# Patient Record
Sex: MALE | Race: WHITE | NOT HISPANIC OR LATINO
[De-identification: names, ages, dates, MRNs, and addresses within clinical notes are randomized per-mention and may not be internally consistent; named-entity substitution may affect disease eponyms.]

---

## 2018-08-20 ENCOUNTER — APPOINTMENT (OUTPATIENT)
Dept: DERMATOLOGY | Facility: CLINIC | Age: 78
End: 2018-08-20
Payer: MEDICARE

## 2018-08-20 PROCEDURE — 99203 OFFICE O/P NEW LOW 30 MIN: CPT

## 2018-08-28 ENCOUNTER — APPOINTMENT (OUTPATIENT)
Dept: DERMATOLOGY | Facility: CLINIC | Age: 78
End: 2018-08-28

## 2018-11-15 ENCOUNTER — APPOINTMENT (OUTPATIENT)
Dept: DERMATOLOGY | Facility: CLINIC | Age: 78
End: 2018-11-15

## 2022-10-20 ENCOUNTER — APPOINTMENT (OUTPATIENT)
Dept: DERMATOLOGY | Facility: CLINIC | Age: 82
End: 2022-10-20

## 2022-10-20 PROCEDURE — 99203 OFFICE O/P NEW LOW 30 MIN: CPT | Mod: 25

## 2022-10-20 PROCEDURE — 17000 DESTRUCT PREMALG LESION: CPT

## 2022-11-18 ENCOUNTER — APPOINTMENT (OUTPATIENT)
Dept: ORTHOPEDIC SURGERY | Facility: CLINIC | Age: 82
End: 2022-11-18

## 2022-11-18 VITALS — WEIGHT: 201 LBS | BODY MASS INDEX: 28.14 KG/M2 | HEIGHT: 71 IN

## 2022-11-18 DIAGNOSIS — Z96.641 PRESENCE OF RIGHT ARTIFICIAL HIP JOINT: ICD-10-CM

## 2022-11-18 DIAGNOSIS — Z96.642 PRESENCE OF LEFT ARTIFICIAL HIP JOINT: ICD-10-CM

## 2022-11-18 PROCEDURE — 73503 X-RAY EXAM HIP UNI 4/> VIEWS: CPT | Mod: LT

## 2022-11-18 PROCEDURE — 99203 OFFICE O/P NEW LOW 30 MIN: CPT

## 2022-11-18 NOTE — HISTORY OF PRESENT ILLNESS
[Gradual] : gradual [4] : 4 [3] : 3 [Localized] : localized [Intermittent] : intermittent [Household chores] : household chores [Rest] : rest [de-identified] : 11/18/22 PT PRESENTS HERE TODAY WITH LEFT HIP PAIN\par H/O BL THAS DONE 16+ YEARS AGO\par NO INJURY  [] : no [FreeTextEntry1] : LEFT HIP  [FreeTextEntry5] : NO INJURY   [de-identified] : GETTING OUT OF BED  [de-identified] : NOTHING

## 2022-11-18 NOTE — ASSESSMENT
[FreeTextEntry1] : S/P B/L NATY ~16 YEARS AGO. NO F/C/S. XRAYS REVIEWED WITH COMPONENTS WELL FIXED. NO SIGNS OF INFECTION. QUESTIONS ANSWERED. \par \par 81 year M WITH MODERATE LT HIP PAIN. PAIN IS TO THE LATERAL ASPECT OF THE HIP AND SOMETIMES RADIATES DOWN THE LE. PAIN WORSENS WITH PROLONGED STANDING AND SITTING TO STAND. PAIN IS AFFECTING FUNCTIONAL ACTIVITIES; GETTING IN AND OUT OF CARS. TREATMENT OPTIONS REVIEWED.

## 2022-11-29 ENCOUNTER — APPOINTMENT (OUTPATIENT)
Dept: PULMONOLOGY | Facility: CLINIC | Age: 82
End: 2022-11-29

## 2022-11-29 VITALS — BODY MASS INDEX: 30.41 KG/M2 | WEIGHT: 203 LBS | HEIGHT: 68.5 IN

## 2022-11-29 VITALS
OXYGEN SATURATION: 97 % | SYSTOLIC BLOOD PRESSURE: 144 MMHG | RESPIRATION RATE: 16 BRPM | HEART RATE: 78 BPM | DIASTOLIC BLOOD PRESSURE: 62 MMHG

## 2022-11-29 DIAGNOSIS — I10 ESSENTIAL (PRIMARY) HYPERTENSION: ICD-10-CM

## 2022-11-29 DIAGNOSIS — Z00.00 ENCOUNTER FOR GENERAL ADULT MEDICAL EXAMINATION W/OUT ABNORMAL FINDINGS: ICD-10-CM

## 2022-11-29 PROCEDURE — 94727 GAS DIL/WSHOT DETER LNG VOL: CPT

## 2022-11-29 PROCEDURE — 85018 HEMOGLOBIN: CPT | Mod: QW

## 2022-11-29 PROCEDURE — 94729 DIFFUSING CAPACITY: CPT

## 2022-11-29 PROCEDURE — 94010 BREATHING CAPACITY TEST: CPT

## 2022-11-29 PROCEDURE — 99205 OFFICE O/P NEW HI 60 MIN: CPT | Mod: 25

## 2022-11-29 NOTE — HISTORY OF PRESENT ILLNESS
[TextBox_4] : 82-year-old male with a greater than 40-pack-year history of cigarette smoking DC'd 20 years ago.  Patient felt symptoms of a cough productive of white sputum in mid November.  He was treated with a course of antibiotics by you but also underwent a chest x-ray on 11/11/2022 at [facility].  Compared to a previous film from June of this past year a new right upper lobe lesion was found.  Patient's symptoms of cough and sputum cleared without hemoptysis, shortness of breath, wheeze.  He has had no complaints of new exposures or changes in weight.  No rashes masses or adenopathy noted

## 2022-11-29 NOTE — END OF VISIT
[FreeTextEntry3] : Chest CT, PACS, PFTs reviewed with family [Time Spent: ___ minutes] : I have spent [unfilled] minutes of time on the encounter.

## 2022-11-29 NOTE — DISCUSSION/SUMMARY
[FreeTextEntry1] : 82-year-old male seen today for the above.  Findings on CAT scan are highly suspicious for a bronchogenic carcinoma stage IIb.  In addition the patient also appears to have stage II chronic obstructive lung disease by PFT criteria.  Differential diagnosis does include resolving community-acquired pneumonia with reactive adenopathy.  Will refer patient to thoracic surgery but first we will place the patient on empiric long-acting bronchodilators with follow-up spirometry to maximize lung function prior to surgery.  In addition a follow-up chest x-ray will be performed within 2 to 3 weeks to evaluate for any reduction in size of the lesion in light of his normal x-ray from June of this year.

## 2022-11-29 NOTE — PROCEDURE
[FreeTextEntry1] : 11/29/2022: Pulmonary function test-normal lung volumes.  Diffusion capacity normal.  Spirometry shows a moderate reduction in forced vital capacity with a severe reduction in FEV1.

## 2022-12-05 ENCOUNTER — APPOINTMENT (OUTPATIENT)
Dept: THORACIC SURGERY | Facility: CLINIC | Age: 82
End: 2022-12-05

## 2022-12-05 VITALS
BODY MASS INDEX: 29.35 KG/M2 | TEMPERATURE: 98 F | DIASTOLIC BLOOD PRESSURE: 70 MMHG | OXYGEN SATURATION: 96 % | HEART RATE: 67 BPM | SYSTOLIC BLOOD PRESSURE: 146 MMHG | WEIGHT: 205 LBS | HEIGHT: 70 IN | RESPIRATION RATE: 18 BRPM

## 2022-12-05 DIAGNOSIS — Z82.49 FAMILY HISTORY OF ISCHEMIC HEART DISEASE AND OTHER DISEASES OF THE CIRCULATORY SYSTEM: ICD-10-CM

## 2022-12-05 PROCEDURE — 99204 OFFICE O/P NEW MOD 45 MIN: CPT

## 2022-12-05 RX ORDER — CLOPIDOGREL BISULFATE 75 MG/1
75 TABLET, FILM COATED ORAL
Refills: 0 | Status: ACTIVE | COMMUNITY

## 2022-12-05 RX ORDER — METOPROLOL SUCCINATE 50 MG/1
50 TABLET, EXTENDED RELEASE ORAL
Refills: 0 | Status: ACTIVE | COMMUNITY

## 2022-12-05 RX ORDER — ATORVASTATIN CALCIUM 40 MG/1
40 TABLET, FILM COATED ORAL
Refills: 0 | Status: ACTIVE | COMMUNITY

## 2022-12-05 NOTE — DATA REVIEWED
[FreeTextEntry1] : NM PET/CT WHOLE BODY at [facility] 11/26/22:\par IMPRESSION:\par There is an intensely FDG avid posterior right upper lobe pulmonary mass which appears malignant in nature. Focal FDG activity in the right hilar region is suspicious for metastatic dejon involvement.\par \par Signed by Kayli Barragan MD.\par \par \par \par \par \par \par \par \par \par \par CT Chest at [facility] on 11/17/22:\par IMPRESSION:\par Right upper lobe mass about 2.8cm, concerning for neoplasm. Associated right hilar nodes.\par Additional right lower lobe nodular lesion, ?related satellite lesion.\par Suggest additional PET/CT assessment and correlation with tissue sampling.

## 2022-12-05 NOTE — HISTORY OF PRESENT ILLNESS
[FreeTextEntry1] : Duane Macias is a 82 year old male who presents for consultation, referred by Dr. Reed, for lung mass.\par \par Past medical history includes former smoker (quit in 2002), HTN, HLD.\par \par Patient reports that he was experiencing a productive cough in November. He was treated with a course of antibiotics but also underwent a chest x-ray on 11/11/2022 at [facility]. Compared to a previous film from June of this past year a new right upper lobe lesion was found. He has since had a CT chest and PET scan performed.\par \par Today the patient reports shortness of breath on exertion. He denies chest pain, palpitations, cough, fevers, chills, fatigue, unintentional weight loss or gain, and night sweats. \par \par He lives at home with his life and is able to independently perform all activities of daily living.\par \par \par \par \par Pulmonologist: Dr. Reed\par Primary care provider: Dr. Carballo

## 2022-12-05 NOTE — REVIEW OF SYSTEMS
[SOB on Exertion] : shortness of breath during exertion [Constipation] : constipation [Easy Bruising] : a tendency for easy bruising [Negative] : Endocrine [Fever] : no fever [Chills] : no chills [Feeling Poorly] : not feeling poorly [Feeling Tired] : not feeling tired [Chest Pain] : no chest pain [Palpitations] : no palpitations [Lower Ext Edema] : no extremity edema [Cough] : no cough [Dizziness] : no dizziness [Fainting] : no fainting [Sleep Disturbances] : no sleep disturbances [Anxiety] : no anxiety [Depression] : no depression

## 2022-12-05 NOTE — ASSESSMENT
[FreeTextEntry1] : Duane is an 82-year-old male with recent finding of a lung nodule and adenopathy in the mediastinal region.  He is scheduled to undergo a follow-up imaging study after course of antibiotics as this has apparently appeared over the past few months.  I would like to see him after that is complete.\par \par Thank you for allowing me to participate in the care of your patient.\par \par 45 minutes was spent during this encounter.\par \par Neri Mratin MD\par Department of Cardiovascular and Thoracic Surgery\par \par [employer]\[facility] [employer] at [facility]\par

## 2022-12-21 ENCOUNTER — APPOINTMENT (OUTPATIENT)
Dept: PULMONOLOGY | Facility: CLINIC | Age: 82
End: 2022-12-21

## 2022-12-21 VITALS — BODY MASS INDEX: 30.62 KG/M2 | WEIGHT: 202 LBS | HEIGHT: 68 IN

## 2022-12-21 VITALS — OXYGEN SATURATION: 97 % | HEART RATE: 70 BPM | SYSTOLIC BLOOD PRESSURE: 128 MMHG | DIASTOLIC BLOOD PRESSURE: 70 MMHG

## 2022-12-21 DIAGNOSIS — Z01.818 ENCOUNTER FOR OTHER PREPROCEDURAL EXAMINATION: ICD-10-CM

## 2022-12-21 PROCEDURE — 94010 BREATHING CAPACITY TEST: CPT

## 2022-12-21 PROCEDURE — 99215 OFFICE O/P EST HI 40 MIN: CPT | Mod: 25

## 2022-12-21 RX ORDER — PREDNISONE 10 MG/1
10 TABLET ORAL
Qty: 42 | Refills: 0 | Status: DISCONTINUED | COMMUNITY
Start: 2022-11-29 | End: 2022-12-21

## 2022-12-21 NOTE — HISTORY OF PRESENT ILLNESS
[Former] : former [Class II - Mild Symptoms and Slight Limitations] : II [TextBox_4] : 82-year-old male with a 40-pack-year history of cigarette smoking DC'd 20 years ago.  Patient presented on 11/29/2020 with a right upper lobe mass.  CAT scan and PET scans were performed and are enclosed below.  Patient was found to have significant degree of airways obstruction and begun on long-acting bronchodilator for treatment of stage II chronic obstructive lung disease.  He is seen today in follow-up.  He presently denies any complaints of cough wheeze or sputum.  His exercise tolerance has improved.  No history of weight loss [YearQuit] : 2002

## 2022-12-21 NOTE — DISCUSSION/SUMMARY
[FreeTextEntry1] : 82-year-old male seen today for follow-up.  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  He has had a significant response to long-acting bronchodilators and has been maximized and should tolerate lobectomy easily.  Considerations for staging biopsy per surgery.  Dr. Martin contacted.  Case discussed with family.  Patient advised to seek cardiac and medical clearance

## 2022-12-21 NOTE — CONSULT LETTER
[Dear  ___] : Dear  [unfilled], [Consult Letter:] : I had the pleasure of evaluating your patient, [unfilled]. [Please see my note below.] : Please see my note below. [Consult Closing:] : Thank you very much for allowing me to participate in the care of this patient.  If you have any questions, please do not hesitate to contact me. [Sincerely,] : Sincerely, [FreeTextEntry3] : Ross Reed MD FCCP\par Pulmonary/Critical Care/Sleep Medicine\par Department of Internal Medicine\par \par [facility] [employer]\par

## 2022-12-29 ENCOUNTER — APPOINTMENT (OUTPATIENT)
Dept: THORACIC SURGERY | Facility: CLINIC | Age: 82
End: 2022-12-29
Payer: MEDICARE

## 2022-12-29 VITALS
WEIGHT: 205 LBS | BODY MASS INDEX: 31.17 KG/M2 | TEMPERATURE: 98.4 F | SYSTOLIC BLOOD PRESSURE: 163 MMHG | DIASTOLIC BLOOD PRESSURE: 79 MMHG | HEART RATE: 54 BPM | OXYGEN SATURATION: 98 % | RESPIRATION RATE: 16 BRPM

## 2022-12-29 PROCEDURE — 99214 OFFICE O/P EST MOD 30 MIN: CPT

## 2022-12-29 NOTE — REVIEW OF SYSTEMS
[SOB on Exertion] : shortness of breath during exertion [Easy Bleeding] : a tendency for easy bleeding [Easy Bruising] : a tendency for easy bruising [Negative] : Endocrine [Fever] : no fever [Chills] : no chills [Feeling Poorly] : not feeling poorly [Feeling Tired] : not feeling tired [Chest Pain] : no chest pain [Palpitations] : no palpitations [Lower Ext Edema] : no extremity edema [Wheezing] : no wheezing [Cough] : no cough [Dizziness] : no dizziness [Fainting] : no fainting [Sleep Disturbances] : no sleep disturbances [Anxiety] : no anxiety [Depression] : no depression

## 2022-12-29 NOTE — PHYSICAL EXAM
[] : no respiratory distress [Auscultation Breath Sounds / Voice Sounds] : lungs were clear to auscultation bilaterally [Heart Rate And Rhythm] : heart rate was normal and rhythm regular [Heart Sounds] : normal S1 and S2 [Heart Sounds Gallop] : no gallops [Murmurs] : no murmurs [Heart Sounds Pericardial Friction Rub] : no pericardial rub [Examination Of The Chest] : the chest was normal in appearance [Chest Visual Inspection Thoracic Asymmetry] : no chest asymmetry [Diminished Respiratory Excursion] : normal chest expansion [No Focal Deficits] : no focal deficits [Oriented To Time, Place, And Person] : oriented to person, place, and time [Impaired Insight] : insight and judgment were intact [Affect] : the affect was normal Arava Counseling:  Patient counseled regarding adverse effects of Arava including but not limited to nausea, vomiting, abnormalities in liver function tests. Patients may develop mouth sores, rash, diarrhea, and abnormalities in blood counts. The patient understands that monitoring is required including LFTs and blood counts.  There is a rare possibility of scarring of the liver and lung problems that can occur when taking methotrexate. Persistent nausea, loss of appetite, pale stools, dark urine, cough, and shortness of breath should be reported immediately. Patient advised to discontinue Arava treatment and consult with a physician prior to attempting conception. The patient will have to undergo a treatment to eliminate Arava from the body prior to conception.

## 2022-12-29 NOTE — ASSESSMENT
[FreeTextEntry1] : Duane is an 82-year-old male with a right upper lobe nodule and hilar adenopathy that are positive by PET scan and concerning for malignancy.  I will be arranging robotic bronchoscopy and endobronchial ultrasound in the near future to obtain tissue and stage the mediastinum.\par \par Thank you for allowing me to participate in the care of your patient.\par \par 30 minutes was spent during this encounter.\par \par Neri Martin MD\par Department of Cardiovascular and Thoracic Surgery\par \par [employer]\[facility] [employer] [facility] [place]\par

## 2022-12-29 NOTE — DATA REVIEWED
[FreeTextEntry1] : CHEST XRAY at [facility] 12/16/22:\par IMPRESSION:\par Persistent 3cm nodule in the right upper lobe.\par No significant change compared to chest x-ray November 11, 2022.\par \par \par \par NM PET/CT WHOLE BODY at [facility] 11/26/22:\par IMPRESSION:\par There is an intensely FDG avid posterior right upper lobe pulmonary mass which appears malignant in nature. Focal FDG activity in the right hilar region is suspicious for metastatic dejon involvement.\par \par Signed by Kayli Barragan MD.\par \par \par \par \par \par CT Chest at [facility] on 11/17/22:\par IMPRESSION:\par Right upper lobe mass about 2.8cm, concerning for neoplasm. Associated right hilar nodes.\par Additional right lower lobe nodular lesion, ?related satellite lesion.\par Suggest additional PET/CT assessment and correlation with tissue sampling.

## 2022-12-29 NOTE — HISTORY OF PRESENT ILLNESS
[FreeTextEntry1] : Duane Macias is a 82 year old male who presents for a follow up appointment, referred by Dr. Reed, to review imaging.\par \par Past medical history includes former smoker (quit in 2002), HTN, HLD.\par \par Patient reports that he was experiencing a productive cough in November. He was treated with a course of antibiotics but also underwent a chest x-ray on 11/11/2022 at [facility]. Compared to a previous film from June of this past year a new right upper lobe lesion was found. He has since had a CT chest and PET scan performed.\par \par Today the patient reports continued shortness of breath on exertion. He denies chest pain, palpitations, cough, fevers, chills, fatigue, unintentional weight loss or gain, and night sweats. \par \par He lives at home with his wife and is able to independently perform all activities of daily living.\par \par \par \par \par Pulmonologist: Dr. Reed\par Primary care provider: Dr. Carballo \par

## 2023-01-12 ENCOUNTER — OUTPATIENT (OUTPATIENT)
Dept: OUTPATIENT SERVICES | Facility: HOSPITAL | Age: 83
LOS: 1 days | End: 2023-01-12
Payer: MEDICARE

## 2023-01-12 VITALS
SYSTOLIC BLOOD PRESSURE: 158 MMHG | TEMPERATURE: 97 F | DIASTOLIC BLOOD PRESSURE: 60 MMHG | WEIGHT: 203.71 LBS | RESPIRATION RATE: 16 BRPM | HEIGHT: 69 IN | OXYGEN SATURATION: 95 % | HEART RATE: 56 BPM

## 2023-01-12 DIAGNOSIS — I44.4 LEFT ANTERIOR FASCICULAR BLOCK: ICD-10-CM

## 2023-01-12 DIAGNOSIS — Z01.818 ENCOUNTER FOR OTHER PREPROCEDURAL EXAMINATION: ICD-10-CM

## 2023-01-12 DIAGNOSIS — Z96.642 PRESENCE OF LEFT ARTIFICIAL HIP JOINT: Chronic | ICD-10-CM

## 2023-01-12 DIAGNOSIS — I25.10 ATHEROSCLEROTIC HEART DISEASE OF NATIVE CORONARY ARTERY WITHOUT ANGINA PECTORIS: ICD-10-CM

## 2023-01-12 DIAGNOSIS — Z29.9 ENCOUNTER FOR PROPHYLACTIC MEASURES, UNSPECIFIED: ICD-10-CM

## 2023-01-12 DIAGNOSIS — Z91.89 OTHER SPECIFIED PERSONAL RISK FACTORS, NOT ELSEWHERE CLASSIFIED: ICD-10-CM

## 2023-01-12 DIAGNOSIS — R91.8 OTHER NONSPECIFIC ABNORMAL FINDING OF LUNG FIELD: ICD-10-CM

## 2023-01-12 DIAGNOSIS — Z96.641 PRESENCE OF RIGHT ARTIFICIAL HIP JOINT: Chronic | ICD-10-CM

## 2023-01-12 DIAGNOSIS — I10 ESSENTIAL (PRIMARY) HYPERTENSION: ICD-10-CM

## 2023-01-12 LAB
ANION GAP SERPL CALC-SCNC: 9 MMOL/L — SIGNIFICANT CHANGE UP (ref 5–17)
APTT BLD: 30.9 SEC — SIGNIFICANT CHANGE UP (ref 27.5–35.5)
BASOPHILS # BLD AUTO: 0.09 K/UL — SIGNIFICANT CHANGE UP (ref 0–0.2)
BASOPHILS NFR BLD AUTO: 0.8 % — SIGNIFICANT CHANGE UP (ref 0–2)
BUN SERPL-MCNC: 22.1 MG/DL — HIGH (ref 8–20)
CALCIUM SERPL-MCNC: 9.3 MG/DL — SIGNIFICANT CHANGE UP (ref 8.4–10.5)
CHLORIDE SERPL-SCNC: 105 MMOL/L — SIGNIFICANT CHANGE UP (ref 96–108)
CO2 SERPL-SCNC: 29 MMOL/L — SIGNIFICANT CHANGE UP (ref 22–29)
CREAT SERPL-MCNC: 0.99 MG/DL — SIGNIFICANT CHANGE UP (ref 0.5–1.3)
EGFR: 77 ML/MIN/1.73M2 — SIGNIFICANT CHANGE UP
EOSINOPHIL # BLD AUTO: 0.26 K/UL — SIGNIFICANT CHANGE UP (ref 0–0.5)
EOSINOPHIL NFR BLD AUTO: 2.4 % — SIGNIFICANT CHANGE UP (ref 0–6)
GLUCOSE SERPL-MCNC: 126 MG/DL — HIGH (ref 70–99)
HCT VFR BLD CALC: 40.4 % — SIGNIFICANT CHANGE UP (ref 39–50)
HGB BLD-MCNC: 13.1 G/DL — SIGNIFICANT CHANGE UP (ref 13–17)
IMM GRANULOCYTES NFR BLD AUTO: 0.7 % — SIGNIFICANT CHANGE UP (ref 0–0.9)
INR BLD: 0.96 RATIO — SIGNIFICANT CHANGE UP (ref 0.88–1.16)
LYMPHOCYTES # BLD AUTO: 1.02 K/UL — SIGNIFICANT CHANGE UP (ref 1–3.3)
LYMPHOCYTES # BLD AUTO: 9.5 % — LOW (ref 13–44)
MCHC RBC-ENTMCNC: 31.3 PG — SIGNIFICANT CHANGE UP (ref 27–34)
MCHC RBC-ENTMCNC: 32.4 GM/DL — SIGNIFICANT CHANGE UP (ref 32–36)
MCV RBC AUTO: 96.4 FL — SIGNIFICANT CHANGE UP (ref 80–100)
MONOCYTES # BLD AUTO: 1.01 K/UL — HIGH (ref 0–0.9)
MONOCYTES NFR BLD AUTO: 9.4 % — SIGNIFICANT CHANGE UP (ref 2–14)
NEUTROPHILS # BLD AUTO: 8.32 K/UL — HIGH (ref 1.8–7.4)
NEUTROPHILS NFR BLD AUTO: 77.2 % — HIGH (ref 43–77)
PLATELET # BLD AUTO: 271 K/UL — SIGNIFICANT CHANGE UP (ref 150–400)
POTASSIUM SERPL-MCNC: 4.8 MMOL/L — SIGNIFICANT CHANGE UP (ref 3.5–5.3)
POTASSIUM SERPL-SCNC: 4.8 MMOL/L — SIGNIFICANT CHANGE UP (ref 3.5–5.3)
PROTHROM AB SERPL-ACNC: 11.1 SEC — SIGNIFICANT CHANGE UP (ref 10.5–13.4)
RBC # BLD: 4.19 M/UL — LOW (ref 4.2–5.8)
RBC # FLD: 14.9 % — HIGH (ref 10.3–14.5)
SODIUM SERPL-SCNC: 143 MMOL/L — SIGNIFICANT CHANGE UP (ref 135–145)
WBC # BLD: 10.78 K/UL — HIGH (ref 3.8–10.5)
WBC # FLD AUTO: 10.78 K/UL — HIGH (ref 3.8–10.5)

## 2023-01-12 PROCEDURE — 93005 ELECTROCARDIOGRAM TRACING: CPT

## 2023-01-12 PROCEDURE — 93010 ELECTROCARDIOGRAM REPORT: CPT

## 2023-01-12 PROCEDURE — 85610 PROTHROMBIN TIME: CPT

## 2023-01-12 PROCEDURE — G0463: CPT

## 2023-01-12 PROCEDURE — 85025 COMPLETE CBC W/AUTO DIFF WBC: CPT

## 2023-01-12 PROCEDURE — 80048 BASIC METABOLIC PNL TOTAL CA: CPT

## 2023-01-12 PROCEDURE — 36415 COLL VENOUS BLD VENIPUNCTURE: CPT

## 2023-01-12 PROCEDURE — 85730 THROMBOPLASTIN TIME PARTIAL: CPT

## 2023-01-12 NOTE — H&P PST ADULT - PROBLEM SELECTOR PLAN 4
/60, pt states she did not take BP medication this AM, educated to take as prescribed, pt verbalized understanding. Medical and cardiac pending

## 2023-01-12 NOTE — H&P PST ADULT - HISTORY OF PRESENT ILLNESS
82 year old male     Past medical history includes former smoker (quit in 2002), HTN, HLD, RBBB, LAFB,     Patient reports that he was experiencing a productive cough in November. He was treated with a course of antibiotics but also underwent a chest x-ray on 11/11/2022 at [facility]. Compared to a previous film from June of this past year a new right upper lobe lesion was found. He has since had a CT chest and PET scan performed.    Today the patient reports continued shortness of breath on exertion. He denies chest pain, palpitations, cough, fevers, chills, fatigue, unintentional weight loss or gain, and night sweats.  82 year old male with pmhx    Past medical history includes former smoker (quit in 2002), HTN, HLD, RBBB, LAFB,     Patient reports that he was experiencing a productive cough in November. He was treated with a course of antibiotics but also underwent a chest x-ray on 11/11/2022 at [facility]. Compared to a previous film from June of this past year a new right upper lobe lesion was found. He has since had a CT chest and PET scan performed.    Patient reports since starting trelegy his dyspnea on exertion has improved. He denies chest pain, palpitations, cough, fevers, chills, fatigue, unintentional weight loss or gain, and night sweats.   Medical and cardiac evaluation pending  82 year old male with pmhx HTN, HLD, CAD on plavix/aspirin, RBBB, LAFB, former smoker quit in 2002 40 pack year history. Presents today following productive cough treated with antibiotics and an abnormal CXR on 11/11/22 that revealed right upper lobe lesion. Patient was started on trelegy. 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   He denies chest pain, palpitations, cough, fevers, chills, fatigue, unintentional weight loss or gain, and night sweats. Patient is scheduled for robotic assisted bronch (ion) EBUS with Dr Martin on 1/18/23.  Medical and cardiac evaluation pending

## 2023-01-12 NOTE — H&P PST ADULT - NSICDXPASTMEDICALHX_GEN_ALL_CORE_FT
PAST MEDICAL HISTORY:  Abnormal findings on diagnostic imaging of lung      PAST MEDICAL HISTORY:  Abnormal findings on diagnostic imaging of lung     CAD (coronary artery disease)     Hyperlipidemia     Hypertension     Left anterior fascicular block (LAFB)     RBBB

## 2023-01-12 NOTE — H&P PST ADULT - MUSCULOSKELETAL
negative no joint swelling/no joint erythema/no calf tenderness/strength 5/5 bilateral upper extremities/strength 5/5 bilateral lower extremities/abnormal gait

## 2023-01-12 NOTE — H&P PST ADULT - ASSESSMENT
CAPRINI SCORE    AGE RELATED RISK FACTORS                                                             [ ] Age 41-60 years                                            (1 Point)  [ ] Age: 61-74 years                                           (2 Points)                 [ ] Age= 75 years                                                (3 Points)             DISEASE RELATED RISK FACTORS                                                       [ ] Edema in the lower extremities                 (1 Point)                     [ ] Varicose veins                                               (1 Point)                                 [ ] BMI > 25 Kg/m2                                            (1 Point)                                  [ ] Serious infection (ie PNA)                            (1 Point)                     [ ] Lung disease ( COPD, Emphysema)            (1 Point)                                                                          [ ] Acute myocardial infarction                         (1 Point)                  [ ] Congestive heart failure (in the previous month)  (1 Point)         [ ] Inflammatory bowel disease                            (1 Point)                  [ ] Central venous access, PICC or Port               (2 points)       (within the last month)                                                                [ ] Stroke (in the previous month)                        (5 Points)    [ ] Previous or present malignancy                       (2 points)                                                                                                                                                         HEMATOLOGY RELATED FACTORS                                                         [ ] Prior episodes of VTE                                     (3 Points)                     [ ] Positive family history for VTE                      (3 Points)                  [ ] Prothrombin 30063 A                                     (3 Points)                     [ ] Factor V Leiden                                                (3 Points)                        [ ] Lupus anticoagulants                                      (3 Points)                                                           [ ] Anticardiolipin antibodies                              (3 Points)                                                       [ ] High homocysteine in the blood                   (3 Points)                                             [ ] Other congenital or acquired thrombophilia      (3 Points)                                                [ ] Heparin induced thrombocytopenia                  (3 Points)                                        MOBILITY RELATED FACTORS  [ ] Bed rest                                                         (1 Point)  [ ] Plaster cast                                                    (2 points)  [ ] Bed bound for more than 72 hours           (2 Points)    GENDER SPECIFIC FACTORS  [ ] Pregnancy or had a baby within the last month   (1 Point)  [ ] Post-partum < 6 weeks                                   (1 Point)  [ ] Hormonal therapy  or oral contraception   (1 Point)  [ ] History of pregnancy complications              (1 point)  [ ] Unexplained or recurrent              (1 Point)    OTHER RISK FACTORS                                           (1 Point)  [ ] BMI >40, smoking, diabetes requiring insulin, chemotherapy  blood transfusions and length of surgery over 2 hours    SURGERY RELATED RISK FACTORS  [ ]  Section within the last month     (1 Point)  [ ] Minor surgery                                                  (1 Point)  [ ] Arthroscopic surgery                                       (2 Points)  [ ] Planned major surgery lasting more            (2 Points)      than 45 minutes     [ ] Elective hip or knee joint replacement       (5 points)       surgery                                                TRAUMA RELATED RISK FACTORS  [ ] Fracture of the hip, pelvis, or leg                       (5 Points)  [ ] Spinal cord injury resulting in paralysis             (5 points)       (in the previous month)    [ ] Paralysis  (less than 1 month)                             (5 Points)  [ ] Multiple Trauma within 1 month                        (5 Points)    Total Score [        ]    Caprini Score 0-2: Low Risk, NO VTE prophylaxis required for most patients, encourage ambulation  Caprini Score 3-6: Moderate Risk , pharmacologic VTE prophylaxis is indicated for most patients (in the absence of contraindications)  Caprini Score Greater than or =7: High risk, pharmocologic VTE prophylaxis indicated for most patients (in the absence of contraindications)                OPIOID RISK TOOL    LAKSHMI EACH BOX THAT APPLIES AND ADD TOTALS AT THE END    FAMILY HISTORY OF SUBSTANCE ABUSE                 FEMALE         MALE                                                Alcohol                             [  ]1 pt          [  ]3pts                                               Illegal Durgs                     [  ]2 pts        [  ]3pts                                               Rx Drugs                           [  ]4 pts        [  ]4 pts    PERSONAL HISTORY OF SUBSTANCE ABUSE                                                                                          Alcohol                             [  ]3 pts       [  ]3 pts                                               Illegal Durgs                     [  ]4 pts        [  ]4 pts                                               Rx Drugs                           [  ]5 pts        [  ]5 pts    AGE BETWEEN 16-45 YEARS                                      [  ]1 pt         [  ]1 pt    HISTORY OF PREADOLESCENT   SEXUAL ABUSE                                                             [  ]3 pts        [  ]0pts    PSYCHOLOGICAL DISEASE                     ADD, OCD, Bipolar, Schizophrenia        [  ]2 pts         [  ]2 pts                      Depression                                               [  ]1 pt           [  ]1 pt           SCORING TOTAL   (add numbers and type here)              (***)                                     A score of 3 or lower indicated LOW risk for future opiod abuse  A score of 4 to 7 indicated moderate risk for future opiod abuse  A score of 8 or higher indicates a high risk for opiod abuse               CAPRINI SCORE    AGE RELATED RISK FACTORS                                                             [ ] Age 41-60 years                                            (1 Point)  [ ] Age: 61-74 years                                           (2 Points)                 [x ] Age= 75 years                                                (3 Points)             DISEASE RELATED RISK FACTORS                                                       [x ] Edema in the lower extremities                 (1 Point)                     [ ] Varicose veins                                               (1 Point)                                 [x ] BMI > 25 Kg/m2                                            (1 Point)                                  [ ] Serious infection (ie PNA)                            (1 Point)                     [ x] Lung disease ( COPD, Emphysema)            (1 Point)                                                                          [ ] Acute myocardial infarction                         (1 Point)                  [ ] Congestive heart failure (in the previous month)  (1 Point)         [ ] Inflammatory bowel disease                            (1 Point)                  [ ] Central venous access, PICC or Port               (2 points)       (within the last month)                                                                [ ] Stroke (in the previous month)                        (5 Points)    [ ] Previous or present malignancy                       (2 points)                                                                                                                                                         HEMATOLOGY RELATED FACTORS                                                         [ ] Prior episodes of VTE                                     (3 Points)                     [ ] Positive family history for VTE                      (3 Points)                  [ ] Prothrombin 83475 A                                     (3 Points)                     [ ] Factor V Leiden                                                (3 Points)                        [ ] Lupus anticoagulants                                      (3 Points)                                                           [ ] Anticardiolipin antibodies                              (3 Points)                                                       [ ] High homocysteine in the blood                   (3 Points)                                             [ ] Other congenital or acquired thrombophilia      (3 Points)                                                [ ] Heparin induced thrombocytopenia                  (3 Points)                                        MOBILITY RELATED FACTORS  [ ] Bed rest                                                         (1 Point)  [ ] Plaster cast                                                    (2 points)  [ ] Bed bound for more than 72 hours           (2 Points)    GENDER SPECIFIC FACTORS  [ ] Pregnancy or had a baby within the last month   (1 Point)  [ ] Post-partum < 6 weeks                                   (1 Point)  [ ] Hormonal therapy  or oral contraception   (1 Point)  [ ] History of pregnancy complications              (1 point)  [ ] Unexplained or recurrent              (1 Point)    OTHER RISK FACTORS                                           (1 Point)  [ ] BMI >40, smoking, diabetes requiring insulin, chemotherapy  blood transfusions and length of surgery over 2 hours    SURGERY RELATED RISK FACTORS  [ ]  Section within the last month     (1 Point)  [ ] Minor surgery                                                  (1 Point)  [ ] Arthroscopic surgery                                       (2 Points)  [x ] Planned major surgery lasting more            (2 Points)      than 45 minutes     [ ] Elective hip or knee joint replacement       (5 points)       surgery                                                TRAUMA RELATED RISK FACTORS  [ ] Fracture of the hip, pelvis, or leg                       (5 Points)  [ ] Spinal cord injury resulting in paralysis             (5 points)       (in the previous month)    [ ] Paralysis  (less than 1 month)                             (5 Points)  [ ] Multiple Trauma within 1 month                        (5 Points)    Total Score [    8    ]    Caprini Score 0-2: Low Risk, NO VTE prophylaxis required for most patients, encourage ambulation  Caprini Score 3-6: Moderate Risk , pharmacologic VTE prophylaxis is indicated for most patients (in the absence of contraindications)  Caprini Score Greater than or =7: High risk, pharmocologic VTE prophylaxis indicated for most patients (in the absence of contraindications)      OPIOID RISK TOOL    LAKSHMI EACH BOX THAT APPLIES AND ADD TOTALS AT THE END    FAMILY HISTORY OF SUBSTANCE ABUSE                 FEMALE         MALE                                                Alcohol                             [  ]1 pt          [  ]3pts                                               Illegal Durgs                     [  ]2 pts        [  ]3pts                                               Rx Drugs                           [  ]4 pts        [  ]4 pts    PERSONAL HISTORY OF SUBSTANCE ABUSE                                                                                          Alcohol                             [  ]3 pts       [  ]3 pts                                               Illegal Durgs                     [  ]4 pts        [  ]4 pts                                               Rx Drugs                           [  ]5 pts        [  ]5 pts    AGE BETWEEN 16-45 YEARS                                      [  ]1 pt         [  ]1 pt    HISTORY OF PREADOLESCENT   SEXUAL ABUSE                                                             [  ]3 pts        [  ]0pts    PSYCHOLOGICAL DISEASE                     ADD, OCD, Bipolar, Schizophrenia        [  ]2 pts         [  ]2 pts                      Depression                                               [  ]1 pt           [  ]1 pt           SCORING TOTAL   0                                    A score of 3 or lower indicated LOW risk for future opiod abuse  A score of 4 to 7 indicated moderate risk for future opiod abuse  A score of 8 or higher indicates a high risk for opiod abuse      82 year old male with pmhx HTN, HLD, CAD on plavix/aspirin, RBBB, LAFB, former smoker quit in  40 pack year history. Presents today following productive cough treated with antibiotics and an abnormal CXR on 22 that revealed right upper lobe lesion. Patient was started on trelegy. 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   He denies chest pain, palpitations, cough, fevers, chills, fatigue, unintentional weight loss or gain, and night sweats. Patient is scheduled for robotic assisted bronch (ion) EBUS with Dr Martin on 23. Patient educated on COVID testing, preadmission instructions, medical, cardiac clearance and day of procedure medications, verbalizes understanding. Pt instructed to stop vitamins/supplements/herbal medications/NSAIDS for one week prior to surgery and discuss with PMD. Asked the patient to consult with PCP/cardiologist about holding ASA/Plavix and the pt  agreed.

## 2023-01-12 NOTE — H&P PST ADULT - PROBLEM SELECTOR PLAN 1
Patient is scheduled for robotic assisted bronch (ion) EBUS with Dr Martin on 1/18/23. Medical and cardiac evaluation

## 2023-01-12 NOTE — H&P PST ADULT - BP NONINVASIVE DIASTOLIC (MM HG)
Patient assisted to use a urinal. Patient weak, but does not want RN's assistance. RN reassures patient that he will have privacy but must be present for his safety. Patient urinates on pants and floor while attempting to use urinal. RN attempts to help patient take off wet clothes, patient becomes agitated with RN and does it himself. Unsteady gait, but able to complete these tasks without assistance. Patient assisted back into recliner and mask placed back on patient.   60

## 2023-01-12 NOTE — H&P PST ADULT - GASTROINTESTINAL
details… normal/soft/nontender/nondistended/normal active bowel sounds/no organomegaly/no palpable hakan

## 2023-01-17 ENCOUNTER — TRANSCRIPTION ENCOUNTER (OUTPATIENT)
Age: 83
End: 2023-01-17

## 2023-01-17 LAB — SARS-COV-2 N GENE NPH QL NAA+PROBE: NOT DETECTED

## 2023-01-18 ENCOUNTER — RESULT REVIEW (OUTPATIENT)
Age: 83
End: 2023-01-18

## 2023-01-18 ENCOUNTER — APPOINTMENT (OUTPATIENT)
Dept: THORACIC SURGERY | Facility: HOSPITAL | Age: 83
End: 2023-01-18

## 2023-01-18 ENCOUNTER — OUTPATIENT (OUTPATIENT)
Dept: OUTPATIENT SERVICES | Facility: HOSPITAL | Age: 83
LOS: 1 days | End: 2023-01-18
Payer: MEDICARE

## 2023-01-18 DIAGNOSIS — Z96.641 PRESENCE OF RIGHT ARTIFICIAL HIP JOINT: Chronic | ICD-10-CM

## 2023-01-18 DIAGNOSIS — R91.8 OTHER NONSPECIFIC ABNORMAL FINDING OF LUNG FIELD: ICD-10-CM

## 2023-01-18 DIAGNOSIS — Z96.642 PRESENCE OF LEFT ARTIFICIAL HIP JOINT: Chronic | ICD-10-CM

## 2023-01-18 LAB
B PERT IGG+IGM PNL SER: ABNORMAL
COLOR FLD: ABNORMAL
GRAM STN FLD: SIGNIFICANT CHANGE UP
LYMPHOCYTES # FLD: 8 % — SIGNIFICANT CHANGE UP
MONOS+MACROS # FLD: 12 % — SIGNIFICANT CHANGE UP
NEUTROPHILS-BODY FLUID: 80 % — SIGNIFICANT CHANGE UP
RCV VOL RI: HIGH /UL (ref 0–0)
SPECIMEN SOURCE FLD: SIGNIFICANT CHANGE UP
SPECIMEN SOURCE: SIGNIFICANT CHANGE UP
TOTAL NUCLEATED CELL COUNT, BODY FLUID: 1100 /UL — SIGNIFICANT CHANGE UP

## 2023-01-18 PROCEDURE — 87102 FUNGUS ISOLATION CULTURE: CPT

## 2023-01-18 PROCEDURE — 31628 BRONCHOSCOPY/LUNG BX EACH: CPT

## 2023-01-18 PROCEDURE — 88342 IMHCHEM/IMCYTCHM 1ST ANTB: CPT | Mod: 26

## 2023-01-18 PROCEDURE — 31627 NAVIGATIONAL BRONCHOSCOPY: CPT

## 2023-01-18 PROCEDURE — S2900 ROBOTIC SURGICAL SYSTEM: CPT | Mod: NC

## 2023-01-18 PROCEDURE — 88341 IMHCHEM/IMCYTCHM EA ADD ANTB: CPT | Mod: 26

## 2023-01-18 PROCEDURE — 89051 BODY FLUID CELL COUNT: CPT

## 2023-01-18 PROCEDURE — 31653 BRONCH EBUS SAMPLNG 3/> NODE: CPT

## 2023-01-18 PROCEDURE — 88172 CYTP DX EVAL FNA 1ST EA SITE: CPT | Mod: 26,59

## 2023-01-18 PROCEDURE — 88112 CYTOPATH CELL ENHANCE TECH: CPT

## 2023-01-18 PROCEDURE — 87594 PNEUMCYSTS JIROVECII AMP PRB: CPT

## 2023-01-18 PROCEDURE — 31625 BRONCHOSCOPY W/BIOPSY(S): CPT | Mod: 59

## 2023-01-18 PROCEDURE — S2900: CPT

## 2023-01-18 PROCEDURE — 88305 TISSUE EXAM BY PATHOLOGIST: CPT | Mod: 26

## 2023-01-18 PROCEDURE — 31623 DX BRONCHOSCOPE/BRUSH: CPT

## 2023-01-18 PROCEDURE — 88104 CYTOPATH FL NONGYN SMEARS: CPT

## 2023-01-18 PROCEDURE — 88305 TISSUE EXAM BY PATHOLOGIST: CPT

## 2023-01-18 PROCEDURE — 88341 IMHCHEM/IMCYTCHM EA ADD ANTB: CPT

## 2023-01-18 PROCEDURE — 31624 DX BRONCHOSCOPE/LAVAGE: CPT

## 2023-01-18 PROCEDURE — 87070 CULTURE OTHR SPECIMN AEROBIC: CPT

## 2023-01-18 PROCEDURE — 31629 BRONCHOSCOPY/NEEDLE BX EACH: CPT

## 2023-01-18 PROCEDURE — 87116 MYCOBACTERIA CULTURE: CPT

## 2023-01-18 PROCEDURE — 88104 CYTOPATH FL NONGYN SMEARS: CPT | Mod: 26,59

## 2023-01-18 PROCEDURE — 87015 SPECIMEN INFECT AGNT CONCNTJ: CPT

## 2023-01-18 PROCEDURE — 88173 CYTOPATH EVAL FNA REPORT: CPT | Mod: 26,59

## 2023-01-18 PROCEDURE — 76000 FLUOROSCOPY <1 HR PHYS/QHP: CPT

## 2023-01-18 PROCEDURE — C9399: CPT

## 2023-01-18 PROCEDURE — 87206 SMEAR FLUORESCENT/ACID STAI: CPT

## 2023-01-18 PROCEDURE — 88342 IMHCHEM/IMCYTCHM 1ST ANTB: CPT

## 2023-01-18 PROCEDURE — 88173 CYTOPATH EVAL FNA REPORT: CPT

## 2023-01-18 PROCEDURE — 88112 CYTOPATH CELL ENHANCE TECH: CPT | Mod: 26,59

## 2023-01-18 PROCEDURE — 88172 CYTP DX EVAL FNA 1ST EA SITE: CPT

## 2023-01-18 NOTE — BRIEF OPERATIVE NOTE - NSICDXBRIEFPROCEDURE_GEN_ALL_CORE_FT
PROCEDURES:  Bronchoscopy with fine needle aspiration biopsy 18-Jan-2023 12:16:08 Bronchoscopy fine needle biopsy with ION, Ebus, Brushing, BAL, Tamara Pang

## 2023-01-19 LAB
NIGHT BLUE STAIN TISS: SIGNIFICANT CHANGE UP
SPECIMEN SOURCE: SIGNIFICANT CHANGE UP

## 2023-01-20 LAB
CULTURE RESULTS: SIGNIFICANT CHANGE UP
SPECIMEN SOURCE: SIGNIFICANT CHANGE UP

## 2023-01-24 LAB
NON-GYNECOLOGICAL CYTOLOGY STUDY: SIGNIFICANT CHANGE UP
NON-GYNECOLOGICAL CYTOLOGY STUDY: SIGNIFICANT CHANGE UP
P JIROVECII DNA L RESP QL NAA+NON-PROBE: NEGATIVE — SIGNIFICANT CHANGE UP
SPECIMEN SOURCE: SIGNIFICANT CHANGE UP

## 2023-01-30 ENCOUNTER — APPOINTMENT (OUTPATIENT)
Dept: THORACIC SURGERY | Facility: CLINIC | Age: 83
End: 2023-01-30
Payer: MEDICARE

## 2023-01-30 ENCOUNTER — OUTPATIENT (OUTPATIENT)
Dept: OUTPATIENT SERVICES | Facility: HOSPITAL | Age: 83
LOS: 1 days | Discharge: HOME | End: 2023-01-30

## 2023-01-30 VITALS
HEIGHT: 68 IN | BODY MASS INDEX: 31.07 KG/M2 | SYSTOLIC BLOOD PRESSURE: 166 MMHG | TEMPERATURE: 98.2 F | RESPIRATION RATE: 16 BRPM | WEIGHT: 205 LBS | DIASTOLIC BLOOD PRESSURE: 73 MMHG | HEART RATE: 61 BPM | OXYGEN SATURATION: 98 %

## 2023-01-30 DIAGNOSIS — Z96.642 PRESENCE OF LEFT ARTIFICIAL HIP JOINT: Chronic | ICD-10-CM

## 2023-01-30 DIAGNOSIS — Z96.641 PRESENCE OF RIGHT ARTIFICIAL HIP JOINT: Chronic | ICD-10-CM

## 2023-01-30 DIAGNOSIS — C34.90 MALIGNANT NEOPLASM OF UNSPECIFIED PART OF UNSPECIFIED BRONCHUS OR LUNG: ICD-10-CM

## 2023-01-30 PROBLEM — I45.10 UNSPECIFIED RIGHT BUNDLE-BRANCH BLOCK: Chronic | Status: ACTIVE | Noted: 2023-01-12

## 2023-01-30 PROBLEM — E78.5 HYPERLIPIDEMIA, UNSPECIFIED: Chronic | Status: ACTIVE | Noted: 2023-01-12

## 2023-01-30 PROBLEM — I44.4 LEFT ANTERIOR FASCICULAR BLOCK: Chronic | Status: ACTIVE | Noted: 2023-01-12

## 2023-01-30 PROBLEM — I25.10 ATHEROSCLEROTIC HEART DISEASE OF NATIVE CORONARY ARTERY WITHOUT ANGINA PECTORIS: Chronic | Status: ACTIVE | Noted: 2023-01-12

## 2023-01-30 PROBLEM — I10 ESSENTIAL (PRIMARY) HYPERTENSION: Chronic | Status: ACTIVE | Noted: 2023-01-12

## 2023-01-30 PROBLEM — R91.8 OTHER NONSPECIFIC ABNORMAL FINDING OF LUNG FIELD: Chronic | Status: ACTIVE | Noted: 2023-01-12

## 2023-01-30 PROCEDURE — 99214 OFFICE O/P EST MOD 30 MIN: CPT

## 2023-01-30 NOTE — REVIEW OF SYSTEMS
[SOB on Exertion] : shortness of breath during exertion [Negative] : Heme/Lymph [Fever] : no fever [Chills] : no chills [Feeling Poorly] : not feeling poorly [Feeling Tired] : not feeling tired [Chest Pain] : no chest pain [Palpitations] : no palpitations [Shortness Of Breath] : no shortness of breath [Wheezing] : no wheezing [Cough] : no cough [Dizziness] : no dizziness [Fainting] : no fainting

## 2023-01-30 NOTE — PHYSICAL EXAM
[] : no respiratory distress [Auscultation Breath Sounds / Voice Sounds] : lungs were clear to auscultation bilaterally [Oriented To Time, Place, And Person] : oriented to person, place, and time [Impaired Insight] : insight and judgment were intact [Affect] : the affect was normal

## 2023-01-30 NOTE — HISTORY OF PRESENT ILLNESS
[FreeTextEntry1] : Duane Macias is a 82 year old male who presents for a follow up appointment, referred by Dr. Reed, to review pathology. He is status post Flexible bronchoscopy, robotic-assisted bronchoscopy with the ion system, endobronchial ultrasound with  transbronchial needle aspiration, endobronchial brushing, endobronchial biopsy on 1/18/23.\par \par Past medical history includes former smoker (quit in 2002), HTN, HLD.\par \par Patient reports that he was experiencing a productive cough in November 2022. He was treated with a course of antibiotics but also underwent a chest x-ray on 11/11/2022 at [facility]. Compared to a previous film from June of this past year a new right upper lobe lesion was found. He has since had a CT chest and PET scan performed. \par \par Today he reports continued shortness of breath on exertion, improved since starting Trelegy. Patient denies chest pain, palpitations, cough, fevers, chills, fatigue, unintentional weight loss or gain, and night sweats.

## 2023-01-30 NOTE — ASSESSMENT
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  I will be sending him to oncology for further recommendations regarding treatment but he is unlikely to be a surgical candidate.\par \par Thank you for allowing me to participate in the care of your patient.\par \par 30 minutes was spent during this encounter.\par \par Neri Martin MD\par Department of Cardiovascular and Thoracic Surgery\par \par [employer]\[facility] [employer] [facility]\par

## 2023-01-31 ENCOUNTER — RESULT REVIEW (OUTPATIENT)
Age: 83
End: 2023-01-31

## 2023-01-31 ENCOUNTER — APPOINTMENT (OUTPATIENT)
Dept: HEMATOLOGY ONCOLOGY | Facility: CLINIC | Age: 83
End: 2023-01-31
Payer: MEDICARE

## 2023-01-31 ENCOUNTER — NON-APPOINTMENT (OUTPATIENT)
Age: 83
End: 2023-01-31

## 2023-01-31 VITALS
HEIGHT: 69.45 IN | OXYGEN SATURATION: 97 % | DIASTOLIC BLOOD PRESSURE: 79 MMHG | BODY MASS INDEX: 29.87 KG/M2 | WEIGHT: 204.02 LBS | HEART RATE: 63 BPM | SYSTOLIC BLOOD PRESSURE: 159 MMHG

## 2023-01-31 LAB
BASOPHILS # BLD AUTO: 0.1 K/UL — SIGNIFICANT CHANGE UP (ref 0–0.2)
BASOPHILS NFR BLD AUTO: 0.6 % — SIGNIFICANT CHANGE UP (ref 0–2)
EOSINOPHIL # BLD AUTO: 0.2 K/UL — SIGNIFICANT CHANGE UP (ref 0–0.5)
EOSINOPHIL NFR BLD AUTO: 2.6 % — SIGNIFICANT CHANGE UP (ref 0–6)
HCT VFR BLD CALC: 39.5 % — SIGNIFICANT CHANGE UP (ref 39–50)
HGB BLD-MCNC: 13.8 G/DL — SIGNIFICANT CHANGE UP (ref 13–17)
LYMPHOCYTES # BLD AUTO: 1.2 K/UL — SIGNIFICANT CHANGE UP (ref 1–3.3)
LYMPHOCYTES # BLD AUTO: 14.1 % — SIGNIFICANT CHANGE UP (ref 13–44)
MCHC RBC-ENTMCNC: 32.7 PG — SIGNIFICANT CHANGE UP (ref 27–34)
MCHC RBC-ENTMCNC: 34.9 G/DL — SIGNIFICANT CHANGE UP (ref 32–36)
MCV RBC AUTO: 93.7 FL — SIGNIFICANT CHANGE UP (ref 80–100)
MONOCYTES # BLD AUTO: 0.9 K/UL — SIGNIFICANT CHANGE UP (ref 0–0.9)
MONOCYTES NFR BLD AUTO: 9.9 % — SIGNIFICANT CHANGE UP (ref 2–14)
NEUTROPHILS # BLD AUTO: 6.4 K/UL — SIGNIFICANT CHANGE UP (ref 1.8–7.4)
NEUTROPHILS NFR BLD AUTO: 72.8 % — SIGNIFICANT CHANGE UP (ref 43–77)
PLATELET # BLD AUTO: 270 K/UL — SIGNIFICANT CHANGE UP (ref 150–400)
RBC # BLD: 4.22 M/UL — SIGNIFICANT CHANGE UP (ref 4.2–5.8)
RBC # FLD: 12.4 % — SIGNIFICANT CHANGE UP (ref 10.3–14.5)
WBC # BLD: 8.7 K/UL — SIGNIFICANT CHANGE UP (ref 3.8–10.5)
WBC # FLD AUTO: 8.7 K/UL — SIGNIFICANT CHANGE UP (ref 3.8–10.5)

## 2023-01-31 PROCEDURE — G2212 PROLONG OUTPT/OFFICE VIS: CPT

## 2023-01-31 PROCEDURE — 99205 OFFICE O/P NEW HI 60 MIN: CPT

## 2023-01-31 RX ORDER — ONDANSETRON 8 MG/1
8 TABLET ORAL EVERY 8 HOURS
Qty: 90 | Refills: 0 | Status: ACTIVE | COMMUNITY
Start: 2023-01-31 | End: 1900-01-01

## 2023-02-01 LAB
ALBUMIN SERPL ELPH-MCNC: 4.4 G/DL
ALP BLD-CCNC: 118 U/L
ALT SERPL-CCNC: 22 U/L
ANION GAP SERPL CALC-SCNC: 12 MMOL/L
AST SERPL-CCNC: 28 U/L
BILIRUB SERPL-MCNC: 0.5 MG/DL
BUN SERPL-MCNC: 20 MG/DL
CALCIUM SERPL-MCNC: 9.9 MG/DL
CEA SERPL-MCNC: 1.2 NG/ML
CHLORIDE SERPL-SCNC: 104 MMOL/L
CO2 SERPL-SCNC: 25 MMOL/L
CREAT SERPL-MCNC: 1.07 MG/DL
EGFR: 70 ML/MIN/1.73M2
GLUCOSE SERPL-MCNC: 100 MG/DL
HBV CORE IGG+IGM SER QL: NONREACTIVE
HBV SURFACE AB SER QL: NONREACTIVE
HBV SURFACE AG SER QL: NONREACTIVE
HCV AB SER QL: NONREACTIVE
HCV S/CO RATIO: 0.06 S/CO
POTASSIUM SERPL-SCNC: 4.6 MMOL/L
PROT SERPL-MCNC: 7 G/DL
SODIUM SERPL-SCNC: 140 MMOL/L

## 2023-02-02 NOTE — ADDENDUM
[FreeTextEntry1] : Documented by Aria Foster acting as scribe for Dr. Modi on 01/31/2023.\par \par All Medical record entries made by the Scribe were at my, Dr. Modi, direction and personally dictated by me on 01/31/2023. I have reviewed the chart and agree that the record accurately reflects my personal performance of the history, physical exam, assessment and plan. I have also personally directed, reviewed, and agreed with the discharge instructions.

## 2023-02-02 NOTE — ASSESSMENT
[FreeTextEntry1] : JONE SOUTH, who was diagnosed with a squamous cell Carcinoma Lung at the age of  83 yo in Jan 2023  Patient with a PMHX of HTN, HLD, COPD, ASHD,  40 ppk year smoking history ( quit 2002) \par \par Patient initially presented with a  productive cough in November 2022, chest x-ray on 11/11/2022 at [facility] showed a new 3 cm nodular right upper lobe lesion \par \par Subsequent CT Chest and PEt scan was done \par \par PET/CT on 11/26/2022 reported  right upper lobe mass with an SUV of 13.2, 2.3 x 2.6 cm. Adjacent activity in the right hilar region with questionable metastatic adenopathy( SUV 6.4, 1.2 cm ).  Previous 6 mm nodule in the anterior right lung base is no longer seen.   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 \par - Paclitaxel  has side effects including N/v/ myelosuppression/flushing / alopecia/ Neuropathy / diarrhea/ stomatitis/ \par -Durvalumab has immune mediated side effects including but not limited to thyroiditis/ hypopit /myocarditis/ arrythmias/ joint pains/ rash. \par - No CI to Immuno therapy\par Will monitor counts/ and for immune mediated side effects\par Also gave patient instructions for febrile neutropenia and to call the office if they were to develop a fever. \par Chemo Consent signed, patient chemotherapy education provided \par - Post visit labs ordered:CMP/ CEA/ Hepatitis panel \par - Restaging PET/CT scan ordered \par - Order MRI Brain to complete staging \par - foundation one to be sent \par - Tentative RTO in 4 weeks with gracy

## 2023-02-02 NOTE — RESULTS/DATA
[FreeTextEntry1] :  1/18/2023 Pathology\par \par Final Diagnosis\par 1. LYMPH NODE, R4, EBUS-GUIDED FNA\par \par POSITIVE FOR MALIGNANT CELLS.\par Squamous Cell Carcinoma.\par \par The cytology slides are composed of syncytial sheets and single pleomorphic cells with irregular, hyperchromatic nuclei and dense cytoplasm in a background of necrosis and keratinized debris. No lymphoid cells present in the background.\par \par 2. LYMPH NODE, LEVEL 7, EBUS-GUIDED FNA\par Squamous Cell Carcinoma.\par \par The cytology slides are composed of syncytial sheets and single pleomorphic cells with irregular, hyperchromatic nuclei and dense\par cytoplasm in a background of necrosis and keratinized debris. No lymphoid cells present in the background.\par \par \par 3. LYMPH NODE, R10, EBUS-GUIDED FNA\par POSITIVE FOR MALIGNANT CELLS.\par Metastatic squamous Cell Carcinoma.\par \par The cytology slides are composed of syncytial sheets and single pleomorphic cells with irregular, hyperchromatic nuclei and dense cytoplasm in a background of necrosis and keratinized debris admixed with lymphoid cells.\par \par \par \par 4. LUNG, BRONCHOALVEOLAR LAVAGE\par NEGATIVE FOR MALIGNANT CELLS.\par \par The cytology slide and cell block are composed of groups of reactive ciliated bronchial epithelial cells, scattered alveolar macrophages and mixed inflammatory cells.\par \par \par \par 11/26/2022: PET/CT [facility] \par -hyperactive right upper lobe mass with an SUV of 13.2, 2.3 x 2.6 cm\par -  Adjacent activity in the right hilar region with questionable metastatic adenopathy( SUV 6.4, 1.2 cm ).  \par -Previous 6 mm nodule in the anterior right lung base is no longer seen.   \par \par 11/17/2022 CT Chest noncontrast : [facility]  \par -2.8 x 2.8 x 2.3 right upper lobe mass in the posterior segment \par - Indeterminate nodule in anterior RLL measuring 6-7 mm\par - Small nodule left upper lobe, measuring 3-4 mm\par - Prominent nodes along right hilar structures, one of which shows similar density as right upper lobe mass , measuring 13 x 14x 11 mm, concerning for satellite lesion\par  \par 11/11/2022 CXR  at [facility]. Compared to a previous film from June 2022 \par -a new 3 cm nodular right upper lobe lesion

## 2023-02-02 NOTE — REVIEW OF SYSTEMS
[Patient Intake Form Reviewed] : Patient intake form was reviewed [FreeTextEntry2] : negative except as reviewed in interval history

## 2023-02-02 NOTE — HISTORY OF PRESENT ILLNESS
[de-identified] : JONE SOUTH is a 82 year M with PMHX of HTN, HLD, COPD, ASHD,  40 ppk year smoking history ( quit 2002) presents for initial consultation for  Squamous Cell lung cancer \par \par Patient presented to Dr. Reed on 11/29/22 c/o  a productive cough in November 2022. He was treated with a course of antibiotics but also underwent a chest x-ray on 11/11/2022 at [facility]. Compared to a previous film from June of this past year a new 3 cm nodular right upper lobe lesion was found. \par \par Subsequent CT Chest performed on 11/17/2022 revealing  2.8 x 2.8 x 2.3 right upper lobe mass in the posterior segment. Indeterminate nodule in anterior RLL measuring 6-7 mm. Small nodule left upper lobe, measuring 3-4 mm. Prominent nodes along right hilar structures, one of which shows similar density as right upper lobe mass , measuring 13 x 14x 11 mm, concerning for satellite lesion\par \par PET/CT on 11/26/2022 reported  right upper lobe mass with an SUV of 13.2, 2.3 x 2.6 cm. Adjacent activity in the right hilar region with questionable metastatic adenopathy( SUV 6.4, 1.2 cm ).  Previous 6 mm nodule in the anterior right lung base is no longer seen.   \par \par Patient referred to Dr. Martin. He underwent  flexible bronchoscopy, robotic-assisted bronchoscopy with the ion system, endobronchial ultrasound with transbronchial needle aspiration, endobronchial brushing, endobronchial biopsy on 1/18/23.\par \par Pathology \par Lymph Node ( R4, R10, Level 7) positive for malignant cells for squamous cell carcinoma. \par Bronchoalveolar lavage was negative\par \par PMH: HTN, hypercholesterolemia\par FMH:\par Sx: hip replacement sx \par Socx: Former smoker, quit 2002 (smoked 40 ppk year)\par \par Care Team: \par Pulmonologist: Dr. Reed\par Primary care provider: Dr. Carballo \par \par   [de-identified] : Patient presents for initial visit with spouse and daughter\par Pt initially went to Dr. 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

## 2023-02-03 ENCOUNTER — OUTPATIENT (OUTPATIENT)
Dept: OUTPATIENT SERVICES | Facility: HOSPITAL | Age: 83
LOS: 1 days | End: 2023-02-03

## 2023-02-03 ENCOUNTER — APPOINTMENT (OUTPATIENT)
Dept: MRI IMAGING | Facility: CLINIC | Age: 83
End: 2023-02-03
Payer: MEDICARE

## 2023-02-03 DIAGNOSIS — Z96.642 PRESENCE OF LEFT ARTIFICIAL HIP JOINT: Chronic | ICD-10-CM

## 2023-02-03 DIAGNOSIS — C34.90 MALIGNANT NEOPLASM OF UNSPECIFIED PART OF UNSPECIFIED BRONCHUS OR LUNG: ICD-10-CM

## 2023-02-03 DIAGNOSIS — Z96.641 PRESENCE OF RIGHT ARTIFICIAL HIP JOINT: Chronic | ICD-10-CM

## 2023-02-03 PROCEDURE — 70553 MRI BRAIN STEM W/O & W/DYE: CPT | Mod: 26

## 2023-02-06 ENCOUNTER — APPOINTMENT (OUTPATIENT)
Dept: NUCLEAR MEDICINE | Facility: CLINIC | Age: 83
End: 2023-02-06
Payer: MEDICARE

## 2023-02-06 ENCOUNTER — OUTPATIENT (OUTPATIENT)
Dept: OUTPATIENT SERVICES | Facility: HOSPITAL | Age: 83
LOS: 1 days | End: 2023-02-06

## 2023-02-06 DIAGNOSIS — Z96.642 PRESENCE OF LEFT ARTIFICIAL HIP JOINT: Chronic | ICD-10-CM

## 2023-02-06 DIAGNOSIS — C34.90 MALIGNANT NEOPLASM OF UNSPECIFIED PART OF UNSPECIFIED BRONCHUS OR LUNG: ICD-10-CM

## 2023-02-06 DIAGNOSIS — Z96.641 PRESENCE OF RIGHT ARTIFICIAL HIP JOINT: Chronic | ICD-10-CM

## 2023-02-06 PROCEDURE — 78815 PET IMAGE W/CT SKULL-THIGH: CPT | Mod: 26,PI

## 2023-02-07 ENCOUNTER — APPOINTMENT (OUTPATIENT)
Dept: RADIATION ONCOLOGY | Facility: CLINIC | Age: 83
End: 2023-02-07
Payer: MEDICARE

## 2023-02-07 ENCOUNTER — OUTPATIENT (OUTPATIENT)
Dept: OUTPATIENT SERVICES | Facility: HOSPITAL | Age: 83
LOS: 1 days | Discharge: HOME | End: 2023-02-07
Payer: MEDICARE

## 2023-02-07 VITALS
BODY MASS INDEX: 28.86 KG/M2 | HEART RATE: 75 BPM | WEIGHT: 198 LBS | DIASTOLIC BLOOD PRESSURE: 54 MMHG | RESPIRATION RATE: 16 BRPM | SYSTOLIC BLOOD PRESSURE: 155 MMHG | OXYGEN SATURATION: 94 %

## 2023-02-07 DIAGNOSIS — Z78.9 OTHER SPECIFIED HEALTH STATUS: ICD-10-CM

## 2023-02-07 DIAGNOSIS — Z86.39 PERSONAL HISTORY OF OTHER ENDOCRINE, NUTRITIONAL AND METABOLIC DISEASE: ICD-10-CM

## 2023-02-07 DIAGNOSIS — C34.11 MALIGNANT NEOPLASM OF UPPER LOBE, RIGHT BRONCHUS OR LUNG: ICD-10-CM

## 2023-02-07 DIAGNOSIS — I10 ESSENTIAL (PRIMARY) HYPERTENSION: ICD-10-CM

## 2023-02-07 DIAGNOSIS — Z96.642 PRESENCE OF LEFT ARTIFICIAL HIP JOINT: Chronic | ICD-10-CM

## 2023-02-07 DIAGNOSIS — Z87.891 PERSONAL HISTORY OF NICOTINE DEPENDENCE: ICD-10-CM

## 2023-02-07 DIAGNOSIS — Z96.641 PRESENCE OF RIGHT ARTIFICIAL HIP JOINT: Chronic | ICD-10-CM

## 2023-02-07 PROCEDURE — 77263 THER RADIOLOGY TX PLNG CPLX: CPT

## 2023-02-07 PROCEDURE — 99204 OFFICE O/P NEW MOD 45 MIN: CPT | Mod: 25

## 2023-02-08 PROBLEM — Z87.891 FORMER CIGARETTE SMOKER: Status: ACTIVE | Noted: 2022-11-29

## 2023-02-08 PROBLEM — Z86.39 HISTORY OF HYPERCHOLESTEROLEMIA: Status: RESOLVED | Noted: 2022-11-29 | Resolved: 2023-02-08

## 2023-02-08 PROBLEM — I10 HYPERTENSION: Status: RESOLVED | Noted: 2022-11-18 | Resolved: 2023-02-08

## 2023-02-08 PROBLEM — Z78.9 CONSUMES ALCOHOL WEEKLY: Status: ACTIVE | Noted: 2022-12-05

## 2023-02-08 NOTE — REASON FOR VISIT
[Consideration of Curative Therapy] : consideration of curative therapy for [Lung Cancer] : lung cancer [Spouse] : spouse [Family Member] : family member

## 2023-02-08 NOTE — VITALS
[Maximal Pain Intensity: 0/10] : 0/10 [Least Pain Intensity: 0/10] : 0/10 [NoTreatment Scheduled] : no treatment scheduled [ECOG Performance Status: 1 - Restricted in physically strenuous activity but ambulatory and able to carry out work of a light or sedentary nature] : Performance Status: 1 - Restricted in physically strenuous activity but ambulatory and able to carry out work of a light or sedentary nature, e.g., light house work, office work [80: Normal activity with effort; some signs or symptoms of disease.] : 80: Normal activity with effort; some signs or symptoms of disease.

## 2023-02-13 NOTE — DATA REVIEWED
[FreeTextEntry1] : 2/6/23 Restaging PET/CT (Health system)\par IMPRESSION:\par 1. Hypermetabolic nodule in the superior segment of right lower lobe compatible with a malignancy; hypermetabolic right hilar node compatible with dejon involvement.\par \par 2. Soft tissue abutting the left ischium with specks of calcification and patchy activity appears to be related to a chronic inflammatory process; this can be further evaluated with MRI when clinically feasible.\par \par 2/3/2022 MRI brain\par IMPRESSION:\par Indeterminate subcentimeter high FLAIR signal with associated tiny enhancement in the left temporal lobe as described above. Tiny metastasis not excluded. Follow-up exam recommended.\par \par 1/18/2023 \par Fine Needle Aspiration Report - Auth (Verified)\par \par Specimen(s) Submitted\par 1. LYMPH NODE, R4, EBUS-GUIDED FNA\par 2. LYMPH NODE, LEVEL 7, EBUS-GUIDED FNA\par 3. LYMPH NODE, R10, EBUS-GUIDED FNA\par 4. LUNG, BRONCHOALVEOLAR LAVAGE\par \par Final Diagnosis\par 1. LYMPH NODE, R4, EBUS-GUIDED FNA\par \par POSITIVE FOR MALIGNANT CELLS.\par Squamous Cell Carcinoma.\par \par The cytology slides are composed of syncytial sheets and single\par pleomorphic cells with irregular, hyperchromatic nuclei and dense\par cytoplasm in a background of necrosis and keratinized debris. No lymphoid\par cells present in the background.\par \par Note:  This may represent direct extension of the tumor or complete\par replacement of the lymph node by tumor.\par \par 2. LYMPH NODE, LEVEL 7, EBUS-GUIDED FNA\par Squamous Cell Carcinoma.\par \par The cytology slides are composed of syncytial sheets and single\par pleomorphic cells with irregular, hyperchromatic nuclei and dense\par cytoplasm in a background of necrosis and keratinized debris. No lymphoid\par cells present in the background.\par \par Note:  This may represent direct extension of the tumor or complete\par replacement of the lymph node by tumor.\par \par 3. LYMPH NODE, R10, EBUS-GUIDED FNA\par POSITIVE FOR MALIGNANT CELLS.\par Metastatic squamous Cell Carcinoma.\par \par The cytology slides are composed of syncytial sheets and single\par pleomorphic cells with irregular, hyperchromatic nuclei and dense\par cytoplasm in a background of necrosis and keratinized debris admixed with\par lymphoid cells.\par \par Note:  This may represent direct extension of the tumor or complete\par replacement of the lymph node by tumor.\par \par 4. LUNG, BRONCHOALVEOLAR LAVAGE\par NEGATIVE FOR MALIGNANT CELLS.\par \par The cytology slide and cell block are composed of groups of reactive\par ciliated bronchial epithelial cells, scattered alveolar macrophages and\par mixed inflammatory cells.\par \par 11/26/2022: PET/CT [facility] \par -hyperactive right upper lobe mass with an SUV of 13.2, 2.3 x 2.6 cm\par - Adjacent activity in the right hilar region with questionable metastatic adenopathy( SUV 6.4, 1.2 cm ). \par -Previous 6 mm nodule in the anterior right lung base is no longer seen. \par \par 11/17/2022 CT Chest noncontrast : [facility] \par -2.8 x 2.8 x 2.3 right upper lobe mass in the posterior segment \par - Indeterminate nodule in anterior RLL measuring 6-7 mm\par - Small nodule left upper lobe, measuring 3-4 mm\par - Prominent nodes along right hilar structures, one of which shows similar density as right upper lobe mass , measuring 13 x 14x 11 mm, concerning for satellite lesion\par  \par 11/11/2022 CXR at [facility]. Compared to a previous film from June 2022 \par -a new 3 cm nodular right upper lobe lesion. \par

## 2023-02-13 NOTE — HISTORY OF PRESENT ILLNESS
[FreeTextEntry1] : This 82 year-old male presents for radiation medicine consultation.  Mr. Macias is an 82 year-old male who on 1/18/23  underwent bronchoscopy and endobronchial ultrasound that demonstrated squamous cell carcinoma of the right upper lobe with N2 dejon disease.  Per Dr. Martin, he is unlikely to be a surgical candidate.\par \par

## 2023-02-13 NOTE — DISEASE MANAGEMENT
[Clinical] : TNM Stage: c [IIB] : IIB [FreeTextEntry4] : squamous cell carcinoma [TTNM] : 1c [NTNM] : 1 [MTNM] : x [de-identified] : 6000cGy [de-identified] : right lung/hilum/mediastinum

## 2023-02-13 NOTE — REVIEW OF SYSTEMS
[SOB on Exertion] : shortness of breath during exertion [Negative] : Constitutional [FreeTextEntry5] : hypertension, hyperlipidemia [FreeTextEntry6] : COPD stage 2 [FreeTextEntry9] : bilateral hip replacements

## 2023-02-13 NOTE — LETTER CLOSING
[Consult Closing:] : Thank you for allowing me to participate in the care of this patient.  If you have any questions, please do not hesitate to contact me. [Sincerely yours,] : Sincerely yours, [FreeTextEntry3] : Shakir James MD\par Physician in Chief\par Department of Radiation Medicine\par [facility] [place]\par [facility] [place]\par \par  of Radiation Medicine\par [facility]\par at  [facility]\par \par Radiation \par [facility]/\par [facility] [place]\par [address]\par [place], [place] [address]\par \par Tel: [phone]\par Fax: [phone]\par

## 2023-02-13 NOTE — PHYSICAL EXAM
[Normal] : normal skin color and pigmentation and no rash [de-identified] : Breath sound CTA, diminished left lung fields

## 2023-02-13 NOTE — LETTER GREETING
[Dear Doctor] : Dear Doctor, [Consult Letter:] : Your patient, [unfilled] was seen in my office today for consultation. [Please see my note below.] : Please see my note below. [FreeTextEntry2] : Reyna Rosa MD\torrie Martin MD

## 2023-02-15 PROCEDURE — 77338 DESIGN MLC DEVICE FOR IMRT: CPT | Mod: 26

## 2023-02-15 PROCEDURE — 77301 RADIOTHERAPY DOSE PLAN IMRT: CPT | Mod: 26

## 2023-02-15 PROCEDURE — 77300 RADIATION THERAPY DOSE PLAN: CPT | Mod: 26

## 2023-02-18 LAB
CULTURE RESULTS: SIGNIFICANT CHANGE UP
SPECIMEN SOURCE: SIGNIFICANT CHANGE UP

## 2023-02-21 PROCEDURE — 77470 SPECIAL RADIATION TREATMENT: CPT | Mod: 26

## 2023-02-27 ENCOUNTER — NON-APPOINTMENT (OUTPATIENT)
Age: 83
End: 2023-02-27

## 2023-02-27 ENCOUNTER — APPOINTMENT (OUTPATIENT)
Age: 83
End: 2023-02-27

## 2023-02-27 ENCOUNTER — RESULT REVIEW (OUTPATIENT)
Age: 83
End: 2023-02-27

## 2023-02-27 VITALS
RESPIRATION RATE: 16 BRPM | BODY MASS INDEX: 29.3 KG/M2 | SYSTOLIC BLOOD PRESSURE: 156 MMHG | HEART RATE: 96 BPM | OXYGEN SATURATION: 93 % | WEIGHT: 201 LBS | DIASTOLIC BLOOD PRESSURE: 62 MMHG

## 2023-02-27 LAB
BASOPHILS # BLD AUTO: 0.1 K/UL — SIGNIFICANT CHANGE UP (ref 0–0.2)
BASOPHILS NFR BLD AUTO: 0.4 % — SIGNIFICANT CHANGE UP (ref 0–2)
CEA SERPL-MCNC: 1.3 NG/ML — SIGNIFICANT CHANGE UP (ref 0–3.8)
EOSINOPHIL # BLD AUTO: 0 K/UL — SIGNIFICANT CHANGE UP (ref 0–0.5)
EOSINOPHIL NFR BLD AUTO: 0.1 % — SIGNIFICANT CHANGE UP (ref 0–6)
HCT VFR BLD CALC: 38.5 % — LOW (ref 39–50)
HGB BLD-MCNC: 13.3 G/DL — SIGNIFICANT CHANGE UP (ref 13–17)
LYMPHOCYTES # BLD AUTO: 1 K/UL — SIGNIFICANT CHANGE UP (ref 1–3.3)
LYMPHOCYTES # BLD AUTO: 6 % — LOW (ref 13–44)
MCHC RBC-ENTMCNC: 32.2 PG — SIGNIFICANT CHANGE UP (ref 27–34)
MCHC RBC-ENTMCNC: 34.6 G/DL — SIGNIFICANT CHANGE UP (ref 32–36)
MCV RBC AUTO: 93 FL — SIGNIFICANT CHANGE UP (ref 80–100)
MONOCYTES # BLD AUTO: 0.9 K/UL — SIGNIFICANT CHANGE UP (ref 0–0.9)
MONOCYTES NFR BLD AUTO: 5.8 % — SIGNIFICANT CHANGE UP (ref 2–14)
NEUTROPHILS # BLD AUTO: 13.9 K/UL — HIGH (ref 1.8–7.4)
NEUTROPHILS NFR BLD AUTO: 87.6 % — HIGH (ref 43–77)
PLATELET # BLD AUTO: 259 K/UL — SIGNIFICANT CHANGE UP (ref 150–400)
RBC # BLD: 4.14 M/UL — LOW (ref 4.2–5.8)
RBC # FLD: 12.8 % — SIGNIFICANT CHANGE UP (ref 10.3–14.5)
WBC # BLD: 15.9 K/UL — HIGH (ref 3.8–10.5)
WBC # FLD AUTO: 15.9 K/UL — HIGH (ref 3.8–10.5)

## 2023-02-27 PROCEDURE — 77387B: CUSTOM | Mod: 26

## 2023-02-27 NOTE — DISEASE MANAGEMENT
[Clinical] : TNM Stage: c [FreeTextEntry4] : squamous cell carcinoma [TTNM] : 1c [NTNM] : 1 [MTNM] : x [IIB] : IIB [de-identified] : 200 cGy [de-identified] : 6000 cGy [de-identified] : right lung/hilum/mediastinum

## 2023-02-28 DIAGNOSIS — Z51.11 ENCOUNTER FOR ANTINEOPLASTIC CHEMOTHERAPY: ICD-10-CM

## 2023-02-28 DIAGNOSIS — R11.2 NAUSEA WITH VOMITING, UNSPECIFIED: ICD-10-CM

## 2023-02-28 LAB
ALBUMIN SERPL ELPH-MCNC: 4.3 G/DL — SIGNIFICANT CHANGE UP (ref 3.3–5)
ALP SERPL-CCNC: 97 U/L — SIGNIFICANT CHANGE UP (ref 40–120)
ALT FLD-CCNC: 20 U/L — SIGNIFICANT CHANGE UP (ref 10–45)
ANION GAP SERPL CALC-SCNC: 13 MMOL/L — SIGNIFICANT CHANGE UP (ref 5–17)
AST SERPL-CCNC: 24 U/L — SIGNIFICANT CHANGE UP (ref 10–40)
BILIRUB SERPL-MCNC: 0.3 MG/DL — SIGNIFICANT CHANGE UP (ref 0.2–1.2)
BUN SERPL-MCNC: 22 MG/DL — SIGNIFICANT CHANGE UP (ref 7–23)
CALCIUM SERPL-MCNC: 9.8 MG/DL — SIGNIFICANT CHANGE UP (ref 8.4–10.5)
CHLORIDE SERPL-SCNC: 104 MMOL/L — SIGNIFICANT CHANGE UP (ref 96–108)
CO2 SERPL-SCNC: 24 MMOL/L — SIGNIFICANT CHANGE UP (ref 22–31)
CREAT SERPL-MCNC: 1.02 MG/DL — SIGNIFICANT CHANGE UP (ref 0.5–1.3)
EGFR: 74 ML/MIN/1.73M2 — SIGNIFICANT CHANGE UP
GLUCOSE SERPL-MCNC: 118 MG/DL — HIGH (ref 70–99)
POTASSIUM SERPL-MCNC: 4.4 MMOL/L — SIGNIFICANT CHANGE UP (ref 3.5–5.3)
POTASSIUM SERPL-SCNC: 4.4 MMOL/L — SIGNIFICANT CHANGE UP (ref 3.5–5.3)
PROT SERPL-MCNC: 7 G/DL — SIGNIFICANT CHANGE UP (ref 6–8.3)
SODIUM SERPL-SCNC: 141 MMOL/L — SIGNIFICANT CHANGE UP (ref 135–145)

## 2023-02-28 PROCEDURE — 77387B: CUSTOM | Mod: 26

## 2023-03-01 PROCEDURE — 77387B: CUSTOM | Mod: 26

## 2023-03-01 PROCEDURE — 77427 RADIATION TX MANAGEMENT X5: CPT

## 2023-03-02 PROCEDURE — 77387B: CUSTOM | Mod: 26

## 2023-03-03 ENCOUNTER — RESULT REVIEW (OUTPATIENT)
Age: 83
End: 2023-03-03

## 2023-03-03 ENCOUNTER — APPOINTMENT (OUTPATIENT)
Dept: HEMATOLOGY ONCOLOGY | Facility: CLINIC | Age: 83
End: 2023-03-03
Payer: MEDICARE

## 2023-03-03 VITALS
SYSTOLIC BLOOD PRESSURE: 107 MMHG | BODY MASS INDEX: 29.47 KG/M2 | HEART RATE: 82 BPM | WEIGHT: 199 LBS | OXYGEN SATURATION: 94 % | HEIGHT: 69 IN | TEMPERATURE: 97.4 F | DIASTOLIC BLOOD PRESSURE: 67 MMHG

## 2023-03-03 LAB
BASOPHILS # BLD AUTO: 0 K/UL — SIGNIFICANT CHANGE UP (ref 0–0.2)
BASOPHILS NFR BLD AUTO: 0.4 % — SIGNIFICANT CHANGE UP (ref 0–2)
EOSINOPHIL # BLD AUTO: 0.2 K/UL — SIGNIFICANT CHANGE UP (ref 0–0.5)
EOSINOPHIL NFR BLD AUTO: 2.5 % — SIGNIFICANT CHANGE UP (ref 0–6)
HCT VFR BLD CALC: 39.6 % — SIGNIFICANT CHANGE UP (ref 39–50)
HGB BLD-MCNC: 13.4 G/DL — SIGNIFICANT CHANGE UP (ref 13–17)
LYMPHOCYTES # BLD AUTO: 0.7 K/UL — LOW (ref 1–3.3)
LYMPHOCYTES # BLD AUTO: 8.6 % — LOW (ref 13–44)
MCHC RBC-ENTMCNC: 32.1 PG — SIGNIFICANT CHANGE UP (ref 27–34)
MCHC RBC-ENTMCNC: 33.9 G/DL — SIGNIFICANT CHANGE UP (ref 32–36)
MCV RBC AUTO: 94.8 FL — SIGNIFICANT CHANGE UP (ref 80–100)
MONOCYTES # BLD AUTO: 0.3 K/UL — SIGNIFICANT CHANGE UP (ref 0–0.9)
MONOCYTES NFR BLD AUTO: 3.1 % — SIGNIFICANT CHANGE UP (ref 2–14)
NEUTROPHILS # BLD AUTO: 7.2 K/UL — SIGNIFICANT CHANGE UP (ref 1.8–7.4)
NEUTROPHILS NFR BLD AUTO: 85.4 % — HIGH (ref 43–77)
PLATELET # BLD AUTO: 204 K/UL — SIGNIFICANT CHANGE UP (ref 150–400)
RBC # BLD: 4.17 M/UL — LOW (ref 4.2–5.8)
RBC # FLD: 13.3 % — SIGNIFICANT CHANGE UP (ref 10.3–14.5)
WBC # BLD: 8.4 K/UL — SIGNIFICANT CHANGE UP (ref 3.8–10.5)
WBC # FLD AUTO: 8.4 K/UL — SIGNIFICANT CHANGE UP (ref 3.8–10.5)

## 2023-03-03 PROCEDURE — 99214 OFFICE O/P EST MOD 30 MIN: CPT

## 2023-03-03 PROCEDURE — 77014: CPT | Mod: 26

## 2023-03-03 NOTE — ASSESSMENT
[FreeTextEntry1] : JONE SOUTH, who was diagnosed with a squamous cell Carcinoma Lung at the age of  83 yo in Jan 2023  Patient with a PMHX of HTN, HLD, COPD, ASHD,  40 ppk year smoking history ( quit 2002) \par \par Patient initially presented with a  productive cough in November 2022, chest x-ray on 11/11/2022 at [facility] showed a new 3 cm nodular right upper lobe lesion \par \par Subsequent CT Chest and PEt scan was done \par \par PET/CT on 11/26/2022 reported  right upper lobe mass with an SUV of 13.2, 2.3 x 2.6 cm. Adjacent activity in the right hilar region with questionable metastatic adenopathy( SUV 6.4, 1.2 cm ).  Previous 6 mm nodule in the anterior right lung base is no longer seen.   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 \par - Paclitaxel  has side effects including N/v/ myelosuppression/flushing / alopecia/ Neuropathy / diarrhea/ stomatitis/ \par -Durvalumab has immune mediated side effects including but not limited to thyroiditis/ hypopit /myocarditis/ arrythmias/ joint pains/ rash. \par - No CI to Immuno therapy\par Will monitor counts/ and for immune mediated side effects\par Also gave patient instructions for febrile neutropenia and to call the office if they were to develop a fever. \par Chemo Consent signed, patient chemotherapy education provided \par  \par 2/6/23 PET/CT \par -Hypermetabolic lung nodule in the superior segment of the right lower lobe measures 3.1 x 2.8 cm with SUV 16.0; this is compatible with a malignancy. \par -There is a peripheral nodular opacity more peripherally measuring 1.3 x 1.0 cm with minimal activity of SUV 1.4 likely reflecting a nonobstructive pattern of atelectasis.\par -Hypermetabolic node superior and posterior to the right main bronchus measures 1.2 x 1.8 cm with SUV 11.3 compatible with dejon involvement. \par -A subcentimeter right upper paratracheal node  measuring 0.4 x 1.0 cm is nonavid.\par \par IMPRESSION:\par 1. Hypermetabolic nodule in the superior segment of right lower lobe compatible with a malignancy; hypermetabolic right hilar node compatible with dejon involvement.\par 2. Soft tissue abutting the left ischium with specks of calcification and patchy activity appears to be related to a chronic inflammatory process; this can be further evaluated with MRI when clinically feasible.\par \par \par 2/3/23 MR Head \par IMPRESSION:\par -Indeterminate subcentimeter high FLAIR signal with associated tiny enhancement in the left temporal lobe as described above. Tiny metastasis not excluded. Follow-up exam recommended.\par -No acute intracranial hemorrhage or acute infarct\par \par - Reviewed MRI and PET/CT with patient. \par - foundation one to be sent \par - Started weekly chemoradiation with Carbo/Taxol on 2/27/23\par - C2 scheduled 3/6/23\par - F/u with PA in 2-3 weeks \par - F/U with MD in 6-8 weeks \par

## 2023-03-03 NOTE — RESULTS/DATA
[FreeTextEntry1] :  1/18/2023 Pathology\par \par Final Diagnosis\par 1. LYMPH NODE, R4, EBUS-GUIDED FNA\par \par POSITIVE FOR MALIGNANT CELLS.\par Squamous Cell Carcinoma.\par \par The cytology slides are composed of syncytial sheets and single pleomorphic cells with irregular, hyperchromatic nuclei and dense cytoplasm in a background of necrosis and keratinized debris. No lymphoid cells present in the background.\par \par 2. LYMPH NODE, LEVEL 7, EBUS-GUIDED FNA\par Squamous Cell Carcinoma.\par \par The cytology slides are composed of syncytial sheets and single pleomorphic cells with irregular, hyperchromatic nuclei and dense\par cytoplasm in a background of necrosis and keratinized debris. No lymphoid cells present in the background.\par \par \par 3. LYMPH NODE, R10, EBUS-GUIDED FNA\par POSITIVE FOR MALIGNANT CELLS.\par Metastatic squamous Cell Carcinoma.\par \par The cytology slides are composed of syncytial sheets and single pleomorphic cells with irregular, hyperchromatic nuclei and dense cytoplasm in a background of necrosis and keratinized debris admixed with lymphoid cells.\par \par \par \par 4. LUNG, BRONCHOALVEOLAR LAVAGE\par NEGATIVE FOR MALIGNANT CELLS.\par \par The cytology slide and cell block are composed of groups of reactive ciliated bronchial epithelial cells, scattered alveolar macrophages and mixed inflammatory cells.\par \par \par \par 11/26/2022: PET/CT [facility] \par -hyperactive right upper lobe mass with an SUV of 13.2, 2.3 x 2.6 cm\par -  Adjacent activity in the right hilar region with questionable metastatic adenopathy( SUV 6.4, 1.2 cm ).  \par -Previous 6 mm nodule in the anterior right lung base is no longer seen.   \par \par 11/17/2022 CT Chest noncontrast : [facility]  \par -2.8 x 2.8 x 2.3 right upper lobe mass in the posterior segment \par - Indeterminate nodule in anterior RLL measuring 6-7 mm\par - Small nodule left upper lobe, measuring 3-4 mm\par - Prominent nodes along right hilar structures, one of which shows similar density as right upper lobe mass , measuring 13 x 14x 11 mm, concerning for satellite lesion\par  \par 11/11/2022 CXR  at [facility]. Compared to a previous film from June 2022 \par -a new 3 cm nodular right upper lobe lesion

## 2023-03-03 NOTE — HISTORY OF PRESENT ILLNESS
[de-identified] : JONE SOUTH is a 82 year M with PMHX of HTN, HLD, COPD, ASHD,  40 ppk year smoking history ( quit 2002) presents for initial consultation for  Squamous Cell lung cancer \par \par Patient presented to Dr. Reed on 11/29/22 c/o  a productive cough in November 2022. He was treated with a course of antibiotics but also underwent a chest x-ray on 11/11/2022 at [facility]. Compared to a previous film from June of this past year a new 3 cm nodular right upper lobe lesion was found. \par \par Subsequent CT Chest performed on 11/17/2022 revealing  2.8 x 2.8 x 2.3 right upper lobe mass in the posterior segment. Indeterminate nodule in anterior RLL measuring 6-7 mm. Small nodule left upper lobe, measuring 3-4 mm. Prominent nodes along right hilar structures, one of which shows similar density as right upper lobe mass , measuring 13 x 14x 11 mm, concerning for satellite lesion\par \par PET/CT on 11/26/2022 reported  right upper lobe mass with an SUV of 13.2, 2.3 x 2.6 cm. Adjacent activity in the right hilar region with questionable metastatic adenopathy( SUV 6.4, 1.2 cm ).  Previous 6 mm nodule in the anterior right lung base is no longer seen.   \par \par Patient referred to Dr. aMrtin. He underwent  flexible bronchoscopy, robotic-assisted bronchoscopy with the ion system, endobronchial ultrasound with transbronchial needle aspiration, endobronchial brushing, endobronchial biopsy on 1/18/23.\par \par Pathology \par Lymph Node ( R4, R10, Level 7) positive for malignant cells for squamous cell carcinoma. \par Bronchoalveolar lavage was negative\par \par PMH: HTN, hypercholesterolemia\par FMH:\par Sx: hip replacement sx \par Socx: Former smoker, quit 2002 (smoked 40 ppk year)\par \par Care Team: \par Pulmonologist: Dr. Reed\par Primary care provider: Dr. 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 \par -There is a peripheral nodular opacity more peripherally measuring 1.3 x 1.0 cm with minimal activity of SUV 1.4 likely reflecting a nonobstructive pattern of atelectasis.\par -Hypermetabolic node superior and posterior to the right main bronchus measures 1.2 x 1.8 cm with SUV 11.3 compatible with dejon involvement. \par -A subcentimeter right upper paratracheal node  measuring 0.4 x 1.0 cm is nonavid.\par \par IMPRESSION:\par 1. Hypermetabolic nodule in the superior segment of right lower lobe compatible with a malignancy; hypermetabolic right hilar node compatible with dejon involvement.\par \par 2. Soft tissue abutting the left ischium with specks of calcification and patchy activity appears to be related to a chronic inflammatory process; this can be further evaluated with MRI when clinically feasible.\par \par \par 2/3/23 MR Head \par IMPRESSION:\par -Indeterminate subcentimeter high FLAIR signal with associated tiny enhancement in the left temporal lobe as described above. Tiny metastasis not excluded. Follow-up exam recommended.\par -No acute intracranial hemorrhage or acute infarct\par  [Date: ____________] : Patient's last distress assessment performed on [unfilled]. [0 - No Distress] : Distress Level: 0

## 2023-03-06 ENCOUNTER — RESULT REVIEW (OUTPATIENT)
Age: 83
End: 2023-03-06

## 2023-03-06 ENCOUNTER — APPOINTMENT (OUTPATIENT)
Age: 83
End: 2023-03-06

## 2023-03-06 ENCOUNTER — NON-APPOINTMENT (OUTPATIENT)
Age: 83
End: 2023-03-06

## 2023-03-06 VITALS
BODY MASS INDEX: 29.39 KG/M2 | HEART RATE: 84 BPM | OXYGEN SATURATION: 96 % | SYSTOLIC BLOOD PRESSURE: 120 MMHG | DIASTOLIC BLOOD PRESSURE: 69 MMHG | RESPIRATION RATE: 16 BRPM | WEIGHT: 199 LBS

## 2023-03-06 LAB
ALBUMIN SERPL ELPH-MCNC: 4.2 G/DL
ALP BLD-CCNC: 108 U/L
ALT SERPL-CCNC: 23 U/L
ANION GAP SERPL CALC-SCNC: 12 MMOL/L
AST SERPL-CCNC: 21 U/L
BASOPHILS # BLD AUTO: 0 K/UL — SIGNIFICANT CHANGE UP (ref 0–0.2)
BASOPHILS NFR BLD AUTO: 0.1 % — SIGNIFICANT CHANGE UP (ref 0–2)
BILIRUB SERPL-MCNC: 1.1 MG/DL
BUN SERPL-MCNC: 24 MG/DL
CALCIUM SERPL-MCNC: 9.5 MG/DL
CHLORIDE SERPL-SCNC: 103 MMOL/L
CO2 SERPL-SCNC: 27 MMOL/L
CREAT SERPL-MCNC: 1.07 MG/DL
EGFR: 70 ML/MIN/1.73M2
EOSINOPHIL # BLD AUTO: 0 K/UL — SIGNIFICANT CHANGE UP (ref 0–0.5)
EOSINOPHIL NFR BLD AUTO: 0 % — SIGNIFICANT CHANGE UP (ref 0–6)
GLUCOSE SERPL-MCNC: 117 MG/DL
HCT VFR BLD CALC: 36.4 % — LOW (ref 39–50)
HGB BLD-MCNC: 12.4 G/DL — LOW (ref 13–17)
LYMPHOCYTES # BLD AUTO: 0.4 K/UL — LOW (ref 1–3.3)
LYMPHOCYTES # BLD AUTO: 3.7 % — LOW (ref 13–44)
MCHC RBC-ENTMCNC: 32 PG — SIGNIFICANT CHANGE UP (ref 27–34)
MCHC RBC-ENTMCNC: 34 G/DL — SIGNIFICANT CHANGE UP (ref 32–36)
MCV RBC AUTO: 94.3 FL — SIGNIFICANT CHANGE UP (ref 80–100)
MONOCYTES # BLD AUTO: 0.2 K/UL — SIGNIFICANT CHANGE UP (ref 0–0.9)
MONOCYTES NFR BLD AUTO: 1.7 % — LOW (ref 2–14)
NEUTROPHILS # BLD AUTO: 9.3 K/UL — HIGH (ref 1.8–7.4)
NEUTROPHILS NFR BLD AUTO: 94.5 % — HIGH (ref 43–77)
PLATELET # BLD AUTO: 219 K/UL — SIGNIFICANT CHANGE UP (ref 150–400)
POTASSIUM SERPL-SCNC: 4.7 MMOL/L
PROT SERPL-MCNC: 6.7 G/DL
RBC # BLD: 3.86 M/UL — LOW (ref 4.2–5.8)
RBC # FLD: 13.4 % — SIGNIFICANT CHANGE UP (ref 10.3–14.5)
SODIUM SERPL-SCNC: 141 MMOL/L
WBC # BLD: 9.9 K/UL — SIGNIFICANT CHANGE UP (ref 3.8–10.5)
WBC # FLD AUTO: 9.9 K/UL — SIGNIFICANT CHANGE UP (ref 3.8–10.5)

## 2023-03-06 PROCEDURE — 77427 RADIATION TX MANAGEMENT X5: CPT

## 2023-03-06 PROCEDURE — 77387B: CUSTOM | Mod: 26

## 2023-03-06 NOTE — DISEASE MANAGEMENT
[Clinical] : TNM Stage: c [FreeTextEntry4] : squamous cell carcinoma [TTNM] : 1c [NTNM] : 1 [MTNM] : x [IIB] : IIB [de-identified] : 1200 cGy [de-identified] : 6000 cGy [de-identified] : right lung/hilum/mediastinum

## 2023-03-07 LAB
ALBUMIN SERPL ELPH-MCNC: 4.4 G/DL — SIGNIFICANT CHANGE UP (ref 3.3–5)
ALP SERPL-CCNC: 97 U/L — SIGNIFICANT CHANGE UP (ref 40–120)
ALT FLD-CCNC: 23 U/L — SIGNIFICANT CHANGE UP (ref 10–45)
ANION GAP SERPL CALC-SCNC: 13 MMOL/L — SIGNIFICANT CHANGE UP (ref 5–17)
AST SERPL-CCNC: 20 U/L — SIGNIFICANT CHANGE UP (ref 10–40)
BILIRUB SERPL-MCNC: 0.7 MG/DL — SIGNIFICANT CHANGE UP (ref 0.2–1.2)
BUN SERPL-MCNC: 30 MG/DL — HIGH (ref 7–23)
CALCIUM SERPL-MCNC: 9.6 MG/DL — SIGNIFICANT CHANGE UP (ref 8.4–10.5)
CEA SERPL-MCNC: 1.7 NG/ML — SIGNIFICANT CHANGE UP (ref 0–3.8)
CHLORIDE SERPL-SCNC: 105 MMOL/L — SIGNIFICANT CHANGE UP (ref 96–108)
CO2 SERPL-SCNC: 24 MMOL/L — SIGNIFICANT CHANGE UP (ref 22–31)
CREAT SERPL-MCNC: 1 MG/DL — SIGNIFICANT CHANGE UP (ref 0.5–1.3)
EGFR: 76 ML/MIN/1.73M2 — SIGNIFICANT CHANGE UP
GLUCOSE SERPL-MCNC: 162 MG/DL — HIGH (ref 70–99)
POTASSIUM SERPL-MCNC: 4.4 MMOL/L — SIGNIFICANT CHANGE UP (ref 3.5–5.3)
POTASSIUM SERPL-SCNC: 4.4 MMOL/L — SIGNIFICANT CHANGE UP (ref 3.5–5.3)
PROT SERPL-MCNC: 7 G/DL — SIGNIFICANT CHANGE UP (ref 6–8.3)
SODIUM SERPL-SCNC: 142 MMOL/L — SIGNIFICANT CHANGE UP (ref 135–145)

## 2023-03-07 PROCEDURE — 77387B: CUSTOM | Mod: 26

## 2023-03-08 LAB
CULTURE RESULTS: SIGNIFICANT CHANGE UP
SPECIMEN SOURCE: SIGNIFICANT CHANGE UP

## 2023-03-08 PROCEDURE — 77387B: CUSTOM | Mod: 26

## 2023-03-09 PROCEDURE — 77387B: CUSTOM | Mod: 26

## 2023-03-10 ENCOUNTER — APPOINTMENT (OUTPATIENT)
Dept: HEMATOLOGY ONCOLOGY | Facility: CLINIC | Age: 83
End: 2023-03-10

## 2023-03-10 ENCOUNTER — RESULT REVIEW (OUTPATIENT)
Age: 83
End: 2023-03-10

## 2023-03-10 LAB
BASOPHILS # BLD AUTO: 0 K/UL — SIGNIFICANT CHANGE UP (ref 0–0.2)
BASOPHILS NFR BLD AUTO: 0.2 % — SIGNIFICANT CHANGE UP (ref 0–2)
EOSINOPHIL # BLD AUTO: 0 K/UL — SIGNIFICANT CHANGE UP (ref 0–0.5)
EOSINOPHIL NFR BLD AUTO: 0.8 % — SIGNIFICANT CHANGE UP (ref 0–6)
HCT VFR BLD CALC: 35.4 % — LOW (ref 39–50)
HGB BLD-MCNC: 12.1 G/DL — LOW (ref 13–17)
LYMPHOCYTES # BLD AUTO: 0.4 K/UL — LOW (ref 1–3.3)
LYMPHOCYTES # BLD AUTO: 8.9 % — LOW (ref 13–44)
MCHC RBC-ENTMCNC: 32.1 PG — SIGNIFICANT CHANGE UP (ref 27–34)
MCHC RBC-ENTMCNC: 34.1 G/DL — SIGNIFICANT CHANGE UP (ref 32–36)
MCV RBC AUTO: 93.9 FL — SIGNIFICANT CHANGE UP (ref 80–100)
MONOCYTES # BLD AUTO: 0.2 K/UL — SIGNIFICANT CHANGE UP (ref 0–0.9)
MONOCYTES NFR BLD AUTO: 3.9 % — SIGNIFICANT CHANGE UP (ref 2–14)
NEUTROPHILS # BLD AUTO: 3.8 K/UL — SIGNIFICANT CHANGE UP (ref 1.8–7.4)
NEUTROPHILS NFR BLD AUTO: 86.2 % — HIGH (ref 43–77)
PLATELET # BLD AUTO: 203 K/UL — SIGNIFICANT CHANGE UP (ref 150–400)
RBC # BLD: 3.77 M/UL — LOW (ref 4.2–5.8)
RBC # FLD: 13.1 % — SIGNIFICANT CHANGE UP (ref 10.3–14.5)
WBC # BLD: 4.4 K/UL — SIGNIFICANT CHANGE UP (ref 3.8–10.5)
WBC # FLD AUTO: 4.4 K/UL — SIGNIFICANT CHANGE UP (ref 3.8–10.5)

## 2023-03-10 PROCEDURE — 77014: CPT | Mod: 26

## 2023-03-13 ENCOUNTER — APPOINTMENT (OUTPATIENT)
Age: 83
End: 2023-03-13

## 2023-03-13 ENCOUNTER — RESULT REVIEW (OUTPATIENT)
Age: 83
End: 2023-03-13

## 2023-03-13 ENCOUNTER — NON-APPOINTMENT (OUTPATIENT)
Age: 83
End: 2023-03-13

## 2023-03-13 VITALS
RESPIRATION RATE: 16 BRPM | DIASTOLIC BLOOD PRESSURE: 71 MMHG | OXYGEN SATURATION: 100 % | SYSTOLIC BLOOD PRESSURE: 152 MMHG | HEART RATE: 85 BPM | WEIGHT: 196.2 LBS | BODY MASS INDEX: 28.97 KG/M2

## 2023-03-13 LAB
ALBUMIN SERPL ELPH-MCNC: 3.8 G/DL
ALBUMIN SERPL ELPH-MCNC: 3.8 G/DL — SIGNIFICANT CHANGE UP (ref 3.3–5)
ALP BLD-CCNC: 83 U/L
ALP SERPL-CCNC: 77 U/L — SIGNIFICANT CHANGE UP (ref 40–120)
ALT FLD-CCNC: 18 U/L — SIGNIFICANT CHANGE UP (ref 10–45)
ALT SERPL-CCNC: 19 U/L
ANION GAP SERPL CALC-SCNC: 12 MMOL/L
ANION GAP SERPL CALC-SCNC: 13 MMOL/L — SIGNIFICANT CHANGE UP (ref 5–17)
AST SERPL-CCNC: 16 U/L
AST SERPL-CCNC: 29 U/L — SIGNIFICANT CHANGE UP (ref 10–40)
BILIRUB SERPL-MCNC: 0.4 MG/DL — SIGNIFICANT CHANGE UP (ref 0.2–1.2)
BILIRUB SERPL-MCNC: 0.8 MG/DL
BUN SERPL-MCNC: 36 MG/DL
BUN SERPL-MCNC: 40 MG/DL — HIGH (ref 7–23)
CALCIUM SERPL-MCNC: 9.2 MG/DL
CALCIUM SERPL-MCNC: 9.3 MG/DL — SIGNIFICANT CHANGE UP (ref 8.4–10.5)
CHLORIDE SERPL-SCNC: 104 MMOL/L
CHLORIDE SERPL-SCNC: 105 MMOL/L — SIGNIFICANT CHANGE UP (ref 96–108)
CO2 SERPL-SCNC: 20 MMOL/L — LOW (ref 22–31)
CO2 SERPL-SCNC: 24 MMOL/L
CREAT SERPL-MCNC: 1.2 MG/DL — SIGNIFICANT CHANGE UP (ref 0.5–1.3)
CREAT SERPL-MCNC: 1.23 MG/DL
EGFR: 59 ML/MIN/1.73M2
EGFR: 61 ML/MIN/1.73M2 — SIGNIFICANT CHANGE UP
GLUCOSE SERPL-MCNC: 108 MG/DL — HIGH (ref 70–99)
GLUCOSE SERPL-MCNC: 118 MG/DL
POTASSIUM SERPL-MCNC: 5.2 MMOL/L — SIGNIFICANT CHANGE UP (ref 3.5–5.3)
POTASSIUM SERPL-SCNC: 4.4 MMOL/L
POTASSIUM SERPL-SCNC: 5.2 MMOL/L — SIGNIFICANT CHANGE UP (ref 3.5–5.3)
PROT SERPL-MCNC: 6.3 G/DL
PROT SERPL-MCNC: 6.7 G/DL — SIGNIFICANT CHANGE UP (ref 6–8.3)
SODIUM SERPL-SCNC: 139 MMOL/L
SODIUM SERPL-SCNC: 139 MMOL/L — SIGNIFICANT CHANGE UP (ref 135–145)

## 2023-03-13 PROCEDURE — 77427 RADIATION TX MANAGEMENT X5: CPT

## 2023-03-13 PROCEDURE — 77387B: CUSTOM | Mod: 26

## 2023-03-14 LAB — CEA SERPL-MCNC: 1.7 NG/ML — SIGNIFICANT CHANGE UP (ref 0–3.8)

## 2023-03-14 PROCEDURE — 77387B: CUSTOM | Mod: 26

## 2023-03-16 PROCEDURE — 77387B: CUSTOM | Mod: 26

## 2023-03-17 ENCOUNTER — APPOINTMENT (OUTPATIENT)
Dept: HEMATOLOGY ONCOLOGY | Facility: CLINIC | Age: 83
End: 2023-03-17

## 2023-03-17 ENCOUNTER — RESULT REVIEW (OUTPATIENT)
Age: 83
End: 2023-03-17

## 2023-03-17 DIAGNOSIS — K20.90 ESOPHAGITIS, UNSPECIFIED WITHOUT BLEEDING: ICD-10-CM

## 2023-03-17 LAB
BASOPHILS # BLD AUTO: 0 K/UL — SIGNIFICANT CHANGE UP (ref 0–0.2)
BASOPHILS NFR BLD AUTO: 0.7 % — SIGNIFICANT CHANGE UP (ref 0–2)
EOSINOPHIL # BLD AUTO: 0 K/UL — SIGNIFICANT CHANGE UP (ref 0–0.5)
EOSINOPHIL NFR BLD AUTO: 0.8 % — SIGNIFICANT CHANGE UP (ref 0–6)
HCT VFR BLD CALC: 34.9 % — LOW (ref 39–50)
HGB BLD-MCNC: 11.7 G/DL — LOW (ref 13–17)
LYMPHOCYTES # BLD AUTO: 0.4 K/UL — LOW (ref 1–3.3)
LYMPHOCYTES # BLD AUTO: 14.2 % — SIGNIFICANT CHANGE UP (ref 13–44)
MCHC RBC-ENTMCNC: 31.3 PG — SIGNIFICANT CHANGE UP (ref 27–34)
MCHC RBC-ENTMCNC: 33.5 G/DL — SIGNIFICANT CHANGE UP (ref 32–36)
MCV RBC AUTO: 93.5 FL — SIGNIFICANT CHANGE UP (ref 80–100)
MONOCYTES # BLD AUTO: 0.3 K/UL — SIGNIFICANT CHANGE UP (ref 0–0.9)
MONOCYTES NFR BLD AUTO: 11.1 % — SIGNIFICANT CHANGE UP (ref 2–14)
NEUTROPHILS # BLD AUTO: 2.1 K/UL — SIGNIFICANT CHANGE UP (ref 1.8–7.4)
NEUTROPHILS NFR BLD AUTO: 73.2 % — SIGNIFICANT CHANGE UP (ref 43–77)
PLATELET # BLD AUTO: 206 K/UL — SIGNIFICANT CHANGE UP (ref 150–400)
RBC # BLD: 3.73 M/UL — LOW (ref 4.2–5.8)
RBC # FLD: 13 % — SIGNIFICANT CHANGE UP (ref 10.3–14.5)
WBC # BLD: 2.8 K/UL — LOW (ref 3.8–10.5)
WBC # FLD AUTO: 2.8 K/UL — LOW (ref 3.8–10.5)

## 2023-03-17 PROCEDURE — 77014: CPT | Mod: 26

## 2023-03-20 ENCOUNTER — NON-APPOINTMENT (OUTPATIENT)
Age: 83
End: 2023-03-20

## 2023-03-20 ENCOUNTER — APPOINTMENT (OUTPATIENT)
Dept: HEMATOLOGY ONCOLOGY | Facility: CLINIC | Age: 83
End: 2023-03-20
Payer: MEDICARE

## 2023-03-20 ENCOUNTER — RESULT REVIEW (OUTPATIENT)
Age: 83
End: 2023-03-20

## 2023-03-20 ENCOUNTER — APPOINTMENT (OUTPATIENT)
Age: 83
End: 2023-03-20

## 2023-03-20 VITALS
BODY MASS INDEX: 29.09 KG/M2 | HEART RATE: 98 BPM | SYSTOLIC BLOOD PRESSURE: 171 MMHG | DIASTOLIC BLOOD PRESSURE: 57 MMHG | RESPIRATION RATE: 16 BRPM | WEIGHT: 197 LBS | OXYGEN SATURATION: 98 %

## 2023-03-20 DIAGNOSIS — I25.10 ATHEROSCLEROTIC HEART DISEASE OF NATIVE CORONARY ARTERY W/OUT ANGINA PECTORIS: ICD-10-CM

## 2023-03-20 DIAGNOSIS — R91.8 OTHER NONSPECIFIC ABNORMAL FINDING OF LUNG FIELD: ICD-10-CM

## 2023-03-20 LAB
ALBUMIN SERPL ELPH-MCNC: 3.8 G/DL — SIGNIFICANT CHANGE UP (ref 3.3–5)
ALBUMIN SERPL ELPH-MCNC: 3.9 G/DL
ALP BLD-CCNC: 91 U/L
ALP SERPL-CCNC: 82 U/L — SIGNIFICANT CHANGE UP (ref 40–120)
ALT FLD-CCNC: 20 U/L — SIGNIFICANT CHANGE UP (ref 10–45)
ALT SERPL-CCNC: 20 U/L
ANION GAP SERPL CALC-SCNC: 12 MMOL/L
ANION GAP SERPL CALC-SCNC: 12 MMOL/L — SIGNIFICANT CHANGE UP (ref 5–17)
AST SERPL-CCNC: 20 U/L
AST SERPL-CCNC: 22 U/L — SIGNIFICANT CHANGE UP (ref 10–40)
BILIRUB SERPL-MCNC: 0.4 MG/DL
BILIRUB SERPL-MCNC: 0.4 MG/DL — SIGNIFICANT CHANGE UP (ref 0.2–1.2)
BUN SERPL-MCNC: 28 MG/DL
BUN SERPL-MCNC: 35 MG/DL — HIGH (ref 7–23)
CALCIUM SERPL-MCNC: 9.6 MG/DL — SIGNIFICANT CHANGE UP (ref 8.4–10.5)
CALCIUM SERPL-MCNC: 9.9 MG/DL
CEA SERPL-MCNC: 2.2 NG/ML — SIGNIFICANT CHANGE UP (ref 0–3.8)
CHLORIDE SERPL-SCNC: 105 MMOL/L
CHLORIDE SERPL-SCNC: 107 MMOL/L — SIGNIFICANT CHANGE UP (ref 96–108)
CO2 SERPL-SCNC: 21 MMOL/L — LOW (ref 22–31)
CO2 SERPL-SCNC: 24 MMOL/L
CREAT SERPL-MCNC: 1.25 MG/DL — SIGNIFICANT CHANGE UP (ref 0.5–1.3)
CREAT SERPL-MCNC: 1.29 MG/DL
EGFR: 56 ML/MIN/1.73M2
EGFR: 58 ML/MIN/1.73M2 — LOW
GLUCOSE SERPL-MCNC: 115 MG/DL
GLUCOSE SERPL-MCNC: 99 MG/DL — SIGNIFICANT CHANGE UP (ref 70–99)
POTASSIUM SERPL-MCNC: 4.5 MMOL/L — SIGNIFICANT CHANGE UP (ref 3.5–5.3)
POTASSIUM SERPL-SCNC: 4.5 MMOL/L — SIGNIFICANT CHANGE UP (ref 3.5–5.3)
POTASSIUM SERPL-SCNC: 4.8 MMOL/L
PROT SERPL-MCNC: 6.4 G/DL
PROT SERPL-MCNC: 6.4 G/DL — SIGNIFICANT CHANGE UP (ref 6–8.3)
SODIUM SERPL-SCNC: 141 MMOL/L
SODIUM SERPL-SCNC: 141 MMOL/L — SIGNIFICANT CHANGE UP (ref 135–145)

## 2023-03-20 PROCEDURE — 99214 OFFICE O/P EST MOD 30 MIN: CPT

## 2023-03-20 PROCEDURE — 77387B: CUSTOM | Mod: 26

## 2023-03-21 PROCEDURE — 77387B: CUSTOM | Mod: 26

## 2023-03-21 PROCEDURE — 77427 RADIATION TX MANAGEMENT X5: CPT

## 2023-03-21 NOTE — ASSESSMENT
[FreeTextEntry1] : JONE SOUTH, who was diagnosed with a squamous cell Carcinoma Lung at the age of  81 yo in Jan 2023  Patient with a PMHX of HTN, HLD, COPD, ASHD,  40 ppk year smoking history ( quit 2002) \par \par Patient initially presented with a  productive cough in November 2022, chest x-ray on 11/11/2022 at [facility] showed a new 3 cm nodular right upper lobe lesion \par \par Subsequent CT Chest and PEt scan was done \par \par PET/CT on 11/26/2022 reported  right upper lobe mass with an SUV of 13.2, 2.3 x 2.6 cm. Adjacent activity in the right hilar region with questionable metastatic adenopathy( SUV 6.4, 1.2 cm ).  Previous 6 mm nodule in the anterior right lung base is no longer seen.   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 \par - Paclitaxel  has side effects including N/v/ myelosuppression/flushing / alopecia/ Neuropathy / diarrhea/ stomatitis/ \par -Durvalumab has immune mediated side effects including but not limited to thyroiditis/ hypopit /myocarditis/ arrythmias/ joint pains/ rash. \par - No CI to Immuno therapy\par Will monitor counts/ and for immune mediated side effects\par Also gave patient instructions for febrile neutropenia and to call the office if they were to develop a fever. \par Chemo Consent signed, patient chemotherapy education provided \par  \par 2/6/23 PET/CT \par -Hypermetabolic lung nodule in the superior segment of the right lower lobe measures 3.1 x 2.8 cm with SUV 16.0; this is compatible with a malignancy. \par -There is a peripheral nodular opacity more peripherally measuring 1.3 x 1.0 cm with minimal activity of SUV 1.4 likely reflecting a nonobstructive pattern of atelectasis.\par -Hypermetabolic node superior and posterior to the right main bronchus measures 1.2 x 1.8 cm with SUV 11.3 compatible with dejon involvement. \par -A subcentimeter right upper paratracheal node  measuring 0.4 x 1.0 cm is nonavid.\par \par IMPRESSION:\par 1. Hypermetabolic nodule in the superior segment of right lower lobe compatible with a malignancy; hypermetabolic right hilar node compatible with dejon involvement.\par 2. Soft tissue abutting the left ischium with specks of calcification and patchy activity appears to be related to a chronic inflammatory process; this can be further evaluated with MRI when clinically feasible.\par \par \par 2/3/23 MR Head \par IMPRESSION:\par -Indeterminate subcentimeter high FLAIR signal with associated tiny enhancement in the left temporal lobe as described above. Tiny metastasis not excluded. Follow-up exam recommended.\par -No acute intracranial hemorrhage or acute infarct\par \par - Reviewed MRI and PET/CT with patient. \par - Foundation one sent on 1/31/23, specimen insufficient. Will consider sending Guardant \par - Advised to f/u with PCP to possibly adjust BP medication \par - Started weekly chemoradiation with Carbo/Taxol on 2/27/23, Rt planned to complete 4/10/23\par - C4 scheduled today\par - F/u in 3-4 weeks \par \par

## 2023-03-21 NOTE — RESULTS/DATA
[FreeTextEntry1] :  1/18/2023 Pathology\par \par Final Diagnosis\par 1. LYMPH NODE, R4, EBUS-GUIDED FNA\par \par POSITIVE FOR MALIGNANT CELLS.\par Squamous Cell Carcinoma.\par \par The cytology slides are composed of syncytial sheets and single pleomorphic cells with irregular, hyperchromatic nuclei and dense cytoplasm in a background of necrosis and keratinized debris. No lymphoid cells present in the background.\par \par 2. LYMPH NODE, LEVEL 7, EBUS-GUIDED FNA\par Squamous Cell Carcinoma.\par \par The cytology slides are composed of syncytial sheets and single pleomorphic cells with irregular, hyperchromatic nuclei and dense\par cytoplasm in a background of necrosis and keratinized debris. No lymphoid cells present in the background.\par \par \par 3. LYMPH NODE, R10, EBUS-GUIDED FNA\par POSITIVE FOR MALIGNANT CELLS.\par Metastatic squamous Cell Carcinoma.\par \par The cytology slides are composed of syncytial sheets and single pleomorphic cells with irregular, hyperchromatic nuclei and dense cytoplasm in a background of necrosis and keratinized debris admixed with lymphoid cells.\par \par \par \par 4. LUNG, BRONCHOALVEOLAR LAVAGE\par NEGATIVE FOR MALIGNANT CELLS.\par \par The cytology slide and cell block are composed of groups of reactive ciliated bronchial epithelial cells, scattered alveolar macrophages and mixed inflammatory cells.\par \par \par \par 11/26/2022: PET/CT [facility] \par -hyperactive right upper lobe mass with an SUV of 13.2, 2.3 x 2.6 cm\par -  Adjacent activity in the right hilar region with questionable metastatic adenopathy( SUV 6.4, 1.2 cm ).  \par -Previous 6 mm nodule in the anterior right lung base is no longer seen.   \par \par 11/17/2022 CT Chest noncontrast : [facility]  \par -2.8 x 2.8 x 2.3 right upper lobe mass in the posterior segment \par - Indeterminate nodule in anterior RLL measuring 6-7 mm\par - Small nodule left upper lobe, measuring 3-4 mm\par - Prominent nodes along right hilar structures, one of which shows similar density as right upper lobe mass , measuring 13 x 14x 11 mm, concerning for satellite lesion\par  \par 11/11/2022 CXR  at [facility]. Compared to a previous film from June 2022 \par -a new 3 cm nodular right upper lobe lesion

## 2023-03-21 NOTE — HISTORY OF PRESENT ILLNESS
[Date: ____________] : Patient's last distress assessment performed on [unfilled]. [0 - No Distress] : Distress Level: 0 [de-identified] : JONE SOUTH is a 82 year M with PMHX of HTN, HLD, COPD, ASHD,  40 ppk year smoking history ( quit 2002) presents for initial consultation for  Squamous Cell lung cancer \par \par Patient presented to Dr. Reed on 11/29/22 c/o  a productive cough in November 2022. He was treated with a course of antibiotics but also underwent a chest x-ray on 11/11/2022 at [facility]. Compared to a previous film from June of this past year a new 3 cm nodular right upper lobe lesion was found. \par \par Subsequent CT Chest performed on 11/17/2022 revealing  2.8 x 2.8 x 2.3 right upper lobe mass in the posterior segment. Indeterminate nodule in anterior RLL measuring 6-7 mm. Small nodule left upper lobe, measuring 3-4 mm. Prominent nodes along right hilar structures, one of which shows similar density as right upper lobe mass , measuring 13 x 14x 11 mm, concerning for satellite lesion\par \par PET/CT on 11/26/2022 reported  right upper lobe mass with an SUV of 13.2, 2.3 x 2.6 cm. Adjacent activity in the right hilar region with questionable metastatic adenopathy( SUV 6.4, 1.2 cm ).  Previous 6 mm nodule in the anterior right lung base is no longer seen.   \par \par Patient referred to Dr. Martin. He underwent  flexible bronchoscopy, robotic-assisted bronchoscopy with the ion system, endobronchial ultrasound with transbronchial needle aspiration, endobronchial brushing, endobronchial biopsy on 1/18/23.\par \par Pathology \par Lymph Node ( R4, R10, Level 7) positive for malignant cells for squamous cell carcinoma. \par Bronchoalveolar lavage was negative\par \par PMH: HTN, hypercholesterolemia\par FMH:\par Sx: hip replacement sx \par Socx: Former smoker, quit 2002 (smoked 40 ppk year)\par \par Care Team: \par Pulmonologist: Dr. Reed\par Primary care provider: Dr. 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 \par -There is a peripheral nodular opacity more peripherally measuring 1.3 x 1.0 cm with minimal activity of SUV 1.4 likely reflecting a nonobstructive pattern of atelectasis.\par -Hypermetabolic node superior and posterior to the right main bronchus measures 1.2 x 1.8 cm with SUV 11.3 compatible with dejon involvement. \par -A subcentimeter right upper paratracheal node  measuring 0.4 x 1.0 cm is nonavid.\par \par IMPRESSION:\par 1. Hypermetabolic nodule in the superior segment of right lower lobe compatible with a malignancy; hypermetabolic right hilar node compatible with dejon involvement.\par \par 2. Soft tissue abutting the left ischium with specks of calcification and patchy activity appears to be related to a chronic inflammatory process; this can be further evaluated with MRI when clinically feasible.\par \par \par 2/3/23 MR Head \par IMPRESSION:\par -Indeterminate subcentimeter high FLAIR signal with associated tiny enhancement in the left temporal lobe as described above. Tiny metastasis not excluded. Follow-up exam recommended.\par -No acute intracranial hemorrhage or acute infarct\par

## 2023-03-22 PROCEDURE — 77387B: CUSTOM | Mod: 26

## 2023-03-23 PROCEDURE — 77387B: CUSTOM | Mod: 26

## 2023-03-24 ENCOUNTER — RESULT REVIEW (OUTPATIENT)
Age: 83
End: 2023-03-24

## 2023-03-24 ENCOUNTER — APPOINTMENT (OUTPATIENT)
Dept: HEMATOLOGY ONCOLOGY | Facility: CLINIC | Age: 83
End: 2023-03-24

## 2023-03-24 LAB
BASOPHILS # BLD AUTO: 0 K/UL — SIGNIFICANT CHANGE UP (ref 0–0.2)
BASOPHILS NFR BLD AUTO: 1.1 % — SIGNIFICANT CHANGE UP (ref 0–2)
EOSINOPHIL # BLD AUTO: 0 K/UL — SIGNIFICANT CHANGE UP (ref 0–0.5)
EOSINOPHIL NFR BLD AUTO: 0.4 % — SIGNIFICANT CHANGE UP (ref 0–6)
HCT VFR BLD CALC: 31.8 % — LOW (ref 39–50)
HGB BLD-MCNC: 11.3 G/DL — LOW (ref 13–17)
LYMPHOCYTES # BLD AUTO: 0.3 K/UL — LOW (ref 1–3.3)
LYMPHOCYTES # BLD AUTO: 7.5 % — LOW (ref 13–44)
MCHC RBC-ENTMCNC: 32.2 PG — SIGNIFICANT CHANGE UP (ref 27–34)
MCHC RBC-ENTMCNC: 35.4 G/DL — SIGNIFICANT CHANGE UP (ref 32–36)
MCV RBC AUTO: 91 FL — SIGNIFICANT CHANGE UP (ref 80–100)
MONOCYTES # BLD AUTO: 0.2 K/UL — SIGNIFICANT CHANGE UP (ref 0–0.9)
MONOCYTES NFR BLD AUTO: 6.1 % — SIGNIFICANT CHANGE UP (ref 2–14)
NEUTROPHILS # BLD AUTO: 3.3 K/UL — SIGNIFICANT CHANGE UP (ref 1.8–7.4)
NEUTROPHILS NFR BLD AUTO: 84.8 % — HIGH (ref 43–77)
PLATELET # BLD AUTO: 147 K/UL — LOW (ref 150–400)
RBC # BLD: 3.5 M/UL — LOW (ref 4.2–5.8)
RBC # FLD: 12.7 % — SIGNIFICANT CHANGE UP (ref 10.3–14.5)
WBC # BLD: 3.9 K/UL — SIGNIFICANT CHANGE UP (ref 3.8–10.5)
WBC # FLD AUTO: 3.9 K/UL — SIGNIFICANT CHANGE UP (ref 3.8–10.5)

## 2023-03-24 PROCEDURE — 77014: CPT | Mod: 26

## 2023-03-25 PROBLEM — R91.8 LUNG MASS: Status: ACTIVE | Noted: 2022-11-29

## 2023-03-25 PROBLEM — I25.10 CAD (CORONARY ARTERY DISEASE): Status: ACTIVE | Noted: 2022-11-29

## 2023-03-25 NOTE — DISEASE MANAGEMENT
[Clinical] : TNM Stage: c [IIB] : IIB [FreeTextEntry4] : squamous cell carcinoma [TTNM] : 1c [NTNM] : 1 [MTNM] : x [de-identified] : 2200 cGy [de-identified] : 6000 cGy [de-identified] : right lung/hilum/mediastinum

## 2023-03-25 NOTE — DISEASE MANAGEMENT
[Clinical] : TNM Stage: c [IIIA] : IIIA [TTNM] : 1c [NTNM] : 1 [MTNM] : 0 [de-identified] : 0455 [de-identified] : 6832 [de-identified] : lung

## 2023-03-27 ENCOUNTER — APPOINTMENT (OUTPATIENT)
Age: 83
End: 2023-03-27

## 2023-03-27 ENCOUNTER — RESULT REVIEW (OUTPATIENT)
Age: 83
End: 2023-03-27

## 2023-03-27 ENCOUNTER — NON-APPOINTMENT (OUTPATIENT)
Age: 83
End: 2023-03-27

## 2023-03-27 VITALS
OXYGEN SATURATION: 100 % | WEIGHT: 194 LBS | SYSTOLIC BLOOD PRESSURE: 135 MMHG | DIASTOLIC BLOOD PRESSURE: 68 MMHG | HEART RATE: 62 BPM | RESPIRATION RATE: 16 BRPM | BODY MASS INDEX: 28.65 KG/M2

## 2023-03-27 LAB
ALBUMIN SERPL ELPH-MCNC: 4 G/DL
ALP BLD-CCNC: 84 U/L
ALT SERPL-CCNC: 27 U/L
ANION GAP SERPL CALC-SCNC: 14 MMOL/L
AST SERPL-CCNC: 26 U/L
BASOPHILS # BLD AUTO: 0 K/UL — SIGNIFICANT CHANGE UP (ref 0–0.2)
BASOPHILS NFR BLD AUTO: 1.4 % — SIGNIFICANT CHANGE UP (ref 0–2)
BILIRUB SERPL-MCNC: 0.8 MG/DL
BUN SERPL-MCNC: 25 MG/DL
CALCIUM SERPL-MCNC: 9.1 MG/DL
CHLORIDE SERPL-SCNC: 103 MMOL/L
CO2 SERPL-SCNC: 22 MMOL/L
CREAT SERPL-MCNC: 1.09 MG/DL
EGFR: 68 ML/MIN/1.73M2
EOSINOPHIL # BLD AUTO: 0 K/UL — SIGNIFICANT CHANGE UP (ref 0–0.5)
EOSINOPHIL NFR BLD AUTO: 0.4 % — SIGNIFICANT CHANGE UP (ref 0–6)
GLUCOSE SERPL-MCNC: 130 MG/DL
HCT VFR BLD CALC: 31.3 % — LOW (ref 39–50)
HGB BLD-MCNC: 10.6 G/DL — LOW (ref 13–17)
LYMPHOCYTES # BLD AUTO: 0.3 K/UL — LOW (ref 1–3.3)
LYMPHOCYTES # BLD AUTO: 9.3 % — LOW (ref 13–44)
MCHC RBC-ENTMCNC: 32.2 PG — SIGNIFICANT CHANGE UP (ref 27–34)
MCHC RBC-ENTMCNC: 33.7 G/DL — SIGNIFICANT CHANGE UP (ref 32–36)
MCV RBC AUTO: 95.7 FL — SIGNIFICANT CHANGE UP (ref 80–100)
MONOCYTES # BLD AUTO: 0.4 K/UL — SIGNIFICANT CHANGE UP (ref 0–0.9)
MONOCYTES NFR BLD AUTO: 12.9 % — SIGNIFICANT CHANGE UP (ref 2–14)
NEUTROPHILS # BLD AUTO: 2.5 K/UL — SIGNIFICANT CHANGE UP (ref 1.8–7.4)
NEUTROPHILS NFR BLD AUTO: 76.1 % — SIGNIFICANT CHANGE UP (ref 43–77)
PLATELET # BLD AUTO: 113 K/UL — LOW (ref 150–400)
POTASSIUM SERPL-SCNC: 3.7 MMOL/L
PROT SERPL-MCNC: 6.1 G/DL
RBC # BLD: 3.28 M/UL — LOW (ref 4.2–5.8)
RBC # FLD: 13.4 % — SIGNIFICANT CHANGE UP (ref 10.3–14.5)
SODIUM SERPL-SCNC: 140 MMOL/L
WBC # BLD: 3.3 K/UL — LOW (ref 3.8–10.5)
WBC # FLD AUTO: 3.3 K/UL — LOW (ref 3.8–10.5)

## 2023-03-27 PROCEDURE — 77387B: CUSTOM | Mod: 26

## 2023-03-27 NOTE — DISEASE MANAGEMENT
[Clinical] : TNM Stage: c [FreeTextEntry4] : squamous cell carcinoma [TTNM] : 1c [NTNM] : 1 [MTNM] : x [IIB] : IIB [de-identified] : 4000 cGy [de-identified] : 6000 cGy [de-identified] : right lung/hilum/mediastinum

## 2023-03-28 LAB
ALBUMIN SERPL ELPH-MCNC: 4 G/DL — SIGNIFICANT CHANGE UP (ref 3.3–5)
ALP SERPL-CCNC: 75 U/L — SIGNIFICANT CHANGE UP (ref 40–120)
ALT FLD-CCNC: 23 U/L — SIGNIFICANT CHANGE UP (ref 10–45)
ANION GAP SERPL CALC-SCNC: 13 MMOL/L — SIGNIFICANT CHANGE UP (ref 5–17)
AST SERPL-CCNC: 23 U/L — SIGNIFICANT CHANGE UP (ref 10–40)
BILIRUB SERPL-MCNC: 0.3 MG/DL — SIGNIFICANT CHANGE UP (ref 0.2–1.2)
BUN SERPL-MCNC: 38 MG/DL — HIGH (ref 7–23)
CALCIUM SERPL-MCNC: 9.4 MG/DL — SIGNIFICANT CHANGE UP (ref 8.4–10.5)
CEA SERPL-MCNC: 4 NG/ML — HIGH (ref 0–3.8)
CHLORIDE SERPL-SCNC: 107 MMOL/L — SIGNIFICANT CHANGE UP (ref 96–108)
CO2 SERPL-SCNC: 23 MMOL/L — SIGNIFICANT CHANGE UP (ref 22–31)
CREAT SERPL-MCNC: 1.67 MG/DL — HIGH (ref 0.5–1.3)
EGFR: 41 ML/MIN/1.73M2 — LOW
GLUCOSE SERPL-MCNC: 91 MG/DL — SIGNIFICANT CHANGE UP (ref 70–99)
POTASSIUM SERPL-MCNC: 4.1 MMOL/L — SIGNIFICANT CHANGE UP (ref 3.5–5.3)
POTASSIUM SERPL-SCNC: 4.1 MMOL/L — SIGNIFICANT CHANGE UP (ref 3.5–5.3)
PROT SERPL-MCNC: 6.6 G/DL — SIGNIFICANT CHANGE UP (ref 6–8.3)
SODIUM SERPL-SCNC: 143 MMOL/L — SIGNIFICANT CHANGE UP (ref 135–145)

## 2023-03-28 PROCEDURE — 77387B: CUSTOM | Mod: 26

## 2023-03-28 PROCEDURE — 77427 RADIATION TX MANAGEMENT X5: CPT

## 2023-03-29 PROCEDURE — 77387B: CUSTOM | Mod: 26

## 2023-03-30 PROCEDURE — 77387B: CUSTOM | Mod: 26

## 2023-03-31 ENCOUNTER — RESULT REVIEW (OUTPATIENT)
Age: 83
End: 2023-03-31

## 2023-03-31 ENCOUNTER — APPOINTMENT (OUTPATIENT)
Dept: HEMATOLOGY ONCOLOGY | Facility: CLINIC | Age: 83
End: 2023-03-31

## 2023-03-31 ENCOUNTER — OUTPATIENT (OUTPATIENT)
Dept: OUTPATIENT SERVICES | Facility: HOSPITAL | Age: 83
LOS: 1 days | Discharge: HOME | End: 2023-03-31

## 2023-03-31 DIAGNOSIS — Z96.641 PRESENCE OF RIGHT ARTIFICIAL HIP JOINT: Chronic | ICD-10-CM

## 2023-03-31 DIAGNOSIS — C34.90 MALIGNANT NEOPLASM OF UNSPECIFIED PART OF UNSPECIFIED BRONCHUS OR LUNG: ICD-10-CM

## 2023-03-31 DIAGNOSIS — Z96.642 PRESENCE OF LEFT ARTIFICIAL HIP JOINT: Chronic | ICD-10-CM

## 2023-03-31 LAB
BASOPHILS # BLD AUTO: 0 K/UL — SIGNIFICANT CHANGE UP (ref 0–0.2)
BASOPHILS NFR BLD AUTO: 1.4 % — SIGNIFICANT CHANGE UP (ref 0–2)
EOSINOPHIL # BLD AUTO: 0 K/UL — SIGNIFICANT CHANGE UP (ref 0–0.5)
EOSINOPHIL NFR BLD AUTO: 0.5 % — SIGNIFICANT CHANGE UP (ref 0–6)
HCT VFR BLD CALC: 31.8 % — LOW (ref 39–50)
HGB BLD-MCNC: 10.8 G/DL — LOW (ref 13–17)
LYMPHOCYTES # BLD AUTO: 0.2 K/UL — LOW (ref 1–3.3)
LYMPHOCYTES # BLD AUTO: 8.9 % — LOW (ref 13–44)
MCHC RBC-ENTMCNC: 32.1 PG — SIGNIFICANT CHANGE UP (ref 27–34)
MCHC RBC-ENTMCNC: 33.8 G/DL — SIGNIFICANT CHANGE UP (ref 32–36)
MCV RBC AUTO: 95.2 FL — SIGNIFICANT CHANGE UP (ref 80–100)
MONOCYTES # BLD AUTO: 0.2 K/UL — SIGNIFICANT CHANGE UP (ref 0–0.9)
MONOCYTES NFR BLD AUTO: 7.2 % — SIGNIFICANT CHANGE UP (ref 2–14)
NEUTROPHILS # BLD AUTO: 2.3 K/UL — SIGNIFICANT CHANGE UP (ref 1.8–7.4)
NEUTROPHILS NFR BLD AUTO: 82.1 % — HIGH (ref 43–77)
PLATELET # BLD AUTO: 89 K/UL — LOW (ref 150–400)
RBC # BLD: 3.34 M/UL — LOW (ref 4.2–5.8)
RBC # FLD: 13.3 % — SIGNIFICANT CHANGE UP (ref 10.3–14.5)
WBC # BLD: 2.8 K/UL — LOW (ref 3.8–10.5)
WBC # FLD AUTO: 2.8 K/UL — LOW (ref 3.8–10.5)

## 2023-03-31 PROCEDURE — 77014: CPT | Mod: 26

## 2023-04-03 ENCOUNTER — RESULT REVIEW (OUTPATIENT)
Age: 83
End: 2023-04-03

## 2023-04-03 ENCOUNTER — NON-APPOINTMENT (OUTPATIENT)
Age: 83
End: 2023-04-03

## 2023-04-03 ENCOUNTER — APPOINTMENT (OUTPATIENT)
Age: 83
End: 2023-04-03

## 2023-04-03 VITALS
WEIGHT: 193 LBS | HEART RATE: 100 BPM | SYSTOLIC BLOOD PRESSURE: 129 MMHG | RESPIRATION RATE: 16 BRPM | OXYGEN SATURATION: 93 % | BODY MASS INDEX: 28.5 KG/M2 | DIASTOLIC BLOOD PRESSURE: 54 MMHG

## 2023-04-03 LAB
ALBUMIN SERPL ELPH-MCNC: 3.9 G/DL — SIGNIFICANT CHANGE UP (ref 3.3–5)
ALBUMIN SERPL ELPH-MCNC: 4.2 G/DL
ALP BLD-CCNC: 79 U/L
ALP SERPL-CCNC: 75 U/L — SIGNIFICANT CHANGE UP (ref 40–120)
ALT FLD-CCNC: 23 U/L — SIGNIFICANT CHANGE UP (ref 10–45)
ALT SERPL-CCNC: 23 U/L
ANION GAP SERPL CALC-SCNC: 13 MMOL/L — SIGNIFICANT CHANGE UP (ref 5–17)
ANION GAP SERPL CALC-SCNC: 15 MMOL/L
AST SERPL-CCNC: 25 U/L
AST SERPL-CCNC: 26 U/L — SIGNIFICANT CHANGE UP (ref 10–40)
BASOPHILS # BLD AUTO: 0 K/UL — SIGNIFICANT CHANGE UP (ref 0–0.2)
BASOPHILS NFR BLD AUTO: 1.3 % — SIGNIFICANT CHANGE UP (ref 0–2)
BILIRUB SERPL-MCNC: 0.5 MG/DL — SIGNIFICANT CHANGE UP (ref 0.2–1.2)
BILIRUB SERPL-MCNC: 0.9 MG/DL
BUN SERPL-MCNC: 29 MG/DL
BUN SERPL-MCNC: 29 MG/DL — HIGH (ref 7–23)
CALCIUM SERPL-MCNC: 9 MG/DL — SIGNIFICANT CHANGE UP (ref 8.4–10.5)
CALCIUM SERPL-MCNC: 9.3 MG/DL
CEA SERPL-MCNC: 4.6 NG/ML — HIGH (ref 0–3.8)
CHLORIDE SERPL-SCNC: 100 MMOL/L
CHLORIDE SERPL-SCNC: 103 MMOL/L — SIGNIFICANT CHANGE UP (ref 96–108)
CO2 SERPL-SCNC: 22 MMOL/L
CO2 SERPL-SCNC: 22 MMOL/L — SIGNIFICANT CHANGE UP (ref 22–31)
CREAT SERPL-MCNC: 1.18 MG/DL
CREAT SERPL-MCNC: 1.19 MG/DL — SIGNIFICANT CHANGE UP (ref 0.5–1.3)
EGFR: 61 ML/MIN/1.73M2 — SIGNIFICANT CHANGE UP
EGFR: 62 ML/MIN/1.73M2
EOSINOPHIL # BLD AUTO: 0 K/UL — SIGNIFICANT CHANGE UP (ref 0–0.5)
EOSINOPHIL NFR BLD AUTO: 0.4 % — SIGNIFICANT CHANGE UP (ref 0–6)
GLUCOSE SERPL-MCNC: 109 MG/DL
GLUCOSE SERPL-MCNC: 96 MG/DL — SIGNIFICANT CHANGE UP (ref 70–99)
HCT VFR BLD CALC: 28.5 % — LOW (ref 39–50)
HGB BLD-MCNC: 9.7 G/DL — LOW (ref 13–17)
LYMPHOCYTES # BLD AUTO: 0.2 K/UL — LOW (ref 1–3.3)
LYMPHOCYTES # BLD AUTO: 6.6 % — LOW (ref 13–44)
MCHC RBC-ENTMCNC: 32.6 PG — SIGNIFICANT CHANGE UP (ref 27–34)
MCHC RBC-ENTMCNC: 34.1 G/DL — SIGNIFICANT CHANGE UP (ref 32–36)
MCV RBC AUTO: 95.4 FL — SIGNIFICANT CHANGE UP (ref 80–100)
MONOCYTES # BLD AUTO: 0.2 K/UL — SIGNIFICANT CHANGE UP (ref 0–0.9)
MONOCYTES NFR BLD AUTO: 10.2 % — SIGNIFICANT CHANGE UP (ref 2–14)
NEUTROPHILS # BLD AUTO: 2 K/UL — SIGNIFICANT CHANGE UP (ref 1.8–7.4)
NEUTROPHILS NFR BLD AUTO: 81.6 % — HIGH (ref 43–77)
PLATELET # BLD AUTO: 86 K/UL — LOW (ref 150–400)
POTASSIUM SERPL-MCNC: 3.9 MMOL/L — SIGNIFICANT CHANGE UP (ref 3.5–5.3)
POTASSIUM SERPL-SCNC: 3.9 MMOL/L
POTASSIUM SERPL-SCNC: 3.9 MMOL/L — SIGNIFICANT CHANGE UP (ref 3.5–5.3)
PROT SERPL-MCNC: 6.1 G/DL
PROT SERPL-MCNC: 6.1 G/DL — SIGNIFICANT CHANGE UP (ref 6–8.3)
RBC # BLD: 2.99 M/UL — LOW (ref 4.2–5.8)
RBC # FLD: 13 % — SIGNIFICANT CHANGE UP (ref 10.3–14.5)
SODIUM SERPL-SCNC: 137 MMOL/L
SODIUM SERPL-SCNC: 139 MMOL/L — SIGNIFICANT CHANGE UP (ref 135–145)
WBC # BLD: 2.5 K/UL — LOW (ref 3.8–10.5)
WBC # FLD AUTO: 2.5 K/UL — LOW (ref 3.8–10.5)

## 2023-04-03 PROCEDURE — 77387B: CUSTOM | Mod: 26

## 2023-04-03 NOTE — REVIEW OF SYSTEMS
[Dysphagia: Grade 0] : Dysphagia: Grade 0 [Edema Limbs: Grade 0] : Edema Limbs: Grade 0  [Fatigue: Grade 1 - Fatigue relieved by rest] : Fatigue: Grade 1 - Fatigue relieved by rest [Localized Edema: Grade 0] : Localized Edema: Grade 0  [Neck Edema: Grade 0] : Neck Edema: Grade 0 [Cough: Grade 1 - Mild symptoms; nonprescription intervention indicated] : Cough: Grade 1 - Mild symptoms; nonprescription intervention indicated [Dyspnea: Grade 1 - Shortness of breath with moderate exertion] : Dyspnea: Grade 1 - Shortness of breath with moderate exertion [Skin Hyperpigmentation: Grade 0] : Skin Hyperpigmentation: Grade 0

## 2023-04-03 NOTE — DISEASE MANAGEMENT
[Clinical] : TNM Stage: c [IIIA] : IIIA [TTNM] : 1c [NTNM] : 1 [MTNM] : 0 [de-identified] : 8641 [de-identified] : 9863 [de-identified] : lung

## 2023-04-03 NOTE — PHYSICAL EXAM
[Normal] : oriented to person, place and time, the affect was normal, the mood was normal and not anxious [de-identified] : murmur

## 2023-04-04 DIAGNOSIS — R11.2 NAUSEA WITH VOMITING, UNSPECIFIED: ICD-10-CM

## 2023-04-04 DIAGNOSIS — Z51.11 ENCOUNTER FOR ANTINEOPLASTIC CHEMOTHERAPY: ICD-10-CM

## 2023-04-04 PROCEDURE — 77387B: CUSTOM | Mod: 26

## 2023-04-04 PROCEDURE — 77427 RADIATION TX MANAGEMENT X5: CPT

## 2023-04-05 ENCOUNTER — APPOINTMENT (OUTPATIENT)
Dept: HEMATOLOGY ONCOLOGY | Facility: CLINIC | Age: 83
End: 2023-04-05
Payer: MEDICARE

## 2023-04-05 ENCOUNTER — RESULT REVIEW (OUTPATIENT)
Age: 83
End: 2023-04-05

## 2023-04-05 VITALS
SYSTOLIC BLOOD PRESSURE: 96 MMHG | WEIGHT: 191.56 LBS | OXYGEN SATURATION: 100 % | HEIGHT: 69 IN | HEART RATE: 69 BPM | DIASTOLIC BLOOD PRESSURE: 48 MMHG | TEMPERATURE: 97.5 F | BODY MASS INDEX: 28.37 KG/M2

## 2023-04-05 PROCEDURE — 77387B: CUSTOM | Mod: 26

## 2023-04-05 PROCEDURE — 99215 OFFICE O/P EST HI 40 MIN: CPT

## 2023-04-05 NOTE — ASSESSMENT
[FreeTextEntry1] : JONE SOUTH, who was diagnosed with a squamous cell Carcinoma Lung at the age of  81 yo in Jan 2023  Patient with a PMHX of HTN, HLD, COPD, ASHD,  40 ppk year smoking history ( quit 2002) \par \par Patient initially presented with a  productive cough in November 2022, chest x-ray on 11/11/2022 at [facility] showed a new 3 cm nodular right upper lobe lesion \par \par Subsequent CT Chest and PEt scan was done \par \par PET/CT on 11/26/2022 reported  right upper lobe mass with an SUV of 13.2, 2.3 x 2.6 cm. Adjacent activity in the right hilar region with questionable metastatic adenopathy( SUV 6.4, 1.2 cm ).  Previous 6 mm nodule in the anterior right lung base is no longer seen.   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 \par -There is a peripheral nodular opacity more peripherally measuring 1.3 x 1.0 cm with minimal activity of SUV 1.4 likely reflecting a nonobstructive pattern of atelectasis.\par -Hypermetabolic node superior and posterior to the right main bronchus measures 1.2 x 1.8 cm with SUV 11.3 compatible with dejon involvement. \par -A subcentimeter right upper paratracheal node  measuring 0.4 x 1.0 cm is nonavid.\par \par IMPRESSION:\par 1. Hypermetabolic nodule in the superior segment of right lower lobe compatible with a malignancy; hypermetabolic right hilar node compatible with dejon involvement.\par 2. Soft tissue abutting the left ischium with specks of calcification and patchy activity appears to be related to a chronic inflammatory process; this can be further evaluated with MRI when clinically feasible.\par \par \par 2/3/23 MR Head \par IMPRESSION:\par -Indeterminate subcentimeter high FLAIR signal with associated tiny enhancement in the left temporal lobe as described above. Tiny metastasis not excluded. Follow-up exam recommended.\par -No acute intracranial hemorrhage or acute infarct\par \par - Foundation one sent on 1/31/23, specimen insufficient. Will consider sending Guardant \par - Advised to f/u with PCP to possibly adjust BP medication \par Started weekly chemoradiation with Carbo/Taxol on 2/27/23, RT planned to finish 4/10/23\par C6 weekly carbo/ taxol completed on 4/3/23 Rt planned to complete 4/10/23\par - Restaging CT CAP 4 weeks after completion of RT \par - f/u after restaging scans to discuss starting durvalumab\par  \par \par

## 2023-04-05 NOTE — REVIEW OF SYSTEMS
[Dysphagia: Grade 0] : Dysphagia: Grade 0 [Edema Limbs: Grade 0] : Edema Limbs: Grade 0  [Localized Edema: Grade 0] : Localized Edema: Grade 0  [Fatigue: Grade 1 - Fatigue relieved by rest] : Fatigue: Grade 1 - Fatigue relieved by rest [Neck Edema: Grade 0] : Neck Edema: Grade 0 [Cough: Grade 1 - Mild symptoms; nonprescription intervention indicated] : Cough: Grade 1 - Mild symptoms; nonprescription intervention indicated [Dyspnea: Grade 1 - Shortness of breath with moderate exertion] : Dyspnea: Grade 1 - Shortness of breath with moderate exertion [Skin Hyperpigmentation: Grade 0] : Skin Hyperpigmentation: Grade 0

## 2023-04-05 NOTE — HISTORY OF PRESENT ILLNESS
[Date: ____________] : Patient's last distress assessment performed on [unfilled]. [0 - No Distress] : Distress Level: 0 [de-identified] : JONE SOUTH is a 82 year M with PMHX of HTN, HLD, COPD, ASHD,  40 ppk year smoking history ( quit 2002) presents for initial consultation for  Squamous Cell lung cancer \par \par Patient presented to Dr. Reed on 11/29/22 c/o  a productive cough in November 2022. He was treated with a course of antibiotics but also underwent a chest x-ray on 11/11/2022 at [facility]. Compared to a previous film from June of this past year a new 3 cm nodular right upper lobe lesion was found. \par \par Subsequent CT Chest performed on 11/17/2022 revealing  2.8 x 2.8 x 2.3 right upper lobe mass in the posterior segment. Indeterminate nodule in anterior RLL measuring 6-7 mm. Small nodule left upper lobe, measuring 3-4 mm. Prominent nodes along right hilar structures, one of which shows similar density as right upper lobe mass , measuring 13 x 14x 11 mm, concerning for satellite lesion\par \par PET/CT on 11/26/2022 reported  right upper lobe mass with an SUV of 13.2, 2.3 x 2.6 cm. Adjacent activity in the right hilar region with questionable metastatic adenopathy( SUV 6.4, 1.2 cm ).  Previous 6 mm nodule in the anterior right lung base is no longer seen.   \par \par Patient referred to Dr. Martin. He underwent  flexible bronchoscopy, robotic-assisted bronchoscopy with the ion system, endobronchial ultrasound with transbronchial needle aspiration, endobronchial brushing, endobronchial biopsy on 1/18/23.\par \par Pathology \par Lymph Node ( R4, R10, Level 7) positive for malignant cells for squamous cell carcinoma. \par Bronchoalveolar lavage was negative\par \par PMH: HTN, hypercholesterolemia\par FMH:\par Sx: hip replacement sx \par Socx: Former smoker, quit 2002 (smoked 40 ppk year)\par \par Care Team: \par Pulmonologist: Dr. Reed\par Primary care provider: Dr. 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 \par -There is a peripheral nodular opacity more peripherally measuring 1.3 x 1.0 cm with minimal activity of SUV 1.4 likely reflecting a nonobstructive pattern of atelectasis.\par -Hypermetabolic node superior and posterior to the right main bronchus measures 1.2 x 1.8 cm with SUV 11.3 compatible with dejon involvement. \par -A subcentimeter right upper paratracheal node  measuring 0.4 x 1.0 cm is nonavid.\par \par IMPRESSION:\par 1. Hypermetabolic nodule in the superior segment of right lower lobe compatible with a malignancy; hypermetabolic right hilar node compatible with dejon involvement.\par \par 2. Soft tissue abutting the left ischium with specks of calcification and patchy activity appears to be related to a chronic inflammatory process; this can be further evaluated with MRI when clinically feasible.\par \par \par 2/3/23 MR Head \par IMPRESSION:\par -Indeterminate subcentimeter high FLAIR signal with associated tiny enhancement in the left temporal lobe as described above. Tiny metastasis not excluded. Follow-up exam recommended.\par -No acute intracranial hemorrhage or acute infarct\par

## 2023-04-05 NOTE — RESULTS/DATA
[FreeTextEntry1] :  1/18/2023 Pathology\par \par Final Diagnosis\par 1. LYMPH NODE, R4, EBUS-GUIDED FNA\par \par POSITIVE FOR MALIGNANT CELLS.\par Squamous Cell Carcinoma.\par \par The cytology slides are composed of syncytial sheets and single pleomorphic cells with irregular, hyperchromatic nuclei and dense cytoplasm in a background of necrosis and keratinized debris. No lymphoid cells present in the background.\par \par 2. LYMPH NODE, LEVEL 7, EBUS-GUIDED FNA\par Squamous Cell Carcinoma.\par \par The cytology slides are composed of syncytial sheets and single pleomorphic cells with irregular, hyperchromatic nuclei and dense\par cytoplasm in a background of necrosis and keratinized debris. No lymphoid cells present in the background.\par \par \par 3. LYMPH NODE, R10, EBUS-GUIDED FNA\par POSITIVE FOR MALIGNANT CELLS.\par Metastatic squamous Cell Carcinoma.\par \par The cytology slides are composed of syncytial sheets and single pleomorphic cells with irregular, hyperchromatic nuclei and dense cytoplasm in a background of necrosis and keratinized debris admixed with lymphoid cells.\par \par \par \par 4. LUNG, BRONCHOALVEOLAR LAVAGE\par NEGATIVE FOR MALIGNANT CELLS.\par \par The cytology slide and cell block are composed of groups of reactive ciliated bronchial epithelial cells, scattered alveolar macrophages and mixed inflammatory cells.\par \par \par \par 11/26/2022: PET/CT [facility] \par -hyperactive right upper lobe mass with an SUV of 13.2, 2.3 x 2.6 cm\par -  Adjacent activity in the right hilar region with questionable metastatic adenopathy( SUV 6.4, 1.2 cm ).  \par -Previous 6 mm nodule in the anterior right lung base is no longer seen.   \par \par 11/17/2022 CT Chest noncontrast : [facility]  \par -2.8 x 2.8 x 2.3 right upper lobe mass in the posterior segment \par - Indeterminate nodule in anterior RLL measuring 6-7 mm\par - Small nodule left upper lobe, measuring 3-4 mm\par - Prominent nodes along right hilar structures, one of which shows similar density as right upper lobe mass , measuring 13 x 14x 11 mm, concerning for satellite lesion\par  \par 11/11/2022 CXR  at [facility]. Compared to a previous film from June 2022 \par -a new 3 cm nodular right upper lobe lesion

## 2023-04-06 ENCOUNTER — APPOINTMENT (OUTPATIENT)
Age: 83
End: 2023-04-06

## 2023-04-06 PROCEDURE — 77387B: CUSTOM | Mod: 26

## 2023-04-07 DIAGNOSIS — Z51.89 ENCOUNTER FOR OTHER SPECIFIED AFTERCARE: ICD-10-CM

## 2023-04-07 PROCEDURE — 77014: CPT | Mod: 26

## 2023-04-10 ENCOUNTER — NON-APPOINTMENT (OUTPATIENT)
Age: 83
End: 2023-04-10

## 2023-04-10 VITALS
OXYGEN SATURATION: 91 % | RESPIRATION RATE: 16 BRPM | BODY MASS INDEX: 27.32 KG/M2 | DIASTOLIC BLOOD PRESSURE: 57 MMHG | SYSTOLIC BLOOD PRESSURE: 93 MMHG | HEART RATE: 70 BPM | WEIGHT: 185 LBS

## 2023-04-10 PROCEDURE — 77387B: CUSTOM | Mod: 26

## 2023-04-10 NOTE — DISEASE MANAGEMENT
[Clinical] : TNM Stage: c [IIIA] : IIIA [TTNM] : 1c [NTNM] : 1 [MTNM] : 0 [de-identified] : 7547 [de-identified] : 8282 [de-identified] : lung

## 2023-04-10 NOTE — PHYSICAL EXAM
[Normal] : oriented to person, place and time, the affect was normal, the mood was normal and not anxious [de-identified] : murmur

## 2023-04-13 ENCOUNTER — INPATIENT (INPATIENT)
Facility: HOSPITAL | Age: 83
LOS: 1 days | Discharge: HOME | DRG: 682 | End: 2023-04-15
Attending: GENERAL ACUTE CARE HOSPITAL | Admitting: FAMILY MEDICINE
Payer: MEDICARE

## 2023-04-13 ENCOUNTER — NON-APPOINTMENT (OUTPATIENT)
Age: 83
End: 2023-04-13

## 2023-04-13 VITALS
SYSTOLIC BLOOD PRESSURE: 123 MMHG | HEIGHT: 69 IN | WEIGHT: 184.97 LBS | TEMPERATURE: 98 F | RESPIRATION RATE: 16 BRPM | DIASTOLIC BLOOD PRESSURE: 57 MMHG | OXYGEN SATURATION: 100 % | HEART RATE: 82 BPM

## 2023-04-13 DIAGNOSIS — I25.10 ATHEROSCLEROTIC HEART DISEASE OF NATIVE CORONARY ARTERY WITHOUT ANGINA PECTORIS: ICD-10-CM

## 2023-04-13 DIAGNOSIS — E78.5 HYPERLIPIDEMIA, UNSPECIFIED: ICD-10-CM

## 2023-04-13 DIAGNOSIS — R55 SYNCOPE AND COLLAPSE: ICD-10-CM

## 2023-04-13 DIAGNOSIS — I10 ESSENTIAL (PRIMARY) HYPERTENSION: ICD-10-CM

## 2023-04-13 DIAGNOSIS — Z96.642 PRESENCE OF LEFT ARTIFICIAL HIP JOINT: Chronic | ICD-10-CM

## 2023-04-13 DIAGNOSIS — I44.4 LEFT ANTERIOR FASCICULAR BLOCK: ICD-10-CM

## 2023-04-13 DIAGNOSIS — Z96.641 PRESENCE OF RIGHT ARTIFICIAL HIP JOINT: Chronic | ICD-10-CM

## 2023-04-13 LAB
ALBUMIN SERPL ELPH-MCNC: 3.7 G/DL — SIGNIFICANT CHANGE UP (ref 3.3–5.2)
ALP SERPL-CCNC: 87 U/L — SIGNIFICANT CHANGE UP (ref 40–120)
ALT FLD-CCNC: 16 U/L — SIGNIFICANT CHANGE UP
ANION GAP SERPL CALC-SCNC: 15 MMOL/L — SIGNIFICANT CHANGE UP (ref 5–17)
ANISOCYTOSIS BLD QL: SLIGHT — SIGNIFICANT CHANGE UP
APTT BLD: 25.4 SEC — LOW (ref 27.5–35.5)
AST SERPL-CCNC: 18 U/L — SIGNIFICANT CHANGE UP
BASOPHILS # BLD AUTO: 0 K/UL — SIGNIFICANT CHANGE UP (ref 0–0.2)
BASOPHILS NFR BLD AUTO: 0 % — SIGNIFICANT CHANGE UP (ref 0–2)
BILIRUB SERPL-MCNC: 0.5 MG/DL — SIGNIFICANT CHANGE UP (ref 0.4–2)
BLD GP AB SCN SERPL QL: SIGNIFICANT CHANGE UP
BUN SERPL-MCNC: 47.4 MG/DL — HIGH (ref 8–20)
CALCIUM SERPL-MCNC: 8.9 MG/DL — SIGNIFICANT CHANGE UP (ref 8.4–10.5)
CHLORIDE SERPL-SCNC: 102 MMOL/L — SIGNIFICANT CHANGE UP (ref 96–108)
CO2 SERPL-SCNC: 23 MMOL/L — SIGNIFICANT CHANGE UP (ref 22–29)
CREAT SERPL-MCNC: 1.87 MG/DL — HIGH (ref 0.5–1.3)
EGFR: 36 ML/MIN/1.73M2 — LOW
EOSINOPHIL # BLD AUTO: 0 K/UL — SIGNIFICANT CHANGE UP (ref 0–0.5)
EOSINOPHIL NFR BLD AUTO: 0 % — SIGNIFICANT CHANGE UP (ref 0–6)
GIANT PLATELETS BLD QL SMEAR: PRESENT — SIGNIFICANT CHANGE UP
GLUCOSE SERPL-MCNC: 120 MG/DL — HIGH (ref 70–99)
HCT VFR BLD CALC: 24.3 % — LOW (ref 39–50)
HCT VFR BLD CALC: 25.1 % — LOW (ref 39–50)
HGB BLD-MCNC: 8.3 G/DL — LOW (ref 13–17)
HGB BLD-MCNC: 8.7 G/DL — LOW (ref 13–17)
INR BLD: 1.09 RATIO — SIGNIFICANT CHANGE UP (ref 0.88–1.16)
LYMPHOCYTES # BLD AUTO: 0.25 K/UL — LOW (ref 1–3.3)
LYMPHOCYTES # BLD AUTO: 12.2 % — LOW (ref 13–44)
MAGNESIUM SERPL-MCNC: 1.4 MG/DL — LOW (ref 1.8–2.6)
MAGNESIUM SERPL-MCNC: 1.7 MG/DL — SIGNIFICANT CHANGE UP (ref 1.6–2.6)
MANUAL SMEAR VERIFICATION: SIGNIFICANT CHANGE UP
MCHC RBC-ENTMCNC: 31.3 PG — SIGNIFICANT CHANGE UP (ref 27–34)
MCHC RBC-ENTMCNC: 31.5 PG — SIGNIFICANT CHANGE UP (ref 27–34)
MCHC RBC-ENTMCNC: 34.2 GM/DL — SIGNIFICANT CHANGE UP (ref 32–36)
MCHC RBC-ENTMCNC: 34.7 GM/DL — SIGNIFICANT CHANGE UP (ref 32–36)
MCV RBC AUTO: 90.9 FL — SIGNIFICANT CHANGE UP (ref 80–100)
MCV RBC AUTO: 91.7 FL — SIGNIFICANT CHANGE UP (ref 80–100)
MONOCYTES # BLD AUTO: 0.28 K/UL — SIGNIFICANT CHANGE UP (ref 0–0.9)
MONOCYTES NFR BLD AUTO: 13.9 % — SIGNIFICANT CHANGE UP (ref 2–14)
NEUTROPHILS # BLD AUTO: 1.49 K/UL — LOW (ref 1.8–7.4)
NEUTROPHILS NFR BLD AUTO: 70.4 % — SIGNIFICANT CHANGE UP (ref 43–77)
NEUTS BAND # BLD: 3.5 % — SIGNIFICANT CHANGE UP (ref 0–8)
NRBC # BLD: 2 /100 — HIGH (ref 0–0)
PLAT MORPH BLD: NORMAL — SIGNIFICANT CHANGE UP
PLATELET # BLD AUTO: 107 K/UL — LOW (ref 150–400)
PLATELET # BLD AUTO: 113 K/UL — LOW (ref 150–400)
POLYCHROMASIA BLD QL SMEAR: SLIGHT — SIGNIFICANT CHANGE UP
POTASSIUM SERPL-MCNC: 3.6 MMOL/L — SIGNIFICANT CHANGE UP (ref 3.5–5.3)
POTASSIUM SERPL-SCNC: 3.6 MMOL/L — SIGNIFICANT CHANGE UP (ref 3.5–5.3)
PROT SERPL-MCNC: 5.9 G/DL — LOW (ref 6.6–8.7)
PROTHROM AB SERPL-ACNC: 12.6 SEC — SIGNIFICANT CHANGE UP (ref 10.5–13.4)
RBC # BLD: 2.65 M/UL — LOW (ref 4.2–5.8)
RBC # BLD: 2.76 M/UL — LOW (ref 4.2–5.8)
RBC # FLD: 15 % — HIGH (ref 10.3–14.5)
RBC # FLD: 15.1 % — HIGH (ref 10.3–14.5)
RBC BLD AUTO: ABNORMAL
SODIUM SERPL-SCNC: 140 MMOL/L — SIGNIFICANT CHANGE UP (ref 135–145)
TROPONIN T SERPL-MCNC: <0.01 NG/ML — SIGNIFICANT CHANGE UP (ref 0–0.06)
WBC # BLD: 1.82 K/UL — LOW (ref 3.8–10.5)
WBC # BLD: 2.02 K/UL — LOW (ref 3.8–10.5)
WBC # FLD AUTO: 1.82 K/UL — LOW (ref 3.8–10.5)
WBC # FLD AUTO: 2.02 K/UL — LOW (ref 3.8–10.5)

## 2023-04-13 PROCEDURE — 71045 X-RAY EXAM CHEST 1 VIEW: CPT | Mod: 26

## 2023-04-13 PROCEDURE — 99285 EMERGENCY DEPT VISIT HI MDM: CPT

## 2023-04-13 PROCEDURE — 93010 ELECTROCARDIOGRAM REPORT: CPT

## 2023-04-13 PROCEDURE — 70450 CT HEAD/BRAIN W/O DYE: CPT | Mod: 26

## 2023-04-13 PROCEDURE — 93880 EXTRACRANIAL BILAT STUDY: CPT | Mod: 26

## 2023-04-13 PROCEDURE — 99223 1ST HOSP IP/OBS HIGH 75: CPT

## 2023-04-13 RX ORDER — LANOLIN ALCOHOL/MO/W.PET/CERES
3 CREAM (GRAM) TOPICAL AT BEDTIME
Refills: 0 | Status: DISCONTINUED | OUTPATIENT
Start: 2023-04-13 | End: 2023-04-15

## 2023-04-13 RX ORDER — ASPIRIN/CALCIUM CARB/MAGNESIUM 324 MG
81 TABLET ORAL DAILY
Refills: 0 | Status: DISCONTINUED | OUTPATIENT
Start: 2023-04-13 | End: 2023-04-15

## 2023-04-13 RX ORDER — TIOTROPIUM BROMIDE 18 UG/1
2 CAPSULE ORAL; RESPIRATORY (INHALATION) DAILY
Refills: 0 | Status: DISCONTINUED | OUTPATIENT
Start: 2023-04-13 | End: 2023-04-15

## 2023-04-13 RX ORDER — SODIUM CHLORIDE 9 MG/ML
1000 INJECTION, SOLUTION INTRAVENOUS
Refills: 0 | Status: DISCONTINUED | OUTPATIENT
Start: 2023-04-13 | End: 2023-04-15

## 2023-04-13 RX ORDER — MAGNESIUM OXIDE 400 MG ORAL TABLET 241.3 MG
400 TABLET ORAL
Refills: 0 | Status: DISCONTINUED | OUTPATIENT
Start: 2023-04-13 | End: 2023-04-15

## 2023-04-13 RX ORDER — HEPARIN SODIUM 5000 [USP'U]/ML
5000 INJECTION INTRAVENOUS; SUBCUTANEOUS EVERY 12 HOURS
Refills: 0 | Status: DISCONTINUED | OUTPATIENT
Start: 2023-04-13 | End: 2023-04-15

## 2023-04-13 RX ORDER — POTASSIUM CHLORIDE 20 MEQ
40 PACKET (EA) ORAL ONCE
Refills: 0 | Status: COMPLETED | OUTPATIENT
Start: 2023-04-13 | End: 2023-04-13

## 2023-04-13 RX ORDER — MAGNESIUM SULFATE 500 MG/ML
2 VIAL (ML) INJECTION ONCE
Refills: 0 | Status: COMPLETED | OUTPATIENT
Start: 2023-04-13 | End: 2023-04-13

## 2023-04-13 RX ORDER — SODIUM CHLORIDE 9 MG/ML
1000 INJECTION, SOLUTION INTRAVENOUS ONCE
Refills: 0 | Status: COMPLETED | OUTPATIENT
Start: 2023-04-13 | End: 2023-04-13

## 2023-04-13 RX ORDER — ACETAMINOPHEN 500 MG
650 TABLET ORAL EVERY 6 HOURS
Refills: 0 | Status: DISCONTINUED | OUTPATIENT
Start: 2023-04-13 | End: 2023-04-15

## 2023-04-13 RX ORDER — ONDANSETRON 8 MG/1
4 TABLET, FILM COATED ORAL EVERY 8 HOURS
Refills: 0 | Status: DISCONTINUED | OUTPATIENT
Start: 2023-04-13 | End: 2023-04-15

## 2023-04-13 RX ORDER — ATORVASTATIN CALCIUM 80 MG/1
40 TABLET, FILM COATED ORAL AT BEDTIME
Refills: 0 | Status: DISCONTINUED | OUTPATIENT
Start: 2023-04-13 | End: 2023-04-15

## 2023-04-13 RX ORDER — CLOPIDOGREL BISULFATE 75 MG/1
75 TABLET, FILM COATED ORAL DAILY
Refills: 0 | Status: DISCONTINUED | OUTPATIENT
Start: 2023-04-13 | End: 2023-04-15

## 2023-04-13 RX ORDER — BUDESONIDE AND FORMOTEROL FUMARATE DIHYDRATE 160; 4.5 UG/1; UG/1
2 AEROSOL RESPIRATORY (INHALATION)
Refills: 0 | Status: DISCONTINUED | OUTPATIENT
Start: 2023-04-13 | End: 2023-04-15

## 2023-04-13 RX ORDER — METOPROLOL TARTRATE 50 MG
50 TABLET ORAL DAILY
Refills: 0 | Status: DISCONTINUED | OUTPATIENT
Start: 2023-04-13 | End: 2023-04-15

## 2023-04-13 RX ADMIN — Medication 40 MILLIEQUIVALENT(S): at 19:04

## 2023-04-13 RX ADMIN — SODIUM CHLORIDE 1000 MILLILITER(S): 9 INJECTION, SOLUTION INTRAVENOUS at 11:30

## 2023-04-13 RX ADMIN — ATORVASTATIN CALCIUM 40 MILLIGRAM(S): 80 TABLET, FILM COATED ORAL at 22:11

## 2023-04-13 RX ADMIN — MAGNESIUM OXIDE 400 MG ORAL TABLET 400 MILLIGRAM(S): 241.3 TABLET ORAL at 19:03

## 2023-04-13 RX ADMIN — HEPARIN SODIUM 5000 UNIT(S): 5000 INJECTION INTRAVENOUS; SUBCUTANEOUS at 19:03

## 2023-04-13 RX ADMIN — Medication 25 GRAM(S): at 11:30

## 2023-04-13 RX ADMIN — BUDESONIDE AND FORMOTEROL FUMARATE DIHYDRATE 2 PUFF(S): 160; 4.5 AEROSOL RESPIRATORY (INHALATION) at 20:48

## 2023-04-13 RX ADMIN — SODIUM CHLORIDE 75 MILLILITER(S): 9 INJECTION, SOLUTION INTRAVENOUS at 19:11

## 2023-04-13 NOTE — ED PROVIDER NOTE - NSICDXPASTMEDICALHX_GEN_ALL_CORE_FT
PAST MEDICAL HISTORY:  Abnormal findings on diagnostic imaging of lung     CAD (coronary artery disease)     Hyperlipidemia     Hypertension     Left anterior fascicular block (LAFB)     RBBB

## 2023-04-13 NOTE — ED PROVIDER NOTE - CARE PLAN
1 Principal Discharge DX:	Syncope  Secondary Diagnosis:	SHANNEN (acute kidney injury)  Secondary Diagnosis:	Hypomagnesemia

## 2023-04-13 NOTE — H&P ADULT - NSHPPHYSICALEXAM_GEN_ALL_CORE
T(C): 36.5 (04-13-23 @ 15:34), Max: 36.5 (04-13-23 @ 15:34)  HR: 72 (04-13-23 @ 15:34) (72 - 82)  BP: 141/60 (04-13-23 @ 15:34) (123/57 - 141/60)  RR: 18 (04-13-23 @ 15:34) (16 - 18)  SpO2: 100% (04-13-23 @ 15:34) (100% - 100%)    GEN - NAD  HEENT - NCAT, EOMI, JESICA,   RESP - CTA BL, no wheeze/rhonchi/crackles. not on supplemental O2.  CARDIO - NS1S2, RRR. No murmurs/rubs/gallops.  ABD - Soft/Non tender/Non distended. Normal BS x4 quadrants.   Ext - No VIANNEY. no signs of venous/arterial stasis ulcers  MSK - full ROM of BL upper and lower extremities without pain or restriction. BL 5/5 strength on upper and lower extremities.   Neuro - cn 2-12 grossly intact.  no visible seizure activity appreciated. no tremor. gait not observed.   Psych- AAOx3. . attentive. normal affect.

## 2023-04-13 NOTE — H&P ADULT - ASSESSMENT
82 year old male with pmhx HTN, HLD, CAD on plavix/aspirin, PVC w/plan for cardiac MRI,  mild aortic stenosis, RBBB, LAFB, former smoker quit in 2002 40 pack year history, diagnosed with lt lung caner s/p chemo/radiated completed course   last treatment on 4/10 presents with syncope. Pt reports 3 episodes of syncope last night, one in bathroom,did not hit head per wife, two at bedroom while he was sitting on bed. Morning pt had   2 episodes of  while trying to stand up, felt lightheaded  and passed out,  associated with palpitations , denies head trauma, no chest pain,weaknes,numbness,sob,speech/vision problems.Wife and daughters at bedside. History from pt and family.    Syncope   R/O orth hypotension, arrhythmia   -tele monitor  -check orth bp  -EKG  -CTH,CUS,TTE  -IVF. Monitor lytes  -f/u cardiology rec    SHANNEN  Hypomagnesemia  -IVF  -replaced, k,mag  -monitor bmp,mag  -hold arb/hctz      Anemia likely sec chemo  -monitor cbc  -c/s Dr Scott    Cad,htn,hlp  -mild aortic stenosis, RBBB, LAFB  -Asa,plavis,statin,BB  -F/U cardiology rec  dvt ppx    Plan of care dw pt and family at bedside  dw rn   82 year old male with pmhx HTN, HLD, CAD on plavix/aspirin, PVC w/plan for cardiac MRI,  mild aortic stenosis, RBBB, LAFB, former smoker quit in 2002 40 pack year history, diagnosed with lt lung caner s/p chemo/radiated completed course   last treatment on 4/10 presents with syncope. Pt reports 3 episodes of syncope last night, one in bathroom,did not hit head per wife, two at bedroom while he was sitting on bed. Morning pt had   2 episodes of  while trying to stand up, felt lightheaded  and passed out,  associated with palpitations , denies head trauma, no chest pain,weaknes,numbness,sob,speech/vision problems.Wife and daughters at bedside. History from pt and family.    Syncope   R/O orth hypotension, arrhythmia   -tele monitor  -check orth bp  -EKG  -CTH,CUS,TTE  -no prior echo in chart  -IVF. Monitor lytes  -f/u cardiology rec. get copy of recent echo.    SHANNEN  Hypomagnesemia  -IVF  -replaced, k,mag  -monitor bmp,mag  -hold arb/hctz      Anemia likely sec chemo  -monitor cbc  -c/s Dr Scott    Cad,htn,hlp  -mild aortic stenosis, RBBB, LAFB  -Asa,plavis,statin,BB  -F/U cardiology rec  dvt ppx    Plan of care dw pt and family at bedside  dw rn

## 2023-04-13 NOTE — PATIENT PROFILE ADULT - CAREGIVER PHONE NUMBER
Pre-Visit Chart Review  For Appointment Scheduled on 6-29-18    There are no preventive care reminders to display for this patient.        [phone]

## 2023-04-13 NOTE — ED PROVIDER NOTE - CLINICAL SUMMARY MEDICAL DECISION MAKING FREE TEXT BOX
Pt presents with recurrent syncopal events and heavy PVC burden. Pt with SHANNEN, I suspect dehydration from chemo, admitted

## 2023-04-13 NOTE — ED PROVIDER NOTE - OBJECTIVE STATEMENT
80 y/o male with PMHx of CAD, HTN, Lung cancer on chemo and radiation last treatment on 4/10 presents with syncope. Pt reports 4 episode since last night, 2 episodes were after he stood up, felt lightheaded  and passed out, 2 episodes occurred while she was at rest, episodes were associated with palpitations , denies head trauma, no chest pain.

## 2023-04-13 NOTE — PATIENT PROFILE ADULT - FALL HARM RISK - HARM RISK INTERVENTIONS
Assistance with ambulation/Assistance OOB with selected safe patient handling equipment/Communicate Risk of Fall with Harm to all staff/Discuss with provider need for PT consult/Monitor gait and stability/Provide patient with walking aids - walker, cane, crutches/Reinforce activity limits and safety measures with patient and family/Review medications for side effects contributing to fall risk/Sit up slowly, dangle for a short time, stand at bedside before walking/Tailored Fall Risk Interventions/Toileting schedule using arm’s reach rule for commode and bathroom/Visual Cue: Yellow wristband and red socks/Bed in lowest position, wheels locked, appropriate side rails in place/Call bell, personal items and telephone in reach/Instruct patient to call for assistance before getting out of bed or chair/Non-slip footwear when patient is out of bed/[place] to call system/Physically safe environment - no spills, clutter or unnecessary equipment/Purposeful Proactive Rounding/Room/bathroom lighting operational, light cord in reach

## 2023-04-13 NOTE — ED ADULT TRIAGE NOTE - CHIEF COMPLAINT QUOTE
Pt A&Ox4 states "I just finished chemo and radiation for my lung and I get dizzy and pass out when I stand up."  BIBA c/o syncopal episode x4 last night and this am. Patient has history of Lung Ca just finished chemo and radiation round on Monday.

## 2023-04-13 NOTE — H&P ADULT - HISTORY OF PRESENT ILLNESS
82 year old male with pmhx HTN, HLD, CAD on plavix/aspirin,25% PVC burden with plan for cardiac MRI, mild aortic stenosis, RBBB, LAFB, former smoker quit in 2002 40 pack year history, diagnosed with lt lung caner s/p chemo/radiated completed course   last treatment on 4/10 presents with syncope. Pt reports 3 episodes of syncope last night, one in bathroom,did not hit head per wife, two at bedroom while he was sitting on bed. Morning pt had   2 episodes of  while trying to stand up, felt lightheaded  and passed out,  associated with palpitations , denies head trauma, no chest pain,weaknes,numbness,sob,speech/vision problems.Wife and daughters at bedside. History from pt and family.    PAST MEDICAL HISTORY:  HTN, HLD, CAD on plavix/aspirin,25% PVC burden with plan for cardiac MRI, mild aortic stenosis, RBBB, LAFB, former smoker quit in 2002 40 pack year history, diagnosed with lt lung caner s/p chemo/radiated complete  Broncopscopy    CAD (coronary artery disease)     Hyperlipidemia     Hypertension    robotic assisted bronch (ion) EBUS with Dr Martin on 1/18/23.  Left anterior fascicular block (LAFB)     RBBB.     PAST SURGICAL HISTORY:  S/P hip replacement, left     S/P hip replacement, right.   Bronchoscopy biopsy    FAMILY HISTORY:  Family history of CVA, heart disease    Social history: Remote smoker, drinks 1-2 beers with dinner 2-3 times per week   82 year old male with pmhx HTN, HLD, CAD on plavix/aspirin,25% PVC burden with plan for cardiac MRI, mild aortic stenosis, RBBB, LAFB, former smoker quit in 2002 40 pack year history, diagnosed with lt lung caner s/p chemo/radiated completed course   last treatment on 4/10 presents with syncope. Pt reports 3 episodes of syncope last night, one in bathroom,did not hit head per wife, two at bedroom while he was sitting on bed. Morning pt had   2 episodes of  while trying to stand up, felt lightheaded  and passed out,  associated with palpitations , denies head trauma, no chest pain,weaknes,numbness,sob,speech/vision problems.Wife and daughters at bedside. History from pt and family.    PAST MEDICAL HISTORY:  HTN, HLD, CAD on plavix/aspirin,25% PVC burden with plan for cardiac MRI, mild aortic stenosis, RBBB, LAFB, former smoker quit in 2002 40 pack year history, diagnosed with lt lung caner s/p chemo/radiated complete  Broncopscopy    CAD (coronary artery disease)     Hyperlipidemia     Hypertension    robotic assisted bronch (ion) EBUS with Dr Martin on 1/18/23.  Left anterior fascicular block (LAFB)     RBBB.     PAST SURGICAL HISTORY:  S/P hip replacement, left     S/P hip replacement, right.   Bronchoscopy biopsy    FAMILY HISTORY:  Family history of CVA, heart disease    Social history: Remote smoker, drinks 1-2 beers with dinner 2-3 times per week

## 2023-04-13 NOTE — CONSULT NOTE ADULT - SUBJECTIVE AND OBJECTIVE BOX
[facility], THE HEART [facility]ENTER                                   [address] [place] [address]                                                      PHONE: [phone]                                                         FAX: [phone]  [email or web address]  ---------------------------------------------------------------------------------------------------------------------------------    Reason for Consult: Syncope    HPI:  82-year-old male with a past medical history of nonobstructive CAD, 25% PVC burden with plan for cardiac MRI, mild aortic stenosis, lung cancer on chemo/radiation therapy, hypertension, right branch block who presents with syncope.    PAST MEDICAL & SURGICAL HISTORY:  Abnormal findings on diagnostic imaging of lung      Hypertension      Hyperlipidemia      CAD (coronary artery disease)      Left anterior fascicular block (LAFB)      RBBB      S/P hip replacement, right      S/P hip replacement, left          No Known Allergies      MEDICATIONS  (STANDING):    MEDICATIONS  (PRN):      Social History:  Cigarettes:  Denies current tobacco use                  Alcohol:  Denies daily etoh            Illicit Drug Abuse:  Denies    Family History:  denies CAD, MI, CVA, or sudden death.    ROS: Negative other than as mentioned in HPI.    Vital Signs Last 24 Hrs  T(C): 36.4 (13 Apr 2023 10:03), Max: 36.4 (13 Apr 2023 10:03)  T(F): 97.6 (13 Apr 2023 10:03), Max: 97.6 (13 Apr 2023 10:03)  HR: 82 (13 Apr 2023 10:03) (82 - 82)  BP: 123/57 (13 Apr 2023 10:03) (123/57 - 123/57)  BP(mean): --  RR: 16 (13 Apr 2023 10:03) (16 - 16)  SpO2: 100% (13 Apr 2023 10:03) (100% - 100%)    Parameters below as of 13 Apr 2023 10:03  Patient On (Oxygen Delivery Method): room air      ICU Vital Signs Last 24 Hrs  JONE SOUTH  I&O's Detail    I&O's Summary    Drug Dosing Weight  JONE WEEMSGIORGIO      PHYSICAL EXAM:  General: NAD  HEENT: Head; normocephalic, atraumatic.  Eyes: EOMI, conjunctiva normal  Neck: Supple, no JVD noted  CARDIOVASCULAR: RRR, S1 S2, No murmurs or gallops  LUNGS: Clear to auscultation b/l, No rales, rhonchi or wheeze, normal inspiratory effort  ABDOMEN: Soft, nontender, non-distended, +bowel sounds  EXTREMITIES: No edema b/l, no cyanosis   SKIN: warm and dry  NEURO: Alert/oriented x 3  PSYCH: normal affect.        LABS:                        8.7    2.02  )-----------( 113      ( 13 Apr 2023 10:22 )             25.1     04-13    140  |  102  |  47.4<H>  ----------------------------<  120<H>  3.6   |  23.0  |  1.87<H>    Ca    8.9      13 Apr 2023 10:22  Mg     1.4     04-13    TPro  5.9<L>  /  Alb  3.7  /  TBili  0.5  /  DBili  x   /  AST  18  /  ALT  16  /  AlkPhos  87  04-13    JONE SOUTH  CARDIAC MARKERS ( 13 Apr 2023 10:22 )  x     / <0.01 ng/mL / x     / x     / x          PT/INR - ( 13 Apr 2023 10:22 )   PT: 12.6 sec;   INR: 1.09 ratio         PTT - ( 13 Apr 2023 10:22 )  PTT:25.4 sec      RADIOLOGY & ADDITIONAL STUDIES:    INTERPRETATION OF TELEMETRY (personally reviewed):    ECG:    ECHO: 5/2022  Summary:  1. Left ventricle: The cavity size is mildly dilated. Normal thickness is present. Regional wall motion abnormalities are  present. Septal contractions are consistent with conduction abnormality. BPM fluctuated between 30-70 for duration  of exam. The left ventricular ejection fraction is mildly reduced. The LVEF was determined by visual estimate. Left  ventricular ejection fraction is 50-55%.  2. Right ventricle: The cavity size is normal. Right ventricular systolic function is normal.  3. Mitral valve: The annulus is calcified. The leaflets are moderately thickened and mildly calcified. There is mild (1+)  mitral valve regurgitation. The mean diastolic gradient is 4.0 mm Hg. The peak diastolic gradient is 11.0 mm Hg.  4. Aortic valve: The valve is trileaflet. The leaflets are moderately thickened and mildly calcified. There is mild aortic  stenosis. There is mild (1+) valvular aortic regurgitation. The peak systolic gradient is 29.0 mm Hg. The mean systolic  gradient is 15.0 mm Hg. The valve area by the velocity-time integral method is 1.41 cm².  5. Tricuspid valve: The tricuspid leaflets are not thickened. There is no tricuspid valve regurgitation.  6. Pericardium, extracardiac: There is no pericardial effusion.    PET/CT MPI June 2022  -Normal myocardial perfusion    Assessment and Plan:  82-year-old male with a past medical history of nonobstructive CAD, 25% PVC burden with plan for cardiac MRI, mild aortic stenosis, lung cancer on chemo/radiation therapy, hypertension, right branch block who presents with syncope.    Syncope  -Patient with SHANNEN on blood work and possibly due to dehydration with orthostasis  -Patient also with hypokalemia and hypomagnesemia on labs.  Recommend repletion of electrolytes  -Last echocardiogram from May 2022 with preserved ejection fraction and mild aortic stenosis  -Continue to monitor on telemetry  -Would recommend repeat echocardiogram to assess LV function      Thank you for letting [facility] to assist in the management of this patient. Please call with any questions.                 [facility], [facility]                                   [address] [place] [address]                                                      PHONE: [phone]                                                         FAX: [phone]  [email or web address]  ---------------------------------------------------------------------------------------------------------------------------------    Reason for Consult: Syncope    HPI:  82-year-old male with a past medical history of nonobstructive CAD, 25% PVC burden with plan for cardiac MRI, mild aortic stenosis, lung cancer on chemo/radiation therapy, hypertension, right branch block who presents with syncope. Patient states that he has been getting his chemotherapy and radiation treatments that he just finished a few days ago. Patient was having some dysphagia and has not been eating or drinking much over the last few weeks. He states that his dyphagia is not improving. Patient has been having multiple episodes of syncope. The first episode happened two nights ago when he went to the bathroom around 11PM. Patient was sitting on the toilet and got up and got dizzy and passed out. He was helped back to bed. Last night the patient had a few more episodes of passing out which all occurred while he was standing up from a seated position. He came to the hospital for further evaluation. Patient denies any chest pain, SOB, palpitations, diaphoresis, or fevers. Patient does state that his urination has decreased as well.     PAST MEDICAL & SURGICAL HISTORY:  Abnormal findings on diagnostic imaging of lung      Hypertension      Hyperlipidemia      CAD (coronary artery disease)      Left anterior fascicular block (LAFB)      RBBB      S/P hip replacement, right      S/P hip replacement, left          No Known Allergies      MEDICATIONS  (STANDING):    MEDICATIONS  (PRN):      Social History:  Cigarettes:  Denies current tobacco use                  Alcohol:  Denies daily etoh            Illicit Drug Abuse:  Denies    Family History:  Denies sudden cardiac death.    ROS: Negative other than as mentioned in HPI.    Vital Signs Last 24 Hrs  T(C): 36.4 (13 Apr 2023 10:03), Max: 36.4 (13 Apr 2023 10:03)  T(F): 97.6 (13 Apr 2023 10:03), Max: 97.6 (13 Apr 2023 10:03)  HR: 82 (13 Apr 2023 10:03) (82 - 82)  BP: 123/57 (13 Apr 2023 10:03) (123/57 - 123/57)  BP(mean): --  RR: 16 (13 Apr 2023 10:03) (16 - 16)  SpO2: 100% (13 Apr 2023 10:03) (100% - 100%)    Parameters below as of 13 Apr 2023 10:03  Patient On (Oxygen Delivery Method): room air      ICU Vital Signs Last 24 Hrs  JONE SOUTH  I&O's Detail    I&O's Summary    Drug Dosing Weight  JONE SOUTH      PHYSICAL EXAM:  General: NAD  HEENT: Head; normocephalic, atraumatic.  Eyes: EOMI, conjunctiva normal  Neck: Supple, no JVD noted  CARDIOVASCULAR: RRR, S1 S2, Systolic murmur  LUNGS: Clear to auscultation b/l, No rales, rhonchi or wheeze, normal inspiratory effort  ABDOMEN: Soft, nontender, non-distended, +bowel sounds  EXTREMITIES: No edema b/l, no cyanosis   SKIN: warm and dry. Tenting present  NEURO: Alert/oriented x 3  PSYCH: normal affect.        LABS:                        8.7    2.02  )-----------( 113      ( 13 Apr 2023 10:22 )             25.1     04-13    140  |  102  |  47.4<H>  ----------------------------<  120<H>  3.6   |  23.0  |  1.87<H>    Ca    8.9      13 Apr 2023 10:22  Mg     1.4     04-13    TPro  5.9<L>  /  Alb  3.7  /  TBili  0.5  /  DBili  x   /  AST  18  /  ALT  16  /  AlkPhos  87  04-13    JONE SOUTH  CARDIAC MARKERS ( 13 Apr 2023 10:22 )  x     / <0.01 ng/mL / x     / x     / x          PT/INR - ( 13 Apr 2023 10:22 )   PT: 12.6 sec;   INR: 1.09 ratio         PTT - ( 13 Apr 2023 10:22 )  PTT:25.4 sec      RADIOLOGY & ADDITIONAL STUDIES:    INTERPRETATION OF TELEMETRY (personally reviewed): Frequent PVCs, Sinus rhythm    ECG: Sinus rhythm, RBBB, LAFB    ECHO: 5/2022  Summary:  1. Left ventricle: The cavity size is mildly dilated. Normal thickness is present. Regional wall motion abnormalities are  present. Septal contractions are consistent with conduction abnormality. BPM fluctuated between 30-70 for duration  of exam. The left ventricular ejection fraction is mildly reduced. The LVEF was determined by visual estimate. Left  ventricular ejection fraction is 50-55%.  2. Right ventricle: The cavity size is normal. Right ventricular systolic function is normal.  3. Mitral valve: The annulus is calcified. The leaflets are moderately thickened and mildly calcified. There is mild (1+)  mitral valve regurgitation. The mean diastolic gradient is 4.0 mm Hg. The peak diastolic gradient is 11.0 mm Hg.  4. Aortic valve: The valve is trileaflet. The leaflets are moderately thickened and mildly calcified. There is mild aortic  stenosis. There is mild (1+) valvular aortic regurgitation. The peak systolic gradient is 29.0 mm Hg. The mean systolic  gradient is 15.0 mm Hg. The valve area by the velocity-time integral method is 1.41 cm².  5. Tricuspid valve: The tricuspid leaflets are not thickened. There is no tricuspid valve regurgitation.  6. Pericardium, extracardiac: There is no pericardial effusion.    PET/CT MPI June 2022  -Normal myocardial perfusion    Assessment and Plan:  82-year-old male with a past medical history of nonobstructive CAD, 25% PVC burden with plan for cardiac MRI, mild aortic stenosis, lung cancer on chemo/radiation therapy, hypertension, right branch block who presents with syncope.    Syncope/SHANNEN/Bifascicular Block  -Patient with SHANNEN on blood work and possibly due to dehydration with orthostasis (history most consistent with this)  -Patient also with hypokalemia and hypomagnesemia on labs.  Recommend repletion of electrolytes  -Patient also with bifascicular block on ECG. Continue to monitor on tele. Patient to be observed overnight.  -Continue to monitor on telemetry  -Last echocardiogram from May 2022 with preserved ejection fraction and mild aortic stenosis  -Would recommend repeat echocardiogram to assess LV function  -s/p 1L IV fluids. Recommend another liter of IV fluid  -encourage PO hydration  -Will follow patient tomorrow morning for further recommendations      Thank you for letting [facility] to assist in the management of this patient. Please call with any questions.                 [facility], [facility]                                   [address] [place] [address]                                                      PHONE: [phone]                                                         FAX: [phone]  [email or web address]  ---------------------------------------------------------------------------------------------------------------------------------    Reason for Consult: Syncope    HPI:  82-year-old male with a past medical history of nonobstructive CAD, 25% PVC burden with plan for cardiac MRI, mild aortic stenosis, lung cancer on chemo/radiation therapy, hypertension, right branch block who presents with syncope. Patient states that he has been getting his chemotherapy and radiation treatments that he just finished a few days ago. Patient was having some dysphagia and has not been eating or drinking much over the last few weeks. He states that his dyphagia is not improving. Patient has been having multiple episodes of syncope. The first episode happened two nights ago when he went to the bathroom around 11PM. Patient was sitting on the toilet and got up and got dizzy and passed out. He was helped back to bed. Last night the patient had a few more episodes of passing out which all occurred while he was standing up from a seated position. He came to the hospital for further evaluation. Patient denies any chest pain, SOB, palpitations, diaphoresis, or fevers. Patient does state that his urination has decreased as well.     PAST MEDICAL & SURGICAL HISTORY:  Abnormal findings on diagnostic imaging of lung      Hypertension      Hyperlipidemia      CAD (coronary artery disease)      Left anterior fascicular block (LAFB)      RBBB      S/P hip replacement, right      S/P hip replacement, left          No Known Allergies      MEDICATIONS  (STANDING):    MEDICATIONS  (PRN):      Social History:  Cigarettes:  Denies current tobacco use                  Alcohol:  Denies daily etoh            Illicit Drug Abuse:  Denies    Family History:  Denies sudden cardiac death.    ROS: Negative other than as mentioned in HPI.    Vital Signs Last 24 Hrs  T(C): 36.4 (13 Apr 2023 10:03), Max: 36.4 (13 Apr 2023 10:03)  T(F): 97.6 (13 Apr 2023 10:03), Max: 97.6 (13 Apr 2023 10:03)  HR: 82 (13 Apr 2023 10:03) (82 - 82)  BP: 123/57 (13 Apr 2023 10:03) (123/57 - 123/57)  BP(mean): --  RR: 16 (13 Apr 2023 10:03) (16 - 16)  SpO2: 100% (13 Apr 2023 10:03) (100% - 100%)    Parameters below as of 13 Apr 2023 10:03  Patient On (Oxygen Delivery Method): room air      ICU Vital Signs Last 24 Hrs  JONE SOUTH  I&O's Detail    I&O's Summary    Drug Dosing Weight  JONE SOUTH      PHYSICAL EXAM:  General: NAD  HEENT: Head; normocephalic, atraumatic.  Eyes: EOMI, conjunctiva normal  Neck: Supple, no JVD noted  CARDIOVASCULAR: RRR, S1 S2, Systolic murmur  LUNGS: Clear to auscultation b/l, No rales, rhonchi or wheeze, normal inspiratory effort  ABDOMEN: Soft, nontender, non-distended, +bowel sounds  EXTREMITIES: No edema b/l, no cyanosis   SKIN: warm and dry. Tenting present  NEURO: Alert/oriented x 3  PSYCH: normal affect.        LABS:                        8.7    2.02  )-----------( 113      ( 13 Apr 2023 10:22 )             25.1     04-13    140  |  102  |  47.4<H>  ----------------------------<  120<H>  3.6   |  23.0  |  1.87<H>    Ca    8.9      13 Apr 2023 10:22  Mg     1.4     04-13    TPro  5.9<L>  /  Alb  3.7  /  TBili  0.5  /  DBili  x   /  AST  18  /  ALT  16  /  AlkPhos  87  04-13    JONE SUOTH  CARDIAC MARKERS ( 13 Apr 2023 10:22 )  x     / <0.01 ng/mL / x     / x     / x          PT/INR - ( 13 Apr 2023 10:22 )   PT: 12.6 sec;   INR: 1.09 ratio         PTT - ( 13 Apr 2023 10:22 )  PTT:25.4 sec      RADIOLOGY & ADDITIONAL STUDIES:    INTERPRETATION OF TELEMETRY (personally reviewed): Frequent PVCs, Sinus rhythm    ECG: Sinus rhythm, RBBB, LAFB    ECHO: 5/2022  Summary:  1. Left ventricle: The cavity size is mildly dilated. Normal thickness is present. Regional wall motion abnormalities are  present. Septal contractions are consistent with conduction abnormality. BPM fluctuated between 30-70 for duration  of exam. The left ventricular ejection fraction is mildly reduced. The LVEF was determined by visual estimate. Left  ventricular ejection fraction is 50-55%.  2. Right ventricle: The cavity size is normal. Right ventricular systolic function is normal.  3. Mitral valve: The annulus is calcified. The leaflets are moderately thickened and mildly calcified. There is mild (1+)  mitral valve regurgitation. The mean diastolic gradient is 4.0 mm Hg. The peak diastolic gradient is 11.0 mm Hg.  4. Aortic valve: The valve is trileaflet. The leaflets are moderately thickened and mildly calcified. There is mild aortic  stenosis. There is mild (1+) valvular aortic regurgitation. The peak systolic gradient is 29.0 mm Hg. The mean systolic  gradient is 15.0 mm Hg. The valve area by the velocity-time integral method is 1.41 cm².  5. Tricuspid valve: The tricuspid leaflets are not thickened. There is no tricuspid valve regurgitation.  6. Pericardium, extracardiac: There is no pericardial effusion.    PET/CT MPI June 2022  -Normal myocardial perfusion    Assessment and Plan:  82-year-old male with a past medical history of nonobstructive CAD, 25% PVC burden with plan for cardiac MRI, mild aortic stenosis, lung cancer on chemo/radiation therapy, hypertension, right branch block who presents with syncope.    Syncope/SHANNEN/Bifascicular Block  -Patient with SHANNEN on blood work and possibly due to dehydration with orthostasis (history most consistent with this)  -Patient also with hypokalemia and hypomagnesemia on labs.  Recommend repletion of electrolytes  -Patient also with bifascicular block on ECG (old from 11 months ago). Continue to monitor on tele. Patient to be observed overnight.  -Continue to monitor on telemetry  -Last echocardiogram from May 2022 with preserved ejection fraction and mild aortic stenosis  -Would recommend repeat echocardiogram to assess LV function  -s/p 1L IV fluids. Recommend another liter of IV fluid  -encourage PO hydration  -Will follow patient tomorrow morning for further recommendations      Thank you for letting [facility] to assist in the management of this patient. Please call with any questions.

## 2023-04-14 ENCOUNTER — TRANSCRIPTION ENCOUNTER (OUTPATIENT)
Age: 83
End: 2023-04-14

## 2023-04-14 LAB
ALBUMIN SERPL ELPH-MCNC: 3.3 G/DL — SIGNIFICANT CHANGE UP (ref 3.3–5.2)
ALP SERPL-CCNC: 84 U/L — SIGNIFICANT CHANGE UP (ref 40–120)
ALT FLD-CCNC: 14 U/L — SIGNIFICANT CHANGE UP
ANION GAP SERPL CALC-SCNC: 12 MMOL/L — SIGNIFICANT CHANGE UP (ref 5–17)
ANISOCYTOSIS BLD QL: SLIGHT — SIGNIFICANT CHANGE UP
AST SERPL-CCNC: 18 U/L — SIGNIFICANT CHANGE UP
BASE EXCESS BLDV CALC-SCNC: 2.6 MMOL/L — SIGNIFICANT CHANGE UP (ref -2–3)
BASOPHILS # BLD AUTO: 0.02 K/UL — SIGNIFICANT CHANGE UP (ref 0–0.2)
BASOPHILS NFR BLD AUTO: 0.9 % — SIGNIFICANT CHANGE UP (ref 0–2)
BILIRUB SERPL-MCNC: 0.5 MG/DL — SIGNIFICANT CHANGE UP (ref 0.4–2)
BUN SERPL-MCNC: 34.7 MG/DL — HIGH (ref 8–20)
CALCIUM SERPL-MCNC: 8.8 MG/DL — SIGNIFICANT CHANGE UP (ref 8.4–10.5)
CHLORIDE SERPL-SCNC: 105 MMOL/L — SIGNIFICANT CHANGE UP (ref 96–108)
CO2 SERPL-SCNC: 24 MMOL/L — SIGNIFICANT CHANGE UP (ref 22–29)
CREAT SERPL-MCNC: 1.22 MG/DL — SIGNIFICANT CHANGE UP (ref 0.5–1.3)
EGFR: 60 ML/MIN/1.73M2 — SIGNIFICANT CHANGE UP
EOSINOPHIL # BLD AUTO: 0.03 K/UL — SIGNIFICANT CHANGE UP (ref 0–0.5)
EOSINOPHIL NFR BLD AUTO: 1.8 % — SIGNIFICANT CHANGE UP (ref 0–6)
GIANT PLATELETS BLD QL SMEAR: PRESENT — SIGNIFICANT CHANGE UP
GLUCOSE SERPL-MCNC: 108 MG/DL — HIGH (ref 70–99)
HCO3 BLDV-SCNC: 26 MMOL/L — SIGNIFICANT CHANGE UP (ref 22–29)
HCT VFR BLD CALC: 24.7 % — LOW (ref 39–50)
HGB BLD-MCNC: 8.4 G/DL — LOW (ref 13–17)
LYMPHOCYTES # BLD AUTO: 0.21 K/UL — LOW (ref 1–3.3)
LYMPHOCYTES # BLD AUTO: 11.5 % — LOW (ref 13–44)
MAGNESIUM SERPL-MCNC: 1.7 MG/DL — SIGNIFICANT CHANGE UP (ref 1.6–2.6)
MANUAL SMEAR VERIFICATION: SIGNIFICANT CHANGE UP
MCHC RBC-ENTMCNC: 31.6 PG — SIGNIFICANT CHANGE UP (ref 27–34)
MCHC RBC-ENTMCNC: 34 GM/DL — SIGNIFICANT CHANGE UP (ref 32–36)
MCV RBC AUTO: 92.9 FL — SIGNIFICANT CHANGE UP (ref 80–100)
MICROCYTES BLD QL: SLIGHT — SIGNIFICANT CHANGE UP
MONOCYTES # BLD AUTO: 0.26 K/UL — SIGNIFICANT CHANGE UP (ref 0–0.9)
MONOCYTES NFR BLD AUTO: 14.1 % — HIGH (ref 2–14)
NEUTROPHILS # BLD AUTO: 1.28 K/UL — LOW (ref 1.8–7.4)
NEUTROPHILS NFR BLD AUTO: 70.8 % — SIGNIFICANT CHANGE UP (ref 43–77)
OVALOCYTES BLD QL SMEAR: SLIGHT — SIGNIFICANT CHANGE UP
PCO2 BLDV: 35 MMHG — LOW (ref 42–55)
PH BLDV: 7.48 — HIGH (ref 7.32–7.43)
PLAT MORPH BLD: NORMAL — SIGNIFICANT CHANGE UP
PLATELET # BLD AUTO: 120 K/UL — LOW (ref 150–400)
PO2 BLDV: 60 MMHG — HIGH (ref 25–45)
POIKILOCYTOSIS BLD QL AUTO: SLIGHT — SIGNIFICANT CHANGE UP
POLYCHROMASIA BLD QL SMEAR: SLIGHT — SIGNIFICANT CHANGE UP
POTASSIUM SERPL-MCNC: 3.6 MMOL/L — SIGNIFICANT CHANGE UP (ref 3.5–5.3)
POTASSIUM SERPL-SCNC: 3.6 MMOL/L — SIGNIFICANT CHANGE UP (ref 3.5–5.3)
PROMYELOCYTES # FLD: 0.9 % — HIGH (ref 0–0)
PROT SERPL-MCNC: 5.6 G/DL — LOW (ref 6.6–8.7)
RBC # BLD: 2.66 M/UL — LOW (ref 4.2–5.8)
RBC # FLD: 15.6 % — HIGH (ref 10.3–14.5)
RBC BLD AUTO: ABNORMAL
SAO2 % BLDV: 91.3 % — SIGNIFICANT CHANGE UP
SODIUM SERPL-SCNC: 141 MMOL/L — SIGNIFICANT CHANGE UP (ref 135–145)
WBC # BLD: 1.81 K/UL — LOW (ref 3.8–10.5)
WBC # FLD AUTO: 1.81 K/UL — LOW (ref 3.8–10.5)

## 2023-04-14 PROCEDURE — 99233 SBSQ HOSP IP/OBS HIGH 50: CPT

## 2023-04-14 PROCEDURE — 99223 1ST HOSP IP/OBS HIGH 75: CPT

## 2023-04-14 RX ORDER — ACETAMINOPHEN 500 MG
2 TABLET ORAL
Qty: 0 | Refills: 0 | DISCHARGE
Start: 2023-04-14

## 2023-04-14 RX ORDER — LOSARTAN/HYDROCHLOROTHIAZIDE 100MG-25MG
1 TABLET ORAL
Qty: 0 | Refills: 0 | DISCHARGE

## 2023-04-14 RX ORDER — DIPHENHYDRAMINE HYDROCHLORIDE AND LIDOCAINE HYDROCHLORIDE AND ALUMINUM HYDROXIDE AND MAGNESIUM HYDRO
10 KIT
Refills: 0 | Status: DISCONTINUED | OUTPATIENT
Start: 2023-04-14 | End: 2023-04-15

## 2023-04-14 RX ORDER — PANTOPRAZOLE SODIUM 20 MG/1
40 TABLET, DELAYED RELEASE ORAL
Refills: 0 | Status: DISCONTINUED | OUTPATIENT
Start: 2023-04-14 | End: 2023-04-15

## 2023-04-14 RX ORDER — CHLORPROMAZINE HCL 10 MG
10 TABLET ORAL THREE TIMES A DAY
Refills: 0 | Status: DISCONTINUED | OUTPATIENT
Start: 2023-04-14 | End: 2023-04-15

## 2023-04-14 RX ADMIN — Medication 50 MILLIGRAM(S): at 05:28

## 2023-04-14 RX ADMIN — MAGNESIUM OXIDE 400 MG ORAL TABLET 400 MILLIGRAM(S): 241.3 TABLET ORAL at 11:33

## 2023-04-14 RX ADMIN — Medication 3 MILLIGRAM(S): at 21:36

## 2023-04-14 RX ADMIN — CLOPIDOGREL BISULFATE 75 MILLIGRAM(S): 75 TABLET, FILM COATED ORAL at 11:30

## 2023-04-14 RX ADMIN — MAGNESIUM OXIDE 400 MG ORAL TABLET 400 MILLIGRAM(S): 241.3 TABLET ORAL at 17:02

## 2023-04-14 RX ADMIN — SODIUM CHLORIDE 75 MILLILITER(S): 9 INJECTION, SOLUTION INTRAVENOUS at 04:42

## 2023-04-14 RX ADMIN — BUDESONIDE AND FORMOTEROL FUMARATE DIHYDRATE 2 PUFF(S): 160; 4.5 AEROSOL RESPIRATORY (INHALATION) at 09:15

## 2023-04-14 RX ADMIN — DIPHENHYDRAMINE HYDROCHLORIDE AND LIDOCAINE HYDROCHLORIDE AND ALUMINUM HYDROXIDE AND MAGNESIUM HYDRO 10 MILLILITER(S): KIT at 17:04

## 2023-04-14 RX ADMIN — Medication 81 MILLIGRAM(S): at 11:30

## 2023-04-14 RX ADMIN — ATORVASTATIN CALCIUM 40 MILLIGRAM(S): 80 TABLET, FILM COATED ORAL at 21:36

## 2023-04-14 RX ADMIN — TIOTROPIUM BROMIDE 2 PUFF(S): 18 CAPSULE ORAL; RESPIRATORY (INHALATION) at 09:14

## 2023-04-14 RX ADMIN — HEPARIN SODIUM 5000 UNIT(S): 5000 INJECTION INTRAVENOUS; SUBCUTANEOUS at 05:28

## 2023-04-14 RX ADMIN — HEPARIN SODIUM 5000 UNIT(S): 5000 INJECTION INTRAVENOUS; SUBCUTANEOUS at 17:05

## 2023-04-14 NOTE — CONSULT NOTE ADULT - ASSESSMENT
82 year old male with pmhx HTN, HLD, CAD on plavix/aspirin, PVC w/plan for cardiac MRI,  mild aortic stenosis, RBBB, LAFB, former smoker quit in 2002 40 pack year history, diagnosed with lt lung caner s/p chemo/radiated completed course   last treatment on 4/10 presents with syncope. Pt reports 3 episodes of syncope last night, one in bathroom,did not hit head per wife, two at bedroom while he was sitting on bed. Morning pt had   2 episodes of  while trying to stand up, felt lightheaded  and passed out,  associated with palpitations , denies head trauma, no chest pain,weaknes,numbness,sob,speech/vision problems.Wife and daughters at bedside. History from pt and family.    Syncope   R/O orth hypotension, arrhythmia   Telemetry EKG  Orthostatic BP  IVF. Monitor lytes  Cardiology F/U last echo results needed    SHANENN  Trending  BUN/Cr and electrolytes  Hypomagnesemia  Potassium, Magnesium replaced    141  |  105  |  34.7<H>  ----------------------------<  108<H>  3.6   |  24.0  |  1.22  Monitor urine output encourage fluid intake    Pharngeal irritation pain  Esophageal inflammation due to last chemo treatment  Magic Mouthwash ordered today, for topical analgesia    Hiccups  Natural remedies discussed  Low dose Thorazine 10 mg PO for intractable hiccups Q3x/d as needed    Anemia   likely sec chemo  Monitor cbc  Heme Onc Dr Scott    CAD/HTN/HLD  Cardiac Work-up coming out (-)  Mild aortic stenosis, RBBB, LAFB  Asa,plavis,statin,BB    GOC  FULL CODE at present  in treatment mode  Completed HCP with wife and daughter today  Wife volunteers, that  is finished with his last chemo and radiation  Next Pet scan to see effectiveness of treatment, then Immunotherapy transfusions.  *Hospice support at home, will not be  possible while still having oncologic treatment  Will discuss on Monday, MOLST and ADV Directive GOC

## 2023-04-14 NOTE — DISCHARGE NOTE PROVIDER - NSDCFUSCHEDAPPT_GEN_ALL_CORE_FT
[facility]  [facility]  Scheduled Appointment: 04/26/2023    Reyna Loya  [facility]  [facility]  Scheduled Appointment: 05/22/2023    Jimena Kumar  [facility]  [facility] E [facility]  Scheduled Appointment: 06/07/2023

## 2023-04-14 NOTE — PROGRESS NOTE ADULT - ASSESSMENT
The patient is an 82 year old male with a past medical history of HTN, HLD, CAD on plavix/aspirin, PVC w/plan for cardiac MRI,  mild aortic stenosis, RBBB, LAFB, former smoker quit in 2002 40 pack year history, diagnosed with  lung caner s/p chemo/radiated completed course   last treatment on 4/10 who presented to the ER with syncope. In the ER, CT head was negative. Noted to have Brooke with electrolyte abnormalities. Started on IV Hdyration. Carotid duplex and TTE were done. Cardiology consulted    Assessment/Plan:    1. Syncope  - Vasovagal  -  Orthostatics positive with dizziness on standing  - Continue IVF  - Encourage oral fluid intake  - PVCS on telemetry  - Carotid duplex with moderate right iCA stenosis  - TTE with normal LVSF and moderate AR.     2. BROOKE  - Improved with IV Hdyration  - Losartan and HCTZ held    3. Hypomagnesemia  - Supplemented    4. Pancyotpenia- chemotherapy induced  - Last chemo 4/10  - Monitor CBC    5. CAD  - metoprolol with parameters  - Aspirin, plavix and statin      6. Pain after swallowing  - Suspect radiation esophagitis or gerd  - Trial PPI     VTE_ Heparin subcut    PT evaluation    Anticipate discharge home over the weekend if orthostatics improved on IVF> Plan discussed with patient wife and daughter at bedside    The patient is an 82 year old male with a past medical history of HTN, HLD, CAD on plavix/aspirin, PVC w/plan for cardiac MRI,  mild aortic stenosis, RBBB, LAFB, former smoker quit in 2002 40 pack year history, diagnosed with  lung caner s/p chemo/radiated completed course   last treatment on 4/10 who presented to the ER with syncope. In the ER, CT head was negative. Noted to have Brooke with electrolyte abnormalities. Started on IV Hdyration. Carotid duplex and TTE were done. Cardiology consulted    Assessment/Plan:    1. Syncope  - Vasovagal  -  Orthostatics positive with dizziness on standing  - Continue IVF  - Encourage oral fluid intake  - PVCS on telemetry with a history of Chronic Bifascicular block- MCOT as outpatient per cardiology   - Carotid duplex with moderate right iCA stenosis  - TTE with normal LVSF and moderate AR.     2. BROOKE  - Improved with IV Hdyration  - Losartan and HCTZ held    3. Hypomagnesemia  - Supplemented    4. Pancytopenia chemotherapy induced  - Last chemo 4/10  - Monitor CBC    5. CAD  - metoprolol with parameters  - Aspirin, plavix and statin      6. Pain after swallowing  - Suspect radiation esophagitis or gerd  - Trial PPI     VTE_ Heparin subcut    PT evaluation    Anticipate discharge home over the weekend if orthostatics improved on IVF> Plan discussed with patient wife and daughter at bedside

## 2023-04-14 NOTE — DISCHARGE NOTE PROVIDER - ATTENDING DISCHARGE PHYSICAL EXAMINATION:
PHYSICAL EXAM:    GENERAL: elderly male, sitting in chair, NAD  HEAD:  Atraumatic, Normocephalic  EYES:  conjunctiva and sclera clear  ENMT: Moist mucous membranes  NECK: Supple  CHEST/LUNG: Clear to auscultation bilaterally; No rales, rhonchi, wheezing, or rubs  HEART: Regular rate and rhythm; + S1/S2  ABDOMEN: Soft, Nontender, Nondistended; Bowel sounds present  EXTREMITIES:  no pedal edema

## 2023-04-14 NOTE — CONSULT NOTE ADULT - NSCONSULTADDITIONALINFOA_GEN_ALL_CORE
Total Time Spent__40__ minutes  This includes chart review, patient assessment, discussion and collaboration with interdisciplinary team members, ACP planning    COUNSELING:  Face to face meeting to discuss Advanced Care Planning - Time Spent 10______Minutes.      Thank you for the opportunity to assist with the care of this patient.   [facility] Palliative Medicine Consult Service [phone]. Bcc Infiltrative Histology Text: The dermis contains distinctive tumor cell masses of various shapes and sizes composed of cells with large oval or elongated nuclei with relatively little cytoplasm.  The nuclei are homogenous.  There is no pronounced variation in size or intensity of staining and no significant anaplastic appearance.  The cells at the outer aspect of the tumor masses show palisading of nuclei.  Tumor masses are surrounded by a connective tissue stroma and in some areas there is retraction artifact of the tumor nodule away from the surrounding stroma.  The tumor nests are sharply angulated and demonstrating an infiltrating pattern extending deeply with surrounding fibrosis.

## 2023-04-14 NOTE — DISCHARGE NOTE PROVIDER - NSDCMRMEDTOKEN_GEN_ALL_CORE_FT
acetaminophen 325 mg oral tablet: 2 tab(s) orally every 6 hours As needed Temp greater or equal to 38C (100.4F), Mild Pain (1 - 3)  aspirin 81 mg oral tablet: orally once a day  atorvastatin 40 mg oral tablet: 1 tab(s) orally once a day  clopidogrel 75 mg oral tablet: 1 tab(s) orally once a day  metoprolol succinate 50 mg oral tablet, extended release: 1 tab(s) orally once a day  ondansetron 8 mg oral tablet, disintegratin tab(s) orally every 8 hours as needed for  nausea  Trelegy Ellipta 100 mcg-62.5 mcg-25 mcg/inh inhalation powder: 1 puff(s) inhaled once a day   acetaminophen 325 mg oral tablet: 2 tab(s) orally every 6 hours As needed Temp greater or equal to 38C (100.4F), Mild Pain (1 - 3)  aspirin 81 mg oral tablet: orally once a day  atorvastatin 40 mg oral tablet: 1 tab(s) orally once a day  clopidogrel 75 mg oral tablet: 1 tab(s) orally once a day  metoprolol succinate 50 mg oral tablet, extended release: 1 tab(s) orally once a day  ondansetron 8 mg oral tablet, disintegratin tab(s) orally every 8 hours as needed for  nausea  pantoprazole 40 mg oral delayed release tablet: 1 tab(s) orally once a day (before a meal)  Trelegy Ellipta 100 mcg-62.5 mcg-25 mcg/inh inhalation powder: 1 puff(s) inhaled once a day

## 2023-04-14 NOTE — CONSULT NOTE ADULT - SUBJECTIVE AND OBJECTIVE BOX
82 year old male with pmhx HTN, HLD, CAD on plavix/aspirin,25% PVC burden with plan for cardiac MRI, mild aortic stenosis, RBBB, LAFB, former smoker quit in 2002 40 pack year history, diagnosed with lt lung caner s/p chemo/radiated completed course   last treatment on 4/10 presents with syncope. Syncopal episodes experienced at home in [place] and sitting in bed. Began to get lightheaded when standing, changing position and walking after last chemotherapy treatment. MR Head-no acute events or bleeds. No CP, weakness, SOB speech or vision problems. Today Patient is sitting OOB/CH. C/O "sore throat pharyngeal esophageal burning and irritation, making it hard for him to eat and drink. He also has had persistent intractable hiccups, that are causing chest discomfort. He denies N/V/constipation having loose stools since chemotherapy RX last BM today. No chills or fever. Palliative team consulted for [facility]  HPI:  82 year old male with pmhx HTN, HLD, CAD on plavix/aspirin,25% PVC burden with plan for cardiac MRI, mild aortic stenosis, RBBB, LAFB, former smoker quit in 2002 40 pack year history, diagnosed with lt lung caner s/p chemo/radiated completed course   last treatment on 4/10 presents with syncope. Pt reports 3 episodes of syncope last night, one in bathroom,did not hit head per wife, two at bedroom while he was sitting on bed. Morning pt had   2 episodes of  while trying to stand up, felt lightheaded  and passed out,  associated with palpitations , denies head trauma, no chest pain,weaknes,numbness,sob,speech/vision problems.Wife and daughters at bedside. History from pt and family.    PAST MEDICAL HISTORY:  HTN, HLD, CAD on plavix/aspirin,  25% PVC burden with plan for cardiac MRI,   mild aortic stenosis, RBBB, LAFB,   former smoker quit in 2002 40 pack year history  Left sided  lung cancer s/p chemo/radiated complete  Bronchoscopy-with biopsy    Hypertension    robotic assisted bronch (ion) EBUS with Dr Martin on 1/18/23.  Left anterior fascicular block (LAFB)      PAST SURGICAL HISTORY:  S/P hip replacements, bilateral    FAMILY HISTORY:  Family history of CVA, heart disease    Social history     (13 Apr 2023 16:18)      PERTINENT PMH REVIEWED: Yes     SOCIAL HISTORY:                      Substance history: : Remote smoker, drinks 1-2 beers with dinner 2-3 times per week                    Admitted from:  home                      [employer]/spirituality: [facility]                    Cultural concerns: none                      HCP-wife Diana Macias [phone]  HCP card left with daughter to have them complete for our chart    No Known Allergies    ADVANCE DIRECTIVES/TREATMENT PREFERENCES:  Full code, all aggressive measures desired   *NO opportunity to speak to him about MOLST or advanced directives-will F/U on MOnday    Baseline ADLs (prior to admission):   Dependent      Karnofsky/Palliative Performance Status Version 50:  %      Present Symptoms:     Dyspnea: none  Nausea/Vomiting:  No  Anxiety:  No  Depression: No  Fatigue: Yes   Loss of appetite: Yes painful swallowing following radiation treatments  Constipation:     Pain: throat and dull CP from hiccups            Character- raw burning            Duration-            Effect-            Factors-            Frequency-            Location-throat and lower esophagus            Severity-moderate to severe    Review of Systems: Reviewed                   All others negative    MEDICATIONS  (STANDING):  aspirin enteric coated 81 milliGRAM(s) Oral daily  atorvastatin 40 milliGRAM(s) Oral at bedtime  budesonide  80 MICROgram(s)/formoterol 4.5 MICROgram(s) Inhaler 2 Puff(s) Inhalation two times a day  clopidogrel Tablet 75 milliGRAM(s) Oral daily  FIRST- Mouthwash  BLM 10 milliLiter(s) Swish and Swallow four times a day  heparin   Injectable 5000 Unit(s) SubCutaneous every 12 hours  lactated ringers. 1000 milliLiter(s) (75 mL/Hr) IV Continuous <Continuous>  magnesium oxide 400 milliGRAM(s) Oral three times a day with meals  metoprolol succinate ER 50 milliGRAM(s) Oral daily  pantoprazole    Tablet 40 milliGRAM(s) Oral before breakfast  tiotropium 2.5 MICROgram(s) Inhaler 2 Puff(s) Inhalation daily    MEDICATIONS  (PRN):  acetaminophen     Tablet .. 650 milliGRAM(s) Oral every 6 hours PRN Temp greater or equal to 38C (100.4F), Mild Pain (1 - 3)  aluminum hydroxide/magnesium hydroxide/simethicone Suspension 30 milliLiter(s) Oral every 4 hours PRN Dyspepsia  chlorproMAZINE    Tablet 10 milliGRAM(s) Oral three times a day PRN offer patient every 8 hours for hiccups  melatonin 3 milliGRAM(s) Oral at bedtime PRN Insomnia  ondansetron Injectable 4 milliGRAM(s) IV Push every 8 hours PRN Nausea and/or Vomiting    PHYSICAL EXAM:    Vital Signs Last 24 Hrs  T(C): 36.6 (14 Apr 2023 09:00), Max: 36.9 (13 Apr 2023 22:09)  T(F): 97.8 (14 Apr 2023 09:00), Max: 98.5 (13 Apr 2023 22:09)  HR: 72 (14 Apr 2023 09:00) (71 - 82)  BP: 147/74 (14 Apr 2023 09:00) (101/52 - 147/74)  BP(mean): --  RR: 18 (14 Apr 2023 09:00) (17 - 18)  SpO2: 98% (14 Apr 2023 09:00) (96% - 100%)    Parameters below as of 14 Apr 2023 09:00  Patient On (Oxygen Delivery Method): room air    General: alert  oriented x _3___awake engaging                  well nourished  verbal      HEENT:   dry mouth      Lungs: comfortable tachypnea/labored breathing on exertion    CV: normal      GI: normal  loose stools              constipation  last BM: today    : voiding clear urine    MSK:  weakness  edema             ambulatory with asst    Neuro: no focal deficits     Skin: normal _  no rash    LABS:                        8.4    1.81  )-----------( 120      ( 14 Apr 2023 05:50 )             24.7     04-14        Ca    8.8      14 Apr 2023 05:50  Mg     1.7     04-14    TPro  5.6<L>  /  Alb  3.3  /  TBili  0.5  /  DBili  x   /  AST  18  /  ALT  14  /  AlkPhos  84  04-14    PT/INR - ( 13 Apr 2023 10:22 )   PT: 12.6 sec;   INR: 1.09 ratio      PTT - ( 13 Apr 2023 10:22 )  PTT:25.4 sec    I&O's Summary    13 Apr 2023 07:01  -  14 Apr 2023 07:00  --------------------------------------------------------  IN: 0 mL / OUT: 700 mL / NET: -700 mL    RADIOLOGY & ADDITIONAL STUDIES:  < from: CT Head No Cont (04.13.23 @ 17:34) >    IMPRESSION:  No evidence of an acute intracranial hemorrhage, midline shift, or   hydrocephalus. 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

## 2023-04-14 NOTE — DISCHARGE NOTE PROVIDER - NSDCCPCAREPLAN_GEN_ALL_CORE_FT
PRINCIPAL DISCHARGE DIAGNOSIS  Diagnosis: Syncope  Assessment and Plan of Treatment: Secondary to dehydration      SECONDARY DISCHARGE DIAGNOSES  Diagnosis: SHANNEN (acute kidney injury)  Assessment and Plan of Treatment: Resolved with Iv fluids  follow up with Dr Fernández in 1-3 days for repeat blood work    Diagnosis: Hypomagnesemia  Assessment and Plan of Treatment: supplemented     PRINCIPAL DISCHARGE DIAGNOSIS  Diagnosis: Syncope  Assessment and Plan of Treatment: Secondary to dehydration  please increase oral hydration at home  you will receive a cardiac monitor in the mail  please follow up with your PCP within 1 week and also follow up with cardiology as instructed.      SECONDARY DISCHARGE DIAGNOSES  Diagnosis: SHANNEN (acute kidney injury)  Assessment and Plan of Treatment: Resolved with Iv fluids  Please hold Losartan at this time and avoid all NSAIDs  follow up with Dr Fernández in 1-3 days for repeat blood work as discussed    Diagnosis: Pancytopenia  Assessment and Plan of Treatment: due to chemotherapy  please follow up for repeat blood work in the next 2-3 days as discussed  follow up with Dr. Modi as discussed  if you develop dizziness or shortness of breath, please return to the ED    Diagnosis: Carotid artery stenosis  Assessment and Plan of Treatment: moderate right carotid artery stenosis  please continue aspirin, plavix and lipitor  please make an appointment to follow up with vascular surgery for further evaluation and discuss if further management is necessary

## 2023-04-14 NOTE — DISCHARGE NOTE PROVIDER - CARE PROVIDER_API CALL
Frank Fernández)  Hematology; HospicePalliative Medicine; Medical Oncology  [address] [place] [address]  Phone: [phone]  Fax: [phone]  Follow Up Time: 1-3 days   Reyna Loya)  Hematology; Internal Medicine; Medical Oncology  [address] [place] [address]  Phone: [phone]  Fax: [phone]  Follow Up Time:     Shawn Muhammad (DO)  Cardiovascular Disease; Internal Medicine  [address] [place] [address]  Phone: [phone]  Fax: [phone]  Follow Up Time:     Cece Abdalla)  Surgery Vascular  [address] [place] [address]  Phone: [phone]  Fax: [phone]  Follow Up Time:

## 2023-04-14 NOTE — DISCHARGE NOTE PROVIDER - CARE PROVIDERS DIRECT ADDRESSES
[email or web address] ,DirectAddress_Unknown,DirectAddress_Unknown,DirectAddress_Unknown

## 2023-04-14 NOTE — DISCHARGE NOTE PROVIDER - HOSPITAL COURSE
82 year old male with pmhx HTN, HLD, CAD on plavix/aspirin, PVC w/plan for cardiac MRI,  mild aortic stenosis, RBBB, LAFB, former smoker quit in 2002 40 pack year history, diagnosed with lt lung caner s/p chemo/radiated completed course   last treatment on 4/10 presents with syncope. Admitted for syncope secondary to Brooke and   electrolyte abnormalitis. CT head negative. Carotid duplex with moderate right ICA stenosis. TTE with normal LVSF. BROOKE resolved. orthstatics negative. Discharged home    82 year old male with pmhx HTN, HLD, CAD on plavix/aspirin, PVC w/plan for cardiac MRI,  mild aortic stenosis, RBBB, LAFB, former smoker quit in 2002 40 pack year history, diagnosed with lt lung caner s/p chemo/radiated completed course last treatment on 4/10 presents with syncope. Admitted for syncope secondary to SHANNEN and electrolyte abnormalities. CT head negative. Carotid duplex with moderate right ICA stenosis. TTE with normal LVSF. SHANNEN resolved. Orthostatics improved; seen by cardiology. Chronic bifascicular block, outpatient MCOT to be arranged. Encourage oral hydration. Patient denies dizziness today and ambulating without acute issues. Patient is medically stable for discharge home but instructed to have repeat CBC in the next 2-3 days with outpatient follow up with Dr. Modi scheduled.

## 2023-04-14 NOTE — DISCHARGE NOTE PROVIDER - PROVIDER TOKENS
PROVIDER:[TOKEN:[[id number]],FOLLOWUP:[1-3 days]] PROVIDER:[TOKEN:[[id number]]],PROVIDER:[TOKEN:[[id number]]],PROVIDER:[TOKEN:[[id number]]]

## 2023-04-15 ENCOUNTER — TRANSCRIPTION ENCOUNTER (OUTPATIENT)
Age: 83
End: 2023-04-15

## 2023-04-15 VITALS
TEMPERATURE: 98 F | HEART RATE: 79 BPM | OXYGEN SATURATION: 97 % | SYSTOLIC BLOOD PRESSURE: 134 MMHG | RESPIRATION RATE: 20 BRPM | DIASTOLIC BLOOD PRESSURE: 72 MMHG

## 2023-04-15 LAB
ALBUMIN SERPL ELPH-MCNC: 3 G/DL — LOW (ref 3.3–5.2)
ALP SERPL-CCNC: 79 U/L — SIGNIFICANT CHANGE UP (ref 40–120)
ALT FLD-CCNC: 13 U/L — SIGNIFICANT CHANGE UP
ANION GAP SERPL CALC-SCNC: 10 MMOL/L — SIGNIFICANT CHANGE UP (ref 5–17)
AST SERPL-CCNC: 16 U/L — SIGNIFICANT CHANGE UP
BILIRUB SERPL-MCNC: 0.5 MG/DL — SIGNIFICANT CHANGE UP (ref 0.4–2)
BUN SERPL-MCNC: 28.9 MG/DL — HIGH (ref 8–20)
CALCIUM SERPL-MCNC: 8.5 MG/DL — SIGNIFICANT CHANGE UP (ref 8.4–10.5)
CHLORIDE SERPL-SCNC: 109 MMOL/L — HIGH (ref 96–108)
CO2 SERPL-SCNC: 26 MMOL/L — SIGNIFICANT CHANGE UP (ref 22–29)
CREAT SERPL-MCNC: 1.01 MG/DL — SIGNIFICANT CHANGE UP (ref 0.5–1.3)
EGFR: 75 ML/MIN/1.73M2 — SIGNIFICANT CHANGE UP
GLUCOSE SERPL-MCNC: 106 MG/DL — HIGH (ref 70–99)
HCT VFR BLD CALC: 23.2 % — LOW (ref 39–50)
HGB BLD-MCNC: 7.9 G/DL — LOW (ref 13–17)
MAGNESIUM SERPL-MCNC: 1.6 MG/DL — SIGNIFICANT CHANGE UP (ref 1.6–2.6)
MCHC RBC-ENTMCNC: 31.7 PG — SIGNIFICANT CHANGE UP (ref 27–34)
MCHC RBC-ENTMCNC: 34.1 GM/DL — SIGNIFICANT CHANGE UP (ref 32–36)
MCV RBC AUTO: 93.2 FL — SIGNIFICANT CHANGE UP (ref 80–100)
PLATELET # BLD AUTO: 113 K/UL — LOW (ref 150–400)
POTASSIUM SERPL-MCNC: 3.3 MMOL/L — LOW (ref 3.5–5.3)
POTASSIUM SERPL-SCNC: 3.3 MMOL/L — LOW (ref 3.5–5.3)
PROT SERPL-MCNC: 5.1 G/DL — LOW (ref 6.6–8.7)
RBC # BLD: 2.49 M/UL — LOW (ref 4.2–5.8)
RBC # FLD: 15.8 % — HIGH (ref 10.3–14.5)
SODIUM SERPL-SCNC: 145 MMOL/L — SIGNIFICANT CHANGE UP (ref 135–145)
WBC # BLD: 1.88 K/UL — LOW (ref 3.8–10.5)
WBC # FLD AUTO: 1.88 K/UL — LOW (ref 3.8–10.5)

## 2023-04-15 PROCEDURE — 99239 HOSP IP/OBS DSCHRG MGMT >30: CPT

## 2023-04-15 RX ORDER — PANTOPRAZOLE SODIUM 20 MG/1
1 TABLET, DELAYED RELEASE ORAL
Qty: 30 | Refills: 0
Start: 2023-04-15 | End: 2023-05-14

## 2023-04-15 RX ORDER — POTASSIUM CHLORIDE 20 MEQ
40 PACKET (EA) ORAL ONCE
Refills: 0 | Status: COMPLETED | OUTPATIENT
Start: 2023-04-15 | End: 2023-04-15

## 2023-04-15 RX ADMIN — TIOTROPIUM BROMIDE 2 PUFF(S): 18 CAPSULE ORAL; RESPIRATORY (INHALATION) at 08:45

## 2023-04-15 RX ADMIN — Medication 40 MILLIEQUIVALENT(S): at 10:49

## 2023-04-15 RX ADMIN — DIPHENHYDRAMINE HYDROCHLORIDE AND LIDOCAINE HYDROCHLORIDE AND ALUMINUM HYDROXIDE AND MAGNESIUM HYDRO 10 MILLILITER(S): KIT at 13:13

## 2023-04-15 RX ADMIN — HEPARIN SODIUM 5000 UNIT(S): 5000 INJECTION INTRAVENOUS; SUBCUTANEOUS at 05:54

## 2023-04-15 RX ADMIN — Medication 50 MILLIGRAM(S): at 05:55

## 2023-04-15 RX ADMIN — MAGNESIUM OXIDE 400 MG ORAL TABLET 400 MILLIGRAM(S): 241.3 TABLET ORAL at 13:14

## 2023-04-15 RX ADMIN — MAGNESIUM OXIDE 400 MG ORAL TABLET 400 MILLIGRAM(S): 241.3 TABLET ORAL at 08:21

## 2023-04-15 RX ADMIN — PANTOPRAZOLE SODIUM 40 MILLIGRAM(S): 20 TABLET, DELAYED RELEASE ORAL at 06:02

## 2023-04-15 RX ADMIN — SODIUM CHLORIDE 75 MILLILITER(S): 9 INJECTION, SOLUTION INTRAVENOUS at 05:51

## 2023-04-15 RX ADMIN — CLOPIDOGREL BISULFATE 75 MILLIGRAM(S): 75 TABLET, FILM COATED ORAL at 13:13

## 2023-04-15 RX ADMIN — Medication 81 MILLIGRAM(S): at 13:12

## 2023-04-15 RX ADMIN — BUDESONIDE AND FORMOTEROL FUMARATE DIHYDRATE 2 PUFF(S): 160; 4.5 AEROSOL RESPIRATORY (INHALATION) at 08:45

## 2023-04-15 RX ADMIN — DIPHENHYDRAMINE HYDROCHLORIDE AND LIDOCAINE HYDROCHLORIDE AND ALUMINUM HYDROXIDE AND MAGNESIUM HYDRO 10 MILLILITER(S): KIT at 05:54

## 2023-04-15 RX ADMIN — BUDESONIDE AND FORMOTEROL FUMARATE DIHYDRATE 2 PUFF(S): 160; 4.5 AEROSOL RESPIRATORY (INHALATION) at 05:53

## 2023-04-15 NOTE — DISCHARGE NOTE NURSING/CASE MANAGEMENT/SOCIAL WORK - PATIENT PORTAL LINK FT
You can access the FollowMyHealth Patient Portal offered by [facility] by registering at the following website: [email or web address]. By joining [employer]’s FollowMyHealth portal, you will also be able to view your health information using other applications (apps) compatible with our system.

## 2023-04-15 NOTE — PROGRESS NOTE ADULT - SUBJECTIVE AND OBJECTIVE BOX
[place] [facility] - [facility], THE HEART CENTER                                   [address] [place] [address]                                                      PHONE: [phone]                                                         FAX: [phone]  [email or web address]  ---------------------------------------------------------------------------------------------------------------------------------    Overnight events/patient complaints:  Pt feels well eating breakfast    No Known Allergies    MEDICATIONS  (STANDING):  aspirin enteric coated 81 milliGRAM(s) Oral daily  atorvastatin 40 milliGRAM(s) Oral at bedtime  budesonide  80 MICROgram(s)/formoterol 4.5 MICROgram(s) Inhaler 2 Puff(s) Inhalation two times a day  clopidogrel Tablet 75 milliGRAM(s) Oral daily  FIRST- Mouthwash  BLM 10 milliLiter(s) Swish and Swallow four times a day  heparin   Injectable 5000 Unit(s) SubCutaneous every 12 hours  lactated ringers. 1000 milliLiter(s) (75 mL/Hr) IV Continuous <Continuous>  magnesium oxide 400 milliGRAM(s) Oral three times a day with meals  metoprolol succinate ER 50 milliGRAM(s) Oral daily  pantoprazole    Tablet 40 milliGRAM(s) Oral before breakfast  tiotropium 2.5 MICROgram(s) Inhaler 2 Puff(s) Inhalation daily    MEDICATIONS  (PRN):  acetaminophen     Tablet .. 650 milliGRAM(s) Oral every 6 hours PRN Temp greater or equal to 38C (100.4F), Mild Pain (1 - 3)  aluminum hydroxide/magnesium hydroxide/simethicone Suspension 30 milliLiter(s) Oral every 4 hours PRN Dyspepsia  chlorproMAZINE    Tablet 10 milliGRAM(s) Oral three times a day PRN offer patient every 8 hours for hiccups  melatonin 3 milliGRAM(s) Oral at bedtime PRN Insomnia  ondansetron Injectable 4 milliGRAM(s) IV Push every 8 hours PRN Nausea and/or Vomiting      Vital Signs Last 24 Hrs  T(C): 36.3 (15 Apr 2023 04:22), Max: 36.7 (14 Apr 2023 16:42)  T(F): 97.4 (15 Apr 2023 04:22), Max: 98.1 (14 Apr 2023 16:42)  HR: 74 (15 Apr 2023 04:22) (74 - 83)  BP: 110/66 (15 Apr 2023 04:22) (105/56 - 116/60)  BP(mean): --  RR: 18 (15 Apr 2023 04:22) (15 - 18)  SpO2: 98% (15 Apr 2023 04:22) (98% - 98%)    Parameters below as of 15 Apr 2023 04:22  Patient On (Oxygen Delivery Method): room air      ICU Vital Signs Last 24 Hrs  JONE SOUTH  I&O's Detail    14 Apr 2023 07:01  -  15 Apr 2023 07:00  --------------------------------------------------------  IN:    Lactated Ringers: 900 mL  Total IN: 900 mL    OUT:    Voided (mL): 1200 mL  Total OUT: 1200 mL    Total NET: -300 mL        Drug Dosing Weight  JONE SOUTH      PHYSICAL EXAM:  General:  alert and cooperative.  HEENT: Head; normocephalic, atraumatic.  Eyes: Pupils reactive, cornea wnl.  Neck: Supple, no nodes adenopathy, no NVD or carotid bruit or thyromegaly.  CARDIOVASCULAR: Normal S1 and S2, No murmur, rub, gallop or lift.   LUNGS: No rales, rhonchi or wheeze. Normal breath sounds bilaterally.  ABDOMEN: Soft, nontender without mass or organomegaly. bowel sounds normoactive.  EXTREMITIES: No clubbing, cyanosis or edema. Distal pulses wnl.   SKIN: warm and dry with normal turgor.  NEURO: Alert/oriented x 3/normal motor exam. No pathologic reflexes.    PSYCH: normal affect.        LABS:                        7.9    1.88  )-----------( 113      ( 15 Apr 2023 05:24 )             23.2     04-15    145  |  109<H>  |  28.9<H>  ----------------------------<  106<H>  3.3<L>   |  26.0  |  1.01    Ca    8.5      15 Apr 2023 05:24  Mg     1.6     04-15    TPro  5.1<L>  /  Alb  3.0<L>  /  TBili  0.5  /  DBili  x   /  AST  16  /  ALT  13  /  AlkPhos  79  04-15    JONE SOUTH  CARDIAC MARKERS ( 13 Apr 2023 10:22 )  x     / <0.01 ng/mL / x     / x     / x          PT/INR - ( 13 Apr 2023 10:22 )   PT: 12.6 sec;   INR: 1.09 ratio         PTT - ( 13 Apr 2023 10:22 )  PTT:25.4 sec      RADIOLOGY & ADDITIONAL STUDIES:    INTERPRETATION OF TELEMETRY (personally reviewed): trigeminy no other events     ECHO: < from: TTE Echo Complete w/o Contrast w/ Doppler (04.13.23 @ 17:14) >  Summary:   1. Left ventricular ejection fraction, by visual estimation, is 60 to   65%.   2. Normal global left ventricular systolic function.   3. Spectral Doppler shows impaired relaxation pattern of left   ventricular myocardial filling (Grade I diastolic dysfunction).   4. Normal left atrial size.   5. Normal right atrial size.   6. Trivial pericardial effusion.   7. Mild mitral annular calcification.   8. Trace mitral valve regurgitation.   9. Mild to moderate aortic regurgitation.  10. Aortic root measured at Sinus of Valsalva is dilated.  11. The Dimesionless Index value is .43.    MD Yodit Electronically signed on 4/14/2023 at 8:33:23 AM    < end of copied text >         ASSESSMENT AND PLAN:  In summary, JONE SOUTH is an 82-year-old male with a past medical history of nonobstructive CAD, 21% PVC burden with plan for cardiac MRI, mild aortic stenosis, lung cancer on chemo/radiation therapy, hypertension, bifascicular block (RBBB/LAFB) who presents with syncope.    Syncope/SHANNEN/Bifascicular Block  -history consistent with orthostasis  -SHANNEN improving with IV hydration  -Check orthostatics and ambulate if feeling well can be DCd home from [facility] standpoint    echo as above  -chronic bifascicular block on EKG  -will send patient home with MCOT monitor (to be mailed to his house) after discharge  -tele while inpatient  -encourage PO hydration  -Please recall as needed  
CC: Follow up     INTERVAL HPI/OVERNIGHT EVENTS: Patient seen and examined, orthostatics positive. Dizzy upon standing at the bedside. Poor oral intake due to pain in the center of the chest after swallowing       Vital Signs Last 24 Hrs  T(C): 36.6 (14 Apr 2023 09:00), Max: 36.9 (13 Apr 2023 22:09)  T(F): 97.8 (14 Apr 2023 09:00), Max: 98.5 (13 Apr 2023 22:09)  HR: 72 (14 Apr 2023 09:00) (71 - 82)  BP: 147/74 (14 Apr 2023 09:00) (101/52 - 147/74)  BP(mean): --  RR: 18 (14 Apr 2023 09:00) (17 - 18)  SpO2: 98% (14 Apr 2023 09:00) (96% - 100%)    Parameters below as of 14 Apr 2023 09:00  Patient On (Oxygen Delivery Method): room air        PHYSICAL EXAM:    GENERAL: NAD,AOX3  HEAD:  Atraumatic, Normocephalic  EYES: conjunctiva and sclera clear  ENMT: Moist mucous membranes  NECK: Supple  CHEST/LUNG: Clear to auscultation bilaterally; No rales, rhonchi, wheezing, or rubs  HEART: Regular rate and rhythm; No murmurs, rubs, or gallops  ABDOMEN: Soft, Nontender, Nondistended; Bowel sounds present  EXTREMITIES:  2+ Peripheral Pulses, No clubbing, cyanosis, or edema        MEDICATIONS  (STANDING):  aspirin enteric coated 81 milliGRAM(s) Oral daily  atorvastatin 40 milliGRAM(s) Oral at bedtime  budesonide  80 MICROgram(s)/formoterol 4.5 MICROgram(s) Inhaler 2 Puff(s) Inhalation two times a day  clopidogrel Tablet 75 milliGRAM(s) Oral daily  heparin   Injectable 5000 Unit(s) SubCutaneous every 12 hours  lactated ringers. 1000 milliLiter(s) (75 mL/Hr) IV Continuous <Continuous>  magnesium oxide 400 milliGRAM(s) Oral three times a day with meals  metoprolol succinate ER 50 milliGRAM(s) Oral daily  pantoprazole    Tablet 40 milliGRAM(s) Oral before breakfast  tiotropium 2.5 MICROgram(s) Inhaler 2 Puff(s) Inhalation daily    MEDICATIONS  (PRN):  acetaminophen     Tablet .. 650 milliGRAM(s) Oral every 6 hours PRN Temp greater or equal to 38C (100.4F), Mild Pain (1 - 3)  aluminum hydroxide/magnesium hydroxide/simethicone Suspension 30 milliLiter(s) Oral every 4 hours PRN Dyspepsia  melatonin 3 milliGRAM(s) Oral at bedtime PRN Insomnia  ondansetron Injectable 4 milliGRAM(s) IV Push every 8 hours PRN Nausea and/or Vomiting      Allergies    No Known Allergies    Intolerances          LABS:                          8.4    1.81  )-----------( 120      ( 14 Apr 2023 05:50 )             24.7     04-14    141  |  105  |  34.7<H>  ----------------------------<  108<H>  3.6   |  24.0  |  1.22    Ca    8.8      14 Apr 2023 05:50  Mg     1.7     04-14    TPro  5.6<L>  /  Alb  3.3  /  TBili  0.5  /  DBili  x   /  AST  18  /  ALT  14  /  AlkPhos  84  04-14    PT/INR - ( 13 Apr 2023 10:22 )   PT: 12.6 sec;   INR: 1.09 ratio         PTT - ( 13 Apr 2023 10:22 )  PTT:25.4 sec      RADIOLOGY & ADDITIONAL TESTS:  
                [place] [facility] - [facility], THE HEART CENTER                                   [address] [place] [address]                                                      PHONE: [phone]                                                         FAX: [phone]  [email or web address]  ---------------------------------------------------------------------------------------------------------------------------------    Overnight events/patient complaints: No significant events overnight. Patient feels better this AM however felt lightheaded upon standing.      No Known Allergies    MEDICATIONS  (STANDING):  aspirin enteric coated 81 milliGRAM(s) Oral daily  atorvastatin 40 milliGRAM(s) Oral at bedtime  budesonide  80 MICROgram(s)/formoterol 4.5 MICROgram(s) Inhaler 2 Puff(s) Inhalation two times a day  clopidogrel Tablet 75 milliGRAM(s) Oral daily  heparin   Injectable 5000 Unit(s) SubCutaneous every 12 hours  lactated ringers. 1000 milliLiter(s) (75 mL/Hr) IV Continuous <Continuous>  magnesium oxide 400 milliGRAM(s) Oral three times a day with meals  metoprolol succinate ER 50 milliGRAM(s) Oral daily  tiotropium 2.5 MICROgram(s) Inhaler 2 Puff(s) Inhalation daily    MEDICATIONS  (PRN):  acetaminophen     Tablet .. 650 milliGRAM(s) Oral every 6 hours PRN Temp greater or equal to 38C (100.4F), Mild Pain (1 - 3)  aluminum hydroxide/magnesium hydroxide/simethicone Suspension 30 milliLiter(s) Oral every 4 hours PRN Dyspepsia  melatonin 3 milliGRAM(s) Oral at bedtime PRN Insomnia  ondansetron Injectable 4 milliGRAM(s) IV Push every 8 hours PRN Nausea and/or Vomiting      Vital Signs Last 24 Hrs  T(C): 36.6 (14 Apr 2023 09:00), Max: 36.9 (13 Apr 2023 22:09)  T(F): 97.8 (14 Apr 2023 09:00), Max: 98.5 (13 Apr 2023 22:09)  HR: 72 (14 Apr 2023 09:00) (71 - 82)  BP: 147/74 (14 Apr 2023 09:00) (101/52 - 147/74)  BP(mean): --  RR: 18 (14 Apr 2023 09:00) (16 - 18)  SpO2: 98% (14 Apr 2023 09:00) (96% - 100%)    Parameters below as of 14 Apr 2023 09:00  Patient On (Oxygen Delivery Method): room air      ICU Vital Signs Last 24 Hrs  JONE SOUTH  I&O's Detail    13 Apr 2023 07:01  -  14 Apr 2023 07:00  --------------------------------------------------------  IN:  Total IN: 0 mL    OUT:    Voided (mL): 700 mL  Total OUT: 700 mL    Total NET: -700 mL        Drug Dosing Weight  JONE SOUTH      PHYSICAL EXAM:  General: NAD  HEENT: Head; normocephalic, atraumatic. Hard of hearing  Eyes: EOMI, conjunctiva normal  Neck: Supple, no JVD noted  CARDIOVASCULAR: RRR, 3/6 Systolic murmur present  LUNGS: Clear to auscultation b/l, No rales, rhonchi or wheeze, normal inspiratory effort  ABDOMEN: Soft, nontender, non-distended, +bowel sounds  EXTREMITIES: No edema b/l, no cyanosis   SKIN: warm and dry  NEURO: Alert/oriented x 3  PSYCH: normal affect.        LABS:                        8.4    1.81  )-----------( 120      ( 14 Apr 2023 05:50 )             24.7     04-14    141  |  105  |  34.7<H>  ----------------------------<  108<H>  3.6   |  24.0  |  1.22    Ca    8.8      14 Apr 2023 05:50  Mg     1.7     04-14    TPro  5.6<L>  /  Alb  3.3  /  TBili  0.5  /  DBili  x   /  AST  18  /  ALT  14  /  AlkPhos  84  04-14    JONE SOUTH  CARDIAC MARKERS ( 13 Apr 2023 10:22 )  x     / <0.01 ng/mL / x     / x     / x          PT/INR - ( 13 Apr 2023 10:22 )   PT: 12.6 sec;   INR: 1.09 ratio         PTT - ( 13 Apr 2023 10:22 )  PTT:25.4 sec    Echo (4/13/23)  Summary:   1. Left ventricular ejection fraction, by visual estimation, is 60 to   65%.   2. Normal global left ventricular systolic function.   3. Spectral Doppler shows impaired relaxation pattern of left   ventricular myocardial filling (Grade I diastolic dysfunction).   4. Normal left atrial size.   5. Normal right atrial size.   6. Trivial pericardial effusion.   7. Mild mitral annular calcification.   8. Trace mitral valve regurgitation.   9. Mild to moderate aortic regurgitation.  10. Aortic root measured at Sinus of Valsalva is dilated.  11. The Dimesionless Index value is .43.        Assessment and Plan:  82-year-old male with a past medical history of nonobstructive CAD, 21% PVC burden with plan for cardiac MRI, mild aortic stenosis, lung cancer on chemo/radiation therapy, hypertension, bifascicular block (RBBB/LAFB) who presents with syncope.    Syncope/SHANNEN/Bifascicular Block  -history consistent with orthostasis  -SHANNEN improved after IV hydration  -patient stood up this AM with drop in blood pressure and lightheadedness but without syncope  -patient placed back on IV fluids  -echo as above  -chronic bifascicular block on EKG  -will send patient home with MCOT monitor (to be mailed to his house) after discharge  -tele while inpatient  -encourage PO hydration      Thank you for letting [facility] to assist in the management of this patient. Please call with any questions.

## 2023-04-15 NOTE — DISCHARGE NOTE NURSING/CASE MANAGEMENT/SOCIAL WORK - HAS THE PATIENT USED TOBACCO IN THE PAST 30 DAYS?
Patient asks if his progress note and x-ray from Dr. Kathi Menon is available for Todd Marinelli to review. He has a problem with his arm. Wife is having Dr. Kathi Menon fax us these reports in the meantime. No

## 2023-04-15 NOTE — DISCHARGE NOTE NURSING/CASE MANAGEMENT/SOCIAL WORK - NSDCPEFALRISK_GEN_ALL_CORE
For information on Fall & Injury Prevention, visit: [email or web address] OR  [email or web address] OR  https://www.cdc.gov/steadi/patient.html

## 2023-04-17 ENCOUNTER — RESULT REVIEW (OUTPATIENT)
Age: 83
End: 2023-04-17

## 2023-04-17 ENCOUNTER — APPOINTMENT (OUTPATIENT)
Dept: HEMATOLOGY ONCOLOGY | Facility: CLINIC | Age: 83
End: 2023-04-17
Payer: MEDICARE

## 2023-04-17 VITALS
TEMPERATURE: 98 F | HEIGHT: 69 IN | DIASTOLIC BLOOD PRESSURE: 62 MMHG | BODY MASS INDEX: 28.88 KG/M2 | HEART RATE: 70 BPM | WEIGHT: 195.01 LBS | SYSTOLIC BLOOD PRESSURE: 128 MMHG | OXYGEN SATURATION: 99 %

## 2023-04-17 LAB
ANISOCYTOSIS BLD QL: SLIGHT — SIGNIFICANT CHANGE UP
BASOPHILS # BLD AUTO: 0 K/UL — SIGNIFICANT CHANGE UP (ref 0–0.2)
BASOPHILS NFR BLD AUTO: 1 % — SIGNIFICANT CHANGE UP (ref 0–2)
EOSINOPHIL # BLD AUTO: 0 K/UL — SIGNIFICANT CHANGE UP (ref 0–0.5)
HCT VFR BLD CALC: 25.1 % — LOW (ref 39–50)
HGB BLD-MCNC: 8.6 G/DL — LOW (ref 13–17)
LG PLATELETS BLD QL AUTO: SLIGHT — SIGNIFICANT CHANGE UP
LYMPHOCYTES # BLD AUTO: 0.4 K/UL — LOW (ref 1–3.3)
LYMPHOCYTES # BLD AUTO: 15 % — SIGNIFICANT CHANGE UP (ref 13–44)
MACROCYTES BLD QL: SLIGHT — SIGNIFICANT CHANGE UP
MCHC RBC-ENTMCNC: 32.2 PG — SIGNIFICANT CHANGE UP (ref 27–34)
MCHC RBC-ENTMCNC: 34.3 G/DL — SIGNIFICANT CHANGE UP (ref 32–36)
MCV RBC AUTO: 93.7 FL — SIGNIFICANT CHANGE UP (ref 80–100)
METAMYELOCYTES # FLD: 1 % — HIGH (ref 0–0)
MICROCYTES BLD QL: SLIGHT — SIGNIFICANT CHANGE UP
MONOCYTES # BLD AUTO: 0.6 K/UL — SIGNIFICANT CHANGE UP (ref 0–0.9)
MONOCYTES NFR BLD AUTO: 15 % — HIGH (ref 2–14)
NEUTROPHILS # BLD AUTO: 2 K/UL — SIGNIFICANT CHANGE UP (ref 1.8–7.4)
NEUTROPHILS NFR BLD AUTO: 68 % — SIGNIFICANT CHANGE UP (ref 43–77)
NRBC # BLD: 1 /100 — HIGH (ref 0–0)
OVALOCYTES BLD QL SMEAR: SLIGHT — SIGNIFICANT CHANGE UP
PLAT MORPH BLD: NORMAL — SIGNIFICANT CHANGE UP
PLATELET # BLD AUTO: 155 K/UL — SIGNIFICANT CHANGE UP (ref 150–400)
POIKILOCYTOSIS BLD QL AUTO: SLIGHT — SIGNIFICANT CHANGE UP
POLYCHROMASIA BLD QL SMEAR: SLIGHT — SIGNIFICANT CHANGE UP
RBC # BLD: 2.68 M/UL — LOW (ref 4.2–5.8)
RBC # FLD: 15.4 % — HIGH (ref 10.3–14.5)
RBC BLD AUTO: ABNORMAL
ROULEAUX BLD QL SMEAR: PRESENT — SIGNIFICANT CHANGE UP
WBC # BLD: 3.1 K/UL — LOW (ref 3.8–10.5)
WBC # FLD AUTO: 3.1 K/UL — LOW (ref 3.8–10.5)

## 2023-04-17 PROCEDURE — 99215 OFFICE O/P EST HI 40 MIN: CPT

## 2023-04-17 NOTE — HISTORY OF PRESENT ILLNESS
[Date: ____________] : Patient's last distress assessment performed on [unfilled]. [0 - No Distress] : Distress Level: 0 [de-identified] : JONE SOUTH is a 82 year M with PMHX of HTN, HLD, COPD, ASHD,  40 ppk year smoking history ( quit 2002) presents for initial consultation for  Squamous Cell lung cancer \par \par Patient presented to Dr. Reed on 11/29/22 c/o  a productive cough in November 2022. He was treated with a course of antibiotics but also underwent a chest x-ray on 11/11/2022 at [facility]. Compared to a previous film from June of this past year a new 3 cm nodular right upper lobe lesion was found. \par \par Subsequent CT Chest performed on 11/17/2022 revealing  2.8 x 2.8 x 2.3 right upper lobe mass in the posterior segment. Indeterminate nodule in anterior RLL measuring 6-7 mm. Small nodule left upper lobe, measuring 3-4 mm. Prominent nodes along right hilar structures, one of which shows similar density as right upper lobe mass , measuring 13 x 14x 11 mm, concerning for satellite lesion\par \par PET/CT on 11/26/2022 reported  right upper lobe mass with an SUV of 13.2, 2.3 x 2.6 cm. Adjacent activity in the right hilar region with questionable metastatic adenopathy( SUV 6.4, 1.2 cm ).  Previous 6 mm nodule in the anterior right lung base is no longer seen.   \par \par Patient referred to Dr. Martin. He underwent  flexible bronchoscopy, robotic-assisted bronchoscopy with the ion system, endobronchial ultrasound with transbronchial needle aspiration, endobronchial brushing, endobronchial biopsy on 1/18/23.\par \par Pathology \par Lymph Node ( R4, R10, Level 7) positive for malignant cells for squamous cell carcinoma. \par Bronchoalveolar lavage was negative\par \par PMH: HTN, hypercholesterolemia\par FMH:\par Sx: hip replacement sx \par Socx: Former smoker, quit 2002 (smoked 40 ppk year)\par \par Care Team: \par Pulmonologist: Dr. Reed\par Primary care provider: Dr. 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 \par -There is a peripheral nodular opacity more peripherally measuring 1.3 x 1.0 cm with minimal activity of SUV 1.4 likely reflecting a nonobstructive pattern of atelectasis.\par -Hypermetabolic node superior and posterior to the right main bronchus measures 1.2 x 1.8 cm with SUV 11.3 compatible with dejon involvement. \par -A subcentimeter right upper paratracheal node  measuring 0.4 x 1.0 cm is nonavid.\par \par IMPRESSION:\par 1. Hypermetabolic nodule in the superior segment of right lower lobe compatible with a malignancy; hypermetabolic right hilar node compatible with dejon involvement.\par \par 2. Soft tissue abutting the left ischium with specks of calcification and patchy activity appears to be related to a chronic inflammatory process; this can be further evaluated with MRI when clinically feasible.\par \par \par 2/3/23 MR Head \par IMPRESSION:\par -Indeterminate subcentimeter high FLAIR signal with associated tiny enhancement in the left temporal lobe as described above. Tiny metastasis not excluded. Follow-up exam recommended.\par -No acute intracranial hemorrhage or acute infarct\par \par \par 4/17/23: \par Patient here for f/u with wife and Daughter \par He was admitted to [facility] last week for 3 days \par Syncope attributed to Orthostatic hypotension \par

## 2023-04-17 NOTE — RESULTS/DATA
[FreeTextEntry1] :  1/18/2023 Pathology\par \par Final Diagnosis\par 1. LYMPH NODE, R4, EBUS-GUIDED FNA\par \par POSITIVE FOR MALIGNANT CELLS.\par Squamous Cell Carcinoma.\par \par The cytology slides are composed of syncytial sheets and single pleomorphic cells with irregular, hyperchromatic nuclei and dense cytoplasm in a background of necrosis and keratinized debris. No lymphoid cells present in the background.\par \par 2. LYMPH NODE, LEVEL 7, EBUS-GUIDED FNA\par Squamous Cell Carcinoma.\par \par The cytology slides are composed of syncytial sheets and single pleomorphic cells with irregular, hyperchromatic nuclei and dense\par cytoplasm in a background of necrosis and keratinized debris. No lymphoid cells present in the background.\par \par \par 3. LYMPH NODE, R10, EBUS-GUIDED FNA\par POSITIVE FOR MALIGNANT CELLS.\par Metastatic squamous Cell Carcinoma.\par \par The cytology slides are composed of syncytial sheets and single pleomorphic cells with irregular, hyperchromatic nuclei and dense cytoplasm in a background of necrosis and keratinized debris admixed with lymphoid cells.\par \par \par \par 4. LUNG, BRONCHOALVEOLAR LAVAGE\par NEGATIVE FOR MALIGNANT CELLS.\par \par The cytology slide and cell block are composed of groups of reactive ciliated bronchial epithelial cells, scattered alveolar macrophages and mixed inflammatory cells.\par \par \par \par 11/26/2022: PET/CT [facility] \par -hyperactive right upper lobe mass with an SUV of 13.2, 2.3 x 2.6 cm\par -  Adjacent activity in the right hilar region with questionable metastatic adenopathy( SUV 6.4, 1.2 cm ).  \par -Previous 6 mm nodule in the anterior right lung base is no longer seen.   \par \par 11/17/2022 CT Chest noncontrast : [facility]  \par -2.8 x 2.8 x 2.3 right upper lobe mass in the posterior segment \par - Indeterminate nodule in anterior RLL measuring 6-7 mm\par - Small nodule left upper lobe, measuring 3-4 mm\par - Prominent nodes along right hilar structures, one of which shows similar density as right upper lobe mass , measuring 13 x 14x 11 mm, concerning for satellite lesion\par  \par 11/11/2022 CXR  at [facility]. Compared to a previous film from June 2022 \par -a new 3 cm nodular right upper lobe lesion

## 2023-04-17 NOTE — ASSESSMENT
[FreeTextEntry1] : JONE SOUTH, who was diagnosed with a squamous cell Carcinoma Lung at the age of  81 yo in Jan 2023  Patient with a PMHX of HTN, HLD, COPD, ASHD,  40 ppk year smoking history ( quit 2002) \par \par Patient initially presented with a  productive cough in November 2022, chest x-ray on 11/11/2022 at [facility] showed a new 3 cm nodular right upper lobe lesion \par \par Subsequent CT Chest and PEt scan was done \par \par PET/CT on 11/26/2022 reported  right upper lobe mass with an SUV of 13.2, 2.3 x 2.6 cm. Adjacent activity in the right hilar region with questionable metastatic adenopathy( SUV 6.4, 1.2 cm ).  Previous 6 mm nodule in the anterior right lung base is no longer seen.   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 \par -There is a peripheral nodular opacity more peripherally measuring 1.3 x 1.0 cm with minimal activity of SUV 1.4 likely reflecting a nonobstructive pattern of atelectasis.\par -Hypermetabolic node superior and posterior to the right main bronchus measures 1.2 x 1.8 cm with SUV 11.3 compatible with dejon involvement. \par -A subcentimeter right upper paratracheal node  measuring 0.4 x 1.0 cm is nonavid.\par \par IMPRESSION:\par 1. Hypermetabolic nodule in the superior segment of right lower lobe compatible with a malignancy; hypermetabolic right hilar node compatible with dejon involvement.\par 2. Soft tissue abutting the left ischium with specks of calcification and patchy activity appears to be related to a chronic inflammatory process; this can be further evaluated with MRI when clinically feasible.\par \par \par 2/3/23 MR Head \par IMPRESSION:\par -Indeterminate subcentimeter high FLAIR signal with associated tiny enhancement in the left temporal lobe as described above. Tiny metastasis not excluded. Follow-up exam recommended.\par -No acute intracranial hemorrhage or acute infarct\par \par - [facility] one sent on 1/31/23, specimen insufficient. Will consider sending Guardant \par - Advised to f/u with PCP to possibly adjust BP medication \par Started weekly chemoradiation with Carbo/Taxol on 2/27/23, RT planned to finish 4/10/23\par C6 weekly carbo/ taxol completed on 4/3/23 Rt completed 4/10/23\par - He was admitted to [facility] last week for 3 days  Syncope attributed to Orthostatic hypotension \par - Restaging CT CAP 4 weeks after completion of RT \par - f/u after restaging scans to discuss starting durvalumab\par  \par \par

## 2023-04-18 ENCOUNTER — APPOINTMENT (OUTPATIENT)
Dept: VASCULAR SURGERY | Facility: CLINIC | Age: 83
End: 2023-04-18
Payer: MEDICARE

## 2023-04-18 VITALS
DIASTOLIC BLOOD PRESSURE: 42 MMHG | WEIGHT: 194 LBS | SYSTOLIC BLOOD PRESSURE: 110 MMHG | HEIGHT: 70 IN | BODY MASS INDEX: 27.77 KG/M2 | OXYGEN SATURATION: 96 % | HEART RATE: 95 BPM | RESPIRATION RATE: 16 BRPM | TEMPERATURE: 96.2 F

## 2023-04-18 DIAGNOSIS — I65.23 OCCLUSION AND STENOSIS OF BILATERAL CAROTID ARTERIES: ICD-10-CM

## 2023-04-18 PROCEDURE — 99204 OFFICE O/P NEW MOD 45 MIN: CPT

## 2023-04-18 NOTE — DATA REVIEWED
[FreeTextEntry1] : Irregular arterial pulses are noted.\par \par Peak systolic velocities are as follows:\par \par RIGHT:\par PROX CCA = 68 cm/s\par DIST CCA = 52 cm/s\par PROX ICA = 113 cm/s\par MID ICA = 155 cm/s\par DIST ICA = 109 cm/s\par ECA = 100 cm/s\par \par LEFT:\par PROX CCA = 102 cm/s\par DIST CCA = 65 cm/s\par PROX ICA = 99 cm/s\par MID ICA = 123 cm/s\par DIST ICA = 82 cm/s\par ECA = 145 cm/s\par \par Antegrade flow is noted within both vertebral arteries.\par \par IMPRESSION: No significant hemodynamic stenosis of the left carotid artery.\par \par Elevated peak systolic velocity of the right ICA, compatible with a moderate (50-69%) stenosis.\par

## 2023-04-18 NOTE — PHYSICAL EXAM
[Normal Breath Sounds] : Normal breath sounds [Normal Rate and Rhythm] : normal rate and rhythm [2+] : left 2+ [JVD] : no jugular venous distention  [Ankle Swelling (On Exam)] : not present [de-identified] : well appearing [de-identified] : wnl [de-identified] : 5/5 strength all muscle groups 4 extremities\par 5/5 sensation all 4 extremities

## 2023-04-18 NOTE — ASSESSMENT
[FreeTextEntry1] : 83yo male with asymptomatic b/l carotid artery stenosis moderate\par -Current guidelines recommend best medical management for this degree of disease\par -Continue DAPT (plavix ASA currently)\par -Continue statin and HDL <70 goal\par -Follow up for surveillance in 1 year\par

## 2023-04-18 NOTE — HISTORY OF PRESENT ILLNESS
[FreeTextEntry1] : 83yo male PMHX of HTN, HLD, COPD, ASHD, 40 ppk year smoking history ( quit 2002) and current treatment for lung CA here with incidental finding of b/l carotid stenosis. Patient had a duplex done while inpatient for syncope; he denies previous TIA, CVA, amaurosis fugax. He takes DAPT (ASA/plavix) and statin

## 2023-04-20 PROCEDURE — U0003: CPT

## 2023-04-20 PROCEDURE — 99285 EMERGENCY DEPT VISIT HI MDM: CPT | Mod: 25

## 2023-04-20 PROCEDURE — 71045 X-RAY EXAM CHEST 1 VIEW: CPT

## 2023-04-20 PROCEDURE — 94640 AIRWAY INHALATION TREATMENT: CPT

## 2023-04-20 PROCEDURE — 86850 RBC ANTIBODY SCREEN: CPT

## 2023-04-20 PROCEDURE — 93005 ELECTROCARDIOGRAM TRACING: CPT

## 2023-04-20 PROCEDURE — 83735 ASSAY OF MAGNESIUM: CPT

## 2023-04-20 PROCEDURE — 85025 COMPLETE CBC W/AUTO DIFF WBC: CPT

## 2023-04-20 PROCEDURE — 93880 EXTRACRANIAL BILAT STUDY: CPT

## 2023-04-20 PROCEDURE — 82803 BLOOD GASES ANY COMBINATION: CPT

## 2023-04-20 PROCEDURE — 80053 COMPREHEN METABOLIC PANEL: CPT

## 2023-04-20 PROCEDURE — 93306 TTE W/DOPPLER COMPLETE: CPT

## 2023-04-20 PROCEDURE — 85730 THROMBOPLASTIN TIME PARTIAL: CPT

## 2023-04-20 PROCEDURE — 70450 CT HEAD/BRAIN W/O DYE: CPT | Mod: MD

## 2023-04-20 PROCEDURE — 96374 THER/PROPH/DIAG INJ IV PUSH: CPT

## 2023-04-20 PROCEDURE — 85027 COMPLETE CBC AUTOMATED: CPT

## 2023-04-20 PROCEDURE — 86901 BLOOD TYPING SEROLOGIC RH(D): CPT

## 2023-04-20 PROCEDURE — 84484 ASSAY OF TROPONIN QUANT: CPT

## 2023-04-20 PROCEDURE — U0005: CPT

## 2023-04-20 PROCEDURE — 86900 BLOOD TYPING SEROLOGIC ABO: CPT

## 2023-04-20 PROCEDURE — 36415 COLL VENOUS BLD VENIPUNCTURE: CPT

## 2023-04-20 PROCEDURE — 85610 PROTHROMBIN TIME: CPT

## 2023-05-02 ENCOUNTER — APPOINTMENT (OUTPATIENT)
Dept: HEMATOLOGY ONCOLOGY | Facility: CLINIC | Age: 83
End: 2023-05-02

## 2023-05-02 ENCOUNTER — RESULT REVIEW (OUTPATIENT)
Age: 83
End: 2023-05-02

## 2023-05-02 LAB
ANISOCYTOSIS BLD QL: SLIGHT — SIGNIFICANT CHANGE UP
BASOPHILS # BLD AUTO: 0.1 K/UL — SIGNIFICANT CHANGE UP (ref 0–0.2)
BASOPHILS NFR BLD AUTO: 1 % — SIGNIFICANT CHANGE UP (ref 0–2)
BURR CELLS BLD QL SMEAR: PRESENT — SIGNIFICANT CHANGE UP
EOSINOPHIL # BLD AUTO: 0.1 K/UL — SIGNIFICANT CHANGE UP (ref 0–0.5)
EOSINOPHIL NFR BLD AUTO: 1 % — SIGNIFICANT CHANGE UP (ref 0–6)
HCT VFR BLD CALC: 30.3 % — LOW (ref 39–50)
HGB BLD-MCNC: 10 G/DL — LOW (ref 13–17)
LG PLATELETS BLD QL AUTO: SLIGHT — SIGNIFICANT CHANGE UP
LYMPHOCYTES # BLD AUTO: 0.6 K/UL — LOW (ref 1–3.3)
LYMPHOCYTES # BLD AUTO: 13 % — SIGNIFICANT CHANGE UP (ref 13–44)
MACROCYTES BLD QL: SLIGHT — SIGNIFICANT CHANGE UP
MCHC RBC-ENTMCNC: 32.6 PG — SIGNIFICANT CHANGE UP (ref 27–34)
MCHC RBC-ENTMCNC: 33.1 G/DL — SIGNIFICANT CHANGE UP (ref 32–36)
MCV RBC AUTO: 98.5 FL — SIGNIFICANT CHANGE UP (ref 80–100)
MICROCYTES BLD QL: SLIGHT — SIGNIFICANT CHANGE UP
MONOCYTES # BLD AUTO: 0.9 K/UL — SIGNIFICANT CHANGE UP (ref 0–0.9)
MONOCYTES NFR BLD AUTO: 22 % — HIGH (ref 2–14)
NEUTROPHILS # BLD AUTO: 3.4 K/UL — SIGNIFICANT CHANGE UP (ref 1.8–7.4)
NEUTROPHILS NFR BLD AUTO: 63 % — SIGNIFICANT CHANGE UP (ref 43–77)
OVALOCYTES BLD QL SMEAR: SLIGHT — SIGNIFICANT CHANGE UP
PLAT MORPH BLD: NORMAL — SIGNIFICANT CHANGE UP
PLATELET # BLD AUTO: 227 K/UL — SIGNIFICANT CHANGE UP (ref 150–400)
POIKILOCYTOSIS BLD QL AUTO: SLIGHT — SIGNIFICANT CHANGE UP
POLYCHROMASIA BLD QL SMEAR: SLIGHT — SIGNIFICANT CHANGE UP
RBC # BLD: 3.08 M/UL — LOW (ref 4.2–5.8)
RBC # FLD: 18.2 % — HIGH (ref 10.3–14.5)
RBC BLD AUTO: SIGNIFICANT CHANGE UP
WBC # BLD: 5.2 K/UL — SIGNIFICANT CHANGE UP (ref 3.8–10.5)
WBC # FLD AUTO: 5.2 K/UL — SIGNIFICANT CHANGE UP (ref 3.8–10.5)

## 2023-05-03 LAB
ALBUMIN SERPL ELPH-MCNC: 4.2 G/DL
ALP BLD-CCNC: 110 U/L
ALT SERPL-CCNC: 15 U/L
ANION GAP SERPL CALC-SCNC: 13 MMOL/L
AST SERPL-CCNC: 18 U/L
BILIRUB SERPL-MCNC: 0.3 MG/DL
BUN SERPL-MCNC: 27 MG/DL
CALCIUM SERPL-MCNC: 9.7 MG/DL
CHLORIDE SERPL-SCNC: 107 MMOL/L
CO2 SERPL-SCNC: 22 MMOL/L
CREAT SERPL-MCNC: 1.28 MG/DL
EGFR: 56 ML/MIN/1.73M2
GLUCOSE SERPL-MCNC: 112 MG/DL
MAGNESIUM SERPL-MCNC: 1.8 MG/DL
POTASSIUM SERPL-SCNC: 4.5 MMOL/L
PROT SERPL-MCNC: 6.4 G/DL
SODIUM SERPL-SCNC: 143 MMOL/L

## 2023-05-15 ENCOUNTER — APPOINTMENT (OUTPATIENT)
Dept: CT IMAGING | Facility: CLINIC | Age: 83
End: 2023-05-15
Payer: MEDICARE

## 2023-05-15 ENCOUNTER — OUTPATIENT (OUTPATIENT)
Dept: OUTPATIENT SERVICES | Facility: HOSPITAL | Age: 83
LOS: 1 days | End: 2023-05-15

## 2023-05-15 DIAGNOSIS — Z96.642 PRESENCE OF LEFT ARTIFICIAL HIP JOINT: Chronic | ICD-10-CM

## 2023-05-15 DIAGNOSIS — C34.90 MALIGNANT NEOPLASM OF UNSPECIFIED PART OF UNSPECIFIED BRONCHUS OR LUNG: ICD-10-CM

## 2023-05-15 DIAGNOSIS — Z96.641 PRESENCE OF RIGHT ARTIFICIAL HIP JOINT: Chronic | ICD-10-CM

## 2023-05-15 PROCEDURE — 74177 CT ABD & PELVIS W/CONTRAST: CPT | Mod: 26

## 2023-05-15 PROCEDURE — 71260 CT THORAX DX C+: CPT | Mod: 26

## 2023-05-22 ENCOUNTER — APPOINTMENT (OUTPATIENT)
Dept: HEMATOLOGY ONCOLOGY | Facility: CLINIC | Age: 83
End: 2023-05-22
Payer: MEDICARE

## 2023-05-22 ENCOUNTER — RESULT REVIEW (OUTPATIENT)
Age: 83
End: 2023-05-22

## 2023-05-22 VITALS
BODY MASS INDEX: 27.06 KG/M2 | HEIGHT: 70 IN | SYSTOLIC BLOOD PRESSURE: 116 MMHG | HEART RATE: 75 BPM | OXYGEN SATURATION: 100 % | DIASTOLIC BLOOD PRESSURE: 58 MMHG | TEMPERATURE: 97.4 F | WEIGHT: 189 LBS

## 2023-05-22 LAB
BASOPHILS # BLD AUTO: 0.1 K/UL — SIGNIFICANT CHANGE UP (ref 0–0.2)
BASOPHILS NFR BLD AUTO: 0.9 % — SIGNIFICANT CHANGE UP (ref 0–2)
EOSINOPHIL # BLD AUTO: 0.6 K/UL — HIGH (ref 0–0.5)
EOSINOPHIL NFR BLD AUTO: 7.8 % — HIGH (ref 0–6)
HCT VFR BLD CALC: 28.6 % — LOW (ref 39–50)
HGB BLD-MCNC: 9.8 G/DL — LOW (ref 13–17)
LYMPHOCYTES # BLD AUTO: 0.8 K/UL — LOW (ref 1–3.3)
LYMPHOCYTES # BLD AUTO: 10.3 % — LOW (ref 13–44)
MCHC RBC-ENTMCNC: 33 PG — SIGNIFICANT CHANGE UP (ref 27–34)
MCHC RBC-ENTMCNC: 34.2 G/DL — SIGNIFICANT CHANGE UP (ref 32–36)
MCV RBC AUTO: 96.7 FL — SIGNIFICANT CHANGE UP (ref 80–100)
MONOCYTES # BLD AUTO: 1.2 K/UL — HIGH (ref 0–0.9)
MONOCYTES NFR BLD AUTO: 16.1 % — HIGH (ref 2–14)
NEUTROPHILS # BLD AUTO: 5 K/UL — SIGNIFICANT CHANGE UP (ref 1.8–7.4)
NEUTROPHILS NFR BLD AUTO: 65 % — SIGNIFICANT CHANGE UP (ref 43–77)
PLATELET # BLD AUTO: 198 K/UL — SIGNIFICANT CHANGE UP (ref 150–400)
RBC # BLD: 2.96 M/UL — LOW (ref 4.2–5.8)
RBC # FLD: 15.8 % — HIGH (ref 10.3–14.5)
WBC # BLD: 7.6 K/UL — SIGNIFICANT CHANGE UP (ref 3.8–10.5)
WBC # FLD AUTO: 7.6 K/UL — SIGNIFICANT CHANGE UP (ref 3.8–10.5)

## 2023-05-22 PROCEDURE — 99215 OFFICE O/P EST HI 40 MIN: CPT

## 2023-05-22 NOTE — ASSESSMENT
[FreeTextEntry1] : JONE SOUTH, who was diagnosed with a squamous cell Carcinoma Lung at the age of  81 yo in Jan 2023  Patient with a PMHX of HTN, HLD, COPD, ASHD,  40 ppk year smoking history ( quit 2002) \par \par Patient initially presented with a  productive cough in November 2022, chest x-ray on 11/11/2022 at [facility] showed a new 3 cm nodular right upper lobe lesion \par \par Subsequent CT Chest and PEt scan was done \par \par PET/CT on 11/26/2022 reported  right upper lobe mass with an SUV of 13.2, 2.3 x 2.6 cm. Adjacent activity in the right hilar region with questionable metastatic adenopathy( SUV 6.4, 1.2 cm ).  Previous 6 mm nodule in the anterior right lung base is no longer seen.   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 Will consider sending Guardant \par - Advised to f/u with PCP to possibly adjust BP medication \par - Started weekly chemoradiation with Carbo/Taxol on 2/27/23, RT planned to finish 4/10/23\par C6 weekly carbo/ taxol completed on 4/3/23 Rt completed 4/10/23\par - He was admitted to [facility] on 4/13-4/15/23 for Syncope attributed to Orthostatic hypotension \par Restaging  CT CAP 5/15/23: after completion of Chemo/ RT \par - Interval decrease of  spiculated right upper lobe nodule. ( 3.5 x 2.4 cm , down from 4.3x 2.9 cm) \par -  New scattered nodular ground-glass opacities within right upper lobe may represent pneumonitis. \par - Approximately 3.5 x 2.4 cm heterogeneous density nodule posterior to the left ischium  is unchanged.\par -Discussed treatment with  immunotherapy which include durvalumab q 2 weeks 10mg/kg x 1y. AE of Durvalumab include fatigue, nausea, constipation, decreased appetite, abdominal pain, arthralgia, rash, and pyrexia. \par -Will plan to start treatment in 1-2 weeks\par -Will order MRI to further evaluate nodule on left ischium on restaging CT CAP, if unable to obtain scan given hip replacements will order a CT scan. \par -F/u in 3-4 weeks with Aria\par  \par \par

## 2023-05-22 NOTE — HISTORY OF PRESENT ILLNESS
[de-identified] : JONE SOUTH is a 82 year M with PMHX of HTN, HLD, COPD, ASHD,  40 ppk year smoking history ( quit 2002) presents for initial consultation for  Squamous Cell lung cancer \par \par Patient presented to Dr. Reed on 11/29/22 c/o  a productive cough in November 2022. He was treated with a course of antibiotics but also underwent a chest x-ray on 11/11/2022 at [facility]. Compared to a previous film from June of this past year a new 3 cm nodular right upper lobe lesion was found. \par \par Subsequent CT Chest performed on 11/17/2022 revealing  2.8 x 2.8 x 2.3 right upper lobe mass in the posterior segment. Indeterminate nodule in anterior RLL measuring 6-7 mm. Small nodule left upper lobe, measuring 3-4 mm. Prominent nodes along right hilar structures, one of which shows similar density as right upper lobe mass , measuring 13 x 14x 11 mm, concerning for satellite lesion\par \par PET/CT on 11/26/2022 reported  right upper lobe mass with an SUV of 13.2, 2.3 x 2.6 cm. Adjacent activity in the right hilar region with questionable metastatic adenopathy( SUV 6.4, 1.2 cm ).  Previous 6 mm nodule in the anterior right lung base is no longer seen.   \par \par Patient referred to Dr. Martin. He underwent  flexible bronchoscopy, robotic-assisted bronchoscopy with the ion system, endobronchial ultrasound with transbronchial needle aspiration, endobronchial brushing, endobronchial biopsy on 1/18/23.\par \par Pathology \par Lymph Node ( R4, R10, Level 7) positive for malignant cells for squamous cell carcinoma. \par Bronchoalveolar lavage was negative\par \par PMH: HTN, hypercholesterolemia\par FMH:\par Sx: hip replacement sx \par Socx: Former smoker, quit 2002 (smoked 40 ppk year)\par \par Care Team: \par Pulmonologist: Dr. Reed\par Primary care provider: Dr. Carballo \par \par   [de-identified] : Patient presents for follow up with spouse and daughter\par Started weekly chemoradiation with Carbo/Taxol on 2/27/23, s/p RT on 4/10/23\par C6 weekly carbo/ taxol completed on 4/3/23\par \par Patient tolerated treatment well, with minimal side effects\par Patient checks BP at home admits its around- 120s/50s. On losartan.\par Reports LE swelling has improved. He is sedentary most of the time. \par Denies lower back pain, Denies sciatica\par Denies h/o heart failure\par Admits B/L hip replacements 20 and 17 years ago. \par \par Denies h/o COPD, rheumatological  or immunological disorders, blood clots,  ha, dizziness, imbalance issues, abdominal pain,  leg edema \par \par CT CAP 5/15/23: \par Interval decrease of  spiculated right upper lobe nodule.\par New scattered nodular ground-glass opacities within right upper lobe may represent pneumonitis. Recommend attention on follow-up.\par Approximately 3.5 x 2.4 cm heterogeneous density nodule posterior to the left ischium (series 2 image 173) is unchanged.\par \par 2/6/23 PET/CT \par -Hypermetabolic lung nodule in the superior segment of the right lower lobe measures 3.1 x 2.8 cm with SUV 16.0; this is compatible with a malignancy. \par -There is a peripheral nodular opacity more peripherally measuring 1.3 x 1.0 cm with minimal activity of SUV 1.4 likely reflecting a nonobstructive pattern of atelectasis.\par -Hypermetabolic node superior and posterior to the right main bronchus measures 1.2 x 1.8 cm with SUV 11.3 compatible with dejon involvement. \par -A subcentimeter right upper paratracheal node  measuring 0.4 x 1.0 cm is nonavid.\par \par IMPRESSION:\par 1. Hypermetabolic nodule in the superior segment of right lower lobe compatible with a malignancy; hypermetabolic right hilar node compatible with dejon involvement.\par \par 2. Soft tissue abutting the left ischium with specks of calcification and patchy activity appears to be related to a chronic inflammatory process; this can be further evaluated with MRI when clinically feasible.\par \par \par 2/3/23 MR Head \par IMPRESSION:\par -Indeterminate subcentimeter high FLAIR signal with associated tiny enhancement in the left temporal lobe as described above. Tiny metastasis not excluded. Follow-up exam recommended.\par -No acute intracranial hemorrhage or acute infarct\par \par \par 4/17/23: \par Patient here for f/u with wife and Daughter \par He was admitted to [facility] last week for 3 days \par Syncope attributed to Orthostatic hypotension \par

## 2023-05-22 NOTE — RESULTS/DATA
[FreeTextEntry1] :  \par 2/6/23 PET/CT \par -Hypermetabolic lung nodule in the superior segment of the right lower lobe measures 3.1 x 2.8 cm with SUV 16.0; this is compatible with a malignancy. \par -There is a peripheral nodular opacity more peripherally measuring 1.3 x 1.0 cm with minimal activity of SUV 1.4 likely reflecting a nonobstructive pattern of atelectasis.\par -Hypermetabolic node superior and posterior to the right main bronchus measures 1.2 x 1.8 cm with SUV 11.3 compatible with dejon involvement. \par -A subcentimeter right upper paratracheal node  measuring 0.4 x 1.0 cm is nonavid.\par \par IMPRESSION:\par 1. Hypermetabolic nodule in the superior segment of right lower lobe compatible with a malignancy; hypermetabolic right hilar node compatible with dejon involvement.\par 2. Soft tissue abutting the left ischium with specks of calcification and patchy activity appears to be related to a chronic inflammatory process; this can be further evaluated with MRI when clinically feasible.\par \par \par 2/3/23 MR Head \par IMPRESSION:\par -Indeterminate subcentimeter high FLAIR signal with associated tiny enhancement in the left temporal lobe as described above. Tiny metastasis not excluded. Follow-up exam recommended.\par -No acute intracranial hemorrhage or acute infarct\par \par \par 1/18/2023 Pathology\par \par Final Diagnosis\par 1. LYMPH NODE, R4, EBUS-GUIDED FNA\par \par POSITIVE FOR MALIGNANT CELLS.\par Squamous Cell Carcinoma.\par \par The cytology slides are composed of syncytial sheets and single pleomorphic cells with irregular, hyperchromatic nuclei and dense cytoplasm in a background of necrosis and keratinized debris. No lymphoid cells present in the background.\par \par 2. LYMPH NODE, LEVEL 7, EBUS-GUIDED FNA\par Squamous Cell Carcinoma.\par \par The cytology slides are composed of syncytial sheets and single pleomorphic cells with irregular, hyperchromatic nuclei and dense\par cytoplasm in a background of necrosis and keratinized debris. No lymphoid cells present in the background.\par \par \par 3. LYMPH NODE, R10, EBUS-GUIDED FNA\par POSITIVE FOR MALIGNANT CELLS.\par Metastatic squamous Cell Carcinoma.\par \par The cytology slides are composed of syncytial sheets and single pleomorphic cells with irregular, hyperchromatic nuclei and dense cytoplasm in a background of necrosis and keratinized debris admixed with lymphoid cells.\par \par \par \par 4. LUNG, BRONCHOALVEOLAR LAVAGE\par NEGATIVE FOR MALIGNANT CELLS.\par \par The cytology slide and cell block are composed of groups of reactive ciliated bronchial epithelial cells, scattered alveolar macrophages and mixed inflammatory cells.\par \par \par \par 11/26/2022: PET/CT [facility] \par -hyperactive right upper lobe mass with an SUV of 13.2, 2.3 x 2.6 cm\par -  Adjacent activity in the right hilar region with questionable metastatic adenopathy( SUV 6.4, 1.2 cm ).  \par -Previous 6 mm nodule in the anterior right lung base is no longer seen.   \par \par 11/17/2022 CT Chest noncontrast : [facility]  \par -2.8 x 2.8 x 2.3 right upper lobe mass in the posterior segment \par - Indeterminate nodule in anterior RLL measuring 6-7 mm\par - Small nodule left upper lobe, measuring 3-4 mm\par - Prominent nodes along right hilar structures, one of which shows similar density as right upper lobe mass , measuring 13 x 14x 11 mm, concerning for satellite lesion\par  \par 11/11/2022 CXR  at [facility]. Compared to a previous film from June 2022 \par -a new 3 cm nodular right upper lobe lesion

## 2023-05-22 NOTE — ADDENDUM
[FreeTextEntry1] : Documented by Selina Hernandes acting as scribe for Dr. Modi on 05/22/2023 \par \par All Medical record entries made by the Scribe were at my, Dr. Modi's, direction and personally dictated by me on 05/22/2023 . I have reviewed the chart and agree that the record accurately reflects my personal performance of the history, physical exam, assessment and plan. I have also personally directed, reviewed, and agreed with the discharge instructions.\par

## 2023-05-23 ENCOUNTER — OUTPATIENT (OUTPATIENT)
Dept: OUTPATIENT SERVICES | Facility: HOSPITAL | Age: 83
LOS: 1 days | Discharge: HOME | End: 2023-05-23

## 2023-05-23 DIAGNOSIS — Z96.642 PRESENCE OF LEFT ARTIFICIAL HIP JOINT: Chronic | ICD-10-CM

## 2023-05-23 DIAGNOSIS — Z96.641 PRESENCE OF RIGHT ARTIFICIAL HIP JOINT: Chronic | ICD-10-CM

## 2023-05-23 DIAGNOSIS — C34.90 MALIGNANT NEOPLASM OF UNSPECIFIED PART OF UNSPECIFIED BRONCHUS OR LUNG: ICD-10-CM

## 2023-05-23 LAB
ALBUMIN SERPL ELPH-MCNC: 4 G/DL
ALP BLD-CCNC: 126 U/L
ALT SERPL-CCNC: 12 U/L
ANION GAP SERPL CALC-SCNC: 14 MMOL/L
AST SERPL-CCNC: 19 U/L
BILIRUB SERPL-MCNC: 0.2 MG/DL
BUN SERPL-MCNC: 35 MG/DL
CALCIUM SERPL-MCNC: 9.2 MG/DL
CEA SERPL-MCNC: 1.9 NG/ML
CHLORIDE SERPL-SCNC: 104 MMOL/L
CO2 SERPL-SCNC: 23 MMOL/L
CREAT SERPL-MCNC: 1.39 MG/DL
EGFR: 51 ML/MIN/1.73M2
GLUCOSE SERPL-MCNC: 120 MG/DL
MAGNESIUM SERPL-MCNC: 1.8 MG/DL
POTASSIUM SERPL-SCNC: 4.4 MMOL/L
PROT SERPL-MCNC: 6.3 G/DL
SODIUM SERPL-SCNC: 141 MMOL/L

## 2023-06-06 ENCOUNTER — RESULT REVIEW (OUTPATIENT)
Age: 83
End: 2023-06-06

## 2023-06-06 ENCOUNTER — APPOINTMENT (OUTPATIENT)
Age: 83
End: 2023-06-06

## 2023-06-06 LAB
ALBUMIN SERPL ELPH-MCNC: 3.4 G/DL — SIGNIFICANT CHANGE UP (ref 3.3–5)
ALP SERPL-CCNC: 95 U/L — SIGNIFICANT CHANGE UP (ref 40–120)
ALT FLD-CCNC: 18 U/L — SIGNIFICANT CHANGE UP (ref 10–45)
ANION GAP SERPL CALC-SCNC: 12 MMOL/L — SIGNIFICANT CHANGE UP (ref 5–17)
AST SERPL-CCNC: 30 U/L — SIGNIFICANT CHANGE UP (ref 10–40)
BASOPHILS # BLD AUTO: 0.1 K/UL — SIGNIFICANT CHANGE UP (ref 0–0.2)
BASOPHILS NFR BLD AUTO: 1.3 % — SIGNIFICANT CHANGE UP (ref 0–2)
BILIRUB SERPL-MCNC: 0.2 MG/DL — SIGNIFICANT CHANGE UP (ref 0.2–1.2)
BUN SERPL-MCNC: 31 MG/DL — HIGH (ref 7–23)
CALCIUM SERPL-MCNC: 9.4 MG/DL — SIGNIFICANT CHANGE UP (ref 8.4–10.5)
CEA SERPL-MCNC: 1.7 NG/ML — SIGNIFICANT CHANGE UP (ref 0–3.8)
CHLORIDE SERPL-SCNC: 104 MMOL/L — SIGNIFICANT CHANGE UP (ref 96–108)
CO2 SERPL-SCNC: 22 MMOL/L — SIGNIFICANT CHANGE UP (ref 22–31)
CREAT SERPL-MCNC: 0.99 MG/DL — SIGNIFICANT CHANGE UP (ref 0.5–1.3)
EGFR: 77 ML/MIN/1.73M2 — SIGNIFICANT CHANGE UP
EOSINOPHIL # BLD AUTO: 0.4 K/UL — SIGNIFICANT CHANGE UP (ref 0–0.5)
EOSINOPHIL NFR BLD AUTO: 5.3 % — SIGNIFICANT CHANGE UP (ref 0–6)
GLUCOSE SERPL-MCNC: 108 MG/DL — HIGH (ref 70–99)
HCT VFR BLD CALC: 27.8 % — LOW (ref 39–50)
HGB BLD-MCNC: 9.2 G/DL — LOW (ref 13–17)
LYMPHOCYTES # BLD AUTO: 0.6 K/UL — LOW (ref 1–3.3)
LYMPHOCYTES # BLD AUTO: 7.5 % — LOW (ref 13–44)
MCHC RBC-ENTMCNC: 32.1 PG — SIGNIFICANT CHANGE UP (ref 27–34)
MCHC RBC-ENTMCNC: 33.2 G/DL — SIGNIFICANT CHANGE UP (ref 32–36)
MCV RBC AUTO: 96.7 FL — SIGNIFICANT CHANGE UP (ref 80–100)
MONOCYTES # BLD AUTO: 1 K/UL — HIGH (ref 0–0.9)
MONOCYTES NFR BLD AUTO: 13.6 % — SIGNIFICANT CHANGE UP (ref 2–14)
NEUTROPHILS # BLD AUTO: 5.4 K/UL — SIGNIFICANT CHANGE UP (ref 1.8–7.4)
NEUTROPHILS NFR BLD AUTO: 72.3 % — SIGNIFICANT CHANGE UP (ref 43–77)
PLATELET # BLD AUTO: 264 K/UL — SIGNIFICANT CHANGE UP (ref 150–400)
POTASSIUM SERPL-MCNC: 4.5 MMOL/L — SIGNIFICANT CHANGE UP (ref 3.5–5.3)
POTASSIUM SERPL-SCNC: 4.5 MMOL/L — SIGNIFICANT CHANGE UP (ref 3.5–5.3)
PROT SERPL-MCNC: 7 G/DL — SIGNIFICANT CHANGE UP (ref 6–8.3)
RBC # BLD: 2.88 M/UL — LOW (ref 4.2–5.8)
RBC # FLD: 15.2 % — HIGH (ref 10.3–14.5)
SODIUM SERPL-SCNC: 138 MMOL/L — SIGNIFICANT CHANGE UP (ref 135–145)
WBC # BLD: 7.5 K/UL — SIGNIFICANT CHANGE UP (ref 3.8–10.5)
WBC # FLD AUTO: 7.5 K/UL — SIGNIFICANT CHANGE UP (ref 3.8–10.5)

## 2023-06-07 ENCOUNTER — APPOINTMENT (OUTPATIENT)
Dept: RADIATION ONCOLOGY | Facility: CLINIC | Age: 83
End: 2023-06-07
Payer: MEDICARE

## 2023-06-07 VITALS
BODY MASS INDEX: 26.4 KG/M2 | WEIGHT: 184 LBS | DIASTOLIC BLOOD PRESSURE: 51 MMHG | SYSTOLIC BLOOD PRESSURE: 121 MMHG | HEART RATE: 160 BPM

## 2023-06-07 DIAGNOSIS — Z51.11 ENCOUNTER FOR ANTINEOPLASTIC CHEMOTHERAPY: ICD-10-CM

## 2023-06-07 LAB — T4 FREE+ TSH PNL SERPL: 0.82 UIU/ML — SIGNIFICANT CHANGE UP (ref 0.27–4.2)

## 2023-06-07 PROCEDURE — 99024 POSTOP FOLLOW-UP VISIT: CPT

## 2023-06-09 NOTE — VITALS
[Least Pain Intensity: 0/10] : 0/10 [Maximal Pain Intensity: 0/10] : 0/10 [70: Cares for self; unalbe to carry on normal activity or do active work.] : 70: Cares for self; unable to carry on normal activity or do active work. [ECOG Performance Status: 2 - Ambulatory and capable of all self care but unable to carry out any work activities] : Performance Status: 2 - Ambulatory and capable of all self care but unable to carry out any work activities. Up and about more than 50% of waking hours

## 2023-06-09 NOTE — LETTER CLOSING
[Sincerely yours,] : Sincerely yours, [FreeTextEntry3] : Shakir James MD\par Physician in Chief\par Department of Radiation Medicine\par [facility]\par [facility] [place]\par \par  of Radiation Medicine\par [facility]\par at  hospitals/[facility]\par \par Radiation \par [facility]/\par [facility]\par [address]\par [place], [place] [address]\par \par Tel: [phone]\par Fax: [phone]\par

## 2023-06-09 NOTE — PHYSICAL EXAM
[Normal] : oriented to person, place and time, the affect was normal, the mood was normal and not anxious [de-identified] : bronchial breath sounds.

## 2023-06-09 NOTE — HISTORY OF PRESENT ILLNESS
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 s/p Chemo/RT- Carbol/Taxol 6 cycles and RT to right lung to a total of 6000 cGy in 30 fraction, completed 4/10/2023.\par \par Restaging CT-C/A/P-5/15/2023- noted \par - Interval decrease of spiculated right upper lobe nodule. ( 3.5 x 2.4 cm , down from 4.3 x 2.9 cm) \par - New scattered nodular ground-glass opacities within right upper lobe may represent pneumonitis. \par - Approximately 3.5 x 2.4 cm heterogeneous density nodule posterior to the left ischium is unchanged.\par \par He follows Dr. Modi and given his MI treatment with immunotherapy was discussed which include Durvalumab q 2 weeks 10 mg/kg x 1 y. \par \par 6/7/2023- Presents today in follow up visit. pt denies pain edema weight loss dysphagie, but he does  c/o dyspnea on exertion

## 2023-06-09 NOTE — LETTER GREETING
[Dear Doctor] : Dear Doctor, [Follow-Up] : Your patient, [unfilled] was seen in my office today for follow-up [Please see my note below.] : Please see my note below. [FreeTextEntry2] : Reyna Rosa MD

## 2023-06-20 ENCOUNTER — RESULT REVIEW (OUTPATIENT)
Age: 83
End: 2023-06-20

## 2023-06-20 ENCOUNTER — APPOINTMENT (OUTPATIENT)
Age: 83
End: 2023-06-20

## 2023-06-20 LAB
ALBUMIN SERPL ELPH-MCNC: 3.4 G/DL — SIGNIFICANT CHANGE UP (ref 3.3–5)
ALP SERPL-CCNC: 99 U/L — SIGNIFICANT CHANGE UP (ref 40–120)
ALT FLD-CCNC: 23 U/L — SIGNIFICANT CHANGE UP (ref 10–45)
ANION GAP SERPL CALC-SCNC: 14 MMOL/L — SIGNIFICANT CHANGE UP (ref 5–17)
AST SERPL-CCNC: 28 U/L — SIGNIFICANT CHANGE UP (ref 10–40)
BASOPHILS # BLD AUTO: 0.1 K/UL — SIGNIFICANT CHANGE UP (ref 0–0.2)
BASOPHILS NFR BLD AUTO: 1.2 % — SIGNIFICANT CHANGE UP (ref 0–2)
BILIRUB SERPL-MCNC: 0.3 MG/DL — SIGNIFICANT CHANGE UP (ref 0.2–1.2)
BUN SERPL-MCNC: 37 MG/DL — HIGH (ref 7–23)
CALCIUM SERPL-MCNC: 9.9 MG/DL — SIGNIFICANT CHANGE UP (ref 8.4–10.5)
CEA SERPL-MCNC: 1.8 NG/ML — SIGNIFICANT CHANGE UP (ref 0–3.8)
CHLORIDE SERPL-SCNC: 104 MMOL/L — SIGNIFICANT CHANGE UP (ref 96–108)
CO2 SERPL-SCNC: 21 MMOL/L — LOW (ref 22–31)
CREAT SERPL-MCNC: 1.11 MG/DL — SIGNIFICANT CHANGE UP (ref 0.5–1.3)
EGFR: 67 ML/MIN/1.73M2 — SIGNIFICANT CHANGE UP
EOSINOPHIL # BLD AUTO: 0.3 K/UL — SIGNIFICANT CHANGE UP (ref 0–0.5)
EOSINOPHIL NFR BLD AUTO: 3.2 % — SIGNIFICANT CHANGE UP (ref 0–6)
GLUCOSE SERPL-MCNC: 101 MG/DL — HIGH (ref 70–99)
HCT VFR BLD CALC: 29.3 % — LOW (ref 39–50)
HGB BLD-MCNC: 9.8 G/DL — LOW (ref 13–17)
LYMPHOCYTES # BLD AUTO: 0.6 K/UL — LOW (ref 1–3.3)
LYMPHOCYTES # BLD AUTO: 7.1 % — LOW (ref 13–44)
MCHC RBC-ENTMCNC: 31.2 PG — SIGNIFICANT CHANGE UP (ref 27–34)
MCHC RBC-ENTMCNC: 33.4 G/DL — SIGNIFICANT CHANGE UP (ref 32–36)
MCV RBC AUTO: 93.4 FL — SIGNIFICANT CHANGE UP (ref 80–100)
MONOCYTES # BLD AUTO: 0.8 K/UL — SIGNIFICANT CHANGE UP (ref 0–0.9)
MONOCYTES NFR BLD AUTO: 9.4 % — SIGNIFICANT CHANGE UP (ref 2–14)
NEUTROPHILS # BLD AUTO: 6.8 K/UL — SIGNIFICANT CHANGE UP (ref 1.8–7.4)
NEUTROPHILS NFR BLD AUTO: 79 % — HIGH (ref 43–77)
PLATELET # BLD AUTO: 328 K/UL — SIGNIFICANT CHANGE UP (ref 150–400)
POTASSIUM SERPL-MCNC: 4.1 MMOL/L — SIGNIFICANT CHANGE UP (ref 3.5–5.3)
POTASSIUM SERPL-SCNC: 4.1 MMOL/L — SIGNIFICANT CHANGE UP (ref 3.5–5.3)
PROT SERPL-MCNC: 7.1 G/DL — SIGNIFICANT CHANGE UP (ref 6–8.3)
RBC # BLD: 3.14 M/UL — LOW (ref 4.2–5.8)
RBC # FLD: 14.8 % — HIGH (ref 10.3–14.5)
SODIUM SERPL-SCNC: 139 MMOL/L — SIGNIFICANT CHANGE UP (ref 135–145)
T4 FREE+ TSH PNL SERPL: 1.02 UIU/ML — SIGNIFICANT CHANGE UP (ref 0.27–4.2)
WBC # BLD: 8.6 K/UL — SIGNIFICANT CHANGE UP (ref 3.8–10.5)
WBC # FLD AUTO: 8.6 K/UL — SIGNIFICANT CHANGE UP (ref 3.8–10.5)

## 2023-06-23 ENCOUNTER — APPOINTMENT (OUTPATIENT)
Dept: HEMATOLOGY ONCOLOGY | Facility: CLINIC | Age: 83
End: 2023-06-23
Payer: MEDICARE

## 2023-06-23 VITALS
BODY MASS INDEX: 26.05 KG/M2 | DIASTOLIC BLOOD PRESSURE: 55 MMHG | HEART RATE: 63 BPM | WEIGHT: 182 LBS | SYSTOLIC BLOOD PRESSURE: 104 MMHG | TEMPERATURE: 97 F | OXYGEN SATURATION: 91 % | HEIGHT: 70 IN

## 2023-06-23 PROCEDURE — 99214 OFFICE O/P EST MOD 30 MIN: CPT

## 2023-06-28 NOTE — RESULTS/DATA
[FreeTextEntry1] :  \par 2/6/23 PET/CT \par -Hypermetabolic lung nodule in the superior segment of the right lower lobe measures 3.1 x 2.8 cm with SUV 16.0; this is compatible with a malignancy. \par -There is a peripheral nodular opacity more peripherally measuring 1.3 x 1.0 cm with minimal activity of SUV 1.4 likely reflecting a nonobstructive pattern of atelectasis.\par -Hypermetabolic node superior and posterior to the right main bronchus measures 1.2 x 1.8 cm with SUV 11.3 compatible with dejon involvement. \par -A subcentimeter right upper paratracheal node  measuring 0.4 x 1.0 cm is nonavid.\par \par IMPRESSION:\par 1. Hypermetabolic nodule in the superior segment of right lower lobe compatible with a malignancy; hypermetabolic right hilar node compatible with dejon involvement.\par 2. Soft tissue abutting the left ischium with specks of calcification and patchy activity appears to be related to a chronic inflammatory process; this can be further evaluated with MRI when clinically feasible.\par \par \par 2/3/23 MR Head \par IMPRESSION:\par -Indeterminate subcentimeter high FLAIR signal with associated tiny enhancement in the left temporal lobe as described above. Tiny metastasis not excluded. Follow-up exam recommended.\par -No acute intracranial hemorrhage or acute infarct\par \par \par 1/18/2023 Pathology\par \par Final Diagnosis\par 1. LYMPH NODE, R4, EBUS-GUIDED FNA\par \par POSITIVE FOR MALIGNANT CELLS.\par Squamous Cell Carcinoma.\par \par The cytology slides are composed of syncytial sheets and single pleomorphic cells with irregular, hyperchromatic nuclei and dense cytoplasm in a background of necrosis and keratinized debris. No lymphoid cells present in the background.\par \par 2. LYMPH NODE, LEVEL 7, EBUS-GUIDED FNA\par Squamous Cell Carcinoma.\par \par The cytology slides are composed of syncytial sheets and single pleomorphic cells with irregular, hyperchromatic nuclei and dense\par cytoplasm in a background of necrosis and keratinized debris. No lymphoid cells present in the background.\par \par \par 3. LYMPH NODE, R10, EBUS-GUIDED FNA\par POSITIVE FOR MALIGNANT CELLS.\par Metastatic squamous Cell Carcinoma.\par \par The cytology slides are composed of syncytial sheets and single pleomorphic cells with irregular, hyperchromatic nuclei and dense cytoplasm in a background of necrosis and keratinized debris admixed with lymphoid cells.\par \par \par \par 4. LUNG, BRONCHOALVEOLAR LAVAGE\par NEGATIVE FOR MALIGNANT CELLS.\par \par The cytology slide and cell block are composed of groups of reactive ciliated bronchial epithelial cells, scattered alveolar macrophages and mixed inflammatory cells.\par \par \par \par 11/26/2022: PET/CT [facility] \par -hyperactive right upper lobe mass with an SUV of 13.2, 2.3 x 2.6 cm\par -  Adjacent activity in the right hilar region with questionable metastatic adenopathy( SUV 6.4, 1.2 cm ).  \par -Previous 6 mm nodule in the anterior right lung base is no longer seen.   \par \par 11/17/2022 CT Chest noncontrast : [facility]  \par -2.8 x 2.8 x 2.3 right upper lobe mass in the posterior segment \par - Indeterminate nodule in anterior RLL measuring 6-7 mm\par - Small nodule left upper lobe, measuring 3-4 mm\par - Prominent nodes along right hilar structures, one of which shows similar density as right upper lobe mass , measuring 13 x 14x 11 mm, concerning for satellite lesion\par  \par 11/11/2022 CXR  at [facility]. Compared to a previous film from June 2022 \par -a new 3 cm nodular right upper lobe lesion Home

## 2023-06-28 NOTE — HISTORY OF PRESENT ILLNESS
[de-identified] : JONE SOUTH is a 82 year M with PMHX of HTN, HLD, COPD, ASHD,  40 ppk year smoking history ( quit 2002) presents for initial consultation for  Squamous Cell lung cancer \par \par Patient presented to Dr. Reed on 11/29/22 c/o  a productive cough in November 2022. He was treated with a course of antibiotics but also underwent a chest x-ray on 11/11/2022 at [facility]. Compared to a previous film from June of this past year a new 3 cm nodular right upper lobe lesion was found. \par \par Subsequent CT Chest performed on 11/17/2022 revealing  2.8 x 2.8 x 2.3 right upper lobe mass in the posterior segment. Indeterminate nodule in anterior RLL measuring 6-7 mm. Small nodule left upper lobe, measuring 3-4 mm. Prominent nodes along right hilar structures, one of which shows similar density as right upper lobe mass , measuring 13 x 14x 11 mm, concerning for satellite lesion\par \par PET/CT on 11/26/2022 reported  right upper lobe mass with an SUV of 13.2, 2.3 x 2.6 cm. Adjacent activity in the right hilar region with questionable metastatic adenopathy( SUV 6.4, 1.2 cm ).  Previous 6 mm nodule in the anterior right lung base is no longer seen.   \par \par Patient referred to Dr. Martin. He underwent  flexible bronchoscopy, robotic-assisted bronchoscopy with the ion system, endobronchial ultrasound with transbronchial needle aspiration, endobronchial brushing, endobronchial biopsy on 1/18/23.\par \par Pathology \par Lymph Node ( R4, R10, Level 7) positive for malignant cells for squamous cell carcinoma. \par Bronchoalveolar lavage was negative\par \par PMH: HTN, hypercholesterolemia\par FMH:\par Sx: hip replacement sx \par Socx: Former smoker, quit 2002 (smoked 40 ppk year)\par \par Care Team: \par Pulmonologist: Dr. Reed\par Primary care provider: Dr. Carballo \par \par   [de-identified] : Patient presents for follow up with spouse and daughter\par Started weekly chemoradiation with Carbo/Taxol on 2/27/23, s/p RT on 4/10/23\par C6 weekly carbo/ taxol completed on 4/3/23\par Started Durvalumab on 6/6/23, next tx scheduled 7/5/23\par \par 4/17/23: \par Patient here for f/u with wife and Daughter \par He was admitted to [facility] May 2023 for 3 days  for Syncope attributed to Orthostatic hypotension \par Patient checks BP at home admits its around- 120s/50s. On losartan.\par Reports LE swelling has improved. He is sedentary most of the time. \par Denies lower back pain, Denies sciatica\par Denies h/o heart failure\par Denies h/o COPD, rheumatological  or immunological disorders, blood clots,  ha, dizziness, imbalance issues, abdominal pain,  leg edema \par \par CT CAP 5/15/23: \par Interval decrease of  spiculated right upper lobe nodule.\par New scattered nodular ground-glass opacities within right upper lobe may represent pneumonitis. Recommend attention on follow-up.\par Approximately 3.5 x 2.4 cm heterogeneous density nodule posterior to the left ischium (series 2 image 173) is unchanged.\par \par \par 6/23/23:  Patient presents for follow up accompanied with wife\par Started Durvalumab on 6/6/23, next tx scheduled 7/5/23\par Admits Fatigue\par Reports SOB on exertion, appears to wife may be a little worse in th past couple weeks. Denies fever, cough, chest pain. \par Denies fever/chills,,rash, , nausea, vomiting, diarrhea,constipation,  abdominal pain, bleeding, easy bruising or visual changes, chest pain, cough,  neuropathy, LE edema.\par \par \par \par \par \par

## 2023-06-28 NOTE — ASSESSMENT
[FreeTextEntry1] : JONE SOUTH, who was diagnosed with a squamous cell Carcinoma Lung at the age of  83 yo in Jan 2023  Patient with a PMHX of HTN, HLD, COPD, ASHD,  40 ppk year smoking history ( quit 2002) \par \par Patient initially presented with a  productive cough in November 2022, chest x-ray on 11/11/2022 at [facility] Radiology showed a new 3 cm nodular right upper lobe lesion \par \par Subsequent CT Chest and PEt scan was done \par \par PET/CT on 11/26/2022 reported  right upper lobe mass with an SUV of 13.2, 2.3 x 2.6 cm. Adjacent activity in the right hilar region with questionable metastatic adenopathy( SUV 6.4, 1.2 cm ).  Previous 6 mm nodule in the anterior right lung base is no longer seen.   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 Will consider sending Guardant \par - Advised to f/u with PCP to possibly adjust BP medication \par - Started weekly chemoradiation with Carbo/Taxol on 2/27/23, RT planned to finish 4/10/23\par C6 weekly carbo/ taxol completed on 4/3/23 Rt completed 4/10/23\par - He was admitted to [facility] on 4/13-4/15/23 for Syncope attributed to Orthostatic hypotension \par Restaging  CT CAP 5/15/23: after completion of Chemo/ RT \par - Interval decrease of  spiculated right upper lobe nodule. ( 3.5 x 2.4 cm , down from 4.3x 2.9 cm) \par -  New scattered nodular ground-glass opacities within right upper lobe may represent pneumonitis. \par - Approximately 3.5 x 2.4 cm heterogeneous density nodule posterior to the left ischium  is unchanged.\par -Discussed treatment with  immunotherapy which include durvalumab q 2 weeks 10mg/kg x 1y. AE of Durvalumab include fatigue, nausea, constipation, decreased appetite, abdominal pain, arthralgia, rash, and pyrexia. \par \par -Will order MRI to further evaluate nodule on left ischium on restaging CT CAP, if unable to obtain scan given hip replacements will order a CT scan. Patient still to schedule MRI\par \par -Started Durvalumab on 6/6/23, next tx scheduled 7/5/23\par - Advised to call office if SOB worsens, may consider imaging\par - Repeat imaging Beginning of September \par - F/u with Pa in 4-6 weeks\par - F/U with MD in 10-12 weeks \par \par

## 2023-07-05 ENCOUNTER — RESULT REVIEW (OUTPATIENT)
Age: 83
End: 2023-07-05

## 2023-07-05 ENCOUNTER — APPOINTMENT (OUTPATIENT)
Age: 83
End: 2023-07-05

## 2023-07-05 LAB
ALBUMIN SERPL ELPH-MCNC: 3.5 G/DL — SIGNIFICANT CHANGE UP (ref 3.3–5)
ALP SERPL-CCNC: 95 U/L — SIGNIFICANT CHANGE UP (ref 40–120)
ALT FLD-CCNC: 20 U/L — SIGNIFICANT CHANGE UP (ref 10–45)
ANION GAP SERPL CALC-SCNC: 12 MMOL/L — SIGNIFICANT CHANGE UP (ref 5–17)
AST SERPL-CCNC: 23 U/L — SIGNIFICANT CHANGE UP (ref 10–40)
BASOPHILS # BLD AUTO: 0.1 K/UL — SIGNIFICANT CHANGE UP (ref 0–0.2)
BASOPHILS NFR BLD AUTO: 1 % — SIGNIFICANT CHANGE UP (ref 0–2)
BILIRUB SERPL-MCNC: 0.3 MG/DL — SIGNIFICANT CHANGE UP (ref 0.2–1.2)
BUN SERPL-MCNC: 31 MG/DL — HIGH (ref 7–23)
CALCIUM SERPL-MCNC: 9.6 MG/DL — SIGNIFICANT CHANGE UP (ref 8.4–10.5)
CEA SERPL-MCNC: 1.5 NG/ML — SIGNIFICANT CHANGE UP (ref 0–3.8)
CHLORIDE SERPL-SCNC: 104 MMOL/L — SIGNIFICANT CHANGE UP (ref 96–108)
CO2 SERPL-SCNC: 24 MMOL/L — SIGNIFICANT CHANGE UP (ref 22–31)
CREAT SERPL-MCNC: 1.01 MG/DL — SIGNIFICANT CHANGE UP (ref 0.5–1.3)
EGFR: 75 ML/MIN/1.73M2 — SIGNIFICANT CHANGE UP
EOSINOPHIL # BLD AUTO: 0.3 K/UL — SIGNIFICANT CHANGE UP (ref 0–0.5)
EOSINOPHIL NFR BLD AUTO: 3.9 % — SIGNIFICANT CHANGE UP (ref 0–6)
GLUCOSE SERPL-MCNC: 102 MG/DL — HIGH (ref 70–99)
HCT VFR BLD CALC: 28.3 % — LOW (ref 39–50)
HGB BLD-MCNC: 9.3 G/DL — LOW (ref 13–17)
LYMPHOCYTES # BLD AUTO: 0.5 K/UL — LOW (ref 1–3.3)
LYMPHOCYTES # BLD AUTO: 6.6 % — LOW (ref 13–44)
MCHC RBC-ENTMCNC: 29.7 PG — SIGNIFICANT CHANGE UP (ref 27–34)
MCHC RBC-ENTMCNC: 32.7 G/DL — SIGNIFICANT CHANGE UP (ref 32–36)
MCV RBC AUTO: 90.8 FL — SIGNIFICANT CHANGE UP (ref 80–100)
MONOCYTES # BLD AUTO: 0.7 K/UL — SIGNIFICANT CHANGE UP (ref 0–0.9)
MONOCYTES NFR BLD AUTO: 8.8 % — SIGNIFICANT CHANGE UP (ref 2–14)
NEUTROPHILS # BLD AUTO: 6.1 K/UL — SIGNIFICANT CHANGE UP (ref 1.8–7.4)
NEUTROPHILS NFR BLD AUTO: 79.6 % — HIGH (ref 43–77)
PLATELET # BLD AUTO: 285 K/UL — SIGNIFICANT CHANGE UP (ref 150–400)
POTASSIUM SERPL-MCNC: 4.2 MMOL/L — SIGNIFICANT CHANGE UP (ref 3.5–5.3)
POTASSIUM SERPL-SCNC: 4.2 MMOL/L — SIGNIFICANT CHANGE UP (ref 3.5–5.3)
PROT SERPL-MCNC: 7 G/DL — SIGNIFICANT CHANGE UP (ref 6–8.3)
RBC # BLD: 3.12 M/UL — LOW (ref 4.2–5.8)
RBC # FLD: 15.2 % — HIGH (ref 10.3–14.5)
SODIUM SERPL-SCNC: 140 MMOL/L — SIGNIFICANT CHANGE UP (ref 135–145)
T4 FREE+ TSH PNL SERPL: 1.33 UIU/ML — SIGNIFICANT CHANGE UP (ref 0.27–4.2)
WBC # BLD: 7.7 K/UL — SIGNIFICANT CHANGE UP (ref 3.8–10.5)
WBC # FLD AUTO: 7.7 K/UL — SIGNIFICANT CHANGE UP (ref 3.8–10.5)

## 2023-07-19 ENCOUNTER — RESULT REVIEW (OUTPATIENT)
Age: 83
End: 2023-07-19

## 2023-07-19 ENCOUNTER — APPOINTMENT (OUTPATIENT)
Dept: HEMATOLOGY ONCOLOGY | Facility: CLINIC | Age: 83
End: 2023-07-19
Payer: MEDICARE

## 2023-07-19 ENCOUNTER — APPOINTMENT (OUTPATIENT)
Dept: HEMATOLOGY ONCOLOGY | Facility: CLINIC | Age: 83
End: 2023-07-19

## 2023-07-19 ENCOUNTER — APPOINTMENT (OUTPATIENT)
Age: 83
End: 2023-07-19

## 2023-07-19 ENCOUNTER — APPOINTMENT (OUTPATIENT)
Dept: ULTRASOUND IMAGING | Facility: CLINIC | Age: 83
End: 2023-07-19
Payer: MEDICARE

## 2023-07-19 ENCOUNTER — OUTPATIENT (OUTPATIENT)
Dept: OUTPATIENT SERVICES | Facility: HOSPITAL | Age: 83
LOS: 1 days | End: 2023-07-19

## 2023-07-19 VITALS
DIASTOLIC BLOOD PRESSURE: 65 MMHG | OXYGEN SATURATION: 98 % | HEIGHT: 70 IN | BODY MASS INDEX: 25.68 KG/M2 | SYSTOLIC BLOOD PRESSURE: 113 MMHG | WEIGHT: 179.34 LBS | HEART RATE: 67 BPM

## 2023-07-19 DIAGNOSIS — C34.90 MALIGNANT NEOPLASM OF UNSPECIFIED PART OF UNSPECIFIED BRONCHUS OR LUNG: ICD-10-CM

## 2023-07-19 LAB
ALBUMIN SERPL ELPH-MCNC: 3.7 G/DL — SIGNIFICANT CHANGE UP (ref 3.3–5)
ALBUMIN SERPL ELPH-MCNC: 3.8 G/DL — SIGNIFICANT CHANGE UP (ref 3.3–5)
ALP SERPL-CCNC: 101 U/L — SIGNIFICANT CHANGE UP (ref 40–120)
ALP SERPL-CCNC: 102 U/L — SIGNIFICANT CHANGE UP (ref 40–120)
ALT FLD-CCNC: 12 U/L — SIGNIFICANT CHANGE UP (ref 10–45)
ALT FLD-CCNC: 13 U/L — SIGNIFICANT CHANGE UP (ref 10–45)
ANION GAP SERPL CALC-SCNC: 12 MMOL/L — SIGNIFICANT CHANGE UP (ref 5–17)
ANION GAP SERPL CALC-SCNC: 12 MMOL/L — SIGNIFICANT CHANGE UP (ref 5–17)
AST SERPL-CCNC: 17 U/L — SIGNIFICANT CHANGE UP (ref 10–40)
AST SERPL-CCNC: 19 U/L — SIGNIFICANT CHANGE UP (ref 10–40)
BASOPHILS # BLD AUTO: 0.1 K/UL — SIGNIFICANT CHANGE UP (ref 0–0.2)
BASOPHILS NFR BLD AUTO: 1.5 % — SIGNIFICANT CHANGE UP (ref 0–2)
BILIRUB SERPL-MCNC: 0.2 MG/DL — SIGNIFICANT CHANGE UP (ref 0.2–1.2)
BILIRUB SERPL-MCNC: 0.2 MG/DL — SIGNIFICANT CHANGE UP (ref 0.2–1.2)
BUN SERPL-MCNC: 32 MG/DL — HIGH (ref 7–23)
BUN SERPL-MCNC: 32 MG/DL — HIGH (ref 7–23)
CALCIUM SERPL-MCNC: 9.6 MG/DL — SIGNIFICANT CHANGE UP (ref 8.4–10.5)
CALCIUM SERPL-MCNC: 9.7 MG/DL — SIGNIFICANT CHANGE UP (ref 8.4–10.5)
CEA SERPL-MCNC: 1.3 NG/ML — SIGNIFICANT CHANGE UP (ref 0–3.8)
CHLORIDE SERPL-SCNC: 101 MMOL/L — SIGNIFICANT CHANGE UP (ref 96–108)
CHLORIDE SERPL-SCNC: 101 MMOL/L — SIGNIFICANT CHANGE UP (ref 96–108)
CO2 SERPL-SCNC: 24 MMOL/L — SIGNIFICANT CHANGE UP (ref 22–31)
CO2 SERPL-SCNC: 24 MMOL/L — SIGNIFICANT CHANGE UP (ref 22–31)
CREAT SERPL-MCNC: 1.07 MG/DL — SIGNIFICANT CHANGE UP (ref 0.5–1.3)
CREAT SERPL-MCNC: 1.09 MG/DL — SIGNIFICANT CHANGE UP (ref 0.5–1.3)
EGFR: 68 ML/MIN/1.73M2 — SIGNIFICANT CHANGE UP
EGFR: 70 ML/MIN/1.73M2 — SIGNIFICANT CHANGE UP
EOSINOPHIL # BLD AUTO: 0.2 K/UL — SIGNIFICANT CHANGE UP (ref 0–0.5)
EOSINOPHIL NFR BLD AUTO: 2.8 % — SIGNIFICANT CHANGE UP (ref 0–6)
GLUCOSE SERPL-MCNC: 102 MG/DL — HIGH (ref 70–99)
GLUCOSE SERPL-MCNC: 99 MG/DL — SIGNIFICANT CHANGE UP (ref 70–99)
HCT VFR BLD CALC: 31.4 % — LOW (ref 39–50)
HGB BLD-MCNC: 10 G/DL — LOW (ref 13–17)
LYMPHOCYTES # BLD AUTO: 0.6 K/UL — LOW (ref 1–3.3)
LYMPHOCYTES # BLD AUTO: 7.5 % — LOW (ref 13–44)
MCHC RBC-ENTMCNC: 28.9 PG — SIGNIFICANT CHANGE UP (ref 27–34)
MCHC RBC-ENTMCNC: 31.9 G/DL — LOW (ref 32–36)
MCV RBC AUTO: 90.5 FL — SIGNIFICANT CHANGE UP (ref 80–100)
MONOCYTES # BLD AUTO: 0.7 K/UL — SIGNIFICANT CHANGE UP (ref 0–0.9)
MONOCYTES NFR BLD AUTO: 9 % — SIGNIFICANT CHANGE UP (ref 2–14)
NEUTROPHILS # BLD AUTO: 6.4 K/UL — SIGNIFICANT CHANGE UP (ref 1.8–7.4)
NEUTROPHILS NFR BLD AUTO: 79.1 % — HIGH (ref 43–77)
PLATELET # BLD AUTO: 263 K/UL — SIGNIFICANT CHANGE UP (ref 150–400)
POTASSIUM SERPL-MCNC: 3.7 MMOL/L — SIGNIFICANT CHANGE UP (ref 3.5–5.3)
POTASSIUM SERPL-MCNC: 3.8 MMOL/L — SIGNIFICANT CHANGE UP (ref 3.5–5.3)
POTASSIUM SERPL-SCNC: 3.7 MMOL/L — SIGNIFICANT CHANGE UP (ref 3.5–5.3)
POTASSIUM SERPL-SCNC: 3.8 MMOL/L — SIGNIFICANT CHANGE UP (ref 3.5–5.3)
PROT SERPL-MCNC: 6.9 G/DL — SIGNIFICANT CHANGE UP (ref 6–8.3)
PROT SERPL-MCNC: 7 G/DL — SIGNIFICANT CHANGE UP (ref 6–8.3)
RBC # BLD: 3.47 M/UL — LOW (ref 4.2–5.8)
RBC # FLD: 15 % — HIGH (ref 10.3–14.5)
SODIUM SERPL-SCNC: 137 MMOL/L — SIGNIFICANT CHANGE UP (ref 135–145)
SODIUM SERPL-SCNC: 137 MMOL/L — SIGNIFICANT CHANGE UP (ref 135–145)
T4 FREE+ TSH PNL SERPL: 1.39 UIU/ML — SIGNIFICANT CHANGE UP (ref 0.27–4.2)
T4 FREE+ TSH PNL SERPL: 1.39 UIU/ML — SIGNIFICANT CHANGE UP (ref 0.27–4.2)
WBC # BLD: 8.1 K/UL — SIGNIFICANT CHANGE UP (ref 3.8–10.5)
WBC # FLD AUTO: 8.1 K/UL — SIGNIFICANT CHANGE UP (ref 3.8–10.5)

## 2023-07-19 PROCEDURE — 99214 OFFICE O/P EST MOD 30 MIN: CPT

## 2023-07-19 PROCEDURE — 93970 EXTREMITY STUDY: CPT | Mod: 26

## 2023-07-23 ENCOUNTER — OUTPATIENT (OUTPATIENT)
Dept: OUTPATIENT SERVICES | Facility: HOSPITAL | Age: 83
LOS: 1 days | Discharge: HOME | End: 2023-07-23

## 2023-07-23 DIAGNOSIS — Z96.642 PRESENCE OF LEFT ARTIFICIAL HIP JOINT: Chronic | ICD-10-CM

## 2023-07-23 DIAGNOSIS — Z96.641 PRESENCE OF RIGHT ARTIFICIAL HIP JOINT: Chronic | ICD-10-CM

## 2023-07-23 DIAGNOSIS — C34.90 MALIGNANT NEOPLASM OF UNSPECIFIED PART OF UNSPECIFIED BRONCHUS OR LUNG: ICD-10-CM

## 2023-07-25 NOTE — ASSESSMENT
[FreeTextEntry1] : JONE SOUTH, who was diagnosed with a squamous cell Carcinoma Lung at the age of  81 yo in Jan 2023  Patient with a PMHX of HTN, HLD, COPD, ASHD,  40 ppk year smoking history ( quit 2002) \par \par Patient initially presented with a  productive cough in November 2022, chest x-ray on 11/11/2022 at [facility] Radiology showed a new 3 cm nodular right upper lobe lesion \par \par Subsequent CT Chest and PEt scan was done \par \par PET/CT on 11/26/2022 reported  right upper lobe mass with an SUV of 13.2, 2.3 x 2.6 cm. Adjacent activity in the right hilar region with questionable metastatic adenopathy( SUV 6.4, 1.2 cm ).  Previous 6 mm nodule in the anterior right lung base is no longer seen.   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 Will consider sending Guardant \par - Advised to f/u with PCP to possibly adjust BP medication \par - Started weekly chemoradiation with Carbo/Taxol on 2/27/23, RT planned to finish 4/10/23\par C6 weekly carbo/ taxol completed on 4/3/23 Rt completed 4/10/23\par - He was admitted to [facility] on 4/13-4/15/23 for Syncope attributed to Orthostatic hypotension \par Restaging  CT CAP 5/15/23: after completion of Chemo/ RT \par - Interval decrease of  spiculated right upper lobe nodule. ( 3.5 x 2.4 cm , down from 4.3x 2.9 cm) \par -  New scattered nodular ground-glass opacities within right upper lobe may represent pneumonitis. \par - Approximately 3.5 x 2.4 cm heterogeneous density nodule posterior to the left ischium  is unchanged.\par -Discussed treatment with  immunotherapy which include durvalumab q 2 weeks 10mg/kg x 1y. AE of Durvalumab include fatigue, nausea, constipation, decreased appetite, abdominal pain, arthralgia, rash, and pyrexia. \par \par -Will order MRI to further evaluate nodule on left ischium on restaging CT CAP, if unable to obtain scan given hip replacements will order a CT scan. Patient still to schedule MRI\par \par -Started Durvalumab on 6/6/23, next tx scheduled 8/2/23\par - Advised to call office if SOB worsens, may consider imaging\par - Venous Doppler on 7/19/23 revealed Acute below the knee left peroneal vein thrombus. Started on Eliquis today. Patient also noted to be on Pradaxa and ASA. Advised to follow up with Cardiology to see the need for ASA and Eliquis at the same time  \par - Repeat imaging Beginning of September \par - F/u with Pa in 4 weeks \par - F/U with MD in 8 weeks \par \par

## 2023-07-25 NOTE — HISTORY OF PRESENT ILLNESS
[de-identified] : JONE SOUTH is a 82 year M with PMHX of HTN, HLD, COPD, ASHD,  40 ppk year smoking history ( quit 2002) presents for initial consultation for  Squamous Cell lung cancer \par \par Patient presented to Dr. Reed on 11/29/22 c/o  a productive cough in November 2022. He was treated with a course of antibiotics but also underwent a chest x-ray on 11/11/2022 at [facility]. Compared to a previous film from June of this past year a new 3 cm nodular right upper lobe lesion was found. \par \par Subsequent CT Chest performed on 11/17/2022 revealing  2.8 x 2.8 x 2.3 right upper lobe mass in the posterior segment. Indeterminate nodule in anterior RLL measuring 6-7 mm. Small nodule left upper lobe, measuring 3-4 mm. Prominent nodes along right hilar structures, one of which shows similar density as right upper lobe mass , measuring 13 x 14x 11 mm, concerning for satellite lesion\par \par PET/CT on 11/26/2022 reported  right upper lobe mass with an SUV of 13.2, 2.3 x 2.6 cm. Adjacent activity in the right hilar region with questionable metastatic adenopathy( SUV 6.4, 1.2 cm ).  Previous 6 mm nodule in the anterior right lung base is no longer seen.   \par \par Patient referred to Dr. Martin. He underwent  flexible bronchoscopy, robotic-assisted bronchoscopy with the ion system, endobronchial ultrasound with transbronchial needle aspiration, endobronchial brushing, endobronchial biopsy on 1/18/23.\par \par Pathology \par Lymph Node ( R4, R10, Level 7) positive for malignant cells for squamous cell carcinoma. \par Bronchoalveolar lavage was negative\par \par PMH: HTN, hypercholesterolemia\par FMH:\par Sx: hip replacement sx \par Socx: Former smoker, quit 2002 (smoked 40 ppk year)\par \par Care Team: \par Pulmonologist: Dr. Reed\par Primary care provider: Dr. Carballo \par \par   [de-identified] : Patient presents for follow up with spouse and daughter\par Started weekly chemoradiation with Carbo/Taxol on 2/27/23, s/p RT on 4/10/23\par C6 weekly carbo/ taxol completed on 4/3/23\par Started Durvalumab on 6/6/23, next tx scheduled 7/5/23\par \par 4/17/23: \par Patient here for f/u with wife and Daughter \par He was admitted to [facility] May 2023 for 3 days  for Syncope attributed to Orthostatic hypotension \par Patient checks BP at home admits its around- 120s/50s. On losartan.\par Reports LE swelling has improved. He is sedentary most of the time. \par Denies lower back pain, Denies sciatica\par Denies h/o heart failure\par Denies h/o COPD, rheumatological  or immunological disorders, blood clots,  ha, dizziness, imbalance issues, abdominal pain,  leg edema \par \par CT CAP 5/15/23: \par Interval decrease of  spiculated right upper lobe nodule.\par New scattered nodular ground-glass opacities within right upper lobe may represent pneumonitis. Recommend attention on follow-up.\par Approximately 3.5 x 2.4 cm heterogeneous density nodule posterior to the left ischium (series 2 image 173) is unchanged.\par \par \par 6/23/23:  Patient presents for follow up accompanied with wife\par Started Durvalumab on 6/6/23, next tx scheduled 7/5/23\par Admits Fatigue\par Reports SOB on exertion, appears to wife may be a little worse in th past couple weeks. Denies fever, cough, chest pain. \par Denies fever/chills,,rash, , nausea, vomiting, diarrhea,constipation,  abdominal pain, bleeding, easy bruising or visual changes, chest pain, cough,  neuropathy, LE edema.\par \par 7/19/23; Patient presented to treatment room accompanied with wife\par Reporting b/l leg swelling for the past 2 weeks, right leg worsen than left \par Denies pain \par SOB on exertion, somewhat stable appears to worsen with humidity\par Admits fatigue\par Denies fever/chills,,rash, , nausea, vomiting, diarrhea,constipation,  abdominal pain, bleeding, easy bruising or visual changes, chest pain, cough,  neuropathy,\par \par 7/19/23 Venous Doppler B/L LE\par \par IMPRESSION:\par Acute below the knee left peroneal vein thrombus\par \par \par \par

## 2023-07-25 NOTE — RESULTS/DATA
[FreeTextEntry1] :  \par 2/6/23 PET/CT \par -Hypermetabolic lung nodule in the superior segment of the right lower lobe measures 3.1 x 2.8 cm with SUV 16.0; this is compatible with a malignancy. \par -There is a peripheral nodular opacity more peripherally measuring 1.3 x 1.0 cm with minimal activity of SUV 1.4 likely reflecting a nonobstructive pattern of atelectasis.\par -Hypermetabolic node superior and posterior to the right main bronchus measures 1.2 x 1.8 cm with SUV 11.3 compatible with dejon involvement. \par -A subcentimeter right upper paratracheal node  measuring 0.4 x 1.0 cm is nonavid.\par \par IMPRESSION:\par 1. Hypermetabolic nodule in the superior segment of right lower lobe compatible with a malignancy; hypermetabolic right hilar node compatible with dejno involvement.\par 2. Soft tissue abutting the left ischium with specks of calcification and patchy activity appears to be related to a chronic inflammatory process; this can be further evaluated with MRI when clinically feasible.\par \par \par 2/3/23 MR Head \par IMPRESSION:\par -Indeterminate subcentimeter high FLAIR signal with associated tiny enhancement in the left temporal lobe as described above. Tiny metastasis not excluded. Follow-up exam recommended.\par -No acute intracranial hemorrhage or acute infarct\par \par \par 1/18/2023 Pathology\par \par Final Diagnosis\par 1. LYMPH NODE, R4, EBUS-GUIDED FNA\par \par POSITIVE FOR MALIGNANT CELLS.\par Squamous Cell Carcinoma.\par \par The cytology slides are composed of syncytial sheets and single pleomorphic cells with irregular, hyperchromatic nuclei and dense cytoplasm in a background of necrosis and keratinized debris. No lymphoid cells present in the background.\par \par 2. LYMPH NODE, LEVEL 7, EBUS-GUIDED FNA\par Squamous Cell Carcinoma.\par \par The cytology slides are composed of syncytial sheets and single pleomorphic cells with irregular, hyperchromatic nuclei and dense\par cytoplasm in a background of necrosis and keratinized debris. No lymphoid cells present in the background.\par \par \par 3. LYMPH NODE, R10, EBUS-GUIDED FNA\par POSITIVE FOR MALIGNANT CELLS.\par Metastatic squamous Cell Carcinoma.\par \par The cytology slides are composed of syncytial sheets and single pleomorphic cells with irregular, hyperchromatic nuclei and dense cytoplasm in a background of necrosis and keratinized debris admixed with lymphoid cells.\par \par \par \par 4. LUNG, BRONCHOALVEOLAR LAVAGE\par NEGATIVE FOR MALIGNANT CELLS.\par \par The cytology slide and cell block are composed of groups of reactive ciliated bronchial epithelial cells, scattered alveolar macrophages and mixed inflammatory cells.\par \par \par \par 11/26/2022: PET/CT [facility] \par -hyperactive right upper lobe mass with an SUV of 13.2, 2.3 x 2.6 cm\par -  Adjacent activity in the right hilar region with questionable metastatic adenopathy( SUV 6.4, 1.2 cm ).  \par -Previous 6 mm nodule in the anterior right lung base is no longer seen.   \par \par 11/17/2022 CT Chest noncontrast : [facility]  \par -2.8 x 2.8 x 2.3 right upper lobe mass in the posterior segment \par - Indeterminate nodule in anterior RLL measuring 6-7 mm\par - Small nodule left upper lobe, measuring 3-4 mm\par - Prominent nodes along right hilar structures, one of which shows similar density as right upper lobe mass , measuring 13 x 14x 11 mm, concerning for satellite lesion\par  \par 11/11/2022 CXR  at [facility]. Compared to a previous film from June 2022 \par -a new 3 cm nodular right upper lobe lesion details…

## 2023-07-26 ENCOUNTER — APPOINTMENT (OUTPATIENT)
Dept: HEMATOLOGY ONCOLOGY | Facility: CLINIC | Age: 83
End: 2023-07-26

## 2023-07-31 ENCOUNTER — OUTPATIENT (OUTPATIENT)
Dept: OUTPATIENT SERVICES | Facility: HOSPITAL | Age: 83
LOS: 1 days | End: 2023-07-31

## 2023-07-31 ENCOUNTER — APPOINTMENT (OUTPATIENT)
Dept: MRI IMAGING | Facility: CLINIC | Age: 83
End: 2023-07-31
Payer: MEDICARE

## 2023-07-31 DIAGNOSIS — Z96.641 PRESENCE OF RIGHT ARTIFICIAL HIP JOINT: Chronic | ICD-10-CM

## 2023-07-31 DIAGNOSIS — Z96.642 PRESENCE OF LEFT ARTIFICIAL HIP JOINT: Chronic | ICD-10-CM

## 2023-07-31 DIAGNOSIS — C34.90 MALIGNANT NEOPLASM OF UNSPECIFIED PART OF UNSPECIFIED BRONCHUS OR LUNG: ICD-10-CM

## 2023-07-31 PROCEDURE — 73723 MRI JOINT LWR EXTR W/O&W/DYE: CPT | Mod: 26,LT

## 2023-08-02 ENCOUNTER — RESULT REVIEW (OUTPATIENT)
Age: 83
End: 2023-08-02

## 2023-08-02 ENCOUNTER — APPOINTMENT (OUTPATIENT)
Age: 83
End: 2023-08-02

## 2023-08-02 LAB
ALBUMIN SERPL ELPH-MCNC: 3.9 G/DL — SIGNIFICANT CHANGE UP (ref 3.3–5)
ALP SERPL-CCNC: 108 U/L — SIGNIFICANT CHANGE UP (ref 40–120)
ALT FLD-CCNC: 12 U/L — SIGNIFICANT CHANGE UP (ref 10–45)
ANION GAP SERPL CALC-SCNC: 12 MMOL/L — SIGNIFICANT CHANGE UP (ref 5–17)
AST SERPL-CCNC: 19 U/L — SIGNIFICANT CHANGE UP (ref 10–40)
BASOPHILS # BLD AUTO: 0 K/UL — SIGNIFICANT CHANGE UP (ref 0–0.2)
BASOPHILS NFR BLD AUTO: 0.7 % — SIGNIFICANT CHANGE UP (ref 0–2)
BILIRUB SERPL-MCNC: 0.3 MG/DL — SIGNIFICANT CHANGE UP (ref 0.2–1.2)
BUN SERPL-MCNC: 29 MG/DL — HIGH (ref 7–23)
CALCIUM SERPL-MCNC: 9.5 MG/DL — SIGNIFICANT CHANGE UP (ref 8.4–10.5)
CEA SERPL-MCNC: 1.2 NG/ML — SIGNIFICANT CHANGE UP (ref 0–3.8)
CHLORIDE SERPL-SCNC: 104 MMOL/L — SIGNIFICANT CHANGE UP (ref 96–108)
CO2 SERPL-SCNC: 24 MMOL/L — SIGNIFICANT CHANGE UP (ref 22–31)
CREAT SERPL-MCNC: 1.05 MG/DL — SIGNIFICANT CHANGE UP (ref 0.5–1.3)
EGFR: 71 ML/MIN/1.73M2 — SIGNIFICANT CHANGE UP
EOSINOPHIL # BLD AUTO: 0.2 K/UL — SIGNIFICANT CHANGE UP (ref 0–0.5)
EOSINOPHIL NFR BLD AUTO: 2.5 % — SIGNIFICANT CHANGE UP (ref 0–6)
GLUCOSE SERPL-MCNC: 103 MG/DL — HIGH (ref 70–99)
HCT VFR BLD CALC: 32.4 % — LOW (ref 39–50)
HGB BLD-MCNC: 10.6 G/DL — LOW (ref 13–17)
LYMPHOCYTES # BLD AUTO: 0.6 K/UL — LOW (ref 1–3.3)
LYMPHOCYTES # BLD AUTO: 8.4 % — LOW (ref 13–44)
MCHC RBC-ENTMCNC: 28.7 PG — SIGNIFICANT CHANGE UP (ref 27–34)
MCHC RBC-ENTMCNC: 32.8 G/DL — SIGNIFICANT CHANGE UP (ref 32–36)
MCV RBC AUTO: 87.6 FL — SIGNIFICANT CHANGE UP (ref 80–100)
MONOCYTES # BLD AUTO: 0.7 K/UL — SIGNIFICANT CHANGE UP (ref 0–0.9)
MONOCYTES NFR BLD AUTO: 9.7 % — SIGNIFICANT CHANGE UP (ref 2–14)
NEUTROPHILS # BLD AUTO: 5.9 K/UL — SIGNIFICANT CHANGE UP (ref 1.8–7.4)
NEUTROPHILS NFR BLD AUTO: 78.8 % — HIGH (ref 43–77)
PLATELET # BLD AUTO: 250 K/UL — SIGNIFICANT CHANGE UP (ref 150–400)
POTASSIUM SERPL-MCNC: 4.4 MMOL/L — SIGNIFICANT CHANGE UP (ref 3.5–5.3)
POTASSIUM SERPL-SCNC: 4.4 MMOL/L — SIGNIFICANT CHANGE UP (ref 3.5–5.3)
PROT SERPL-MCNC: 6.5 G/DL — SIGNIFICANT CHANGE UP (ref 6–8.3)
RBC # BLD: 3.7 M/UL — LOW (ref 4.2–5.8)
RBC # FLD: 14.6 % — HIGH (ref 10.3–14.5)
SODIUM SERPL-SCNC: 140 MMOL/L — SIGNIFICANT CHANGE UP (ref 135–145)
T4 FREE+ TSH PNL SERPL: 1.2 UIU/ML — SIGNIFICANT CHANGE UP (ref 0.27–4.2)
WBC # BLD: 7.4 K/UL — SIGNIFICANT CHANGE UP (ref 3.8–10.5)
WBC # FLD AUTO: 7.4 K/UL — SIGNIFICANT CHANGE UP (ref 3.8–10.5)

## 2023-08-03 RX ORDER — ASPIRIN 81 MG
81 TABLET, DELAYED RELEASE (ENTERIC COATED) ORAL
Refills: 0 | Status: DISCONTINUED | COMMUNITY
End: 2023-08-03

## 2023-08-04 LAB
APTT BLD: 37.3 SEC
INR PPP: 1.31 RATIO
PT BLD: 14.8 SEC

## 2023-08-09 ENCOUNTER — OUTPATIENT (OUTPATIENT)
Dept: OUTPATIENT SERVICES | Facility: HOSPITAL | Age: 83
LOS: 1 days | End: 2023-08-09

## 2023-08-09 ENCOUNTER — NON-APPOINTMENT (OUTPATIENT)
Age: 83
End: 2023-08-09

## 2023-08-09 ENCOUNTER — APPOINTMENT (OUTPATIENT)
Dept: INTERVENTIONAL RADIOLOGY/VASCULAR | Facility: CLINIC | Age: 83
End: 2023-08-09
Payer: MEDICARE

## 2023-08-09 VITALS
OXYGEN SATURATION: 96 % | TEMPERATURE: 98 F | RESPIRATION RATE: 16 BRPM | DIASTOLIC BLOOD PRESSURE: 74 MMHG | SYSTOLIC BLOOD PRESSURE: 177 MMHG | HEART RATE: 76 BPM

## 2023-08-09 DIAGNOSIS — Z96.642 PRESENCE OF LEFT ARTIFICIAL HIP JOINT: Chronic | ICD-10-CM

## 2023-08-09 DIAGNOSIS — Z96.641 PRESENCE OF RIGHT ARTIFICIAL HIP JOINT: Chronic | ICD-10-CM

## 2023-08-09 DIAGNOSIS — C34.90 MALIGNANT NEOPLASM OF UNSPECIFIED PART OF UNSPECIFIED BRONCHUS OR LUNG: ICD-10-CM

## 2023-08-09 PROCEDURE — 36561 INSERT TUNNELED CV CATH: CPT | Mod: RT

## 2023-08-09 PROCEDURE — 77001 FLUOROGUIDE FOR VEIN DEVICE: CPT | Mod: 26

## 2023-08-09 NOTE — HISTORY OF PRESENT ILLNESS
[FreeTextEntry1] : PRE CALL PRIOR TO APPOINTMENT:   Phone Number: [phone]  Diagnosis: Non-small cell carcinoma of lung  COVID-19 SWAB: n/a  RX on file: yes  Referring MD: Shelley  Appointment date:  8/9/2023  Currently on Chemotherapy:    no           CBC within 48 hours:  WBC:       ANC:   Diabetic: no  Clotting or Bleeding disorders: no  PPM / Defibrillator: no  NPO status advised: yes  History of fall:    no  Assistant device for walking: cane   home: wife  Blood Thinners: plavix, 5day hold/ eliquis 5mg 2 day hold/   Prescribing MD agree to have medication held: will check with cardiologist.   Capacity to make decisions: yes  HCP: no  DNR: no  Person contact for Pre-Call: Spoke to pt by Kayli Darnell for Screening Anesthesia / Sedation Cases	(TYPE YES OR NO)    Obtain Prescription / Authorization from Referring Physician: YES	  Medical Necessity (Justification of the need for Anesthesia / Sedation) from referring Physician: YES	  Have you taken oral sedation? (e.g. Xanax, Valium, and other oral sedatives):  NO	  Clearance to receive Anesthesia / Sedation: YES- BY Dr. Shah 	    ANESTHESIA EXCLUSION CRITERIA QUESTIONNAIRE:	 	 Difficult Airway: (TYPE YES OR NO) 	           Previous Problem with Anesthesia or sedation: NO	  History of Difficult Intubation: NO	  Stridor, Snoring, Sleep Apnea: NO	  Tonsils / Adenoids present: no removed at age 3	  Dysmorphic Facial Features: NO	  Cervical Spine Fusion/ Cervical Spine Surgery: NO	  Tumor in Airway: NO	  Trauma to Airway: NO	  Radiation therapy to head / neck: NO	  Advanced Rheumatoid ArthritiS: NO	  Active Cardiac Ischemia (as defined by EKG or Laboratory  Examination): NO	   Severe COPD / Home O2: NO	   Known or Suspected Increased Intracranial pressure: NO	  Patient with BMI of 40 or greater : NO    Weight (kgs.) 82.7_____ Height (ft.) 5'10______       BMI__26.1_____	 	  	 Patients with Increased Risk of Aspiration: (TYPE YES OR NO) 		  Abnormal Airway: NO	  Hiatal Hernia with Reflux: NO	            Pregnancy: NO	 	            Altered Mental State: NO 	 	            Spinal Cord Injury with Paraplegia or Quadriplegia: NO	 	         History of delayed Gastric Emptying: NO 	 	            ASA Physical Status of 3 or greater (See Appendix 1 from policy [id number].5502) 	 	  	  Malignant Hyperthermia Screening: (TYPE YES OR NO) 	            Family history unexpected death following anesthesia or exercise: NO	            Family or personal history of Malignant Hyperthermia: NO  	            A muscle or neuromuscular disorder: NO   Malignant Hyperthermia Screening  Does the patient have a history of any of the following (Type YES or NO)  Malignant hyperthermia: NO  A muscle or neuromuscular disorder: NO    Personal history of: (Type YES or NO)  Muscle spasm: NO  Dark or chocolate-colored urine: NO  Unanticipated fever immediately following anesthesia or exercise: NO	    [facility] -Operative Assessment (Day of Procedure)   Procedure: Medi port placement   Indication: Non-small cell carcinoma of lung  NPO status: confirmed  Accompanied by: daughter  Falls risk: na  Labs: PLT:      INR:      PT:      CR:      Potassium:   IVL: 22g lac  IR MD: Dr. Tank Aldrich   Urine Pregnancy:na  Pre-Op instructions: provided  POST-OP teaching initiated: yes  Allergy bracelet on:na  Anesthesia plan discussed with IR MD: yes  Antibiotic given: ancef

## 2023-08-11 ENCOUNTER — RESULT REVIEW (OUTPATIENT)
Age: 83
End: 2023-08-11

## 2023-08-11 ENCOUNTER — APPOINTMENT (OUTPATIENT)
Age: 83
End: 2023-08-11

## 2023-08-11 LAB
BASOPHILS # BLD AUTO: 0.1 K/UL — SIGNIFICANT CHANGE UP (ref 0–0.2)
BASOPHILS NFR BLD AUTO: 0.8 % — SIGNIFICANT CHANGE UP (ref 0–2)
EOSINOPHIL # BLD AUTO: 0.2 K/UL — SIGNIFICANT CHANGE UP (ref 0–0.5)
EOSINOPHIL NFR BLD AUTO: 2.4 % — SIGNIFICANT CHANGE UP (ref 0–6)
HCT VFR BLD CALC: 31.4 % — LOW (ref 39–50)
HGB BLD-MCNC: 9.8 G/DL — LOW (ref 13–17)
LYMPHOCYTES # BLD AUTO: 0.5 K/UL — LOW (ref 1–3.3)
LYMPHOCYTES # BLD AUTO: 6.5 % — LOW (ref 13–44)
MCHC RBC-ENTMCNC: 27.8 PG — SIGNIFICANT CHANGE UP (ref 27–34)
MCHC RBC-ENTMCNC: 31.4 G/DL — LOW (ref 32–36)
MCV RBC AUTO: 88.7 FL — SIGNIFICANT CHANGE UP (ref 80–100)
MONOCYTES # BLD AUTO: 0.6 K/UL — SIGNIFICANT CHANGE UP (ref 0–0.9)
MONOCYTES NFR BLD AUTO: 8 % — SIGNIFICANT CHANGE UP (ref 2–14)
NEUTROPHILS # BLD AUTO: 5.9 K/UL — SIGNIFICANT CHANGE UP (ref 1.8–7.4)
NEUTROPHILS NFR BLD AUTO: 82.3 % — HIGH (ref 43–77)
PLATELET # BLD AUTO: 261 K/UL — SIGNIFICANT CHANGE UP (ref 150–400)
RBC # BLD: 3.54 M/UL — LOW (ref 4.2–5.8)
RBC # FLD: 14.9 % — HIGH (ref 10.3–14.5)
WBC # BLD: 7.2 K/UL — SIGNIFICANT CHANGE UP (ref 3.8–10.5)
WBC # FLD AUTO: 7.2 K/UL — SIGNIFICANT CHANGE UP (ref 3.8–10.5)

## 2023-08-12 LAB
ALBUMIN SERPL ELPH-MCNC: 3.3 G/DL — SIGNIFICANT CHANGE UP (ref 3.3–5)
ALP SERPL-CCNC: 96 U/L — SIGNIFICANT CHANGE UP (ref 40–120)
ALT FLD-CCNC: 21 U/L — SIGNIFICANT CHANGE UP (ref 10–45)
ANION GAP SERPL CALC-SCNC: 10 MMOL/L — SIGNIFICANT CHANGE UP (ref 5–17)
AST SERPL-CCNC: 32 U/L — SIGNIFICANT CHANGE UP (ref 10–40)
BILIRUB SERPL-MCNC: <0.2 MG/DL — SIGNIFICANT CHANGE UP (ref 0.2–1.2)
BUN SERPL-MCNC: 21 MG/DL — SIGNIFICANT CHANGE UP (ref 7–23)
CALCIUM SERPL-MCNC: 9.4 MG/DL — SIGNIFICANT CHANGE UP (ref 8.4–10.5)
CEA SERPL-MCNC: 1 NG/ML — SIGNIFICANT CHANGE UP (ref 0–3.8)
CHLORIDE SERPL-SCNC: 104 MMOL/L — SIGNIFICANT CHANGE UP (ref 96–108)
CO2 SERPL-SCNC: 24 MMOL/L — SIGNIFICANT CHANGE UP (ref 22–31)
CREAT SERPL-MCNC: 1 MG/DL — SIGNIFICANT CHANGE UP (ref 0.5–1.3)
EGFR: 76 ML/MIN/1.73M2 — SIGNIFICANT CHANGE UP
GLUCOSE SERPL-MCNC: 90 MG/DL — SIGNIFICANT CHANGE UP (ref 70–99)
POTASSIUM SERPL-MCNC: 4.1 MMOL/L — SIGNIFICANT CHANGE UP (ref 3.5–5.3)
POTASSIUM SERPL-SCNC: 4.1 MMOL/L — SIGNIFICANT CHANGE UP (ref 3.5–5.3)
PROT SERPL-MCNC: 6.4 G/DL — SIGNIFICANT CHANGE UP (ref 6–8.3)
SODIUM SERPL-SCNC: 138 MMOL/L — SIGNIFICANT CHANGE UP (ref 135–145)
T4 FREE+ TSH PNL SERPL: 1.72 UIU/ML — SIGNIFICANT CHANGE UP (ref 0.27–4.2)

## 2023-08-14 DIAGNOSIS — Z51.11 ENCOUNTER FOR ANTINEOPLASTIC CHEMOTHERAPY: ICD-10-CM

## 2023-08-23 ENCOUNTER — APPOINTMENT (OUTPATIENT)
Dept: HEMATOLOGY ONCOLOGY | Facility: CLINIC | Age: 83
End: 2023-08-23
Payer: MEDICARE

## 2023-08-23 ENCOUNTER — RESULT REVIEW (OUTPATIENT)
Age: 83
End: 2023-08-23

## 2023-08-23 VITALS
OXYGEN SATURATION: 94 % | HEART RATE: 68 BPM | SYSTOLIC BLOOD PRESSURE: 118 MMHG | DIASTOLIC BLOOD PRESSURE: 65 MMHG | WEIGHT: 179.9 LBS | BODY MASS INDEX: 25.75 KG/M2 | HEIGHT: 70 IN

## 2023-08-23 PROCEDURE — 99214 OFFICE O/P EST MOD 30 MIN: CPT

## 2023-08-23 NOTE — RESULTS/DATA
[FreeTextEntry1] :  \par  2/6/23 PET/CT \par  -Hypermetabolic lung nodule in the superior segment of the right lower lobe measures 3.1 x 2.8 cm with SUV 16.0; this is compatible with a malignancy. \par  -There is a peripheral nodular opacity more peripherally measuring 1.3 x 1.0 cm with minimal activity of SUV 1.4 likely reflecting a nonobstructive pattern of atelectasis.\par  -Hypermetabolic node superior and posterior to the right main bronchus measures 1.2 x 1.8 cm with SUV 11.3 compatible with dejon involvement. \par  -A subcentimeter right upper paratracheal node  measuring 0.4 x 1.0 cm is nonavid.\par  \par  IMPRESSION:\par  1. Hypermetabolic nodule in the superior segment of right lower lobe compatible with a malignancy; hypermetabolic right hilar node compatible with dejon involvement.\par  2. Soft tissue abutting the left ischium with specks of calcification and patchy activity appears to be related to a chronic inflammatory process; this can be further evaluated with MRI when clinically feasible.\par  \par  \par  2/3/23 MR Head \par  IMPRESSION:\par  -Indeterminate subcentimeter high FLAIR signal with associated tiny enhancement in the left temporal lobe as described above. Tiny metastasis not excluded. Follow-up exam recommended.\par  -No acute intracranial hemorrhage or acute infarct\par  \par  \par  1/18/2023 Pathology\par  \par  Final Diagnosis\par  1. LYMPH NODE, R4, EBUS-GUIDED FNA\par  \par  POSITIVE FOR MALIGNANT CELLS.\par  Squamous Cell Carcinoma.\par  \par  The cytology slides are composed of syncytial sheets and single pleomorphic cells with irregular, hyperchromatic nuclei and dense cytoplasm in a background of necrosis and keratinized debris. No lymphoid cells present in the background.\par  \par  2. LYMPH NODE, LEVEL 7, EBUS-GUIDED FNA\par  Squamous Cell Carcinoma.\par  \par  The cytology slides are composed of syncytial sheets and single pleomorphic cells with irregular, hyperchromatic nuclei and dense\par  cytoplasm in a background of necrosis and keratinized debris. No lymphoid cells present in the background.\par  \par  \par  3. LYMPH NODE, R10, EBUS-GUIDED FNA\par  POSITIVE FOR MALIGNANT CELLS.\par  Metastatic squamous Cell Carcinoma.\par  \par  The cytology slides are composed of syncytial sheets and single pleomorphic cells with irregular, hyperchromatic nuclei and dense cytoplasm in a background of necrosis and keratinized debris admixed with lymphoid cells.\par  \par  \par  \par  4. LUNG, BRONCHOALVEOLAR LAVAGE\par  NEGATIVE FOR MALIGNANT CELLS.\par  \par  The cytology slide and cell block are composed of groups of reactive ciliated bronchial epithelial cells, scattered alveolar macrophages and mixed inflammatory cells.\par  \par  \par  \par  11/26/2022: PET/CT [facility] \par  -hyperactive right upper lobe mass with an SUV of 13.2, 2.3 x 2.6 cm\par  -  Adjacent activity in the right hilar region with questionable metastatic adenopathy( SUV 6.4, 1.2 cm ).  \par  -Previous 6 mm nodule in the anterior right lung base is no longer seen.   \par  \par  11/17/2022 CT Chest noncontrast : [facility]  \par  -2.8 x 2.8 x 2.3 right upper lobe mass in the posterior segment \par  - Indeterminate nodule in anterior RLL measuring 6-7 mm\par  - Small nodule left upper lobe, measuring 3-4 mm\par  - Prominent nodes along right hilar structures, one of which shows similar density as right upper lobe mass , measuring 13 x 14x 11 mm, concerning for satellite lesion\par   \par  11/11/2022 CXR  at [facility]. Compared to a previous film from June 2022 \par  -a new 3 cm nodular right upper lobe lesion

## 2023-08-23 NOTE — ASSESSMENT
[FreeTextEntry1] : JONE SOUTH, who was diagnosed with a squamous cell Carcinoma Lung at the age of  83 yo in Jan 2023  Patient with a PMHX of HTN, HLD, COPD, ASHD,  40 ppk year smoking history ( quit 2002)   Patient initially presented with a  productive cough in November 2022, chest x-ray on 11/11/2022 at [facility] Radiology showed a new 3 cm nodular right upper lobe lesion   Subsequent CT Chest and PEt scan was done   PET/CT on 11/26/2022 reported  right upper lobe mass with an SUV of 13.2, 2.3 x 2.6 cm. Adjacent activity in the right hilar region with questionable metastatic adenopathy( SUV 6.4, 1.2 cm ).  Previous 6 mm nodule in the anterior right lung base is no longer seen.     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 Will consider sending Guardant  - Advised to f/u with PCP to possibly adjust BP medication  - Started weekly chemoradiation with Carbo/Taxol on 2/27/23, RT planned to finish 4/10/23 C6 weekly carbo/ taxol completed on 4/3/23 Rt completed 4/10/23 - He was admitted to [facility] on 4/13-4/15/23 for Syncope attributed to Orthostatic hypotension  Restaging  CT CAP 5/15/23: after completion of Chemo/ RT  - Interval decrease of  spiculated right upper lobe nodule. ( 3.5 x 2.4 cm , down from 4.3x 2.9 cm)  -  New scattered nodular ground-glass opacities within right upper lobe may represent pneumonitis.  - Approximately 3.5 x 2.4 cm heterogeneous density nodule posterior to the left ischium  is unchanged. -Discussed treatment with  immunotherapy which include durvalumab q 2 weeks 10mg/kg x 1y. AE of Durvalumab include fatigue, nausea, constipation, decreased appetite, abdominal pain, arthralgia, rash, and pyrexia.   -Will order MRI to further evaluate nodule on left ischium on restaging CT CAP, if unable to obtain scan given hip replacements will order a CT scan. Patient still to schedule MRI  -Started Durvalumab on 6/6/23, next tx scheduled 8/2/23 - Advised to call office if SOB worsens, may consider imaging - Venous Doppler on 7/19/23 revealed Acute below the knee left peroneal vein thrombus. Started on Eliquis today. Patient also noted to be on Plavix and ASA. Advised to follow up with Cardiology to see the need for ASA and Eliquis at the same time   - Reviewed MRI of Left hip findings with patient. Given no pain in the area, Patient would like to defer Orthopedic referral at this time. Advise if patient becomes symptomatic, will refer to Ortho - Given worsening fatigue- will order iron panel and B12/folate with next treatment   - Repeat imaging Beginning of September  - F/u in 3-4 weeks

## 2023-08-23 NOTE — HISTORY OF PRESENT ILLNESS
[de-identified] : JONE SOUTH is a 82 year M with PMHX of HTN, HLD, COPD, ASHD,  40 ppk year smoking history ( quit 2002) presents for initial consultation for  Squamous Cell lung cancer \par  \par  Patient presented to Dr. Reed on 11/29/22 c/o  a productive cough in November 2022. He was treated with a course of antibiotics but also underwent a chest x-ray on 11/11/2022 at [facility]. Compared to a previous film from June of this past year a new 3 cm nodular right upper lobe lesion was found. \par  \par  Subsequent CT Chest performed on 11/17/2022 revealing  2.8 x 2.8 x 2.3 right upper lobe mass in the posterior segment. Indeterminate nodule in anterior RLL measuring 6-7 mm. Small nodule left upper lobe, measuring 3-4 mm. Prominent nodes along right hilar structures, one of which shows similar density as right upper lobe mass , measuring 13 x 14x 11 mm, concerning for satellite lesion\par  \par  PET/CT on 11/26/2022 reported  right upper lobe mass with an SUV of 13.2, 2.3 x 2.6 cm. Adjacent activity in the right hilar region with questionable metastatic adenopathy( SUV 6.4, 1.2 cm ).  Previous 6 mm nodule in the anterior right lung base is no longer seen.   \par  \par  Patient referred to Dr. Martin. He underwent  flexible bronchoscopy, robotic-assisted bronchoscopy with the ion system, endobronchial ultrasound with transbronchial needle aspiration, endobronchial brushing, endobronchial biopsy on 1/18/23.\par  \par  Pathology \par  Lymph Node ( R4, R10, Level 7) positive for malignant cells for squamous cell carcinoma. \par  Bronchoalveolar lavage was negative\par  \par  PMH: HTN, hypercholesterolemia\par  FMH:\par  Sx: hip replacement sx \par  Socx: Former smoker, quit 2002 (smoked 40 ppk year)\par  \par  Care Team: \par  Pulmonologist: Dr. Reed\par  Primary care provider: Dr. Carballo \par  \par    [de-identified] : Patient presents for follow up with spouse and daughter Started weekly chemoradiation with Carbo/Taxol on 2/27/23, s/p RT on 4/10/23 C6 weekly carbo/ taxol completed on 4/3/23 Started Durvalumab on 6/6/23, next tx scheduled 7/5/23 4/17/23:  Patient here for f/u with wife and Daughter  He was admitted to [facility] May 2023 for 3 days  for Syncope attributed to Orthostatic hypotension  Patient checks BP at home admits its around- 120s/50s. On losartan. Reports LE swelling has improved. He is sedentary most of the time.  Denies lower back pain, Denies sciatica Denies h/o heart failure Denies h/o COPD, rheumatological  or immunological disorders, blood clots,  ha, dizziness, imbalance issues, abdominal pain,  leg edema   CT CAP 5/15/23:  Interval decrease of  spiculated right upper lobe nodule. New scattered nodular ground-glass opacities within right upper lobe may represent pneumonitis. Recommend attention on follow-up. Approximately 3.5 x 2.4 cm heterogeneous density nodule posterior to the left ischium (series 2 image 173) is unchanged.   6/23/23:  Patient presents for follow up accompanied with wife Started Durvalumab on 6/6/23, next tx scheduled 7/5/23 Admits Fatigue Reports SOB on exertion, appears to wife may be a little worse in th past couple weeks. Denies fever, cough, chest pain.  Denies fever/chills,,rash, , nausea, vomiting, diarrhea,constipation,  abdominal pain, bleeding, easy bruising or visual changes, chest pain, cough,  neuropathy, LE edema.  7/19/23; Patient presented to treatment room accompanied with wife Reporting b/l leg swelling for the past 2 weeks, right leg worsen than left  Denies pain  SOB on exertion, somewhat stable appears to worsen with humidity Admits fatigue Denies fever/chills,,rash, , nausea, vomiting, diarrhea,constipation,  abdominal pain, bleeding, easy bruising or visual changes, chest pain, cough,  neuropathy,  7/19/23 Venous Doppler B/L LE  IMPRESSION: Acute below the knee left peroneal vein thrombus   8/23/23:  Patient presented to treatment room accompanied with wife and son Started Durvalumab on 6/6/23, next tx scheduled 8/25/23 s/p mediport placement  Patient reports Decreased appetite and fatigue  patient recently started on metformin by PCP for elevated Hg A1C. Patient has not started medication yet  Patient with chronic constipation Patient with SOB on exertion, appears stable  Denies fever, chest pain, abd pain, n/v/d, new/worsening pain   7/31/23 MRI Left Hip   IMPRESSION: 1.  Near full-thickness retracted tear of the common origin of the hamstring tendon with associated fluid gap and edema, which corresponds to lesion noted on prior CT. 2.  Small fluid in the left hip joint space. 3.  Intermediate signal surrounding the left proximal femoral component at the level of the greater trochanter. Some of this may be postoperative in etiology. Osteolysis is included in the differential.

## 2023-08-25 ENCOUNTER — RESULT REVIEW (OUTPATIENT)
Age: 83
End: 2023-08-25

## 2023-08-25 ENCOUNTER — APPOINTMENT (OUTPATIENT)
Age: 83
End: 2023-08-25

## 2023-08-25 LAB
ALBUMIN SERPL ELPH-MCNC: 3.6 G/DL — SIGNIFICANT CHANGE UP (ref 3.3–5)
ALP SERPL-CCNC: 101 U/L — SIGNIFICANT CHANGE UP (ref 40–120)
ALT FLD-CCNC: 14 U/L — SIGNIFICANT CHANGE UP (ref 10–45)
ANION GAP SERPL CALC-SCNC: 14 MMOL/L — SIGNIFICANT CHANGE UP (ref 5–17)
AST SERPL-CCNC: 27 U/L — SIGNIFICANT CHANGE UP (ref 10–40)
BASOPHILS # BLD AUTO: 0.1 K/UL — SIGNIFICANT CHANGE UP (ref 0–0.2)
BASOPHILS NFR BLD AUTO: 1.1 % — SIGNIFICANT CHANGE UP (ref 0–2)
BILIRUB SERPL-MCNC: 0.3 MG/DL — SIGNIFICANT CHANGE UP (ref 0.2–1.2)
BUN SERPL-MCNC: 28 MG/DL — HIGH (ref 7–23)
CALCIUM SERPL-MCNC: 9.7 MG/DL — SIGNIFICANT CHANGE UP (ref 8.4–10.5)
CEA SERPL-MCNC: 1 NG/ML — SIGNIFICANT CHANGE UP (ref 0–3.8)
CHLORIDE SERPL-SCNC: 104 MMOL/L — SIGNIFICANT CHANGE UP (ref 96–108)
CO2 SERPL-SCNC: 21 MMOL/L — LOW (ref 22–31)
CREAT SERPL-MCNC: 0.99 MG/DL — SIGNIFICANT CHANGE UP (ref 0.5–1.3)
EGFR: 77 ML/MIN/1.73M2 — SIGNIFICANT CHANGE UP
EOSINOPHIL # BLD AUTO: 0.1 K/UL — SIGNIFICANT CHANGE UP (ref 0–0.5)
EOSINOPHIL NFR BLD AUTO: 1.4 % — SIGNIFICANT CHANGE UP (ref 0–6)
GLUCOSE SERPL-MCNC: 120 MG/DL — HIGH (ref 70–99)
HCT VFR BLD CALC: 31 % — LOW (ref 39–50)
HGB BLD-MCNC: 10.2 G/DL — LOW (ref 13–17)
LYMPHOCYTES # BLD AUTO: 0.5 K/UL — LOW (ref 1–3.3)
LYMPHOCYTES # BLD AUTO: 6.2 % — LOW (ref 13–44)
MCHC RBC-ENTMCNC: 27.4 PG — SIGNIFICANT CHANGE UP (ref 27–34)
MCHC RBC-ENTMCNC: 32.8 G/DL — SIGNIFICANT CHANGE UP (ref 32–36)
MCV RBC AUTO: 83.5 FL — SIGNIFICANT CHANGE UP (ref 80–100)
MONOCYTES # BLD AUTO: 0.6 K/UL — SIGNIFICANT CHANGE UP (ref 0–0.9)
MONOCYTES NFR BLD AUTO: 7.9 % — SIGNIFICANT CHANGE UP (ref 2–14)
NEUTROPHILS # BLD AUTO: 6.3 K/UL — SIGNIFICANT CHANGE UP (ref 1.8–7.4)
NEUTROPHILS NFR BLD AUTO: 83.4 % — HIGH (ref 43–77)
PLATELET # BLD AUTO: 266 K/UL — SIGNIFICANT CHANGE UP (ref 150–400)
POTASSIUM SERPL-MCNC: 3.7 MMOL/L — SIGNIFICANT CHANGE UP (ref 3.5–5.3)
POTASSIUM SERPL-SCNC: 3.7 MMOL/L — SIGNIFICANT CHANGE UP (ref 3.5–5.3)
PROT SERPL-MCNC: 6.7 G/DL — SIGNIFICANT CHANGE UP (ref 6–8.3)
RBC # BLD: 3.72 M/UL — LOW (ref 4.2–5.8)
RBC # FLD: 14.6 % — HIGH (ref 10.3–14.5)
SODIUM SERPL-SCNC: 139 MMOL/L — SIGNIFICANT CHANGE UP (ref 135–145)
T4 FREE+ TSH PNL SERPL: 1.51 UIU/ML — SIGNIFICANT CHANGE UP (ref 0.27–4.2)
WBC # BLD: 7.6 K/UL — SIGNIFICANT CHANGE UP (ref 3.8–10.5)
WBC # FLD AUTO: 7.6 K/UL — SIGNIFICANT CHANGE UP (ref 3.8–10.5)

## 2023-09-08 ENCOUNTER — RESULT REVIEW (OUTPATIENT)
Age: 83
End: 2023-09-08

## 2023-09-08 ENCOUNTER — APPOINTMENT (OUTPATIENT)
Age: 83
End: 2023-09-08

## 2023-09-08 LAB
ALBUMIN SERPL ELPH-MCNC: 3.6 G/DL — SIGNIFICANT CHANGE UP (ref 3.3–5)
ALP SERPL-CCNC: 106 U/L — SIGNIFICANT CHANGE UP (ref 40–120)
ALT FLD-CCNC: 17 U/L — SIGNIFICANT CHANGE UP (ref 10–45)
ANION GAP SERPL CALC-SCNC: 12 MMOL/L — SIGNIFICANT CHANGE UP (ref 5–17)
AST SERPL-CCNC: 27 U/L — SIGNIFICANT CHANGE UP (ref 10–40)
BASOPHILS # BLD AUTO: 0 K/UL — SIGNIFICANT CHANGE UP (ref 0–0.2)
BASOPHILS NFR BLD AUTO: 0.6 % — SIGNIFICANT CHANGE UP (ref 0–2)
BILIRUB SERPL-MCNC: 0.2 MG/DL — SIGNIFICANT CHANGE UP (ref 0.2–1.2)
BUN SERPL-MCNC: 21 MG/DL — SIGNIFICANT CHANGE UP (ref 7–23)
CALCIUM SERPL-MCNC: 9.6 MG/DL — SIGNIFICANT CHANGE UP (ref 8.4–10.5)
CEA SERPL-MCNC: 0.9 NG/ML — SIGNIFICANT CHANGE UP (ref 0–3.8)
CHLORIDE SERPL-SCNC: 104 MMOL/L — SIGNIFICANT CHANGE UP (ref 96–108)
CO2 SERPL-SCNC: 22 MMOL/L — SIGNIFICANT CHANGE UP (ref 22–31)
CREAT SERPL-MCNC: 0.96 MG/DL — SIGNIFICANT CHANGE UP (ref 0.5–1.3)
EGFR: 79 ML/MIN/1.73M2 — SIGNIFICANT CHANGE UP
EOSINOPHIL # BLD AUTO: 0.2 K/UL — SIGNIFICANT CHANGE UP (ref 0–0.5)
EOSINOPHIL NFR BLD AUTO: 3.3 % — SIGNIFICANT CHANGE UP (ref 0–6)
GLUCOSE SERPL-MCNC: 97 MG/DL — SIGNIFICANT CHANGE UP (ref 70–99)
HCT VFR BLD CALC: 29.6 % — LOW (ref 39–50)
HGB BLD-MCNC: 9.9 G/DL — LOW (ref 13–17)
LYMPHOCYTES # BLD AUTO: 0.6 K/UL — LOW (ref 1–3.3)
LYMPHOCYTES # BLD AUTO: 7.4 % — LOW (ref 13–44)
MCHC RBC-ENTMCNC: 27.3 PG — SIGNIFICANT CHANGE UP (ref 27–34)
MCHC RBC-ENTMCNC: 33.4 G/DL — SIGNIFICANT CHANGE UP (ref 32–36)
MCV RBC AUTO: 81.8 FL — SIGNIFICANT CHANGE UP (ref 80–100)
MONOCYTES # BLD AUTO: 0.8 K/UL — SIGNIFICANT CHANGE UP (ref 0–0.9)
MONOCYTES NFR BLD AUTO: 11.1 % — SIGNIFICANT CHANGE UP (ref 2–14)
NEUTROPHILS # BLD AUTO: 5.8 K/UL — SIGNIFICANT CHANGE UP (ref 1.8–7.4)
NEUTROPHILS NFR BLD AUTO: 77.6 % — HIGH (ref 43–77)
PLATELET # BLD AUTO: 217 K/UL — SIGNIFICANT CHANGE UP (ref 150–400)
POTASSIUM SERPL-MCNC: 4 MMOL/L — SIGNIFICANT CHANGE UP (ref 3.5–5.3)
POTASSIUM SERPL-SCNC: 4 MMOL/L — SIGNIFICANT CHANGE UP (ref 3.5–5.3)
PROT SERPL-MCNC: 6.8 G/DL — SIGNIFICANT CHANGE UP (ref 6–8.3)
RBC # BLD: 3.61 M/UL — LOW (ref 4.2–5.8)
RBC # FLD: 16.2 % — HIGH (ref 10.3–14.5)
SODIUM SERPL-SCNC: 138 MMOL/L — SIGNIFICANT CHANGE UP (ref 135–145)
T4 FREE+ TSH PNL SERPL: 1.62 UIU/ML — SIGNIFICANT CHANGE UP (ref 0.27–4.2)
WBC # BLD: 7.5 K/UL — SIGNIFICANT CHANGE UP (ref 3.8–10.5)
WBC # FLD AUTO: 7.5 K/UL — SIGNIFICANT CHANGE UP (ref 3.8–10.5)

## 2023-09-11 ENCOUNTER — APPOINTMENT (OUTPATIENT)
Dept: CT IMAGING | Facility: CLINIC | Age: 83
End: 2023-09-11
Payer: MEDICARE

## 2023-09-11 ENCOUNTER — OUTPATIENT (OUTPATIENT)
Dept: OUTPATIENT SERVICES | Facility: HOSPITAL | Age: 83
LOS: 1 days | End: 2023-09-11
Payer: MEDICARE

## 2023-09-11 DIAGNOSIS — Z96.641 PRESENCE OF RIGHT ARTIFICIAL HIP JOINT: Chronic | ICD-10-CM

## 2023-09-11 DIAGNOSIS — C34.90 MALIGNANT NEOPLASM OF UNSPECIFIED PART OF UNSPECIFIED BRONCHUS OR LUNG: ICD-10-CM

## 2023-09-11 DIAGNOSIS — Z96.642 PRESENCE OF LEFT ARTIFICIAL HIP JOINT: Chronic | ICD-10-CM

## 2023-09-11 PROCEDURE — 74177 CT ABD & PELVIS W/CONTRAST: CPT

## 2023-09-11 PROCEDURE — 74177 CT ABD & PELVIS W/CONTRAST: CPT | Mod: 26,MH

## 2023-09-11 PROCEDURE — 71260 CT THORAX DX C+: CPT | Mod: 26,MH

## 2023-09-11 PROCEDURE — 71260 CT THORAX DX C+: CPT

## 2023-09-13 ENCOUNTER — APPOINTMENT (OUTPATIENT)
Dept: HEMATOLOGY ONCOLOGY | Facility: CLINIC | Age: 83
End: 2023-09-13
Payer: MEDICARE

## 2023-09-13 VITALS
DIASTOLIC BLOOD PRESSURE: 63 MMHG | WEIGHT: 182.04 LBS | OXYGEN SATURATION: 95 % | SYSTOLIC BLOOD PRESSURE: 118 MMHG | HEART RATE: 67 BPM | HEIGHT: 70 IN | BODY MASS INDEX: 26.06 KG/M2

## 2023-09-13 PROCEDURE — 99215 OFFICE O/P EST HI 40 MIN: CPT

## 2023-09-20 ENCOUNTER — RESULT REVIEW (OUTPATIENT)
Age: 83
End: 2023-09-20

## 2023-09-20 ENCOUNTER — APPOINTMENT (OUTPATIENT)
Age: 83
End: 2023-09-20

## 2023-09-20 LAB
ALBUMIN SERPL ELPH-MCNC: 3.9 G/DL — SIGNIFICANT CHANGE UP (ref 3.3–5)
ALP SERPL-CCNC: 99 U/L — SIGNIFICANT CHANGE UP (ref 40–120)
ALT FLD-CCNC: 14 U/L — SIGNIFICANT CHANGE UP (ref 10–45)
ANION GAP SERPL CALC-SCNC: 12 MMOL/L — SIGNIFICANT CHANGE UP (ref 5–17)
AST SERPL-CCNC: 24 U/L — SIGNIFICANT CHANGE UP (ref 10–40)
BASOPHILS # BLD AUTO: 0.1 K/UL — SIGNIFICANT CHANGE UP (ref 0–0.2)
BASOPHILS NFR BLD AUTO: 1.3 % — SIGNIFICANT CHANGE UP (ref 0–2)
BILIRUB SERPL-MCNC: 0.2 MG/DL — SIGNIFICANT CHANGE UP (ref 0.2–1.2)
BUN SERPL-MCNC: 22 MG/DL — SIGNIFICANT CHANGE UP (ref 7–23)
CALCIUM SERPL-MCNC: 9.9 MG/DL — SIGNIFICANT CHANGE UP (ref 8.4–10.5)
CEA SERPL-MCNC: 1 NG/ML — SIGNIFICANT CHANGE UP (ref 0–3.8)
CHLORIDE SERPL-SCNC: 104 MMOL/L — SIGNIFICANT CHANGE UP (ref 96–108)
CO2 SERPL-SCNC: 23 MMOL/L — SIGNIFICANT CHANGE UP (ref 22–31)
CREAT SERPL-MCNC: 0.91 MG/DL — SIGNIFICANT CHANGE UP (ref 0.5–1.3)
EGFR: 85 ML/MIN/1.73M2 — SIGNIFICANT CHANGE UP
EOSINOPHIL # BLD AUTO: 0.3 K/UL — SIGNIFICANT CHANGE UP (ref 0–0.5)
EOSINOPHIL NFR BLD AUTO: 4.1 % — SIGNIFICANT CHANGE UP (ref 0–6)
GLUCOSE SERPL-MCNC: 84 MG/DL — SIGNIFICANT CHANGE UP (ref 70–99)
HCT VFR BLD CALC: 31.6 % — LOW (ref 39–50)
HGB BLD-MCNC: 9.8 G/DL — LOW (ref 13–17)
LYMPHOCYTES # BLD AUTO: 0.6 K/UL — LOW (ref 1–3.3)
LYMPHOCYTES # BLD AUTO: 9.2 % — LOW (ref 13–44)
MCHC RBC-ENTMCNC: 27.4 PG — SIGNIFICANT CHANGE UP (ref 27–34)
MCHC RBC-ENTMCNC: 31.1 G/DL — LOW (ref 32–36)
MCV RBC AUTO: 88.3 FL — SIGNIFICANT CHANGE UP (ref 80–100)
MONOCYTES # BLD AUTO: 0.9 K/UL — SIGNIFICANT CHANGE UP (ref 0–0.9)
MONOCYTES NFR BLD AUTO: 12.6 % — SIGNIFICANT CHANGE UP (ref 2–14)
NEUTROPHILS # BLD AUTO: 5 K/UL — SIGNIFICANT CHANGE UP (ref 1.8–7.4)
NEUTROPHILS NFR BLD AUTO: 72.8 % — SIGNIFICANT CHANGE UP (ref 43–77)
PLATELET # BLD AUTO: 241 K/UL — SIGNIFICANT CHANGE UP (ref 150–400)
POTASSIUM SERPL-MCNC: 3.9 MMOL/L — SIGNIFICANT CHANGE UP (ref 3.5–5.3)
POTASSIUM SERPL-SCNC: 3.9 MMOL/L — SIGNIFICANT CHANGE UP (ref 3.5–5.3)
PROT SERPL-MCNC: 6.8 G/DL — SIGNIFICANT CHANGE UP (ref 6–8.3)
RBC # BLD: 3.58 M/UL — LOW (ref 4.2–5.8)
RBC # FLD: 17.3 % — HIGH (ref 10.3–14.5)
SODIUM SERPL-SCNC: 138 MMOL/L — SIGNIFICANT CHANGE UP (ref 135–145)
T4 FREE+ TSH PNL SERPL: 4.89 UIU/ML — HIGH (ref 0.27–4.2)
WBC # BLD: 6.9 K/UL — SIGNIFICANT CHANGE UP (ref 3.8–10.5)
WBC # FLD AUTO: 6.9 K/UL — SIGNIFICANT CHANGE UP (ref 3.8–10.5)

## 2023-09-21 LAB — T4 FREE SERPL-MCNC: 1 NG/DL — SIGNIFICANT CHANGE UP (ref 0.9–1.8)

## 2023-09-27 ENCOUNTER — OUTPATIENT (OUTPATIENT)
Dept: OUTPATIENT SERVICES | Facility: HOSPITAL | Age: 83
LOS: 1 days | Discharge: HOME | End: 2023-09-27

## 2023-09-27 DIAGNOSIS — Z96.642 PRESENCE OF LEFT ARTIFICIAL HIP JOINT: Chronic | ICD-10-CM

## 2023-09-27 DIAGNOSIS — Z96.641 PRESENCE OF RIGHT ARTIFICIAL HIP JOINT: Chronic | ICD-10-CM

## 2023-09-27 DIAGNOSIS — C34.90 MALIGNANT NEOPLASM OF UNSPECIFIED PART OF UNSPECIFIED BRONCHUS OR LUNG: ICD-10-CM

## 2023-09-28 ENCOUNTER — APPOINTMENT (OUTPATIENT)
Dept: RADIATION ONCOLOGY | Facility: CLINIC | Age: 83
End: 2023-09-28
Payer: MEDICARE

## 2023-09-28 VITALS
RESPIRATION RATE: 16 BRPM | DIASTOLIC BLOOD PRESSURE: 69 MMHG | WEIGHT: 182.6 LBS | BODY MASS INDEX: 26.2 KG/M2 | HEART RATE: 68 BPM | OXYGEN SATURATION: 99 % | SYSTOLIC BLOOD PRESSURE: 169 MMHG

## 2023-09-28 PROCEDURE — 99213 OFFICE O/P EST LOW 20 MIN: CPT

## 2023-10-04 ENCOUNTER — RESULT REVIEW (OUTPATIENT)
Age: 83
End: 2023-10-04

## 2023-10-04 ENCOUNTER — APPOINTMENT (OUTPATIENT)
Age: 83
End: 2023-10-04

## 2023-10-04 LAB
ALBUMIN SERPL ELPH-MCNC: 3.6 G/DL — SIGNIFICANT CHANGE UP (ref 3.3–5)
ALP SERPL-CCNC: 101 U/L — SIGNIFICANT CHANGE UP (ref 40–120)
ALT FLD-CCNC: 11 U/L — SIGNIFICANT CHANGE UP (ref 10–45)
ANION GAP SERPL CALC-SCNC: 12 MMOL/L — SIGNIFICANT CHANGE UP (ref 5–17)
AST SERPL-CCNC: 20 U/L — SIGNIFICANT CHANGE UP (ref 10–40)
BASOPHILS # BLD AUTO: 0.1 K/UL — SIGNIFICANT CHANGE UP (ref 0–0.2)
BASOPHILS NFR BLD AUTO: 1.4 % — SIGNIFICANT CHANGE UP (ref 0–2)
BILIRUB SERPL-MCNC: 0.3 MG/DL — SIGNIFICANT CHANGE UP (ref 0.2–1.2)
BUN SERPL-MCNC: 23 MG/DL — SIGNIFICANT CHANGE UP (ref 7–23)
CALCIUM SERPL-MCNC: 9.4 MG/DL — SIGNIFICANT CHANGE UP (ref 8.4–10.5)
CHLORIDE SERPL-SCNC: 105 MMOL/L — SIGNIFICANT CHANGE UP (ref 96–108)
CO2 SERPL-SCNC: 21 MMOL/L — LOW (ref 22–31)
CREAT SERPL-MCNC: 0.88 MG/DL — SIGNIFICANT CHANGE UP (ref 0.5–1.3)
EGFR: 86 ML/MIN/1.73M2 — SIGNIFICANT CHANGE UP
EOSINOPHIL # BLD AUTO: 0.3 K/UL — SIGNIFICANT CHANGE UP (ref 0–0.5)
EOSINOPHIL NFR BLD AUTO: 3.8 % — SIGNIFICANT CHANGE UP (ref 0–6)
GLUCOSE SERPL-MCNC: 93 MG/DL — SIGNIFICANT CHANGE UP (ref 70–99)
HCT VFR BLD CALC: 30.2 % — LOW (ref 39–50)
HGB BLD-MCNC: 9.7 G/DL — LOW (ref 13–17)
LYMPHOCYTES # BLD AUTO: 0.7 K/UL — LOW (ref 1–3.3)
LYMPHOCYTES # BLD AUTO: 9 % — LOW (ref 13–44)
MCHC RBC-ENTMCNC: 27.7 PG — SIGNIFICANT CHANGE UP (ref 27–34)
MCHC RBC-ENTMCNC: 32 G/DL — SIGNIFICANT CHANGE UP (ref 32–36)
MCV RBC AUTO: 86.6 FL — SIGNIFICANT CHANGE UP (ref 80–100)
MONOCYTES # BLD AUTO: 0.9 K/UL — SIGNIFICANT CHANGE UP (ref 0–0.9)
MONOCYTES NFR BLD AUTO: 12.2 % — SIGNIFICANT CHANGE UP (ref 2–14)
NEUTROPHILS # BLD AUTO: 5.5 K/UL — SIGNIFICANT CHANGE UP (ref 1.8–7.4)
NEUTROPHILS NFR BLD AUTO: 73.6 % — SIGNIFICANT CHANGE UP (ref 43–77)
PLATELET # BLD AUTO: 242 K/UL — SIGNIFICANT CHANGE UP (ref 150–400)
POTASSIUM SERPL-MCNC: 4 MMOL/L — SIGNIFICANT CHANGE UP (ref 3.5–5.3)
POTASSIUM SERPL-SCNC: 4 MMOL/L — SIGNIFICANT CHANGE UP (ref 3.5–5.3)
PROT SERPL-MCNC: 6.7 G/DL — SIGNIFICANT CHANGE UP (ref 6–8.3)
RBC # BLD: 3.49 M/UL — LOW (ref 4.2–5.8)
RBC # FLD: 17.2 % — HIGH (ref 10.3–14.5)
SODIUM SERPL-SCNC: 138 MMOL/L — SIGNIFICANT CHANGE UP (ref 135–145)
T4 FREE+ TSH PNL SERPL: 1.55 UIU/ML — SIGNIFICANT CHANGE UP (ref 0.27–4.2)
WBC # BLD: 7.4 K/UL — SIGNIFICANT CHANGE UP (ref 3.8–10.5)
WBC # FLD AUTO: 7.4 K/UL — SIGNIFICANT CHANGE UP (ref 3.8–10.5)

## 2023-10-05 DIAGNOSIS — Z51.11 ENCOUNTER FOR ANTINEOPLASTIC CHEMOTHERAPY: ICD-10-CM

## 2023-10-05 LAB — CEA SERPL-MCNC: 1.1 NG/ML — SIGNIFICANT CHANGE UP (ref 0–3.8)

## 2023-10-10 ENCOUNTER — APPOINTMENT (OUTPATIENT)
Dept: HEMATOLOGY ONCOLOGY | Facility: CLINIC | Age: 83
End: 2023-10-10
Payer: MEDICARE

## 2023-10-10 VITALS
HEART RATE: 80 BPM | OXYGEN SATURATION: 94 % | SYSTOLIC BLOOD PRESSURE: 90 MMHG | HEIGHT: 70 IN | BODY MASS INDEX: 25.86 KG/M2 | WEIGHT: 180.67 LBS | TEMPERATURE: 98.3 F | DIASTOLIC BLOOD PRESSURE: 53 MMHG

## 2023-10-10 PROCEDURE — 99214 OFFICE O/P EST MOD 30 MIN: CPT

## 2023-10-18 ENCOUNTER — RESULT REVIEW (OUTPATIENT)
Age: 83
End: 2023-10-18

## 2023-10-18 ENCOUNTER — APPOINTMENT (OUTPATIENT)
Age: 83
End: 2023-10-18

## 2023-10-18 LAB
ALBUMIN SERPL ELPH-MCNC: 3.3 G/DL — SIGNIFICANT CHANGE UP (ref 3.3–5)
ALBUMIN SERPL ELPH-MCNC: 3.3 G/DL — SIGNIFICANT CHANGE UP (ref 3.3–5)
ALP SERPL-CCNC: 94 U/L — SIGNIFICANT CHANGE UP (ref 40–120)
ALP SERPL-CCNC: 94 U/L — SIGNIFICANT CHANGE UP (ref 40–120)
ALT FLD-CCNC: 34 U/L — SIGNIFICANT CHANGE UP (ref 10–45)
ALT FLD-CCNC: 34 U/L — SIGNIFICANT CHANGE UP (ref 10–45)
ANION GAP SERPL CALC-SCNC: 13 MMOL/L — SIGNIFICANT CHANGE UP (ref 5–17)
ANION GAP SERPL CALC-SCNC: 13 MMOL/L — SIGNIFICANT CHANGE UP (ref 5–17)
AST SERPL-CCNC: 31 U/L — SIGNIFICANT CHANGE UP (ref 10–40)
AST SERPL-CCNC: 31 U/L — SIGNIFICANT CHANGE UP (ref 10–40)
BASOPHILS # BLD AUTO: 0.1 K/UL — SIGNIFICANT CHANGE UP (ref 0–0.2)
BASOPHILS # BLD AUTO: 0.1 K/UL — SIGNIFICANT CHANGE UP (ref 0–0.2)
BASOPHILS NFR BLD AUTO: 0.9 % — SIGNIFICANT CHANGE UP (ref 0–2)
BASOPHILS NFR BLD AUTO: 0.9 % — SIGNIFICANT CHANGE UP (ref 0–2)
BILIRUB SERPL-MCNC: 0.4 MG/DL — SIGNIFICANT CHANGE UP (ref 0.2–1.2)
BILIRUB SERPL-MCNC: 0.4 MG/DL — SIGNIFICANT CHANGE UP (ref 0.2–1.2)
BUN SERPL-MCNC: 23 MG/DL — SIGNIFICANT CHANGE UP (ref 7–23)
BUN SERPL-MCNC: 23 MG/DL — SIGNIFICANT CHANGE UP (ref 7–23)
CALCIUM SERPL-MCNC: 9.6 MG/DL — SIGNIFICANT CHANGE UP (ref 8.4–10.5)
CALCIUM SERPL-MCNC: 9.6 MG/DL — SIGNIFICANT CHANGE UP (ref 8.4–10.5)
CEA SERPL-MCNC: 0.8 NG/ML — SIGNIFICANT CHANGE UP (ref 0–3.8)
CEA SERPL-MCNC: 0.8 NG/ML — SIGNIFICANT CHANGE UP (ref 0–3.8)
CHLORIDE SERPL-SCNC: 104 MMOL/L — SIGNIFICANT CHANGE UP (ref 96–108)
CHLORIDE SERPL-SCNC: 104 MMOL/L — SIGNIFICANT CHANGE UP (ref 96–108)
CO2 SERPL-SCNC: 25 MMOL/L — SIGNIFICANT CHANGE UP (ref 22–31)
CO2 SERPL-SCNC: 25 MMOL/L — SIGNIFICANT CHANGE UP (ref 22–31)
CREAT SERPL-MCNC: 0.91 MG/DL — SIGNIFICANT CHANGE UP (ref 0.5–1.3)
CREAT SERPL-MCNC: 0.91 MG/DL — SIGNIFICANT CHANGE UP (ref 0.5–1.3)
EGFR: 85 ML/MIN/1.73M2 — SIGNIFICANT CHANGE UP
EGFR: 85 ML/MIN/1.73M2 — SIGNIFICANT CHANGE UP
EOSINOPHIL # BLD AUTO: 0.2 K/UL — SIGNIFICANT CHANGE UP (ref 0–0.5)
EOSINOPHIL # BLD AUTO: 0.2 K/UL — SIGNIFICANT CHANGE UP (ref 0–0.5)
EOSINOPHIL NFR BLD AUTO: 2 % — SIGNIFICANT CHANGE UP (ref 0–6)
EOSINOPHIL NFR BLD AUTO: 2 % — SIGNIFICANT CHANGE UP (ref 0–6)
GLUCOSE SERPL-MCNC: 104 MG/DL — HIGH (ref 70–99)
GLUCOSE SERPL-MCNC: 104 MG/DL — HIGH (ref 70–99)
HCT VFR BLD CALC: 29.8 % — LOW (ref 39–50)
HCT VFR BLD CALC: 29.8 % — LOW (ref 39–50)
HGB BLD-MCNC: 9.7 G/DL — LOW (ref 13–17)
HGB BLD-MCNC: 9.7 G/DL — LOW (ref 13–17)
LYMPHOCYTES # BLD AUTO: 0.5 K/UL — LOW (ref 1–3.3)
LYMPHOCYTES # BLD AUTO: 0.5 K/UL — LOW (ref 1–3.3)
LYMPHOCYTES # BLD AUTO: 4.7 % — LOW (ref 13–44)
LYMPHOCYTES # BLD AUTO: 4.7 % — LOW (ref 13–44)
MCHC RBC-ENTMCNC: 27.3 PG — SIGNIFICANT CHANGE UP (ref 27–34)
MCHC RBC-ENTMCNC: 27.3 PG — SIGNIFICANT CHANGE UP (ref 27–34)
MCHC RBC-ENTMCNC: 32.5 G/DL — SIGNIFICANT CHANGE UP (ref 32–36)
MCHC RBC-ENTMCNC: 32.5 G/DL — SIGNIFICANT CHANGE UP (ref 32–36)
MCV RBC AUTO: 84.1 FL — SIGNIFICANT CHANGE UP (ref 80–100)
MCV RBC AUTO: 84.1 FL — SIGNIFICANT CHANGE UP (ref 80–100)
MONOCYTES # BLD AUTO: 0.9 K/UL — SIGNIFICANT CHANGE UP (ref 0–0.9)
MONOCYTES # BLD AUTO: 0.9 K/UL — SIGNIFICANT CHANGE UP (ref 0–0.9)
MONOCYTES NFR BLD AUTO: 9.1 % — SIGNIFICANT CHANGE UP (ref 2–14)
MONOCYTES NFR BLD AUTO: 9.1 % — SIGNIFICANT CHANGE UP (ref 2–14)
NEUTROPHILS # BLD AUTO: 8.6 K/UL — HIGH (ref 1.8–7.4)
NEUTROPHILS # BLD AUTO: 8.6 K/UL — HIGH (ref 1.8–7.4)
NEUTROPHILS NFR BLD AUTO: 83.4 % — HIGH (ref 43–77)
NEUTROPHILS NFR BLD AUTO: 83.4 % — HIGH (ref 43–77)
PLATELET # BLD AUTO: 342 K/UL — SIGNIFICANT CHANGE UP (ref 150–400)
PLATELET # BLD AUTO: 342 K/UL — SIGNIFICANT CHANGE UP (ref 150–400)
POTASSIUM SERPL-MCNC: 3.9 MMOL/L — SIGNIFICANT CHANGE UP (ref 3.5–5.3)
POTASSIUM SERPL-MCNC: 3.9 MMOL/L — SIGNIFICANT CHANGE UP (ref 3.5–5.3)
POTASSIUM SERPL-SCNC: 3.9 MMOL/L — SIGNIFICANT CHANGE UP (ref 3.5–5.3)
POTASSIUM SERPL-SCNC: 3.9 MMOL/L — SIGNIFICANT CHANGE UP (ref 3.5–5.3)
PROT SERPL-MCNC: 6.8 G/DL — SIGNIFICANT CHANGE UP (ref 6–8.3)
PROT SERPL-MCNC: 6.8 G/DL — SIGNIFICANT CHANGE UP (ref 6–8.3)
RBC # BLD: 3.55 M/UL — LOW (ref 4.2–5.8)
RBC # BLD: 3.55 M/UL — LOW (ref 4.2–5.8)
RBC # FLD: 16.5 % — HIGH (ref 10.3–14.5)
RBC # FLD: 16.5 % — HIGH (ref 10.3–14.5)
SODIUM SERPL-SCNC: 141 MMOL/L — SIGNIFICANT CHANGE UP (ref 135–145)
SODIUM SERPL-SCNC: 141 MMOL/L — SIGNIFICANT CHANGE UP (ref 135–145)
T4 FREE+ TSH PNL SERPL: 1.44 UIU/ML — SIGNIFICANT CHANGE UP (ref 0.27–4.2)
T4 FREE+ TSH PNL SERPL: 1.44 UIU/ML — SIGNIFICANT CHANGE UP (ref 0.27–4.2)
WBC # BLD: 10.4 K/UL — SIGNIFICANT CHANGE UP (ref 3.8–10.5)
WBC # BLD: 10.4 K/UL — SIGNIFICANT CHANGE UP (ref 3.8–10.5)
WBC # FLD AUTO: 10.4 K/UL — SIGNIFICANT CHANGE UP (ref 3.8–10.5)
WBC # FLD AUTO: 10.4 K/UL — SIGNIFICANT CHANGE UP (ref 3.8–10.5)

## 2023-10-19 ENCOUNTER — APPOINTMENT (OUTPATIENT)
Dept: DERMATOLOGY | Facility: CLINIC | Age: 83
End: 2023-10-19
Payer: MEDICARE

## 2023-10-19 PROCEDURE — 99213 OFFICE O/P EST LOW 20 MIN: CPT

## 2023-10-20 ENCOUNTER — APPOINTMENT (OUTPATIENT)
Dept: PHYSICAL MEDICINE AND REHAB | Facility: CLINIC | Age: 83
End: 2023-10-20
Payer: MEDICARE

## 2023-10-20 VITALS — OXYGEN SATURATION: 99 % | SYSTOLIC BLOOD PRESSURE: 130 MMHG | HEART RATE: 70 BPM | DIASTOLIC BLOOD PRESSURE: 73 MMHG

## 2023-10-20 PROCEDURE — 99204 OFFICE O/P NEW MOD 45 MIN: CPT

## 2023-11-01 ENCOUNTER — APPOINTMENT (OUTPATIENT)
Age: 83
End: 2023-11-01

## 2023-11-01 ENCOUNTER — RESULT REVIEW (OUTPATIENT)
Age: 83
End: 2023-11-01

## 2023-11-01 LAB
ALBUMIN SERPL ELPH-MCNC: 3.5 G/DL — SIGNIFICANT CHANGE UP (ref 3.3–5)
ALBUMIN SERPL ELPH-MCNC: 3.5 G/DL — SIGNIFICANT CHANGE UP (ref 3.3–5)
ALP SERPL-CCNC: 95 U/L — SIGNIFICANT CHANGE UP (ref 40–120)
ALP SERPL-CCNC: 95 U/L — SIGNIFICANT CHANGE UP (ref 40–120)
ALT FLD-CCNC: 13 U/L — SIGNIFICANT CHANGE UP (ref 10–45)
ALT FLD-CCNC: 13 U/L — SIGNIFICANT CHANGE UP (ref 10–45)
ANION GAP SERPL CALC-SCNC: 13 MMOL/L — SIGNIFICANT CHANGE UP (ref 5–17)
ANION GAP SERPL CALC-SCNC: 13 MMOL/L — SIGNIFICANT CHANGE UP (ref 5–17)
AST SERPL-CCNC: 15 U/L — SIGNIFICANT CHANGE UP (ref 10–40)
AST SERPL-CCNC: 15 U/L — SIGNIFICANT CHANGE UP (ref 10–40)
BASOPHILS # BLD AUTO: 0.2 K/UL — SIGNIFICANT CHANGE UP (ref 0–0.2)
BASOPHILS # BLD AUTO: 0.2 K/UL — SIGNIFICANT CHANGE UP (ref 0–0.2)
BASOPHILS NFR BLD AUTO: 1.9 % — SIGNIFICANT CHANGE UP (ref 0–2)
BASOPHILS NFR BLD AUTO: 1.9 % — SIGNIFICANT CHANGE UP (ref 0–2)
BILIRUB SERPL-MCNC: 0.3 MG/DL — SIGNIFICANT CHANGE UP (ref 0.2–1.2)
BILIRUB SERPL-MCNC: 0.3 MG/DL — SIGNIFICANT CHANGE UP (ref 0.2–1.2)
BUN SERPL-MCNC: 31 MG/DL — HIGH (ref 7–23)
BUN SERPL-MCNC: 31 MG/DL — HIGH (ref 7–23)
CALCIUM SERPL-MCNC: 9.5 MG/DL — SIGNIFICANT CHANGE UP (ref 8.4–10.5)
CALCIUM SERPL-MCNC: 9.5 MG/DL — SIGNIFICANT CHANGE UP (ref 8.4–10.5)
CEA SERPL-MCNC: 1.2 NG/ML — SIGNIFICANT CHANGE UP (ref 0–3.8)
CEA SERPL-MCNC: 1.2 NG/ML — SIGNIFICANT CHANGE UP (ref 0–3.8)
CHLORIDE SERPL-SCNC: 103 MMOL/L — SIGNIFICANT CHANGE UP (ref 96–108)
CHLORIDE SERPL-SCNC: 103 MMOL/L — SIGNIFICANT CHANGE UP (ref 96–108)
CO2 SERPL-SCNC: 24 MMOL/L — SIGNIFICANT CHANGE UP (ref 22–31)
CO2 SERPL-SCNC: 24 MMOL/L — SIGNIFICANT CHANGE UP (ref 22–31)
CREAT SERPL-MCNC: 0.91 MG/DL — SIGNIFICANT CHANGE UP (ref 0.5–1.3)
CREAT SERPL-MCNC: 0.91 MG/DL — SIGNIFICANT CHANGE UP (ref 0.5–1.3)
EGFR: 85 ML/MIN/1.73M2 — SIGNIFICANT CHANGE UP
EGFR: 85 ML/MIN/1.73M2 — SIGNIFICANT CHANGE UP
EOSINOPHIL # BLD AUTO: 0.1 K/UL — SIGNIFICANT CHANGE UP (ref 0–0.5)
EOSINOPHIL # BLD AUTO: 0.1 K/UL — SIGNIFICANT CHANGE UP (ref 0–0.5)
EOSINOPHIL NFR BLD AUTO: 1.1 % — SIGNIFICANT CHANGE UP (ref 0–6)
EOSINOPHIL NFR BLD AUTO: 1.1 % — SIGNIFICANT CHANGE UP (ref 0–6)
GLUCOSE SERPL-MCNC: 101 MG/DL — HIGH (ref 70–99)
GLUCOSE SERPL-MCNC: 101 MG/DL — HIGH (ref 70–99)
HCT VFR BLD CALC: 30 % — LOW (ref 39–50)
HCT VFR BLD CALC: 30 % — LOW (ref 39–50)
HGB BLD-MCNC: 9.5 G/DL — LOW (ref 13–17)
HGB BLD-MCNC: 9.5 G/DL — LOW (ref 13–17)
LYMPHOCYTES # BLD AUTO: 0.5 K/UL — LOW (ref 1–3.3)
LYMPHOCYTES # BLD AUTO: 0.5 K/UL — LOW (ref 1–3.3)
LYMPHOCYTES # BLD AUTO: 5.7 % — LOW (ref 13–44)
LYMPHOCYTES # BLD AUTO: 5.7 % — LOW (ref 13–44)
MCHC RBC-ENTMCNC: 27.8 PG — SIGNIFICANT CHANGE UP (ref 27–34)
MCHC RBC-ENTMCNC: 27.8 PG — SIGNIFICANT CHANGE UP (ref 27–34)
MCHC RBC-ENTMCNC: 31.7 G/DL — LOW (ref 32–36)
MCHC RBC-ENTMCNC: 31.7 G/DL — LOW (ref 32–36)
MCV RBC AUTO: 87.6 FL — SIGNIFICANT CHANGE UP (ref 80–100)
MCV RBC AUTO: 87.6 FL — SIGNIFICANT CHANGE UP (ref 80–100)
MONOCYTES # BLD AUTO: 0.7 K/UL — SIGNIFICANT CHANGE UP (ref 0–0.9)
MONOCYTES # BLD AUTO: 0.7 K/UL — SIGNIFICANT CHANGE UP (ref 0–0.9)
MONOCYTES NFR BLD AUTO: 8 % — SIGNIFICANT CHANGE UP (ref 2–14)
MONOCYTES NFR BLD AUTO: 8 % — SIGNIFICANT CHANGE UP (ref 2–14)
NEUTROPHILS # BLD AUTO: 7.3 K/UL — SIGNIFICANT CHANGE UP (ref 1.8–7.4)
NEUTROPHILS # BLD AUTO: 7.3 K/UL — SIGNIFICANT CHANGE UP (ref 1.8–7.4)
NEUTROPHILS NFR BLD AUTO: 83.2 % — HIGH (ref 43–77)
NEUTROPHILS NFR BLD AUTO: 83.2 % — HIGH (ref 43–77)
PLATELET # BLD AUTO: 350 K/UL — SIGNIFICANT CHANGE UP (ref 150–400)
PLATELET # BLD AUTO: 350 K/UL — SIGNIFICANT CHANGE UP (ref 150–400)
POTASSIUM SERPL-MCNC: 3.9 MMOL/L — SIGNIFICANT CHANGE UP (ref 3.5–5.3)
POTASSIUM SERPL-MCNC: 3.9 MMOL/L — SIGNIFICANT CHANGE UP (ref 3.5–5.3)
POTASSIUM SERPL-SCNC: 3.9 MMOL/L — SIGNIFICANT CHANGE UP (ref 3.5–5.3)
POTASSIUM SERPL-SCNC: 3.9 MMOL/L — SIGNIFICANT CHANGE UP (ref 3.5–5.3)
PROT SERPL-MCNC: 7.1 G/DL — SIGNIFICANT CHANGE UP (ref 6–8.3)
PROT SERPL-MCNC: 7.1 G/DL — SIGNIFICANT CHANGE UP (ref 6–8.3)
RBC # BLD: 3.42 M/UL — LOW (ref 4.2–5.8)
RBC # BLD: 3.42 M/UL — LOW (ref 4.2–5.8)
RBC # FLD: 15.1 % — HIGH (ref 10.3–14.5)
RBC # FLD: 15.1 % — HIGH (ref 10.3–14.5)
SODIUM SERPL-SCNC: 140 MMOL/L — SIGNIFICANT CHANGE UP (ref 135–145)
SODIUM SERPL-SCNC: 140 MMOL/L — SIGNIFICANT CHANGE UP (ref 135–145)
T4 FREE+ TSH PNL SERPL: 1.56 UIU/ML — SIGNIFICANT CHANGE UP (ref 0.27–4.2)
T4 FREE+ TSH PNL SERPL: 1.56 UIU/ML — SIGNIFICANT CHANGE UP (ref 0.27–4.2)
WBC # BLD: 8.8 K/UL — SIGNIFICANT CHANGE UP (ref 3.8–10.5)
WBC # BLD: 8.8 K/UL — SIGNIFICANT CHANGE UP (ref 3.8–10.5)
WBC # FLD AUTO: 8.8 K/UL — SIGNIFICANT CHANGE UP (ref 3.8–10.5)
WBC # FLD AUTO: 8.8 K/UL — SIGNIFICANT CHANGE UP (ref 3.8–10.5)

## 2023-11-10 ENCOUNTER — RESULT REVIEW (OUTPATIENT)
Age: 83
End: 2023-11-10

## 2023-11-10 ENCOUNTER — APPOINTMENT (OUTPATIENT)
Dept: HEMATOLOGY ONCOLOGY | Facility: CLINIC | Age: 83
End: 2023-11-10
Payer: MEDICARE

## 2023-11-10 VITALS
HEIGHT: 70 IN | HEART RATE: 85 BPM | DIASTOLIC BLOOD PRESSURE: 61 MMHG | SYSTOLIC BLOOD PRESSURE: 110 MMHG | BODY MASS INDEX: 25.08 KG/M2 | OXYGEN SATURATION: 92 % | WEIGHT: 175.15 LBS

## 2023-11-10 PROCEDURE — 99214 OFFICE O/P EST MOD 30 MIN: CPT

## 2023-11-13 ENCOUNTER — APPOINTMENT (OUTPATIENT)
Dept: PHYSICAL MEDICINE AND REHAB | Facility: CLINIC | Age: 83
End: 2023-11-13
Payer: MEDICARE

## 2023-11-13 VITALS
HEIGHT: 70 IN | RESPIRATION RATE: 14 BRPM | WEIGHT: 180 LBS | DIASTOLIC BLOOD PRESSURE: 50 MMHG | BODY MASS INDEX: 25.77 KG/M2 | HEART RATE: 108 BPM | SYSTOLIC BLOOD PRESSURE: 90 MMHG

## 2023-11-13 DIAGNOSIS — R29.898 OTHER SYMPTOMS AND SIGNS INVOLVING THE MUSCULOSKELETAL SYSTEM: ICD-10-CM

## 2023-11-13 PROCEDURE — 99214 OFFICE O/P EST MOD 30 MIN: CPT

## 2023-11-14 RX ORDER — GABAPENTIN 100 MG/1
100 CAPSULE ORAL 3 TIMES DAILY
Qty: 90 | Refills: 1 | Status: ACTIVE | COMMUNITY
Start: 2023-11-13 | End: 1900-01-01

## 2023-11-15 ENCOUNTER — RESULT REVIEW (OUTPATIENT)
Age: 83
End: 2023-11-15

## 2023-11-15 ENCOUNTER — APPOINTMENT (OUTPATIENT)
Age: 83
End: 2023-11-15

## 2023-11-15 LAB
ALBUMIN SERPL ELPH-MCNC: 3.4 G/DL — SIGNIFICANT CHANGE UP (ref 3.3–5)
ALBUMIN SERPL ELPH-MCNC: 3.4 G/DL — SIGNIFICANT CHANGE UP (ref 3.3–5)
ALP SERPL-CCNC: 91 U/L — SIGNIFICANT CHANGE UP (ref 40–120)
ALP SERPL-CCNC: 91 U/L — SIGNIFICANT CHANGE UP (ref 40–120)
ALT FLD-CCNC: 14 U/L — SIGNIFICANT CHANGE UP (ref 10–45)
ALT FLD-CCNC: 14 U/L — SIGNIFICANT CHANGE UP (ref 10–45)
ANION GAP SERPL CALC-SCNC: 15 MMOL/L — SIGNIFICANT CHANGE UP (ref 5–17)
ANION GAP SERPL CALC-SCNC: 15 MMOL/L — SIGNIFICANT CHANGE UP (ref 5–17)
AST SERPL-CCNC: 16 U/L — SIGNIFICANT CHANGE UP (ref 10–40)
AST SERPL-CCNC: 16 U/L — SIGNIFICANT CHANGE UP (ref 10–40)
BASOPHILS # BLD AUTO: 0.1 K/UL — SIGNIFICANT CHANGE UP (ref 0–0.2)
BASOPHILS # BLD AUTO: 0.1 K/UL — SIGNIFICANT CHANGE UP (ref 0–0.2)
BASOPHILS NFR BLD AUTO: 1.3 % — SIGNIFICANT CHANGE UP (ref 0–2)
BASOPHILS NFR BLD AUTO: 1.3 % — SIGNIFICANT CHANGE UP (ref 0–2)
BILIRUB SERPL-MCNC: 0.3 MG/DL — SIGNIFICANT CHANGE UP (ref 0.2–1.2)
BILIRUB SERPL-MCNC: 0.3 MG/DL — SIGNIFICANT CHANGE UP (ref 0.2–1.2)
BUN SERPL-MCNC: 34 MG/DL — HIGH (ref 7–23)
BUN SERPL-MCNC: 34 MG/DL — HIGH (ref 7–23)
CALCIUM SERPL-MCNC: 9.4 MG/DL — SIGNIFICANT CHANGE UP (ref 8.4–10.5)
CALCIUM SERPL-MCNC: 9.4 MG/DL — SIGNIFICANT CHANGE UP (ref 8.4–10.5)
CEA SERPL-MCNC: 1.1 NG/ML — SIGNIFICANT CHANGE UP (ref 0–3.8)
CEA SERPL-MCNC: 1.1 NG/ML — SIGNIFICANT CHANGE UP (ref 0–3.8)
CHLORIDE SERPL-SCNC: 105 MMOL/L — SIGNIFICANT CHANGE UP (ref 96–108)
CHLORIDE SERPL-SCNC: 105 MMOL/L — SIGNIFICANT CHANGE UP (ref 96–108)
CO2 SERPL-SCNC: 23 MMOL/L — SIGNIFICANT CHANGE UP (ref 22–31)
CO2 SERPL-SCNC: 23 MMOL/L — SIGNIFICANT CHANGE UP (ref 22–31)
CREAT SERPL-MCNC: 1.03 MG/DL — SIGNIFICANT CHANGE UP (ref 0.5–1.3)
CREAT SERPL-MCNC: 1.03 MG/DL — SIGNIFICANT CHANGE UP (ref 0.5–1.3)
EGFR: 73 ML/MIN/1.73M2 — SIGNIFICANT CHANGE UP
EGFR: 73 ML/MIN/1.73M2 — SIGNIFICANT CHANGE UP
EOSINOPHIL # BLD AUTO: 0.1 K/UL — SIGNIFICANT CHANGE UP (ref 0–0.5)
EOSINOPHIL # BLD AUTO: 0.1 K/UL — SIGNIFICANT CHANGE UP (ref 0–0.5)
EOSINOPHIL NFR BLD AUTO: 1.2 % — SIGNIFICANT CHANGE UP (ref 0–6)
EOSINOPHIL NFR BLD AUTO: 1.2 % — SIGNIFICANT CHANGE UP (ref 0–6)
GLUCOSE SERPL-MCNC: 122 MG/DL — HIGH (ref 70–99)
GLUCOSE SERPL-MCNC: 122 MG/DL — HIGH (ref 70–99)
HCT VFR BLD CALC: 28.5 % — LOW (ref 39–50)
HCT VFR BLD CALC: 28.5 % — LOW (ref 39–50)
HGB BLD-MCNC: 8.8 G/DL — LOW (ref 13–17)
HGB BLD-MCNC: 8.8 G/DL — LOW (ref 13–17)
LYMPHOCYTES # BLD AUTO: 0.5 K/UL — LOW (ref 1–3.3)
LYMPHOCYTES # BLD AUTO: 0.5 K/UL — LOW (ref 1–3.3)
LYMPHOCYTES # BLD AUTO: 5.7 % — LOW (ref 13–44)
LYMPHOCYTES # BLD AUTO: 5.7 % — LOW (ref 13–44)
MCHC RBC-ENTMCNC: 26.9 PG — LOW (ref 27–34)
MCHC RBC-ENTMCNC: 26.9 PG — LOW (ref 27–34)
MCHC RBC-ENTMCNC: 30.8 G/DL — LOW (ref 32–36)
MCHC RBC-ENTMCNC: 30.8 G/DL — LOW (ref 32–36)
MCV RBC AUTO: 87.3 FL — SIGNIFICANT CHANGE UP (ref 80–100)
MCV RBC AUTO: 87.3 FL — SIGNIFICANT CHANGE UP (ref 80–100)
MONOCYTES # BLD AUTO: 0.8 K/UL — SIGNIFICANT CHANGE UP (ref 0–0.9)
MONOCYTES # BLD AUTO: 0.8 K/UL — SIGNIFICANT CHANGE UP (ref 0–0.9)
MONOCYTES NFR BLD AUTO: 8.9 % — SIGNIFICANT CHANGE UP (ref 2–14)
MONOCYTES NFR BLD AUTO: 8.9 % — SIGNIFICANT CHANGE UP (ref 2–14)
NEUTROPHILS # BLD AUTO: 7.2 K/UL — SIGNIFICANT CHANGE UP (ref 1.8–7.4)
NEUTROPHILS # BLD AUTO: 7.2 K/UL — SIGNIFICANT CHANGE UP (ref 1.8–7.4)
NEUTROPHILS NFR BLD AUTO: 82.8 % — HIGH (ref 43–77)
NEUTROPHILS NFR BLD AUTO: 82.8 % — HIGH (ref 43–77)
PLATELET # BLD AUTO: 341 K/UL — SIGNIFICANT CHANGE UP (ref 150–400)
PLATELET # BLD AUTO: 341 K/UL — SIGNIFICANT CHANGE UP (ref 150–400)
POTASSIUM SERPL-MCNC: 3.8 MMOL/L — SIGNIFICANT CHANGE UP (ref 3.5–5.3)
POTASSIUM SERPL-MCNC: 3.8 MMOL/L — SIGNIFICANT CHANGE UP (ref 3.5–5.3)
POTASSIUM SERPL-SCNC: 3.8 MMOL/L — SIGNIFICANT CHANGE UP (ref 3.5–5.3)
POTASSIUM SERPL-SCNC: 3.8 MMOL/L — SIGNIFICANT CHANGE UP (ref 3.5–5.3)
PROT SERPL-MCNC: 6.9 G/DL — SIGNIFICANT CHANGE UP (ref 6–8.3)
PROT SERPL-MCNC: 6.9 G/DL — SIGNIFICANT CHANGE UP (ref 6–8.3)
RBC # BLD: 3.26 M/UL — LOW (ref 4.2–5.8)
RBC # BLD: 3.26 M/UL — LOW (ref 4.2–5.8)
RBC # FLD: 15.2 % — HIGH (ref 10.3–14.5)
RBC # FLD: 15.2 % — HIGH (ref 10.3–14.5)
SODIUM SERPL-SCNC: 143 MMOL/L — SIGNIFICANT CHANGE UP (ref 135–145)
SODIUM SERPL-SCNC: 143 MMOL/L — SIGNIFICANT CHANGE UP (ref 135–145)
T4 FREE+ TSH PNL SERPL: 1.91 UIU/ML — SIGNIFICANT CHANGE UP (ref 0.27–4.2)
T4 FREE+ TSH PNL SERPL: 1.91 UIU/ML — SIGNIFICANT CHANGE UP (ref 0.27–4.2)
WBC # BLD: 8.7 K/UL — SIGNIFICANT CHANGE UP (ref 3.8–10.5)
WBC # BLD: 8.7 K/UL — SIGNIFICANT CHANGE UP (ref 3.8–10.5)
WBC # FLD AUTO: 8.7 K/UL — SIGNIFICANT CHANGE UP (ref 3.8–10.5)
WBC # FLD AUTO: 8.7 K/UL — SIGNIFICANT CHANGE UP (ref 3.8–10.5)

## 2023-11-20 ENCOUNTER — OUTPATIENT (OUTPATIENT)
Dept: OUTPATIENT SERVICES | Facility: HOSPITAL | Age: 83
LOS: 1 days | Discharge: HOME | End: 2023-11-20

## 2023-11-20 DIAGNOSIS — C34.90 MALIGNANT NEOPLASM OF UNSPECIFIED PART OF UNSPECIFIED BRONCHUS OR LUNG: ICD-10-CM

## 2023-11-20 DIAGNOSIS — Z96.641 PRESENCE OF RIGHT ARTIFICIAL HIP JOINT: Chronic | ICD-10-CM

## 2023-11-21 ENCOUNTER — APPOINTMENT (OUTPATIENT)
Dept: MRI IMAGING | Facility: CLINIC | Age: 83
End: 2023-11-21

## 2023-11-29 ENCOUNTER — APPOINTMENT (OUTPATIENT)
Age: 83
End: 2023-11-29

## 2023-12-02 ENCOUNTER — APPOINTMENT (OUTPATIENT)
Dept: CT IMAGING | Facility: CLINIC | Age: 83
End: 2023-12-02

## 2023-12-02 ENCOUNTER — OUTPATIENT (OUTPATIENT)
Dept: OUTPATIENT SERVICES | Facility: HOSPITAL | Age: 83
LOS: 1 days | End: 2023-12-02
Payer: MEDICARE

## 2023-12-02 DIAGNOSIS — Z96.641 PRESENCE OF RIGHT ARTIFICIAL HIP JOINT: Chronic | ICD-10-CM

## 2023-12-02 DIAGNOSIS — C34.90 MALIGNANT NEOPLASM OF UNSPECIFIED PART OF UNSPECIFIED BRONCHUS OR LUNG: ICD-10-CM

## 2023-12-02 DIAGNOSIS — Z96.642 PRESENCE OF LEFT ARTIFICIAL HIP JOINT: Chronic | ICD-10-CM

## 2023-12-02 PROCEDURE — 74177 CT ABD & PELVIS W/CONTRAST: CPT | Mod: 26

## 2023-12-02 PROCEDURE — 71260 CT THORAX DX C+: CPT | Mod: 26

## 2023-12-05 ENCOUNTER — RESULT REVIEW (OUTPATIENT)
Age: 83
End: 2023-12-05

## 2023-12-05 ENCOUNTER — APPOINTMENT (OUTPATIENT)
Dept: MRI IMAGING | Facility: CLINIC | Age: 83
End: 2023-12-05

## 2023-12-05 ENCOUNTER — OUTPATIENT (OUTPATIENT)
Dept: OUTPATIENT SERVICES | Facility: HOSPITAL | Age: 83
LOS: 1 days | End: 2023-12-05
Payer: MEDICARE

## 2023-12-05 DIAGNOSIS — R29.898 OTHER SYMPTOMS AND SIGNS INVOLVING THE MUSCULOSKELETAL SYSTEM: ICD-10-CM

## 2023-12-05 DIAGNOSIS — Z96.642 PRESENCE OF LEFT ARTIFICIAL HIP JOINT: Chronic | ICD-10-CM

## 2023-12-05 DIAGNOSIS — Z96.641 PRESENCE OF RIGHT ARTIFICIAL HIP JOINT: Chronic | ICD-10-CM

## 2023-12-05 PROCEDURE — 72157 MRI CHEST SPINE W/O & W/DYE: CPT | Mod: 26

## 2023-12-05 PROCEDURE — 72156 MRI NECK SPINE W/O & W/DYE: CPT | Mod: 26

## 2023-12-05 PROCEDURE — 73030 X-RAY EXAM OF SHOULDER: CPT | Mod: 26,50

## 2023-12-05 PROCEDURE — 72158 MRI LUMBAR SPINE W/O & W/DYE: CPT | Mod: 26

## 2023-12-06 ENCOUNTER — NON-APPOINTMENT (OUTPATIENT)
Age: 83
End: 2023-12-06

## 2023-12-08 ENCOUNTER — APPOINTMENT (OUTPATIENT)
Dept: PHYSICAL MEDICINE AND REHAB | Facility: CLINIC | Age: 83
End: 2023-12-08
Payer: MEDICARE

## 2023-12-08 DIAGNOSIS — M25.512 PAIN IN RIGHT SHOULDER: ICD-10-CM

## 2023-12-08 DIAGNOSIS — M25.511 PAIN IN RIGHT SHOULDER: ICD-10-CM

## 2023-12-08 DIAGNOSIS — M79.2 NEURALGIA AND NEURITIS, UNSPECIFIED: ICD-10-CM

## 2023-12-08 DIAGNOSIS — R29.898 OTHER SYMPTOMS AND SIGNS INVOLVING THE MUSCULOSKELETAL SYSTEM: ICD-10-CM

## 2023-12-08 PROCEDURE — 99214 OFFICE O/P EST MOD 30 MIN: CPT

## 2023-12-11 ENCOUNTER — APPOINTMENT (OUTPATIENT)
Dept: HEMATOLOGY ONCOLOGY | Facility: CLINIC | Age: 83
End: 2023-12-11
Payer: MEDICARE

## 2023-12-11 VITALS
OXYGEN SATURATION: 100 % | DIASTOLIC BLOOD PRESSURE: 62 MMHG | WEIGHT: 177.91 LBS | SYSTOLIC BLOOD PRESSURE: 105 MMHG | HEIGHT: 70 IN | BODY MASS INDEX: 25.47 KG/M2 | HEART RATE: 68 BPM

## 2023-12-11 PROCEDURE — 99215 OFFICE O/P EST HI 40 MIN: CPT

## 2023-12-12 ENCOUNTER — RESULT REVIEW (OUTPATIENT)
Age: 83
End: 2023-12-12

## 2023-12-12 ENCOUNTER — APPOINTMENT (OUTPATIENT)
Dept: ORTHOPEDIC SURGERY | Facility: CLINIC | Age: 83
End: 2023-12-12
Payer: MEDICARE

## 2023-12-12 ENCOUNTER — APPOINTMENT (OUTPATIENT)
Dept: HEMATOLOGY ONCOLOGY | Facility: CLINIC | Age: 83
End: 2023-12-12

## 2023-12-12 VITALS
HEART RATE: 71 BPM | BODY MASS INDEX: 25.34 KG/M2 | WEIGHT: 177 LBS | SYSTOLIC BLOOD PRESSURE: 114 MMHG | HEIGHT: 70 IN | DIASTOLIC BLOOD PRESSURE: 68 MMHG

## 2023-12-12 LAB
BASOPHILS # BLD AUTO: 0.1 K/UL — SIGNIFICANT CHANGE UP (ref 0–0.2)
BASOPHILS # BLD AUTO: 0.1 K/UL — SIGNIFICANT CHANGE UP (ref 0–0.2)
BASOPHILS NFR BLD AUTO: 0.6 % — SIGNIFICANT CHANGE UP (ref 0–2)
BASOPHILS NFR BLD AUTO: 0.6 % — SIGNIFICANT CHANGE UP (ref 0–2)
EOSINOPHIL # BLD AUTO: 0.2 K/UL — SIGNIFICANT CHANGE UP (ref 0–0.5)
EOSINOPHIL # BLD AUTO: 0.2 K/UL — SIGNIFICANT CHANGE UP (ref 0–0.5)
EOSINOPHIL NFR BLD AUTO: 1.6 % — SIGNIFICANT CHANGE UP (ref 0–6)
EOSINOPHIL NFR BLD AUTO: 1.6 % — SIGNIFICANT CHANGE UP (ref 0–6)
HCT VFR BLD CALC: 28.8 % — LOW (ref 39–50)
HCT VFR BLD CALC: 28.8 % — LOW (ref 39–50)
HGB BLD-MCNC: 9.1 G/DL — LOW (ref 13–17)
HGB BLD-MCNC: 9.1 G/DL — LOW (ref 13–17)
LYMPHOCYTES # BLD AUTO: 0.6 K/UL — LOW (ref 1–3.3)
LYMPHOCYTES # BLD AUTO: 0.6 K/UL — LOW (ref 1–3.3)
LYMPHOCYTES # BLD AUTO: 6.8 % — LOW (ref 13–44)
LYMPHOCYTES # BLD AUTO: 6.8 % — LOW (ref 13–44)
MCHC RBC-ENTMCNC: 26.5 PG — LOW (ref 27–34)
MCHC RBC-ENTMCNC: 26.5 PG — LOW (ref 27–34)
MCHC RBC-ENTMCNC: 31.6 G/DL — LOW (ref 32–36)
MCHC RBC-ENTMCNC: 31.6 G/DL — LOW (ref 32–36)
MCV RBC AUTO: 83.8 FL — SIGNIFICANT CHANGE UP (ref 80–100)
MCV RBC AUTO: 83.8 FL — SIGNIFICANT CHANGE UP (ref 80–100)
MONOCYTES # BLD AUTO: 1 K/UL — HIGH (ref 0–0.9)
MONOCYTES # BLD AUTO: 1 K/UL — HIGH (ref 0–0.9)
MONOCYTES NFR BLD AUTO: 10.1 % — SIGNIFICANT CHANGE UP (ref 2–14)
MONOCYTES NFR BLD AUTO: 10.1 % — SIGNIFICANT CHANGE UP (ref 2–14)
NEUTROPHILS # BLD AUTO: 7.8 K/UL — HIGH (ref 1.8–7.4)
NEUTROPHILS # BLD AUTO: 7.8 K/UL — HIGH (ref 1.8–7.4)
NEUTROPHILS NFR BLD AUTO: 80.9 % — HIGH (ref 43–77)
NEUTROPHILS NFR BLD AUTO: 80.9 % — HIGH (ref 43–77)
PLATELET # BLD AUTO: 370 K/UL — SIGNIFICANT CHANGE UP (ref 150–400)
PLATELET # BLD AUTO: 370 K/UL — SIGNIFICANT CHANGE UP (ref 150–400)
RBC # BLD: 3.44 M/UL — LOW (ref 4.2–5.8)
RBC # BLD: 3.44 M/UL — LOW (ref 4.2–5.8)
RBC # FLD: 15.9 % — HIGH (ref 10.3–14.5)
RBC # FLD: 15.9 % — HIGH (ref 10.3–14.5)
WBC # BLD: 9.6 K/UL — SIGNIFICANT CHANGE UP (ref 3.8–10.5)
WBC # BLD: 9.6 K/UL — SIGNIFICANT CHANGE UP (ref 3.8–10.5)
WBC # FLD AUTO: 9.6 K/UL — SIGNIFICANT CHANGE UP (ref 3.8–10.5)
WBC # FLD AUTO: 9.6 K/UL — SIGNIFICANT CHANGE UP (ref 3.8–10.5)

## 2023-12-12 PROCEDURE — 99205 OFFICE O/P NEW HI 60 MIN: CPT

## 2023-12-13 ENCOUNTER — RESULT REVIEW (OUTPATIENT)
Age: 83
End: 2023-12-13

## 2023-12-13 ENCOUNTER — APPOINTMENT (OUTPATIENT)
Age: 83
End: 2023-12-13

## 2023-12-13 LAB
ALBUMIN SERPL ELPH-MCNC: 3.2 G/DL — LOW (ref 3.3–5)
ALBUMIN SERPL ELPH-MCNC: 3.2 G/DL — LOW (ref 3.3–5)
ALP SERPL-CCNC: 89 U/L — SIGNIFICANT CHANGE UP (ref 40–120)
ALP SERPL-CCNC: 89 U/L — SIGNIFICANT CHANGE UP (ref 40–120)
ALT FLD-CCNC: 10 U/L — SIGNIFICANT CHANGE UP (ref 10–45)
ALT FLD-CCNC: 10 U/L — SIGNIFICANT CHANGE UP (ref 10–45)
ANION GAP SERPL CALC-SCNC: 9 MMOL/L — SIGNIFICANT CHANGE UP (ref 5–17)
ANION GAP SERPL CALC-SCNC: 9 MMOL/L — SIGNIFICANT CHANGE UP (ref 5–17)
AST SERPL-CCNC: 16 U/L — SIGNIFICANT CHANGE UP (ref 10–40)
AST SERPL-CCNC: 16 U/L — SIGNIFICANT CHANGE UP (ref 10–40)
BASOPHILS # BLD AUTO: 0.2 K/UL — SIGNIFICANT CHANGE UP (ref 0–0.2)
BASOPHILS # BLD AUTO: 0.2 K/UL — SIGNIFICANT CHANGE UP (ref 0–0.2)
BASOPHILS NFR BLD AUTO: 2.4 % — HIGH (ref 0–2)
BASOPHILS NFR BLD AUTO: 2.4 % — HIGH (ref 0–2)
BILIRUB SERPL-MCNC: 0.3 MG/DL — SIGNIFICANT CHANGE UP (ref 0.2–1.2)
BILIRUB SERPL-MCNC: 0.3 MG/DL — SIGNIFICANT CHANGE UP (ref 0.2–1.2)
BUN SERPL-MCNC: 26 MG/DL — HIGH (ref 7–23)
BUN SERPL-MCNC: 26 MG/DL — HIGH (ref 7–23)
CALCIUM SERPL-MCNC: 9.2 MG/DL — SIGNIFICANT CHANGE UP (ref 8.4–10.5)
CALCIUM SERPL-MCNC: 9.2 MG/DL — SIGNIFICANT CHANGE UP (ref 8.4–10.5)
CEA SERPL-MCNC: 0.9 NG/ML — SIGNIFICANT CHANGE UP (ref 0–3.8)
CEA SERPL-MCNC: 0.9 NG/ML — SIGNIFICANT CHANGE UP (ref 0–3.8)
CHLORIDE SERPL-SCNC: 104 MMOL/L — SIGNIFICANT CHANGE UP (ref 96–108)
CHLORIDE SERPL-SCNC: 104 MMOL/L — SIGNIFICANT CHANGE UP (ref 96–108)
CO2 SERPL-SCNC: 25 MMOL/L — SIGNIFICANT CHANGE UP (ref 22–31)
CO2 SERPL-SCNC: 25 MMOL/L — SIGNIFICANT CHANGE UP (ref 22–31)
CORTIS SERPL-MCNC: 28.4 UG/DL
CREAT SERPL-MCNC: 0.83 MG/DL — SIGNIFICANT CHANGE UP (ref 0.5–1.3)
CREAT SERPL-MCNC: 0.83 MG/DL — SIGNIFICANT CHANGE UP (ref 0.5–1.3)
EGFR: 87 ML/MIN/1.73M2 — SIGNIFICANT CHANGE UP
EGFR: 87 ML/MIN/1.73M2 — SIGNIFICANT CHANGE UP
EOSINOPHIL # BLD AUTO: 0.2 K/UL — SIGNIFICANT CHANGE UP (ref 0–0.5)
EOSINOPHIL # BLD AUTO: 0.2 K/UL — SIGNIFICANT CHANGE UP (ref 0–0.5)
EOSINOPHIL NFR BLD AUTO: 2.1 % — SIGNIFICANT CHANGE UP (ref 0–6)
EOSINOPHIL NFR BLD AUTO: 2.1 % — SIGNIFICANT CHANGE UP (ref 0–6)
FERRITIN SERPL-MCNC: 354 NG/ML — SIGNIFICANT CHANGE UP (ref 30–400)
FERRITIN SERPL-MCNC: 354 NG/ML — SIGNIFICANT CHANGE UP (ref 30–400)
FOLATE SERPL-MCNC: 5.6 NG/ML — SIGNIFICANT CHANGE UP
FOLATE SERPL-MCNC: 5.6 NG/ML — SIGNIFICANT CHANGE UP
GLUCOSE SERPL-MCNC: 103 MG/DL — HIGH (ref 70–99)
GLUCOSE SERPL-MCNC: 103 MG/DL — HIGH (ref 70–99)
HCT VFR BLD CALC: 27.8 % — LOW (ref 39–50)
HCT VFR BLD CALC: 27.8 % — LOW (ref 39–50)
HGB BLD-MCNC: 8.6 G/DL — LOW (ref 13–17)
HGB BLD-MCNC: 8.6 G/DL — LOW (ref 13–17)
IRON SATN MFR SERPL: 20 UG/DL — LOW (ref 45–165)
IRON SATN MFR SERPL: 20 UG/DL — LOW (ref 45–165)
IRON SATN MFR SERPL: 9 % — LOW (ref 16–55)
IRON SATN MFR SERPL: 9 % — LOW (ref 16–55)
LYMPHOCYTES # BLD AUTO: 0.5 K/UL — LOW (ref 1–3.3)
LYMPHOCYTES # BLD AUTO: 0.5 K/UL — LOW (ref 1–3.3)
LYMPHOCYTES # BLD AUTO: 6.3 % — LOW (ref 13–44)
LYMPHOCYTES # BLD AUTO: 6.3 % — LOW (ref 13–44)
MCHC RBC-ENTMCNC: 26.7 PG — LOW (ref 27–34)
MCHC RBC-ENTMCNC: 26.7 PG — LOW (ref 27–34)
MCHC RBC-ENTMCNC: 31.1 G/DL — LOW (ref 32–36)
MCHC RBC-ENTMCNC: 31.1 G/DL — LOW (ref 32–36)
MCV RBC AUTO: 85.8 FL — SIGNIFICANT CHANGE UP (ref 80–100)
MCV RBC AUTO: 85.8 FL — SIGNIFICANT CHANGE UP (ref 80–100)
MONOCYTES # BLD AUTO: 0.7 K/UL — SIGNIFICANT CHANGE UP (ref 0–0.9)
MONOCYTES # BLD AUTO: 0.7 K/UL — SIGNIFICANT CHANGE UP (ref 0–0.9)
MONOCYTES NFR BLD AUTO: 8.5 % — SIGNIFICANT CHANGE UP (ref 2–14)
MONOCYTES NFR BLD AUTO: 8.5 % — SIGNIFICANT CHANGE UP (ref 2–14)
NEUTROPHILS # BLD AUTO: 6.2 K/UL — SIGNIFICANT CHANGE UP (ref 1.8–7.4)
NEUTROPHILS # BLD AUTO: 6.2 K/UL — SIGNIFICANT CHANGE UP (ref 1.8–7.4)
NEUTROPHILS NFR BLD AUTO: 80.7 % — HIGH (ref 43–77)
NEUTROPHILS NFR BLD AUTO: 80.7 % — HIGH (ref 43–77)
PLATELET # BLD AUTO: 366 K/UL — SIGNIFICANT CHANGE UP (ref 150–400)
PLATELET # BLD AUTO: 366 K/UL — SIGNIFICANT CHANGE UP (ref 150–400)
POTASSIUM SERPL-MCNC: 4.1 MMOL/L — SIGNIFICANT CHANGE UP (ref 3.5–5.3)
POTASSIUM SERPL-MCNC: 4.1 MMOL/L — SIGNIFICANT CHANGE UP (ref 3.5–5.3)
POTASSIUM SERPL-SCNC: 4.1 MMOL/L — SIGNIFICANT CHANGE UP (ref 3.5–5.3)
POTASSIUM SERPL-SCNC: 4.1 MMOL/L — SIGNIFICANT CHANGE UP (ref 3.5–5.3)
PROT SERPL-MCNC: 6.8 G/DL — SIGNIFICANT CHANGE UP (ref 6–8.3)
PROT SERPL-MCNC: 6.8 G/DL — SIGNIFICANT CHANGE UP (ref 6–8.3)
RBC # BLD: 3.23 M/UL — LOW (ref 4.2–5.8)
RBC # BLD: 3.23 M/UL — LOW (ref 4.2–5.8)
RBC # FLD: 15.1 % — HIGH (ref 10.3–14.5)
RBC # FLD: 15.1 % — HIGH (ref 10.3–14.5)
SODIUM SERPL-SCNC: 138 MMOL/L — SIGNIFICANT CHANGE UP (ref 135–145)
SODIUM SERPL-SCNC: 138 MMOL/L — SIGNIFICANT CHANGE UP (ref 135–145)
T4 FREE+ TSH PNL SERPL: 3.61 UIU/ML — SIGNIFICANT CHANGE UP (ref 0.27–4.2)
T4 FREE+ TSH PNL SERPL: 3.61 UIU/ML — SIGNIFICANT CHANGE UP (ref 0.27–4.2)
TIBC SERPL-MCNC: 219 UG/DL — LOW (ref 220–430)
TIBC SERPL-MCNC: 219 UG/DL — LOW (ref 220–430)
UIBC SERPL-MCNC: 199 UG/DL — SIGNIFICANT CHANGE UP (ref 110–370)
UIBC SERPL-MCNC: 199 UG/DL — SIGNIFICANT CHANGE UP (ref 110–370)
VIT B12 SERPL-MCNC: 288 PG/ML — SIGNIFICANT CHANGE UP (ref 232–1245)
VIT B12 SERPL-MCNC: 288 PG/ML — SIGNIFICANT CHANGE UP (ref 232–1245)
WBC # BLD: 7.7 K/UL — SIGNIFICANT CHANGE UP (ref 3.8–10.5)
WBC # BLD: 7.7 K/UL — SIGNIFICANT CHANGE UP (ref 3.8–10.5)
WBC # FLD AUTO: 7.7 K/UL — SIGNIFICANT CHANGE UP (ref 3.8–10.5)
WBC # FLD AUTO: 7.7 K/UL — SIGNIFICANT CHANGE UP (ref 3.8–10.5)

## 2023-12-13 NOTE — ASSESSMENT
[FreeTextEntry1] : 83-year-old male with cervical and thoracic myelopathy, as well as spinal stenosis with neurogenic claudication  Today Duane's family and I had a long discussion about his current physical exam findings, signs and symptoms as well as his MRIs today in the office.  He has had a progressive decline in his functional and his ambulatory status over the past 3 to 4 months.  I do not believe that this decline is secondary to his chemoradiation or his immune therapy.  I discussed with them that likely the lethargy that he feels is likely secondary to the immunotherapy however his significant decline in ambulatory status is loss of dexterity and strength in his hands is due to his cervical and thoracic myelopathy.  I discussed with them the natural history of myelopathy the anatomy and progression of myelopathy moving forward.  I discussed them that cervical and/or thoracic myelopathy can lead to continued functional and ambulatory decline if the compression is not relieved from the spine.  This can lead to significant weakness in the upper extremities worsening and significant weakness in his lower extremities, loss of bowel or bladder control, incontinence, paralysis.  I also discussed with them that the stages are impossible to determine when they may occur and that this is a progressive decline followed by a plateau phase and that the only way to prevent further decline and give the possibility of improvement in his symptoms would be surgical decompression. I also discussed with them that he does have a complex medical history of stage III lung cancer that will be need to taken into account if any surgical intervention is chosen in the future.  After discussion of the possible interventions, the risks and benefits of the procedure, the alternatives for nonoperative intervention, I discussed them that operative intervention would likely consist of a 1 versus 2 level ACDF at C5-6 and C6-7, and we will either stage the procedure with a posterior decompression and laminectomy at T11-T12. They will follow-up with their primary care doctor, heme-onc  And cardiologist to discuss surgical intervention and risk stratification for the procedure.  I will see him back in 2 to 4 weeks after he has had time to discuss with his family and his caring physicians with able to side which treatment option moving forward.

## 2023-12-13 NOTE — PHYSICAL EXAM
[de-identified] : CONSTITUTIONAL: Patient is a very pleasant individual who is well-nourished and appears stated age. PSYCHIATRIC: Alert and oriented times three and in no apparent distress, and participates with orthopedic evaluation well. HEAD: Atraumatic and nonsyndromic in appearance. EENT: No thyromegaly, EOMI. RESPIRATORY: Respiratory rate is regular, not dyspneic on examination. LYMPHATICS: There is no cervical or axillary lymphadenopathy. INTEGUMENTARY: Skin is clean, dry, and intact about the bilateral upper extremities and cervical spine. VASCULAR: There is brisk capillary refill about the bilateral upper extremities and radial pulses are 2/4.  Cervical Spine Exam: There is tenderness palpation in the bilateral paraspinal cervical musculature with some hypertonicity throughout Shoulder Abduction (C5): 3/5 B/L, only able to abduct to 30 deg 2/2 B/L shoulder OA Biceps (C6): 4/5 B/L Wrist Extension (C6): 4/5 B/L Triceps (C7): 3+/5 RUE, 4/5 LUE Wrist Flexion (C7): 4/5 B/L Finger Adduction/Abduction (C8/T1): 3/5 B/L Sensation:  SILT C5-T1 bilateral  Reflexes: 2+ reflexes Bicep, 3+Triceps/Quad/Achilles   Hip Flexion: 4-/5 BLLE Knee extension: 4/5 RLE, 5/5 LLE Dorsiflexion: 4/5 RLE, 5/5 LLE Plantarflexion: 4/5 RLE, 5/5 LLE     Gait: Short shuffling gait without walker, patient uses walker at baseline Unable to perform tandem gait  Special Testing:  + SLR RLE  Inverted brachioradialis reflex bilateral Positive Spurlings recreating neck pain  negative clonus BL LE negative Hoffmans B/L UE     [de-identified] : MRI cervical spine performed on 12/5/2023 in [email or web address] PACS axial and sagittal T1 and T2, STIR weighted images demonstrates there is severe bilateral foraminal stenosis with moderate central stenosis at C3-C4 C4-5 trace anterolisthesis mild central stenosis right greater than left foraminal stenosis C5-6 demonstrates severe central stenosis due to broad-based disc bulge/osteophyte complex there is also ligamentum flavum hypertrophy with severe bilateral foraminal stenosis C6-C7 broad-based disc bulge osteophyte complex causing moderate to severe central stenosis with bilateral foraminal stenosis C7-T1 with no significant central or foraminal stenosis  MRI Thoracic spine performed on 12/5/2023 in [email or web address] PACS axial and sagittal T1 and T2, STIR weighted images there is multilevel spondylosis, a severe central stenosis at T11-T12 with a disc osteophyte complex and posterior ligamentum flavum hypertrophy, endplate Modic changes at this level with significant disc space degeneration.  Possible cord intrinsic changes at this level indicative of myelomalacia.    MRI Lumbar spine [facility] axial and sagittal T1 and T2, STIR weighted images there is multilevel lumbar spondylosis, L1-2 demonstrates mild central stenosis bilateral facet arthropathy L2-3, L3-4 demonstrates severe central stenosis with bilateral facet arthropathy L4-5 with grade 1 spondylolisthesis, severe central stenosis and severe bilateral facet arthropathy with ligamentum flavum hypertrophy L5-S1 with left greater than right lateral recess stenosis and left greater than right facet hypertrophy and arthrosis, left greater than right foraminal stenosis, mild central stenosis

## 2023-12-13 NOTE — HISTORY OF PRESENT ILLNESS
[de-identified] : Chief Complaint: Neck pain worsening functional status   History of Present Illness: 83-year-old male presenting today for initial evaluation for his worsening functional status, difficulty with gait.  Patient has a complex past history of a lung carcinoma that was inoperable has been undergoing chemoradiation therapy and more recently has started immune modulator therapy in July of this year.  He states that he has severe lethargy which has been ongoing since the start of his immunomodulator therapy. he states that since July his functional status is significantly declined he has progressed from a working about 8 months ago without any issues to now progressively using a cane and more recently has transition to a walker secondary to severe difficulty with gait or balance.  He is also having significant difficulty with fine motor tasks he has difficulty buttoning his shirt with the use of his fingers.  He denies any significant numbness and tingling in his hands.  He also has severe weakness and pain in his shoulders which is likely secondary to his bilateral glenohumeral joint arthritis. He states that when he stands he has significant issues with his balance and his legs cannot find his foot and space.  And feels like he may fall without the support of a walker.  This is all significantly worsened over the past 4 to 5 months.  He denies any significant numbness or tingling in his legs he does have signs and symptoms of spinal stenosis has been ongoing for some time severe pain when he stands and significant relief from the sits down however he does not believe that this is the cause of his difficulty with gait.   Past medical history, past surgical history, medications, allergies, social history, and family history are as documented in our records today.  Notable items include: None   Review of Systems: I have reviewed the patient's documented Review of Systems data today, I concur with this documentation.

## 2023-12-14 ENCOUNTER — NON-APPOINTMENT (OUTPATIENT)
Age: 83
End: 2023-12-14

## 2023-12-14 DIAGNOSIS — Z51.11 ENCOUNTER FOR ANTINEOPLASTIC CHEMOTHERAPY: ICD-10-CM

## 2023-12-21 ENCOUNTER — RX RENEWAL (OUTPATIENT)
Age: 83
End: 2023-12-21

## 2023-12-21 RX ORDER — FLUTICASONE FUROATE, UMECLIDINIUM BROMIDE AND VILANTEROL TRIFENATATE 100; 62.5; 25 UG/1; UG/1; UG/1
100-62.5-25 POWDER RESPIRATORY (INHALATION)
Qty: 3 | Refills: 3 | Status: ACTIVE | COMMUNITY
Start: 2022-11-29 | End: 1900-01-01

## 2023-12-27 ENCOUNTER — RESULT REVIEW (OUTPATIENT)
Age: 83
End: 2023-12-27

## 2023-12-27 ENCOUNTER — APPOINTMENT (OUTPATIENT)
Age: 83
End: 2023-12-27

## 2023-12-27 ENCOUNTER — APPOINTMENT (OUTPATIENT)
Dept: ORTHOPEDIC SURGERY | Facility: CLINIC | Age: 83
End: 2023-12-27
Payer: MEDICARE

## 2023-12-27 VITALS — BODY MASS INDEX: 25.34 KG/M2 | HEIGHT: 70 IN | WEIGHT: 177 LBS

## 2023-12-27 LAB
ALBUMIN SERPL ELPH-MCNC: 3.4 G/DL — SIGNIFICANT CHANGE UP (ref 3.3–5)
ALBUMIN SERPL ELPH-MCNC: 3.4 G/DL — SIGNIFICANT CHANGE UP (ref 3.3–5)
ALP SERPL-CCNC: 91 U/L — SIGNIFICANT CHANGE UP (ref 40–120)
ALP SERPL-CCNC: 91 U/L — SIGNIFICANT CHANGE UP (ref 40–120)
ALT FLD-CCNC: 9 U/L — LOW (ref 10–45)
ALT FLD-CCNC: 9 U/L — LOW (ref 10–45)
ANION GAP SERPL CALC-SCNC: 11 MMOL/L — SIGNIFICANT CHANGE UP (ref 5–17)
ANION GAP SERPL CALC-SCNC: 11 MMOL/L — SIGNIFICANT CHANGE UP (ref 5–17)
AST SERPL-CCNC: 14 U/L — SIGNIFICANT CHANGE UP (ref 10–40)
AST SERPL-CCNC: 14 U/L — SIGNIFICANT CHANGE UP (ref 10–40)
BASOPHILS # BLD AUTO: 0.1 K/UL — SIGNIFICANT CHANGE UP (ref 0–0.2)
BASOPHILS # BLD AUTO: 0.1 K/UL — SIGNIFICANT CHANGE UP (ref 0–0.2)
BASOPHILS NFR BLD AUTO: 0.8 % — SIGNIFICANT CHANGE UP (ref 0–2)
BASOPHILS NFR BLD AUTO: 0.8 % — SIGNIFICANT CHANGE UP (ref 0–2)
BILIRUB SERPL-MCNC: 0.2 MG/DL — SIGNIFICANT CHANGE UP (ref 0.2–1.2)
BILIRUB SERPL-MCNC: 0.2 MG/DL — SIGNIFICANT CHANGE UP (ref 0.2–1.2)
BUN SERPL-MCNC: 31 MG/DL — HIGH (ref 7–23)
BUN SERPL-MCNC: 31 MG/DL — HIGH (ref 7–23)
CALCIUM SERPL-MCNC: 9.3 MG/DL — SIGNIFICANT CHANGE UP (ref 8.4–10.5)
CALCIUM SERPL-MCNC: 9.3 MG/DL — SIGNIFICANT CHANGE UP (ref 8.4–10.5)
CEA SERPL-MCNC: 0.9 NG/ML — SIGNIFICANT CHANGE UP (ref 0–3.8)
CEA SERPL-MCNC: 0.9 NG/ML — SIGNIFICANT CHANGE UP (ref 0–3.8)
CHLORIDE SERPL-SCNC: 104 MMOL/L — SIGNIFICANT CHANGE UP (ref 96–108)
CHLORIDE SERPL-SCNC: 104 MMOL/L — SIGNIFICANT CHANGE UP (ref 96–108)
CO2 SERPL-SCNC: 24 MMOL/L — SIGNIFICANT CHANGE UP (ref 22–31)
CO2 SERPL-SCNC: 24 MMOL/L — SIGNIFICANT CHANGE UP (ref 22–31)
CREAT SERPL-MCNC: 0.99 MG/DL — SIGNIFICANT CHANGE UP (ref 0.5–1.3)
CREAT SERPL-MCNC: 0.99 MG/DL — SIGNIFICANT CHANGE UP (ref 0.5–1.3)
EGFR: 76 ML/MIN/1.73M2 — SIGNIFICANT CHANGE UP
EGFR: 76 ML/MIN/1.73M2 — SIGNIFICANT CHANGE UP
EOSINOPHIL # BLD AUTO: 0.1 K/UL — SIGNIFICANT CHANGE UP (ref 0–0.5)
EOSINOPHIL # BLD AUTO: 0.1 K/UL — SIGNIFICANT CHANGE UP (ref 0–0.5)
EOSINOPHIL NFR BLD AUTO: 1.4 % — SIGNIFICANT CHANGE UP (ref 0–6)
EOSINOPHIL NFR BLD AUTO: 1.4 % — SIGNIFICANT CHANGE UP (ref 0–6)
GLUCOSE SERPL-MCNC: 110 MG/DL — HIGH (ref 70–99)
GLUCOSE SERPL-MCNC: 110 MG/DL — HIGH (ref 70–99)
HCT VFR BLD CALC: 27.7 % — LOW (ref 39–50)
HCT VFR BLD CALC: 27.7 % — LOW (ref 39–50)
HGB BLD-MCNC: 8.4 G/DL — LOW (ref 13–17)
HGB BLD-MCNC: 8.4 G/DL — LOW (ref 13–17)
LYMPHOCYTES # BLD AUTO: 0.5 K/UL — LOW (ref 1–3.3)
LYMPHOCYTES # BLD AUTO: 0.5 K/UL — LOW (ref 1–3.3)
LYMPHOCYTES # BLD AUTO: 6.2 % — LOW (ref 13–44)
LYMPHOCYTES # BLD AUTO: 6.2 % — LOW (ref 13–44)
MCHC RBC-ENTMCNC: 25.5 PG — LOW (ref 27–34)
MCHC RBC-ENTMCNC: 25.5 PG — LOW (ref 27–34)
MCHC RBC-ENTMCNC: 30.3 G/DL — LOW (ref 32–36)
MCHC RBC-ENTMCNC: 30.3 G/DL — LOW (ref 32–36)
MCV RBC AUTO: 84 FL — SIGNIFICANT CHANGE UP (ref 80–100)
MCV RBC AUTO: 84 FL — SIGNIFICANT CHANGE UP (ref 80–100)
MONOCYTES # BLD AUTO: 0.9 K/UL — SIGNIFICANT CHANGE UP (ref 0–0.9)
MONOCYTES # BLD AUTO: 0.9 K/UL — SIGNIFICANT CHANGE UP (ref 0–0.9)
MONOCYTES NFR BLD AUTO: 10.3 % — SIGNIFICANT CHANGE UP (ref 2–14)
MONOCYTES NFR BLD AUTO: 10.3 % — SIGNIFICANT CHANGE UP (ref 2–14)
NEUTROPHILS # BLD AUTO: 6.9 K/UL — SIGNIFICANT CHANGE UP (ref 1.8–7.4)
NEUTROPHILS # BLD AUTO: 6.9 K/UL — SIGNIFICANT CHANGE UP (ref 1.8–7.4)
NEUTROPHILS NFR BLD AUTO: 81.2 % — HIGH (ref 43–77)
NEUTROPHILS NFR BLD AUTO: 81.2 % — HIGH (ref 43–77)
PLATELET # BLD AUTO: 362 K/UL — SIGNIFICANT CHANGE UP (ref 150–400)
PLATELET # BLD AUTO: 362 K/UL — SIGNIFICANT CHANGE UP (ref 150–400)
POTASSIUM SERPL-MCNC: 3.8 MMOL/L — SIGNIFICANT CHANGE UP (ref 3.5–5.3)
POTASSIUM SERPL-MCNC: 3.8 MMOL/L — SIGNIFICANT CHANGE UP (ref 3.5–5.3)
POTASSIUM SERPL-SCNC: 3.8 MMOL/L — SIGNIFICANT CHANGE UP (ref 3.5–5.3)
POTASSIUM SERPL-SCNC: 3.8 MMOL/L — SIGNIFICANT CHANGE UP (ref 3.5–5.3)
PROT SERPL-MCNC: 6.5 G/DL — SIGNIFICANT CHANGE UP (ref 6–8.3)
PROT SERPL-MCNC: 6.5 G/DL — SIGNIFICANT CHANGE UP (ref 6–8.3)
RBC # BLD: 3.3 M/UL — LOW (ref 4.2–5.8)
RBC # BLD: 3.3 M/UL — LOW (ref 4.2–5.8)
RBC # FLD: 15.9 % — HIGH (ref 10.3–14.5)
RBC # FLD: 15.9 % — HIGH (ref 10.3–14.5)
SODIUM SERPL-SCNC: 140 MMOL/L — SIGNIFICANT CHANGE UP (ref 135–145)
SODIUM SERPL-SCNC: 140 MMOL/L — SIGNIFICANT CHANGE UP (ref 135–145)
T4 FREE+ TSH PNL SERPL: 1.57 UIU/ML — SIGNIFICANT CHANGE UP (ref 0.27–4.2)
T4 FREE+ TSH PNL SERPL: 1.57 UIU/ML — SIGNIFICANT CHANGE UP (ref 0.27–4.2)
WBC # BLD: 8.4 K/UL — SIGNIFICANT CHANGE UP (ref 3.8–10.5)
WBC # BLD: 8.4 K/UL — SIGNIFICANT CHANGE UP (ref 3.8–10.5)
WBC # FLD AUTO: 8.4 K/UL — SIGNIFICANT CHANGE UP (ref 3.8–10.5)
WBC # FLD AUTO: 8.4 K/UL — SIGNIFICANT CHANGE UP (ref 3.8–10.5)

## 2023-12-27 PROCEDURE — 99214 OFFICE O/P EST MOD 30 MIN: CPT

## 2023-12-31 NOTE — DATA REVIEWED
[FreeTextEntry1] : On my interpretation of these images from  on 12/5/23. I have additionally reviewed the radiologist report. MRI images were reviewed on today's visit.  C2-3: mild to mod DDD C3-4: mod DDD and mild to mod stenosis mod to severe fs C4-5:  mod DDD mild central stenosis  C5-6: adv DDD large central disc protrusion causing spinal cord compression and myelomalacia signal on T2 images  C6-7: adv disc degeneration with broad based HNP, mod central stenosis mild cord compression  C7-T1:  mild to mod DDD no stenosis **Panus formation at C1-2  On my interpretation of these images from  on 12/5/23. I have additionally reviewed the radiologist report. thoracic MRI images were reviewed on today's visit. multi- level mod DDD, mod to adv DDD with areas of mild stenosis, no high-grade compression in mid to upper thoracic spine  On my interpretation of these images from  on 12/5/23.  I have additionally reviewed the radiologist report. MRI images were reviewed on today's visit.  L5-S1: adv DDD and severe bl lateral recess stenosis and mod rt and serv left fs  L4-5: severe stenosis with grade 1 listhesis adv facet arthropathy  L3-4: mod DDD adv facet severe stenosis  L2-3: mod DDD adv facet arthropathy severe central and lateral recess stenosis  L1-2: mod DDD mod to adv facet arthropathy mild to mod stenosis T12-L1:  mild DDD no stenosis  T11-12: adv DDD facet hypertrophy, severe R >L stenosis with spinal cord compression.

## 2023-12-31 NOTE — PHYSICAL EXAM
[4___] : right deltoid  4[unfilled]/5 [de-identified] : Constitutional: - General Appearance: Unremarkable Body Habitus Well Developed Well Nourished Body Habitus No Deformities Well Groomed Ability To communicate: Normal Neurologic: Global sensation is intact to upper and lower extremities. See examination of Neck and/or Spine for exceptions. Orientation to Time, Place and Person is: Normal Mood And Affect is Normal Skin: - Head/Face, Right Upper/Lower Extremity, Left Upper/Lower Extremity: Normal See Examination of Neck and/or Spine for exceptions Cardiovascular: Peripheral Cardiovascular System is Normal Palpation of Lymph Nodes: Normal Palpation of lymph nodes in: Axilla, Cervical, Inguinal Abnormal Palpation of lymph nodes in: None  [] : motor exam is not non-focal throughout both upper extremities [FreeTextEntry3] : discoloration in arms.  [de-identified] : RT resisted supination 4/5 [de-identified] : left lateral rotation 25 degrees [[id number]] : right lateral rotation 25 degrees

## 2023-12-31 NOTE — DISCUSSION/SUMMARY
[de-identified] : 84 y/o male with cervical myelopathy C5/6, thoracic myelopathy T11/12 and severe lumbar stenosis L2-S1. We've talked about realistic goals, I do not think his body would tolerate extensive thoracic and lumbar sx, but a consideration for a C5/6 ACDF is not unreasonable as long as he can be medically cleared. Follow up in 2 weeks.   Prior to appointment and during encounter with patient extensive medical records were reviewed including but not limited to, hospital records, out patient records, imaging results, and lab data. During this appointment the patient was examined, diagnoses were discussed and explained in a face to face manner. In addition extensive time was spent reviewing aforementioned diagnostic studies. Counseling including abnormal image results, differential diagnoses, treatment options, risk and benefits, lifestyle changes, current condition, and current medications was performed. Patient's comments, questions, and concerns were address and patient verbalized understanding. Based on this patient's presentation at our office, which is an orthopedic spine surgeon's office, this patient inherently / intrinsically has a risk, however minute, of developing issues such as Cauda equina syndrome, bowel and bladder changes, or progression of motor or neurological deficits such as paralysis which may be permanent.   I, Neela Connor, attest that this documentation has been prepared under the direction and in the presence of provider Neri Levy MD.

## 2023-12-31 NOTE — HISTORY OF PRESENT ILLNESS
[Neck] : neck [Upper back] : upper back [Mid-back] : mid-back [Lower back] : lower back [de-identified] : 12/27/2023: Patient presenting today for an initial evaluation. Patient has lung cancer, unable to do surgery for it, has treated with chemotherapy and radiation, now completing immunotherapy, cancer is controlled at this time. Reports about 4 months prior has experienced an overall decline in UE/ LE strength and function. Patient has difficulty raising arms, weakness in arms, increase in difficulty walking, increase in dexterity issues. C/o neck and RT leg pain. Reports no back pain. Has been ambulating with rolling walker past few weeks, previously ambulating with cane. Patient has been on blood thinners for 13 years due to blockage in arteries, taking Eliquis and Plavix, heart is controlled.     [] : no [FreeTextEntry5] : 83 yr old male states neck and back pain. States that he received prior treatment by Dr. Macias. States b/l arm weakness, b/l shoulder pain, leg weakness.

## 2024-01-03 ENCOUNTER — APPOINTMENT (OUTPATIENT)
Dept: HEMATOLOGY ONCOLOGY | Facility: CLINIC | Age: 84
End: 2024-01-03
Payer: MEDICARE

## 2024-01-03 ENCOUNTER — RESULT REVIEW (OUTPATIENT)
Age: 84
End: 2024-01-03

## 2024-01-03 VITALS
BODY MASS INDEX: 25.96 KG/M2 | SYSTOLIC BLOOD PRESSURE: 115 MMHG | OXYGEN SATURATION: 98 % | WEIGHT: 181.33 LBS | HEIGHT: 70 IN | HEART RATE: 67 BPM | DIASTOLIC BLOOD PRESSURE: 66 MMHG

## 2024-01-03 PROCEDURE — 99214 OFFICE O/P EST MOD 30 MIN: CPT

## 2024-01-03 NOTE — REVIEW OF SYSTEMS
[Diarrhea: Grade 1 - Increase of <4 stools per day over baseline; mild increase in ostomy output compared to baseline] : Diarrhea: Grade 1 - Increase of <4 stools per day over baseline; mild increase in ostomy output compared to baseline [FreeTextEntry2] : negative except as reviewed in interval history.

## 2024-01-03 NOTE — ASSESSMENT
[FreeTextEntry1] : JONE SOUTH, who was diagnosed with a squamous cell Carcinoma Lung at the age of 81 yo in Jan 2023 Patient with a PMHX of HTN, HLD, COPD, ASHD, 40 ppk year smoking history ( quit 2002)  Patient initially presented with a productive cough in November 2022, chest x-ray on 11/11/2022 at [facility]iology showed a new 3 cm nodular right upper lobe lesion.  Subsequent CT Chest and PET scan was done   PET/CT on 11/26/2022 reported right upper lobe mass with an SUV of 13.2, 2.3 x 2.6 cm. Adjacent activity in the right hilar region with questionable metastatic adenopathy( SUV 6.4, 1.2 cm ). Previous 6 mm nodule in the anterior right lung base is no longer seen.  ON 1/18/23, Flexible bronchoscopy, robotic-assisted bronchoscopy with the ion system, endobronchial ultrasound with transbronchial needle aspiration, endobronchial brushing, endobronchial biopsy Path c/w Squamous cell carcinoma lung with involvement of Hilar/ Mediastinal LN ( R4/ R10 and level 7)   T1cN2 Mx St IIIA squamous cell carcinoma Lung  - Patient met with Dr Martin on 1/30/23, not a surgical candidate -Definitive treatment with concurrent chemoRT with Carbo AUC 2 and taxol 50 mg/ m2 weekly x 6-8 cycles with RT followed by Immunotherapy with Durvalumab for 1 year - No CI to Immuno therapy   1/18/2023 Pathology  4/17/23: Patient here for f/u with wife and Daughter He was admitted to [facility] last week for 3 days Syncope attributed to Orthostatic hypotension  CT CAP 5/15/23: Interval decrease of spiculated right upper lobe nodule. ( 3.5 x 2.4 cm , down from 4.3x 2.9 cm) New scattered nodular ground-glass opacities within right upper lobe may represent pneumonitis. Approximately 3.5 x 2.4 cm heterogeneous density nodule posterior to the left ischium (series 2 image 173) is unchanged.  Discussed treatment with immunotherapy which include durvalumab q 2 weeks 10mg/kg x 1y. AE of Durvalumab include fatigue, nausea, constipation, decreased appetite, abdominal pain, arthralgia, rash, and pyrexia.  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 No new lung nodule. New small right pleural effusion. No evidence of metastasis in the abdomen and pelvis. - CT CAP from Sept 2023- Stable. - CT Chest 12/2/23 : Interval regression post radiation change right perihilar region. New biapical groundglass opacities, indeterminate. Continued attention on follow-up imaging. No CT evidence of metastatic disease in the abdomen and pelvis. - Repeat CT CAP March 2024 - Last Treatment given on 12/27/23 - Advised if SOB on exertion / breathing worsens to call office,  - f/u  in 4 weeks   [Reviewed no changes] : Reviewed no changes

## 2024-01-03 NOTE — HISTORY OF PRESENT ILLNESS
[de-identified] : JONE SOUTH is a 82 year M with PMHX of HTN, HLD, COPD, ASHD, 40 ppk year smoking history ( quit 2002) presents for initial consultation for Squamous Cell lung cancer  Patient presented to Dr. Reed on 11/29/22 c/o a productive cough in November 2022. He was treated with a course of antibiotics but also underwent a chest x-ray on 11/11/2022 at [facility]. Compared to a previous film from June of this past year a new 3 cm nodular right upper lobe lesion was found.  Subsequent CT Chest performed on 11/17/2022 revealing 2.8 x 2.8 x 2.3 right upper lobe mass in the posterior segment. Indeterminate nodule in anterior RLL measuring 6-7 mm. Small nodule left upper lobe, measuring 3-4 mm. Prominent nodes along right hilar structures, one of which shows similar density as right upper lobe mass , measuring 13 x 14x 11 mm, concerning for satellite lesion  PET/CT on 11/26/2022 reported right upper lobe mass with an SUV of 13.2, 2.3 x 2.6 cm. Adjacent activity in the right hilar region with questionable metastatic adenopathy( SUV 6.4, 1.2 cm ). Previous 6 mm nodule in the anterior right lung base is no longer seen.  Patient referred to Dr. Martin. He underwent flexible bronchoscopy, robotic-assisted bronchoscopy with the ion system, endobronchial ultrasound with transbronchial needle aspiration, endobronchial brushing, endobronchial biopsy on 1/18/23.   Started weekly chemoradiation with Carbo/Taxol on 2/27/23, s/p RT on 4/10/23 C6 weekly carbo/ taxol completed on 4/3/23 Patient tolerated treatment well, with minimal side effects   Pathology Lymph Node ( R4, R10, Level 7) positive for malignant cells for squamous cell carcinoma. Bronchoalveolar lavage was negative  PMH: HTN, hypercholesterolemia FMH: Sx: hip replacement sx Socx: Former smoker, quit 2002 (smoked 40 ppk year)  Care Team: Pulmonologist: Dr. Reed Primary care provider: Dr. Carballo.   [de-identified] : Patient presents for follow up with spouse and daughter   Patient c/o B/l shoulder and neck pain, ongoing for the past 3-4 months. Patient with generalized weakness and decline, states it is the same since last visit.  Spine specialist planning for possible posterior decompression and laminectomy at T11-T12, surgery still to be scheduled  Patient denies any worsening SOB   12/2/23 CT CAP Interval regression post radiation change right perihilar region. New biapical groundglass opacities, indeterminate. Continued attention on follow-up imaging. No CT evidence of metastatic disease in the abdomen and pelvis.

## 2024-01-03 NOTE — RESULTS/DATA
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 New small right pleural effusion with component in the oblique fissure, adjacent to the fibrosis.  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  No new lung nodule.  New small right pleural effusion.  No evidence of metastasis in the abdomen and pelvis.     Restaging CT CAP 5/15/23: after completion of Chemo/ RT  - Interval decrease of spiculated right upper lobe nodule. ( 3.5 x 2.4 cm , down from 4.3x 2.9 cm)  - New scattered nodular ground-glass opacities within right upper lobe may represent pneumonitis.  - Approximately 3.5 x 2.4 cm heterogeneous density nodule posterior to the left ischium is unchanged.     2/6/23 PET/CT  -Hypermetabolic lung nodule in the superior segment of the right lower lobe measures 3.1 x 2.8 cm with SUV 16.0; this is compatible with a malignancy.  -There is a peripheral nodular opacity more peripherally measuring 1.3 x 1.0 cm with minimal activity of SUV 1.4 likely reflecting a nonobstructive pattern of atelectasis.  -Hypermetabolic node superior and posterior to the right main bronchus measures 1.2 x 1.8 cm with SUV 11.3 compatible with dejon involvement.  -A subcentimeter right upper paratracheal node measuring 0.4 x 1.0 cm is nonavid.   IMPRESSION:  1. Hypermetabolic nodule in the superior segment of right lower lobe compatible with a malignancy; hypermetabolic right hilar node compatible with dejon involvement.  2. Soft tissue abutting the left ischium with specks of calcification and patchy activity appears to be related to a chronic inflammatory process; this can be further evaluated with MRI when clinically feasible.     2/3/23 MR Head  IMPRESSION:  -Indeterminate subcentimeter high FLAIR signal with associated tiny enhancement in the left temporal lobe as described above. Tiny metastasis not excluded. Follow-up exam recommended.  -No acute intracranial hemorrhage or acute infarct      1/18/2023 Pathology    Final Diagnosis  1. LYMPH NODE, R4, EBUS-GUIDED FNA    POSITIVE FOR MALIGNANT CELLS.  Squamous Cell Carcinoma.    The cytology slides are composed of syncytial sheets and single pleomorphic cells with irregular, hyperchromatic nuclei and dense cytoplasm in a background of necrosis and keratinized debris. No lymphoid cells present in the background.    2. LYMPH NODE, LEVEL 7, EBUS-GUIDED FNA  Squamous Cell Carcinoma.    The cytology slides are composed of syncytial sheets and single pleomorphic cells with irregular, hyperchromatic nuclei and dense  cytoplasm in a background of necrosis and keratinized debris. No lymphoid cells present in the background.      3. LYMPH NODE, R10, EBUS-GUIDED FNA  POSITIVE FOR MALIGNANT CELLS.  Metastatic squamous Cell Carcinoma.    The cytology slides are composed of syncytial sheets and single pleomorphic cells with irregular, hyperchromatic nuclei and dense cytoplasm in a background of necrosis and keratinized debris admixed with lymphoid cells.        4. LUNG, BRONCHOALVEOLAR LAVAGE  NEGATIVE FOR MALIGNANT CELLS.    The cytology slide and cell block are composed of groups of reactive ciliated bronchial epithelial cells, scattered alveolar macrophages and mixed inflammatory cells.        11/26/2022: PET/CT [facility]  -hyperactive right upper lobe mass with an SUV of 13.2, 2.3 x 2.6 cm  - Adjacent activity in the right hilar region with questionable metastatic adenopathy( SUV 6.4, 1.2 cm ).  -Previous 6 mm nodule in the anterior right lung base is no longer seen.    11/17/2022 CT Chest noncontrast : [facility]  -2.8 x 2.8 x 2.3 right upper lobe mass in the posterior segment  - Indeterminate nodule in anterior RLL measuring 6-7 mm  - Small nodule left upper lobe, measuring 3-4 mm  - Prominent nodes along right hilar structures, one of which shows similar density as right upper lobe mass , measuring 13 x 14x 11 mm, concerning for satellite lesion    11/11/2022 CXR at [facility]. Compared to a previous film from June 2022  -a new 3 cm nodular right upper lobe lesion.

## 2024-01-10 ENCOUNTER — RESULT REVIEW (OUTPATIENT)
Age: 84
End: 2024-01-10

## 2024-01-10 ENCOUNTER — APPOINTMENT (OUTPATIENT)
Dept: ORTHOPEDIC SURGERY | Facility: CLINIC | Age: 84
End: 2024-01-10

## 2024-01-10 ENCOUNTER — APPOINTMENT (OUTPATIENT)
Age: 84
End: 2024-01-10

## 2024-01-10 LAB
ALBUMIN SERPL ELPH-MCNC: 3.4 G/DL — SIGNIFICANT CHANGE UP (ref 3.3–5)
ALBUMIN SERPL ELPH-MCNC: 3.4 G/DL — SIGNIFICANT CHANGE UP (ref 3.3–5)
ALP SERPL-CCNC: 94 U/L — SIGNIFICANT CHANGE UP (ref 40–120)
ALP SERPL-CCNC: 94 U/L — SIGNIFICANT CHANGE UP (ref 40–120)
ALT FLD-CCNC: 8 U/L — LOW (ref 10–45)
ALT FLD-CCNC: 8 U/L — LOW (ref 10–45)
ANION GAP SERPL CALC-SCNC: 11 MMOL/L — SIGNIFICANT CHANGE UP (ref 5–17)
ANION GAP SERPL CALC-SCNC: 11 MMOL/L — SIGNIFICANT CHANGE UP (ref 5–17)
AST SERPL-CCNC: 13 U/L — SIGNIFICANT CHANGE UP (ref 10–40)
AST SERPL-CCNC: 13 U/L — SIGNIFICANT CHANGE UP (ref 10–40)
BASOPHILS # BLD AUTO: 0.1 K/UL — SIGNIFICANT CHANGE UP (ref 0–0.2)
BASOPHILS # BLD AUTO: 0.1 K/UL — SIGNIFICANT CHANGE UP (ref 0–0.2)
BASOPHILS NFR BLD AUTO: 0.8 % — SIGNIFICANT CHANGE UP (ref 0–2)
BASOPHILS NFR BLD AUTO: 0.8 % — SIGNIFICANT CHANGE UP (ref 0–2)
BILIRUB SERPL-MCNC: 0.3 MG/DL — SIGNIFICANT CHANGE UP (ref 0.2–1.2)
BILIRUB SERPL-MCNC: 0.3 MG/DL — SIGNIFICANT CHANGE UP (ref 0.2–1.2)
BUN SERPL-MCNC: 22 MG/DL — SIGNIFICANT CHANGE UP (ref 7–23)
BUN SERPL-MCNC: 22 MG/DL — SIGNIFICANT CHANGE UP (ref 7–23)
CALCIUM SERPL-MCNC: 9.3 MG/DL — SIGNIFICANT CHANGE UP (ref 8.4–10.5)
CALCIUM SERPL-MCNC: 9.3 MG/DL — SIGNIFICANT CHANGE UP (ref 8.4–10.5)
CEA SERPL-MCNC: 1 NG/ML — SIGNIFICANT CHANGE UP (ref 0–3.8)
CEA SERPL-MCNC: 1 NG/ML — SIGNIFICANT CHANGE UP (ref 0–3.8)
CHLORIDE SERPL-SCNC: 104 MMOL/L — SIGNIFICANT CHANGE UP (ref 96–108)
CHLORIDE SERPL-SCNC: 104 MMOL/L — SIGNIFICANT CHANGE UP (ref 96–108)
CO2 SERPL-SCNC: 24 MMOL/L — SIGNIFICANT CHANGE UP (ref 22–31)
CO2 SERPL-SCNC: 24 MMOL/L — SIGNIFICANT CHANGE UP (ref 22–31)
CREAT SERPL-MCNC: 0.99 MG/DL — SIGNIFICANT CHANGE UP (ref 0.5–1.3)
CREAT SERPL-MCNC: 0.99 MG/DL — SIGNIFICANT CHANGE UP (ref 0.5–1.3)
EGFR: 76 ML/MIN/1.73M2 — SIGNIFICANT CHANGE UP
EGFR: 76 ML/MIN/1.73M2 — SIGNIFICANT CHANGE UP
EOSINOPHIL # BLD AUTO: 0.1 K/UL — SIGNIFICANT CHANGE UP (ref 0–0.5)
EOSINOPHIL # BLD AUTO: 0.1 K/UL — SIGNIFICANT CHANGE UP (ref 0–0.5)
EOSINOPHIL NFR BLD AUTO: 1.6 % — SIGNIFICANT CHANGE UP (ref 0–6)
EOSINOPHIL NFR BLD AUTO: 1.6 % — SIGNIFICANT CHANGE UP (ref 0–6)
GLUCOSE SERPL-MCNC: 110 MG/DL — HIGH (ref 70–99)
GLUCOSE SERPL-MCNC: 110 MG/DL — HIGH (ref 70–99)
HCT VFR BLD CALC: 27.5 % — LOW (ref 39–50)
HCT VFR BLD CALC: 27.5 % — LOW (ref 39–50)
HGB BLD-MCNC: 8.6 G/DL — LOW (ref 13–17)
HGB BLD-MCNC: 8.6 G/DL — LOW (ref 13–17)
LYMPHOCYTES # BLD AUTO: 0.7 K/UL — LOW (ref 1–3.3)
LYMPHOCYTES # BLD AUTO: 0.7 K/UL — LOW (ref 1–3.3)
LYMPHOCYTES # BLD AUTO: 7.7 % — LOW (ref 13–44)
LYMPHOCYTES # BLD AUTO: 7.7 % — LOW (ref 13–44)
MCHC RBC-ENTMCNC: 25.6 PG — LOW (ref 27–34)
MCHC RBC-ENTMCNC: 25.6 PG — LOW (ref 27–34)
MCHC RBC-ENTMCNC: 31.4 G/DL — LOW (ref 32–36)
MCHC RBC-ENTMCNC: 31.4 G/DL — LOW (ref 32–36)
MCV RBC AUTO: 81.3 FL — SIGNIFICANT CHANGE UP (ref 80–100)
MCV RBC AUTO: 81.3 FL — SIGNIFICANT CHANGE UP (ref 80–100)
MONOCYTES # BLD AUTO: 0.8 K/UL — SIGNIFICANT CHANGE UP (ref 0–0.9)
MONOCYTES # BLD AUTO: 0.8 K/UL — SIGNIFICANT CHANGE UP (ref 0–0.9)
MONOCYTES NFR BLD AUTO: 8.9 % — SIGNIFICANT CHANGE UP (ref 2–14)
MONOCYTES NFR BLD AUTO: 8.9 % — SIGNIFICANT CHANGE UP (ref 2–14)
NEUTROPHILS # BLD AUTO: 7.2 K/UL — SIGNIFICANT CHANGE UP (ref 1.8–7.4)
NEUTROPHILS # BLD AUTO: 7.2 K/UL — SIGNIFICANT CHANGE UP (ref 1.8–7.4)
NEUTROPHILS NFR BLD AUTO: 81 % — HIGH (ref 43–77)
NEUTROPHILS NFR BLD AUTO: 81 % — HIGH (ref 43–77)
PLATELET # BLD AUTO: 396 K/UL — SIGNIFICANT CHANGE UP (ref 150–400)
PLATELET # BLD AUTO: 396 K/UL — SIGNIFICANT CHANGE UP (ref 150–400)
POTASSIUM SERPL-MCNC: 3.8 MMOL/L — SIGNIFICANT CHANGE UP (ref 3.5–5.3)
POTASSIUM SERPL-MCNC: 3.8 MMOL/L — SIGNIFICANT CHANGE UP (ref 3.5–5.3)
POTASSIUM SERPL-SCNC: 3.8 MMOL/L — SIGNIFICANT CHANGE UP (ref 3.5–5.3)
POTASSIUM SERPL-SCNC: 3.8 MMOL/L — SIGNIFICANT CHANGE UP (ref 3.5–5.3)
PROT SERPL-MCNC: 6.6 G/DL — SIGNIFICANT CHANGE UP (ref 6–8.3)
PROT SERPL-MCNC: 6.6 G/DL — SIGNIFICANT CHANGE UP (ref 6–8.3)
RBC # BLD: 3.38 M/UL — LOW (ref 4.2–5.8)
RBC # BLD: 3.38 M/UL — LOW (ref 4.2–5.8)
RBC # FLD: 16.5 % — HIGH (ref 10.3–14.5)
RBC # FLD: 16.5 % — HIGH (ref 10.3–14.5)
SODIUM SERPL-SCNC: 139 MMOL/L — SIGNIFICANT CHANGE UP (ref 135–145)
SODIUM SERPL-SCNC: 139 MMOL/L — SIGNIFICANT CHANGE UP (ref 135–145)
T4 FREE+ TSH PNL SERPL: 1.59 UIU/ML — SIGNIFICANT CHANGE UP (ref 0.27–4.2)
T4 FREE+ TSH PNL SERPL: 1.59 UIU/ML — SIGNIFICANT CHANGE UP (ref 0.27–4.2)
WBC # BLD: 8.9 K/UL — SIGNIFICANT CHANGE UP (ref 3.8–10.5)
WBC # BLD: 8.9 K/UL — SIGNIFICANT CHANGE UP (ref 3.8–10.5)
WBC # FLD AUTO: 8.9 K/UL — SIGNIFICANT CHANGE UP (ref 3.8–10.5)
WBC # FLD AUTO: 8.9 K/UL — SIGNIFICANT CHANGE UP (ref 3.8–10.5)

## 2024-01-12 ENCOUNTER — APPOINTMENT (OUTPATIENT)
Dept: PHYSICAL MEDICINE AND REHAB | Facility: CLINIC | Age: 84
End: 2024-01-12
Payer: MEDICARE

## 2024-01-12 DIAGNOSIS — Z74.09 OTHER REDUCED MOBILITY: ICD-10-CM

## 2024-01-12 PROCEDURE — 99213 OFFICE O/P EST LOW 20 MIN: CPT

## 2024-01-12 NOTE — HISTORY OF PRESENT ILLNESS
[FreeTextEntry1] : JONE SOUTH is an 83 year old male who was diagnosed with a squamous cell Carcinoma Lung at the age of 81 yo in Jan 2023 Patient with a PMHX of HTN, HLD, COPD, ASHD, 40 ppk year smoking history ( quit 2002).  On 1/18/23, Flexible bronchoscopy, robotic-assisted bronchoscopy with the ion system, endobronchial ultrasound with transbronchial needle aspiration, endobronchial brushing, endobronchial biopsy Path c/w Squamous cell carcinoma lung with involvement of Hilar/ Mediastinal LN ( R4/ R10 and level 7).  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  Definitive treatment with concurrent chemoRT with Carbo AUC 2 and taxol 50 mg/ m2 weekly x 6-8 cycles with RT, completed April 2023, now Immunotherapy with Durvalumab for 1 year.  He reports that he still is having difficulty with his functional mobility.  Using a walker.  Denies any acute worsening of weakness.  No bowel bladder dysfunction or saddle anesthesia.  Denies any significant pain.  Following up with neurosurgery.

## 2024-01-12 NOTE — PHYSICAL EXAM
[FreeTextEntry1] : Gen: Patient is A&O x 3, NAD HEENT: EOMI, hearing grossly normal Resp: regular, non - labored CV: pulses regular Skin: no rashes, erythema Lymph: no clubbing, cyanosis, edema Inspection: no instability  ROM: severely restricted in shoulder abduction due to pain Palpation: TTP b/l GH, crepitus to palpation  Sensation: Mild decrease to light touch in feet and hands  Reflexes: 1+ and symmetric throughout Strength: 4/5 in shoulder abduction, 5/5 in elbow flexion, 4/5 elbow extension, 3/5 wrist extension, 4/5 finger flexion, 4/5 in b/l LE Special tests: -Right straight leg raise, -Hoffmans sign, -Spurling sign, no clonus  Gait: unable to stand tandem, RW

## 2024-01-12 NOTE — ASSESSMENT
[FreeTextEntry1] : 83 year old male presenting for evaluation.  #Impaired mobility: -Hold PT -Continue to follow with ortho-spine, pending surgery if obtains clearances  -Educated on red flag signs for which to report to ER -Educated on rehabilitation program post-surgery    Follow up after surgery or sooner as needed.

## 2024-01-18 ENCOUNTER — APPOINTMENT (OUTPATIENT)
Dept: RADIATION ONCOLOGY | Facility: CLINIC | Age: 84
End: 2024-01-18
Payer: MEDICARE

## 2024-01-18 ENCOUNTER — APPOINTMENT (OUTPATIENT)
Dept: ORTHOPEDIC SURGERY | Facility: CLINIC | Age: 84
End: 2024-01-18
Payer: MEDICARE

## 2024-01-18 ENCOUNTER — OUTPATIENT (OUTPATIENT)
Dept: OUTPATIENT SERVICES | Facility: HOSPITAL | Age: 84
LOS: 1 days | Discharge: HOME | End: 2024-01-18

## 2024-01-18 VITALS
BODY MASS INDEX: 26.48 KG/M2 | WEIGHT: 185 LBS | SYSTOLIC BLOOD PRESSURE: 129 MMHG | DIASTOLIC BLOOD PRESSURE: 71 MMHG | HEART RATE: 69 BPM | HEIGHT: 70 IN

## 2024-01-18 DIAGNOSIS — Z96.642 PRESENCE OF LEFT ARTIFICIAL HIP JOINT: Chronic | ICD-10-CM

## 2024-01-18 DIAGNOSIS — C34.90 MALIGNANT NEOPLASM OF UNSPECIFIED PART OF UNSPECIFIED BRONCHUS OR LUNG: ICD-10-CM

## 2024-01-18 DIAGNOSIS — Z92.3 PERSONAL HISTORY OF IRRADIATION: ICD-10-CM

## 2024-01-18 DIAGNOSIS — Z96.641 PRESENCE OF RIGHT ARTIFICIAL HIP JOINT: Chronic | ICD-10-CM

## 2024-01-18 DIAGNOSIS — M48.062 SPINAL STENOSIS, LUMBAR REGION WITH NEUROGENIC CLAUDICATION: ICD-10-CM

## 2024-01-18 PROCEDURE — 99214 OFFICE O/P EST MOD 30 MIN: CPT

## 2024-01-18 PROCEDURE — 99215 OFFICE O/P EST HI 40 MIN: CPT | Mod: 57

## 2024-01-18 NOTE — REASON FOR VISIT
[Follow-Up Visit] : a follow-up visit for [Back Pain] : back pain [Neck Pain] : neck pain [Spouse] : spouse [Family Member] : family member

## 2024-01-18 NOTE — HISTORY OF PRESENT ILLNESS
[de-identified] : Chief Complaint: Decreased ambulatory status, progressive decline   History of Present Illness: Patient is here today for follow-up bilateral the patient on December 12, 2023 and at that time he was diagnosed with cervical and thoracic myelopathy due to significant and rapid decrease in overall functional status over the past 4 months.  This is included upper and lower extremity strength and function.  He has progressed from using no assistance to a cane and now to a rolling upright walker which she needs in order to ambulate due to difficulty with gait balance is also complaining of dexterity issues in his bilateral hands difficulty with fine motor tasks and buttoning his shirt putting on his shoes.  In terms of pain he mainly complains of neck pain and right leg pain.  He has a history of lumbar spinal stenosis as well at multiple levels in the lower lumbar spine which I believe is causing his neurogenic claudication and severe right leg symptoms.  During his last visit we had discussed that given the amount of compression in his cervical and thoracic spine that this was likely the primary cause of his rapid decline in his overall function and ambulatory status.  He has multiple medical comorbidities and I encouraged him to follow-up with his primary care doctor, cardiologist, vascular doctor and his hematologist oncologist for clearance is for possible C5-C6 ACDF and a T11-T12 thoracic laminectomy.  I also encouraged him to obtain a second opinion from a orthopedic spine versus neurosurgeon.  This was obtained by Dr. Neri Levy, who recommended a C5-C6 ACDF pending clearances.  I reach out to his hematologist oncologist and she demonstrated that he has a favorable prognosis with remission of his lung cancer and currently he is on a maintenance medication and has a life expectancy of disease-free survival of 60% over 5 years.  He is currently on Eliquis and Plavix and he has a follow-up appointment with his vascular doctor regarding possible discontinuing of his Eliquis.  After discussion with his family and his wife they would like to pursue surgical intervention at this time.   Past medical history, past surgical history, medications, allergies, social history, and family history are as documented in our records today.  Notable items include: None   Review of Systems: I have reviewed the patient's documented Review of Systems data today, I concur with this documentation.

## 2024-01-18 NOTE — PHYSICAL EXAM
[de-identified] : Cervical Spine Exam: There is tenderness palpation in the bilateral paraspinal cervical musculature with some hypertonicity throughout Shoulder Abduction (C5): 3/5 B/L, only able to abduct to 30 deg 2/2 B/L shoulder OA Biceps (C6): 4/5 B/L Wrist Extension (C6): 4/5 B/L Triceps (C7): 4/5 RUE, 4/5 LUE Wrist Flexion (C7): 4/5 B/L Finger Adduction/Abduction (C8/T1): 3+/5 B/L Sensation: SILT C5-T1 bilateral Reflexes: 2+ reflexes Bicep, 3+Triceps B/L LLE: 3+ Quad reflex RLE: 0 Quad reflex   Hip Flexion: 4-/5 BLLE Knee extension: 4/5 RLE, 5/5 LLE Dorsiflexion: 4/5 RLE, 5/5 LLE Plantarflexion: 4/5 RLE, 5/5 LLE   Gait: Short shuffling gait without walker, patient uses walker at baseline Unable to perform tandem gait  Special Testing: + SLR RLE Inverted brachioradialis reflex bilateral Positive Spurlings recreating neck pain negative clonus BL LE negative Hoffmans B/L UE Positive Babinski B/L  [de-identified] : MRI cervical spine performed on 12/5/2023 in [facility] PACS axial and sagittal T1 and T2, STIR weighted images demonstrates there is severe bilateral foraminal stenosis with moderate central stenosis at C3-C4 C4-5 trace anterolisthesis mild central stenosis right greater than left foraminal stenosis C5-6 demonstrates severe central stenosis due to broad-based disc bulge/osteophyte complex there is also ligamentum flavum hypertrophy with severe bilateral foraminal stenosis C6-C7 broad-based disc bulge osteophyte complex causing moderate to severe central stenosis with bilateral foraminal stenosis C7-T1 with no significant central or foraminal stenosis  MRI Thoracic spine performed on 12/5/2023 in [employer] PACS axial and sagittal T1 and T2, STIR weighted images there is multilevel spondylosis, a severe central stenosis at T11-T12 with a disc osteophyte complex and posterior ligamentum flavum hypertrophy, endplate Modic changes at this level with significant disc space degeneration. Possible cord intrinsic changes at this level indicative of myelomalacia.

## 2024-01-18 NOTE — ASSESSMENT
[FreeTextEntry1] : 83-year-old male with cervical and thoracic myelopathy  On neurological exam he is notable for decreased hand coordination, decreased balance and functional decline, positive Babinski bilateral. We had extensive discussion regarding his treatment options at this time. We discussed that there is increased Incidence and instability or signs of spinal cord compression leading to myelopathy which over time will continue to get worse. We discussed that unfortunately given that the etiology of his symptoms is likely coming from his cervical or thoracic spine, the treatment is necessarily surgical and would involve a decompression and fusion of the cervical spine and a laminectomy of his thoracic spine. We discussed that the likely surgical option would be a thoracic laminectomy at T11 and T12, likely during the same procedure we will also perform a C5-C6 anterior cervical discectomy and fusion. We discussed that the alternatives of surgery would include physical therapy and pain management but given the progressive nature of this disease especially when myelopathic symptoms have arisen, there is a chance that his myelopathy will continue to worsen which could lead to dysfunction of the arms and legs and him severe cases permanent weakness or inability to ambulate or use on his arms or hands.  I had an extensive discussion of risks, benefits, and alternatives of surgery. Alternatives include no surgery, physical therapy, and pain management. Benefits of surgery include decompression of spinal cord and nerve roots, possible improvement of Upper extremity and lower extremity strength, possible improvement in gait and balance, and to prevent further worsening of decompensation and possible paralysis. Risks were thoroughly discussed and include but are not limited to bleeding, infection, pain, motor/sensory deficits, difficulty swallowing, hoarse speech, cerebrospinal fluid leak, spinal stroke, paralysis, death, risks of anesthesia, and possible need for repeat or subsequent surgery. Adverse effects and complications can be temporary or permanent. No guarantees of success were stated or implied.   We also discussed with the patient complications of incisions directly related to previous cancer, diabetes, previous wound complications or post-surgical wound infections, smoking, neuropathy, and chronic anticoagulation. This risk has been specifically discussed and the patient will discuss modifiable risk factors to be optimized prior to surgical management. A multimodality approach of primary care physician, and medicine subspecialists will be utilized to optimize medical risk factors. Patient verbalized understanding of the risks and benefits and agrees to proceed with surgery.

## 2024-01-18 NOTE — DISCUSSION/SUMMARY
[de-identified] : MRI of thoracic or lumbar spine, , to count from S1 up to confirm T11-T12 pathology for surgical planning We will plan for a thoracic decompression with or without addressing his cervical spine during the same procedure versus postponing until he recovers from his thoracic laminectomy.  He will follow-up with his primary care doctor as well as his cardiologist and his vascular surgeon if he is able to come off of his Eliquis then this will allow us more flexibility in performing multiple procedures as he recovers however if he is unable to come off the Eliquis that he needs to remain on long-term we will likely consider performing a C5-C6 ACDF and a T11-T12 laminectomy during the same procedure to limit time off of his blood thinners.  Evaluating his labs his hemoglobin demonstrates he has chronic anemia, and he may possibly need a blood transfusion prior to or postoperatively however I discussed with Duane and his wife that both procedures I do not expect significant blood loss, however if he still is on a blood thinner then this is something that we would need to address pre and postoperatively. My staff will reach out to Duane regarding the presurgical testing and clearances needed we will also discuss a plan date for surgery. I again discussed with them before leaving that if he is unable to obtain surgical clearance and become optimized prior to surgery then we cannot move forward with the planned procedure as the risk significantly outweigh the benefits.

## 2024-01-20 PROBLEM — Z92.3 PERSONAL HISTORY OF RADIATION THERAPY: Status: ACTIVE | Noted: 2023-06-09

## 2024-01-20 NOTE — DISEASE MANAGEMENT
[Clinical] : TNM Stage: c [TTNM] : 2 [NTNM] : 1 [MTNM] : 0 [III] : III [de-identified] : 6000cGy [de-identified] : right lung

## 2024-01-20 NOTE — LETTER CLOSING
[Sincerely yours,] : Sincerely yours, [FreeTextEntry3] : Shakir James MD Physician in Chief Department of Radiation Medicine [facility]   of Radiation Medicine [employer] at  [facility]  Radiation  [facility]/ [facility] at [place] [address]  Tel: [phone] Fax: [phone]

## 2024-01-20 NOTE — LETTER GREETING
Statement Selected [Dear Doctor] : Dear Doctor, [Follow-Up] : Your patient, [unfilled] was seen in my office today for follow-up [Please see my note below.] : Please see my note below. [FreeTextEntry2] : Reyna Rosa MD

## 2024-01-20 NOTE — HISTORY OF PRESENT ILLNESS
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 He is s/p Chemo/RT- Carbol/Taxol 6 cycles and RT to right lung to a total of 6000 cGy in 30 fractions, completed 4/10/2023. Mr. Macias presents today in follow up visit. Currently on Durvalumab since June 2023 , tolerating well. Recent CT of C/A/P demonstrated regression of lung cancer , new small pleural effusion and no evidence of metastasis.  Patient has plans for extensive spinal surgery for degenerative disease with Dr. Edwin Gonzalez.

## 2024-01-20 NOTE — ASSESSMENT
[No evidence of disease] : No evidence of disease [FreeTextEntry1] : minimal treatment related sequelae

## 2024-01-20 NOTE — PHYSICAL EXAM
[Normal] : oriented to person, place and time, the affect was normal, the mood was normal and not anxious [de-identified] : some limitations in spinal ROM [de-identified] : diffuse echymosis

## 2024-01-20 NOTE — VITALS
[Maximal Pain Intensity: 4/10] : 4/10 [Least Pain Intensity: 1/10] : 1/10 [Pain Location: ___] : Pain Location: [unfilled] [Adjuvant (neuroleptics)] : adjuvant (neuroleptics) [80: Normal activity with effort; some signs or symptoms of disease.] : 80: Normal activity with effort; some signs or symptoms of disease.  [ECOG Performance Status: 1 - Restricted in physically strenuous activity but ambulatory and able to carry out work of a light or sedentary nature] : Performance Status: 1 - Restricted in physically strenuous activity but ambulatory and able to carry out work of a light or sedentary nature, e.g., light house work, office work

## 2024-01-24 ENCOUNTER — RESULT REVIEW (OUTPATIENT)
Age: 84
End: 2024-01-24

## 2024-01-24 ENCOUNTER — APPOINTMENT (OUTPATIENT)
Age: 84
End: 2024-01-24

## 2024-01-24 LAB
BASOPHILS # BLD AUTO: 0.1 K/UL — SIGNIFICANT CHANGE UP (ref 0–0.2)
BASOPHILS NFR BLD AUTO: 1.4 % — SIGNIFICANT CHANGE UP (ref 0–2)
EOSINOPHIL # BLD AUTO: 0.3 K/UL — SIGNIFICANT CHANGE UP (ref 0–0.5)
EOSINOPHIL NFR BLD AUTO: 4.2 % — SIGNIFICANT CHANGE UP (ref 0–6)
HCT VFR BLD CALC: 28.3 % — LOW (ref 39–50)
HGB BLD-MCNC: 8.6 G/DL — LOW (ref 13–17)
LYMPHOCYTES # BLD AUTO: 0.7 K/UL — LOW (ref 1–3.3)
LYMPHOCYTES # BLD AUTO: 9 % — LOW (ref 13–44)
MCHC RBC-ENTMCNC: 25.2 PG — LOW (ref 27–34)
MCHC RBC-ENTMCNC: 30.5 G/DL — LOW (ref 32–36)
MCV RBC AUTO: 82.5 FL — SIGNIFICANT CHANGE UP (ref 80–100)
MONOCYTES # BLD AUTO: 0.9 K/UL — SIGNIFICANT CHANGE UP (ref 0–0.9)
MONOCYTES NFR BLD AUTO: 11.1 % — SIGNIFICANT CHANGE UP (ref 2–14)
NEUTROPHILS # BLD AUTO: 5.7 K/UL — SIGNIFICANT CHANGE UP (ref 1.8–7.4)
NEUTROPHILS NFR BLD AUTO: 74.2 % — SIGNIFICANT CHANGE UP (ref 43–77)
PLATELET # BLD AUTO: 344 K/UL — SIGNIFICANT CHANGE UP (ref 150–400)
RBC # BLD: 3.43 M/UL — LOW (ref 4.2–5.8)
RBC # FLD: 16.2 % — HIGH (ref 10.3–14.5)
WBC # BLD: 7.7 K/UL — SIGNIFICANT CHANGE UP (ref 3.8–10.5)
WBC # FLD AUTO: 7.7 K/UL — SIGNIFICANT CHANGE UP (ref 3.8–10.5)

## 2024-01-25 DIAGNOSIS — Z51.11 ENCOUNTER FOR ANTINEOPLASTIC CHEMOTHERAPY: ICD-10-CM

## 2024-01-25 LAB
ALBUMIN SERPL ELPH-MCNC: 3.7 G/DL — SIGNIFICANT CHANGE UP (ref 3.3–5)
ALP SERPL-CCNC: 90 U/L — SIGNIFICANT CHANGE UP (ref 40–120)
ALT FLD-CCNC: 7 U/L — LOW (ref 10–45)
ANION GAP SERPL CALC-SCNC: 11 MMOL/L — SIGNIFICANT CHANGE UP (ref 5–17)
AST SERPL-CCNC: 15 U/L — SIGNIFICANT CHANGE UP (ref 10–40)
BILIRUB SERPL-MCNC: 0.2 MG/DL — SIGNIFICANT CHANGE UP (ref 0.2–1.2)
BUN SERPL-MCNC: 32 MG/DL — HIGH (ref 7–23)
CALCIUM SERPL-MCNC: 9.5 MG/DL — SIGNIFICANT CHANGE UP (ref 8.4–10.5)
CEA SERPL-MCNC: 0.8 NG/ML — SIGNIFICANT CHANGE UP (ref 0–3.8)
CHLORIDE SERPL-SCNC: 106 MMOL/L — SIGNIFICANT CHANGE UP (ref 96–108)
CO2 SERPL-SCNC: 25 MMOL/L — SIGNIFICANT CHANGE UP (ref 22–31)
CREAT SERPL-MCNC: 1.06 MG/DL — SIGNIFICANT CHANGE UP (ref 0.5–1.3)
EGFR: 70 ML/MIN/1.73M2 — SIGNIFICANT CHANGE UP
GLUCOSE SERPL-MCNC: 105 MG/DL — HIGH (ref 70–99)
POTASSIUM SERPL-MCNC: 3.8 MMOL/L — SIGNIFICANT CHANGE UP (ref 3.5–5.3)
POTASSIUM SERPL-SCNC: 3.8 MMOL/L — SIGNIFICANT CHANGE UP (ref 3.5–5.3)
PROT SERPL-MCNC: 7.1 G/DL — SIGNIFICANT CHANGE UP (ref 6–8.3)
SODIUM SERPL-SCNC: 142 MMOL/L — SIGNIFICANT CHANGE UP (ref 135–145)
T4 FREE+ TSH PNL SERPL: 1.87 UIU/ML — SIGNIFICANT CHANGE UP (ref 0.27–4.2)

## 2024-02-02 ENCOUNTER — APPOINTMENT (OUTPATIENT)
Dept: PULMONOLOGY | Facility: CLINIC | Age: 84
End: 2024-02-02

## 2024-02-07 ENCOUNTER — RESULT REVIEW (OUTPATIENT)
Age: 84
End: 2024-02-07

## 2024-02-07 ENCOUNTER — APPOINTMENT (OUTPATIENT)
Age: 84
End: 2024-02-07

## 2024-02-07 ENCOUNTER — APPOINTMENT (OUTPATIENT)
Dept: HEMATOLOGY ONCOLOGY | Facility: CLINIC | Age: 84
End: 2024-02-07
Payer: MEDICARE

## 2024-02-07 DIAGNOSIS — Z29.89 ENCOUNTER. FOR OTHER SPECIFIED PROPHYLACTIC MEASURES: ICD-10-CM

## 2024-02-07 LAB
ALBUMIN SERPL ELPH-MCNC: 3.4 G/DL — SIGNIFICANT CHANGE UP (ref 3.3–5)
ALP SERPL-CCNC: 91 U/L — SIGNIFICANT CHANGE UP (ref 40–120)
ALT FLD-CCNC: 7 U/L — LOW (ref 10–45)
ANION GAP SERPL CALC-SCNC: 12 MMOL/L — SIGNIFICANT CHANGE UP (ref 5–17)
AST SERPL-CCNC: 15 U/L — SIGNIFICANT CHANGE UP (ref 10–40)
BASOPHILS # BLD AUTO: 0.1 K/UL — SIGNIFICANT CHANGE UP (ref 0–0.2)
BASOPHILS NFR BLD AUTO: 1 % — SIGNIFICANT CHANGE UP (ref 0–2)
BILIRUB SERPL-MCNC: 0.2 MG/DL — SIGNIFICANT CHANGE UP (ref 0.2–1.2)
BUN SERPL-MCNC: 23 MG/DL — SIGNIFICANT CHANGE UP (ref 7–23)
CALCIUM SERPL-MCNC: 9.3 MG/DL — SIGNIFICANT CHANGE UP (ref 8.4–10.5)
CEA SERPL-MCNC: 0.9 NG/ML — SIGNIFICANT CHANGE UP (ref 0–3.8)
CHLORIDE SERPL-SCNC: 105 MMOL/L — SIGNIFICANT CHANGE UP (ref 96–108)
CO2 SERPL-SCNC: 24 MMOL/L — SIGNIFICANT CHANGE UP (ref 22–31)
CREAT SERPL-MCNC: 0.92 MG/DL — SIGNIFICANT CHANGE UP (ref 0.5–1.3)
EGFR: 83 ML/MIN/1.73M2 — SIGNIFICANT CHANGE UP
EOSINOPHIL # BLD AUTO: 0.2 K/UL — SIGNIFICANT CHANGE UP (ref 0–0.5)
EOSINOPHIL NFR BLD AUTO: 2.1 % — SIGNIFICANT CHANGE UP (ref 0–6)
GLUCOSE SERPL-MCNC: 98 MG/DL — SIGNIFICANT CHANGE UP (ref 70–99)
HCT VFR BLD CALC: 28.1 % — LOW (ref 39–50)
HGB BLD-MCNC: 8.3 G/DL — LOW (ref 13–17)
LYMPHOCYTES # BLD AUTO: 0.4 K/UL — LOW (ref 1–3.3)
LYMPHOCYTES # BLD AUTO: 5 % — LOW (ref 13–44)
MCHC RBC-ENTMCNC: 23.3 PG — LOW (ref 27–34)
MCHC RBC-ENTMCNC: 29.4 G/DL — LOW (ref 32–36)
MCV RBC AUTO: 79.1 FL — LOW (ref 80–100)
MONOCYTES # BLD AUTO: 1 K/UL — HIGH (ref 0–0.9)
MONOCYTES NFR BLD AUTO: 10.7 % — SIGNIFICANT CHANGE UP (ref 2–14)
NEUTROPHILS # BLD AUTO: 7.2 K/UL — SIGNIFICANT CHANGE UP (ref 1.8–7.4)
NEUTROPHILS NFR BLD AUTO: 81.1 % — HIGH (ref 43–77)
PLATELET # BLD AUTO: 359 K/UL — SIGNIFICANT CHANGE UP (ref 150–400)
POTASSIUM SERPL-MCNC: 3.9 MMOL/L — SIGNIFICANT CHANGE UP (ref 3.5–5.3)
POTASSIUM SERPL-SCNC: 3.9 MMOL/L — SIGNIFICANT CHANGE UP (ref 3.5–5.3)
PROT SERPL-MCNC: 6.9 G/DL — SIGNIFICANT CHANGE UP (ref 6–8.3)
RBC # BLD: 3.55 M/UL — LOW (ref 4.2–5.8)
RBC # FLD: 16.8 % — HIGH (ref 10.3–14.5)
SODIUM SERPL-SCNC: 141 MMOL/L — SIGNIFICANT CHANGE UP (ref 135–145)
T4 FREE+ TSH PNL SERPL: 1.92 UIU/ML — SIGNIFICANT CHANGE UP (ref 0.27–4.2)
WBC # BLD: 8.9 K/UL — SIGNIFICANT CHANGE UP (ref 3.8–10.5)
WBC # FLD AUTO: 8.9 K/UL — SIGNIFICANT CHANGE UP (ref 3.8–10.5)

## 2024-02-07 PROCEDURE — 99214 OFFICE O/P EST MOD 30 MIN: CPT

## 2024-02-21 ENCOUNTER — RESULT REVIEW (OUTPATIENT)
Age: 84
End: 2024-02-21

## 2024-02-21 ENCOUNTER — APPOINTMENT (OUTPATIENT)
Age: 84
End: 2024-02-21

## 2024-02-21 LAB
ALBUMIN SERPL ELPH-MCNC: 3.5 G/DL — SIGNIFICANT CHANGE UP (ref 3.3–5)
ALP SERPL-CCNC: 94 U/L — SIGNIFICANT CHANGE UP (ref 40–120)
ALT FLD-CCNC: 9 U/L — LOW (ref 10–45)
ANION GAP SERPL CALC-SCNC: 12 MMOL/L — SIGNIFICANT CHANGE UP (ref 5–17)
AST SERPL-CCNC: 15 U/L — SIGNIFICANT CHANGE UP (ref 10–40)
BASOPHILS # BLD AUTO: 0.1 K/UL — SIGNIFICANT CHANGE UP (ref 0–0.2)
BASOPHILS NFR BLD AUTO: 0.9 % — SIGNIFICANT CHANGE UP (ref 0–2)
BILIRUB SERPL-MCNC: 0.3 MG/DL — SIGNIFICANT CHANGE UP (ref 0.2–1.2)
BUN SERPL-MCNC: 26 MG/DL — HIGH (ref 7–23)
CALCIUM SERPL-MCNC: 9.3 MG/DL — SIGNIFICANT CHANGE UP (ref 8.4–10.5)
CEA SERPL-MCNC: 0.9 NG/ML — SIGNIFICANT CHANGE UP (ref 0–3.8)
CHLORIDE SERPL-SCNC: 105 MMOL/L — SIGNIFICANT CHANGE UP (ref 96–108)
CO2 SERPL-SCNC: 23 MMOL/L — SIGNIFICANT CHANGE UP (ref 22–31)
CREAT SERPL-MCNC: 0.92 MG/DL — SIGNIFICANT CHANGE UP (ref 0.5–1.3)
EGFR: 83 ML/MIN/1.73M2 — SIGNIFICANT CHANGE UP
EOSINOPHIL # BLD AUTO: 0.3 K/UL — SIGNIFICANT CHANGE UP (ref 0–0.5)
EOSINOPHIL NFR BLD AUTO: 3.1 % — SIGNIFICANT CHANGE UP (ref 0–6)
GLUCOSE SERPL-MCNC: 108 MG/DL — HIGH (ref 70–99)
HCT VFR BLD CALC: 27.3 % — LOW (ref 39–50)
HGB BLD-MCNC: 8.5 G/DL — LOW (ref 13–17)
LYMPHOCYTES # BLD AUTO: 0.5 K/UL — LOW (ref 1–3.3)
LYMPHOCYTES # BLD AUTO: 6.2 % — LOW (ref 13–44)
MCHC RBC-ENTMCNC: 24.2 PG — LOW (ref 27–34)
MCHC RBC-ENTMCNC: 31.2 G/DL — LOW (ref 32–36)
MCV RBC AUTO: 77.5 FL — LOW (ref 80–100)
MONOCYTES # BLD AUTO: 0.8 K/UL — SIGNIFICANT CHANGE UP (ref 0–0.9)
MONOCYTES NFR BLD AUTO: 9.7 % — SIGNIFICANT CHANGE UP (ref 2–14)
NEUTROPHILS # BLD AUTO: 6.5 K/UL — SIGNIFICANT CHANGE UP (ref 1.8–7.4)
NEUTROPHILS NFR BLD AUTO: 80.1 % — HIGH (ref 43–77)
PLATELET # BLD AUTO: 314 K/UL — SIGNIFICANT CHANGE UP (ref 150–400)
POTASSIUM SERPL-MCNC: 3.7 MMOL/L — SIGNIFICANT CHANGE UP (ref 3.5–5.3)
POTASSIUM SERPL-SCNC: 3.7 MMOL/L — SIGNIFICANT CHANGE UP (ref 3.5–5.3)
PROT SERPL-MCNC: 7 G/DL — SIGNIFICANT CHANGE UP (ref 6–8.3)
RBC # BLD: 3.52 M/UL — LOW (ref 4.2–5.8)
RBC # FLD: 16.6 % — HIGH (ref 10.3–14.5)
SODIUM SERPL-SCNC: 140 MMOL/L — SIGNIFICANT CHANGE UP (ref 135–145)
T4 FREE+ TSH PNL SERPL: 1.9 UIU/ML — SIGNIFICANT CHANGE UP (ref 0.27–4.2)
WBC # BLD: 8.1 K/UL — SIGNIFICANT CHANGE UP (ref 3.8–10.5)
WBC # FLD AUTO: 8.1 K/UL — SIGNIFICANT CHANGE UP (ref 3.8–10.5)

## 2024-02-23 ENCOUNTER — APPOINTMENT (OUTPATIENT)
Dept: VASCULAR SURGERY | Facility: CLINIC | Age: 84
End: 2024-02-23
Payer: MEDICARE

## 2024-02-23 VITALS
RESPIRATION RATE: 14 BRPM | TEMPERATURE: 97 F | DIASTOLIC BLOOD PRESSURE: 63 MMHG | OXYGEN SATURATION: 96 % | WEIGHT: 183 LBS | HEIGHT: 70 IN | SYSTOLIC BLOOD PRESSURE: 129 MMHG | BODY MASS INDEX: 26.2 KG/M2 | HEART RATE: 69 BPM

## 2024-02-23 PROCEDURE — 99212 OFFICE O/P EST SF 10 MIN: CPT

## 2024-02-23 PROCEDURE — 93971 EXTREMITY STUDY: CPT

## 2024-02-26 NOTE — REVIEW OF SYSTEMS
[Lower Ext Edema] : lower extremity edema [Negative] : Neurological [Fever] : no fever [Chills] : no chills [Chest Pain] : no chest pain [Leg Claudication] : no intermittent leg claudication [Shortness Of Breath] : no shortness of breath

## 2024-02-26 NOTE — ASSESSMENT
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 He denies chest pain, shortness of breath. US Doppler in office today revealed chronic changes in the SSV, however calf vessels could not be visualized secondary to edema. Advise patient to continue his eliquis and current medications. Follow up with Heme/Onc. RTC in 1mo for carotid surveillance.

## 2024-02-26 NOTE — PHYSICAL EXAM
[2+] : right 2+ [1+] : left 1+ [Ankle Swelling (On Exam)] : present [Ankle Swelling On The Left] : moderate [No Rash or Lesion] : No rash or lesion [Alert] : alert [Oriented to Person] : oriented to person [Oriented to Place] : oriented to place [Oriented to Time] : oriented to time [Calm] : calm [] : not present [Varicose Veins Of Lower Extremities] : not present [Abdomen Tenderness] : ~T ~M No abdominal tenderness [de-identified] : Appears well, in no acute distress. [de-identified] : normocephalic, atraumatic [de-identified] :  supple, no masses. [de-identified] :   normal respiratory effort. unlabored breathing. [de-identified] :  normal s1/s2 [de-identified] : LLE edema, no ulcers

## 2024-02-26 NOTE — DATA REVIEWED
[FreeTextEntry1] : US Duplex of the LLE was unable to visualize calf vessels due to edema, chronic changes seen in the SSV.

## 2024-02-26 NOTE — HISTORY OF PRESENT ILLNESS
[FreeTextEntry1] :  82 yo male  prior patient of Dr. Abdalla with PMH of b/l carotid HTN, HLD, COPD, ASHD, 40 pk/yr smoking history (quit 2002) diagnosed with a squamous cell Carcinoma Lung at in Jan 2023. Patient had a Left below knee peroneal DVT despite being on prior ac. He is currently on Eliquis 5mg twice daily. He presents to clinic today with hopes of discontinuing his AC. He reports residual swelling in the LLE. Denies leg pain, heaviness, chest pain, shortness of breath, dizziness, symptoms of stroke/TIA. He has completed chemotherapy and has started immunotherapy. Reports that he will be spinal surgery.

## 2024-03-04 ENCOUNTER — APPOINTMENT (OUTPATIENT)
Dept: CT IMAGING | Facility: CLINIC | Age: 84
End: 2024-03-04

## 2024-03-04 ENCOUNTER — OUTPATIENT (OUTPATIENT)
Dept: OUTPATIENT SERVICES | Facility: HOSPITAL | Age: 84
LOS: 1 days | End: 2024-03-04
Payer: MEDICARE

## 2024-03-04 DIAGNOSIS — Z96.642 PRESENCE OF LEFT ARTIFICIAL HIP JOINT: Chronic | ICD-10-CM

## 2024-03-04 DIAGNOSIS — C34.90 MALIGNANT NEOPLASM OF UNSPECIFIED PART OF UNSPECIFIED BRONCHUS OR LUNG: ICD-10-CM

## 2024-03-04 PROCEDURE — 74177 CT ABD & PELVIS W/CONTRAST: CPT | Mod: 26

## 2024-03-04 PROCEDURE — 71260 CT THORAX DX C+: CPT | Mod: 26

## 2024-03-06 ENCOUNTER — RESULT REVIEW (OUTPATIENT)
Age: 84
End: 2024-03-06

## 2024-03-06 ENCOUNTER — APPOINTMENT (OUTPATIENT)
Age: 84
End: 2024-03-06

## 2024-03-06 LAB
ALBUMIN SERPL ELPH-MCNC: 3.6 G/DL — SIGNIFICANT CHANGE UP (ref 3.3–5)
ALP SERPL-CCNC: 95 U/L — SIGNIFICANT CHANGE UP (ref 40–120)
ALT FLD-CCNC: 8 U/L — LOW (ref 10–45)
ANION GAP SERPL CALC-SCNC: 12 MMOL/L — SIGNIFICANT CHANGE UP (ref 5–17)
AST SERPL-CCNC: 22 U/L — SIGNIFICANT CHANGE UP (ref 10–40)
BASOPHILS # BLD AUTO: 0.1 K/UL — SIGNIFICANT CHANGE UP (ref 0–0.2)
BASOPHILS NFR BLD AUTO: 1 % — SIGNIFICANT CHANGE UP (ref 0–2)
BILIRUB SERPL-MCNC: 0.3 MG/DL — SIGNIFICANT CHANGE UP (ref 0.2–1.2)
BUN SERPL-MCNC: 20 MG/DL — SIGNIFICANT CHANGE UP (ref 7–23)
CALCIUM SERPL-MCNC: 9.3 MG/DL — SIGNIFICANT CHANGE UP (ref 8.4–10.5)
CEA SERPL-MCNC: 1 NG/ML — SIGNIFICANT CHANGE UP (ref 0–3.8)
CHLORIDE SERPL-SCNC: 104 MMOL/L — SIGNIFICANT CHANGE UP (ref 96–108)
CO2 SERPL-SCNC: 23 MMOL/L — SIGNIFICANT CHANGE UP (ref 22–31)
CREAT SERPL-MCNC: 0.85 MG/DL — SIGNIFICANT CHANGE UP (ref 0.5–1.3)
EGFR: 87 ML/MIN/1.73M2 — SIGNIFICANT CHANGE UP
EOSINOPHIL # BLD AUTO: 0.2 K/UL — SIGNIFICANT CHANGE UP (ref 0–0.5)
EOSINOPHIL NFR BLD AUTO: 2 % — SIGNIFICANT CHANGE UP (ref 0–6)
GLUCOSE SERPL-MCNC: 103 MG/DL — HIGH (ref 70–99)
HCT VFR BLD CALC: 28.3 % — LOW (ref 39–50)
HGB BLD-MCNC: 8.8 G/DL — LOW (ref 13–17)
LYMPHOCYTES # BLD AUTO: 0.6 K/UL — LOW (ref 1–3.3)
LYMPHOCYTES # BLD AUTO: 6.2 % — LOW (ref 13–44)
MCHC RBC-ENTMCNC: 24.7 PG — LOW (ref 27–34)
MCHC RBC-ENTMCNC: 31.2 G/DL — LOW (ref 32–36)
MCV RBC AUTO: 79 FL — LOW (ref 80–100)
MONOCYTES # BLD AUTO: 0.8 K/UL — SIGNIFICANT CHANGE UP (ref 0–0.9)
MONOCYTES NFR BLD AUTO: 8.3 % — SIGNIFICANT CHANGE UP (ref 2–14)
NEUTROPHILS # BLD AUTO: 7.6 K/UL — HIGH (ref 1.8–7.4)
NEUTROPHILS NFR BLD AUTO: 82.6 % — HIGH (ref 43–77)
PLATELET # BLD AUTO: 347 K/UL — SIGNIFICANT CHANGE UP (ref 150–400)
POTASSIUM SERPL-MCNC: 3.8 MMOL/L — SIGNIFICANT CHANGE UP (ref 3.5–5.3)
POTASSIUM SERPL-SCNC: 3.8 MMOL/L — SIGNIFICANT CHANGE UP (ref 3.5–5.3)
PROT SERPL-MCNC: 7 G/DL — SIGNIFICANT CHANGE UP (ref 6–8.3)
RBC # BLD: 3.58 M/UL — LOW (ref 4.2–5.8)
RBC # FLD: 16.9 % — HIGH (ref 10.3–14.5)
SODIUM SERPL-SCNC: 138 MMOL/L — SIGNIFICANT CHANGE UP (ref 135–145)
T4 FREE+ TSH PNL SERPL: 2.45 UIU/ML — SIGNIFICANT CHANGE UP (ref 0.27–4.2)
WBC # BLD: 9.3 K/UL — SIGNIFICANT CHANGE UP (ref 3.8–10.5)
WBC # FLD AUTO: 9.3 K/UL — SIGNIFICANT CHANGE UP (ref 3.8–10.5)

## 2024-03-08 ENCOUNTER — OUTPATIENT (OUTPATIENT)
Dept: OUTPATIENT SERVICES | Facility: HOSPITAL | Age: 84
LOS: 1 days | End: 2024-03-08
Payer: MEDICARE

## 2024-03-08 VITALS
TEMPERATURE: 97 F | HEIGHT: 70 IN | DIASTOLIC BLOOD PRESSURE: 60 MMHG | WEIGHT: 182.1 LBS | HEART RATE: 82 BPM | SYSTOLIC BLOOD PRESSURE: 130 MMHG | RESPIRATION RATE: 20 BRPM | OXYGEN SATURATION: 94 %

## 2024-03-08 DIAGNOSIS — I25.10 ATHEROSCLEROTIC HEART DISEASE OF NATIVE CORONARY ARTERY WITHOUT ANGINA PECTORIS: ICD-10-CM

## 2024-03-08 DIAGNOSIS — M50.022 CERVICAL DISC DISORDER AT C5-C6 LEVEL WITH MYELOPATHY: ICD-10-CM

## 2024-03-08 DIAGNOSIS — Z96.641 PRESENCE OF RIGHT ARTIFICIAL HIP JOINT: Chronic | ICD-10-CM

## 2024-03-08 DIAGNOSIS — Z96.642 PRESENCE OF LEFT ARTIFICIAL HIP JOINT: Chronic | ICD-10-CM

## 2024-03-08 DIAGNOSIS — Z29.9 ENCOUNTER FOR PROPHYLACTIC MEASURES, UNSPECIFIED: ICD-10-CM

## 2024-03-08 DIAGNOSIS — R91.8 OTHER NONSPECIFIC ABNORMAL FINDING OF LUNG FIELD: ICD-10-CM

## 2024-03-08 DIAGNOSIS — M51.04 INTERVERTEBRAL DISC DISORDERS WITH MYELOPATHY, THORACIC REGION: ICD-10-CM

## 2024-03-08 DIAGNOSIS — I44.4 LEFT ANTERIOR FASCICULAR BLOCK: ICD-10-CM

## 2024-03-08 DIAGNOSIS — I10 ESSENTIAL (PRIMARY) HYPERTENSION: ICD-10-CM

## 2024-03-08 DIAGNOSIS — Z01.818 ENCOUNTER FOR OTHER PREPROCEDURAL EXAMINATION: ICD-10-CM

## 2024-03-08 LAB
A1C WITH ESTIMATED AVERAGE GLUCOSE RESULT: 5.8 % — HIGH (ref 4–5.6)
ACANTHOCYTES BLD QL SMEAR: SIGNIFICANT CHANGE UP
ALBUMIN SERPL ELPH-MCNC: 3.6 G/DL — SIGNIFICANT CHANGE UP (ref 3.3–5.2)
ALP SERPL-CCNC: 102 U/L — SIGNIFICANT CHANGE UP (ref 40–120)
ALT FLD-CCNC: <5 U/L — SIGNIFICANT CHANGE UP
ANION GAP SERPL CALC-SCNC: 14 MMOL/L — SIGNIFICANT CHANGE UP (ref 5–17)
ANISOCYTOSIS BLD QL: SLIGHT — SIGNIFICANT CHANGE UP
APTT BLD: 40 SEC — HIGH (ref 24.5–35.6)
AST SERPL-CCNC: 16 U/L — SIGNIFICANT CHANGE UP
BASOPHILS # BLD AUTO: 0.08 K/UL — SIGNIFICANT CHANGE UP (ref 0–0.2)
BASOPHILS NFR BLD AUTO: 0.9 % — SIGNIFICANT CHANGE UP (ref 0–2)
BILIRUB SERPL-MCNC: 0.4 MG/DL — SIGNIFICANT CHANGE UP (ref 0.4–2)
BLD GP AB SCN SERPL QL: SIGNIFICANT CHANGE UP
BUN SERPL-MCNC: 16.9 MG/DL — SIGNIFICANT CHANGE UP (ref 8–20)
BURR CELLS BLD QL SMEAR: PRESENT — SIGNIFICANT CHANGE UP
CALCIUM SERPL-MCNC: 9.3 MG/DL — SIGNIFICANT CHANGE UP (ref 8.4–10.5)
CHLORIDE SERPL-SCNC: 104 MMOL/L — SIGNIFICANT CHANGE UP (ref 96–108)
CO2 SERPL-SCNC: 25 MMOL/L — SIGNIFICANT CHANGE UP (ref 22–29)
CREAT SERPL-MCNC: 0.79 MG/DL — SIGNIFICANT CHANGE UP (ref 0.5–1.3)
EGFR: 89 ML/MIN/1.73M2 — SIGNIFICANT CHANGE UP
ELLIPTOCYTES BLD QL SMEAR: SLIGHT — SIGNIFICANT CHANGE UP
EOSINOPHIL # BLD AUTO: 0.24 K/UL — SIGNIFICANT CHANGE UP (ref 0–0.5)
EOSINOPHIL NFR BLD AUTO: 2.8 % — SIGNIFICANT CHANGE UP (ref 0–6)
ESTIMATED AVERAGE GLUCOSE: 120 MG/DL — HIGH (ref 68–114)
GIANT PLATELETS BLD QL SMEAR: PRESENT — SIGNIFICANT CHANGE UP
GLUCOSE SERPL-MCNC: 116 MG/DL — HIGH (ref 70–99)
HCT VFR BLD CALC: 29.9 % — LOW (ref 39–50)
HGB BLD-MCNC: 9.1 G/DL — LOW (ref 13–17)
HYPOCHROMIA BLD QL: SLIGHT — SIGNIFICANT CHANGE UP
INR BLD: 1.92 RATIO — HIGH (ref 0.85–1.18)
LYMPHOCYTES # BLD AUTO: 0.17 K/UL — LOW (ref 1–3.3)
LYMPHOCYTES # BLD AUTO: 1.9 % — LOW (ref 13–44)
MANUAL SMEAR VERIFICATION: SIGNIFICANT CHANGE UP
MCHC RBC-ENTMCNC: 24.3 PG — LOW (ref 27–34)
MCHC RBC-ENTMCNC: 30.4 GM/DL — LOW (ref 32–36)
MCV RBC AUTO: 79.7 FL — LOW (ref 80–100)
MONOCYTES # BLD AUTO: 0.65 K/UL — SIGNIFICANT CHANGE UP (ref 0–0.9)
MONOCYTES NFR BLD AUTO: 7.4 % — SIGNIFICANT CHANGE UP (ref 2–14)
MRSA PCR RESULT.: SIGNIFICANT CHANGE UP
NEUTROPHILS # BLD AUTO: 7.59 K/UL — HIGH (ref 1.8–7.4)
NEUTROPHILS NFR BLD AUTO: 87 % — HIGH (ref 43–77)
PLAT MORPH BLD: NORMAL — SIGNIFICANT CHANGE UP
PLATELET # BLD AUTO: 348 K/UL — SIGNIFICANT CHANGE UP (ref 150–400)
POIKILOCYTOSIS BLD QL AUTO: SLIGHT — SIGNIFICANT CHANGE UP
POLYCHROMASIA BLD QL SMEAR: SIGNIFICANT CHANGE UP
POTASSIUM SERPL-MCNC: 4.5 MMOL/L — SIGNIFICANT CHANGE UP (ref 3.5–5.3)
POTASSIUM SERPL-SCNC: 4.5 MMOL/L — SIGNIFICANT CHANGE UP (ref 3.5–5.3)
PROT SERPL-MCNC: 7.2 G/DL — SIGNIFICANT CHANGE UP (ref 6.6–8.7)
PROTHROM AB SERPL-ACNC: 20.9 SEC — HIGH (ref 9.5–13)
RBC # BLD: 3.75 M/UL — LOW (ref 4.2–5.8)
RBC # FLD: 18.8 % — HIGH (ref 10.3–14.5)
RBC BLD AUTO: SIGNIFICANT CHANGE UP
S AUREUS DNA NOSE QL NAA+PROBE: SIGNIFICANT CHANGE UP
SCHISTOCYTES BLD QL AUTO: SLIGHT — SIGNIFICANT CHANGE UP
SMUDGE CELLS # BLD: PRESENT — SIGNIFICANT CHANGE UP
SODIUM SERPL-SCNC: 143 MMOL/L — SIGNIFICANT CHANGE UP (ref 135–145)
WBC # BLD: 8.72 K/UL — SIGNIFICANT CHANGE UP (ref 3.8–10.5)
WBC # FLD AUTO: 8.72 K/UL — SIGNIFICANT CHANGE UP (ref 3.8–10.5)

## 2024-03-08 PROCEDURE — G0463: CPT

## 2024-03-08 PROCEDURE — 93010 ELECTROCARDIOGRAM REPORT: CPT

## 2024-03-08 PROCEDURE — 93005 ELECTROCARDIOGRAM TRACING: CPT

## 2024-03-08 RX ORDER — FLUTICASONE FUROATE, UMECLIDINIUM BROMIDE AND VILANTEROL TRIFENATATE 200; 62.5; 25 UG/1; UG/1; UG/1
1 POWDER RESPIRATORY (INHALATION)
Qty: 0 | Refills: 0 | DISCHARGE

## 2024-03-08 RX ORDER — METFORMIN HYDROCHLORIDE 850 MG/1
1 TABLET ORAL
Refills: 0 | DISCHARGE

## 2024-03-08 RX ORDER — ASPIRIN/CALCIUM CARB/MAGNESIUM 324 MG
0 TABLET ORAL
Qty: 0 | Refills: 0 | DISCHARGE

## 2024-03-08 NOTE — H&P PST ADULT - ASSESSMENT
CAPRINI SCORE    AGE RELATED RISK FACTORS                                                             [ ] Age 41-60 years                                            (1 Point)  [ ] Age: 61-74 years                                           (2 Points)                 [ ] Age= 75 years                                                (3 Points)             DISEASE RELATED RISK FACTORS                                                       [ ] Edema in the lower extremities                 (1 Point)                     [ ] Varicose veins                                               (1 Point)                                 [ ] BMI > 25 Kg/m2                                            (1 Point)                                  [ ] Serious infection (ie PNA)                            (1 Point)                     [ ] Lung disease ( COPD, Emphysema)            (1 Point)                                                                          [ ] Acute myocardial infarction                         (1 Point)                  [ ] Congestive heart failure (in the previous month)  (1 Point)         [ ] Inflammatory bowel disease                            (1 Point)                  [ ] Central venous access, PICC or Port               (2 points)       (within the last month)                                                                [ ] Stroke (in the previous month)                        (5 Points)    [ ] Previous or present malignancy                       (2 points)                                                                                                                                                         HEMATOLOGY RELATED FACTORS                                                         [ ] Prior episodes of VTE                                     (3 Points)                     [ ] Positive family history for VTE                      (3 Points)                  [ ] Prothrombin 97273 A                                     (3 Points)                     [ ] Factor V Leiden                                                (3 Points)                        [ ] Lupus anticoagulants                                      (3 Points)                                                           [ ] Anticardiolipin antibodies                              (3 Points)                                                       [ ] High homocysteine in the blood                   (3 Points)                                             [ ] Other congenital or acquired thrombophilia      (3 Points)                                                [ ] Heparin induced thrombocytopenia                  (3 Points)                                        MOBILITY RELATED FACTORS  [ ] Bed rest                                                         (1 Point)  [ ] Plaster cast                                                    (2 points)  [ ] Bed bound for more than 72 hours           (2 Points)    GENDER SPECIFIC FACTORS  [ ] Pregnancy or had a baby within the last month   (1 Point)  [ ] Post-partum < 6 weeks                                   (1 Point)  [ ] Hormonal therapy  or oral contraception   (1 Point)  [ ] History of pregnancy complications              (1 point)  [ ] Unexplained or recurrent              (1 Point)    OTHER RISK FACTORS                                           (1 Point)  [ ] BMI >40, smoking, diabetes requiring insulin, chemotherapy  blood transfusions and length of surgery over 2 hours    SURGERY RELATED RISK FACTORS  [ ]  Section within the last month     (1 Point)  [ ] Minor surgery                                                  (1 Point)  [ ] Arthroscopic surgery                                       (2 Points)  [ ] Planned major surgery lasting more            (2 Points)      than 45 minutes     [ ] Elective hip or knee joint replacement       (5 points)       surgery                                                TRAUMA RELATED RISK FACTORS  [ ] Fracture of the hip, pelvis, or leg                       (5 Points)  [ ] Spinal cord injury resulting in paralysis             (5 points)       (in the previous month)    [ ] Paralysis  (less than 1 month)                             (5 Points)  [ ] Multiple Trauma within 1 month                        (5 Points)    Total Score [        ]    Caprini Score 0-2: Low Risk, NO VTE prophylaxis required for most patients, encourage ambulation  Caprini Score 3-6: Moderate Risk , pharmacologic VTE prophylaxis is indicated for most patients (in the absence of contraindications)  Caprini Score Greater than or =7: High risk, pharmocologic VTE prophylaxis indicated for most patients (in the absence of contraindications)    OPIOID RISK TOOL    LAKSHMI EACH BOX THAT APPLIES AND ADD TOTALS AT THE END    FAMILY HISTORY OF SUBSTANCE ABUSE                 FEMALE         MALE                                                Alcohol                             [  ]1 pt          [  ]3pts                                               Illegal Durgs                     [  ]2 pts        [  ]3pts                                               Rx Drugs                           [  ]4 pts        [  ]4 pts    PERSONAL HISTORY OF SUBSTANCE ABUSE                                                                                          Alcohol                             [  ]3 pts       [  ]3 pts                                               Illegal Drugs                     [  ]4 pts        [  ]4 pts                                               Rx Drugs                           [  ]5 pts        [  ]5 pts    AGE BETWEEN 16-45 YEARS                                      [  ]1 pt         [  ]1 pt    HISTORY OF PREADOLESCENT   SEXUAL ABUSE                                                             [  ]3 pts        [  ]0pts    PSYCHOLOGICAL DISEASE                     ADD, OCD, Bipolar, Schizophrenia        [  ]2 pts         [  ]2 pts                      Depression                                               [  ]1 pt           [  ]1 pt           SCORING TOTAL   (add numbers and type here)              (***)                                     A score of 3 or lower indicated LOW risk for future opioid abuse  A score of 4 to 7 indicated moderate risk for future opioid abuse  A score of 8 or higher indicates a high risk for opioid abuse   Patient educated on surgical scrub, preadmission instructions, medical clearance and day of procedure medications, pt. verbalizes understanding and agreement.       CAPRINI SCORE    AGE RELATED RISK FACTORS                                                             [ ] Age 41-60 years                                            (1 Point)  [ ] Age: 61-74 years                                           (2 Points)                 [ ] Age= 75 years                                                (3 Points)             DISEASE RELATED RISK FACTORS                                                       [ ] Edema in the lower extremities                 (1 Point)                     [ ] Varicose veins                                               (1 Point)                                 [ ] BMI > 25 Kg/m2                                            (1 Point)                                  [ ] Serious infection (ie PNA)                            (1 Point)                     [ ] Lung disease ( COPD, Emphysema)            (1 Point)                                                                          [ ] Acute myocardial infarction                         (1 Point)                  [ ] Congestive heart failure (in the previous month)  (1 Point)         [ ] Inflammatory bowel disease                            (1 Point)                  [ ] Central venous access, PICC or Port               (2 points)       (within the last month)                                                                [ ] Stroke (in the previous month)                        (5 Points)    [ ] Previous or present malignancy                       (2 points)                                                                                                                                                         HEMATOLOGY RELATED FACTORS                                                         [ ] Prior episodes of VTE                                     (3 Points)                     [ ] Positive family history for VTE                      (3 Points)                  [ ] Prothrombin 26402 A                                     (3 Points)                     [ ] Factor V Leiden                                                (3 Points)                        [ ] Lupus anticoagulants                                      (3 Points)                                                           [ ] Anticardiolipin antibodies                              (3 Points)                                                       [ ] High homocysteine in the blood                   (3 Points)                                             [ ] Other congenital or acquired thrombophilia      (3 Points)                                                [ ] Heparin induced thrombocytopenia                  (3 Points)                                        MOBILITY RELATED FACTORS  [ ] Bed rest                                                         (1 Point)  [ ] Plaster cast                                                    (2 points)  [ ] Bed bound for more than 72 hours           (2 Points)    GENDER SPECIFIC FACTORS  [ ] Pregnancy or had a baby within the last month   (1 Point)  [ ] Post-partum < 6 weeks                                   (1 Point)  [ ] Hormonal therapy  or oral contraception   (1 Point)  [ ] History of pregnancy complications              (1 point)  [ ] Unexplained or recurrent              (1 Point)    OTHER RISK FACTORS                                           (1 Point)  [ ] BMI >40, smoking, diabetes requiring insulin, chemotherapy  blood transfusions and length of surgery over 2 hours    SURGERY RELATED RISK FACTORS  [ ]  Section within the last month     (1 Point)  [ ] Minor surgery                                                  (1 Point)  [ ] Arthroscopic surgery                                       (2 Points)  [ ] Planned major surgery lasting more            (2 Points)      than 45 minutes     [ ] Elective hip or knee joint replacement       (5 points)       surgery                                                TRAUMA RELATED RISK FACTORS  [ ] Fracture of the hip, pelvis, or leg                       (5 Points)  [ ] Spinal cord injury resulting in paralysis             (5 points)       (in the previous month)    [ ] Paralysis  (less than 1 month)                             (5 Points)  [ ] Multiple Trauma within 1 month                        (5 Points)    Total Score [        ]    Caprini Score 0-2: Low Risk, NO VTE prophylaxis required for most patients, encourage ambulation  Caprini Score 3-6: Moderate Risk , pharmacologic VTE prophylaxis is indicated for most patients (in the absence of contraindications)  Caprini Score Greater than or =7: High risk, pharmocologic VTE prophylaxis indicated for most patients (in the absence of contraindications)    OPIOID RISK TOOL    LAKSHMI EACH BOX THAT APPLIES AND ADD TOTALS AT THE END    FAMILY HISTORY OF SUBSTANCE ABUSE                 FEMALE         MALE                                                Alcohol                             [  ]1 pt          [  ]3pts                                               Illegal Durgs                     [  ]2 pts        [  ]3pts                                               Rx Drugs                           [  ]4 pts        [  ]4 pts    PERSONAL HISTORY OF SUBSTANCE ABUSE                                                                                          Alcohol                             [  ]3 pts       [  ]3 pts                                               Illegal Drugs                     [  ]4 pts        [  ]4 pts                                               Rx Drugs                           [  ]5 pts        [  ]5 pts    AGE BETWEEN 16-45 YEARS                                      [  ]1 pt         [  ]1 pt    HISTORY OF PREADOLESCENT   SEXUAL ABUSE                                                             [  ]3 pts        [  ]0pts    PSYCHOLOGICAL DISEASE                     ADD, OCD, Bipolar, Schizophrenia        [  ]2 pts         [  ]2 pts                      Depression                                               [  ]1 pt           [  ]1 pt           SCORING TOTAL   (add numbers and type here)              (***)                                     A score of 3 or lower indicated LOW risk for future opioid abuse  A score of 4 to 7 indicated moderate risk for future opioid abuse  A score of 8 or higher indicates a high risk for opioid abuse   84 yo male presents of PST with wife with PMH of CAD, Cervical myelopathy, DVT, Esophagitis, Essential hypertension, Impaired functional mobility/balance/gait/endurance, Leg weakness, Lung mass, Neuropathic pain, Non-small cell carcinoma of lung, Personal history of radiation therapy, Spinal stenosis of lumbar region with neurogenic claudication, 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  Patient reporting neck pain, "cracking" discomfort, sharp and shooting worse with neck rotation to either side, posterior and anterior. Patient reports numbness/tingling to digits 1-3 of right hand. Patient having increasing motor skill issues for the past 6 months. Patient has initially needed cane and then walker to assist with mobility. Reports no back pain. Patient currently getting immunotherapy for lung CA every other week till the beginning of July scheduled. Patient has been on blood thinners for 13 years due to blockage in arteries, taking Eliquis and Plavix, heart is controlled. Pt denies fever/chills, N/V, dizziness, CP, SOB, bowel/bladder incontinence. Pt scheduled for T1-T12 Laminectomy and C5-6 Anterior cervical discectomy and fusion scheduled 3/27/2024 with Dr. Gonzalez. ERP reviewed. Patient educated on surgical scrub, preadmission instructions, medical clearance and day of procedure medications, pt. verbalizes understanding and agreement. Discussed need for patient to get medical, cardiac, onc clearance prior to surgery. Patient also advised to stop Eliquis and Plavix as per prescriber recommendation.        CAPRINI SCORE    AGE RELATED RISK FACTORS                                                             [ ] Age 41-60 years                                            (1 Point)  [ ] Age: 61-74 years                                           (2 Points)                 [x ] Age= 75 years                                                (3 Points)             DISEASE RELATED RISK FACTORS                                                       [x ] Edema in the lower extremities                 (1 Point)                     [ ] Varicose veins                                               (1 Point)                                 [ x] BMI > 25 Kg/m2                                            (1 Point)                                  [ ] Serious infection (ie PNA)                            (1 Point)                     [ ] Lung disease ( COPD, Emphysema)            (1 Point)                                                                          [ ] Acute myocardial infarction                         (1 Point)                  [ ] Congestive heart failure (in the previous month)  (1 Point)         [ ] Inflammatory bowel disease                            (1 Point)                  [ ] Central venous access, PICC or Port               (2 points)       (within the last month)                                                                [ ] Stroke (in the previous month)                        (5 Points)    [x ] Previous or present malignancy                       (2 points)                                                                                                                                                         HEMATOLOGY RELATED FACTORS                                                         [x ] Prior episodes of VTE                                     (3 Points)                     [ ] Positive family history for VTE                      (3 Points)                  [ ] Prothrombin 73654 A                                     (3 Points)                     [ ] Factor V Leiden                                                (3 Points)                        [ ] Lupus anticoagulants                                      (3 Points)                                                           [ ] Anticardiolipin antibodies                              (3 Points)                                                       [ ] High homocysteine in the blood                   (3 Points)                                             [ ] Other congenital or acquired thrombophilia      (3 Points)                                                [ ] Heparin induced thrombocytopenia                  (3 Points)                                        MOBILITY RELATED FACTORS  [ ] Bed rest                                                         (1 Point)  [ ] Plaster cast                                                    (2 points)  [ ] Bed bound for more than 72 hours           (2 Points)    GENDER SPECIFIC FACTORS  [ ] Pregnancy or had a baby within the last month   (1 Point)  [ ] Post-partum < 6 weeks                                   (1 Point)  [ ] Hormonal therapy  or oral contraception   (1 Point)  [ ] History of pregnancy complications              (1 point)  [ ] Unexplained or recurrent              (1 Point)    OTHER RISK FACTORS                                           (1 Point)  [x ] BMI >40, smoking, diabetes requiring insulin, chemotherapy  blood transfusions and length of surgery over 2 hours    SURGERY RELATED RISK FACTORS  [ ]  Section within the last month     (1 Point)  [ ] Minor surgery                                                  (1 Point)  [ ] Arthroscopic surgery                                       (2 Points)  [ x] Planned major surgery lasting more            (2 Points)      than 45 minutes     [ ] Elective hip or knee joint replacement       (5 points)       surgery                                                TRAUMA RELATED RISK FACTORS  [ ] Fracture of the hip, pelvis, or leg                       (5 Points)  [ ] Spinal cord injury resulting in paralysis             (5 points)       (in the previous month)    [ ] Paralysis  (less than 1 month)                             (5 Points)  [ ] Multiple Trauma within 1 month                        (5 Points)    Total Score [  13      ]    Caprini Score 0-2: Low Risk, NO VTE prophylaxis required for most patients, encourage ambulation  Caprini Score 3-6: Moderate Risk , pharmacologic VTE prophylaxis is indicated for most patients (in the absence of contraindications)  Caprini Score Greater than or =7: High risk, pharmocologic VTE prophylaxis indicated for most patients (in the absence of contraindications)    OPIOID RISK TOOL    LAKSHMI EACH BOX THAT APPLIES AND ADD TOTALS AT THE END    FAMILY HISTORY OF SUBSTANCE ABUSE                 FEMALE         MALE                                                Alcohol                             [  ]1 pt          [  ]3pts                                               Illegal Durgs                     [  ]2 pts        [  ]3pts                                               Rx Drugs                           [  ]4 pts        [  ]4 pts    PERSONAL HISTORY OF SUBSTANCE ABUSE                                                                                          Alcohol                             [  ]3 pts       [  ]3 pts                                               Illegal Drugs                     [  ]4 pts        [  ]4 pts                                               Rx Drugs                           [  ]5 pts        [  ]5 pts    AGE BETWEEN 16-45 YEARS                                      [  ]1 pt         [  ]1 pt    HISTORY OF PREADOLESCENT   SEXUAL ABUSE                                                             [  ]3 pts        [  ]0pts    PSYCHOLOGICAL DISEASE                     ADD, OCD, Bipolar, Schizophrenia        [  ]2 pts         [  ]2 pts                      Depression                                               [  ]1 pt           [  ]1 pt           SCORING TOTAL   (add numbers and type here)              (0**)                                     A score of 3 or lower indicated LOW risk for future opioid abuse  A score of 4 to 7 indicated moderate risk for future opioid abuse  A score of 8 or higher indicates a high risk for opioid abuse

## 2024-03-08 NOTE — H&P PST ADULT - ATTENDING COMMENTS
Duane has had a progressive decline over the last year requiring the use of a walker at all times.  Prior to this he was ambulatory w/o assistance.  He was found to have signs and symptoms of myelopathy. MRI of C spine demonstrated severe stenosis at C5-C6, also severe stenosis at T11-T12 with cord signal changes.  His exam was Positive for hyperreflexia in B/L upper and lower extremities, + hoffmans, unable to perform tandem gait, altered sensation in b/l hands as well as severe dexterity issues and difficulty with fine motor tasks.  Given his severe decline the patient wanted to proceed with C5-C6 ACDF and T11-T12 laminectomy to decrease the risk of progression and possibly allow for some improvement in his upper and lower extremity function.   D/w patient and his wife in detail in the office about the risks of the procedure given his frailty, hx of lung Ca now in remission and currently on immune modulator therapy that he is high risk for post operative complications, which is why we will keep the procedure to only addressing the areas of severe stenosis.   After reviewing the risks again today, the patient elects to proceed with C5-C6  ACDF and T11-T12 laminectomy  He will be admitted for monitoring, eliquis to restart 48 hrs after procedure, discusssed given his hx of carcinoma, he is at high risk for DVT and is hypercoagulable.  We will keep a cervical and thoracic drain to limit hematoma collection and to monitor for blood loss post operatively.

## 2024-03-08 NOTE — H&P PST ADULT - HISTORY OF PRESENT ILLNESS
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      Patient presenting today for an initial evaluation. Patient has lung cancer, unable to do surgery for it, has treated with chemotherapy and radiation, now completing immunotherapy, cancer is controlled at this time. Reports about 4 months prior has experienced an overall decline in UE/ LE strength and function. Patient has difficulty raising arms, weakness in arms, increase in difficulty walking, increase in dexterity issues. C/o neck and RT leg pain. Reports no back pain. Has been ambulating with rolling walker past few weeks, previously ambulating with cane. Patient has been on blood thinners for 13 years due to blockage in arteries, taking Eliquis and Plavix, heart is controlled.  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  Pt scheduled for T1-T12 Laminectomy and C5-6 Anterior cervical discectomy and fusion scheduled 3/27/2024 with Dr. Gonzalez.       Patient reporting neck pain, "cracking" discomfort, sharp and shooting worse with neck rotation to either side, posterior and anterior. Patient having increasing motor skill issues for the past 6 months. Patient has initially needed cane and then walker to assist with mobility. Patient currently getting immunotherapy every other week till the beginning of July scheduled.    Patient presenting today for an initial evaluation. Patient has lung cancer, unable to do surgery for it, has treated with chemotherapy and radiation, now completing immunotherapy, cancer is controlled at this time. Reports about 4 months prior has experienced an overall decline in UE/ LE strength and function. Patient has difficulty raising arms, weakness in arms, increase in difficulty walking, increase in dexterity issues. C/o neck and RT leg pain. Reports no back pain. Has been ambulating with rolling walker past few weeks, previously ambulating with cane. Patient has been on blood thinners for 13 years due to blockage in arteries, taking Eliquis and Plavix, heart is controlled.  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  Patient reporting neck pain, "cracking" discomfort, sharp and shooting worse with neck rotation to either side, posterior and anterior. Patient reports numbness/tingling to digits 1-3 of right hand. Patient having increasing motor skill issues for the past 6 months. Patient has initially needed cane and then walker to assist with mobility. Reports no back pain. Patient currently getting immunotherapy for lung CA every other week till the beginning of July scheduled. Patient has been on blood thinners for 13 years due to blockage in arteries, taking Eliquis and Plavix, heart is controlled. Pt denies fever/chills, N/V, dizziness, CP, SOB, bowel/bladder incontinence. Pt scheduled for T1-T12 Laminectomy and C5-6 Anterior cervical discectomy and fusion scheduled 3/27/2024 with Dr. Gonzalez.

## 2024-03-08 NOTE — H&P PST ADULT - PROBLEM SELECTOR PLAN 5
Cardiac clearance pending. Pt to stop eliquis and plavix as per prescriber recommendations. Pt educated and understands.

## 2024-03-08 NOTE — H&P PST ADULT - PROBLEM SELECTOR PLAN 4
Patient advised to take Metoprolol as usual including am of procedure. Pt educated and understands. Patient advised to take Metoprolol as usual including am of procedure. Pt educated and understands.  pt pending cardiac and medical clearance

## 2024-03-08 NOTE — H&P PST ADULT - PROBLEM SELECTOR PLAN 2
Pt scheduled for T1-T12 Laminectomy and C5-6 Anterior cervical discectomy and fusion scheduled 3/27/2024 with Dr. Gonzalez.

## 2024-03-08 NOTE — H&P PST ADULT - NEGATIVE GASTROINTESTINAL SYMPTOMS
no nausea/no vomiting/no diarrhea/no change in bowel habits/no flatulence/no abdominal pain/no melena/no hematochezia/no steatorrhea/no jaundice

## 2024-03-08 NOTE — H&P PST ADULT - GASTROINTESTINAL
normal/soft/nontender/nondistended/normal active bowel sounds/no guarding/no rigidity/no organomegaly/no palpable hakan/no masses palpable details…

## 2024-03-08 NOTE — H&P PST ADULT - MUSCULOSKELETAL COMMENTS
upper extremity motor skill issues, ambulates with walker, numbness tingling in right hand, decreased Rom in neck

## 2024-03-08 NOTE — H&P PST ADULT - OTHER CARE PROVIDERS
Dr. Anup Carballo [phone] Dr. Anup Carballo [phone]; Cardiologist Dr. Ferrera [phone]; Pulmonologist Derek [phone] Dr. Anup Carballo [phone]; Cardiologist Dr. Ferrera [phone]; Pulmonologist Derek [phone]; onc Dr. Nguyen Dr. Anup Carballo [phone]; Cardiologist Dr. Ferrera [phone]; Pulmonologist Derek [phone]; onc Dr. Nguyen [phone]

## 2024-03-08 NOTE — H&P PST ADULT - NSICDXFAMILYHX_GEN_ALL_CORE_FT
FAMILY HISTORY:  Father  Still living? Unknown  Family history of CVA, Age at diagnosis: Age Unknown  FH: heart attack, Age at diagnosis: Age Unknown

## 2024-03-08 NOTE — H&P PST ADULT - HEMATOLOGY/LYMPHATICS COMMENTS
blood clot in left leg 6 months ago, followed with [facility], repeat scan 2 weeks prior and negative

## 2024-03-08 NOTE — H&P PST ADULT - CARDIOVASCULAR
details… normal/regular rate and rhythm/S1 S2 present/no gallops/no rub/no murmur/no JVD/no pedal edema normal/regular rate and rhythm/S1 S2 present/no gallops/no rub/no JVD/murmur/pedal edema

## 2024-03-08 NOTE — H&P PST ADULT - EKG AND INTERPRETATION
54 BPM Sinus bradycardia, RBBB, Left anterior fascicular block, bifascicular block abnormal ECG pending final read.

## 2024-03-08 NOTE — H&P PST ADULT - PROBLEM SELECTOR PLAN 7
ONC clearance pending. Pt to stop eliquis and plavix as per prescriber recommendations. Pt educated and understands.

## 2024-03-08 NOTE — H&P PST ADULT - PROBLEM SELECTOR PLAN 6
Medical and Cardiac clearance pending. Pt to stop eliquis and plavix as per prescriber recommendations. Pt educated and understands.

## 2024-03-08 NOTE — H&P PST ADULT - PROBLEM SELECTOR PLAN 3
Caprini Caprini score 13 High risk,  Surgical team should assess /Strongly recommend pharmacological and mechanical measures for VTE prophylaxis indicated

## 2024-03-13 ENCOUNTER — APPOINTMENT (OUTPATIENT)
Dept: HEMATOLOGY ONCOLOGY | Facility: CLINIC | Age: 84
End: 2024-03-13
Payer: MEDICARE

## 2024-03-13 ENCOUNTER — OUTPATIENT (OUTPATIENT)
Dept: OUTPATIENT SERVICES | Facility: HOSPITAL | Age: 84
LOS: 1 days | Discharge: HOME | End: 2024-03-13

## 2024-03-13 VITALS
DIASTOLIC BLOOD PRESSURE: 50 MMHG | SYSTOLIC BLOOD PRESSURE: 110 MMHG | HEART RATE: 66 BPM | HEIGHT: 70 IN | WEIGHT: 182 LBS | BODY MASS INDEX: 26.05 KG/M2 | OXYGEN SATURATION: 98 %

## 2024-03-13 DIAGNOSIS — Z96.641 PRESENCE OF RIGHT ARTIFICIAL HIP JOINT: Chronic | ICD-10-CM

## 2024-03-13 DIAGNOSIS — Z96.642 PRESENCE OF LEFT ARTIFICIAL HIP JOINT: Chronic | ICD-10-CM

## 2024-03-13 DIAGNOSIS — C34.90 MALIGNANT NEOPLASM OF UNSPECIFIED PART OF UNSPECIFIED BRONCHUS OR LUNG: ICD-10-CM

## 2024-03-13 PROCEDURE — 99214 OFFICE O/P EST MOD 30 MIN: CPT

## 2024-03-13 RX ORDER — APIXABAN 5 MG/1
5 TABLET, FILM COATED ORAL
Qty: 180 | Refills: 0 | Status: ACTIVE | COMMUNITY
Start: 2023-07-19 | End: 1900-01-01

## 2024-03-14 ENCOUNTER — APPOINTMENT (OUTPATIENT)
Dept: PULMONOLOGY | Facility: CLINIC | Age: 84
End: 2024-03-14
Payer: MEDICARE

## 2024-03-14 VITALS — HEIGHT: 68 IN | WEIGHT: 176 LBS | BODY MASS INDEX: 26.67 KG/M2

## 2024-03-14 VITALS
SYSTOLIC BLOOD PRESSURE: 120 MMHG | DIASTOLIC BLOOD PRESSURE: 70 MMHG | HEIGHT: 68 IN | RESPIRATION RATE: 16 BRPM | WEIGHT: 176 LBS | BODY MASS INDEX: 26.67 KG/M2

## 2024-03-14 VITALS — HEART RATE: 69 BPM | OXYGEN SATURATION: 90 %

## 2024-03-14 DIAGNOSIS — Z01.811 ENCOUNTER FOR PREPROCEDURAL RESPIRATORY EXAMINATION: ICD-10-CM

## 2024-03-14 DIAGNOSIS — J44.9 CHRONIC OBSTRUCTIVE PULMONARY DISEASE, UNSPECIFIED: ICD-10-CM

## 2024-03-14 PROCEDURE — 99214 OFFICE O/P EST MOD 30 MIN: CPT | Mod: 25

## 2024-03-14 PROCEDURE — 85018 HEMOGLOBIN: CPT | Mod: QW

## 2024-03-14 PROCEDURE — 94010 BREATHING CAPACITY TEST: CPT

## 2024-03-14 PROCEDURE — 94729 DIFFUSING CAPACITY: CPT

## 2024-03-14 PROCEDURE — 94727 GAS DIL/WSHOT DETER LNG VOL: CPT

## 2024-03-14 NOTE — CONSULT LETTER
[Dear  ___] : Dear  [unfilled], [Consult Letter:] : I had the pleasure of evaluating your patient, [unfilled]. [Please see my note below.] : Please see my note below. [Consult Closing:] : Thank you very much for allowing me to participate in the care of this patient.  If you have any questions, please do not hesitate to contact me. [Sincerely,] : Sincerely, [DrRajinder  ___] : Dr. DIEGO

## 2024-03-14 NOTE — PROVIDER CONTACT NOTE (OTHER) - ACTION/TREATMENT ORDERED:
Attended spine education class with opportunity to ask questions. Contact information for follow up questions given  on 3/13.

## 2024-03-14 NOTE — RESULTS/DATA
[TextEntry] : PFT on 3/14/24: Mild obstruction. FEV1=1.21 liters (48% predicted) and FVC=2.39 liters (66% predicted). Mildly restrictive volumes.  Diffusion=56%Predicted.

## 2024-03-14 NOTE — PHYSICAL EXAM
[No Acute Distress] : no acute distress [Normal Appearance] : normal appearance [Normal Oropharynx] : normal oropharynx [No Neck Mass] : no neck mass [Normal Rate/Rhythm] : normal rate/rhythm [Normal S1, S2] : normal s1, s2 [No Murmurs] : no murmurs [No Resp Distress] : no resp distress [Clear to Auscultation Bilaterally] : clear to auscultation bilaterally [No Abnormalities] : no abnormalities [Normal Gait] : normal gait [Benign] : benign [No Clubbing] : no clubbing [No Cyanosis] : no cyanosis [FROM] : FROM [No Edema] : no edema [No Focal Deficits] : no focal deficits [Normal Color/ Pigmentation] : normal color/ pigmentation [Oriented x3] : oriented x3 [Normal Affect] : normal affect

## 2024-03-14 NOTE — DISCUSSION/SUMMARY
[FreeTextEntry1] : Assess  Stage 2 moderate COPD  Post treatment for RUL Squamous Cell Carcinoma No significant pulmonary contraindication to T-spine surgery under general anesthesia  Rec  Continue Trelegy FU with Dr Reed in 6 months

## 2024-03-20 ENCOUNTER — RESULT REVIEW (OUTPATIENT)
Age: 84
End: 2024-03-20

## 2024-03-20 ENCOUNTER — APPOINTMENT (OUTPATIENT)
Age: 84
End: 2024-03-20

## 2024-03-20 LAB
ALBUMIN SERPL ELPH-MCNC: 3.6 G/DL — SIGNIFICANT CHANGE UP (ref 3.3–5)
ALP SERPL-CCNC: 95 U/L — SIGNIFICANT CHANGE UP (ref 40–120)
ALT FLD-CCNC: 6 U/L — LOW (ref 10–45)
ANION GAP SERPL CALC-SCNC: 12 MMOL/L — SIGNIFICANT CHANGE UP (ref 5–17)
AST SERPL-CCNC: 13 U/L — SIGNIFICANT CHANGE UP (ref 10–40)
BASOPHILS # BLD AUTO: 0 K/UL — SIGNIFICANT CHANGE UP (ref 0–0.2)
BASOPHILS NFR BLD AUTO: 0.4 % — SIGNIFICANT CHANGE UP (ref 0–2)
BILIRUB SERPL-MCNC: 0.3 MG/DL — SIGNIFICANT CHANGE UP (ref 0.2–1.2)
BUN SERPL-MCNC: 19 MG/DL — SIGNIFICANT CHANGE UP (ref 7–23)
CALCIUM SERPL-MCNC: 9.1 MG/DL — SIGNIFICANT CHANGE UP (ref 8.4–10.5)
CEA SERPL-MCNC: 1.1 NG/ML — SIGNIFICANT CHANGE UP (ref 0–3.8)
CHLORIDE SERPL-SCNC: 102 MMOL/L — SIGNIFICANT CHANGE UP (ref 96–108)
CO2 SERPL-SCNC: 24 MMOL/L — SIGNIFICANT CHANGE UP (ref 22–31)
CREAT SERPL-MCNC: 0.8 MG/DL — SIGNIFICANT CHANGE UP (ref 0.5–1.3)
EGFR: 88 ML/MIN/1.73M2 — SIGNIFICANT CHANGE UP
EOSINOPHIL # BLD AUTO: 0.2 K/UL — SIGNIFICANT CHANGE UP (ref 0–0.5)
EOSINOPHIL NFR BLD AUTO: 2.3 % — SIGNIFICANT CHANGE UP (ref 0–6)
GLUCOSE SERPL-MCNC: 98 MG/DL — SIGNIFICANT CHANGE UP (ref 70–99)
HCT VFR BLD CALC: 28.1 % — LOW (ref 39–50)
HGB BLD-MCNC: 8.9 G/DL — LOW (ref 13–17)
LYMPHOCYTES # BLD AUTO: 0.5 K/UL — LOW (ref 1–3.3)
LYMPHOCYTES # BLD AUTO: 5.8 % — LOW (ref 13–44)
MCHC RBC-ENTMCNC: 24.7 PG — LOW (ref 27–34)
MCHC RBC-ENTMCNC: 31.7 G/DL — LOW (ref 32–36)
MCV RBC AUTO: 78 FL — LOW (ref 80–100)
MONOCYTES # BLD AUTO: 0.8 K/UL — SIGNIFICANT CHANGE UP (ref 0–0.9)
MONOCYTES NFR BLD AUTO: 9.8 % — SIGNIFICANT CHANGE UP (ref 2–14)
NEUTROPHILS # BLD AUTO: 6.7 K/UL — SIGNIFICANT CHANGE UP (ref 1.8–7.4)
NEUTROPHILS NFR BLD AUTO: 81.6 % — HIGH (ref 43–77)
PLATELET # BLD AUTO: 341 K/UL — SIGNIFICANT CHANGE UP (ref 150–400)
POTASSIUM SERPL-MCNC: 3.9 MMOL/L — SIGNIFICANT CHANGE UP (ref 3.5–5.3)
POTASSIUM SERPL-SCNC: 3.9 MMOL/L — SIGNIFICANT CHANGE UP (ref 3.5–5.3)
PROT SERPL-MCNC: 7.1 G/DL — SIGNIFICANT CHANGE UP (ref 6–8.3)
RBC # BLD: 3.6 M/UL — LOW (ref 4.2–5.8)
RBC # FLD: 17 % — HIGH (ref 10.3–14.5)
SODIUM SERPL-SCNC: 138 MMOL/L — SIGNIFICANT CHANGE UP (ref 135–145)
T4 FREE+ TSH PNL SERPL: 2.35 UIU/ML — SIGNIFICANT CHANGE UP (ref 0.27–4.2)
WBC # BLD: 8.2 K/UL — SIGNIFICANT CHANGE UP (ref 3.8–10.5)
WBC # FLD AUTO: 8.2 K/UL — SIGNIFICANT CHANGE UP (ref 3.8–10.5)

## 2024-03-20 RX ORDER — METOPROLOL TARTRATE 50 MG
1 TABLET ORAL
Qty: 0 | Refills: 0 | DISCHARGE

## 2024-03-21 DIAGNOSIS — Z51.11 ENCOUNTER FOR ANTINEOPLASTIC CHEMOTHERAPY: ICD-10-CM

## 2024-03-26 ENCOUNTER — TRANSCRIPTION ENCOUNTER (OUTPATIENT)
Age: 84
End: 2024-03-26

## 2024-03-26 RX ORDER — POVIDONE-IODINE 5 %
1 AEROSOL (ML) TOPICAL ONCE
Refills: 0 | Status: COMPLETED | OUTPATIENT
Start: 2024-03-27 | End: 2024-03-27

## 2024-03-26 NOTE — PATIENT PROFILE ADULT - FALL HARM RISK - HARM RISK INTERVENTIONS

## 2024-03-26 NOTE — PATIENT PROFILE ADULT - FUNCTIONAL ASSESSMENT - BASIC MOBILITY ASSESSMENT TYPE
Spoke to pt to schedule procedure(s) Colonoscopy       Physician to perform procedure(s) Dr. LJ Dunn  Date of Procedure (s) 3/21/24  Arrival Time 10:15 AM  Time of Procedure(s) 11:15 AM   Location of Procedure(s) [facility] [address] Floor  Type of Rx Prep sent to patient: PEG  Instructions provided to patient via [facility]    Patient was informed on the following information and verbalized understanding. Screening questionnaire reviewed with patient and complete. If procedure requires anesthesia, a responsible adult needs to be present to accompany the patient home, patient cannot drive after receiving anesthesia. Appointment details are tentative, especially check-in time. Patient will receive a prep-op call 7 days prior to confirm check-in time for procedure. If applicable the patient should contact their pharmacy to verify Rx for procedure prep is ready for pick-up. Patient was advised to call the scheduling department at [phone] if pharmacy states no Rx is available. Patient was advised to call the endoscopy scheduling department if any questions or concerns arise.      [facility] Scheduling Department       Admission

## 2024-03-27 ENCOUNTER — INPATIENT (INPATIENT)
Facility: HOSPITAL | Age: 84
LOS: 3 days | Discharge: HOME | DRG: 472 | End: 2024-03-31
Attending: STUDENT IN AN ORGANIZED HEALTH CARE EDUCATION/TRAINING PROGRAM | Admitting: STUDENT IN AN ORGANIZED HEALTH CARE EDUCATION/TRAINING PROGRAM
Payer: MEDICARE

## 2024-03-27 ENCOUNTER — APPOINTMENT (OUTPATIENT)
Dept: ORTHOPEDIC SURGERY | Facility: HOSPITAL | Age: 84
End: 2024-03-27

## 2024-03-27 ENCOUNTER — TRANSCRIPTION ENCOUNTER (OUTPATIENT)
Age: 84
End: 2024-03-27

## 2024-03-27 VITALS
HEART RATE: 66 BPM | RESPIRATION RATE: 18 BRPM | DIASTOLIC BLOOD PRESSURE: 52 MMHG | HEIGHT: 70 IN | WEIGHT: 182.1 LBS | SYSTOLIC BLOOD PRESSURE: 111 MMHG | OXYGEN SATURATION: 99 % | TEMPERATURE: 98 F

## 2024-03-27 DIAGNOSIS — M47.14 OTHER SPONDYLOSIS WITH MYELOPATHY, THORACIC REGION: ICD-10-CM

## 2024-03-27 DIAGNOSIS — M47.12 OTHER SPONDYLOSIS WITH MYELOPATHY, CERVICAL REGION: ICD-10-CM

## 2024-03-27 DIAGNOSIS — M51.04 INTERVERTEBRAL DISC DISORDERS WITH MYELOPATHY, THORACIC REGION: ICD-10-CM

## 2024-03-27 DIAGNOSIS — Z96.642 PRESENCE OF LEFT ARTIFICIAL HIP JOINT: Chronic | ICD-10-CM

## 2024-03-27 DIAGNOSIS — Z96.641 PRESENCE OF RIGHT ARTIFICIAL HIP JOINT: Chronic | ICD-10-CM

## 2024-03-27 LAB
ANION GAP SERPL CALC-SCNC: 14 MMOL/L — SIGNIFICANT CHANGE UP (ref 5–17)
ANISOCYTOSIS BLD QL: SLIGHT — SIGNIFICANT CHANGE UP
APTT BLD: 33.2 SEC — SIGNIFICANT CHANGE UP (ref 24.5–35.6)
BASOPHILS # BLD AUTO: 0 K/UL — SIGNIFICANT CHANGE UP (ref 0–0.2)
BASOPHILS NFR BLD AUTO: 0 % — SIGNIFICANT CHANGE UP (ref 0–2)
BUN SERPL-MCNC: 23.2 MG/DL — HIGH (ref 8–20)
BURR CELLS BLD QL SMEAR: PRESENT — SIGNIFICANT CHANGE UP
CALCIUM SERPL-MCNC: 8.4 MG/DL — SIGNIFICANT CHANGE UP (ref 8.4–10.5)
CHLORIDE SERPL-SCNC: 100 MMOL/L — SIGNIFICANT CHANGE UP (ref 96–108)
CO2 SERPL-SCNC: 22 MMOL/L — SIGNIFICANT CHANGE UP (ref 22–29)
CREAT SERPL-MCNC: 1.04 MG/DL — SIGNIFICANT CHANGE UP (ref 0.5–1.3)
EGFR: 72 ML/MIN/1.73M2 — SIGNIFICANT CHANGE UP
EOSINOPHIL # BLD AUTO: 0 K/UL — SIGNIFICANT CHANGE UP (ref 0–0.5)
EOSINOPHIL NFR BLD AUTO: 0 % — SIGNIFICANT CHANGE UP (ref 0–6)
GAS PNL BLDA: SIGNIFICANT CHANGE UP
GIANT PLATELETS BLD QL SMEAR: PRESENT — SIGNIFICANT CHANGE UP
GLUCOSE BLDC GLUCOMTR-MCNC: 110 MG/DL — HIGH (ref 70–99)
GLUCOSE BLDC GLUCOMTR-MCNC: 114 MG/DL — HIGH (ref 70–99)
GLUCOSE SERPL-MCNC: 130 MG/DL — HIGH (ref 70–99)
HCT VFR BLD CALC: 27.6 % — LOW (ref 39–50)
HCT VFR BLD CALC: 30.8 % — LOW (ref 39–50)
HGB BLD-MCNC: 8.8 G/DL — LOW (ref 13–17)
HGB BLD-MCNC: 9.7 G/DL — LOW (ref 13–17)
INR BLD: 1.13 RATIO — SIGNIFICANT CHANGE UP (ref 0.85–1.18)
LYMPHOCYTES # BLD AUTO: 0.29 K/UL — LOW (ref 1–3.3)
LYMPHOCYTES # BLD AUTO: 3.5 % — LOW (ref 13–44)
MANUAL SMEAR VERIFICATION: SIGNIFICANT CHANGE UP
MCHC RBC-ENTMCNC: 24.4 PG — LOW (ref 27–34)
MCHC RBC-ENTMCNC: 25.3 PG — LOW (ref 27–34)
MCHC RBC-ENTMCNC: 31.5 GM/DL — LOW (ref 32–36)
MCHC RBC-ENTMCNC: 31.9 GM/DL — LOW (ref 32–36)
MCV RBC AUTO: 77.4 FL — LOW (ref 80–100)
MCV RBC AUTO: 79.3 FL — LOW (ref 80–100)
MICROCYTES BLD QL: SLIGHT — SIGNIFICANT CHANGE UP
MONOCYTES # BLD AUTO: 0 K/UL — SIGNIFICANT CHANGE UP (ref 0–0.9)
MONOCYTES NFR BLD AUTO: 0 % — LOW (ref 2–14)
MYELOCYTES NFR BLD: 0.9 % — HIGH (ref 0–0)
NEUTROPHILS # BLD AUTO: 8.01 K/UL — HIGH (ref 1.8–7.4)
NEUTROPHILS NFR BLD AUTO: 95.6 % — HIGH (ref 43–77)
OVALOCYTES BLD QL SMEAR: SIGNIFICANT CHANGE UP
PLAT MORPH BLD: NORMAL — SIGNIFICANT CHANGE UP
PLATELET # BLD AUTO: 294 K/UL — SIGNIFICANT CHANGE UP (ref 150–400)
PLATELET # BLD AUTO: 367 K/UL — SIGNIFICANT CHANGE UP (ref 150–400)
POIKILOCYTOSIS BLD QL AUTO: SIGNIFICANT CHANGE UP
POLYCHROMASIA BLD QL SMEAR: SLIGHT — SIGNIFICANT CHANGE UP
POTASSIUM SERPL-MCNC: 4.2 MMOL/L — SIGNIFICANT CHANGE UP (ref 3.5–5.3)
POTASSIUM SERPL-SCNC: 4.2 MMOL/L — SIGNIFICANT CHANGE UP (ref 3.5–5.3)
PROTHROM AB SERPL-ACNC: 12.5 SEC — SIGNIFICANT CHANGE UP (ref 9.5–13)
RBC # BLD: 3.48 M/UL — LOW (ref 4.2–5.8)
RBC # BLD: 3.98 M/UL — LOW (ref 4.2–5.8)
RBC # FLD: 18.2 % — HIGH (ref 10.3–14.5)
RBC # FLD: 18.2 % — HIGH (ref 10.3–14.5)
RBC BLD AUTO: ABNORMAL
SODIUM SERPL-SCNC: 136 MMOL/L — SIGNIFICANT CHANGE UP (ref 135–145)
WBC # BLD: 8.38 K/UL — SIGNIFICANT CHANGE UP (ref 3.8–10.5)
WBC # BLD: 9.07 K/UL — SIGNIFICANT CHANGE UP (ref 3.8–10.5)
WBC # FLD AUTO: 8.38 K/UL — SIGNIFICANT CHANGE UP (ref 3.8–10.5)
WBC # FLD AUTO: 9.07 K/UL — SIGNIFICANT CHANGE UP (ref 3.8–10.5)

## 2024-03-27 PROCEDURE — 22853 INSJ BIOMECHANICAL DEVICE: CPT | Mod: 80

## 2024-03-27 PROCEDURE — 63046 LAM FACETEC & FORAMOT THRC: CPT

## 2024-03-27 PROCEDURE — 22845 INSERT SPINE FIXATION DEVICE: CPT | Mod: 80,59

## 2024-03-27 PROCEDURE — 22845 INSERT SPINE FIXATION DEVICE: CPT | Mod: 59

## 2024-03-27 PROCEDURE — 63048 LAM FACETEC &FORAMOT EA ADDL: CPT

## 2024-03-27 PROCEDURE — 63046 LAM FACETEC & FORAMOT THRC: CPT | Mod: 80

## 2024-03-27 PROCEDURE — 22551 ARTHRD ANT NTRBDY CERVICAL: CPT

## 2024-03-27 PROCEDURE — 22551 ARTHRD ANT NTRBDY CERVICAL: CPT | Mod: 80

## 2024-03-27 PROCEDURE — 22853 INSJ BIOMECHANICAL DEVICE: CPT

## 2024-03-27 PROCEDURE — 63048 LAM FACETEC &FORAMOT EA ADDL: CPT | Mod: 80

## 2024-03-27 DEVICE — SPACER CASCADIA 7 DEG 7X16X13MM: Type: IMPLANTABLE DEVICE | Status: FUNCTIONAL

## 2024-03-27 DEVICE — DISTRACTION PIN 12MM (BLUE): Type: IMPLANTABLE DEVICE | Status: FUNCTIONAL

## 2024-03-27 DEVICE — SCREW OZARK SELF START 4.35X16MM: Type: IMPLANTABLE DEVICE | Status: FUNCTIONAL

## 2024-03-27 DEVICE — SURGIFOAM PAD 8CM X 12.5CM X 10MM (100): Type: IMPLANTABLE DEVICE | Status: FUNCTIONAL

## 2024-03-27 DEVICE — SURGIFLO MATRIX WITH THROMBIN KIT: Type: IMPLANTABLE DEVICE | Status: FUNCTIONAL

## 2024-03-27 DEVICE — IMPLANTABLE DEVICE: Type: IMPLANTABLE DEVICE | Status: FUNCTIONAL

## 2024-03-27 RX ORDER — DEXTROSE 50 % IN WATER 50 %
12.5 SYRINGE (ML) INTRAVENOUS ONCE
Refills: 0 | Status: DISCONTINUED | OUTPATIENT
Start: 2024-03-27 | End: 2024-03-31

## 2024-03-27 RX ORDER — SODIUM CHLORIDE 9 MG/ML
3 INJECTION INTRAMUSCULAR; INTRAVENOUS; SUBCUTANEOUS EVERY 8 HOURS
Refills: 0 | Status: DISCONTINUED | OUTPATIENT
Start: 2024-03-27 | End: 2024-03-27

## 2024-03-27 RX ORDER — METHOCARBAMOL 500 MG/1
500 TABLET, FILM COATED ORAL EVERY 8 HOURS
Refills: 0 | Status: DISCONTINUED | OUTPATIENT
Start: 2024-03-27 | End: 2024-03-31

## 2024-03-27 RX ORDER — METOPROLOL TARTRATE 50 MG
50 TABLET ORAL DAILY
Refills: 0 | Status: DISCONTINUED | OUTPATIENT
Start: 2024-03-28 | End: 2024-03-29

## 2024-03-27 RX ORDER — TIOTROPIUM BROMIDE 18 UG/1
2 CAPSULE ORAL; RESPIRATORY (INHALATION) DAILY
Refills: 0 | Status: DISCONTINUED | OUTPATIENT
Start: 2024-03-27 | End: 2024-03-31

## 2024-03-27 RX ORDER — BUDESONIDE AND FORMOTEROL FUMARATE DIHYDRATE 160; 4.5 UG/1; UG/1
2 AEROSOL RESPIRATORY (INHALATION)
Refills: 0 | Status: DISCONTINUED | OUTPATIENT
Start: 2024-03-27 | End: 2024-03-31

## 2024-03-27 RX ORDER — APREPITANT 80 MG/1
40 CAPSULE ORAL ONCE
Refills: 0 | Status: COMPLETED | OUTPATIENT
Start: 2024-03-27 | End: 2024-03-27

## 2024-03-27 RX ORDER — CELECOXIB 200 MG/1
200 CAPSULE ORAL EVERY 12 HOURS
Refills: 0 | Status: DISCONTINUED | OUTPATIENT
Start: 2024-03-27 | End: 2024-03-31

## 2024-03-27 RX ORDER — OXYCODONE HYDROCHLORIDE 5 MG/1
5 TABLET ORAL
Refills: 0 | Status: DISCONTINUED | OUTPATIENT
Start: 2024-03-27 | End: 2024-03-31

## 2024-03-27 RX ORDER — MAGNESIUM HYDROXIDE 400 MG/1
30 TABLET, CHEWABLE ORAL EVERY 12 HOURS
Refills: 0 | Status: DISCONTINUED | OUTPATIENT
Start: 2024-03-27 | End: 2024-03-31

## 2024-03-27 RX ORDER — LIDOCAINE 4 G/100G
1 CREAM TOPICAL EVERY 24 HOURS
Refills: 0 | Status: DISCONTINUED | OUTPATIENT
Start: 2024-03-27 | End: 2024-03-31

## 2024-03-27 RX ORDER — ACETAMINOPHEN 500 MG
975 TABLET ORAL ONCE
Refills: 0 | Status: COMPLETED | OUTPATIENT
Start: 2024-03-27 | End: 2024-03-27

## 2024-03-27 RX ORDER — ONDANSETRON 8 MG/1
4 TABLET, FILM COATED ORAL EVERY 6 HOURS
Refills: 0 | Status: DISCONTINUED | OUTPATIENT
Start: 2024-03-27 | End: 2024-03-31

## 2024-03-27 RX ORDER — DEXTROSE 50 % IN WATER 50 %
25 SYRINGE (ML) INTRAVENOUS ONCE
Refills: 0 | Status: DISCONTINUED | OUTPATIENT
Start: 2024-03-27 | End: 2024-03-31

## 2024-03-27 RX ORDER — SENNA PLUS 8.6 MG/1
2 TABLET ORAL AT BEDTIME
Refills: 0 | Status: DISCONTINUED | OUTPATIENT
Start: 2024-03-27 | End: 2024-03-31

## 2024-03-27 RX ORDER — CEFAZOLIN SODIUM 1 G
2000 VIAL (EA) INJECTION
Refills: 0 | Status: DISCONTINUED | OUTPATIENT
Start: 2024-03-27 | End: 2024-03-27

## 2024-03-27 RX ORDER — CELECOXIB 200 MG/1
400 CAPSULE ORAL ONCE
Refills: 0 | Status: COMPLETED | OUTPATIENT
Start: 2024-03-27 | End: 2024-03-27

## 2024-03-27 RX ORDER — ACETAMINOPHEN 500 MG
975 TABLET ORAL EVERY 8 HOURS
Refills: 0 | Status: DISCONTINUED | OUTPATIENT
Start: 2024-03-27 | End: 2024-03-31

## 2024-03-27 RX ORDER — ATORVASTATIN CALCIUM 80 MG/1
40 TABLET, FILM COATED ORAL AT BEDTIME
Refills: 0 | Status: DISCONTINUED | OUTPATIENT
Start: 2024-03-27 | End: 2024-03-31

## 2024-03-27 RX ORDER — GABAPENTIN 400 MG/1
100 CAPSULE ORAL THREE TIMES A DAY
Refills: 0 | Status: DISCONTINUED | OUTPATIENT
Start: 2024-03-27 | End: 2024-03-31

## 2024-03-27 RX ORDER — PANTOPRAZOLE SODIUM 20 MG/1
40 TABLET, DELAYED RELEASE ORAL
Refills: 0 | Status: DISCONTINUED | OUTPATIENT
Start: 2024-03-27 | End: 2024-03-31

## 2024-03-27 RX ORDER — ONDANSETRON 8 MG/1
4 TABLET, FILM COATED ORAL EVERY 6 HOURS
Refills: 0 | Status: DISCONTINUED | OUTPATIENT
Start: 2024-03-27 | End: 2024-03-27

## 2024-03-27 RX ORDER — SODIUM CHLORIDE 9 MG/ML
1000 INJECTION INTRAMUSCULAR; INTRAVENOUS; SUBCUTANEOUS
Refills: 0 | Status: DISCONTINUED | OUTPATIENT
Start: 2024-03-27 | End: 2024-03-29

## 2024-03-27 RX ORDER — GLUCAGON INJECTION, SOLUTION 0.5 MG/.1ML
1 INJECTION, SOLUTION SUBCUTANEOUS ONCE
Refills: 0 | Status: DISCONTINUED | OUTPATIENT
Start: 2024-03-27 | End: 2024-03-31

## 2024-03-27 RX ORDER — APIXABAN 2.5 MG/1
5 TABLET, FILM COATED ORAL
Refills: 0 | Status: DISCONTINUED | OUTPATIENT
Start: 2024-03-29 | End: 2024-03-31

## 2024-03-27 RX ORDER — DEXAMETHASONE 0.5 MG/5ML
6 ELIXIR ORAL EVERY 6 HOURS
Refills: 0 | Status: COMPLETED | OUTPATIENT
Start: 2024-03-27 | End: 2024-03-28

## 2024-03-27 RX ORDER — OXYCODONE HYDROCHLORIDE 5 MG/1
10 TABLET ORAL
Refills: 0 | Status: DISCONTINUED | OUTPATIENT
Start: 2024-03-27 | End: 2024-03-31

## 2024-03-27 RX ORDER — SODIUM CHLORIDE 9 MG/ML
1000 INJECTION, SOLUTION INTRAVENOUS
Refills: 0 | Status: DISCONTINUED | OUTPATIENT
Start: 2024-03-27 | End: 2024-03-31

## 2024-03-27 RX ORDER — BENZOCAINE AND MENTHOL 5; 1 G/100ML; G/100ML
1 LIQUID ORAL
Refills: 0 | Status: DISCONTINUED | OUTPATIENT
Start: 2024-03-27 | End: 2024-03-31

## 2024-03-27 RX ORDER — DEXTROSE 50 % IN WATER 50 %
15 SYRINGE (ML) INTRAVENOUS ONCE
Refills: 0 | Status: DISCONTINUED | OUTPATIENT
Start: 2024-03-27 | End: 2024-03-31

## 2024-03-27 RX ORDER — CEFAZOLIN SODIUM 1 G
2000 VIAL (EA) INJECTION ONCE
Refills: 0 | Status: DISCONTINUED | OUTPATIENT
Start: 2024-03-27 | End: 2024-03-27

## 2024-03-27 RX ORDER — INSULIN LISPRO 100/ML
VIAL (ML) SUBCUTANEOUS
Refills: 0 | Status: DISCONTINUED | OUTPATIENT
Start: 2024-03-27 | End: 2024-03-31

## 2024-03-27 RX ORDER — HYDROMORPHONE HYDROCHLORIDE 2 MG/ML
0.2 INJECTION INTRAMUSCULAR; INTRAVENOUS; SUBCUTANEOUS EVERY 6 HOURS
Refills: 0 | Status: DISCONTINUED | OUTPATIENT
Start: 2024-03-27 | End: 2024-03-27

## 2024-03-27 RX ORDER — CEFAZOLIN SODIUM 1 G
2000 VIAL (EA) INJECTION
Refills: 0 | Status: COMPLETED | OUTPATIENT
Start: 2024-03-27 | End: 2024-03-28

## 2024-03-27 RX ADMIN — Medication 1 APPLICATION(S): at 09:54

## 2024-03-27 RX ADMIN — Medication 975 MILLIGRAM(S): at 10:03

## 2024-03-27 RX ADMIN — CELECOXIB 400 MILLIGRAM(S): 200 CAPSULE ORAL at 10:02

## 2024-03-27 RX ADMIN — APREPITANT 40 MILLIGRAM(S): 80 CAPSULE ORAL at 10:02

## 2024-03-27 NOTE — DISCHARGE NOTE PROVIDER - NSDCFUSCHEDAPPT_GEN_ALL_CORE_FT
[facility]  Scheduled Appointment: 04/03/2024    Edwin Gonzalez  [facility]  ORTHOSURG [address]  Scheduled Appointment: 04/09/2024    [facility]  Scheduled Appointment: 04/17/2024    [facility]  Scheduled Appointment: 05/01/2024

## 2024-03-27 NOTE — INPATIENT CERTIFICATION FOR MEDICARE PATIENTS - THE SEVERITY OF SIGNS/SYMPTOMS. (SEE ED/ADMIT DOCUMENTS)
Allergy phones to say pt is being seen today; has referral to [facility] as requested but were able to get in sooner here, New referral ordered.   1. The severity of signs/symptoms.(See ED/admit documents)

## 2024-03-27 NOTE — ASU PREOP CHECKLIST - SURGICAL CONSENT
flexed midline on the fluoroscopy table. A pillow was placed under the patient's head to increase cervical interlaminar space. Standard monitors were placed, and vital signs were observed throughout the procedure. The area was prepped with chloraprep and the C7-T1 interspace was marked under fluoroscopy. The skin and subcutaneous tissues at the above level were anesthestized with 0.5% lidocaine. An # 18 gauge 3-1/2 tuohy epidural needle was inserted and advanced toward the inferior lamina until bony contact was made. The needle was then advanced superiorly toward epidural space . From this point on loss of resistance technique with 5 cc glass syringe was used to confirm entrance of the needle into the epidural space under intermittent lateral fluoroscopy. Once in the epidural space , negative aspiration for blood and CSF was confirmed. Needle tip placement was confirmed by visualizing epidural spread of 1 ml of Isovue M 300 visualized in both AP and lateral live fluoroscopic views. Then after negative aspiration, a solution of 0.9 % saline 2 ml and 12  mg of Dexamethasone was easily injected. The needle was gently removed intact. The patient neck was cleaned and a Band-Aid was placed over the needle insertion point. Disposition the patient tolerated the procedure well and there were no complications . Vital signs remained stable throughout the procedure. The patient was escorted to the recovery area where they remained until discharge and written discharge instructions for the procedure were given. Plan: Dewayne Duane will return to our pain management center as scheduled.      Yaa Burgos MD
done

## 2024-03-27 NOTE — BRIEF OPERATIVE NOTE - NSICDXBRIEFPREOP_GEN_ALL_CORE_FT
PRE-OP DIAGNOSIS:  Degenerative cervical spinal stenosis 27-Mar-2024 14:40:46  Jennifer Arias  Myelopathy, spondylogenic, cervical 27-Mar-2024 17:34:36  Edwin Gonzalez  Thoracic myelopathy 27-Mar-2024 14:40:20  Jennifer Arias

## 2024-03-27 NOTE — BRIEF OPERATIVE NOTE - NSICDXBRIEFPOSTOP_GEN_ALL_CORE_FT
POST-OP DIAGNOSIS:  Myelopathy, spondylogenic, cervical 27-Mar-2024 17:35:06  Edwin Gonzalez  Thoracic myelopathy 27-Mar-2024 17:35:30  Edwin Gonzalez  Degenerative cervical spinal stenosis 27-Mar-2024 17:34:54  Edwin Gonzalez

## 2024-03-27 NOTE — DISCHARGE NOTE PROVIDER - PROVIDER TOKENS
PROVIDER:[TOKEN:[[id number]]] PROVIDER:[TOKEN:[[id number]]],PROVIDER:[TOKEN:[[id number]],FOLLOWUP:[1-3 days]]

## 2024-03-27 NOTE — BRIEF OPERATIVE NOTE - OPERATION/FINDINGS
C5-C5 ACDF, severe stenosis and spondylosis  T11-T12 laminectomy with severe spinal stenosis and spondylosis

## 2024-03-27 NOTE — DISCHARGE NOTE PROVIDER - NSDCMRMEDTOKEN_GEN_ALL_CORE_FT
acetaminophen 325 mg oral tablet: 2 tab(s) orally every 6 hours As needed Temp greater or equal to 38C (100.4F), Mild Pain (1 - 3)  atorvastatin 40 mg oral tablet: 1 tab(s) orally once a day  clopidogrel 75 mg oral tablet: 1 tab(s) orally once a day  clopidogrel 75 mg oral tablet: 1 tab(s) orally once a day  Eliquis 5 mg oral tablet: 1 tab(s) orally 2 times a day as needed for DVT in left leg  gabapentin 100 mg oral tablet: 1 tab(s) orally as needed for  moderate pain  losartan-hydrochlorothiazide 50 mg-12.5 mg oral tablet: 1 tab(s) orally once a day  MetFORMIN (Eqv-Fortamet) 500 mg oral tablet, extended release: 1 tablet, extended release orally once a day  metoprolol succinate 50 mg oral capsule, extended release: 1 cap(s) orally once a day  Trelegy Ellipta 100 mcg-62.5 mcg-25 mcg/inh inhalation powder: 1 puff(s) inhaled   acetaminophen 325 mg oral tablet: 2 tab(s) orally every 6 hours  atorvastatin 40 mg oral tablet: 1 tab(s) orally once a day  clopidogrel 75 mg oral tablet: 1 tab(s) orally once a day  clopidogrel 75 mg oral tablet: 1 tab(s) orally once a day  Eliquis 5 mg oral tablet: 1 tab(s) orally 2 times a day as needed for DVT in left leg  gabapentin 100 mg oral capsule: 1 cap(s) orally 3 times a day  losartan-hydrochlorothiazide 50 mg-12.5 mg oral tablet: 1 tab(s) orally once a day  methocarbamol 750 mg oral tablet: 1 tab(s) orally 3 times a day For Muscle Spasm, MDD: 3  metoprolol succinate 50 mg oral capsule, extended release: 1 cap(s) orally once a day  Narcan 4 mg/0.1 mL nasal spray: 1 spray(s) intranasally  omeprazole 20 mg oral delayed release tablet: 1 tab(s) orally once a day (in the morning) MDD: 1  oxyCODONE 5 mg oral tablet: 1 tab(s) orally every 6 hours as needed for  severe pain MDD: 4  Senna S 50 mg-8.6 mg oral tablet: 2 tab(s) orally once a day (at bedtime) as needed for  constipation MDD: 2  tamsulosin 0.4 mg oral capsule: 1 cap(s) orally once a day (at bedtime)  Trelegy Ellipta 100 mcg-62.5 mcg-25 mcg/inh inhalation powder: 1 puff(s) inhaled

## 2024-03-27 NOTE — DISCHARGE NOTE PROVIDER - NSDCCPCAREPLAN_GEN_ALL_CORE_FT
PRINCIPAL DISCHARGE DIAGNOSIS  Diagnosis: Cervical disc disorder at C5-C6 level with myelopathy  Assessment and Plan of Treatment:

## 2024-03-27 NOTE — BRIEF OPERATIVE NOTE - COMMENTS
Eliquis and plavix to restart 48 hrs postop , D/C titus in AM, admit to 2 Bracket, WBAT, No C collar, monitor MUKESH output, ancef x 2 doses

## 2024-03-27 NOTE — DISCHARGE NOTE PROVIDER - HOSPITAL COURSE
Patient was admitted for elective anterior cervical discectomy and fusion and thoracic laminectomy and fusion. The post operative course was uneventful.  PT/OT Worked with patient for acute rehabilitation process. The pain was adequately controlled and patient is able to go home as she can accomplish ADL with help from family member at this time.  The cervical collar was worn for comfort when out of bed.

## 2024-03-27 NOTE — DISCHARGE NOTE PROVIDER - CARE PROVIDER_API CALL
Edwin Gonzalez  Orthopaedic Surgery  [address] [place] [id number]  Phone: [phone]  Fax: [phone]  Follow Up Time:    Edwin Gonzalez  Orthopaedic Surgery  [address] [place] [address]  Phone: [phone]  Fax: [phone]  Follow Up Time:     Harvey Gonzales  Urology  [address] [place] [id number]  Phone: [phone]  Fax: [phone]  Follow Up Time: 1-3 days

## 2024-03-27 NOTE — PROGRESS NOTE ADULT - SUBJECTIVE AND OBJECTIVE BOX
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    Family history of CVA (Father)    FH: heart attack (Father)    Abnormal findings on diagnostic imaging of lung    Hypertension    Hyperlipidemia    CAD (coronary artery disease)    Left anterior fascicular block (LAFB)    RBBB    Thoracic myelopathy    Congenital stenosis of cervical spine    Degenerative cervical spinal stenosis    Degenerative cervical spinal stenosis    Thoracic myelopathy    Intervertebral disc disorders with myelopathy of thoracic region    Cervical disc disorder at C5-C6 level with myelopathy    Need for prophylactic measure    Hypertension    Left anterior fascicular block (LAFB)    CAD (coronary artery disease)    Abnormal findings on diagnostic imaging of lung    S/P hip replacement, right    S/P hip replacement, left    INTERVERTEBRAL DISC DISORDERS    SysAdmin_VstLnk    Home Medications:  acetaminophen 325 mg oral tablet: 2 tab(s) orally every 6 hours As needed Temp greater or equal to 38C (100.4F), Mild Pain (1 - 3) (27 Mar 2024 10:07)  atorvastatin 40 mg oral tablet: 1 tab(s) orally once a day (27 Mar 2024 10:07)  clopidogrel 75 mg oral tablet: 1 tab(s) orally once a day (27 Mar 2024 10:07)  clopidogrel 75 mg oral tablet: 1 tab(s) orally once a day (27 Mar 2024 10:07)  Eliquis 5 mg oral tablet: 1 tab(s) orally 2 times a day as needed for DVT in left leg (27 Mar 2024 10:07)  gabapentin 100 mg oral tablet: 1 tab(s) orally as needed for  moderate pain (27 Mar 2024 10:07)  losartan-hydrochlorothiazide 50 mg-12.5 mg oral tablet: 1 tab(s) orally once a day (27 Mar 2024 10:07)  MetFORMIN (Eqv-Fortamet) 500 mg oral tablet, extended release: 1 tablet, extended release orally once a day (27 Mar 2024 10:07)  metoprolol succinate 50 mg oral capsule, extended release: 1 cap(s) orally once a day (27 Mar 2024 10:07)  Trelegy Ellipta 100 mcg-62.5 mcg-25 mcg/inh inhalation powder: 1 puff(s) inhaled (27 Mar 2024 10:07)    STATUS POST:  Thoracic laminectomy T11, T12 for thoracic stenosis with myelopathy/ACDF C5-C6 for cervical stenosis causing patient to have weakness of lower extremities, ataxia    Physical Exam:  Back Pain controlled, lower extremity pain resolving    OBJECTIVE:   T(C): 36.6 (03-27-24 @ 09:44), Max: 36.6 (03-27-24 @ 09:44)  HR: 66 (03-27-24 @ 09:44) (66 - 66)  BP: 111/52 (03-27-24 @ 09:44) (111/52 - 111/52)  RR: 18 (03-27-24 @ 09:44) (18 - 18)  SpO2: 99% (03-27-24 @ 09:44) (99% - 99%)   Constitutional: Pleasant in no acute distress  Psych:A&Ox3  Abdominal: soft and supple non distended  Lymphatics: no pretibial pitting edema  Spine:          Dressing:  clean/dry/intact anterior cervical, thoracic                           MUKESH drain in place, patent anterior cervical, thoracic               Sensation:            Upper extremity          grossly intact manually          Lower extremity           grossly intact manually                               Motor:          Lower extremity                      HF(L2)   KE(L3)    TA(L4)   EHL(L5)  GS(S1)                                                 R        5/5        5/5        5/5       5/5         5/5                                               L         5/5        5/5       5/5       5/5          5/5                                                        [x] warm well perfused; capillary refill <3 seconds                A/P : 83yMale   S/P ACDF and thoraic laminectomy as above POD#0  -    Pain control- multimodal.  Avoid NSAIDS since they can deter fusion.  -    DVT ppx: [ x]SCDs[ x] Early Ambulation. Pharmacolgic  restart Eliquis 5 mg BID, plavix 75 mg BID post op day 3 on 3/30/24 as prior to surgery.  will leave drains prolonged, 2-3 days after restart Eliquis and Plavix and will discuss with Dr Gonzalez prior to pulling drains.  -    Periop abx:  Ancef [x ]   -    Check AM labs    -    Monitor Drain Output, can discontinue drain when output equal or less than 10 cc/hr/12 hr.  change dressing when drain pulled and as needed, honeycomb dressing.    -  Resume home meds as appropriate  -PT/OT WBAT, balance and gait, no cervical or TLSO brace necessary.  Ambulate with assistance until cleared by PT/OT  -medical follow up- Hospitalist Service Dr Meeks  - probable home vs acute rehab vs [facility] 7 days post op  - HTN- restart antihypertensives with parameters and when appropriate  - DM2- accuchecks, consistent carbohydrate diet, sliding scale coverage, nutrition consult  - COPD- restart inhaler  - thoracic myelopathy- decadron 10 mg QID x 1 day.  Acute rehab consult.  - HLD - restart statin  - hx dvt- restart Eliquis and Plavix as above.   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    Family history of CVA (Father)    FH: heart attack (Father)    Abnormal findings on diagnostic imaging of lung    Hypertension    Hyperlipidemia    CAD (coronary artery disease)    Left anterior fascicular block (LAFB)    RBBB    Thoracic myelopathy    Congenital stenosis of cervical spine    Degenerative cervical spinal stenosis    Degenerative cervical spinal stenosis    Thoracic myelopathy    Intervertebral disc disorders with myelopathy of thoracic region    Cervical disc disorder at C5-C6 level with myelopathy    Need for prophylactic measure    Hypertension    Left anterior fascicular block (LAFB)    CAD (coronary artery disease)    Abnormal findings on diagnostic imaging of lung    S/P hip replacement, right    S/P hip replacement, left    INTERVERTEBRAL DISC DISORDERS    SysAdmin_VstLnk    Home Medications:  acetaminophen 325 mg oral tablet: 2 tab(s) orally every 6 hours As needed Temp greater or equal to 38C (100.4F), Mild Pain (1 - 3) (27 Mar 2024 10:07)  atorvastatin 40 mg oral tablet: 1 tab(s) orally once a day (27 Mar 2024 10:07)  clopidogrel 75 mg oral tablet: 1 tab(s) orally once a day (27 Mar 2024 10:07)  clopidogrel 75 mg oral tablet: 1 tab(s) orally once a day (27 Mar 2024 10:07)  Eliquis 5 mg oral tablet: 1 tab(s) orally 2 times a day as needed for DVT in left leg (27 Mar 2024 10:07)  gabapentin 100 mg oral tablet: 1 tab(s) orally as needed for  moderate pain (27 Mar 2024 10:07)  losartan-hydrochlorothiazide 50 mg-12.5 mg oral tablet: 1 tab(s) orally once a day (27 Mar 2024 10:07)  MetFORMIN (Eqv-Fortamet) 500 mg oral tablet, extended release: 1 tablet, extended release orally once a day (27 Mar 2024 10:07)  metoprolol succinate 50 mg oral capsule, extended release: 1 cap(s) orally once a day (27 Mar 2024 10:07)  Trelegy Ellipta 100 mcg-62.5 mcg-25 mcg/inh inhalation powder: 1 puff(s) inhaled (27 Mar 2024 10:07)    STATUS POST:  Thoracic laminectomy T11, T12 for thoracic stenosis with myelopathy/ACDF C5-C6 for cervical stenosis causing patient to have weakness of lower extremities, ataxia    Physical Exam:  Back Pain controlled, lower extremity pain resolving    OBJECTIVE:   T(C): 36.6 (03-27-24 @ 09:44), Max: 36.6 (03-27-24 @ 09:44)  HR: 66 (03-27-24 @ 09:44) (66 - 66)  BP: 111/52 (03-27-24 @ 09:44) (111/52 - 111/52)  RR: 18 (03-27-24 @ 09:44) (18 - 18)  SpO2: 99% (03-27-24 @ 09:44) (99% - 99%)   Constitutional: Pleasant in no acute distress  Psych:A&Ox3  Abdominal: soft and supple non distended  Lymphatics: no pretibial pitting edema  Spine:          Dressing:  clean/dry/intact anterior cervical, thoracic                           MUKESH drain in place, patent anterior cervical, thoracic               Sensation:            Upper extremity          grossly intact manually          Lower extremity           grossly intact manually                               Motor:          Lower extremity                      HF(L2)   KE(L3)    TA(L4)   EHL(L5)  GS(S1)                                                 R        5/5        5/5        5/5       5/5         5/5                                               L         5/5        5/5       5/5       5/5          5/5                                                        [x] warm well perfused; capillary refill <3 seconds                A/P : 83yMale   S/P ACDF and thoraic laminectomy as above POD#0  -    Pain control- multimodal.  Avoid NSAIDS since they can deter fusion.  -    DVT ppx: [ x]SCDs[ x] Early Ambulation. Pharmacolgic  restart Eliquis 5 mg BID, plavix 75 mg BID post op day 3 on 3/30/24 as prior to surgery.  will leave drains prolonged, 2-3 days after restart Eliquis and Plavix and will discuss with Dr Gonzalez prior to pulling drains.  -    Periop abx:  Ancef [x ]   -    Check AM labs    -    Monitor Drain Output, can discontinue drain when output equal or less than 10 cc/hr/12 hr.  change dressing when drain pulled and as needed, honeycomb dressing.    -  Resume home meds as appropriate  -PT/OT WBAT, balance and gait, no cervical or TLSO brace necessary.  Ambulate with assistance until cleared by PT/OT  -medical follow up- Hospitalist Service Dr Meeks  - probable home vs acute rehab vs [facility] 7 days post op  - HTN- restart antihypertensives with parameters and when appropriate  - DM2- accuchecks, consistent carbohydrate diet, sliding scale coverage, nutrition consult  - COPD- restart inhaler  - thoracic myelopathy- decadron 6 mg QID x 1 day.  Acute rehab consult.  - HLD - restart statin  - hx dvt- restart Eliquis and Plavix as above.    - s/p acdf- cepacol losenges prn, ice to affected area, methocarbamol prn.  soft diet at first and advance as tolerated.  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    Family history of CVA (Father)    FH: heart attack (Father)    Abnormal findings on diagnostic imaging of lung    Hypertension    Hyperlipidemia    CAD (coronary artery disease)    Left anterior fascicular block (LAFB)    RBBB    Thoracic myelopathy    Congenital stenosis of cervical spine    Degenerative cervical spinal stenosis    Degenerative cervical spinal stenosis    Thoracic myelopathy    Intervertebral disc disorders with myelopathy of thoracic region    Cervical disc disorder at C5-C6 level with myelopathy    Need for prophylactic measure    Hypertension    Left anterior fascicular block (LAFB)    CAD (coronary artery disease)    Abnormal findings on diagnostic imaging of lung    S/P hip replacement, right    S/P hip replacement, left    INTERVERTEBRAL DISC DISORDERS    SysAdmin_VstLnk    Home Medications:  acetaminophen 325 mg oral tablet: 2 tab(s) orally every 6 hours As needed Temp greater or equal to 38C (100.4F), Mild Pain (1 - 3) (27 Mar 2024 10:07)  atorvastatin 40 mg oral tablet: 1 tab(s) orally once a day (27 Mar 2024 10:07)  clopidogrel 75 mg oral tablet: 1 tab(s) orally once a day (27 Mar 2024 10:07)  clopidogrel 75 mg oral tablet: 1 tab(s) orally once a day (27 Mar 2024 10:07)  Eliquis 5 mg oral tablet: 1 tab(s) orally 2 times a day as needed for DVT in left leg (27 Mar 2024 10:07)  gabapentin 100 mg oral tablet: 1 tab(s) orally as needed for  moderate pain (27 Mar 2024 10:07)  losartan-hydrochlorothiazide 50 mg-12.5 mg oral tablet: 1 tab(s) orally once a day (27 Mar 2024 10:07)  MetFORMIN (Eqv-Fortamet) 500 mg oral tablet, extended release: 1 tablet, extended release orally once a day (27 Mar 2024 10:07)  metoprolol succinate 50 mg oral capsule, extended release: 1 cap(s) orally once a day (27 Mar 2024 10:07)  Trelegy Ellipta 100 mcg-62.5 mcg-25 mcg/inh inhalation powder: 1 puff(s) inhaled (27 Mar 2024 10:07)    STATUS POST:  Thoracic laminectomy T11, T12 for thoracic stenosis with myelopathy/ACDF C5-C6 for cervical stenosis causing patient to have weakness of lower extremities, ataxia    Physical Exam:  Back Pain controlled, lower extremity pain resolving    OBJECTIVE:   T(C): 36.6 (03-27-24 @ 09:44), Max: 36.6 (03-27-24 @ 09:44)  HR: 66 (03-27-24 @ 09:44) (66 - 66)  BP: 111/52 (03-27-24 @ 09:44) (111/52 - 111/52)  RR: 18 (03-27-24 @ 09:44) (18 - 18)  SpO2: 99% (03-27-24 @ 09:44) (99% - 99%)   Constitutional: Pleasant in no acute distress  Psych:A&Ox3  Abdominal: soft and supple non distended  Lymphatics: no pretibial pitting edema  Spine:          Dressing:  clean/dry/intact anterior cervical, thoracic                           MUKESH drain in place, patent anterior cervical, thoracic               Sensation:            Upper extremity          grossly intact manually          Lower extremity           grossly intact manually                               Motor:          Lower extremity                      HF(L2)   KE(L3)    TA(L4)   EHL(L5)  GS(S1)                                                 R        5/5        5/5        5/5       5/5         5/5                                               L         5/5        5/5       5/5       5/5          5/5                                                        [x] warm well perfused; capillary refill <3 seconds                A/P : 83yMale   S/P ACDF and thoraic laminectomy as above POD#0  -    Pain control- multimodal.  Avoid NSAIDS since they can deter fusion.  -    DVT ppx: [ x]SCDs[ x] Early Ambulation. Pharmacolgic  restart Eliquis 5 mg BID, plavix 75 mg BID post op day 2 on 3/29/24 as prior to surgery.  will leave drains prolonged, 1-2 days after restart Eliquis and Plavix and will discuss with Dr Gonzalez prior to pulling drains.  -    Periop abx:  Ancef [x ]   -    Check AM labs    -    Monitor Drain Output, can discontinue drain when output equal or less than 10 cc/hr/12 hr and after discussion with Dr Gonzalez.  change dressing when drain pulled and as needed, honeycomb dressing.    -  Resume home meds as appropriate  -PT/OT WBAT, balance and gait, no cervical or TLSO brace necessary.  Ambulate with assistance until cleared by PT/OT  -medical follow up- Hospitalist Service Dr Meeks  - probable home vs acute rehab vs [facility] 7 days post op  - HTN- restart antihypertensives with parameters and when appropriate  - DM2- accuchecks, consistent carbohydrate diet, sliding scale coverage, nutrition consult  - COPD- restart inhaler  - thoracic myelopathy- decadron 6 mg QID x 1 day.  Acute rehab consult.  - HLD - restart statin  - hx dvt- restart Eliquis and Plavix as above.    - s/p acdf- cepacol losenges prn, ice to affected area, methocarbamol prn.  soft diet at first and advance as tolerated.  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    Family history of CVA (Father)    FH: heart attack (Father)    Abnormal findings on diagnostic imaging of lung    Hypertension    Hyperlipidemia    CAD (coronary artery disease)    Left anterior fascicular block (LAFB)    RBBB    Thoracic myelopathy    Congenital stenosis of cervical spine    Degenerative cervical spinal stenosis    Degenerative cervical spinal stenosis    Thoracic myelopathy    Intervertebral disc disorders with myelopathy of thoracic region    Cervical disc disorder at C5-C6 level with myelopathy    Need for prophylactic measure    Hypertension    Left anterior fascicular block (LAFB)    CAD (coronary artery disease)    Abnormal findings on diagnostic imaging of lung    S/P hip replacement, right    S/P hip replacement, left    INTERVERTEBRAL DISC DISORDERS    SysAdmin_VstLnk    Home Medications:  acetaminophen 325 mg oral tablet: 2 tab(s) orally every 6 hours As needed Temp greater or equal to 38C (100.4F), Mild Pain (1 - 3) (27 Mar 2024 10:07)  atorvastatin 40 mg oral tablet: 1 tab(s) orally once a day (27 Mar 2024 10:07)  clopidogrel 75 mg oral tablet: 1 tab(s) orally once a day (27 Mar 2024 10:07)  clopidogrel 75 mg oral tablet: 1 tab(s) orally once a day (27 Mar 2024 10:07)  Eliquis 5 mg oral tablet: 1 tab(s) orally 2 times a day as needed for DVT in left leg (27 Mar 2024 10:07)  gabapentin 100 mg oral tablet: 1 tab(s) orally as needed for  moderate pain (27 Mar 2024 10:07)  losartan-hydrochlorothiazide 50 mg-12.5 mg oral tablet: 1 tab(s) orally once a day (27 Mar 2024 10:07)  MetFORMIN (Eqv-Fortamet) 500 mg oral tablet, extended release: 1 tablet, extended release orally once a day (27 Mar 2024 10:07)  metoprolol succinate 50 mg oral capsule, extended release: 1 cap(s) orally once a day (27 Mar 2024 10:07)  Trelegy Ellipta 100 mcg-62.5 mcg-25 mcg/inh inhalation powder: 1 puff(s) inhaled (27 Mar 2024 10:07)    STATUS POST:  Thoracic laminectomy T11, T12 for thoracic stenosis with myelopathy/ACDF C5-C6 for cervical stenosis causing patient to have weakness of lower extremities, ataxia    Physical Exam:  Back Pain controlled, lower extremity pain resolving    OBJECTIVE:   T(C): 36.6 (03-27-24 @ 09:44), Max: 36.6 (03-27-24 @ 09:44)  HR: 66 (03-27-24 @ 09:44) (66 - 66)  BP: 111/52 (03-27-24 @ 09:44) (111/52 - 111/52)  RR: 18 (03-27-24 @ 09:44) (18 - 18)  SpO2: 99% (03-27-24 @ 09:44) (99% - 99%)   Constitutional: Pleasant in no acute distress  Psych:A&Ox3  Abdominal: soft and supple non distended  Lymphatics: no pretibial pitting edema  Spine:          Dressing:  clean/dry/intact anterior cervical, thoracic                           MUKESH drain in place, patent anterior cervical, thoracic               Sensation:            Upper extremity          grossly intact manually          Lower extremity           grossly intact manually                               Motor:          Lower extremity                      HF(L2)   KE(L3)    TA(L4)   EHL(L5)  GS(S1)                                                 R        4-/5        4/5        4/5       4/5         4/5                                               L         4-/5        5/5       5/5       5/5          5/5      Upper Extremities:   Grossly moving B/L  Upper extremities, limitation with shoulder abduction 2/2 OA  No signs of weakness with Elbow flexion / Elbow extension from baseline  Weak hand intrinsics, 3/5 which is baseline                                                        [x] warm well perfused; capillary refill <3 seconds                A/P : 83yMale   S/P ACDF and thoraic laminectomy as above POD#0  -    Pain control- multimodal.  Avoid NSAIDS since they can deter fusion.  -    DVT ppx: [ x]SCDs[ x] Early Ambulation. Pharmacolgic  restart Eliquis 5 mg BID, plavix 75 mg BID post op day 2 on 3/29/24 as prior to surgery.  will leave drains prolonged, 1-2 days after restart Eliquis and Plavix and will discuss with Dr Gonzalez prior to pulling drains.  -    Periop abx:  Ancef [x ]   -    Check AM labs    -    Monitor Drain Output, can discontinue drain when output equal or less than 10 cc/hr/12 hr and after discussion with Dr Gonzalez.  change dressing when drain pulled and as needed, honeycomb dressing.    -  Resume home meds as appropriate  -PT/OT WBAT, balance and gait, no cervical or TLSO brace necessary.  Ambulate with assistance until cleared by PT/OT  -medical follow up- Hospitalist Service Dr Meeks  - probable home vs acute rehab vs [facility] 7 days post op  - HTN- restart antihypertensives with parameters and when appropriate  - DM2- accuchecks, consistent carbohydrate diet, sliding scale coverage, nutrition consult  - COPD- restart inhaler  - thoracic myelopathy- decadron 6 mg QID x 1 day.  Acute rehab consult.  - HLD - restart statin  - hx dvt- restart Eliquis and Plavix as above.    - s/p acdf- cepacol losenges prn, ice to affected area, methocarbamol prn.  soft diet at first and advance as tolerated.

## 2024-03-27 NOTE — DISCHARGE NOTE PROVIDER - NSDCFUADDINST_GEN_ALL_CORE_FT
Please make an appointment for follow up with your surgeon in 1-2 weeks. Call to schedule an appointment. Staples or stitches will be removed at your follow up appointment. Keep incision site clean and dry. No creams, lotions, or ointments to incision area. You are cleared to shower 3 days after surgery unless otherwise instructed.    Take pain medication as prescribed after surgery for pain control. Contact your surgeon's office if pain increases while taking prescribed pain medications or if related concerns develop. If you are taking narcotic pain medications, take a stool softener with it to prevent narcotic associated constipation. Additionally, increase water intake (drink at least 8 glasses daily) and add fiber to your diet by eating fruits, vegetables and foods that are rich in grains.     Do NOT take NSAIDs, including ibuprofen, Advil, Aleve, and Motrin for at least 3 months after your surgery as these medications can impact your healing.    NO heavy lifting, strenuous activity, twisting, bending, driving, or working until cleared by your surgeon  Contact your surgeon's office immediately for any of the following: fever, bleeding, new onset numbness/tingling/weakness, nausea and/or vomiting, urinary and/or fecal incontinence or retention. If an emergency occurs (chest pain, shortness of breath, new confusion, seizure, altered mental status, or other), call 911 and go to the emergency department for evaluation.     Patient to continue plavix and eliquis one post op day 2 for blood clot prevention.  Please make an appointment for follow up with your surgeon in 1-2 weeks. Call to schedule an appointment. Staples or stitches will be removed at your follow up appointment. Keep incision site clean and dry. No creams, lotions, or ointments to incision area. You are cleared to shower 3 days after surgery unless otherwise instructed.    Take pain medication as prescribed after surgery for pain control. Contact your surgeon's office if pain increases while taking prescribed pain medications or if related concerns develop. If you are taking narcotic pain medications, take a stool softener with it to prevent narcotic associated constipation. Additionally, increase water intake (drink at least 8 glasses daily) and add fiber to your diet by eating fruits, vegetables and foods that are rich in grains.     Do NOT take NSAIDs, including ibuprofen, Advil, Aleve, and Motrin for at least 3 months after your surgery as these medications can impact your healing.    NO heavy lifting, strenuous activity, twisting, bending, driving, or working until cleared by your surgeon  Contact your surgeon's office immediately for any of the following: fever, bleeding, new onset numbness/tingling/weakness, nausea and/or vomiting, urinary and/or fecal incontinence or retention. If an emergency occurs (chest pain, shortness of breath, new confusion, seizure, altered mental status, or other), call 911 and go to the emergency department for evaluation.     Patient to continue plavix and eliquis one post op day 2 for blood clot prevention.     Follow-up with Urology Dr. Gonzales. Call for appointment for post-surgical qureshi removal.     You were found to have Hyperglycemia/Hypoglycemia during your stay. Your diabetes may need closer control and improvement.  Please follow up with your PMD/Endocrinologist upon discharge. Please follow up [facility] upon discharge.

## 2024-03-27 NOTE — DISCHARGE NOTE PROVIDER - CARE PROVIDERS DIRECT ADDRESSES
[email or web address]

## 2024-03-28 LAB
ANION GAP SERPL CALC-SCNC: 17 MMOL/L — SIGNIFICANT CHANGE UP (ref 5–17)
BASOPHILS # BLD AUTO: 0.01 K/UL — SIGNIFICANT CHANGE UP (ref 0–0.2)
BASOPHILS NFR BLD AUTO: 0.1 % — SIGNIFICANT CHANGE UP (ref 0–2)
BUN SERPL-MCNC: 26.9 MG/DL — HIGH (ref 8–20)
CALCIUM SERPL-MCNC: 9 MG/DL — SIGNIFICANT CHANGE UP (ref 8.4–10.5)
CHLORIDE SERPL-SCNC: 100 MMOL/L — SIGNIFICANT CHANGE UP (ref 96–108)
CO2 SERPL-SCNC: 21 MMOL/L — LOW (ref 22–29)
CREAT SERPL-MCNC: 0.94 MG/DL — SIGNIFICANT CHANGE UP (ref 0.5–1.3)
EGFR: 81 ML/MIN/1.73M2 — SIGNIFICANT CHANGE UP
EOSINOPHIL # BLD AUTO: 0 K/UL — SIGNIFICANT CHANGE UP (ref 0–0.5)
EOSINOPHIL NFR BLD AUTO: 0 % — SIGNIFICANT CHANGE UP (ref 0–6)
GLUCOSE BLDC GLUCOMTR-MCNC: 122 MG/DL — HIGH (ref 70–99)
GLUCOSE BLDC GLUCOMTR-MCNC: 137 MG/DL — HIGH (ref 70–99)
GLUCOSE BLDC GLUCOMTR-MCNC: 177 MG/DL — HIGH (ref 70–99)
GLUCOSE BLDC GLUCOMTR-MCNC: 204 MG/DL — HIGH (ref 70–99)
GLUCOSE SERPL-MCNC: 145 MG/DL — HIGH (ref 70–99)
HCT VFR BLD CALC: 32.4 % — LOW (ref 39–50)
HGB BLD-MCNC: 10.2 G/DL — LOW (ref 13–17)
IMM GRANULOCYTES NFR BLD AUTO: 0.4 % — SIGNIFICANT CHANGE UP (ref 0–0.9)
LYMPHOCYTES # BLD AUTO: 0.34 K/UL — LOW (ref 1–3.3)
LYMPHOCYTES # BLD AUTO: 3.5 % — LOW (ref 13–44)
MCHC RBC-ENTMCNC: 24.9 PG — LOW (ref 27–34)
MCHC RBC-ENTMCNC: 31.5 GM/DL — LOW (ref 32–36)
MCV RBC AUTO: 79 FL — LOW (ref 80–100)
MONOCYTES # BLD AUTO: 0.22 K/UL — SIGNIFICANT CHANGE UP (ref 0–0.9)
MONOCYTES NFR BLD AUTO: 2.3 % — SIGNIFICANT CHANGE UP (ref 2–14)
NEUTROPHILS # BLD AUTO: 9.12 K/UL — HIGH (ref 1.8–7.4)
NEUTROPHILS NFR BLD AUTO: 93.7 % — HIGH (ref 43–77)
PLATELET # BLD AUTO: 340 K/UL — SIGNIFICANT CHANGE UP (ref 150–400)
POTASSIUM SERPL-MCNC: 4.2 MMOL/L — SIGNIFICANT CHANGE UP (ref 3.5–5.3)
POTASSIUM SERPL-SCNC: 4.2 MMOL/L — SIGNIFICANT CHANGE UP (ref 3.5–5.3)
RBC # BLD: 4.1 M/UL — LOW (ref 4.2–5.8)
RBC # FLD: 17.6 % — HIGH (ref 10.3–14.5)
SODIUM SERPL-SCNC: 138 MMOL/L — SIGNIFICANT CHANGE UP (ref 135–145)
WBC # BLD: 9.73 K/UL — SIGNIFICANT CHANGE UP (ref 3.8–10.5)
WBC # FLD AUTO: 9.73 K/UL — SIGNIFICANT CHANGE UP (ref 3.8–10.5)

## 2024-03-28 PROCEDURE — 99222 1ST HOSP IP/OBS MODERATE 55: CPT

## 2024-03-28 PROCEDURE — 99222 1ST HOSP IP/OBS MODERATE 55: CPT | Mod: GC

## 2024-03-28 RX ADMIN — Medication 2000 MILLIGRAM(S): at 00:53

## 2024-03-28 RX ADMIN — CELECOXIB 200 MILLIGRAM(S): 200 CAPSULE ORAL at 17:20

## 2024-03-28 RX ADMIN — SODIUM CHLORIDE 75 MILLILITER(S): 9 INJECTION INTRAMUSCULAR; INTRAVENOUS; SUBCUTANEOUS at 14:30

## 2024-03-28 RX ADMIN — Medication 975 MILLIGRAM(S): at 22:16

## 2024-03-28 RX ADMIN — Medication 1: at 12:09

## 2024-03-28 RX ADMIN — METHOCARBAMOL 500 MILLIGRAM(S): 500 TABLET, FILM COATED ORAL at 14:28

## 2024-03-28 RX ADMIN — Medication 975 MILLIGRAM(S): at 14:28

## 2024-03-28 RX ADMIN — OXYCODONE HYDROCHLORIDE 10 MILLIGRAM(S): 5 TABLET ORAL at 06:07

## 2024-03-28 RX ADMIN — TIOTROPIUM BROMIDE 2 PUFF(S): 18 CAPSULE ORAL; RESPIRATORY (INHALATION) at 12:09

## 2024-03-28 RX ADMIN — METHOCARBAMOL 500 MILLIGRAM(S): 500 TABLET, FILM COATED ORAL at 21:17

## 2024-03-28 RX ADMIN — Medication 975 MILLIGRAM(S): at 06:53

## 2024-03-28 RX ADMIN — Medication 975 MILLIGRAM(S): at 21:16

## 2024-03-28 RX ADMIN — SENNA PLUS 2 TABLET(S): 8.6 TABLET ORAL at 21:17

## 2024-03-28 RX ADMIN — ATORVASTATIN CALCIUM 40 MILLIGRAM(S): 80 TABLET, FILM COATED ORAL at 21:17

## 2024-03-28 RX ADMIN — BUDESONIDE AND FORMOTEROL FUMARATE DIHYDRATE 2 PUFF(S): 160; 4.5 AEROSOL RESPIRATORY (INHALATION) at 19:43

## 2024-03-28 RX ADMIN — Medication 6 MILLIGRAM(S): at 05:57

## 2024-03-28 RX ADMIN — METHOCARBAMOL 500 MILLIGRAM(S): 500 TABLET, FILM COATED ORAL at 05:54

## 2024-03-28 RX ADMIN — OXYCODONE HYDROCHLORIDE 10 MILLIGRAM(S): 5 TABLET ORAL at 07:07

## 2024-03-28 RX ADMIN — PANTOPRAZOLE SODIUM 40 MILLIGRAM(S): 20 TABLET, DELAYED RELEASE ORAL at 05:54

## 2024-03-28 RX ADMIN — BUDESONIDE AND FORMOTEROL FUMARATE DIHYDRATE 2 PUFF(S): 160; 4.5 AEROSOL RESPIRATORY (INHALATION) at 08:51

## 2024-03-28 RX ADMIN — Medication 975 MILLIGRAM(S): at 05:54

## 2024-03-28 RX ADMIN — GABAPENTIN 100 MILLIGRAM(S): 400 CAPSULE ORAL at 14:27

## 2024-03-28 RX ADMIN — CELECOXIB 200 MILLIGRAM(S): 200 CAPSULE ORAL at 06:53

## 2024-03-28 RX ADMIN — CELECOXIB 200 MILLIGRAM(S): 200 CAPSULE ORAL at 18:20

## 2024-03-28 RX ADMIN — Medication 6 MILLIGRAM(S): at 00:54

## 2024-03-28 RX ADMIN — Medication 2000 MILLIGRAM(S): at 06:06

## 2024-03-28 RX ADMIN — Medication 975 MILLIGRAM(S): at 15:20

## 2024-03-28 RX ADMIN — GABAPENTIN 100 MILLIGRAM(S): 400 CAPSULE ORAL at 05:54

## 2024-03-28 RX ADMIN — MAGNESIUM HYDROXIDE 30 MILLILITER(S): 400 TABLET, CHEWABLE ORAL at 21:20

## 2024-03-28 RX ADMIN — CELECOXIB 200 MILLIGRAM(S): 200 CAPSULE ORAL at 05:54

## 2024-03-28 RX ADMIN — GABAPENTIN 100 MILLIGRAM(S): 400 CAPSULE ORAL at 21:17

## 2024-03-28 RX ADMIN — Medication 6 MILLIGRAM(S): at 12:11

## 2024-03-28 RX ADMIN — SODIUM CHLORIDE 75 MILLILITER(S): 9 INJECTION INTRAMUSCULAR; INTRAVENOUS; SUBCUTANEOUS at 00:38

## 2024-03-28 NOTE — OCCUPATIONAL THERAPY INITIAL EVALUATION ADULT - LONG TERM MEMORY, REHAB EVAL
intact Birth Control Pills Pregnancy And Lactation Text: This medication should be avoided if pregnant and for the first 30 days post-partum.

## 2024-03-28 NOTE — PHYSICAL THERAPY INITIAL EVALUATION ADULT - PERTINENT HX OF CURRENT PROBLEM, REHAB EVAL
as per medical chart: pt present to [facility] for the elective surgery for T1-T12 Laminectomy and C5-6 Anterior cervical discectomy and fusion

## 2024-03-28 NOTE — PROGRESS NOTE ADULT - SUBJECTIVE AND OBJECTIVE BOX
JONE SOUTH    [id number]    History: Patient is status post ACDF C5-6 and T11-12 lami/fusion POD # 1.  Patient is doing well and is comfortable. The patient's pain is controlled using the prescribed pain medications. The patient is participating in physical therapy. Denies nausea, vomiting, chest pain, shortness of breath, abdominal pain or fever. No new complaints. The patients b/l UE weakness and LE pain is improved from pre-op.    Vital Signs Last 24 Hrs  T(C): 36.7 (28 Mar 2024 04:25), Max: 37 (28 Mar 2024 00:20)  T(F): 98 (28 Mar 2024 04:25), Max: 98.6 (28 Mar 2024 00:20)  HR: 45 (28 Mar 2024 04:25) (45 - 66)  BP: 156/69 (28 Mar 2024 04:25) (102/45 - 160/52)  BP(mean): 73 (27 Mar 2024 22:20) (59 - 84)  RR: 18 (28 Mar 2024 04:25) (13 - 20)  SpO2: 97% (28 Mar 2024 04:25) (95% - 100%)    Parameters below as of 28 Mar 2024 04:25  Patient On (Oxygen Delivery Method): room air                              10.2   9.73  )-----------( 340      ( 28 Mar 2024 07:03 )             32.4     03-27    136  |  100  |  23.2<H>  ----------------------------<  130<H>  4.2   |  22.0  |  1.04    Ca    8.4      27 Mar 2024 18:30        MEDICATIONS  (STANDING):  acetaminophen     Tablet .. 975 milliGRAM(s) Oral every 8 hours  atorvastatin 40 milliGRAM(s) Oral at bedtime  budesonide  80 MICROgram(s)/formoterol 4.5 MICROgram(s) Inhaler 2 Puff(s) Inhalation two times a day  celecoxib 200 milliGRAM(s) Oral every 12 hours  dexAMETHasone  Injectable 6 milliGRAM(s) IV Push every 6 hours  dextrose 5%. 1000 milliLiter(s) (100 mL/Hr) IV Continuous <Continuous>  dextrose 5%. 1000 milliLiter(s) (50 mL/Hr) IV Continuous <Continuous>  dextrose 50% Injectable 12.5 Gram(s) IV Push once  dextrose 50% Injectable 25 Gram(s) IV Push once  dextrose 50% Injectable 25 Gram(s) IV Push once  gabapentin 100 milliGRAM(s) Oral three times a day  glucagon  Injectable 1 milliGRAM(s) IntraMuscular once  insulin lispro (ADMELOG) corrective regimen sliding scale   SubCutaneous three times a day before meals  methocarbamol 500 milliGRAM(s) Oral every 8 hours  metoprolol succinate ER 50 milliGRAM(s) Oral daily  pantoprazole    Tablet 40 milliGRAM(s) Oral before breakfast  senna 2 Tablet(s) Oral at bedtime  sodium chloride 0.9%. 1000 milliLiter(s) (75 mL/Hr) IV Continuous <Continuous>  tiotropium 2.5 MICROgram(s) Inhaler 2 Puff(s) Inhalation daily    MEDICATIONS  (PRN):  aluminum hydroxide/magnesium hydroxide/simethicone Suspension 30 milliLiter(s) Oral every 12 hours PRN Indigestion  benzocaine/menthol Lozenge 1 Lozenge Oral every 2 hours PRN Sore Throat  dextrose Oral Gel 15 Gram(s) Oral once PRN Blood Glucose LESS THAN 70 milliGRAM(s)/deciliter  HYDROmorphone  Injectable 0.2 milliGRAM(s) IV Push every 6 hours PRN severe breakthrough pain not controlled with current meds  lidocaine   4% Patch 1 Patch Transdermal every 24 hours PRN pain  magnesium hydroxide Suspension 30 milliLiter(s) Oral every 12 hours PRN Constipation  ondansetron Injectable 4 milliGRAM(s) IV Push every 6 hours PRN Nausea and/or Vomiting  oxyCODONE    IR 10 milliGRAM(s) Oral every 3 hours PRN Severe Pain (7 - 10)  oxyCODONE    IR 5 milliGRAM(s) Oral every 3 hours PRN Moderate Pain (4 - 6)      Vital Signs Last 24 Hrs  T(C): 36.7 (28 Mar 2024 04:25), Max: 37 (28 Mar 2024 00:20)  T(F): 98 (28 Mar 2024 04:25), Max: 98.6 (28 Mar 2024 00:20)  HR: 45 (28 Mar 2024 04:25) (45 - 66)  BP: 156/69 (28 Mar 2024 04:25) (102/45 - 160/52)  BP(mean): 73 (27 Mar 2024 22:20) (59 - 84)  RR: 18 (28 Mar 2024 04:25) (13 - 20)  SpO2: 97% (28 Mar 2024 04:25) (95% - 100%)    Parameters below as of 28 Mar 2024 04:25  Patient On (Oxygen Delivery Method): room air      Daily Height in cm: 177.8 (27 Mar 2024 09:44)    Daily     Appearance: Alert, responsive, in no acute distress.    Skin: no rash on visible skin. Skin is clean, dry and intact. No bleeding. No abrasions. No ulcerations.    Musculoskeletal:         Cervical: Dressing clean, dry, intact. +MUKESH drain       Left Upper Extremity:  Sensation is grossly intact to light touch. 2+ distal pulses. Cap refill < 2 sec.        Right Upper Extremity: Sensation is grossly intact to light touch. 2+ distal pulses. Cap refill < 2 sec.        Lumbar: Dressing clean, dry, intact.        Left Lower Extremity:  Sensation is grossly intact to light touch. 2+ distal pulses. Cap refill < 2 sec.        Right Lower Extremity: Sensation is grossly intact to light touch. 2+ distal pulses. Cap refill < 2 sec.       Motor exam: [  ]          [ ] Upper extremity                      Bi(c5)  WE(c6)  EE(c7)   FF(c8)                                                R         4/5        5/5        4/5       5/5                                               L          4/5        5/5        4/5       5/5      Primary Orthopedic Assessment:  • Patient is status post ACDF C5-6 and T11-12 lami/fusion POD # 1.     Secondary  Orthopedic Assessment(s):   •     Secondary  Medical Assessment(s):   •     Plan:   • DVT prophylaxis as prescribed, including use of compression devices and ankle pumps  • Continue physical therapy  • Out of Bed as tolerated with assistance of a walker  • Incentive spirometry encouraged  • Pain control as clinically indicated  • Monitor drains  • Discharge planning – anticipated discharge is home pending drain output.    JONE SOUTH    [id number]    History: Patient is status post ACDF C5-6 and T11-12 lami/fusion POD # 1.  Patient is doing well and is comfortable. The patient's pain is controlled using the prescribed pain medications. The patient is participating in physical therapy. Denies nausea, vomiting, chest pain, shortness of breath, abdominal pain or fever. No new complaints. The patients b/l UE weakness and LE pain is improved from pre-op.    Vital Signs Last 24 Hrs  T(C): 36.7 (28 Mar 2024 04:25), Max: 37 (28 Mar 2024 00:20)  T(F): 98 (28 Mar 2024 04:25), Max: 98.6 (28 Mar 2024 00:20)  HR: 45 (28 Mar 2024 04:25) (45 - 66)  BP: 156/69 (28 Mar 2024 04:25) (102/45 - 160/52)  BP(mean): 73 (27 Mar 2024 22:20) (59 - 84)  RR: 18 (28 Mar 2024 04:25) (13 - 20)  SpO2: 97% (28 Mar 2024 04:25) (95% - 100%)    Parameters below as of 28 Mar 2024 04:25  Patient On (Oxygen Delivery Method): room air                              10.2   9.73  )-----------( 340      ( 28 Mar 2024 07:03 )             32.4     03-27    136  |  100  |  23.2<H>  ----------------------------<  130<H>  4.2   |  22.0  |  1.04    Ca    8.4      27 Mar 2024 18:30        MEDICATIONS  (STANDING):  acetaminophen     Tablet .. 975 milliGRAM(s) Oral every 8 hours  atorvastatin 40 milliGRAM(s) Oral at bedtime  budesonide  80 MICROgram(s)/formoterol 4.5 MICROgram(s) Inhaler 2 Puff(s) Inhalation two times a day  celecoxib 200 milliGRAM(s) Oral every 12 hours  dexAMETHasone  Injectable 6 milliGRAM(s) IV Push every 6 hours  dextrose 5%. 1000 milliLiter(s) (100 mL/Hr) IV Continuous <Continuous>  dextrose 5%. 1000 milliLiter(s) (50 mL/Hr) IV Continuous <Continuous>  dextrose 50% Injectable 12.5 Gram(s) IV Push once  dextrose 50% Injectable 25 Gram(s) IV Push once  dextrose 50% Injectable 25 Gram(s) IV Push once  gabapentin 100 milliGRAM(s) Oral three times a day  glucagon  Injectable 1 milliGRAM(s) IntraMuscular once  insulin lispro (ADMELOG) corrective regimen sliding scale   SubCutaneous three times a day before meals  methocarbamol 500 milliGRAM(s) Oral every 8 hours  metoprolol succinate ER 50 milliGRAM(s) Oral daily  pantoprazole    Tablet 40 milliGRAM(s) Oral before breakfast  senna 2 Tablet(s) Oral at bedtime  sodium chloride 0.9%. 1000 milliLiter(s) (75 mL/Hr) IV Continuous <Continuous>  tiotropium 2.5 MICROgram(s) Inhaler 2 Puff(s) Inhalation daily    MEDICATIONS  (PRN):  aluminum hydroxide/magnesium hydroxide/simethicone Suspension 30 milliLiter(s) Oral every 12 hours PRN Indigestion  benzocaine/menthol Lozenge 1 Lozenge Oral every 2 hours PRN Sore Throat  dextrose Oral Gel 15 Gram(s) Oral once PRN Blood Glucose LESS THAN 70 milliGRAM(s)/deciliter  HYDROmorphone  Injectable 0.2 milliGRAM(s) IV Push every 6 hours PRN severe breakthrough pain not controlled with current meds  lidocaine   4% Patch 1 Patch Transdermal every 24 hours PRN pain  magnesium hydroxide Suspension 30 milliLiter(s) Oral every 12 hours PRN Constipation  ondansetron Injectable 4 milliGRAM(s) IV Push every 6 hours PRN Nausea and/or Vomiting  oxyCODONE    IR 10 milliGRAM(s) Oral every 3 hours PRN Severe Pain (7 - 10)  oxyCODONE    IR 5 milliGRAM(s) Oral every 3 hours PRN Moderate Pain (4 - 6)      Vital Signs Last 24 Hrs  T(C): 36.7 (28 Mar 2024 04:25), Max: 37 (28 Mar 2024 00:20)  T(F): 98 (28 Mar 2024 04:25), Max: 98.6 (28 Mar 2024 00:20)  HR: 45 (28 Mar 2024 04:25) (45 - 66)  BP: 156/69 (28 Mar 2024 04:25) (102/45 - 160/52)  BP(mean): 73 (27 Mar 2024 22:20) (59 - 84)  RR: 18 (28 Mar 2024 04:25) (13 - 20)  SpO2: 97% (28 Mar 2024 04:25) (95% - 100%)    Parameters below as of 28 Mar 2024 04:25  Patient On (Oxygen Delivery Method): room air      Daily Height in cm: 177.8 (27 Mar 2024 09:44)    Daily     Appearance: Alert, responsive, in no acute distress.    Skin: no rash on visible skin. Skin is clean, dry and intact. No bleeding. No abrasions. No ulcerations.    Musculoskeletal:        Cervical: Dressing clean, dry, intact. +MUKESH drain       Left Upper Extremity:  Sensation is grossly intact to light touch. 2+ distal pulses. Cap refill < 2 sec.        Right Upper Extremity: Sensation is grossly intact to light touch. 2+ distal pulses. Cap refill < 2 sec.        Lumbar: Dressing clean, dry, intact.        Left Lower Extremity:  Sensation is grossly intact to light touch. 2+ distal pulses. Cap refill < 2 sec.         Right Lower Extremity: Sensation is grossly intact to light touch. 2+ distal pulses. Cap refill < 2 sec.       Motor exam: [  ]          [ ] Upper extremity                      Bi(c5)  WE(c6)  EE(c7)   FF(c8)                                                R         4/5        5/5        4/5       5/5                                               L          4/5        5/5        4/5       5/5    [ ] Lower extremity                            HF(l2)    KE(l3)   TA(l4)  EHL(l5)    GS(s1)                                                 R        5/5        5/5        5/5       5/5         5/5                                               L         5/5        5/5       5/5       5/5          5/5      Primary Orthopedic Assessment:  • Patient is status post ACDF C5-6 and T11-12 lami/fusion POD # 1.     Secondary  Orthopedic Assessment(s):   •     Secondary  Medical Assessment(s):   •     Plan:   • DVT prophylaxis as prescribed, including use of compression devices and ankle pumps  • Continue physical therapy  • Out of Bed as tolerated with assistance of a walker  • Incentive spirometry encouraged  • Pain control as clinically indicated  • Monitor drains  • Discharge planning – anticipated discharge is home pending drain output.    JONE SOUTH    [id number]    History: Patient is status post ACDF C5-6 and T11-12 lami POD # 1.  Patient is doing well and is comfortable. The patient's pain is controlled using the prescribed pain medications. The patient is participating in physical therapy. Denies nausea, vomiting, chest pain, shortness of breath, abdominal pain or fever. No new complaints. The patients b/l UE weakness and LE pain is improved from pre-op.    Vital Signs Last 24 Hrs  T(C): 36.7 (28 Mar 2024 04:25), Max: 37 (28 Mar 2024 00:20)  T(F): 98 (28 Mar 2024 04:25), Max: 98.6 (28 Mar 2024 00:20)  HR: 45 (28 Mar 2024 04:25) (45 - 66)  BP: 156/69 (28 Mar 2024 04:25) (102/45 - 160/52)  BP(mean): 73 (27 Mar 2024 22:20) (59 - 84)  RR: 18 (28 Mar 2024 04:25) (13 - 20)  SpO2: 97% (28 Mar 2024 04:25) (95% - 100%)    Parameters below as of 28 Mar 2024 04:25  Patient On (Oxygen Delivery Method): room air                              10.2   9.73  )-----------( 340      ( 28 Mar 2024 07:03 )             32.4     03-27    136  |  100  |  23.2<H>  ----------------------------<  130<H>  4.2   |  22.0  |  1.04    Ca    8.4      27 Mar 2024 18:30        MEDICATIONS  (STANDING):  acetaminophen     Tablet .. 975 milliGRAM(s) Oral every 8 hours  atorvastatin 40 milliGRAM(s) Oral at bedtime  budesonide  80 MICROgram(s)/formoterol 4.5 MICROgram(s) Inhaler 2 Puff(s) Inhalation two times a day  celecoxib 200 milliGRAM(s) Oral every 12 hours  dexAMETHasone  Injectable 6 milliGRAM(s) IV Push every 6 hours  dextrose 5%. 1000 milliLiter(s) (100 mL/Hr) IV Continuous <Continuous>  dextrose 5%. 1000 milliLiter(s) (50 mL/Hr) IV Continuous <Continuous>  dextrose 50% Injectable 12.5 Gram(s) IV Push once  dextrose 50% Injectable 25 Gram(s) IV Push once  dextrose 50% Injectable 25 Gram(s) IV Push once  gabapentin 100 milliGRAM(s) Oral three times a day  glucagon  Injectable 1 milliGRAM(s) IntraMuscular once  insulin lispro (ADMELOG) corrective regimen sliding scale   SubCutaneous three times a day before meals  methocarbamol 500 milliGRAM(s) Oral every 8 hours  metoprolol succinate ER 50 milliGRAM(s) Oral daily  pantoprazole    Tablet 40 milliGRAM(s) Oral before breakfast  senna 2 Tablet(s) Oral at bedtime  sodium chloride 0.9%. 1000 milliLiter(s) (75 mL/Hr) IV Continuous <Continuous>  tiotropium 2.5 MICROgram(s) Inhaler 2 Puff(s) Inhalation daily    MEDICATIONS  (PRN):  aluminum hydroxide/magnesium hydroxide/simethicone Suspension 30 milliLiter(s) Oral every 12 hours PRN Indigestion  benzocaine/menthol Lozenge 1 Lozenge Oral every 2 hours PRN Sore Throat  dextrose Oral Gel 15 Gram(s) Oral once PRN Blood Glucose LESS THAN 70 milliGRAM(s)/deciliter  HYDROmorphone  Injectable 0.2 milliGRAM(s) IV Push every 6 hours PRN severe breakthrough pain not controlled with current meds  lidocaine   4% Patch 1 Patch Transdermal every 24 hours PRN pain  magnesium hydroxide Suspension 30 milliLiter(s) Oral every 12 hours PRN Constipation  ondansetron Injectable 4 milliGRAM(s) IV Push every 6 hours PRN Nausea and/or Vomiting  oxyCODONE    IR 10 milliGRAM(s) Oral every 3 hours PRN Severe Pain (7 - 10)  oxyCODONE    IR 5 milliGRAM(s) Oral every 3 hours PRN Moderate Pain (4 - 6)      Vital Signs Last 24 Hrs  T(C): 36.7 (28 Mar 2024 04:25), Max: 37 (28 Mar 2024 00:20)  T(F): 98 (28 Mar 2024 04:25), Max: 98.6 (28 Mar 2024 00:20)  HR: 45 (28 Mar 2024 04:25) (45 - 66)  BP: 156/69 (28 Mar 2024 04:25) (102/45 - 160/52)  BP(mean): 73 (27 Mar 2024 22:20) (59 - 84)  RR: 18 (28 Mar 2024 04:25) (13 - 20)  SpO2: 97% (28 Mar 2024 04:25) (95% - 100%)    Parameters below as of 28 Mar 2024 04:25  Patient On (Oxygen Delivery Method): room air      Daily Height in cm: 177.8 (27 Mar 2024 09:44)    Daily     Appearance: Alert, responsive, in no acute distress.    Skin: no rash on visible skin. Skin is clean, dry and intact. No bleeding. No abrasions. No ulcerations.    Musculoskeletal:        Cervical: Dressing clean, dry, intact. +MUKESH drain       Left Upper Extremity:  Sensation is grossly intact to light touch. 2+ distal pulses. Cap refill < 2 sec.        Right Upper Extremity: Sensation is grossly intact to light touch. 2+ distal pulses. Cap refill < 2 sec.        Lumbar: Dressing clean, dry, intact.        Left Lower Extremity:  Sensation is grossly intact to light touch. 2+ distal pulses. Cap refill < 2 sec.         Right Lower Extremity: Sensation is grossly intact to light touch. 2+ distal pulses. Cap refill < 2 sec.       Motor exam: [  ]          [ ] Upper extremity                      Bi(c5)  WE(c6)  EE(c7)   FF(c8)                                                R         4/5        5/5        4/5       3/5                                               L          4/5        5/5        4/5       3/5    [ ] Lower extremity                            HF(l2)    KE(l3)   TA(l4)  EHL(l5)    GS(s1)                                                 R        5/5        5/5        4/5       4/5         5/5                                               L         5/5        5/5       5/5       5/5          5/5      Primary Orthopedic Assessment:  • Patient is status post ACDF C5-6 and T11-12 lami POD # 1.     Secondary  Orthopedic Assessment(s):   •     Secondary  Medical Assessment(s):   •     Plan:   • DVT prophylaxis as prescribed, including use of compression devices and ankle pumps  • Continue physical therapy  • Out of Bed as tolerated with assistance of a walker  • Incentive spirometry encouraged  • Pain control as clinically indicated  • Monitor drains  • Discharge planning – anticipated discharge is home pending drain output.

## 2024-03-28 NOTE — CHART NOTE - NSCHARTNOTEFT_GEN_A_CORE
Patient is seen and evaluated  as per patient he has bradycardia and his follows with cardiologist  denies any chest pain, sob, palpitation  will continue to monitor  he was told to follow with his cardiologist as out patient.   His labs reviewed as well   TSH is ok

## 2024-03-28 NOTE — PHYSICAL THERAPY INITIAL EVALUATION ADULT - STANDING BALANCE: DYNAMIC, REHAB EVAL
fair balance Propranolol Pregnancy And Lactation Text: This medication is Pregnancy Category C and it isn't known if it is safe during pregnancy. It is excreted in breast milk.

## 2024-03-28 NOTE — OCCUPATIONAL THERAPY INITIAL EVALUATION ADULT - TRANSFER SAFETY CONCERNS NOTED: SIT/STAND, REHAB EVAL
child has been asleep, HR remains in 70s, normal respirations, color appears good - will wake and evaluate for discharge
decreased sequencing ability/decreased weight-shifting ability

## 2024-03-28 NOTE — CONSULT NOTE ADULT - SUBJECTIVE AND OBJECTIVE BOX
HPI:  84yo M w/ PMH lung cancer (on immunotherapy), DVT (on apixaban), HTN, and CAD, who was admitted to [facility] 03/27/2024 for elective surgery for spinal stenosis. S/p 03/27 C5-6 ACDF + T11-12 lami/fusion. Post-op course c/b intermittent bradycardia w/ PACs, suspected 2/2 anesthetics. PM&R consulted 03/28.    Imaging Reviewed Today:  None this hospitalization.  ----------------------------------------------------------------------------------------  CHIEF COMPLAINT          ----------------------------------------------------------------------------------------  VITALS  T(C): 36.7 (03-28-24 @ 04:25), Max: 37 (03-28-24 @ 00:20)  HR: 45 (03-28-24 @ 04:25) (45 - 66)  BP: 156/69 (03-28-24 @ 04:25) (102/45 - 160/52)  RR: 18 (03-28-24 @ 04:25) (13 - 20)  SpO2: 97% (03-28-24 @ 04:25) (95% - 100%)  Wt(kg): --    PAST MEDICAL & SURGICAL HISTORY  Abnormal findings on diagnostic imaging of lung  Hypertension  Hyperlipidemia  CAD (coronary artery disease)  Left anterior fascicular block (LAFB)  RBBB  S/P hip replacement, right  S/P hip replacement, left    LABS  REVIEWED    CBC Full  -  ( 28 Mar 2024 07:03 )  WBC Count : 9.73 K/uL  RBC Count : 4.10 M/uL  Hemoglobin : 10.2 g/dL  Hematocrit : 32.4 %  Platelet Count - Automated : 340 K/uL  Mean Cell Volume : 79.0 fl  Mean Cell Hemoglobin : 24.9 pg  Mean Cell Hemoglobin Concentration : 31.5 gm/dL  Auto Neutrophil # : 9.12 K/uL  Auto Lymphocyte # : 0.34 K/uL  Auto Monocyte # : 0.22 K/uL  Auto Eosinophil # : 0.00 K/uL  Auto Basophil # : 0.01 K/uL  Auto Neutrophil % : 93.7 %  Auto Lymphocyte % : 3.5 %  Auto Monocyte % : 2.3 %  Auto Eosinophil % : 0.0 %  Auto Basophil % : 0.1 %    03-28    138  |  100  |  26.9<H>  ----------------------------<  145<H>  4.2   |  21.0<L>  |  0.94    Ca    9.0      28 Mar 2024 07:03    ALLERGIES  No Known Allergies    MEDICATIONS   acetaminophen     Tablet .. 975 milliGRAM(s) Oral every 8 hours  aluminum hydroxide/magnesium hydroxide/simethicone Suspension 30 milliLiter(s) Oral every 12 hours PRN  atorvastatin 40 milliGRAM(s) Oral at bedtime  benzocaine/menthol Lozenge 1 Lozenge Oral every 2 hours PRN  budesonide  80 MICROgram(s)/formoterol 4.5 MICROgram(s) Inhaler 2 Puff(s) Inhalation two times a day  celecoxib 200 milliGRAM(s) Oral every 12 hours  dexAMETHasone  Injectable 6 milliGRAM(s) IV Push every 6 hours  dextrose 5%. 1000 milliLiter(s) IV Continuous <Continuous>  dextrose 5%. 1000 milliLiter(s) IV Continuous <Continuous>  dextrose 50% Injectable 12.5 Gram(s) IV Push once  dextrose 50% Injectable 25 Gram(s) IV Push once  dextrose 50% Injectable 25 Gram(s) IV Push once  dextrose Oral Gel 15 Gram(s) Oral once PRN  gabapentin 100 milliGRAM(s) Oral three times a day  glucagon  Injectable 1 milliGRAM(s) IntraMuscular once  HYDROmorphone  Injectable 0.2 milliGRAM(s) IV Push every 6 hours PRN  insulin lispro (ADMELOG) corrective regimen sliding scale   SubCutaneous three times a day before meals  lidocaine   4% Patch 1 Patch Transdermal every 24 hours PRN  magnesium hydroxide Suspension 30 milliLiter(s) Oral every 12 hours PRN  methocarbamol 500 milliGRAM(s) Oral every 8 hours  metoprolol succinate ER 50 milliGRAM(s) Oral daily  ondansetron Injectable 4 milliGRAM(s) IV Push every 6 hours PRN  oxyCODONE    IR 5 milliGRAM(s) Oral every 3 hours PRN  oxyCODONE    IR 10 milliGRAM(s) Oral every 3 hours PRN  pantoprazole    Tablet 40 milliGRAM(s) Oral before breakfast  senna 2 Tablet(s) Oral at bedtime  sodium chloride 0.9%. 1000 milliLiter(s) IV Continuous <Continuous>  tiotropium 2.5 MICROgram(s) Inhaler 2 Puff(s) Inhalation daily  ----------------------------------------------------------------------------------------  FUNCTIONAL HISTORY  Lives   Independent    CURRENT FUNCTIONAL STATUS      ----------------------------------------------------------------------------------------  PHYSICAL EXAM  Constitutional - NAD, Comfortable  HEENT - NCAT, EOMI  Neck - Supple, No limited ROM  Chest - Breathing comfortably, No wheezing  Cardiovascular - S1S2   Abdomen - Soft   Extremities - No C/C/E, No calf tenderness   Neurologic Exam -                    Cognitive - AAO to self, place, date, year, situation     Communication - Fluent, No dysarthria     Cranial Nerves - CN 2-12 intact     FUNCTIONAL MOTOR EXAM - No focal deficits                    LEFT    UE - ShAB 5/5, EF 5/5, EE 5/5, WE 5/5,  5/5                    RIGHT UE - ShAB 5/5, EF 5/5, EE 5/5, WE 5/5,  5/5                    LEFT    LE - HF 5/5, KE 5/5, DF 5/5, PF 5/5                    RIGHT LE - HF 5/5, KE 5/5, DF 5/5, PF 5/5        Sensory - Intact to LT     Reflexes - DTR Intact, No primitive reflexive     Coordination - FTN intact     OculoVestibular - No saccades, No nystagmus, VOR         Balance - WNL Static  Psychiatric - Mood stable, Affect WNL  ----------------------------------------------------------------------------------------  ASSESSMENT/PLAN  83yMale with functional deficits after 03/27 C5-6 ACDF + T11-12 lami/fusion.  Pain - acetaminophen 975mg q8h, celecoxib 200mg q12h, gabapentin 100mg TID, lidocaine patch x1, methocarbamol 500mg q8h, oxycodone PRN, hydromorphone PRN  Lung ca - budesonide/formoterol, tiotropium   S/p ACDF + T lami/fusion - dexamethasone  GI ppx - pantoprazole  CAD - Toprol-XL w/ hold parameters, atorvastatin  DVT - resume apixaban when appropriate per ortho  Impaired Mobility/Function/Rehab Recommendations -  73 year old male with intermittent gross hematuria.  Long smoking history of approximately 30 years in the past.  Intermittent gross hematuria prompting CT urogram showing a 2.7 cm enhancing left renal pelvis mass concerning for urothelial carcinoma.  Long discussion had with patient, he understands that he will need further evaluation with a diagnostic ureteroscopy as an outpatient and biopsy to determine the next step.      -- Please obtain a urine cytology (ordered as NON-GYN Cytology -> urine)  -- Patient to follow up with [facility] once discharged for Diagnostic ureteroscopy and biopsy to determine next step as soon as discharged from hospital  -- If any issues with follow up, may follow up with Dr. Vela at the [facility] ([phone]) for further management 73 year old male with intermittent gross hematuria.  Long smoking history of approximately 30 years in the past.  Intermittent gross hematuria prompting CT urogram showing a 2.7 cm enhancing left renal pelvis mass concerning for urothelial carcinoma.  Long discussion had with patient, he understands that he will need further evaluation with a diagnostic ureteroscopy as an outpatient and biopsy to determine the next step.      -- No urgent urologic intervention needed, etiology of hematuria has now been found  -- Please obtain a urine cytology (ordered as NON-GYN Cytology -> urine)  -- Patient to follow up with [facility] once discharged for Diagnostic ureteroscopy and biopsy to determine next step as soon as discharged from hospital  -- If any issues with follow up, may follow up with Dr. Vela at the [facility] ([phone]) for further management HPI:  82yo M w/ PMH lung cancer (on immunotherapy), DVT (on apixaban), HTN, and CAD, who was admitted to [facility] 03/27/2024 for elective surgery for spinal stenosis. S/p 03/27 C5-6 ACDF + T11-12 lami/fusion. Post-op course c/b intermittent bradycardia w/ PACs, suspected 2/2 anesthetics. PM&R consulted 03/28.    Imaging Reviewed Today:  MR Spine 12/05/2023  Cervical spondylosis, most notably at C5/C6, resulting in severe spinal   canal narrowing and moderate to severe foraminal narrowing.    Thoracic spondylosis, most notably at T11/T12, resulting in severe spinal   canal narrowing and moderate to severe left and severe right foraminal   narrowing. Question of focal myelomalacia at this level.    Lumbar spondylosis, most notably at L4/L5, resulting in severe spinal   canal narrowing.    Advanced degenerative changes at the articulations of C1/C2.  No evidence of osseous metastases.    ----------------------------------------------------------------------------------------  CHIEF COMPLAINT          ----------------------------------------------------------------------------------------  VITALS  T(C): 36.7 (03-28-24 @ 04:25), Max: 37 (03-28-24 @ 00:20)  HR: 45 (03-28-24 @ 04:25) (45 - 66)  BP: 156/69 (03-28-24 @ 04:25) (102/45 - 160/52)  RR: 18 (03-28-24 @ 04:25) (13 - 20)  SpO2: 97% (03-28-24 @ 04:25) (95% - 100%)  Wt(kg): --    PAST MEDICAL & SURGICAL HISTORY  Abnormal findings on diagnostic imaging of lung  Hypertension  Hyperlipidemia  CAD (coronary artery disease)  Left anterior fascicular block (LAFB)  RBBB  S/P hip replacement, right  S/P hip replacement, left    LABS  REVIEWED    CBC Full  -  ( 28 Mar 2024 07:03 )  WBC Count : 9.73 K/uL  RBC Count : 4.10 M/uL  Hemoglobin : 10.2 g/dL  Hematocrit : 32.4 %  Platelet Count - Automated : 340 K/uL  Mean Cell Volume : 79.0 fl  Mean Cell Hemoglobin : 24.9 pg  Mean Cell Hemoglobin Concentration : 31.5 gm/dL  Auto Neutrophil # : 9.12 K/uL  Auto Lymphocyte # : 0.34 K/uL  Auto Monocyte # : 0.22 K/uL  Auto Eosinophil # : 0.00 K/uL  Auto Basophil # : 0.01 K/uL  Auto Neutrophil % : 93.7 %  Auto Lymphocyte % : 3.5 %  Auto Monocyte % : 2.3 %  Auto Eosinophil % : 0.0 %  Auto Basophil % : 0.1 %    03-28    138  |  100  |  26.9<H>  ----------------------------<  145<H>  4.2   |  21.0<L>  |  0.94    Ca    9.0      28 Mar 2024 07:03    ALLERGIES  No Known Allergies    MEDICATIONS   acetaminophen     Tablet .. 975 milliGRAM(s) Oral every 8 hours  aluminum hydroxide/magnesium hydroxide/simethicone Suspension 30 milliLiter(s) Oral every 12 hours PRN  atorvastatin 40 milliGRAM(s) Oral at bedtime  benzocaine/menthol Lozenge 1 Lozenge Oral every 2 hours PRN  budesonide  80 MICROgram(s)/formoterol 4.5 MICROgram(s) Inhaler 2 Puff(s) Inhalation two times a day  celecoxib 200 milliGRAM(s) Oral every 12 hours  dexAMETHasone  Injectable 6 milliGRAM(s) IV Push every 6 hours  dextrose 5%. 1000 milliLiter(s) IV Continuous <Continuous>  dextrose 5%. 1000 milliLiter(s) IV Continuous <Continuous>  dextrose 50% Injectable 12.5 Gram(s) IV Push once  dextrose 50% Injectable 25 Gram(s) IV Push once  dextrose 50% Injectable 25 Gram(s) IV Push once  dextrose Oral Gel 15 Gram(s) Oral once PRN  gabapentin 100 milliGRAM(s) Oral three times a day  glucagon  Injectable 1 milliGRAM(s) IntraMuscular once  HYDROmorphone  Injectable 0.2 milliGRAM(s) IV Push every 6 hours PRN  insulin lispro (ADMELOG) corrective regimen sliding scale   SubCutaneous three times a day before meals  lidocaine   4% Patch 1 Patch Transdermal every 24 hours PRN  magnesium hydroxide Suspension 30 milliLiter(s) Oral every 12 hours PRN  methocarbamol 500 milliGRAM(s) Oral every 8 hours  metoprolol succinate ER 50 milliGRAM(s) Oral daily  ondansetron Injectable 4 milliGRAM(s) IV Push every 6 hours PRN  oxyCODONE    IR 5 milliGRAM(s) Oral every 3 hours PRN  oxyCODONE    IR 10 milliGRAM(s) Oral every 3 hours PRN  pantoprazole    Tablet 40 milliGRAM(s) Oral before breakfast  senna 2 Tablet(s) Oral at bedtime  sodium chloride 0.9%. 1000 milliLiter(s) IV Continuous <Continuous>  tiotropium 2.5 MICROgram(s) Inhaler 2 Puff(s) Inhalation daily  ----------------------------------------------------------------------------------------  FUNCTIONAL HISTORY  Lives   Independent    CURRENT FUNCTIONAL STATUS      ----------------------------------------------------------------------------------------  PHYSICAL EXAM  Constitutional - NAD, Comfortable  HEENT - NCAT, EOMI  Neck - Supple, No limited ROM  Chest - Breathing comfortably, No wheezing  Cardiovascular - S1S2   Abdomen - Soft   Extremities - No C/C/E, No calf tenderness   Neurologic Exam -                    Cognitive - AAO to self, place, date, year, situation     Communication - Fluent, No dysarthria     Cranial Nerves - CN 2-12 intact     FUNCTIONAL MOTOR EXAM - No focal deficits                    LEFT    UE - ShAB 5/5, EF 5/5, EE 5/5, WE 5/5,  5/5                    RIGHT UE - ShAB 5/5, EF 5/5, EE 5/5, WE 5/5,  5/5                    LEFT    LE - HF 5/5, KE 5/5, DF 5/5, PF 5/5                    RIGHT LE - HF 5/5, KE 5/5, DF 5/5, PF 5/5        Sensory - Intact to LT     Reflexes - DTR Intact, No primitive reflexive     Coordination - FTN intact     OculoVestibular - No saccades, No nystagmus, VOR         Balance - WNL Static  Psychiatric - Mood stable, Affect WNL  ----------------------------------------------------------------------------------------  ASSESSMENT/PLAN  83yMale with functional deficits after 03/27 C5-6 ACDF + T11-12 lami/fusion.  Pain - acetaminophen 975mg q8h, celecoxib 200mg q12h, gabapentin 100mg TID, lidocaine patch x1, methocarbamol 500mg q8h, oxycodone PRN, hydromorphone PRN  Lung ca - budesonide/formoterol, tiotropium   S/p ACDF + T lami/fusion - dexamethasone  GI ppx - pantoprazole  CAD - Toprol-XL w/ hold parameters, atorvastatin  DVT - resume apixaban when appropriate per ortho  Impaired Mobility/Function/Rehab Recommendations -  HPI:  84yo M w/ PMH lung cancer (on immunotherapy), DVT (on apixaban), HTN, and CAD, who was admitted to [facility] 03/27/2024 for elective surgery for spinal stenosis. S/p 03/27 C5-6 ACDF + T11-12 lami/fusion. Post-op course c/b intermittent bradycardia w/ PACs. PM&R consulted 03/28.    Imaging Reviewed Today:  MR Spine 12/05/2023  Cervical spondylosis, most notably at C5/C6, resulting in severe spinal   canal narrowing and moderate to severe foraminal narrowing.    Thoracic spondylosis, most notably at T11/T12, resulting in severe spinal   canal narrowing and moderate to severe left and severe right foraminal   narrowing. Question of focal myelomalacia at this level.    Lumbar spondylosis, most notably at L4/L5, resulting in severe spinal   canal narrowing.    Advanced degenerative changes at the articulations of C1/C2.  No evidence of osseous metastases.    ----------------------------------------------------------------------------------------  CHIEF COMPLAINT    Seen at bedside w/ wife and son this AM.  Pain well tolerated on current regimen.   Chronic numbness to right first 3 digits.  Reports bradycardia has been standing issue on current immunotherapy regimen. No chest pain, palpitations, diaphoresis. Some wooziness attributed to recent pain medication and NPO for surgery.  ----------------------------------------------------------------------------------------  VITALS  T(C): 36.7 (03-28-24 @ 04:25), Max: 37 (03-28-24 @ 00:20)  HR: 45 (03-28-24 @ 04:25) (45 - 66)  BP: 156/69 (03-28-24 @ 04:25) (102/45 - 160/52)  RR: 18 (03-28-24 @ 04:25) (13 - 20)  SpO2: 97% (03-28-24 @ 04:25) (95% - 100%)  Wt(kg): --    PAST MEDICAL & SURGICAL HISTORY  Abnormal findings on diagnostic imaging of lung  Hypertension  Hyperlipidemia  CAD (coronary artery disease)  Left anterior fascicular block (LAFB)  RBBB  S/P hip replacement, right  S/P hip replacement, left    LABS  REVIEWED    CBC Full  -  ( 28 Mar 2024 07:03 )  WBC Count : 9.73 K/uL  RBC Count : 4.10 M/uL  Hemoglobin : 10.2 g/dL  Hematocrit : 32.4 %  Platelet Count - Automated : 340 K/uL  Mean Cell Volume : 79.0 fl  Mean Cell Hemoglobin : 24.9 pg  Mean Cell Hemoglobin Concentration : 31.5 gm/dL  Auto Neutrophil # : 9.12 K/uL  Auto Lymphocyte # : 0.34 K/uL  Auto Monocyte # : 0.22 K/uL  Auto Eosinophil # : 0.00 K/uL  Auto Basophil # : 0.01 K/uL  Auto Neutrophil % : 93.7 %  Auto Lymphocyte % : 3.5 %  Auto Monocyte % : 2.3 %  Auto Eosinophil % : 0.0 %  Auto Basophil % : 0.1 %    03-28    138  |  100  |  26.9<H>  ----------------------------<  145<H>  4.2   |  21.0<L>  |  0.94    Ca    9.0      28 Mar 2024 07:03    ALLERGIES  No Known Allergies    MEDICATIONS   acetaminophen     Tablet .. 975 milliGRAM(s) Oral every 8 hours  aluminum hydroxide/magnesium hydroxide/simethicone Suspension 30 milliLiter(s) Oral every 12 hours PRN  atorvastatin 40 milliGRAM(s) Oral at bedtime  benzocaine/menthol Lozenge 1 Lozenge Oral every 2 hours PRN  budesonide  80 MICROgram(s)/formoterol 4.5 MICROgram(s) Inhaler 2 Puff(s) Inhalation two times a day  celecoxib 200 milliGRAM(s) Oral every 12 hours  dexAMETHasone  Injectable 6 milliGRAM(s) IV Push every 6 hours  dextrose 5%. 1000 milliLiter(s) IV Continuous <Continuous>  dextrose 5%. 1000 milliLiter(s) IV Continuous <Continuous>  dextrose 50% Injectable 12.5 Gram(s) IV Push once  dextrose 50% Injectable 25 Gram(s) IV Push once  dextrose 50% Injectable 25 Gram(s) IV Push once  dextrose Oral Gel 15 Gram(s) Oral once PRN  gabapentin 100 milliGRAM(s) Oral three times a day  glucagon  Injectable 1 milliGRAM(s) IntraMuscular once  HYDROmorphone  Injectable 0.2 milliGRAM(s) IV Push every 6 hours PRN  insulin lispro (ADMELOG) corrective regimen sliding scale   SubCutaneous three times a day before meals  lidocaine   4% Patch 1 Patch Transdermal every 24 hours PRN  magnesium hydroxide Suspension 30 milliLiter(s) Oral every 12 hours PRN  methocarbamol 500 milliGRAM(s) Oral every 8 hours  metoprolol succinate ER 50 milliGRAM(s) Oral daily  ondansetron Injectable 4 milliGRAM(s) IV Push every 6 hours PRN  oxyCODONE    IR 5 milliGRAM(s) Oral every 3 hours PRN  oxyCODONE    IR 10 milliGRAM(s) Oral every 3 hours PRN  pantoprazole    Tablet 40 milliGRAM(s) Oral before breakfast  senna 2 Tablet(s) Oral at bedtime  sodium chloride 0.9%. 1000 milliLiter(s) IV Continuous <Continuous>  tiotropium 2.5 MICROgram(s) Inhaler 2 Puff(s) Inhalation daily  ----------------------------------------------------------------------------------------  FUNCTIONAL HISTORY  Lives with wife in private home. 1 KARTHIK. + walk-in shower.  Assistance from wife for UBD, LBD, and bathing. Independent for ambulation, primarily with forearm walker.    CURRENT FUNCTIONAL STATUS  Pending PT/OT eval.    ----------------------------------------------------------------------------------------  PHYSICAL EXAM  Constitutional - NAD, Comfortable  HEENT - NCAT, hard of hearing  Chest - normal effort on room air  Extremities - diffuse bruising to BUE, b/l shoulders crepitous w/ reduced flexion/abduction  Neurologic Exam -                    Cognitive - AAO to self, place, situation     Communication - Fluent, No dysarthria     FUNCTIONAL MOTOR EXAM - No focal deficits                    LEFT    UE - ShAB 4/5, C5 4/5, C6 4/5, C7 4/5, C8 4/5, T1 4/5                    RIGHT UE - ShAB 4/5, C5 4/5, C6 4/5, C7 4/5, C8 4/5, T1 4/5                    LEFT    LE - L2 4/5, L3 4/5, L4 5/5, L5 4/5, S1 5/5                    RIGHT LE - L2 2/5, L3 4/5, L4 4/5, L5 4/5, S1 5/5     Sensory - grossly intact to LT     Reflexes - diffusely hyporeflexic, b/l Rees's neg, b/l clonus neg  Psychiatric - Mood stable, Affect WNL  ----------------------------------------------------------------------------------------  ASSESSMENT/PLAN  83yMale with functional deficits after 03/27 C5-6 ACDF + T11-12 lami/fusion.  Pain - acetaminophen 975mg q8h, celecoxib 200mg q12h, gabapentin 100mg TID, lidocaine patch x1, methocarbamol 500mg q8h, oxycodone PRN, hydromorphone PRN  Lung ca - budesonide/formoterol, tiotropium   S/p ACDF + T lami/fusion - dexamethasone  GI ppx - pantoprazole  CAD - Toprol-XL w/ hold parameters, atorvastatin  DVT - SCDs, resume apixaban when appropriate per ortho  Impaired Mobility/Function/Rehab Recommendations - Continues to require acute hospitalization for post-op care and above medical issues. Will benefit from continued mobilization during hospitalization to prevent further mobility. Recommend OOB during day.    Currently pending PT/OT evaluations. Suspect patient may achieve functional goals for discharge home with family assist. Will follow participation and progress with therapies to determine rehab needs and disposition.    PM&R will continue to follow and offer recommendations. HPI:  84yo M w/ PMH lung cancer (on immunotherapy), DVT (on apixaban), HTN, and CAD, who was admitted to [facility] 03/27/2024 for elective surgery for spinal stenosis. S/p 03/27 C5-6 ACDF + T11-12 lami/fusion. Post-op course c/b intermittent bradycardia w/ PACs. PM&R consulted 03/28.    Imaging Reviewed Today:  MR Spine 12/05/2023  Cervical spondylosis, most notably at C5/C6, resulting in severe spinal   canal narrowing and moderate to severe foraminal narrowing.    Thoracic spondylosis, most notably at T11/T12, resulting in severe spinal   canal narrowing and moderate to severe left and severe right foraminal   narrowing. Question of focal myelomalacia at this level.    Lumbar spondylosis, most notably at L4/L5, resulting in severe spinal   canal narrowing.    Advanced degenerative changes at the articulations of C1/C2.  No evidence of osseous metastases.    ----------------------------------------------------------------------------------------  CHIEF COMPLAINT    Pain well tolerated on current regimen.   Reports strength is improving.    ----------------------------------------------------------------------------------------  VITALS  T(C): 36.7 (03-28-24 @ 04:25), Max: 37 (03-28-24 @ 00:20)  HR: 45 (03-28-24 @ 04:25) (45 - 66)  BP: 156/69 (03-28-24 @ 04:25) (102/45 - 160/52)  RR: 18 (03-28-24 @ 04:25) (13 - 20)  SpO2: 97% (03-28-24 @ 04:25) (95% - 100%)  Wt(kg): --    PAST MEDICAL & SURGICAL HISTORY  Abnormal findings on diagnostic imaging of lung  Hypertension  Hyperlipidemia  CAD (coronary artery disease)  Left anterior fascicular block (LAFB)  RBBB  S/P hip replacement, right  S/P hip replacement, left    LABS  REVIEWED    CBC Full  -  ( 28 Mar 2024 07:03 )  WBC Count : 9.73 K/uL  RBC Count : 4.10 M/uL  Hemoglobin : 10.2 g/dL  Hematocrit : 32.4 %  Platelet Count - Automated : 340 K/uL  Mean Cell Volume : 79.0 fl  Mean Cell Hemoglobin : 24.9 pg  Mean Cell Hemoglobin Concentration : 31.5 gm/dL  Auto Neutrophil # : 9.12 K/uL  Auto Lymphocyte # : 0.34 K/uL  Auto Monocyte # : 0.22 K/uL  Auto Eosinophil # : 0.00 K/uL  Auto Basophil # : 0.01 K/uL  Auto Neutrophil % : 93.7 %  Auto Lymphocyte % : 3.5 %  Auto Monocyte % : 2.3 %  Auto Eosinophil % : 0.0 %  Auto Basophil % : 0.1 %    03-28    138  |  100  |  26.9<H>  ----------------------------<  145<H>  4.2   |  21.0<L>  |  0.94    Ca    9.0      28 Mar 2024 07:03    ALLERGIES  No Known Allergies    MEDICATIONS   acetaminophen     Tablet .. 975 milliGRAM(s) Oral every 8 hours  aluminum hydroxide/magnesium hydroxide/simethicone Suspension 30 milliLiter(s) Oral every 12 hours PRN  atorvastatin 40 milliGRAM(s) Oral at bedtime  benzocaine/menthol Lozenge 1 Lozenge Oral every 2 hours PRN  budesonide  80 MICROgram(s)/formoterol 4.5 MICROgram(s) Inhaler 2 Puff(s) Inhalation two times a day  celecoxib 200 milliGRAM(s) Oral every 12 hours  dexAMETHasone  Injectable 6 milliGRAM(s) IV Push every 6 hours  dextrose 5%. 1000 milliLiter(s) IV Continuous <Continuous>  dextrose 5%. 1000 milliLiter(s) IV Continuous <Continuous>  dextrose 50% Injectable 12.5 Gram(s) IV Push once  dextrose 50% Injectable 25 Gram(s) IV Push once  dextrose 50% Injectable 25 Gram(s) IV Push once  dextrose Oral Gel 15 Gram(s) Oral once PRN  gabapentin 100 milliGRAM(s) Oral three times a day  glucagon  Injectable 1 milliGRAM(s) IntraMuscular once  HYDROmorphone  Injectable 0.2 milliGRAM(s) IV Push every 6 hours PRN  insulin lispro (ADMELOG) corrective regimen sliding scale   SubCutaneous three times a day before meals  lidocaine   4% Patch 1 Patch Transdermal every 24 hours PRN  magnesium hydroxide Suspension 30 milliLiter(s) Oral every 12 hours PRN  methocarbamol 500 milliGRAM(s) Oral every 8 hours  metoprolol succinate ER 50 milliGRAM(s) Oral daily  ondansetron Injectable 4 milliGRAM(s) IV Push every 6 hours PRN  oxyCODONE    IR 5 milliGRAM(s) Oral every 3 hours PRN  oxyCODONE    IR 10 milliGRAM(s) Oral every 3 hours PRN  pantoprazole    Tablet 40 milliGRAM(s) Oral before breakfast  senna 2 Tablet(s) Oral at bedtime  sodium chloride 0.9%. 1000 milliLiter(s) IV Continuous <Continuous>  tiotropium 2.5 MICROgram(s) Inhaler 2 Puff(s) Inhalation daily  ----------------------------------------------------------------------------------------  FUNCTIONAL HISTORY  Lives with wife in private home. 1 KARTHIK. + walk-in shower.  Assistance from wife for UBD, LBD, and bathing. Independent for ambulation, primarily with forearm walker.    CURRENT FUNCTIONAL STATUS    Bed Mobility: Rolling/Turning:     · Level of Edgar	moderate assist (50% patients effort); log roll to the right  · Physical Assist/Nonphysical Assist	1 person assist; nonverbal cues (demo/gestures); verbal cues    Bed Mobility: Scooting/Bridging:     · Level of Edgar	moderate assist (50% patients effort)  · Physical Assist/Nonphysical Assist	1 person assist; nonverbal cues (demo/gestures); verbal cues  · Assistive Device	bed rails    Bed Mobility: Supine to Sit:     · Level of Edgar	moderate assist (50% patients effort)  · Physical Assist/Nonphysical Assist	1 person assist; nonverbal cues (demo/gestures); verbal cues    Bed Mobility Analysis:     · Bed Mobility Limitations	decreased ability to use legs for bridging/pushing; decreased ability to use arms for pushing/pulling  · Impairments Contributing to Impaired Bed Mobility	decreased flexibility; decreased ROM; decreased strength    Bed/Chair Transfer Safety Analysis:     · Impairments Contributing to Impaired Transfers	impaired balance; decreased flexibility; decreased strength; decreased ROM  · Transfer Safety Concerns Noted	decreased sequencing ability; decreased weight-shifting ability; in sagittal plane    Transfer: Sit to Stand:     · Level of Edgar	moderate assist (50% patients effort)  · Physical Assist/Nonphysical Assist	1 person assist; nonverbal cues (demo/gestures); verbal cues  · Weight-Bearing Restrictions	no weight bearing restrictions  · Assistive Device	rolling walker    Transfer: Stand to Sit:     · Level of Edgar	moderate assist (50% patients effort)  · Physical Assist/Nonphysical Assist	1 person assist; nonverbal cues (demo/gestures); verbal cues  · Weight-Bearing Restrictions	no weight bearing restrictions  · Assistive Device	rolling walker    Sit/Stand Transfer Safety Analysis:     · Transfer Safety Concerns Noted	decreased weight-shifting ability; in sagittal plane; decreased sequencing ability  · Impairments Contributing to Impaired Transfers	impaired balance; decreased ROM; decreased strength    Gait Skills:     · Physical Assist/Nonphysical Assist	1 person assist; nonverbal cues (demo/gestures); verbal cues  · Weight-Bearing Restrictions	no weight bearing restrictions  · Assistive Device	rolling walker  · Gait Distance	10 feet      ----------------------------------------------------------------------------------------  PHYSICAL EXAM  Constitutional - NAD, Comfortable  HEENT - NCAT, hard of hearing  Chest - normal effort on room air  Neurologic Exam -                    Cognitive - AAO to self, place, situation     Communication - Fluent, No dysarthria     FUNCTIONAL MOTOR EXAM - No focal deficits                    LEFT    UE - ShAB 4/5, C5 4/5, C6 4/5, C7 4/5, C8 4/5, T1 4/5                    RIGHT UE - ShAB 4/5, C5 4/5, C6 4/5, C7 4/5, C8 4/5, T1 4/5                    LEFT    LE - L2 4/5, L3 4/5, L4 5/5, L5 4/5, S1 5/5                    RIGHT LE - L2 2/5, L3 4/5, L4 4/5, L5 4/5, S1 5/5     Sensory - grossly intact to LT     Reflexes - diffusely hyporeflexic, b/l Rees's neg, b/l clonus neg  Psychiatric - Mood stable, Affect WNL  ----------------------------------------------------------------------------------------  ASSESSMENT/PLAN  83yMale with functional deficits after 03/27 C5-6 ACDF + T11-12 lami/fusion.  Pain - acetaminophen 975mg q8h, celecoxib 200mg q12h, gabapentin 100mg TID, lidocaine patch x1, methocarbamol 500mg q8h, oxycodone PRN, hydromorphone PRN  Lung ca - budesonide/formoterol, tiotropium   GI ppx - pantoprazole  CAD - Toprol-XL w/ hold parameters, atorvastatin  DVT - SCDs, resume apixaban when appropriate per ortho  Impaired Mobility/Function/Rehab Recommendations - Continues to require acute hospitalization for post-op care and above medical issues. Continue PT.  Consult OT for evaluation.  Recommend acute rehabilitation once medically stable.  Patient will be able to tolerated 3 hours of therapy.    PM&R will continue to follow.

## 2024-03-28 NOTE — CONSULT NOTE ADULT - ATTENDING COMMENTS
83yMale with functional deficits after 03/27 C5-6 ACDF + T11-12 lami/fusion.  Reports strength improving and pain controlled.  Anti-gravity strength.  Continue PT/OT.  Recommend acute rehabilitation.

## 2024-03-28 NOTE — PHYSICAL THERAPY INITIAL EVALUATION ADULT - NSPTDISCHREC_GEN_A_CORE
rehab disposition recommendation may be change base on patient  functional progress/Acute Inpatient Rehab

## 2024-03-28 NOTE — CONSULT NOTE ADULT - SUBJECTIVE AND OBJECTIVE BOX
SUBJECTIVE  BRIEF HOSPITAL COURSE SUMMARY: Pt is a 83yMale with a PMH of lung cancer (on immunotherapy) complicated by prior DVT (on Eliquis), HTN, and CAD who was admitted for elective orthopedic surgery in setting of spinal stenosis. Post-operatively, pt developed bradycardia w/ PACs as reported on telemetry.    REASON FOR CONSULT: Bradycardia + PACs  AT BEDSIDE: Pt seen at bedside. Pt states he does not know why he is being seen by the medicine service and states that no one told him anything about his heart rate being low. Pt states that his heart is frequently in the 60s, which is confirmed by HIE review. Pt states he has been asymptomatic all night and has not experienced any dizziness, weakness, lethargy, syncope, lightheadedness, palpitations, chest pain, or SOB. Offers no other acute complaints & ROS otherwise negative.     OBJECTIVE  PHYSICAL EXAM:  GENERAL: no acute distress, comfortably in bed  HEENT: Drain in place. Atraumatic, normocephalic, non-icteric, no JVD  NEURO:  A&Ox3, no focal deficits, moving all extremities spontaneously, no dysarthria, CN II-XII grossly intact  PSYCH: Normal affect, calm, appropriate insight and judgment, fluent speech  LUNGS: CTAB, no wrr, non-labored breathing  HEART: RRR, no murmur appreciated  ABD: Soft, non-tender, non-distended, no organomegaly, no appreciable masses, +bs all 4 quadrants  EXTREMITIES: Nontender, no clubbing, cyanosis, or edema  SKIN: No rashes or lesions     Vital Signs Last 24 Hrs  T(C): 37 (28 Mar 2024 00:20), Max: 37 (28 Mar 2024 00:20)  T(F): 98.6 (28 Mar 2024 00:20), Max: 98.6 (28 Mar 2024 00:20)  HR: 45 (28 Mar 2024 01:47) (45 - 66)  BP: 142/64 (28 Mar 2024 00:20) (102/45 - 160/52)  BP(mean): 73 (27 Mar 2024 22:20) (59 - 84)  RR: 18 (28 Mar 2024 00:20) (13 - 20)  SpO2: 95% (28 Mar 2024 00:20) (95% - 100%)    Parameters below as of 28 Mar 2024 00:20  Patient On (Oxygen Delivery Method): room air        MEDICATIONS  (STANDING):  acetaminophen     Tablet .. 975 milliGRAM(s) Oral every 8 hours  atorvastatin 40 milliGRAM(s) Oral at bedtime  budesonide  80 MICROgram(s)/formoterol 4.5 MICROgram(s) Inhaler 2 Puff(s) Inhalation two times a day  ceFAZolin  Injectable. 2000 milliGRAM(s) IV Push <User Schedule>  celecoxib 200 milliGRAM(s) Oral every 12 hours  dexAMETHasone  Injectable 6 milliGRAM(s) IV Push every 6 hours  dextrose 5%. 1000 milliLiter(s) (100 mL/Hr) IV Continuous <Continuous>  dextrose 5%. 1000 milliLiter(s) (50 mL/Hr) IV Continuous <Continuous>  dextrose 50% Injectable 12.5 Gram(s) IV Push once  dextrose 50% Injectable 25 Gram(s) IV Push once  dextrose 50% Injectable 25 Gram(s) IV Push once  gabapentin 100 milliGRAM(s) Oral three times a day  glucagon  Injectable 1 milliGRAM(s) IntraMuscular once  insulin lispro (ADMELOG) corrective regimen sliding scale   SubCutaneous three times a day before meals  methocarbamol 500 milliGRAM(s) Oral every 8 hours  metoprolol succinate ER 50 milliGRAM(s) Oral daily  pantoprazole    Tablet 40 milliGRAM(s) Oral before breakfast  senna 2 Tablet(s) Oral at bedtime  sodium chloride 0.9%. 1000 milliLiter(s) (75 mL/Hr) IV Continuous <Continuous>  tiotropium 2.5 MICROgram(s) Inhaler 2 Puff(s) Inhalation daily    MEDICATIONS  (PRN):  aluminum hydroxide/magnesium hydroxide/simethicone Suspension 30 milliLiter(s) Oral every 12 hours PRN Indigestion  benzocaine/menthol Lozenge 1 Lozenge Oral every 2 hours PRN Sore Throat  dextrose Oral Gel 15 Gram(s) Oral once PRN Blood Glucose LESS THAN 70 milliGRAM(s)/deciliter  HYDROmorphone  Injectable 0.2 milliGRAM(s) IV Push every 6 hours PRN severe breakthrough pain not controlled with current meds  lidocaine   4% Patch 1 Patch Transdermal every 24 hours PRN pain  magnesium hydroxide Suspension 30 milliLiter(s) Oral every 12 hours PRN Constipation  ondansetron Injectable 4 milliGRAM(s) IV Push every 6 hours PRN Nausea and/or Vomiting  oxyCODONE    IR 5 milliGRAM(s) Oral every 3 hours PRN Moderate Pain (4 - 6)  oxyCODONE    IR 10 milliGRAM(s) Oral every 3 hours PRN Severe Pain (7 - 10)    Allergies    No Known Allergies    Intolerances        LABS:                        8.8    8.38  )-----------( 294      ( 27 Mar 2024 18:30 )             27.6     03-27    136  |  100  |  23.2<H>  ----------------------------<  130<H>  4.2   |  22.0  |  1.04    Ca    8.4      27 Mar 2024 18:30      PT/INR - ( 27 Mar 2024 09:50 )   PT: 12.5 sec;   INR: 1.13 ratio         PTT - ( 27 Mar 2024 09:50 )  PTT:33.2 sec  Urinalysis Basic - ( 27 Mar 2024 18:30 )    Color: x / Appearance: x / SG: x / pH: x  Gluc: 130 mg/dL / Ketone: x  / Bili: x / Urobili: x   Blood: x / Protein: x / Nitrite: x   Leuk Esterase: x / RBC: x / WBC x   Sq Epi: x / Non Sq Epi: x / Bacteria: x      CAPILLARY BLOOD GLUCOSE      POCT Blood Glucose.: 114 mg/dL (27 Mar 2024 18:06)  POCT Blood Glucose.: 110 mg/dL (27 Mar 2024 10:11)      RADIOLOGY & ADDITIONAL TESTS:      Imaging Personally Reviewed:  [  ] YES  [  ] NO    Consultant(s) Notes Reviewed:  [  ] YES  [  ] NO    Care Discussed with Consultants/Other Providers [  ] YES  [  ] NO    Plan of Care discussed with Housestaff [ X ]YES [  ] NO SUBJECTIVE  BRIEF HOSPITAL COURSE SUMMARY: Pt is a 83yMale with a PMH of lung cancer (on immunotherapy) complicated by prior DVT (on Eliquis), HTN, and CAD who was admitted for elective orthopedic surgery in setting of spinal stenosis. Post-operatively, pt developed bradycardia w/ PACs as reported on telemetry.    REASON FOR CONSULT: Bradycardia + PACs  AT BEDSIDE: Pt seen at bedside. Pt feeling well, states he was unaware that his heart rate was low. Pt states that his heart is frequently in the 60s, which is confirmed by HIE review. Pt states he has been asymptomatic all night and has not experienced any dizziness, weakness, lethargy, syncope, lightheadedness, palpitations, chest pain, or SOB. Offers no other acute complaints & ROS otherwise negative.     OBJECTIVE  PHYSICAL EXAM:  GENERAL: no acute distress, comfortably in bed  HEENT: Drain in place. Atraumatic, normocephalic, non-icteric, no JVD  NEURO:  A&Ox3, no focal deficits, moving all extremities spontaneously, no dysarthria, CN II-XII grossly intact  PSYCH: Normal affect, calm, appropriate insight and judgment, fluent speech  LUNGS: CTAB, no wrr, non-labored breathing  HEART: Regular rhythm, bradycardic, no murmur appreciated  ABD: Soft, non-tender, non-distended, no organomegaly, no appreciable masses, +bs all 4 quadrants  EXTREMITIES: Nontender, no clubbing, cyanosis, or edema  SKIN: No rashes or lesions     Vital Signs Last 24 Hrs  T(C): 37 (28 Mar 2024 00:20), Max: 37 (28 Mar 2024 00:20)  T(F): 98.6 (28 Mar 2024 00:20), Max: 98.6 (28 Mar 2024 00:20)  HR: 45 (28 Mar 2024 01:47) (45 - 66)  BP: 142/64 (28 Mar 2024 00:20) (102/45 - 160/52)  BP(mean): 73 (27 Mar 2024 22:20) (59 - 84)  RR: 18 (28 Mar 2024 00:20) (13 - 20)  SpO2: 95% (28 Mar 2024 00:20) (95% - 100%)    Parameters below as of 28 Mar 2024 00:20  Patient On (Oxygen Delivery Method): room air    MEDICATIONS  (STANDING):  acetaminophen     Tablet .. 975 milliGRAM(s) Oral every 8 hours  atorvastatin 40 milliGRAM(s) Oral at bedtime  budesonide  80 MICROgram(s)/formoterol 4.5 MICROgram(s) Inhaler 2 Puff(s) Inhalation two times a day  ceFAZolin  Injectable. 2000 milliGRAM(s) IV Push <User Schedule>  celecoxib 200 milliGRAM(s) Oral every 12 hours  dexAMETHasone  Injectable 6 milliGRAM(s) IV Push every 6 hours  dextrose 5%. 1000 milliLiter(s) (100 mL/Hr) IV Continuous <Continuous>  dextrose 5%. 1000 milliLiter(s) (50 mL/Hr) IV Continuous <Continuous>  dextrose 50% Injectable 12.5 Gram(s) IV Push once  dextrose 50% Injectable 25 Gram(s) IV Push once  dextrose 50% Injectable 25 Gram(s) IV Push once  gabapentin 100 milliGRAM(s) Oral three times a day  glucagon  Injectable 1 milliGRAM(s) IntraMuscular once  insulin lispro (ADMELOG) corrective regimen sliding scale   SubCutaneous three times a day before meals  methocarbamol 500 milliGRAM(s) Oral every 8 hours  metoprolol succinate ER 50 milliGRAM(s) Oral daily  pantoprazole    Tablet 40 milliGRAM(s) Oral before breakfast  senna 2 Tablet(s) Oral at bedtime  sodium chloride 0.9%. 1000 milliLiter(s) (75 mL/Hr) IV Continuous <Continuous>  tiotropium 2.5 MICROgram(s) Inhaler 2 Puff(s) Inhalation daily    MEDICATIONS  (PRN):  aluminum hydroxide/magnesium hydroxide/simethicone Suspension 30 milliLiter(s) Oral every 12 hours PRN Indigestion  benzocaine/menthol Lozenge 1 Lozenge Oral every 2 hours PRN Sore Throat  dextrose Oral Gel 15 Gram(s) Oral once PRN Blood Glucose LESS THAN 70 milliGRAM(s)/deciliter  HYDROmorphone  Injectable 0.2 milliGRAM(s) IV Push every 6 hours PRN severe breakthrough pain not controlled with current meds  lidocaine   4% Patch 1 Patch Transdermal every 24 hours PRN pain  magnesium hydroxide Suspension 30 milliLiter(s) Oral every 12 hours PRN Constipation  ondansetron Injectable 4 milliGRAM(s) IV Push every 6 hours PRN Nausea and/or Vomiting  oxyCODONE    IR 5 milliGRAM(s) Oral every 3 hours PRN Moderate Pain (4 - 6)  oxyCODONE    IR 10 milliGRAM(s) Oral every 3 hours PRN Severe Pain (7 - 10)    Allergies    No Known Allergies    Intolerances        LABS:                        8.8    8.38  )-----------( 294      ( 27 Mar 2024 18:30 )             27.6     03-27    136  |  100  |  23.2<H>  ----------------------------<  130<H>  4.2   |  22.0  |  1.04    Ca    8.4      27 Mar 2024 18:30      PT/INR - ( 27 Mar 2024 09:50 )   PT: 12.5 sec;   INR: 1.13 ratio         PTT - ( 27 Mar 2024 09:50 )  PTT:33.2 sec  Urinalysis Basic - ( 27 Mar 2024 18:30 )    Color: x / Appearance: x / SG: x / pH: x  Gluc: 130 mg/dL / Ketone: x  / Bili: x / Urobili: x   Blood: x / Protein: x / Nitrite: x   Leuk Esterase: x / RBC: x / WBC x   Sq Epi: x / Non Sq Epi: x / Bacteria: x      CAPILLARY BLOOD GLUCOSE      POCT Blood Glucose.: 114 mg/dL (27 Mar 2024 18:06)  POCT Blood Glucose.: 110 mg/dL (27 Mar 2024 10:11)      RADIOLOGY & ADDITIONAL TESTS:      Imaging Personally Reviewed:  [  ] YES  [  ] NO    Consultant(s) Notes Reviewed:  [  ] YES  [  ] NO    Care Discussed with Consultants/Other Providers [  ] YES  [  ] NO    Plan of Care discussed with Housestaff [ X ]YES [  ] NO

## 2024-03-28 NOTE — PHYSICAL THERAPY INITIAL EVALUATION ADULT - ADDITIONAL COMMENTS
as per pt and wife: resides in the private house with one step through the door to enter, and 2 steps to the living tori (-) rail, owns RW uses indoors, rollator with Ryan platforms uses outdoors, SAC, wife was assisting with ADL's PTA as needed

## 2024-03-28 NOTE — OCCUPATIONAL THERAPY INITIAL EVALUATION ADULT - LIVES WITH, PROFILE
Pt reports living in a private house with his spouse. Spouse works full time so only able to assist minimally, pt would be alone during day. 1 + 2 KARTHIK no handrail./spouse

## 2024-03-28 NOTE — OCCUPATIONAL THERAPY INITIAL EVALUATION ADULT - ADDITIONAL COMMENTS
Pt has a shower with curtains and 2 grab bars. Pt owns a RW, platform rollator (b/l), shower chair, commode, cane and has a bed rail. Pt is right handed and drives. Pt reports independent PTA but recently has been having spouse assist with ADLs 2*pain.

## 2024-03-28 NOTE — CONSULT NOTE ADULT - ASSESSMENT
83y Male w/ PMH of lung cancer (on immunotherapy) complicated by prior DVT (on Eliquis), HTN, and CAD admitted for elective orthopedic surgery. Medicine consulted for bradycardia.    Plan  #Bradycardia  -Independently reviewed telemetry with telemetry technician - no PVCs present but some PACs  -Pt had bouts of bradycardia in 40s-50s, but had HR in 60s when I woke him for my examination  -Pt remains completely asymptomatic,     #Post-operative care  -Care per orthopedic team  -Resume Eliquis when safe to do so, as pt is hypercoagulable w/ hx of DVTs      83y Male w/ PMH of lung cancer (on immunotherapy) complicated by prior DVT (on Eliquis), HTN, and CAD admitted for elective orthopedic surgery. Medicine consulted for bradycardia.    Plan  #Bradycardia  -Independently reviewed telemetry with telemetry technician - no PVCs present but some PACs  -Pt had bouts of bradycardia in 40s-50s, but had HR in 60s when I woke him for my examination  -Avoid AV-dejon blocking agents  -Pt remains completely asymptomaticwas as;ee[  #Post-operative care  -Care per orthopedic team  -Resume Eliquis when safe to do so, as pt is hypercoagulable w/ hx of DVTs      83y Male w/ PMH of lung cancer (on immunotherapy) complicated by prior DVT (on Eliquis), HTN, and CAD admitted for elective orthopedic surgery. Medicine consulted for bradycardia.    Plan  #Bradycardia  -Independently reviewed telemetry with telemetry technician - no PVCs present but some PACs  -Pt had bouts of bradycardia in 40s-50s, but had HR in 60s when I woke him for my examination  -Avoid AV-dejon blocking agents  -Pt remains completely asymptomatic, was asleep, and recently had anesthesia  -No further workup indicated at this time  -Will sign off, please re-consult as needed    #Post-operative care  -Care per orthopedic team  -Resume Eliquis when safe to do so, as pt is hypercoagulable w/ hx of DVTs      83y Male w/ PMH of lung cancer (on immunotherapy) complicated by prior DVT (on Eliquis), HTN, and CAD admitted for elective orthopedic surgery. Medicine consulted for bradycardia.    Plan  #Bradycardia  -Independently reviewed telemetry with telemetry technician - no PVCs present but some PACs  -Pt had bouts of bradycardia in 40s-50s, but had HR in 60s when I woke him for my examination  -Would recommend holding AV-dejon blocking agents (Metoprolol) for HR < 60   -Pt remains completely asymptomatic, was asleep, and recently had anesthesia; HR will likely increase as anesthetic is fully washed out  -No further workup indicated at this time  -Monitor on telemetry for 24 hours    #Post-operative care  -Care per orthopedic team  -Resume Eliquis when safe to do so, as pt is hypercoagulable w/ hx of DVTs

## 2024-03-28 NOTE — OCCUPATIONAL THERAPY INITIAL EVALUATION ADULT - GENERAL OBSERVATIONS, REHAB EVAL
Received pt semifowler in bed, NAD, +IV, +Tele/, +Georges, +MUKESH x 2, pm RA A&Ox4, wife bedside. Pt is hard of hearing. Pre/post pain 0/10. Pt agreeable to OT evaluation

## 2024-03-28 NOTE — PHYSICAL THERAPY INITIAL EVALUATION ADULT - ACTIVE RANGE OF MOTION EXAMINATION, REHAB EVAL
assessed during functional mobility, resistance not applied, within spinal precautions/bilateral upper extremity Active ROM was WFL (within functional limits)/bilateral  lower extremity Active ROM was WFL (within functional limits)

## 2024-03-28 NOTE — CONSULT NOTE ADULT - ATTENDING COMMENTS
83y Male w/ PMH of lung cancer (on immunotherapy) complicated by prior DVT (on Eliquis), HTN, and CAD admitted for elective orthopedic surgery. Medicine consulted for bradycardia.    Patient on AV dejon blocker Metoprolol; states baseline HR around 60. Patient with recent anesthesia and was sleeping at the time of the episode. Asymptomatic.   Would continue to monitor on tele; if bradycardia persists while patient is awake, after anesthesia fully wears off and/or if patient developed symptomatic bradycardia, would recommend stopping Metoprolol     Will follow up in am  Remainder of postop care per ortho team    Agree with remainder of history, physical, assessment and plan as illustrated in resident note above. 83y Male w/ PMH of lung cancer (on immunotherapy) complicated by prior DVT (on Eliquis), HTN, and CAD admitted for elective orthopedic surgery. Medicine consulted for bradycardia.    Patient on AV dejon blocker Metoprolol; states baseline HR around 60. Patient with recent anesthesia and was sleeping at the time of the episode. Asymptomatic.   Would continue to monitor on tele; if bradycardia persists while patient is awake, after anesthesia fully wears off and/or if patient developed symptomatic bradycardia, would recommend stopping Metoprolol     Chronic medical problems : CAD, HTN, Esophagitis, hx DVT, COPD - continue home medications as ordered    Will follow up in am  Remainder of postop care per ortho team    Agree with remainder of history, physical, assessment and plan as illustrated in resident note above.

## 2024-03-29 LAB
ACANTHOCYTES BLD QL SMEAR: SLIGHT — SIGNIFICANT CHANGE UP
ANION GAP SERPL CALC-SCNC: 13 MMOL/L — SIGNIFICANT CHANGE UP (ref 5–17)
ANISOCYTOSIS BLD QL: SLIGHT — SIGNIFICANT CHANGE UP
BASOPHILS # BLD AUTO: 0 K/UL — SIGNIFICANT CHANGE UP (ref 0–0.2)
BASOPHILS NFR BLD AUTO: 0 % — SIGNIFICANT CHANGE UP (ref 0–2)
BUN SERPL-MCNC: 43.9 MG/DL — HIGH (ref 8–20)
BURR CELLS BLD QL SMEAR: PRESENT — SIGNIFICANT CHANGE UP
CALCIUM SERPL-MCNC: 8.8 MG/DL — SIGNIFICANT CHANGE UP (ref 8.4–10.5)
CHLORIDE SERPL-SCNC: 102 MMOL/L — SIGNIFICANT CHANGE UP (ref 96–108)
CO2 SERPL-SCNC: 22 MMOL/L — SIGNIFICANT CHANGE UP (ref 22–29)
CREAT SERPL-MCNC: 1.36 MG/DL — HIGH (ref 0.5–1.3)
EGFR: 52 ML/MIN/1.73M2 — LOW
ELLIPTOCYTES BLD QL SMEAR: SLIGHT — SIGNIFICANT CHANGE UP
EOSINOPHIL # BLD AUTO: 0 K/UL — SIGNIFICANT CHANGE UP (ref 0–0.5)
EOSINOPHIL NFR BLD AUTO: 0 % — SIGNIFICANT CHANGE UP (ref 0–6)
GLUCOSE BLDC GLUCOMTR-MCNC: 107 MG/DL — HIGH (ref 70–99)
GLUCOSE BLDC GLUCOMTR-MCNC: 115 MG/DL — HIGH (ref 70–99)
GLUCOSE BLDC GLUCOMTR-MCNC: 122 MG/DL — HIGH (ref 70–99)
GLUCOSE BLDC GLUCOMTR-MCNC: 93 MG/DL — SIGNIFICANT CHANGE UP (ref 70–99)
GLUCOSE SERPL-MCNC: 139 MG/DL — HIGH (ref 70–99)
HCT VFR BLD CALC: 32.2 % — LOW (ref 39–50)
HGB BLD-MCNC: 10.4 G/DL — LOW (ref 13–17)
LYMPHOCYTES # BLD AUTO: 0.52 K/UL — LOW (ref 1–3.3)
LYMPHOCYTES # BLD AUTO: 2.6 % — LOW (ref 13–44)
MANUAL SMEAR VERIFICATION: SIGNIFICANT CHANGE UP
MCHC RBC-ENTMCNC: 25.4 PG — LOW (ref 27–34)
MCHC RBC-ENTMCNC: 32.3 GM/DL — SIGNIFICANT CHANGE UP (ref 32–36)
MCV RBC AUTO: 78.5 FL — LOW (ref 80–100)
MONOCYTES # BLD AUTO: 0.7 K/UL — SIGNIFICANT CHANGE UP (ref 0–0.9)
MONOCYTES NFR BLD AUTO: 3.5 % — SIGNIFICANT CHANGE UP (ref 2–14)
NEUTROPHILS # BLD AUTO: 18.31 K/UL — HIGH (ref 1.8–7.4)
NEUTROPHILS NFR BLD AUTO: 92.2 % — HIGH (ref 43–77)
OVALOCYTES BLD QL SMEAR: SLIGHT — SIGNIFICANT CHANGE UP
PLAT MORPH BLD: NORMAL — SIGNIFICANT CHANGE UP
PLATELET # BLD AUTO: 357 K/UL — SIGNIFICANT CHANGE UP (ref 150–400)
POIKILOCYTOSIS BLD QL AUTO: SLIGHT — SIGNIFICANT CHANGE UP
POLYCHROMASIA BLD QL SMEAR: SLIGHT — SIGNIFICANT CHANGE UP
POTASSIUM SERPL-MCNC: 3.9 MMOL/L — SIGNIFICANT CHANGE UP (ref 3.5–5.3)
POTASSIUM SERPL-SCNC: 3.9 MMOL/L — SIGNIFICANT CHANGE UP (ref 3.5–5.3)
RBC # BLD: 4.1 M/UL — LOW (ref 4.2–5.8)
RBC # FLD: 18.3 % — HIGH (ref 10.3–14.5)
RBC BLD AUTO: ABNORMAL
SMUDGE CELLS # BLD: PRESENT — SIGNIFICANT CHANGE UP
SODIUM SERPL-SCNC: 137 MMOL/L — SIGNIFICANT CHANGE UP (ref 135–145)
VARIANT LYMPHS # BLD: 1.7 % — SIGNIFICANT CHANGE UP (ref 0–6)
WBC # BLD: 19.86 K/UL — HIGH (ref 3.8–10.5)
WBC # FLD AUTO: 19.86 K/UL — HIGH (ref 3.8–10.5)

## 2024-03-29 PROCEDURE — 99233 SBSQ HOSP IP/OBS HIGH 50: CPT | Mod: GC

## 2024-03-29 PROCEDURE — 99232 SBSQ HOSP IP/OBS MODERATE 35: CPT

## 2024-03-29 RX ORDER — CLOPIDOGREL BISULFATE 75 MG/1
75 TABLET, FILM COATED ORAL DAILY
Refills: 0 | Status: DISCONTINUED | OUTPATIENT
Start: 2024-03-29 | End: 2024-03-31

## 2024-03-29 RX ORDER — SODIUM CHLORIDE 9 MG/ML
1000 INJECTION INTRAMUSCULAR; INTRAVENOUS; SUBCUTANEOUS
Refills: 0 | Status: DISCONTINUED | OUTPATIENT
Start: 2024-03-29 | End: 2024-03-30

## 2024-03-29 RX ORDER — TAMSULOSIN HYDROCHLORIDE 0.4 MG/1
0.4 CAPSULE ORAL AT BEDTIME
Refills: 0 | Status: DISCONTINUED | OUTPATIENT
Start: 2024-03-30 | End: 2024-03-31

## 2024-03-29 RX ORDER — TAMSULOSIN HYDROCHLORIDE 0.4 MG/1
0.4 CAPSULE ORAL ONCE
Refills: 0 | Status: COMPLETED | OUTPATIENT
Start: 2024-03-29 | End: 2024-03-29

## 2024-03-29 RX ADMIN — CELECOXIB 200 MILLIGRAM(S): 200 CAPSULE ORAL at 05:12

## 2024-03-29 RX ADMIN — METHOCARBAMOL 500 MILLIGRAM(S): 500 TABLET, FILM COATED ORAL at 05:11

## 2024-03-29 RX ADMIN — TAMSULOSIN HYDROCHLORIDE 0.4 MILLIGRAM(S): 0.4 CAPSULE ORAL at 18:51

## 2024-03-29 RX ADMIN — Medication 975 MILLIGRAM(S): at 05:11

## 2024-03-29 RX ADMIN — CELECOXIB 200 MILLIGRAM(S): 200 CAPSULE ORAL at 17:53

## 2024-03-29 RX ADMIN — APIXABAN 5 MILLIGRAM(S): 2.5 TABLET, FILM COATED ORAL at 05:11

## 2024-03-29 RX ADMIN — GABAPENTIN 100 MILLIGRAM(S): 400 CAPSULE ORAL at 21:34

## 2024-03-29 RX ADMIN — Medication 975 MILLIGRAM(S): at 14:12

## 2024-03-29 RX ADMIN — ATORVASTATIN CALCIUM 40 MILLIGRAM(S): 80 TABLET, FILM COATED ORAL at 21:35

## 2024-03-29 RX ADMIN — Medication 975 MILLIGRAM(S): at 15:12

## 2024-03-29 RX ADMIN — TIOTROPIUM BROMIDE 2 PUFF(S): 18 CAPSULE ORAL; RESPIRATORY (INHALATION) at 10:41

## 2024-03-29 RX ADMIN — Medication 975 MILLIGRAM(S): at 21:35

## 2024-03-29 RX ADMIN — PANTOPRAZOLE SODIUM 40 MILLIGRAM(S): 20 TABLET, DELAYED RELEASE ORAL at 05:12

## 2024-03-29 RX ADMIN — GABAPENTIN 100 MILLIGRAM(S): 400 CAPSULE ORAL at 14:16

## 2024-03-29 RX ADMIN — Medication 975 MILLIGRAM(S): at 22:35

## 2024-03-29 RX ADMIN — BUDESONIDE AND FORMOTEROL FUMARATE DIHYDRATE 2 PUFF(S): 160; 4.5 AEROSOL RESPIRATORY (INHALATION) at 10:41

## 2024-03-29 RX ADMIN — Medication 975 MILLIGRAM(S): at 06:11

## 2024-03-29 RX ADMIN — CLOPIDOGREL BISULFATE 75 MILLIGRAM(S): 75 TABLET, FILM COATED ORAL at 14:12

## 2024-03-29 RX ADMIN — GABAPENTIN 100 MILLIGRAM(S): 400 CAPSULE ORAL at 05:11

## 2024-03-29 RX ADMIN — CELECOXIB 200 MILLIGRAM(S): 200 CAPSULE ORAL at 06:11

## 2024-03-29 RX ADMIN — APIXABAN 5 MILLIGRAM(S): 2.5 TABLET, FILM COATED ORAL at 17:53

## 2024-03-29 RX ADMIN — SODIUM CHLORIDE 80 MILLILITER(S): 9 INJECTION INTRAMUSCULAR; INTRAVENOUS; SUBCUTANEOUS at 21:35

## 2024-03-29 RX ADMIN — BUDESONIDE AND FORMOTEROL FUMARATE DIHYDRATE 2 PUFF(S): 160; 4.5 AEROSOL RESPIRATORY (INHALATION) at 20:10

## 2024-03-29 RX ADMIN — METHOCARBAMOL 500 MILLIGRAM(S): 500 TABLET, FILM COATED ORAL at 21:35

## 2024-03-29 RX ADMIN — SODIUM CHLORIDE 80 MILLILITER(S): 9 INJECTION INTRAMUSCULAR; INTRAVENOUS; SUBCUTANEOUS at 10:39

## 2024-03-29 RX ADMIN — SENNA PLUS 2 TABLET(S): 8.6 TABLET ORAL at 21:35

## 2024-03-29 RX ADMIN — METHOCARBAMOL 500 MILLIGRAM(S): 500 TABLET, FILM COATED ORAL at 14:13

## 2024-03-29 NOTE — PROGRESS NOTE ADULT - SUBJECTIVE AND OBJECTIVE BOX
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     (29 Mar 2024 09:18)      INTERVAL HPI/OVERNIGHT EVENTS:    Patient has some pain neck, better with pain meds, denies fever, chills, chest pain, sob       Vital Signs Last 24 Hrs  T(C): 36.3 (29 Mar 2024 09:12), Max: 36.7 (29 Mar 2024 04:12)  T(F): 97.4 (29 Mar 2024 09:12), Max: 98 (29 Mar 2024 04:12)  HR: 70 (29 Mar 2024 09:12) (52 - 70)  BP: 157/71 (29 Mar 2024 09:12) (120/65 - 157/71)  RR: 17 (29 Mar 2024 09:12) (17 - 18)  SpO2: 92% (29 Mar 2024 09:12) (92% - 99%)    Parameters below as of 29 Mar 2024 09:12  Patient On (Oxygen Delivery Method): room air        PHYSICAL EXAM:    GENERAL: Elderly male looking comfortable   HEENT: PERRL, +EOMI  NECK: clean dressings on anterior aspect of the neck   CHEST/LUNG: Clear to auscultate bilaterally; No wheezing  HEART: S1S2+, Regular rate and rhythm; No murmurs  ABDOMEN: Soft, Nontender, Nondistended; Bowel sounds present  EXTREMITIES:  1+ Peripheral Pulses, No edema  SKIN: No rashes or lesions  NEURO: AAOX3  PSYCH: normal mood  Back: clean dressings on       LABS:                        10.4   19.86 )-----------( 357      ( 29 Mar 2024 05:03 )             32.2     03-29    137  |  102  |  43.9<H>  ----------------------------<  139<H>  3.9   |  22.0  |  1.36<H>    Ca    8.8      29 Mar 2024 05:03        I&O's Summary    28 Mar 2024 07:01  -  29 Mar 2024 07:00  --------------------------------------------------------  IN: 600 mL / OUT: 710 mL / NET: -110 mL    29 Mar 2024 07:01  -  29 Mar 2024 12:12  --------------------------------------------------------  IN: 360 mL / OUT: 285 mL / NET: 75 mL        MEDICATIONS  (STANDING):  acetaminophen     Tablet .. 975 milliGRAM(s) Oral every 8 hours  apixaban 5 milliGRAM(s) Oral two times a day  atorvastatin 40 milliGRAM(s) Oral at bedtime  budesonide  80 MICROgram(s)/formoterol 4.5 MICROgram(s) Inhaler 2 Puff(s) Inhalation two times a day  celecoxib 200 milliGRAM(s) Oral every 12 hours  clopidogrel Tablet 75 milliGRAM(s) Oral daily  dextrose 5%. 1000 milliLiter(s) (50 mL/Hr) IV Continuous <Continuous>  dextrose 5%. 1000 milliLiter(s) (100 mL/Hr) IV Continuous <Continuous>  dextrose 50% Injectable 12.5 Gram(s) IV Push once  dextrose 50% Injectable 25 Gram(s) IV Push once  dextrose 50% Injectable 25 Gram(s) IV Push once  gabapentin 100 milliGRAM(s) Oral three times a day  glucagon  Injectable 1 milliGRAM(s) IntraMuscular once  insulin lispro (ADMELOG) corrective regimen sliding scale   SubCutaneous three times a day before meals  methocarbamol 500 milliGRAM(s) Oral every 8 hours  metoprolol succinate ER 50 milliGRAM(s) Oral daily  pantoprazole    Tablet 40 milliGRAM(s) Oral before breakfast  senna 2 Tablet(s) Oral at bedtime  sodium chloride 0.9%. 1000 milliLiter(s) (80 mL/Hr) IV Continuous <Continuous>  tiotropium 2.5 MICROgram(s) Inhaler 2 Puff(s) Inhalation daily    MEDICATIONS  (PRN):  aluminum hydroxide/magnesium hydroxide/simethicone Suspension 30 milliLiter(s) Oral every 12 hours PRN Indigestion  benzocaine/menthol Lozenge 1 Lozenge Oral every 2 hours PRN Sore Throat  dextrose Oral Gel 15 Gram(s) Oral once PRN Blood Glucose LESS THAN 70 milliGRAM(s)/deciliter  lidocaine   4% Patch 1 Patch Transdermal every 24 hours PRN pain  magnesium hydroxide Suspension 30 milliLiter(s) Oral every 12 hours PRN Constipation  ondansetron Injectable 4 milliGRAM(s) IV Push every 6 hours PRN Nausea and/or Vomiting  oxyCODONE    IR 10 milliGRAM(s) Oral every 3 hours PRN Severe Pain (7 - 10)  oxyCODONE    IR 5 milliGRAM(s) Oral every 3 hours PRN Moderate Pain (4 - 6)

## 2024-03-29 NOTE — DIETITIAN INITIAL EVALUATION ADULT - ORAL INTAKE PTA/DIET HISTORY
Pt reports eating well PTA; denies any recent weight changes. Pt reports needing some assistance with meal set up; however is able to feed himself. Pt denies difficulty chewing or swallowing at this time. Reviewed and reinforced importance of HBV protein intake and healing; pt with good understanding and expected compliance.

## 2024-03-29 NOTE — PROGRESS NOTE ADULT - SUBJECTIVE AND OBJECTIVE BOX
Called by RN regarding patient's anterior cervical dressing with leakage from drain site.  I removed dressings to reveal steristrips intact. Drain appears to be functioning and bulb is under suction.  Drain suture remains.  New dry gauze and tegaderm placed.

## 2024-03-29 NOTE — DIETITIAN INITIAL EVALUATION ADULT - PERTINENT LABORATORY DATA
03-29    137  |  102  |  43.9<H>  ----------------------------<  139<H>  3.9   |  22.0  |  1.36<H>    Ca    8.8      29 Mar 2024 05:03    POCT Blood Glucose.: 122 mg/dL (03-29-24 @ 07:59)  A1C with Estimated Average Glucose Result: 5.8 % (03-08-24 @ 10:05)

## 2024-03-29 NOTE — DIETITIAN INITIAL EVALUATION ADULT - NUTRITIONGOAL OUTCOME1
Occupational Therapy   Occupational Therapy Initial Assessment  Date: 12/3/2019   Patient Name: Deidre Leal  MRN: [id number]     : 1943    Date of Service: 12/3/2019    Discharge Recommendations:    Further therapy recommended at discharge. Assessment   Performance deficits / Impairments: Decreased functional mobility ; Decreased strength;Decreased endurance;Decreased ADL status; Decreased high-level IADLs;Decreased balance  Treatment Diagnosis: pneumonia   Prognosis: Fair  Decision Making: Medium Complexity  Patient Education: pt ed on POC, purpose of eval, importance of movement, benefits of therapy, hand placement during bed mobility. good return   REQUIRES OT FOLLOW UP: Yes  Activity Tolerance  Activity Tolerance: Patient Tolerated treatment well  Safety Devices  Safety Devices in place: Yes  Type of devices: Call light within reach; Left in bed;Bed alarm in place  Restraints  Initially in place: No         Patient Diagnosis(es): There were no encounter diagnoses.      has a past medical history of Anemia in chronic kidney disease (CKD), Arthritis, Bacteremia, Benign prostatic hyperplasia without lower urinary tract symptoms, BMI 40.0-44.9, adult (HCC), Cellulitis of left lower extremity, Chronic cerebral ischemia, Closed fracture of forearm, Controlled type 2 diabetes mellitus with chronic kidney disease on chronic dialysis, with long-term current use of insulin ([address] Controlled type 2 diabetes mellitus with diabetic polyneuropathy, with long-term current use of insulin ([address] Decubital ulcer, Dementia ([address] Dialysis patient ([address] Difficult intravenous access, Difficulty walking, DJD (degenerative joint disease) of knee, Elbow, forearm, and wrist, abrasion or friction burn, without mention of infection, Encephalopathy acute, Encounter regarding vascular access for dialysis for ESRD ([address] ESRD (end stage renal disease) on dialysis ([address] Forgetfulness, H/O transesophageal echocardiography ([facility]) for monitoring, Hemodialysis patient ([facility] [address] Hyperlipidemia, Hypertension, Intercritical gout, Joint pain, knee, Long-term insulin use ([address] Major depressive disorder with single episode, in partial remission ([address] Mobility impaired, Morbid obesity ([address] Muscle weakness, Obesity, Obstructive sleep apnea, HEATHER on CPAP, Paroxysmal atrial fibrillation ([address] Respiratory failure ([address] S/P cardiac cath, Seborrhea, Severe aortic stenosis, Severe mitral valve stenosis, Severe sepsis (HCC), Skin rash, Stasis dermatitis, Thyroid disease, Traumatic amputation of thumb, Venous stasis dermatitis, Wears glasses, and Wheelchair bound. has a past surgical history that includes Colonoscopy (11/19/09); Knee arthroscopy (Left, 09/17/99); Hand surgery (Left, 1990 (x10-12 surgeries)); Arm Surgery (Left, 1990); Cardiac catheterization; Nasal septum surgery; Sternotomy (3/18/16); Aortic valve replacement (03/18/2016); Mitral valve replacement (03/18/2016); other surgical history (3/18/16); other surgical history (3/18/16); other surgical history (Right, 01/09/2017); other surgical history (Right, 08/29/2017); pr ligatn angioaccess av fistula (Right, 8/29/2017); pr egd transoral biopsy single/multiple (N/A, 10/17/2017); central venous catheter (Right, 02/21/2018); Colonoscopy (N/A, 10/12/2018); other surgical history (04/05/2018); vascular surgery (12/06/2018); EXPLORATION OF WOUND OF EXTREMITY (Right, 3/10/2019); and EXPLORATION OF WOUND OF EXTREMITY (Right, 3/8/2019). Treatment Diagnosis: pneumonia       Restrictions  Restrictions/Precautions  Restrictions/Precautions: Fall Risk  Required Braces or Orthoses?: No  Position Activity Restriction  Other position/activity restrictions: up as tolerated     Subjective   General  Patient assessed for rehabilitation services?: Yes  Family / Caregiver Present: No  Diagnosis: pneumonia   General Comment  Comments: RN ok'd for therapy this morning.  Pt agreeable to participate in session and cooperative/pleasant throughout   Patient Currently in Pain: Denies    Oxygen Therapy  O2 Device: Nasal cannula  O2 Flow Rate (L/min): 3 L/min    Social/Functional History  Social/Functional History  Lives With: Other (comment)  Type of Home: Facility  Home Layout: One level  Home Access: Level entry  Bathroom Shower/Tub: Tub/Shower unit  Bathroom Toilet: Standard  Bathroom Equipment: Shower chair  Home Equipment: [employer]  ADL Assistance: Needs assistance(pt reported occasionally requiring assistance for ADLs, but reported primarily being independent )  Homemaking Assistance: Needs assistance  Homemaking Responsibilities: No(staff at facility complete IADLs)  Ambulation Assistance: Needs assistance(pt reported only using w/c and hasn't walked in years )  Transfer Assistance: Needs assistance(pt reported completing stand pivot transfers )  Active : No  Patient's  Info: facility provides transportation   Additional Comments: pt questionnable historian due to slight disorientation      Objective   Vision: Within Functional Limits  Hearing: Within functional limits    Orientation  Overall Orientation Status: Impaired  Orientation Level: Oriented to person;Disoriented to place; Disoriented to time(pt able to accurately state year but not month )     Balance  Sitting Balance: Maximum assistance(~5 minutes on EOB )     ADL  Feeding: Supervision  Grooming: Minimal assistance  UE Bathing: Moderate assistance  LE Bathing: Maximum assistance  UE Dressing: Moderate assistance  LE Dressing: Maximum assistance  Toileting: Maximum assistance  Tone RUE  RUE Tone: Normotonic  Tone LUE  LUE Tone: Normotonic  Coordination  Movements Are Fluid And Coordinated: Yes     Bed mobility  Rolling to Left: Moderate assistance  Rolling to Right: Moderate assistance  Supine to Sit: Moderate assistance;2 Person assistance  Sit to Supine: Maximum assistance;2 Person assistance  Scooting:  Moderate assistance;2 Person assistance  Transfers  Transfer Comments: standing not attempted due to poor sitting balance      Cognition  Overall Cognitive Status: WFL     Sensation  Overall Sensation Status: WFL      LUE AROM : Exceptions  L Shoulder Flexion 0-180: 0-90  L Elbow Flexion 0-145: WFL  L Wrist Flexion 0-80: WFL  L Wrist Extension 0-70: WFL  Left Hand AROM: WFL    RUE AROM : Exceptions  R Shoulder Flexion 0-180: 0-90  R Elbow Flexion 0-145: WFL  R Wrist Flexion 0-80: WFL  R Wrist Extension 0-70: WFL  Right Hand AROM: WFL    LUE Strength  Gross LUE Strength: Exceptions to [facility]  L Shoulder Flex: 4/5  L Elbow Flex: 4/5  L Hand General: 4-/5  RUE Strength  Gross RUE Strength: Exceptions to [facility]  R Shoulder Flex: 4/5  R Elbow Flex: 4/5  R Hand General: 4-/5      Plan   Plan  Times per week: 3-5x/wk    AM-PAC Score   AM-[facility] Inpatient Daily Activity Raw Score: 14 (12/03/19 1407)  AM-PAC Inpatient ADL T-Scale Score : 33.39 (12/03/19 1407)  ADL Inpatient CMS 0-100% Score: 59.67 (12/03/19 1407)  ADL Inpatient CMS G-Code Modifier : CK (12/03/19 1407)    Goals  Short term goals  Time Frame for Short term goals: pt will, by discharge  Short term goal 1: dem mod A during bed mobility in order to increase independence   Short term goal 2: dem ~8 minutes static/dynamic sitting balance on EOB with mod A in order to complete functional tasks  Short term goal 3: complete UB ADLs and grooming tasks with min A, set up and modified tech  Short term goal 4: complete LB ADLs and toileting tasks with mod A, set up and Ae, as needed  Short term goal 5: increase activity tolerance to 20+ minutes in order to participate in daily tasks     Therapy Time   Individual Concurrent Group Co-treatment   Time In 0912         Time Out 0935         Minutes [facility], OTR/L Maintain adequate PO intake > 75% at meals to promote healing

## 2024-03-29 NOTE — PROGRESS NOTE ADULT - SUBJECTIVE AND OBJECTIVE BOX
FUNCTIONAL PROGRESS  PT/OT 03/28  bed mobility: modA  transfers: modA  gait: 1-son assist for cues w/ RW for 10ft  stairs: unable  UBD: modA  LBD: maxA  bathing: modA  grooming: aMged  feeding: independent    OT 03/28      VITALS  T(C): 36.7 (03-29-24 @ 04:12), Max: 36.7 (03-29-24 @ 04:12)  HR: 52 (03-29-24 @ 04:12) (52 - 78)  BP: 150/73 (03-29-24 @ 04:12) (120/65 - 150/73)  RR: 18 (03-29-24 @ 04:12) (18 - 18)  SpO2: 99% (03-29-24 @ 04:12) (92% - 99%)  Wt(kg): --    MEDICATIONS   acetaminophen     Tablet .. 975 milliGRAM(s) every 8 hours  aluminum hydroxide/magnesium hydroxide/simethicone Suspension 30 milliLiter(s) every 12 hours PRN  apixaban 5 milliGRAM(s) two times a day  atorvastatin 40 milliGRAM(s) at bedtime  benzocaine/menthol Lozenge 1 Lozenge every 2 hours PRN  budesonide  80 MICROgram(s)/formoterol 4.5 MICROgram(s) Inhaler 2 Puff(s) two times a day  celecoxib 200 milliGRAM(s) every 12 hours  clopidogrel Tablet 75 milliGRAM(s) daily  dextrose 5%. 1000 milliLiter(s) <Continuous>  dextrose 5%. 1000 milliLiter(s) <Continuous>  dextrose 50% Injectable 12.5 Gram(s) once  dextrose 50% Injectable 25 Gram(s) once  dextrose 50% Injectable 25 Gram(s) once  dextrose Oral Gel 15 Gram(s) once PRN  gabapentin 100 milliGRAM(s) three times a day  glucagon  Injectable 1 milliGRAM(s) once  insulin lispro (ADMELOG) corrective regimen sliding scale   three times a day before meals  lidocaine   4% Patch 1 Patch every 24 hours PRN  magnesium hydroxide Suspension 30 milliLiter(s) every 12 hours PRN  methocarbamol 500 milliGRAM(s) every 8 hours  metoprolol succinate ER 50 milliGRAM(s) daily  ondansetron Injectable 4 milliGRAM(s) every 6 hours PRN  oxyCODONE    IR 10 milliGRAM(s) every 3 hours PRN  oxyCODONE    IR 5 milliGRAM(s) every 3 hours PRN  pantoprazole    Tablet 40 milliGRAM(s) before breakfast  senna 2 Tablet(s) at bedtime  sodium chloride 0.9%. 1000 milliLiter(s) <Continuous>  tiotropium 2.5 MICROgram(s) Inhaler 2 Puff(s) daily      RECENT LABS/IMAGING  - Reviewed Today                        10.4   19.86 )-----------( 357      ( 29 Mar 2024 05:03 )             32.2     03-29    137  |  102  |  43.9<H>  ----------------------------<  139<H>  3.9   |  22.0  |  1.36<H>    Ca    8.8      29 Mar 2024 05:03      PT/INR - ( 27 Mar 2024 09:50 )   PT: 12.5 sec;   INR: 1.13 ratio         PTT - ( 27 Mar 2024 09:50 )  PTT:33.2 sec    MR Spine 12/05/2023  Cervical spondylosis, most notably at C5/C6, resulting in severe spinal   canal narrowing and moderate to severe foraminal narrowing.    Thoracic spondylosis, most notably at T11/T12, resulting in severe spinal   canal narrowing and moderate to severe left and severe right foraminal   narrowing. Question of focal myelomalacia at this level.    Lumbar spondylosis, most notably at L4/L5, resulting in severe spinal   canal narrowing.    Advanced degenerative changes at the articulations of C1/C2.  No evidence of osseous metastases.  ----------------------------------------------------------------------------------------  PHYSICAL EXAM  Constitutional - NAD, Comfortable  HEENT - NCAT, hard of hearing  Chest - normal effort on room air  Neurologic Exam -                    Cognitive - AAO to self, place, situation     Communication - Fluent, No dysarthria     FUNCTIONAL MOTOR EXAM - No focal deficits                    LEFT    UE - ShAB 4/5, C5 4/5, C6 4/5, C7 4/5, C8 4/5, T1 4/5                    RIGHT UE - ShAB 4/5, C5 4/5, C6 4/5, C7 4/5, C8 4/5, T1 4/5                    LEFT    LE - L2 4/5, L3 4/5, L4 5/5, L5 4/5, S1 5/5                    RIGHT LE - L2 2/5, L3 4/5, L4 4/5, L5 4/5, S1 5/5     Sensory - grossly intact to LT     Reflexes - diffusely hyporeflexic, b/l Rees's neg, b/l clonus neg  Psychiatric - Mood stable, Affect WNL  ----------------------------------------------------------------------------------------  ASSESSMENT/PLAN  83yMale with functional deficits after 03/27 C5-6 ACDF + T11-12 lami/fusion.  Pain - acetaminophen 975mg q8h, celecoxib 200mg q12h, gabapentin 100mg TID, lidocaine patch x1, methocarbamol 500mg q8h, oxycodone PRN, hydromorphone PRN  Lung ca - budesonide/formoterol, tiotropium   GI ppx - pantoprazole  CAD - Toprol-XL w/ hold parameters, atorvastatin  DVT - SCDs, resume apixaban when appropriate per ortho  Impaired Mobility/Function/Rehab Recommendations - Continues to require acute hospitalization for post-op care and above medical issues. Continue PT.  Consult OT for evaluation.  Recommend acute rehabilitation once medically stable.  Patient will be able to tolerated 3 hours of therapy.    PM&R will continue to follow.               FUNCTIONAL PROGRESS  PT/OT 03/28  bed mobility: modA  transfers: modA  gait: 1-son assist for cues w/ RW for 10ft  stairs: unable  UBD: modA  LBD: maxA  bathing: modA  grooming: Maged  feeding: independent    OT 03/28      VITALS  T(C): 36.7 (03-29-24 @ 04:12), Max: 36.7 (03-29-24 @ 04:12)  HR: 52 (03-29-24 @ 04:12) (52 - 78)  BP: 150/73 (03-29-24 @ 04:12) (120/65 - 150/73)  RR: 18 (03-29-24 @ 04:12) (18 - 18)  SpO2: 99% (03-29-24 @ 04:12) (92% - 99%)  Wt(kg): --    MEDICATIONS   acetaminophen     Tablet .. 975 milliGRAM(s) every 8 hours  aluminum hydroxide/magnesium hydroxide/simethicone Suspension 30 milliLiter(s) every 12 hours PRN  apixaban 5 milliGRAM(s) two times a day  atorvastatin 40 milliGRAM(s) at bedtime  benzocaine/menthol Lozenge 1 Lozenge every 2 hours PRN  budesonide  80 MICROgram(s)/formoterol 4.5 MICROgram(s) Inhaler 2 Puff(s) two times a day  celecoxib 200 milliGRAM(s) every 12 hours  clopidogrel Tablet 75 milliGRAM(s) daily  dextrose 5%. 1000 milliLiter(s) <Continuous>  dextrose 5%. 1000 milliLiter(s) <Continuous>  dextrose 50% Injectable 12.5 Gram(s) once  dextrose 50% Injectable 25 Gram(s) once  dextrose 50% Injectable 25 Gram(s) once  dextrose Oral Gel 15 Gram(s) once PRN  gabapentin 100 milliGRAM(s) three times a day  glucagon  Injectable 1 milliGRAM(s) once  insulin lispro (ADMELOG) corrective regimen sliding scale   three times a day before meals  lidocaine   4% Patch 1 Patch every 24 hours PRN  magnesium hydroxide Suspension 30 milliLiter(s) every 12 hours PRN  methocarbamol 500 milliGRAM(s) every 8 hours  metoprolol succinate ER 50 milliGRAM(s) daily  ondansetron Injectable 4 milliGRAM(s) every 6 hours PRN  oxyCODONE    IR 10 milliGRAM(s) every 3 hours PRN  oxyCODONE    IR 5 milliGRAM(s) every 3 hours PRN  pantoprazole    Tablet 40 milliGRAM(s) before breakfast  senna 2 Tablet(s) at bedtime  sodium chloride 0.9%. 1000 milliLiter(s) <Continuous>  tiotropium 2.5 MICROgram(s) Inhaler 2 Puff(s) daily      RECENT LABS/IMAGING  - Reviewed Today                        10.4   19.86 )-----------( 357      ( 29 Mar 2024 05:03 )             32.2     03-29    137  |  102  |  43.9<H>  ----------------------------<  139<H>  3.9   |  22.0  |  1.36<H>    Ca    8.8      29 Mar 2024 05:03      PT/INR - ( 27 Mar 2024 09:50 )   PT: 12.5 sec;   INR: 1.13 ratio         PTT - ( 27 Mar 2024 09:50 )  PTT:33.2 sec    MR Spine 12/05/2023  Cervical spondylosis, most notably at C5/C6, resulting in severe spinal   canal narrowing and moderate to severe foraminal narrowing.    Thoracic spondylosis, most notably at T11/T12, resulting in severe spinal   canal narrowing and moderate to severe left and severe right foraminal   narrowing. Question of focal myelomalacia at this level.    Lumbar spondylosis, most notably at L4/L5, resulting in severe spinal   canal narrowing.    Advanced degenerative changes at the articulations of C1/C2.  No evidence of osseous metastases.  ----------------------------------------------------------------------------------------  PHYSICAL EXAM  Constitutional - NAD, Comfortable  HEENT - NCAT, hard of hearing  Chest - normal effort on room air  Neurologic Exam -                    Cognitive - AAO to self, place, situation     Communication - Fluent, No dysarthria     FUNCTIONAL MOTOR EXAM - No focal deficits                    LEFT    UE - ShAB 4/5, C5 4/5, C6 4/5, C7 4/5, C8 4/5, T1 4/5                    RIGHT UE - ShAB 4/5, C5 4/5, C6 4/5, C7 4/5, C8 4/5, T1 4/5                    LEFT    LE - L2 4/5, L3 4/5, L4 5/5, L5 4/5, S1 5/5                    RIGHT LE - L2 2/5, L3 4/5, L4 4/5, L5 4/5, S1 5/5     Sensory - grossly intact to LT     Reflexes - diffusely hyporeflexic, b/l Rees's neg, b/l clonus neg  Psychiatric - Mood stable, Affect WNL  ----------------------------------------------------------------------------------------  ASSESSMENT/PLAN  83yMale with functional deficits after 03/27 C5-6 ACDF + T11-12 lami/fusion.  Pain - acetaminophen 975mg q8h, celecoxib 200mg q12h, gabapentin 100mg TID, lidocaine patch x1, methocarbamol 500mg q8h, oxycodone PRN  Lung ca - budesonide/formoterol, tiotropium   GI ppx - pantoprazole  CAD - Toprol-XL w/ hold parameters, atorvastatin  DVT - apixaban  Impaired Mobility/Function/Rehab Recommendations - Continues to require acute hospitalization for post-op care and above medical issues. Continue PT.  Consult OT for evaluation.  Recommend acute rehabilitation once medically stable.  Patient will be able to tolerated 3 hours of therapy.    PM&R will continue to follow.           Seen at bedside family this AM.  Pain controlled on current regimen. Reports never had neuropathic pain, was not taking gabapentin at home. Feels he can stop it here.  Tolerated therapies and OOB well yesterday. Motivated for rehab.  Pending immunotherapy on Wed (q2wks).    FUNCTIONAL PROGRESS  PT/OT 03/28  bed mobility: modA  transfers: modA  gait: 1-son assist for cues w/ RW for 10ft  stairs: unable  UBD: modA  LBD: maxA  bathing: modA  grooming: Maged  feeding: independent      VITALS  T(C): 36.7 (03-29-24 @ 04:12), Max: 36.7 (03-29-24 @ 04:12)  HR: 52 (03-29-24 @ 04:12) (52 - 78)  BP: 150/73 (03-29-24 @ 04:12) (120/65 - 150/73)  RR: 18 (03-29-24 @ 04:12) (18 - 18)  SpO2: 99% (03-29-24 @ 04:12) (92% - 99%)  Wt(kg): --    MEDICATIONS   acetaminophen     Tablet .. 975 milliGRAM(s) every 8 hours  aluminum hydroxide/magnesium hydroxide/simethicone Suspension 30 milliLiter(s) every 12 hours PRN  apixaban 5 milliGRAM(s) two times a day  atorvastatin 40 milliGRAM(s) at bedtime  benzocaine/menthol Lozenge 1 Lozenge every 2 hours PRN  budesonide  80 MICROgram(s)/formoterol 4.5 MICROgram(s) Inhaler 2 Puff(s) two times a day  celecoxib 200 milliGRAM(s) every 12 hours  clopidogrel Tablet 75 milliGRAM(s) daily  dextrose 5%. 1000 milliLiter(s) <Continuous>  dextrose 5%. 1000 milliLiter(s) <Continuous>  dextrose 50% Injectable 12.5 Gram(s) once  dextrose 50% Injectable 25 Gram(s) once  dextrose 50% Injectable 25 Gram(s) once  dextrose Oral Gel 15 Gram(s) once PRN  gabapentin 100 milliGRAM(s) three times a day  glucagon  Injectable 1 milliGRAM(s) once  insulin lispro (ADMELOG) corrective regimen sliding scale   three times a day before meals  lidocaine   4% Patch 1 Patch every 24 hours PRN  magnesium hydroxide Suspension 30 milliLiter(s) every 12 hours PRN  methocarbamol 500 milliGRAM(s) every 8 hours  metoprolol succinate ER 50 milliGRAM(s) daily  ondansetron Injectable 4 milliGRAM(s) every 6 hours PRN  oxyCODONE    IR 10 milliGRAM(s) every 3 hours PRN  oxyCODONE    IR 5 milliGRAM(s) every 3 hours PRN  pantoprazole    Tablet 40 milliGRAM(s) before breakfast  senna 2 Tablet(s) at bedtime  sodium chloride 0.9%. 1000 milliLiter(s) <Continuous>  tiotropium 2.5 MICROgram(s) Inhaler 2 Puff(s) daily      RECENT LABS/IMAGING  - Reviewed Today                        10.4   19.86 )-----------( 357      ( 29 Mar 2024 05:03 )             32.2     03-29    137  |  102  |  43.9<H>  ----------------------------<  139<H>  3.9   |  22.0  |  1.36<H>    Ca    8.8      29 Mar 2024 05:03      PT/INR - ( 27 Mar 2024 09:50 )   PT: 12.5 sec;   INR: 1.13 ratio         PTT - ( 27 Mar 2024 09:50 )  PTT:33.2 sec    MR Spine 12/05/2023  Cervical spondylosis, most notably at C5/C6, resulting in severe spinal   canal narrowing and moderate to severe foraminal narrowing.    Thoracic spondylosis, most notably at T11/T12, resulting in severe spinal   canal narrowing and moderate to severe left and severe right foraminal   narrowing. Question of focal myelomalacia at this level.    Lumbar spondylosis, most notably at L4/L5, resulting in severe spinal   canal narrowing.    Advanced degenerative changes at the articulations of C1/C2.  No evidence of osseous metastases.  ----------------------------------------------------------------------------------------  PHYSICAL EXAM  Constitutional - NAD, Comfortable  HEENT - NCAT, hard of hearing  Chest - normal effort on room air  Neurologic Exam -                    Cognitive - AAO to self, place, situation     Communication - Fluent, No dysarthria     FUNCTIONAL MOTOR EXAM - No focal deficits                    LEFT    UE - ShAB 4/5, C5 4/5, C6 4/5, C7 4/5, C8 4/5, T1 4/5                    RIGHT UE - ShAB 4/5, C5 4/5, C6 4/5, C7 4/5, C8 4/5, T1 4/5                    LEFT    LE - L2 4/5, L3 4/5, L4 4/5, L5 4/5, S1 5/5                    RIGHT LE - L2 2/5, L3 4/5, L4 4/5, L5 4/5, S1 5/5     Sensory - grossly intact to LT     Reflexes - diffusely hyporeflexic, b/l Rees's neg, b/l clonus neg  Psychiatric - Mood stable, Affect WNL  ----------------------------------------------------------------------------------------  ASSESSMENT/PLAN  83yMale with functional deficits after 03/27 C5-6 ACDF + T11-12 lami/fusion.  Pain - acetaminophen 975mg q8h, celecoxib 200mg q12h, rec d/c gabapentin, lidocaine patch x1, methocarbamol 500mg q8h, oxycodone PRN  Lung ca - budesonide/formoterol, tiotropium, durvalumab q2wks due 04/03  GI ppx - pantoprazole  CAD - Toprol-XL w/ hold parameters, atorvastatin  DVT - apixaban  Impaired Mobility/Function/Rehab Recommendations - Continues to require acute hospitalization for post-op care and above medical issues. At current functional level, recommend patient for acute inpatient rehabilitation. Patient will tolerate and benefit from 3 hours per day, 5 days per week comprehensive therapies. Pt will benefit from continued medical and physiatric supervision for above medical issues and rehab needs. Recommendation dependent upon progress/participation in therapies.    Immunotherapy (durvalumab infusion) unlikely to be covered at rehab; recommend that patient remain in acute hospital for scheduled dose 04/03. From there, pt will be able to go to acute rehab with hopeful discharge by next scheduled dose. As pt is highly motivated for discharge home, will appreciate therapy reassessments over the course of hospitalization to assess safety and feasibility of discharge home. Pt and family in agreement. Recommend OOB during day and continue mobilization to prevent further debility.    PM&R will continue to follow.

## 2024-03-29 NOTE — PROGRESS NOTE ADULT - SUBJECTIVE AND OBJECTIVE BOX
Pt Name: JONE SOUTH    MRN: [id number]    Patient is status post ACDF C5-6 and T11-12 lami POD #2.  Patient is doing well and is comfortable. The patient's pain is controlled using the prescribed pain medications. The patient is participating in physical therapy. Pt reports ambulating at bedside yesterday and was OOB to chair. Denies nausea, vomiting, chest pain, shortness of breath, abdominal pain or fever. No new complaints.      PAST MEDICAL & SURGICAL HISTORY:  PAST MEDICAL & SURGICAL HISTORY:  Abnormal findings on diagnostic imaging of lung    Hypertension    Hyperlipidemia    CAD (coronary artery disease)    Left anterior fascicular block (LAFB)    RBBB    S/P hip replacement, right    S/P hip replacement, left    Allergies: No Known Allergies    Medications: acetaminophen     Tablet .. 975 milliGRAM(s) Oral every 8 hours  aluminum hydroxide/magnesium hydroxide/simethicone Suspension 30 milliLiter(s) Oral every 12 hours PRN  apixaban 5 milliGRAM(s) Oral two times a day  atorvastatin 40 milliGRAM(s) Oral at bedtime  benzocaine/menthol Lozenge 1 Lozenge Oral every 2 hours PRN  budesonide  80 MICROgram(s)/formoterol 4.5 MICROgram(s) Inhaler 2 Puff(s) Inhalation two times a day  celecoxib 200 milliGRAM(s) Oral every 12 hours  clopidogrel Tablet 75 milliGRAM(s) Oral daily  dextrose 5%. 1000 milliLiter(s) IV Continuous <Continuous>  dextrose 5%. 1000 milliLiter(s) IV Continuous <Continuous>  dextrose 50% Injectable 25 Gram(s) IV Push once  dextrose 50% Injectable 12.5 Gram(s) IV Push once  dextrose 50% Injectable 25 Gram(s) IV Push once  dextrose Oral Gel 15 Gram(s) Oral once PRN  gabapentin 100 milliGRAM(s) Oral three times a day  glucagon  Injectable 1 milliGRAM(s) IntraMuscular once  insulin lispro (ADMELOG) corrective regimen sliding scale   SubCutaneous three times a day before meals  lidocaine   4% Patch 1 Patch Transdermal every 24 hours PRN  magnesium hydroxide Suspension 30 milliLiter(s) Oral every 12 hours PRN  methocarbamol 500 milliGRAM(s) Oral every 8 hours  metoprolol succinate ER 50 milliGRAM(s) Oral daily  ondansetron Injectable 4 milliGRAM(s) IV Push every 6 hours PRN  oxyCODONE    IR 10 milliGRAM(s) Oral every 3 hours PRN  oxyCODONE    IR 5 milliGRAM(s) Oral every 3 hours PRN  pantoprazole    Tablet 40 milliGRAM(s) Oral before breakfast  senna 2 Tablet(s) Oral at bedtime  sodium chloride 0.9%. 1000 milliLiter(s) IV Continuous <Continuous>  tiotropium 2.5 MICROgram(s) Inhaler 2 Puff(s) Inhalation daily                        10.4   19.86 )-----------( 357      ( 29 Mar 2024 05:03 )             32.2     03-29    137  |  102  |  43.9<H>  ----------------------------<  139<H>  3.9   |  22.0  |  1.36<H>    Ca    8.8      29 Mar 2024 05:03        PHYSICAL EXAM:    Vital Signs Last 24 Hrs  T(C): 36.7 (29 Mar 2024 04:12), Max: 36.7 (29 Mar 2024 04:12)  T(F): 98 (29 Mar 2024 04:12), Max: 98 (29 Mar 2024 04:12)  HR: 52 (29 Mar 2024 04:12) (52 - 78)  BP: 150/73 (29 Mar 2024 04:12) (120/65 - 150/73)  BP(mean): --  RR: 18 (29 Mar 2024 04:12) (18 - 18)  SpO2: 99% (29 Mar 2024 04:12) (92% - 99%)    Parameters below as of 29 Mar 2024 04:12  Patient On (Oxygen Delivery Method): room air      Daily     Daily     Appearance: Alert, responsive, in no acute distress.    Skin: no rash on visible skin. Skin is clean, dry and intact. No bleeding. No abrasions. No ulcerations.    Musculoskeletal:        Cervical: Dressing clean, dry, intact. +MUKESH drain, dressing changed, steris in place, no active bleeding, new dry dressing placed       Left Upper Extremity:  Sensation is grossly intact to light touch. 2+ distal pulses. Cap refill < 2 sec.        Right Upper Extremity: Sensation is grossly intact to light touch. 2+ distal pulses. Cap refill < 2 sec.        Lumbar: Dressing clean, dry, intact. +HV drain, dressing changed, staples in place, no active bleeding, new dry dressing placed        Left Lower Extremity:  Sensation is grossly intact to light touch. 2+ distal pulses. Cap refill < 2 sec.         Right Lower Extremity: Sensation is grossly intact to light touch. 2+ distal pulses. Cap refill < 2 sec.       Motor exam: [  ]          [ ] Upper extremity                      Bi(c5)  WE(c6)  EE(c7)   FF(c8)                                                R         4/5        5/5        4/5       3/5                                               L          4/5        5/5        4/5       3/5    [ ] Lower extremity                            HF(l2)    KE(l3)   TA(l4)  EHL(l5)    GS(s1)                                                 R        5/5        5/5        4/5       4/5         5/5                                               L         5/5        5/5       5/5       5/5          5/5      A/P:  Pt is a  83y Male with ACDF C5-6 and T11-12 lami POD #2    PLAN:   • DVT prophylaxis as prescribed, including use of compression devices and ankle pumps  • Continue physical therapy  • Out of Bed as tolerated with assistance of a walker  • Incentive spirometry encouraged  • Pain control as clinically indicated  • Monitor drains  • Discharge planning – anticipated discharge is home pending drain output. Pt Name: JONE SOUTH    MRN: [id number]    Patient is status post ACDF C5-6 and T11-12 lami POD #2.  Patient is doing well and is comfortable. The patient's pain is controlled using the prescribed pain medications. The patient is participating in physical therapy. Pt reports ambulating at bedside yesterday and was OOB to chair. Denies nausea, vomiting, chest pain, shortness of breath, abdominal pain or fever. No new complaints.      PAST MEDICAL & SURGICAL HISTORY:  PAST MEDICAL & SURGICAL HISTORY:  Abnormal findings on diagnostic imaging of lung    Hypertension    Hyperlipidemia    CAD (coronary artery disease)    Left anterior fascicular block (LAFB)    RBBB    S/P hip replacement, right    S/P hip replacement, left    Allergies: No Known Allergies    Medications: acetaminophen     Tablet .. 975 milliGRAM(s) Oral every 8 hours  aluminum hydroxide/magnesium hydroxide/simethicone Suspension 30 milliLiter(s) Oral every 12 hours PRN  apixaban 5 milliGRAM(s) Oral two times a day  atorvastatin 40 milliGRAM(s) Oral at bedtime  benzocaine/menthol Lozenge 1 Lozenge Oral every 2 hours PRN  budesonide  80 MICROgram(s)/formoterol 4.5 MICROgram(s) Inhaler 2 Puff(s) Inhalation two times a day  celecoxib 200 milliGRAM(s) Oral every 12 hours  clopidogrel Tablet 75 milliGRAM(s) Oral daily  dextrose 5%. 1000 milliLiter(s) IV Continuous <Continuous>  dextrose 5%. 1000 milliLiter(s) IV Continuous <Continuous>  dextrose 50% Injectable 25 Gram(s) IV Push once  dextrose 50% Injectable 12.5 Gram(s) IV Push once  dextrose 50% Injectable 25 Gram(s) IV Push once  dextrose Oral Gel 15 Gram(s) Oral once PRN  gabapentin 100 milliGRAM(s) Oral three times a day  glucagon  Injectable 1 milliGRAM(s) IntraMuscular once  insulin lispro (ADMELOG) corrective regimen sliding scale   SubCutaneous three times a day before meals  lidocaine   4% Patch 1 Patch Transdermal every 24 hours PRN  magnesium hydroxide Suspension 30 milliLiter(s) Oral every 12 hours PRN  methocarbamol 500 milliGRAM(s) Oral every 8 hours  metoprolol succinate ER 50 milliGRAM(s) Oral daily  ondansetron Injectable 4 milliGRAM(s) IV Push every 6 hours PRN  oxyCODONE    IR 10 milliGRAM(s) Oral every 3 hours PRN  oxyCODONE    IR 5 milliGRAM(s) Oral every 3 hours PRN  pantoprazole    Tablet 40 milliGRAM(s) Oral before breakfast  senna 2 Tablet(s) Oral at bedtime  sodium chloride 0.9%. 1000 milliLiter(s) IV Continuous <Continuous>  tiotropium 2.5 MICROgram(s) Inhaler 2 Puff(s) Inhalation daily                        10.4   19.86 )-----------( 357      ( 29 Mar 2024 05:03 )             32.2     03-29    137  |  102  |  43.9<H>  ----------------------------<  139<H>  3.9   |  22.0  |  1.36<H>    Ca    8.8      29 Mar 2024 05:03        PHYSICAL EXAM:    Vital Signs Last 24 Hrs  T(C): 36.7 (29 Mar 2024 04:12), Max: 36.7 (29 Mar 2024 04:12)  T(F): 98 (29 Mar 2024 04:12), Max: 98 (29 Mar 2024 04:12)  HR: 52 (29 Mar 2024 04:12) (52 - 78)  BP: 150/73 (29 Mar 2024 04:12) (120/65 - 150/73)  BP(mean): --  RR: 18 (29 Mar 2024 04:12) (18 - 18)  SpO2: 99% (29 Mar 2024 04:12) (92% - 99%)    Parameters below as of 29 Mar 2024 04:12  Patient On (Oxygen Delivery Method): room air      Daily     Daily     Appearance: Alert, responsive, in no acute distress.    Skin: no rash on visible skin. Skin is clean, dry and intact. No bleeding. No abrasions. No ulcerations.    Musculoskeletal:        Cervical: Dressing clean, dry, intact. +MUKESH drain, dressing changed, steris in place, no active bleeding, new dry dressing placed       Left Upper Extremity:  Sensation is grossly intact to light touch. 2+ distal pulses. Cap refill < 2 sec.        Right Upper Extremity: Sensation is grossly intact to light touch. 2+ distal pulses. Cap refill < 2 sec.        Lumbar: Dressing clean, dry, intact. +HV drain, dressing changed, staples in place, no active bleeding, new dry dressing placed        Left Lower Extremity:  Sensation is grossly intact to light touch. 2+ distal pulses. Cap refill < 2 sec.         Right Lower Extremity: Sensation is grossly intact to light touch. 2+ distal pulses. Cap refill < 2 sec.       Motor exam: [  ]          [ ] Upper extremity                      Bi(c5)  WE(c6)  EE(c7)   FF(c8)                                                R         5/5        5/5        5/5       4/5                                               L          5/5        5/5        5/5       4/5    [ ] Lower extremity                            HF(l2)    KE(l3)   TA(l4)  EHL(l5)    GS(s1)                                                 R        5/5        5/5        4/5       4/5         5/5                                               L         5/5        5/5       5/5       5/5          5/5      A/P:  Pt is a  83y Male with ACDF C5-6 and T11-12 lami POD #2    PLAN:   • DVT prophylaxis as prescribed, including use of compression devices and ankle pumps  • Continue physical therapy  • Out of Bed as tolerated with assistance of a walker  • Incentive spirometry encouraged  • Pain control as clinically indicated  • Monitor drains  • Discharge planning – anticipated discharge is home pending drain output.

## 2024-03-29 NOTE — PROGRESS NOTE ADULT - ATTENDING COMMENTS
The ABCs of the Annual Wellness Visit  Subsequent Medicare Wellness Visit    Subjective    Hayde Guzman is a 79 y.o. female who presents for a Subsequent Medicare Wellness Visit.    The following portions of the patient's history were reviewed and   updated as appropriate: allergies, current medications, past family history, past medical history, past social history, past surgical history, and problem list.    Compared to one year ago, the patient feels her physical   health is worse.    Compared to one year ago, the patient feels her mental   health is the same.    Recent Hospitalizations:  She was not admitted to the hospital during the last year.       Current Medical Providers:  Patient Care Team:  Nicole Mann MD as PCP - General  Nicole aMnn MD as PCP - Family Medicine    Outpatient Medications Prior to Visit   Medication Sig Dispense Refill    Ascorbic Acid (VITAMIN C ER) 1000 MG tablet controlled-release Take 1 tablet by mouth daily.      Biotin 1000 MCG tablet Take  by mouth.      Calcium-Vitamin D-Vitamin K 500-500-40 MG-UNT-MCG chewable tablet Chew.      Cetirizine HCl (ZyrTEC Allergy) 10 MG capsule Take 1 capsule by mouth Daily.      Cholecalciferol (VITAMIN D) 2000 UNITS capsule Take 1 capsule by mouth Daily.      Coenzyme Q10 (COQ10) 200 MG capsule Take 1 capsule by mouth Daily.      Cranberry 1000 MG capsule Take 1 capsule by mouth Daily.      Diclofenac Sodium (VOLTAREN) 1 % gel gel Apply 4 g topically to the appropriate area as directed 3 (Three) Times a Day. 100 g 5    fluticasone (FLONASE) 50 MCG/ACT nasal spray USE 2 SPRAYS IN EACH NOSTRIL DAILY 48 g 3    Ginkgo Biloba 40 MG tablet Take 1 tablet by mouth daily.      Glucosamine-Chondroitin 250-200 MG tablet Take  by mouth.      montelukast (SINGULAIR) 10 MG tablet TAKE 1 TABLET EVERY NIGHT 90 tablet 3    Multiple Vitamin (MULTI VITAMIN DAILY) tablet Take 1 tablet by mouth Daily.      Multiple Vitamins-Minerals ([facility]ALTH  "FORMULA PO) Take  by mouth.      Omega-3 Fatty Acids (FISH OIL) 1000 MG capsule capsule Take 1 capsule by mouth Daily.      vitamin B-12 (CYANOCOBALAMIN) 2500 MCG sublingual tablet tablet Place 1 tablet under the tongue Daily.      Xiidra 5 % ophthalmic solution       carbamide peroxide (Debrox) 6.5 % otic solution Administer 5 drops into both ears 2 (Two) Times a Day. 15 mL 1     No facility-administered medications prior to visit.       No opioid medication identified on active medication list. I have reviewed chart for other potential  high risk medication/s and harmful drug interactions in the elderly.        Aspirin is not on active medication list.  Aspirin use is not indicated based on review of current medical condition/s. Risk of harm outweighs potential benefits.  .    Patient Active Problem List   Diagnosis    Acute sinusitis    Elevated blood-pressure reading without diagnosis of hypertension    Bruises easily    Primary hypertriglyceridemia    Paronychia of finger    Menopausal and postmenopausal disorder    Primary localized osteoarthrosis of ankle and foot    Rash    Pain of toe    Vitamin D deficiency    Pain of left calf    Onychomycosis of toenail    Synovitis     Advance Care Planning   Advance Care Planning     Advance Directive is on file.  ACP discussion was held with the patient during this visit. Patient has an advance directive in EMR which is still valid.      Objective    Vitals:    10/30/23 0729   BP: 130/80   Pulse: 63   Temp: 97.4 °F (36.3 °C)   SpO2: 97%  Comment: RA   Weight: 67.5 kg (148 lb 12.8 oz)   Height: 162.6 cm (64.02\")   PainSc: 0-No pain     Estimated body mass index is 25.53 kg/m² as calculated from the following:    Height as of this encounter: 162.6 cm (64.02\").    Weight as of this encounter: 67.5 kg (148 lb 12.8 oz).    BMI is >= 25 and <30. (Overweight) The following options were offered after discussion;: exercise counseling/recommendations and nutrition " counseling/recommendations      Does the patient have evidence of cognitive impairment? No          HEALTH RISK ASSESSMENT    Smoking Status:  Social History     Tobacco Use   Smoking Status Never   Smokeless Tobacco Never     Alcohol Consumption:  Social History     Substance and Sexual Activity   Alcohol Use Yes    Alcohol/week: 1.0 standard drink of alcohol    Types: 1 Glasses of wine per week    Comment: 1 drink a week or less     Fall Risk Screen:    NAVDEEP Fall Risk Assessment was completed, and patient is at MODERATE risk for falls. Assessment completed on:10/30/2023    Depression Screening:      10/30/2023     7:37 AM   PHQ-2/PHQ-9 Depression Screening   Little Interest or Pleasure in Doing Things 0-->not at all   Feeling Down, Depressed or Hopeless 0-->not at all   PHQ-9: Brief Depression Severity Measure Score 0       Health Habits and Functional and Cognitive Screening:      10/30/2023     7:36 AM   Functional & Cognitive Status   Do you have difficulty preparing food and eating? No   Do you have difficulty bathing yourself, getting dressed or grooming yourself? No   Do you have difficulty moving around from place to place? No   Do you have trouble with steps or getting out of a bed or a chair? No   Current Diet Well Balanced Diet   Dental Exam Up to date   Eye Exam Up to date   Exercise (times per week) 5 times per week   Current Exercises Include Walking   Do you need help using the phone?  No   Are you deaf or do you have serious difficulty hearing?  No   Do you need help to go to places out of walking distance? No   Do you need help shopping? No   Do you need help preparing meals?  No   Do you need help with housework?  No   Do you need help with laundry? No   Do you need help taking your medications? No   Do you need help managing money? No   Do you ever drive or ride in a car without wearing a seat belt? No   Have you felt unusual stress, anger or loneliness in the last month? No   Who do you live  with? Alone   If you need help, do you have trouble finding someone available to you? No   Have you been bothered in the last four weeks by sexual problems? No   Do you have difficulty concentrating, remembering or making decisions? No       Age-appropriate Screening Schedule:  Refer to the list below for future screening recommendations based on patient's age, sex and/or medical conditions. Orders for these recommended tests are listed in the plan section. The patient has been provided with a written plan.    Health Maintenance   Topic Date Due    LIPID PANEL  10/26/2023    ANNUAL WELLNESS VISIT  10/30/2024    BMI FOLLOWUP  10/30/2024    DXA SCAN  01/25/2027    COVID-19 Vaccine  Completed    INFLUENZA VACCINE  Completed    Pneumococcal Vaccine 65+  Completed    ZOSTER VACCINE  Completed    HEPATITIS C SCREENING  Addressed    TDAP/TD VACCINES  Discontinued    COLORECTAL CANCER SCREENING  Discontinued                  CMS Preventative Services Quick Reference  Risk Factors Identified During Encounter  Immunizations Discussed/Encouraged:  utd  The above risks/problems have been discussed with the patient.  Pertinent information has been shared with the patient in the After Visit Summary.  An After Visit Summary and PPPS were made available to the patient.    Follow Up:   Next Medicare Wellness visit to be scheduled in 1 year.       Additional E&M Note during same encounter follows:  Patient has multiple medical problems which are significant and separately identifiable that require additional work above and beyond the Medicare Wellness Visit.      Chief Complaint  Annual Exam    Subjective        HPI  Hayde Guzman is also being seen today for a f/u on her elevated BP, DLP and notes she has frequency and has had a couple of UTIs in the past and would like her urine checked.  Would like refills on her zanaflex 2mg hs that she takes for her leg spasms  Also has a significant amopunt of tinnitus. Using the  "singulair, zyrtec and flonase.    Review of Systems   Constitutional:  Negative for chills and fever.   HENT:  Positive for congestion and tinnitus. Negative for ear pain and sore throat.    Eyes:  Negative for pain, redness and visual disturbance.   Respiratory:  Negative for cough and shortness of breath.    Cardiovascular:  Negative for chest pain, palpitations and leg swelling.   Gastrointestinal:  Negative for abdominal pain, diarrhea and nausea.   Endocrine: Negative for cold intolerance and heat intolerance.   Genitourinary:  Positive for frequency. Negative for flank pain and urgency.   Musculoskeletal:  Positive for myalgias. Negative for arthralgias and gait problem.   Skin:  Negative for pallor and rash.   Neurological:  Negative for dizziness and weakness.   Psychiatric/Behavioral:  Negative for dysphoric mood and sleep disturbance. The patient is not nervous/anxious.        Objective   Vital Signs:  /80   Pulse 63   Temp 97.4 °F (36.3 °C)   Ht 162.6 cm (64.02\")   Wt 67.5 kg (148 lb 12.8 oz)   SpO2 97% Comment: RA  BMI 25.53 kg/m²     Physical Exam  Constitutional:       General: She is not in acute distress.     Appearance: Normal appearance. She is well-developed.   HENT:      Head: Normocephalic and atraumatic.      Right Ear: Tympanic membrane and external ear normal.      Left Ear: Tympanic membrane and external ear normal.      Nose: Nose normal.      Mouth/Throat:      Mouth: Mucous membranes are moist.      Pharynx: Posterior oropharyngeal erythema present. No oropharyngeal exudate.      Tonsils: No tonsillar abscesses.   Eyes:      General: No scleral icterus.     Pupils: Pupils are equal, round, and reactive to light.   Neck:      Vascular: No carotid bruit.   Cardiovascular:      Rate and Rhythm: Normal rate and regular rhythm.      Pulses: Normal pulses.      Heart sounds: Normal heart sounds. No murmur heard.     No friction rub. No gallop.   Pulmonary:      Effort: Pulmonary " Patient seen and examined. Case discussed with Dr. Ward. Agree with recommendations.     Rehab/Impaired mobility and function - Patient continues to require hospitalization for the above diagnoses and ongoing active management of comorbid complications (MUKESH drain, bradycardia) that are substantially impairing functional ability and impairing quality of life.     RECOMMEND - OOB daily     Patient seen earlier today and discussed options for AR vs. HOME. Since then, has significantly made progress and is now at supervision level. Plan for DC HOME.     Will continue to follow. Rehab recommendations are dependent on how functional progress changes as well as how patient continues to participate and tolerate therapeutic interventions, which may change. Recommend ongoing mobilization by staff to maintain cardiopulmonary function and prevention of secondary complications related to debility. Discussed the specific management and recommendations above with rehab clinical care team/rehab liaison.      Total Time Spent on Encounter (reviewing clinical notes, labs, radiology, medications, patient history/exam, assessment and plan) - 50 minutes effort is normal.      Breath sounds: Normal breath sounds. No stridor. No rhonchi or rales.   Abdominal:      General: Bowel sounds are normal. There is no distension.      Palpations: Abdomen is soft.      Tenderness: There is no right CVA tenderness, left CVA tenderness, guarding or rebound.      Hernia: No hernia is present.   Musculoskeletal:         General: No tenderness. Normal range of motion.      Cervical back: Normal range of motion.      Right lower leg: No edema.      Left lower leg: No edema.   Lymphadenopathy:      Cervical: No cervical adenopathy.   Skin:     General: Skin is warm and dry.      Findings: No rash.   Neurological:      General: No focal deficit present.      Mental Status: She is alert and oriented to person, place, and time. Mental status is at baseline.      Cranial Nerves: No cranial nerve deficit.      Sensory: No sensory deficit.      Coordination: Coordination normal.      Gait: Gait normal.      Deep Tendon Reflexes: Reflexes normal.   Psychiatric:         Mood and Affect: Mood normal.         Behavior: Behavior normal.                               Current Outpatient Medications:     Ascorbic Acid (VITAMIN C ER) 1000 MG tablet controlled-release, Take 1 tablet by mouth daily., Disp: , Rfl:     Biotin 1000 MCG tablet, Take  by mouth., Disp: , Rfl:     Calcium-Vitamin D-Vitamin K 500-500-40 MG-UNT-MCG chewable tablet, Chew., Disp: , Rfl:     carbamide peroxide (Debrox) 6.5 % otic solution, Administer 5 drops into both ears 2 (Two) Times a Day., Disp: 15 mL, Rfl: 1    Cetirizine HCl (ZyrTEC Allergy) 10 MG capsule, Take 1 capsule by mouth Daily., Disp: , Rfl:     Cholecalciferol (VITAMIN D) 2000 UNITS capsule, Take 1 capsule by mouth Daily., Disp: , Rfl:     Coenzyme Q10 (COQ10) 200 MG capsule, Take 1 capsule by mouth Daily., Disp: , Rfl:     Cranberry 1000 MG capsule, Take 1 capsule by mouth Daily., Disp: , Rfl:     Diclofenac Sodium (VOLTAREN) 1 % gel gel, Apply 4 g topically to  "the appropriate area as directed 3 (Three) Times a Day., Disp: 100 g, Rfl: 5    fluticasone (FLONASE) 50 MCG/ACT nasal spray, USE 2 SPRAYS IN EACH NOSTRIL DAILY, Disp: 48 g, Rfl: 3    Ginkgo Biloba 40 MG tablet, Take 1 tablet by mouth daily., Disp: , Rfl:     Glucosamine-Chondroitin 250-200 MG tablet, Take  by mouth., Disp: , Rfl:     montelukast (SINGULAIR) 10 MG tablet, TAKE 1 TABLET EVERY NIGHT, Disp: 90 tablet, Rfl: 3    Multiple Vitamin (MULTI VITAMIN DAILY) tablet, Take 1 tablet by mouth Daily., Disp: , Rfl:     Multiple Vitamins-Minerals ([facility] FORMULA PO), Take  by mouth., Disp: , Rfl:     Omega-3 Fatty Acids (FISH OIL) 1000 MG capsule capsule, Take 1 capsule by mouth Daily., Disp: , Rfl:     vitamin B-12 (CYANOCOBALAMIN) 2500 MCG sublingual tablet tablet, Place 1 tablet under the tongue Daily., Disp: , Rfl:     Xiidra 5 % ophthalmic solution, , Disp: , Rfl:     azelastine (ASTELIN) 0.1 % nasal spray, 2 sprays into the nostril(s) as directed by provider 2 (Two) Times a Day. Use in each nostril as directed in addition to flonase., Disp: 90 mL, Rfl: 1    nitrofurantoin, macrocrystal-monohydrate, (Macrobid) 100 MG capsule, Take 1 capsule by mouth 2 (Two) Times a Day., Disp: 14 capsule, Rfl: 0    tiZANidine (ZANAFLEX) 2 MG tablet, Take 1 tablet by mouth At Night As Needed for Muscle Spasms., Disp: 30 tablet, Rfl: 3      The following portions of the patient's history were reviewed and updated as appropriate: allergies, current medications, past family history, past medical history, past social history, past surgical history and problem list.        Objective   /80   Pulse 63   Temp 97.4 °F (36.3 °C)   Ht 162.6 cm (64.02\")   Wt 67.5 kg (148 lb 12.8 oz)   SpO2 97% Comment: RA  BMI 25.53 kg/m²         Results for orders placed or performed in visit on 10/30/23   POCT urinalysis dipstick, automated    Specimen: Urine   Result Value Ref Range    Color Yellow Yellow, Straw, Dark Yellow, Celeste    " Clarity, UA Hazy (A) Clear    Specific Gravity  1.010 1.005 - 1.030    pH, Urine 6.0 5.0 - 8.0    Leukocytes 500 Shadi/ul (A) Negative    Nitrite, UA Positive (A) Negative    Protein, POC Negative Negative mg/dL    Glucose, UA Negative Negative mg/dL    Ketones, UA Negative Negative    Urobilinogen, UA Normal Normal, 0.2 E.U./dL    Bilirubin Negative Negative    Blood, UA 3+ (A) Negative    Lot Number [id number]     Expiration Date 11/21/24              Assessment & Plan   Diagnoses and all orders for this visit:    Medicare annual wellness visit, subsequent    Tinnitus of both ears  -     carbamide peroxide (Debrox) 6.5 % otic solution; Administer 5 drops into both ears 2 (Two) Times a Day.    B12 deficiency    Other hyperlipidemia  -     Comprehensive Metabolic Panel; Future  -     Lipid Panel; Future  -     TSH Rfx On Abnormal To Free T4; Future    Vitamin D deficiency  -     Vitamin B12; Future  -     Vitamin D,25-Hydroxy; Future    Elevated glucose  -     Hemoglobin A1c; Future    Seasonal allergic rhinitis due to other allergic trigger  -     azelastine (ASTELIN) 0.1 % nasal spray; 2 sprays into the nostril(s) as directed by provider 2 (Two) Times a Day. Use in each nostril as directed in addition to flonase.  -     CBC (No Diff); Future    Myalgia  -     tiZANidine (ZANAFLEX) 2 MG tablet; Take 1 tablet by mouth At Night As Needed for Muscle Spasms.  -     CBC (No Diff); Future    Frequency of urination  -     POCT urinalysis dipstick, automated    Urinary tract infection with hematuria, site unspecified  -     Urine Culture - Urine, Urine, Clean Catch; Future  -     Urine Culture - Urine, Urine, Clean Catch  -     nitrofurantoin, macrocrystal-monohydrate, (Macrobid) 100 MG capsule; Take 1 capsule by mouth 2 (Two) Times a Day.    Add astelin and continue debrox for tinnitus, call if would like pred taper.           PHQ-2/PHQ-9 Depression screening reviewed with patient . Total time spent today for depression  screening  with Hayde Guzman  was 15 minutes in counseling, along with plans for any diagnositc work-up and treatment.    Advice and education were given regarding cardiovascular risk reduction, healthy dietary habits, Seatbelt and helmet use and self skin examination.          Electronically signed by:    Nicole Mann MD         Follow Up   Return in about 1 year (around 10/30/2024) for Annual.  Patient was given instructions and counseling regarding her condition or for health maintenance advice. Please see specific information pulled into the AVS if appropriate.

## 2024-03-29 NOTE — PROGRESS NOTE ADULT - ASSESSMENT
83y Male w/ PMH of lung cancer (on immunotherapy) complicated by prior DVT (on Eliquis), HTN, and CAD admitted for elective orthopedic surgery. Medicine consulted for bradycardia.    Plan:     Back pain s/p     - post op no complications  - VS stable  - abx per ortho   - opiate induced constipation regimen   - encouraging incentive spirometry   -c/w local wound care per ortho   -DVT prophylaxis and Pain meds as per Ortho team   -PT/OT and weight bearing per ortho   -gabapentin 100 mg as needed for  moderate pain      #Bradycardia:   -Independently reviewed telemetry with telemetry technician - no PVCs present but some PACs  -Pt had bouts of bradycardia in 40s-50s, but had HR in 60s when I woke him for my examination  -Would recommend holding AV-dejon blocking agents (Metoprolol) for HR < 60, would decrease Metoprolol to 25mg from 50mg   -Pt remains completely asymptomatic, was asleep, and recently had anesthesia; HR will likely increase as anesthetic is fully washed out  -No further workup indicated at this time  -Monitor on telemetry for 24 hours    SHANNEN: will d/c HCTZ, will monitor BMP, will do I/Os, will avoid nephrotoxic agents, will do bladder scan to see if he is retaining.     Hx of HTN: will hold metoprolol for now, will continue to monitor, will continue with losartan 50 mg once a day with holding parameters , will hold -hydrochlorothiazide given SHANNEN     DM: FS monitoring and coverage insulin, will resume MetFORMIN upon discharge     Hx of CAD: Will resume clopidogrel 75 mg once a day once ok from the Spine team     HLD: will continue with atorvastatin 40 mg once a day    Hx of DVT: will continue with Eliquis 5 mg 2 times a day as needed for DVT in left leg    Hx of COPD: Will continue with Trelegy Ellipta 100 mcg-62.5 mcg-25 mcg/inh daily.           83y Male w/ PMH of lung cancer (on immunotherapy) complicated by prior DVT (on Eliquis), HTN, and CAD admitted for elective orthopedic surgery. Medicine consulted for bradycardia.    Plan:     Back pain s/p     - post op no complications  - VS stable  - abx per ortho   - opiate induced constipation regimen   - encouraging incentive spirometry   -c/w local wound care per ortho   -DVT prophylaxis and Pain meds as per Ortho team   -PT/OT and weight bearing per ortho   -gabapentin 100 mg as needed for  moderate pain      #Bradycardia:   -Independently reviewed telemetry with telemetry technician - no PVCs present but some PACs  -Pt had bouts of bradycardia in 40s-50s, but had HR in 60s when I woke him for my examination  -Would recommend holding AV-dejon blocking agents (Metoprolol) for HR < 60, would decrease Metoprolol to 25mg from 50mg   -Pt remains completely asymptomatic, was asleep, and recently had anesthesia; HR will likely increase as anesthetic is fully washed out  -No further workup indicated at this time  -Monitor on telemetry for 24 hours    SHANNEN: will d/c HCTZ, will monitor BMP, will do I/Os, will avoid nephrotoxic agents, will do bladder scan to see if he is retaining.     Leucocytosis: Likely post op, no focus of infection, no fever, no chills.     Hx of HTN: will hold metoprolol for now, will continue to monitor, will continue with losartan 50 mg once a day with holding parameters , will hold -hydrochlorothiazide given SHANNEN     DM: FS monitoring and coverage insulin, will resume MetFORMIN upon discharge     Hx of CAD: Will resume clopidogrel 75 mg once a day once ok from the Spine team     HLD: will continue with atorvastatin 40 mg once a day    Hx of DVT: will continue with Eliquis 5 mg 2 times a day as needed for DVT in left leg    Hx of COPD: Will continue with Trelegy Ellipta 100 mcg-62.5 mcg-25 mcg/inh daily.

## 2024-03-29 NOTE — DIETITIAN INITIAL EVALUATION ADULT - PERTINENT MEDS FT
MEDICATIONS  (STANDING):  acetaminophen     Tablet .. 975 milliGRAM(s) Oral every 8 hours  apixaban 5 milliGRAM(s) Oral two times a day  atorvastatin 40 milliGRAM(s) Oral at bedtime  budesonide  80 MICROgram(s)/formoterol 4.5 MICROgram(s) Inhaler 2 Puff(s) Inhalation two times a day  celecoxib 200 milliGRAM(s) Oral every 12 hours  clopidogrel Tablet 75 milliGRAM(s) Oral daily    dextrose 50% Injectable 25 Gram(s) IV Push once  gabapentin 100 milliGRAM(s) Oral three times a day  glucagon  Injectable 1 milliGRAM(s) IntraMuscular once  insulin lispro (ADMELOG) corrective regimen sliding scale   SubCutaneous three times a day before meals  methocarbamol 500 milliGRAM(s) Oral every 8 hours  metoprolol succinate ER 50 milliGRAM(s) Oral daily  pantoprazole    Tablet 40 milliGRAM(s) Oral before breakfast  senna 2 Tablet(s) Oral at bedtime  sodium chloride 0.9%. 1000 milliLiter(s) (80 mL/Hr) IV Continuous <Continuous>  tiotropium 2.5 MICROgram(s) Inhaler 2 Puff(s) Inhalation daily    MEDICATIONS  (PRN):  aluminum hydroxide/magnesium hydroxide/simethicone Suspension 30 milliLiter(s) Oral every 12 hours PRN Indigestion  benzocaine/menthol Lozenge 1 Lozenge Oral every 2 hours PRN Sore Throat  dextrose Oral Gel 15 Gram(s) Oral once PRN Blood Glucose LESS THAN 70 milliGRAM(s)/deciliter  lidocaine   4% Patch 1 Patch Transdermal every 24 hours PRN pain  magnesium hydroxide Suspension 30 milliLiter(s) Oral every 12 hours PRN Constipation  ondansetron Injectable 4 milliGRAM(s) IV Push every 6 hours PRN Nausea and/or Vomiting  oxyCODONE    IR 5 milliGRAM(s) Oral every 3 hours PRN Moderate Pain (4 - 6)  oxyCODONE    IR 10 milliGRAM(s) Oral every 3 hours PRN Severe Pain (7 - 10)

## 2024-03-29 NOTE — DIETITIAN INITIAL EVALUATION ADULT - OTHER INFO
Pt is a 83 year old Male with a PMH of lung cancer (on immunotherapy) complicated by prior DVT (on Eliquis), HTN, and CAD who was admitted for elective orthopedic surgery in setting of spinal stenosis. Post-operatively, pt developed bradycardia w/ PACs as reported on telemetry.

## 2024-03-30 LAB
ALBUMIN SERPL ELPH-MCNC: 2.9 G/DL — LOW (ref 3.3–5.2)
ALP SERPL-CCNC: 82 U/L — SIGNIFICANT CHANGE UP (ref 40–120)
ALT FLD-CCNC: <5 U/L — SIGNIFICANT CHANGE UP
ANION GAP SERPL CALC-SCNC: 12 MMOL/L — SIGNIFICANT CHANGE UP (ref 5–17)
AST SERPL-CCNC: 10 U/L — SIGNIFICANT CHANGE UP
BASOPHILS # BLD AUTO: 0.03 K/UL — SIGNIFICANT CHANGE UP (ref 0–0.2)
BASOPHILS NFR BLD AUTO: 0.3 % — SIGNIFICANT CHANGE UP (ref 0–2)
BILIRUB SERPL-MCNC: <0.2 MG/DL — LOW (ref 0.4–2)
BUN SERPL-MCNC: 48.1 MG/DL — HIGH (ref 8–20)
CALCIUM SERPL-MCNC: 8.6 MG/DL — SIGNIFICANT CHANGE UP (ref 8.4–10.5)
CHLORIDE SERPL-SCNC: 106 MMOL/L — SIGNIFICANT CHANGE UP (ref 96–108)
CO2 SERPL-SCNC: 21 MMOL/L — LOW (ref 22–29)
CREAT SERPL-MCNC: 1.32 MG/DL — HIGH (ref 0.5–1.3)
EGFR: 54 ML/MIN/1.73M2 — LOW
EOSINOPHIL # BLD AUTO: 0.22 K/UL — SIGNIFICANT CHANGE UP (ref 0–0.5)
EOSINOPHIL NFR BLD AUTO: 2 % — SIGNIFICANT CHANGE UP (ref 0–6)
GLUCOSE BLDC GLUCOMTR-MCNC: 104 MG/DL — HIGH (ref 70–99)
GLUCOSE BLDC GLUCOMTR-MCNC: 122 MG/DL — HIGH (ref 70–99)
GLUCOSE BLDC GLUCOMTR-MCNC: 98 MG/DL — SIGNIFICANT CHANGE UP (ref 70–99)
GLUCOSE BLDC GLUCOMTR-MCNC: 99 MG/DL — SIGNIFICANT CHANGE UP (ref 70–99)
GLUCOSE SERPL-MCNC: 103 MG/DL — HIGH (ref 70–99)
HCT VFR BLD CALC: 33.6 % — LOW (ref 39–50)
HGB BLD-MCNC: 10.5 G/DL — LOW (ref 13–17)
IMM GRANULOCYTES NFR BLD AUTO: 0.8 % — SIGNIFICANT CHANGE UP (ref 0–0.9)
LYMPHOCYTES # BLD AUTO: 0.75 K/UL — LOW (ref 1–3.3)
LYMPHOCYTES # BLD AUTO: 6.7 % — LOW (ref 13–44)
MCHC RBC-ENTMCNC: 24.9 PG — LOW (ref 27–34)
MCHC RBC-ENTMCNC: 31.3 GM/DL — LOW (ref 32–36)
MCV RBC AUTO: 79.8 FL — LOW (ref 80–100)
MONOCYTES # BLD AUTO: 0.98 K/UL — HIGH (ref 0–0.9)
MONOCYTES NFR BLD AUTO: 8.8 % — SIGNIFICANT CHANGE UP (ref 2–14)
NEUTROPHILS # BLD AUTO: 9.09 K/UL — HIGH (ref 1.8–7.4)
NEUTROPHILS NFR BLD AUTO: 81.4 % — HIGH (ref 43–77)
PLATELET # BLD AUTO: 309 K/UL — SIGNIFICANT CHANGE UP (ref 150–400)
POTASSIUM SERPL-MCNC: 4.4 MMOL/L — SIGNIFICANT CHANGE UP (ref 3.5–5.3)
POTASSIUM SERPL-SCNC: 4.4 MMOL/L — SIGNIFICANT CHANGE UP (ref 3.5–5.3)
PROT SERPL-MCNC: 6.1 G/DL — LOW (ref 6.6–8.7)
RBC # BLD: 4.21 M/UL — SIGNIFICANT CHANGE UP (ref 4.2–5.8)
RBC # FLD: 18.8 % — HIGH (ref 10.3–14.5)
SODIUM SERPL-SCNC: 139 MMOL/L — SIGNIFICANT CHANGE UP (ref 135–145)
WBC # BLD: 11.16 K/UL — HIGH (ref 3.8–10.5)
WBC # FLD AUTO: 11.16 K/UL — HIGH (ref 3.8–10.5)

## 2024-03-30 PROCEDURE — 99233 SBSQ HOSP IP/OBS HIGH 50: CPT

## 2024-03-30 RX ORDER — METOPROLOL TARTRATE 50 MG
25 TABLET ORAL DAILY
Refills: 0 | Status: DISCONTINUED | OUTPATIENT
Start: 2024-03-30 | End: 2024-03-31

## 2024-03-30 RX ORDER — SODIUM CHLORIDE 9 MG/ML
1000 INJECTION INTRAMUSCULAR; INTRAVENOUS; SUBCUTANEOUS
Refills: 0 | Status: DISCONTINUED | OUTPATIENT
Start: 2024-03-30 | End: 2024-03-31

## 2024-03-30 RX ADMIN — CELECOXIB 200 MILLIGRAM(S): 200 CAPSULE ORAL at 05:54

## 2024-03-30 RX ADMIN — Medication 975 MILLIGRAM(S): at 21:28

## 2024-03-30 RX ADMIN — GABAPENTIN 100 MILLIGRAM(S): 400 CAPSULE ORAL at 15:02

## 2024-03-30 RX ADMIN — Medication 975 MILLIGRAM(S): at 14:00

## 2024-03-30 RX ADMIN — CELECOXIB 200 MILLIGRAM(S): 200 CAPSULE ORAL at 17:33

## 2024-03-30 RX ADMIN — APIXABAN 5 MILLIGRAM(S): 2.5 TABLET, FILM COATED ORAL at 05:54

## 2024-03-30 RX ADMIN — GABAPENTIN 100 MILLIGRAM(S): 400 CAPSULE ORAL at 05:54

## 2024-03-30 RX ADMIN — METHOCARBAMOL 500 MILLIGRAM(S): 500 TABLET, FILM COATED ORAL at 13:00

## 2024-03-30 RX ADMIN — CELECOXIB 200 MILLIGRAM(S): 200 CAPSULE ORAL at 06:51

## 2024-03-30 RX ADMIN — APIXABAN 5 MILLIGRAM(S): 2.5 TABLET, FILM COATED ORAL at 17:33

## 2024-03-30 RX ADMIN — Medication 975 MILLIGRAM(S): at 06:51

## 2024-03-30 RX ADMIN — TIOTROPIUM BROMIDE 2 PUFF(S): 18 CAPSULE ORAL; RESPIRATORY (INHALATION) at 13:02

## 2024-03-30 RX ADMIN — PANTOPRAZOLE SODIUM 40 MILLIGRAM(S): 20 TABLET, DELAYED RELEASE ORAL at 05:54

## 2024-03-30 RX ADMIN — SENNA PLUS 2 TABLET(S): 8.6 TABLET ORAL at 21:28

## 2024-03-30 RX ADMIN — GABAPENTIN 100 MILLIGRAM(S): 400 CAPSULE ORAL at 21:28

## 2024-03-30 RX ADMIN — Medication 975 MILLIGRAM(S): at 05:54

## 2024-03-30 RX ADMIN — SODIUM CHLORIDE 100 MILLILITER(S): 9 INJECTION INTRAMUSCULAR; INTRAVENOUS; SUBCUTANEOUS at 17:33

## 2024-03-30 RX ADMIN — Medication 975 MILLIGRAM(S): at 22:28

## 2024-03-30 RX ADMIN — METHOCARBAMOL 500 MILLIGRAM(S): 500 TABLET, FILM COATED ORAL at 21:28

## 2024-03-30 RX ADMIN — METHOCARBAMOL 500 MILLIGRAM(S): 500 TABLET, FILM COATED ORAL at 05:54

## 2024-03-30 RX ADMIN — BUDESONIDE AND FORMOTEROL FUMARATE DIHYDRATE 2 PUFF(S): 160; 4.5 AEROSOL RESPIRATORY (INHALATION) at 13:01

## 2024-03-30 RX ADMIN — Medication 975 MILLIGRAM(S): at 13:01

## 2024-03-30 RX ADMIN — ATORVASTATIN CALCIUM 40 MILLIGRAM(S): 80 TABLET, FILM COATED ORAL at 21:28

## 2024-03-30 RX ADMIN — CLOPIDOGREL BISULFATE 75 MILLIGRAM(S): 75 TABLET, FILM COATED ORAL at 13:01

## 2024-03-30 RX ADMIN — BUDESONIDE AND FORMOTEROL FUMARATE DIHYDRATE 2 PUFF(S): 160; 4.5 AEROSOL RESPIRATORY (INHALATION) at 19:53

## 2024-03-30 RX ADMIN — TAMSULOSIN HYDROCHLORIDE 0.4 MILLIGRAM(S): 0.4 CAPSULE ORAL at 21:28

## 2024-03-30 NOTE — PROGRESS NOTE ADULT - SUBJECTIVE AND OBJECTIVE BOX
Pt seen & examined sitting up in chair at bedside comfortably.  Had dressing changed overnight from leakage from drain site.  Also had straight  cath x 2 yesterday due to increased PVR, he is urinating but not fully emptying bladder. Patient states he is doing well. Denies new numbness, tingling, or new weakness. No CP or SOB, no difficulty swallowing.    Vital Signs Last 24 Hrs  T(C): 36.6 (30 Mar 2024 09:17), Max: 36.6 (30 Mar 2024 09:17)  T(F): 97.9 (30 Mar 2024 09:17), Max: 97.9 (30 Mar 2024 09:17)  HR: 62 (30 Mar 2024 09:17) (58 - 69)  BP: 129/70 (30 Mar 2024 09:17) (122/66 - 134/66)  BP(mean): --  RR: 18 (30 Mar 2024 09:17) (17 - 18)  SpO2: 98% (30 Mar 2024 09:17) (94% - 100%)    Parameters below as of 30 Mar 2024 09:17  Patient On (Oxygen Delivery Method): room air    Gen: NAD  Spine:  Dressings C/D/I  Anterior dressing removed- MUKESH drain removed, steri strips in place, new dry sterile dressing place  Posterior dressing removed, staples in place, incision healing well, drain removed, dry sterile dressing applied  +Sensation C5-T1 & L2-S1  Motor exam          Upper extremity                         Bi(c5)  WE(c6)  EE(c7)   FF(c8)                                                R         5/5        5/5        5/5       4/5                                               L          5/5        5/5        5/5       4/5         Lower extremity                            HF(l2)    KE(l3)   TA(l4)  EHL(l5)    GS(s1)                                                 R        5/5        5/5        4/5       4/5         5/5                                               L         5/5        5/5       5/5       5/5          5/5    Distal pulses intact  Soft compartments, - calf tenderness to palpation    A/P 83y Male with ACDF C5-6 and T11-12 lami POD #3    PLAN:   DVT prophylaxis: SCDs/ambulation, Eliquis and plavix  Urinary retention- he is voiding but incomplete emptying, straight cath x 2, we will see how he does today, will start flomax   Continue physical therapy  Out of Bed as tolerated with assistance of a walker  Incentive spirometry encouraged  Pain control as clinically indicated  Discharge planning – he will d/w wife to determine if they want AR vs home, possible DC home today pending voiding   Medical care per Hospitalist

## 2024-03-30 NOTE — PROGRESS NOTE ADULT - REASON FOR ADMISSION
ataxia, weakness of extremities from cervical stenosis, thoracic stenosis with myelopathy.
C5-C5 ACDF, severe stenosis and spondylosis  T11-T12 laminectomy with severe spinal stenosis and spondylosis

## 2024-03-30 NOTE — PROGRESS NOTE ADULT - SUBJECTIVE AND OBJECTIVE BOX
Pt seen & examined at bedside this AM, sitting in bed comfortably.  Had dressing changed overnight from leakage from drain site.  Also had straight  cath x 2 yesterday due to increased PVR, he is urinating but not fully emptying bladder.  He states his neck, back , arms and legs still feel great, no new numbness or tingling, no new weakness. No CP or SOB, no difficulty swallowing.      All vital signs stable    Gen: NAD  Spine:  Dressing clean D&I, MUKESH drain in place anterior neck and Thoracic spine, SS output   +Sensation C5-T1 & L2-S1  Motor exam          Upper extremity                         Bi(c5)  WE(c6)  EE(c7)   FF(c8)                                                R         5/5        5/5        5/5       4/5                                               L          5/5        5/5        5/5       4/5         Lower extremity                            HF(l2)    KE(l3)   TA(l4)  EHL(l5)    GS(s1)                                                 R        5/5        5/5        4/5       4/5         5/5                                               L         5/5        5/5       5/5       5/5          5/5    Distal pulses intact  Soft compartments, - calf tenderness to palpation    A/P 83y Male with ACDF C5-6 and T11-12 lami POD #3    PLAN:   DVT prophylaxis: SCDs/ambulation, Eliquis and plavix restarted for 24 hrs now, no increase in drain output, will D/C drains today and monitor  Urinary retention- he is voiding but incomplete emptying, straight cath x 2, we will see how he does today, will start flomax   Continue physical therapy  Out of Bed as tolerated with assistance of a walker  Incentive spirometry encouraged  Pain control as clinically indicated  Discharge planning – he will d/w wife to determine if they want AR vs home, possible DC home today pending voiding trial

## 2024-03-30 NOTE — PROGRESS NOTE ADULT - SUBJECTIVE AND OBJECTIVE BOX
Patient seen and examined . S/p C5-C5 ACDF, severe stenosis and spondylosis  s/p T11-T12 laminectomy with severe spinal stenosis and spondylosis, POD#3 . Pain well controlled , denies n/v .   Since yesterday noted with urinary retention , this am s/p straight cath , had 900 ml PVR .     CC : Neck / back pain postop well controlled , urinary retention pos top +           MEDICATIONS  (STANDING):  acetaminophen     Tablet .. 975 milliGRAM(s) Oral every 8 hours  apixaban 5 milliGRAM(s) Oral two times a day  atorvastatin 40 milliGRAM(s) Oral at bedtime  budesonide  80 MICROgram(s)/formoterol 4.5 MICROgram(s) Inhaler 2 Puff(s) Inhalation two times a day  celecoxib 200 milliGRAM(s) Oral every 12 hours  clopidogrel Tablet 75 milliGRAM(s) Oral daily  dextrose 5%. 1000 milliLiter(s) (50 mL/Hr) IV Continuous <Continuous>  dextrose 5%. 1000 milliLiter(s) (100 mL/Hr) IV Continuous <Continuous>  dextrose 50% Injectable 12.5 Gram(s) IV Push once  dextrose 50% Injectable 25 Gram(s) IV Push once  dextrose 50% Injectable 25 Gram(s) IV Push once  gabapentin 100 milliGRAM(s) Oral three times a day  glucagon  Injectable 1 milliGRAM(s) IntraMuscular once  insulin lispro (ADMELOG) corrective regimen sliding scale   SubCutaneous three times a day before meals  methocarbamol 500 milliGRAM(s) Oral every 8 hours  metoprolol succinate ER 25 milliGRAM(s) Oral daily  pantoprazole    Tablet 40 milliGRAM(s) Oral before breakfast  senna 2 Tablet(s) Oral at bedtime  sodium chloride 0.9%. 1000 milliLiter(s) (80 mL/Hr) IV Continuous <Continuous>  tamsulosin 0.4 milliGRAM(s) Oral at bedtime  tiotropium 2.5 MICROgram(s) Inhaler 2 Puff(s) Inhalation daily    MEDICATIONS  (PRN):  aluminum hydroxide/magnesium hydroxide/simethicone Suspension 30 milliLiter(s) Oral every 12 hours PRN Indigestion  benzocaine/menthol Lozenge 1 Lozenge Oral every 2 hours PRN Sore Throat  dextrose Oral Gel 15 Gram(s) Oral once PRN Blood Glucose LESS THAN 70 milliGRAM(s)/deciliter  lidocaine   4% Patch 1 Patch Transdermal every 24 hours PRN pain  magnesium hydroxide Suspension 30 milliLiter(s) Oral every 12 hours PRN Constipation  ondansetron Injectable 4 milliGRAM(s) IV Push every 6 hours PRN Nausea and/or Vomiting  oxyCODONE    IR 10 milliGRAM(s) Oral every 3 hours PRN Severe Pain (7 - 10)  oxyCODONE    IR 5 milliGRAM(s) Oral every 3 hours PRN Moderate Pain (4 - 6)      LABS:                          10.5   11.16 )-----------( 309      ( 30 Mar 2024 06:15 )             33.6     03-30    139  |  106  |  48.1<H>  ----------------------------<  103<H>  4.4   |  21.0<L>  |  1.32<H>    Ca    8.6      30 Mar 2024 06:15    TPro  6.1<L>  /  Alb  2.9<L>  /  TBili  <0.2<L>  /  DBili  x   /  AST  10  /  ALT  <5  /  AlkPhos  82  03-30        RADIOLOGY & ADDITIONAL TESTS:          REVIEW OF SYSTEMS:    As above , all other systems are reviewed and are negative .     Vital Signs Last 24 Hrs  T(C): 36.6 (30 Mar 2024 09:17), Max: 36.6 (30 Mar 2024 09:17)  T(F): 97.9 (30 Mar 2024 09:17), Max: 97.9 (30 Mar 2024 09:17)  HR: 62 (30 Mar 2024 09:17) (58 - 69)  BP: 129/70 (30 Mar 2024 09:17) (122/66 - 134/66)  BP(mean): --  RR: 18 (30 Mar 2024 09:17) (17 - 18)  SpO2: 98% (30 Mar 2024 09:17) (94% - 100%)    Parameters below as of 30 Mar 2024 09:17  Patient On (Oxygen Delivery Method): room air      PHYSICAL EXAM:    GENERAL: NAD, well-groomed, well-developed  HEAD:  Atraumatic, Normocephalic  EYES: EOMI, PERRLA, conjunctiva and sclera clear  NECK: Supple, No JVD, Normal thyroid  Anterior neck with dry and clean dressing   NERVOUS SYSTEM:  Alert & Oriented X3, no focal deficit  CHEST/LUNG: CTA b/l ,  no  rales, rhonchi, wheezing, or rubs  HEART: Regular rate and rhythm; No murmurs, rubs, or gallops  ABDOMEN: Soft, Nontender, Nondistended; Bowel sounds present  EXTREMITIES:  2+ Peripheral Pulses, No clubbing, cyanosis, or edema  LYMPH: No lymphadenopathy noted  SKIN: No rashes or lesions  Mid back with dry and clean dressing +

## 2024-03-30 NOTE — PROGRESS NOTE ADULT - ASSESSMENT
83y Male w/ PMH of lung cancer (on immunotherapy) complicated by prior DVT (on Eliquis), HTN, and CAD admitted for elective orthopedic surgery. Medicine consulted for bradycardia.    Plan:     Back pain - chronic    S/p C5-C5 ACDF, severe stenosis and spondylosis  s/p T11-T12 laminectomy with severe spinal stenosis and spondylosis    PT/OT/pain mgmt  DVT prophylaxis- as per ortho  Abx as per SCIP- given   Incentive spirometry  Prophylaxis of opioid  induced constipation.  Wound care/ WB status as per ortho         #Bradycardia:   -Independently reviewed telemetry with telemetry technician - no PVCs present but some PACs  -Pt had bouts of bradycardia in 40s-50s, but had HR in 60s when  woke him for my examination  -Would recommend holding AV-dejon blocking agents (Metoprolol) for HR < 60, would decrease Metoprolol to 25mg from 50mg   -Pt remains completely asymptomatic, was asleep,  Pre op EKG noted with bradycardia at 54 bpm - likely chronic ,   will monitor till am   -No further workup indicated at this time  -Monitor on telemetry for 24 hours    SHANNEN: likely due to urinary retention , hold HCTZ , will give ivf ,   continue straight cath , may place Georges cath if next PVR> 500 ml ,   continue Flomax , avoid nephrotoxic medications . F/U BMP in am     Leucocytosis: Likely post op, no focus of infection, no fever, no chills.   Resolving - likely reactive     Hx of HTN: hold HCT , continue losartan , restart Metoprolol in lower dose     DM: FS monitoring and coverage insulin, will resume MetFORMIN upon discharge     Hx of CAD: Will resume clopidogrel 75 mg once a day once ok from the Spine team     HLD: will continue with atorvastatin 40 mg once a day    Hx of DVT: will continue with Eliquis 5 mg 2 times a day as needed for DVT in left leg    Hx of COPD: Will continue with Trelegy Ellipta 100 mcg-62.5 mcg-25 mcg/inh daily.     Postop urinary retention - started on Flomax , this am PVR - 900 ml ,   will recommend to ambulate patient , have rohan to stend up and have void ,   if PVR > 500 ml place Georges     D/W patient/ family / nurse/ ortho team .  Will follow along with you .   Time spent reviewing the chart documentation, reviewing labs and imaging studies, evaluating the patient, discussing the plan of care with above and documenting- 78 minutes .

## 2024-03-31 ENCOUNTER — TRANSCRIPTION ENCOUNTER (OUTPATIENT)
Age: 84
End: 2024-03-31

## 2024-03-31 VITALS
DIASTOLIC BLOOD PRESSURE: 60 MMHG | OXYGEN SATURATION: 96 % | HEART RATE: 77 BPM | TEMPERATURE: 98 F | SYSTOLIC BLOOD PRESSURE: 119 MMHG | RESPIRATION RATE: 18 BRPM

## 2024-03-31 LAB
ANION GAP SERPL CALC-SCNC: 11 MMOL/L — SIGNIFICANT CHANGE UP (ref 5–17)
BUN SERPL-MCNC: 38.8 MG/DL — HIGH (ref 8–20)
CALCIUM SERPL-MCNC: 8.8 MG/DL — SIGNIFICANT CHANGE UP (ref 8.4–10.5)
CHLORIDE SERPL-SCNC: 111 MMOL/L — HIGH (ref 96–108)
CO2 SERPL-SCNC: 20 MMOL/L — LOW (ref 22–29)
CREAT SERPL-MCNC: 1.15 MG/DL — SIGNIFICANT CHANGE UP (ref 0.5–1.3)
EGFR: 64 ML/MIN/1.73M2 — SIGNIFICANT CHANGE UP
GLUCOSE BLDC GLUCOMTR-MCNC: 108 MG/DL — HIGH (ref 70–99)
GLUCOSE BLDC GLUCOMTR-MCNC: 88 MG/DL — SIGNIFICANT CHANGE UP (ref 70–99)
GLUCOSE SERPL-MCNC: 107 MG/DL — HIGH (ref 70–99)
HCT VFR BLD CALC: 30.8 % — LOW (ref 39–50)
HGB BLD-MCNC: 9.4 G/DL — LOW (ref 13–17)
MCHC RBC-ENTMCNC: 24.9 PG — LOW (ref 27–34)
MCHC RBC-ENTMCNC: 30.5 GM/DL — LOW (ref 32–36)
MCV RBC AUTO: 81.5 FL — SIGNIFICANT CHANGE UP (ref 80–100)
PLATELET # BLD AUTO: 318 K/UL — SIGNIFICANT CHANGE UP (ref 150–400)
POTASSIUM SERPL-MCNC: 5.1 MMOL/L — SIGNIFICANT CHANGE UP (ref 3.5–5.3)
POTASSIUM SERPL-SCNC: 5.1 MMOL/L — SIGNIFICANT CHANGE UP (ref 3.5–5.3)
RBC # BLD: 3.78 M/UL — LOW (ref 4.2–5.8)
RBC # FLD: 19.3 % — HIGH (ref 10.3–14.5)
SODIUM SERPL-SCNC: 142 MMOL/L — SIGNIFICANT CHANGE UP (ref 135–145)
WBC # BLD: 9.71 K/UL — SIGNIFICANT CHANGE UP (ref 3.8–10.5)
WBC # FLD AUTO: 9.71 K/UL — SIGNIFICANT CHANGE UP (ref 3.8–10.5)

## 2024-03-31 PROCEDURE — 85025 COMPLETE CBC W/AUTO DIFF WBC: CPT

## 2024-03-31 PROCEDURE — 36430 TRANSFUSION BLD/BLD COMPNT: CPT

## 2024-03-31 PROCEDURE — 97530 THERAPEUTIC ACTIVITIES: CPT

## 2024-03-31 PROCEDURE — P9016: CPT

## 2024-03-31 PROCEDURE — 85730 THROMBOPLASTIN TIME PARTIAL: CPT

## 2024-03-31 PROCEDURE — 97116 GAIT TRAINING THERAPY: CPT

## 2024-03-31 PROCEDURE — 85014 HEMATOCRIT: CPT

## 2024-03-31 PROCEDURE — 76000 FLUOROSCOPY <1 HR PHYS/QHP: CPT

## 2024-03-31 PROCEDURE — 94640 AIRWAY INHALATION TREATMENT: CPT

## 2024-03-31 PROCEDURE — 82803 BLOOD GASES ANY COMBINATION: CPT

## 2024-03-31 PROCEDURE — 80053 COMPREHEN METABOLIC PANEL: CPT

## 2024-03-31 PROCEDURE — 80048 BASIC METABOLIC PNL TOTAL CA: CPT

## 2024-03-31 PROCEDURE — 85610 PROTHROMBIN TIME: CPT

## 2024-03-31 PROCEDURE — 99232 SBSQ HOSP IP/OBS MODERATE 35: CPT

## 2024-03-31 PROCEDURE — 85027 COMPLETE CBC AUTOMATED: CPT

## 2024-03-31 PROCEDURE — C1713: CPT

## 2024-03-31 PROCEDURE — 84295 ASSAY OF SERUM SODIUM: CPT

## 2024-03-31 PROCEDURE — 36415 COLL VENOUS BLD VENIPUNCTURE: CPT

## 2024-03-31 PROCEDURE — 84132 ASSAY OF SERUM POTASSIUM: CPT

## 2024-03-31 PROCEDURE — 82330 ASSAY OF CALCIUM: CPT

## 2024-03-31 PROCEDURE — 82947 ASSAY GLUCOSE BLOOD QUANT: CPT

## 2024-03-31 PROCEDURE — 85018 HEMOGLOBIN: CPT

## 2024-03-31 PROCEDURE — 82435 ASSAY OF BLOOD CHLORIDE: CPT

## 2024-03-31 PROCEDURE — 83605 ASSAY OF LACTIC ACID: CPT

## 2024-03-31 PROCEDURE — C1889: CPT

## 2024-03-31 PROCEDURE — 82962 GLUCOSE BLOOD TEST: CPT

## 2024-03-31 RX ORDER — NALOXONE HYDROCHLORIDE 4 MG/.1ML
1 SPRAY NASAL
Qty: 1 | Refills: 0
Start: 2024-03-31

## 2024-03-31 RX ORDER — METHOCARBAMOL 500 MG/1
1 TABLET, FILM COATED ORAL
Qty: 15 | Refills: 0
Start: 2024-03-31 | End: 2024-04-04

## 2024-03-31 RX ORDER — SENNOSIDES/DOCUSATE SODIUM 8.6MG-50MG
2 TABLET ORAL
Qty: 14 | Refills: 0
Start: 2024-03-31 | End: 2024-04-06

## 2024-03-31 RX ORDER — OMEPRAZOLE 10 MG/1
1 CAPSULE, DELAYED RELEASE ORAL
Qty: 42 | Refills: 0
Start: 2024-03-31 | End: 2024-05-11

## 2024-03-31 RX ORDER — OXYCODONE HYDROCHLORIDE 5 MG/1
1 TABLET ORAL
Qty: 28 | Refills: 0
Start: 2024-03-31 | End: 2024-04-06

## 2024-03-31 RX ORDER — TAMSULOSIN HYDROCHLORIDE 0.4 MG/1
1 CAPSULE ORAL
Qty: 30 | Refills: 0
Start: 2024-03-31 | End: 2024-04-29

## 2024-03-31 RX ORDER — GABAPENTIN 400 MG/1
1 CAPSULE ORAL
Qty: 0 | Refills: 0 | DISCHARGE
Start: 2024-03-31

## 2024-03-31 RX ADMIN — CLOPIDOGREL BISULFATE 75 MILLIGRAM(S): 75 TABLET, FILM COATED ORAL at 11:59

## 2024-03-31 RX ADMIN — METHOCARBAMOL 500 MILLIGRAM(S): 500 TABLET, FILM COATED ORAL at 14:26

## 2024-03-31 RX ADMIN — CELECOXIB 200 MILLIGRAM(S): 200 CAPSULE ORAL at 05:12

## 2024-03-31 RX ADMIN — Medication 975 MILLIGRAM(S): at 14:26

## 2024-03-31 RX ADMIN — TIOTROPIUM BROMIDE 2 PUFF(S): 18 CAPSULE ORAL; RESPIRATORY (INHALATION) at 07:40

## 2024-03-31 RX ADMIN — PANTOPRAZOLE SODIUM 40 MILLIGRAM(S): 20 TABLET, DELAYED RELEASE ORAL at 05:11

## 2024-03-31 RX ADMIN — METHOCARBAMOL 500 MILLIGRAM(S): 500 TABLET, FILM COATED ORAL at 05:12

## 2024-03-31 RX ADMIN — GABAPENTIN 100 MILLIGRAM(S): 400 CAPSULE ORAL at 05:12

## 2024-03-31 RX ADMIN — Medication 975 MILLIGRAM(S): at 05:12

## 2024-03-31 RX ADMIN — GABAPENTIN 100 MILLIGRAM(S): 400 CAPSULE ORAL at 14:26

## 2024-03-31 RX ADMIN — Medication 975 MILLIGRAM(S): at 15:00

## 2024-03-31 RX ADMIN — Medication 25 MILLIGRAM(S): at 05:12

## 2024-03-31 RX ADMIN — BUDESONIDE AND FORMOTEROL FUMARATE DIHYDRATE 2 PUFF(S): 160; 4.5 AEROSOL RESPIRATORY (INHALATION) at 07:38

## 2024-03-31 RX ADMIN — APIXABAN 5 MILLIGRAM(S): 2.5 TABLET, FILM COATED ORAL at 05:12

## 2024-03-31 NOTE — PROGRESS NOTE ADULT - SUBJECTIVE AND OBJECTIVE BOX
Patient seen and examined . Neck/ back pain well controlled , Georges reinserted yesterday as patient noted with PVR of 800 ml.   Denies n/v , having BM , participating with physical therapy .      CC : Necxk/back chronic pain postop well controlled , postoperative urinary retention       MEDICATIONS  (STANDING):  acetaminophen     Tablet .. 975 milliGRAM(s) Oral every 8 hours  apixaban 5 milliGRAM(s) Oral two times a day  atorvastatin 40 milliGRAM(s) Oral at bedtime  budesonide  80 MICROgram(s)/formoterol 4.5 MICROgram(s) Inhaler 2 Puff(s) Inhalation two times a day  celecoxib 200 milliGRAM(s) Oral every 12 hours  clopidogrel Tablet 75 milliGRAM(s) Oral daily  dextrose 5%. 1000 milliLiter(s) (100 mL/Hr) IV Continuous <Continuous>  dextrose 5%. 1000 milliLiter(s) (50 mL/Hr) IV Continuous <Continuous>  dextrose 50% Injectable 12.5 Gram(s) IV Push once  dextrose 50% Injectable 25 Gram(s) IV Push once  dextrose 50% Injectable 25 Gram(s) IV Push once  gabapentin 100 milliGRAM(s) Oral three times a day  glucagon  Injectable 1 milliGRAM(s) IntraMuscular once  insulin lispro (ADMELOG) corrective regimen sliding scale   SubCutaneous three times a day before meals  methocarbamol 500 milliGRAM(s) Oral every 8 hours  metoprolol succinate ER 25 milliGRAM(s) Oral daily  pantoprazole    Tablet 40 milliGRAM(s) Oral before breakfast  senna 2 Tablet(s) Oral at bedtime  sodium chloride 0.9%. 1000 milliLiter(s) (100 mL/Hr) IV Continuous <Continuous>  tamsulosin 0.4 milliGRAM(s) Oral at bedtime  tiotropium 2.5 MICROgram(s) Inhaler 2 Puff(s) Inhalation daily    MEDICATIONS  (PRN):  aluminum hydroxide/magnesium hydroxide/simethicone Suspension 30 milliLiter(s) Oral every 12 hours PRN Indigestion  benzocaine/menthol Lozenge 1 Lozenge Oral every 2 hours PRN Sore Throat  dextrose Oral Gel 15 Gram(s) Oral once PRN Blood Glucose LESS THAN 70 milliGRAM(s)/deciliter  lidocaine   4% Patch 1 Patch Transdermal every 24 hours PRN pain  magnesium hydroxide Suspension 30 milliLiter(s) Oral every 12 hours PRN Constipation  ondansetron Injectable 4 milliGRAM(s) IV Push every 6 hours PRN Nausea and/or Vomiting  oxyCODONE    IR 5 milliGRAM(s) Oral every 3 hours PRN Moderate Pain (4 - 6)  oxyCODONE    IR 10 milliGRAM(s) Oral every 3 hours PRN Severe Pain (7 - 10)      LABS:                          9.4    9.71  )-----------( 318      ( 31 Mar 2024 06:50 )             30.8     03-31    142  |  111<H>  |  38.8<H>  ----------------------------<  107<H>  5.1   |  20.0<L>  |  1.15    Ca    8.8      31 Mar 2024 06:50    TPro  6.1<L>  /  Alb  2.9<L>  /  TBili  <0.2<L>  /  DBili  x   /  AST  10  /  ALT  <5  /  AlkPhos  82  03-30          RADIOLOGY & ADDITIONAL TESTS:        REVIEW OF SYSTEMS:    As above , all other systems are reviewed and are negative .     Vital Signs Last 24 Hrs  T(C): 36.3 (31 Mar 2024 04:32), Max: 36.4 (30 Mar 2024 21:07)  T(F): 97.4 (31 Mar 2024 04:32), Max: 97.5 (30 Mar 2024 21:07)  HR: 57 (31 Mar 2024 04:32) (57 - 82)  BP: 157/72 (31 Mar 2024 04:32) (132/77 - 157/72)  BP(mean): --  RR: 18 (31 Mar 2024 04:32) (18 - 18)  SpO2: 98% (31 Mar 2024 04:32) (94% - 100%)    Parameters below as of 31 Mar 2024 04:32  Patient On (Oxygen Delivery Method): room air      PHYSICAL EXAM:    GENERAL: NAD, well-groomed, well-developed  HEAD:  Atraumatic, Normocephalic  EYES: EOMI, PERRLA, conjunctiva and sclera clear  NECK: Supple, No JVD, Normal thyroid  Anterior neck with dry and clean dressing +   NERVOUS SYSTEM:  Alert & Oriented X4, no focal deficit  CHEST/LUNG: CTA b/l ,  no  rales, rhonchi, wheezing, or rubs  HEART: Regular rate and rhythm; No murmurs, rubs, or gallops  ABDOMEN: Soft, Nontender, Nondistended; Bowel sounds present  EXTREMITIES:  2+ Peripheral Pulses, No clubbing, cyanosis, or edema  LYMPH: No lymphadenopathy noted  SKIN: No rashes or lesions  Low back with dry and clean dressing +   Georges cath + , with clear/yellowish urine     I&O's Detail    30 Mar 2024 07:01  -  31 Mar 2024 07:00  --------------------------------------------------------  IN:  Total IN: 0 mL    OUT:    Indwelling Catheter - Urethral (mL): 2700 mL    Voided (mL): 200 mL  Total OUT: 2900 mL    Total NET: -2900 mL

## 2024-03-31 NOTE — PROGRESS NOTE ADULT - SUBJECTIVE AND OBJECTIVE BOX
ORTHO-SPINE POST-OP PROGRESS NOTE:      [id number]    [facility]      PROCEDURE:    DOS:       SUBJECTIVE: Patient is a 83yMale Patient seen and examined. Patient reports of moderate discomfort that is controlled by pain medications. Patient had qureshi placed last evening for urinary retention. No issues overnight, improving in physical function status, accomplished stairs yesterday with PT.  Patient denies of acute sensory or motor changes.                             9.4    9.71  )-----------( 318      ( 31 Mar 2024 06:50 )             30.8           I&O's Detail    30 Mar 2024 07:01  -  31 Mar 2024 07:00  --------------------------------------------------------  IN:  Total IN: 0 mL    OUT:    Indwelling Catheter - Urethral (mL): 2700 mL    Voided (mL): 200 mL  Total OUT: 2900 mL    Total NET: -2900 mL        acetaminophen     Tablet .. 975 milliGRAM(s) Oral every 8 hours  aluminum hydroxide/magnesium hydroxide/simethicone Suspension 30 milliLiter(s) Oral every 12 hours PRN  apixaban 5 milliGRAM(s) Oral two times a day  atorvastatin 40 milliGRAM(s) Oral at bedtime  benzocaine/menthol Lozenge 1 Lozenge Oral every 2 hours PRN  budesonide  80 MICROgram(s)/formoterol 4.5 MICROgram(s) Inhaler 2 Puff(s) Inhalation two times a day  celecoxib 200 milliGRAM(s) Oral every 12 hours  clopidogrel Tablet 75 milliGRAM(s) Oral daily  dextrose 5%. 1000 milliLiter(s) IV Continuous <Continuous>  dextrose 5%. 1000 milliLiter(s) IV Continuous <Continuous>  dextrose 50% Injectable 12.5 Gram(s) IV Push once  dextrose 50% Injectable 25 Gram(s) IV Push once  dextrose 50% Injectable 25 Gram(s) IV Push once  dextrose Oral Gel 15 Gram(s) Oral once PRN  gabapentin 100 milliGRAM(s) Oral three times a day  glucagon  Injectable 1 milliGRAM(s) IntraMuscular once  insulin lispro (ADMELOG) corrective regimen sliding scale   SubCutaneous three times a day before meals  lidocaine   4% Patch 1 Patch Transdermal every 24 hours PRN  magnesium hydroxide Suspension 30 milliLiter(s) Oral every 12 hours PRN  methocarbamol 500 milliGRAM(s) Oral every 8 hours  metoprolol succinate ER 25 milliGRAM(s) Oral daily  ondansetron Injectable 4 milliGRAM(s) IV Push every 6 hours PRN  oxyCODONE    IR 5 milliGRAM(s) Oral every 3 hours PRN  oxyCODONE    IR 10 milliGRAM(s) Oral every 3 hours PRN  pantoprazole    Tablet 40 milliGRAM(s) Oral before breakfast  senna 2 Tablet(s) Oral at bedtime  sodium chloride 0.9%. 1000 milliLiter(s) IV Continuous <Continuous>  tamsulosin 0.4 milliGRAM(s) Oral at bedtime  tiotropium 2.5 MICROgram(s) Inhaler 2 Puff(s) Inhalation daily        T(C): 36.3 (03-31-24 @ 04:32), Max: 36.4 (03-30-24 @ 21:07)  HR: 57 (03-31-24 @ 04:32) (57 - 82)  BP: 157/72 (03-31-24 @ 04:32) (132/77 - 157/72)  RR: 18 (03-31-24 @ 04:32) (18 - 18)  SpO2: 98% (03-31-24 @ 04:32) (94% - 100%)  Wt(kg): --      PHYSICAL EXAM:     Constitutional: Alert, responsive, in no acute distress.     Spine:  Dressings C/D/I  +Sensation C5-T1 & L2-S1  Motor exam          Upper extremity                         Bi(c5)  WE(c6)  EE(c7)   FF(c8)                                                R         5/5        5/5        5/5       4/5                                               L          5/5        5/5        5/5       4/5         Lower extremity                            HF(l2)    KE(l3)   TA(l4)  EHL(l5)    GS(s1)                                                 R        5/5        5/5        4/5       4/5         5/5                                               L         5/5        5/5       5/5       5/5          5/5      Soft compartments, - calf tenderness to palpation, 2+ DP pulses     A/P 83y Male with ACDF C5-6 and T11-12 lami POD #4    PLAN:   DVT prophylaxis: SCDs/ambulation, Eliquis and plavix  Urinary retention- flomax, continue qureshi, urology follow-up in 2-3 days upon discharge  D/w physical therapy of plan for home, will work with patient today and re-evaluate accordingly  Out of Bed as tolerated with assistance of a walker  Incentive spirometry encouraged  Pain control as clinically indicated  Discharge planning – Discussed with wife, patient requesting to be discharged home.   Medical team following

## 2024-03-31 NOTE — PROGRESS NOTE ADULT - NS ATTEND AMEND GEN_ALL_CORE FT
Orthopaedic Spine/Trauma Addendum:    I have personally reviewed the patients chart, imaging and lab results. I have reviewed the physician assistant note and agree with the history, exam, and plan of care, except as noted.    Patient to be discharged home with Destini   We will help to setup appointment for outpatient TOV  D/C instructions provided to patient  Plan to see me in office in 10-14 days for evaluation, wound check and staple removal from thoracic incision    Edwin Gonzalez DO  Orthopaedic Spine/Trauma Surgeon  [facility] [place]
Orthopaedic Spine/Trauma Addendum:      Patient seen and examined at bedside with family present patient is currently sitting in his chair eating lunch.  He states that he has no significant difficulty swallowing he is eating without any issues he denies any chest pain or shortness of breath.  He states that he has notices improvement in his overall upper extremity function and his dexterity states that he is he is able to use his hands better today.  States he has no significant neck pain.  His back pain is also well-controlled more of an aching pain.  He has been standing with physical therapy and walking he states that his legs feel stronger is no new weakness or numbness.    His exam is remained stable in bilateral upper extremities and there no signs of new deficits new numbness or tingling or weakness in his upper or lower extremities.  Anterior dressing cervical spine clean dry and intact MUKESH drain in place with serosanguineous output  Thoracic spine dressing clean dry and intact MUKESH drain in place with serosanguineous output      We will continue to monitor his drain output we will restart his Eliquis tomorrow approximate 48 hours after surgery where we will maintain the MUKESH drains for at least 12 to 24 hours after restarting his Eliquis and Plavix and once we pull his drains we will continue to monitor for least another 12 to 24 hours I anticipate a discharge home with having remained stable likely Sunday.  Pain medication as needed  PT/OT, limit bending twisting lifting  Incentive spirometry  SCDs currently for DVT prophylaxis  Med following  Dispo planning likely home versus subacute rehab      Edwin Gonzalez DO  Orthopaedic Spine/Trauma Surgeon  [facility] [place]
Orthopaedic Spine/Trauma Addendum:    I have personally reviewed the patients chart, imaging and lab results. I have reviewed the physician assistant note and agree with the history, exam, and plan of care, except as noted.    Patient seen and examined today in room, wife present.  He is sitting comfortably in chair, states he feels great.  Has notice significant improvement in his Upper extremity function, strength and his hand dexterity.  Also states he feels more stable on his feet, was walking up and down [place] with PT today.  He may be a candidate for acute rehab. Eating and urinating independently.  Started Plavix and Eliquis this AM, drain output stable  Exam in upper and Lower extremities improving   MUKESH neck and thoracic with SS output  Cervical dressing changed from leakage around drain site  Thoracic dressing C/D/I      If drain output maintains with start of eliquis and plavix then we will likely DC tomorrow afternoon, possible DC home tomorrow evening vs sunday  He and his wife will discuss possible AR vs DC home   continue PT/OT while in house  incentive spirometry   pain control prn  Med following  Dispo planning       Edwin Gonzalez DO  Orthopaedic Spine/Trauma Surgeon  [facility] [place]
Orthopaedic Spine/Trauma Addendum:    . I have reviewed the physician assistant note and agree with the history, exam, and plan of care, except as noted.  I examined Ray in the PACU with Jennifer (PA).  Patient is lethargic from surgery but responsive and cooperative with exam.  Overall no complaints, states his throat is dry, pain is well controlled.       Edwin Gonzalez DO  Orthopaedic Spine/Trauma Surgeon  [facility] [place]

## 2024-03-31 NOTE — PROGRESS NOTE ADULT - PROVIDER SPECIALTY LIST ADULT
Hospitalist
Hospitalist
Orthopedics
Orthopedics
Physiatry
Hospitalist
Orthopedics

## 2024-03-31 NOTE — DISCHARGE NOTE NURSING/CASE MANAGEMENT/SOCIAL WORK - PATIENT PORTAL LINK FT
You can access the FollowMyHealth Patient Portal offered by [facility] by registering at the following website: [email or web address]. By joining [employer]’s FollowMyHealth portal, you will also be able to view your health information using other applications (apps) compatible with our system.

## 2024-03-31 NOTE — PROGRESS NOTE ADULT - ASSESSMENT
83y Male w/ PMH of lung cancer (on immunotherapy) complicated by prior DVT (on Eliquis), HTN, and CAD admitted for elective orthopedic surgery. Medicine consulted for bradycardia.    Plan:     Back pain/ Neck pain  - chronic    S/p C5-C5 ACDF, severe stenosis and spondylosis  s/p T11-T12 laminectomy with severe spinal stenosis and spondylosis    PT/OT/pain mgmt  DVT prophylaxis- as per ortho  Abx as per SCIP- given   Incentive spirometry  Prophylaxis of opioid  induced constipation.  Wound care/ WB status as per ortho         #Bradycardia postop noted, pre op EKG with bradycardia noted at 54 bpm - likely chronic bradycardia   as patient on BB . Takes Metoprolol ER 50 mg daily . Restarted on Metoprolol ER 25 mg daily .   Cardiac monitor reviewed HR 50's-80's , patient is asymptomatic .   Patient advised to take take 50 mg 1/2( 25 mg )  pill Metoprolol ER for few days and if HR remains > 55   may continue home dose of 50 mg . F/U with cardiologist in 1-2 wks for further recommendations .     SHANNEN: likely due to urinary retention , Georges placed , ivf given -   resolved     Leucocytosis: Likely post op, no focus of infection, no fever, no chills.   Resolved- likely reactive     Hx of HTN: restart home medications     DM: FS monitoring and coverage insulin, will resume MetFORMIN upon discharge     Hx of CAD: Will resume clopidogrel 75 mg once a day once ok from the Spine team     HLD: will continue with atorvastatin 40 mg once a day    Hx of DVT: will continue with Eliquis 5 mg 2 times a day as needed for DVT in left leg    Hx of COPD: Will continue with Trelegy Ellipta 100 mcg-62.5 mcg-25 mcg/inh daily.     Postop urinary retention - started on Flomax , Georges reinserted .   Patient admits having urinary frequency for longer time , never seen by  .   Plan d/c home with Georges , continue Flomax , see  in 5-7 days for TOV ,   prostate exam and PSA check        D/W patient/ family / nurse/ ortho team .    Dispo plan is Home - likely today .    Medically stable to d/c once cleared by physical therapy/ ortho .   Will sign off , please recall if clinically indicated .

## 2024-04-02 ENCOUNTER — APPOINTMENT (OUTPATIENT)
Dept: UROLOGY | Facility: CLINIC | Age: 84
End: 2024-04-02
Payer: MEDICARE

## 2024-04-02 VITALS
DIASTOLIC BLOOD PRESSURE: 68 MMHG | WEIGHT: 180 LBS | BODY MASS INDEX: 27.28 KG/M2 | HEIGHT: 68 IN | HEART RATE: 75 BPM | SYSTOLIC BLOOD PRESSURE: 122 MMHG

## 2024-04-02 DIAGNOSIS — Z85.118 PERSONAL HISTORY OF OTHER MALIGNANT NEOPLASM OF BRONCHUS AND LUNG: ICD-10-CM

## 2024-04-02 PROCEDURE — 99203 OFFICE O/P NEW LOW 30 MIN: CPT

## 2024-04-02 RX ORDER — ENOXAPARIN SODIUM 80 MG/.8ML
80 INJECTION, SOLUTION SUBCUTANEOUS
Qty: 4 | Refills: 0 | Status: DISCONTINUED | COMMUNITY
Start: 2023-08-04 | End: 2024-04-02

## 2024-04-02 RX ORDER — SUCRALFATE 1 G/10ML
1 SUSPENSION ORAL 4 TIMES DAILY
Qty: 240 | Refills: 4 | Status: DISCONTINUED | COMMUNITY
Start: 2023-03-17 | End: 2024-04-02

## 2024-04-02 RX ORDER — PROCHLORPERAZINE MALEATE 10 MG/1
10 TABLET ORAL EVERY 6 HOURS
Qty: 120 | Refills: 2 | Status: DISCONTINUED | COMMUNITY
Start: 2023-01-31 | End: 2024-04-02

## 2024-04-02 RX ORDER — OMEPRAZOLE 20 MG/1
20 CAPSULE, DELAYED RELEASE ORAL
Qty: 30 | Refills: 0 | Status: ACTIVE | COMMUNITY
Start: 2024-04-02

## 2024-04-02 RX ORDER — DEXAMETHASONE 4 MG/1
4 TABLET ORAL
Qty: 10 | Refills: 0 | Status: DISCONTINUED | COMMUNITY
Start: 2023-01-31 | End: 2024-04-02

## 2024-04-02 RX ORDER — METHOCARBAMOL 750 MG/1
750 TABLET, FILM COATED ORAL 3 TIMES DAILY
Qty: 90 | Refills: 0 | Status: ACTIVE | COMMUNITY
Start: 2024-04-02

## 2024-04-02 RX ORDER — LOSARTAN POTASSIUM AND HYDROCHLOROTHIAZIDE 25; 100 MG/1; MG/1
100-25 TABLET ORAL
Refills: 0 | Status: DISCONTINUED | COMMUNITY
End: 2024-04-02

## 2024-04-02 RX ORDER — TAMSULOSIN HYDROCHLORIDE 0.4 MG/1
0.4 CAPSULE ORAL
Qty: 30 | Refills: 1 | Status: ACTIVE | COMMUNITY
Start: 2024-04-02

## 2024-04-02 NOTE — LETTER BODY
[Dear  ___] : Dear  [unfilled], [Please see my note below.] : Please see my note below. [Courtesy Letter:] : I had the pleasure of seeing your patient, [unfilled], in my office today. [Sincerely,] : Sincerely, [FreeTextEntry3] : Ed  Harvey Gonzales MD [facility]  [facility] [employer] [employer] at [facility]

## 2024-04-02 NOTE — PHYSICAL EXAM
[General Appearance - Well Developed] : well developed [Oriented To Time, Place, And Person] : oriented to person, place, and time [Affect] : the affect was normal [Not Anxious] : not anxious [de-identified] : upper and lower extremitiy edema [de-identified] : walker

## 2024-04-02 NOTE — HISTORY OF PRESENT ILLNESS
[FreeTextEntry1] : Patient had recent neck and back surgery.  was in retention.  has been using a walker.  Does not drink much fluids. he hs been constipated.

## 2024-04-02 NOTE — ASSESSMENT
[FreeTextEntry1] : post op urinary retention  Plan;  VT in one week maximize boewel function and activity.

## 2024-04-03 ENCOUNTER — RESULT REVIEW (OUTPATIENT)
Age: 84
End: 2024-04-03

## 2024-04-03 ENCOUNTER — APPOINTMENT (OUTPATIENT)
Age: 84
End: 2024-04-03

## 2024-04-03 LAB
BASOPHILS # BLD AUTO: 0.1 K/UL — SIGNIFICANT CHANGE UP (ref 0–0.2)
BASOPHILS NFR BLD AUTO: 0.7 % — SIGNIFICANT CHANGE UP (ref 0–2)
EOSINOPHIL # BLD AUTO: 0.4 K/UL — SIGNIFICANT CHANGE UP (ref 0–0.5)
EOSINOPHIL NFR BLD AUTO: 4.1 % — SIGNIFICANT CHANGE UP (ref 0–6)
HCT VFR BLD CALC: 28.8 % — LOW (ref 39–50)
HGB BLD-MCNC: 9.1 G/DL — LOW (ref 13–17)
LYMPHOCYTES # BLD AUTO: 0.6 K/UL — LOW (ref 1–3.3)
LYMPHOCYTES # BLD AUTO: 6.3 % — LOW (ref 13–44)
MCHC RBC-ENTMCNC: 25.3 PG — LOW (ref 27–34)
MCHC RBC-ENTMCNC: 31.6 G/DL — LOW (ref 32–36)
MCV RBC AUTO: 80.1 FL — SIGNIFICANT CHANGE UP (ref 80–100)
MONOCYTES # BLD AUTO: 1 K/UL — HIGH (ref 0–0.9)
MONOCYTES NFR BLD AUTO: 10.7 % — SIGNIFICANT CHANGE UP (ref 2–14)
NEUTROPHILS # BLD AUTO: 7.4 K/UL — SIGNIFICANT CHANGE UP (ref 1.8–7.4)
NEUTROPHILS NFR BLD AUTO: 78.2 % — HIGH (ref 43–77)
PLATELET # BLD AUTO: 292 K/UL — SIGNIFICANT CHANGE UP (ref 150–400)
RBC # BLD: 3.6 M/UL — LOW (ref 4.2–5.8)
RBC # FLD: 18.8 % — HIGH (ref 10.3–14.5)
WBC # BLD: 9.5 K/UL — SIGNIFICANT CHANGE UP (ref 3.8–10.5)
WBC # FLD AUTO: 9.5 K/UL — SIGNIFICANT CHANGE UP (ref 3.8–10.5)

## 2024-04-04 LAB
ALBUMIN SERPL ELPH-MCNC: 3.4 G/DL — SIGNIFICANT CHANGE UP (ref 3.3–5)
ALP SERPL-CCNC: 97 U/L — SIGNIFICANT CHANGE UP (ref 40–120)
ALT FLD-CCNC: 23 U/L — SIGNIFICANT CHANGE UP (ref 10–45)
ANION GAP SERPL CALC-SCNC: 10 MMOL/L — SIGNIFICANT CHANGE UP (ref 5–17)
AST SERPL-CCNC: 25 U/L — SIGNIFICANT CHANGE UP (ref 10–40)
BILIRUB SERPL-MCNC: 0.2 MG/DL — SIGNIFICANT CHANGE UP (ref 0.2–1.2)
BUN SERPL-MCNC: 18 MG/DL — SIGNIFICANT CHANGE UP (ref 7–23)
CALCIUM SERPL-MCNC: 9 MG/DL — SIGNIFICANT CHANGE UP (ref 8.4–10.5)
CEA SERPL-MCNC: 0.8 NG/ML — SIGNIFICANT CHANGE UP (ref 0–3.8)
CHLORIDE SERPL-SCNC: 105 MMOL/L — SIGNIFICANT CHANGE UP (ref 96–108)
CO2 SERPL-SCNC: 23 MMOL/L — SIGNIFICANT CHANGE UP (ref 22–31)
CREAT SERPL-MCNC: 0.87 MG/DL — SIGNIFICANT CHANGE UP (ref 0.5–1.3)
EGFR: 86 ML/MIN/1.73M2 — SIGNIFICANT CHANGE UP
GLUCOSE SERPL-MCNC: 103 MG/DL — HIGH (ref 70–99)
POTASSIUM SERPL-MCNC: 4.1 MMOL/L — SIGNIFICANT CHANGE UP (ref 3.5–5.3)
POTASSIUM SERPL-SCNC: 4.1 MMOL/L — SIGNIFICANT CHANGE UP (ref 3.5–5.3)
PROT SERPL-MCNC: 6.5 G/DL — SIGNIFICANT CHANGE UP (ref 6–8.3)
SODIUM SERPL-SCNC: 138 MMOL/L — SIGNIFICANT CHANGE UP (ref 135–145)
T4 FREE+ TSH PNL SERPL: 2.96 UIU/ML — SIGNIFICANT CHANGE UP (ref 0.27–4.2)

## 2024-04-05 ENCOUNTER — NON-APPOINTMENT (OUTPATIENT)
Age: 84
End: 2024-04-05

## 2024-04-09 ENCOUNTER — APPOINTMENT (OUTPATIENT)
Dept: UROLOGY | Facility: CLINIC | Age: 84
End: 2024-04-09
Payer: MEDICARE

## 2024-04-09 ENCOUNTER — APPOINTMENT (OUTPATIENT)
Dept: ORTHOPEDIC SURGERY | Facility: CLINIC | Age: 84
End: 2024-04-09
Payer: MEDICARE

## 2024-04-09 VITALS
DIASTOLIC BLOOD PRESSURE: 58 MMHG | HEIGHT: 68 IN | WEIGHT: 196 LBS | BODY MASS INDEX: 29.7 KG/M2 | HEART RATE: 61 BPM | SYSTOLIC BLOOD PRESSURE: 141 MMHG

## 2024-04-09 DIAGNOSIS — R33.9 RETENTION OF URINE, UNSPECIFIED: ICD-10-CM

## 2024-04-09 DIAGNOSIS — M51.04 INTERVERTEBRAL DISC DISORDERS WITH MYELOPATHY, THORACIC REGION: ICD-10-CM

## 2024-04-09 PROCEDURE — 99024 POSTOP FOLLOW-UP VISIT: CPT

## 2024-04-09 PROCEDURE — 51700 IRRIGATION OF BLADDER: CPT

## 2024-04-09 PROCEDURE — A4216: CPT | Mod: NC

## 2024-04-09 PROCEDURE — 72080 X-RAY EXAM THORACOLMB 2/> VW: CPT

## 2024-04-09 NOTE — HISTORY OF PRESENT ILLNESS
[___ Weeks Post Op] : [unfilled] weeks post op [Xray (Date:___)] : [unfilled] Xray -  [de-identified] :  s/p C5-C6 ACDF, T11-T12 laminectomy. DOS: 03/27/2024 [de-identified] : 83-year-old male presenting today for his first postop follow-up visit status post C5-6 ACDF and T11-T12 laminectomy on 3/27/2024. He has been doing extremely well at home he saw the urologist today had the Georges removed is now urinating independently He states that his neck pain is also significantly improved from the arthritic pain he fell prior he now has sensation all of his fingers bilateral hands he is able to now but make sure she has been able to do for 6 to 12 months He is walking better states he feels more stable on his feet and is able to walk around his house without walker. Overall him and his wife are extremely happy with the outcome of his surgery and is improving so far His only issue recently is he is edematous in his upper and lower extremities, his upper extremities were significant edematous however this is resolved over the past week his legs are also very edematous and he recently saw his primary care doctor and he recommended elevation of the lower extremities and increased ambulation [de-identified] : Incision anterior cervical spine dressing removed incision is clean dry and intact no drainage no dehiscence Incision and thoracic spine staples removed, no signs of dehiscence no significant drainage no erythema cleaned with alcohol swab and Steri-Strips placed Upper extremities patient has significant improved range of motion of the right upper extremity is able to abduct and forward flex greater than 120 degrees Improved dexterity and  strength is now 4 out of 5 bilateral upper extremities is able to unbutton his shirt Sensation is intact to light touch to all digits C5-T1 bilateral Patient remains 5 out of 5 left lower extremity with hip flexion knee extension dorsiflexion plantarflexion right lower extremity has some chronic weakness secondary to lumbar spinal stenosis but hip flexion is 4- out of 5, knee extension, dorsiflexion plantarflexion 5 out of 5 Lower extremity edema extending from mid thigh to bilateral ankles nonpitting No calf or popliteal tenderness Patient is able to ambulate without the use of his walker around the room and he feels steady on his feet improved strength in legs [de-identified] : AP lateral cervical spine performed on 4/9/2024 demonstrates cervical spine status post C5-C6 ACDF with interbody with anterior plating no signs of hardware failure or loosening 2 views AP lateral thoracolumbar spine demonstrates chronic multilevel spondylosis laminectomy defect T11-T12 no signs of listhesis or instability [de-identified] : Recommend continued ambulation and lower extremity elevation to decrease the edema I recommend follow-up with his cardiologist for evaluation patient currently has no chest pain shortness of breath and was recently seen by his PCP Physical therapy, to work on gait upper and lower extremity function, [facility] service referral was placed they will reach out and assist with scheduling location Tylenol as needed for pain control Limitations of bending twisting lifting however regularly that he will exceed on limitations given his current functional status but he will refrain Incision care recommend that he can shower get incisions wet pat dry cover as needed He will refrain from soaking incisions for least the next 2 months I will see him back in 6 weeks

## 2024-04-16 ENCOUNTER — APPOINTMENT (OUTPATIENT)
Dept: VASCULAR SURGERY | Facility: CLINIC | Age: 84
End: 2024-04-16

## 2024-04-17 ENCOUNTER — RESULT REVIEW (OUTPATIENT)
Age: 84
End: 2024-04-17

## 2024-04-17 ENCOUNTER — APPOINTMENT (OUTPATIENT)
Age: 84
End: 2024-04-17

## 2024-04-17 LAB
ALBUMIN SERPL ELPH-MCNC: 3.5 G/DL — SIGNIFICANT CHANGE UP (ref 3.3–5)
ALP SERPL-CCNC: 114 U/L — SIGNIFICANT CHANGE UP (ref 40–120)
ALT FLD-CCNC: 12 U/L — SIGNIFICANT CHANGE UP (ref 10–45)
ANION GAP SERPL CALC-SCNC: 11 MMOL/L — SIGNIFICANT CHANGE UP (ref 5–17)
AST SERPL-CCNC: 15 U/L — SIGNIFICANT CHANGE UP (ref 10–40)
BASOPHILS # BLD AUTO: 0.1 K/UL — SIGNIFICANT CHANGE UP (ref 0–0.2)
BASOPHILS NFR BLD AUTO: 1.2 % — SIGNIFICANT CHANGE UP (ref 0–2)
BILIRUB SERPL-MCNC: 0.2 MG/DL — SIGNIFICANT CHANGE UP (ref 0.2–1.2)
BUN SERPL-MCNC: 19 MG/DL — SIGNIFICANT CHANGE UP (ref 7–23)
CALCIUM SERPL-MCNC: 9.3 MG/DL — SIGNIFICANT CHANGE UP (ref 8.4–10.5)
CEA SERPL-MCNC: 1 NG/ML — SIGNIFICANT CHANGE UP (ref 0–3.8)
CHLORIDE SERPL-SCNC: 100 MMOL/L — SIGNIFICANT CHANGE UP (ref 96–108)
CO2 SERPL-SCNC: 25 MMOL/L — SIGNIFICANT CHANGE UP (ref 22–31)
CREAT SERPL-MCNC: 0.89 MG/DL — SIGNIFICANT CHANGE UP (ref 0.5–1.3)
EGFR: 86 ML/MIN/1.73M2 — SIGNIFICANT CHANGE UP
EOSINOPHIL # BLD AUTO: 0.5 K/UL — SIGNIFICANT CHANGE UP (ref 0–0.5)
EOSINOPHIL NFR BLD AUTO: 6.5 % — HIGH (ref 0–6)
GLUCOSE SERPL-MCNC: 109 MG/DL — HIGH (ref 70–99)
HCT VFR BLD CALC: 25.8 % — LOW (ref 39–50)
HGB BLD-MCNC: 8.2 G/DL — LOW (ref 13–17)
LYMPHOCYTES # BLD AUTO: 0.4 K/UL — LOW (ref 1–3.3)
LYMPHOCYTES # BLD AUTO: 5.7 % — LOW (ref 13–44)
MCHC RBC-ENTMCNC: 25.9 PG — LOW (ref 27–34)
MCHC RBC-ENTMCNC: 32 G/DL — SIGNIFICANT CHANGE UP (ref 32–36)
MCV RBC AUTO: 80.9 FL — SIGNIFICANT CHANGE UP (ref 80–100)
MONOCYTES # BLD AUTO: 0.9 K/UL — SIGNIFICANT CHANGE UP (ref 0–0.9)
MONOCYTES NFR BLD AUTO: 12.1 % — SIGNIFICANT CHANGE UP (ref 2–14)
NEUTROPHILS # BLD AUTO: 5.4 K/UL — SIGNIFICANT CHANGE UP (ref 1.8–7.4)
NEUTROPHILS NFR BLD AUTO: 74.5 % — SIGNIFICANT CHANGE UP (ref 43–77)
PLATELET # BLD AUTO: 302 K/UL — SIGNIFICANT CHANGE UP (ref 150–400)
POTASSIUM SERPL-MCNC: 4 MMOL/L — SIGNIFICANT CHANGE UP (ref 3.5–5.3)
POTASSIUM SERPL-SCNC: 4 MMOL/L — SIGNIFICANT CHANGE UP (ref 3.5–5.3)
PROT SERPL-MCNC: 6.6 G/DL — SIGNIFICANT CHANGE UP (ref 6–8.3)
RBC # BLD: 3.19 M/UL — LOW (ref 4.2–5.8)
RBC # FLD: 18.6 % — HIGH (ref 10.3–14.5)
SODIUM SERPL-SCNC: 135 MMOL/L — SIGNIFICANT CHANGE UP (ref 135–145)
T4 FREE+ TSH PNL SERPL: 1.93 UIU/ML — SIGNIFICANT CHANGE UP (ref 0.27–4.2)
WBC # BLD: 7.2 K/UL — SIGNIFICANT CHANGE UP (ref 3.8–10.5)
WBC # FLD AUTO: 7.2 K/UL — SIGNIFICANT CHANGE UP (ref 3.8–10.5)

## 2024-04-22 ENCOUNTER — LABORATORY RESULT (OUTPATIENT)
Age: 84
End: 2024-04-22

## 2024-04-22 ENCOUNTER — APPOINTMENT (OUTPATIENT)
Dept: HEMATOLOGY ONCOLOGY | Facility: CLINIC | Age: 84
End: 2024-04-22
Payer: MEDICARE

## 2024-04-22 ENCOUNTER — RESULT REVIEW (OUTPATIENT)
Age: 84
End: 2024-04-22

## 2024-04-22 VITALS
OXYGEN SATURATION: 93 % | HEIGHT: 68 IN | BODY MASS INDEX: 30.92 KG/M2 | DIASTOLIC BLOOD PRESSURE: 43 MMHG | SYSTOLIC BLOOD PRESSURE: 109 MMHG | HEART RATE: 64 BPM | WEIGHT: 204.04 LBS

## 2024-04-22 DIAGNOSIS — D50.8 OTHER IRON DEFICIENCY ANEMIAS: ICD-10-CM

## 2024-04-22 DIAGNOSIS — D64.9 ANEMIA, UNSPECIFIED: ICD-10-CM

## 2024-04-22 LAB
BASOPHILS # BLD AUTO: 0.1 K/UL — SIGNIFICANT CHANGE UP (ref 0–0.2)
BASOPHILS NFR BLD AUTO: 0.8 % — SIGNIFICANT CHANGE UP (ref 0–2)
EOSINOPHIL # BLD AUTO: 0.4 K/UL — SIGNIFICANT CHANGE UP (ref 0–0.5)
EOSINOPHIL NFR BLD AUTO: 5.4 % — SIGNIFICANT CHANGE UP (ref 0–6)
HCT VFR BLD CALC: 28.8 % — LOW (ref 39–50)
HGB BLD-MCNC: 9.2 G/DL — LOW (ref 13–17)
LYMPHOCYTES # BLD AUTO: 0.5 K/UL — LOW (ref 1–3.3)
LYMPHOCYTES # BLD AUTO: 7 % — LOW (ref 13–44)
MCHC RBC-ENTMCNC: 26 PG — LOW (ref 27–34)
MCHC RBC-ENTMCNC: 31.9 G/DL — LOW (ref 32–36)
MCV RBC AUTO: 81.4 FL — SIGNIFICANT CHANGE UP (ref 80–100)
MONOCYTES # BLD AUTO: 1.1 K/UL — HIGH (ref 0–0.9)
MONOCYTES NFR BLD AUTO: 14.4 % — HIGH (ref 2–14)
NEUTROPHILS # BLD AUTO: 5.5 K/UL — SIGNIFICANT CHANGE UP (ref 1.8–7.4)
NEUTROPHILS NFR BLD AUTO: 72.4 % — SIGNIFICANT CHANGE UP (ref 43–77)
PLATELET # BLD AUTO: 363 K/UL — SIGNIFICANT CHANGE UP (ref 150–400)
RBC # BLD: 3.54 M/UL — LOW (ref 4.2–5.8)
RBC # FLD: 19.6 % — HIGH (ref 10.3–14.5)
WBC # BLD: 7.6 K/UL — SIGNIFICANT CHANGE UP (ref 3.8–10.5)
WBC # FLD AUTO: 7.6 K/UL — SIGNIFICANT CHANGE UP (ref 3.8–10.5)

## 2024-04-22 PROCEDURE — 99215 OFFICE O/P EST HI 40 MIN: CPT

## 2024-04-22 NOTE — RESULTS/DATA
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 New small right pleural effusion with component in the oblique fissure, adjacent to the fibrosis.  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  No new lung nodule.  New small right pleural effusion.  No evidence of metastasis in the abdomen and pelvis.     Restaging CT CAP 5/15/23: after completion of Chemo/ RT  - Interval decrease of spiculated right upper lobe nodule. ( 3.5 x 2.4 cm , down from 4.3x 2.9 cm)  - New scattered nodular ground-glass opacities within right upper lobe may represent pneumonitis.  - Approximately 3.5 x 2.4 cm heterogeneous density nodule posterior to the left ischium is unchanged.     2/6/23 PET/CT  -Hypermetabolic lung nodule in the superior segment of the right lower lobe measures 3.1 x 2.8 cm with SUV 16.0; this is compatible with a malignancy.  -There is a peripheral nodular opacity more peripherally measuring 1.3 x 1.0 cm with minimal activity of SUV 1.4 likely reflecting a nonobstructive pattern of atelectasis.  -Hypermetabolic node superior and posterior to the right main bronchus measures 1.2 x 1.8 cm with SUV 11.3 compatible with dejon involvement.  -A subcentimeter right upper paratracheal node measuring 0.4 x 1.0 cm is nonavid.   IMPRESSION:  1. Hypermetabolic nodule in the superior segment of right lower lobe compatible with a malignancy; hypermetabolic right hilar node compatible with dejon involvement.  2. Soft tissue abutting the left ischium with specks of calcification and patchy activity appears to be related to a chronic inflammatory process; this can be further evaluated with MRI when clinically feasible.     2/3/23 MR Head  IMPRESSION:  -Indeterminate subcentimeter high FLAIR signal with associated tiny enhancement in the left temporal lobe as described above. Tiny metastasis not excluded. Follow-up exam recommended.  -No acute intracranial hemorrhage or acute infarct      1/18/2023 Pathology    Final Diagnosis  1. LYMPH NODE, R4, EBUS-GUIDED FNA    POSITIVE FOR MALIGNANT CELLS.  Squamous Cell Carcinoma.    The cytology slides are composed of syncytial sheets and single pleomorphic cells with irregular, hyperchromatic nuclei and dense cytoplasm in a background of necrosis and keratinized debris. No lymphoid cells present in the background.    2. LYMPH NODE, LEVEL 7, EBUS-GUIDED FNA  Squamous Cell Carcinoma.    The cytology slides are composed of syncytial sheets and single pleomorphic cells with irregular, hyperchromatic nuclei and dense  cytoplasm in a background of necrosis and keratinized debris. No lymphoid cells present in the background.      3. LYMPH NODE, R10, EBUS-GUIDED FNA  POSITIVE FOR MALIGNANT CELLS.  Metastatic squamous Cell Carcinoma.    The cytology slides are composed of syncytial sheets and single pleomorphic cells with irregular, hyperchromatic nuclei and dense cytoplasm in a background of necrosis and keratinized debris admixed with lymphoid cells.        4. LUNG, BRONCHOALVEOLAR LAVAGE  NEGATIVE FOR MALIGNANT CELLS.    The cytology slide and cell block are composed of groups of reactive ciliated bronchial epithelial cells, scattered alveolar macrophages and mixed inflammatory cells.        11/26/2022: PET/CT [facility]  -hyperactive right upper lobe mass with an SUV of 13.2, 2.3 x 2.6 cm  - Adjacent activity in the right hilar region with questionable metastatic adenopathy( SUV 6.4, 1.2 cm ).  -Previous 6 mm nodule in the anterior right lung base is no longer seen.    11/17/2022 CT Chest noncontrast : [facility]  -2.8 x 2.8 x 2.3 right upper lobe mass in the posterior segment  - Indeterminate nodule in anterior RLL measuring 6-7 mm  - Small nodule left upper lobe, measuring 3-4 mm  - Prominent nodes along right hilar structures, one of which shows similar density as right upper lobe mass , measuring 13 x 14x 11 mm, concerning for satellite lesion    11/11/2022 CXR at [facility]. Compared to a previous film from June 2022  -a new 3 cm nodular right upper lobe lesion.

## 2024-04-22 NOTE — HISTORY OF PRESENT ILLNESS
[de-identified] : JONE SOUTH is a 82 year M with PMHX of HTN, HLD, COPD, ASHD, 40 ppk year smoking history ( quit 2002) presents for initial consultation for Squamous Cell lung cancer  Patient presented to Dr. Reed on 11/29/22 c/o a productive cough in November 2022. He was treated with a course of antibiotics but also underwent a chest x-ray on 11/11/2022 at [facility]. Compared to a previous film from June of this past year a new 3 cm nodular right upper lobe lesion was found.  Subsequent CT Chest performed on 11/17/2022 revealing 2.8 x 2.8 x 2.3 right upper lobe mass in the posterior segment. Indeterminate nodule in anterior RLL measuring 6-7 mm. Small nodule left upper lobe, measuring 3-4 mm. Prominent nodes along right hilar structures, one of which shows similar density as right upper lobe mass , measuring 13 x 14x 11 mm, concerning for satellite lesion  PET/CT on 11/26/2022 reported right upper lobe mass with an SUV of 13.2, 2.3 x 2.6 cm. Adjacent activity in the right hilar region with questionable metastatic adenopathy( SUV 6.4, 1.2 cm ). Previous 6 mm nodule in the anterior right lung base is no longer seen.  Patient referred to Dr. Martin. He underwent flexible bronchoscopy, robotic-assisted bronchoscopy with the ion system, endobronchial ultrasound with transbronchial needle aspiration, endobronchial brushing, endobronchial biopsy on 1/18/23.   Started weekly chemoradiation with Carbo/Taxol on 2/27/23, s/p RT on 4/10/23 C6 weekly carbo/ taxol completed on 4/3/23 Patient tolerated treatment well, with minimal side effects   Pathology Lymph Node ( R4, R10, Level 7) positive for malignant cells for squamous cell carcinoma. Bronchoalveolar lavage was negative  PMH: HTN, hypercholesterolemia FMH: Sx: hip replacement sx Socx: Former smoker, quit 2002 (smoked 40 ppk year)  Care Team: Pulmonologist: Dr. Reed Primary care provider: Dr. Carballo.   [de-identified] : Patient presents for follow up with spouse and son  Patient c/o B/l shoulder and neck pain, ongoing for the past 4-5 months. Patient with generalized weakness and decline, appears to be stable since last visit. Walking with Walker  Spine specialist planning for possible posterior decompression and laminectomy at T11-T12, scheduled for 3/27/24 Patient denies any worsening SOB   3/4/24 CT CAP Interval resolution of previously new bi apical ground glass opacities, which were likely infectious/inflammatory. Stable right perihilar post radiation changes, obscuring the treated lesion. Small right pleural effusion. No new nodules. No metastatic disease in the abdomen or pelvis.  12/2/23 CT CAP Interval regression post radiation change right perihilar region. New biapical groundglass opacities, indeterminate. Continued attention on follow-up imaging. No CT evidence of metastatic disease in the abdomen and pelvis.  04/22/24 Patient presents for follow up with wife and daughter.  s/p Durvalumab treatment on 04/17/24 s/p Posterior decompression and laminectomy at T11-T12, 3/24 Currently on Eliquis 5 mg BID.  Generally doing weil His energy level are better and is able to do small things around the house.

## 2024-04-22 NOTE — ASSESSMENT
[Reviewed no changes] : Reviewed no changes [FreeTextEntry1] : JONE SOUTH, who was diagnosed with a squamous cell Carcinoma Lung at the age of 81 yo in Jan 2023 Patient with a PMHX of HTN, HLD, COPD, ASHD, 40 ppk year smoking history ( quit 2002)  Patient initially presented with a productive cough in November 2022, chest x-ray on 11/11/2022 at [facility] Radiology showed a new 3 cm nodular right upper lobe lesion.  Subsequent CT Chest and PET scan was done PET/CT on 11/26/2022 reported right upper lobe mass with an SUV of 13.2, 2.3 x 2.6 cm. Adjacent activity in the right hilar region with questionable metastatic adenopathy( SUV 6.4, 1.2 cm ). Previous 6 mm nodule in the anterior right lung base is no longer seen.  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 C6 weekly carbo/ taxol completed on 4/3/23 -4/17/23: He was admitted to [facility] last week for 3 days, Syncope attributed to Orthostatic hypotension -CT CAP 5/15/23: Interval decrease of spiculated right upper lobe nodule. ( 3.5 x 2.4 cm , down from 4.3x 2.9 cm) New scattered nodular ground-glass opacities within right upper lobe may represent pneumonitis. Approximately 3.5 x 2.4 cm heterogeneous density nodule posterior to the left ischium (series 2 image 173) is unchanged. - STARTED 6/6/23,  - CT CAP from Sept 2023- Stable. - CT Chest 12/2/23 : Interval regression post radiation change right perihilar region. New biapical groundglass opacities, indeterminate. Continued attention on follow-up imaging. No CT evidence of metastatic disease in the abdomen and pelvis. - 3/4/24 CT CAP Interval resolution of previously new bi apical ground glass opacities, which were likely infectious/inflammatory. Stable right perihilar post radiation changes, obscuring the treated lesion. Small right pleural effusion. No new nodules. No metastatic disease in the abdomen or pelvis. - s/p  Posterior decompression and laminectomy at T11-T12, on  3/27/24 - Durvalumab on 4/17/24 - Repeat CT CAP July 2024 - Advised if SOB on exertion / breathing worsens to call office,  - Labs ordered today - f/u in 6 weeks with Aria  # DVT - 7/19/2023 Venous Dopppler: Acute below the knee left peroneal vein thrombus - repeat USg LE no evidence of acute VTE  - May stop Eliquis now

## 2024-04-23 LAB
BASOPHILS # BLD AUTO: 0.2 K/UL
BASOPHILS NFR BLD AUTO: 2.6 %
EOSINOPHIL # BLD AUTO: 0.4 K/UL
EOSINOPHIL NFR BLD AUTO: 5.2 %
FERRITIN SERPL-MCNC: 197 NG/ML
FOLATE SERPL-MCNC: 7.1 NG/ML
HCT VFR BLD CALC: 28.8 %
HGB BLD-MCNC: 8.8 G/DL
IRON SATN MFR SERPL: 12 %
IRON SERPL-MCNC: 31 UG/DL
LDH SERPL-CCNC: 157 U/L
LYMPHOCYTES # BLD AUTO: 0.34 K/UL
LYMPHOCYTES NFR BLD AUTO: 4.4 %
MAN DIFF?: NORMAL
MCHC RBC-ENTMCNC: 25.5 PG
MCHC RBC-ENTMCNC: 30.6 GM/DL
MCV RBC AUTO: 83.5 FL
MONOCYTES # BLD AUTO: 0.47 K/UL
MONOCYTES NFR BLD AUTO: 6.1 %
NEUTROPHILS # BLD AUTO: 6.24 K/UL
NEUTROPHILS NFR BLD AUTO: 81.7 %
PLATELET # BLD AUTO: 348 K/UL
RBC # BLD: 3.45 M/UL
RBC # BLD: 3.45 M/UL
RBC # FLD: 21.8 %
RETICS # AUTO: 2.3 %
RETICS AGGREG/RBC NFR: 77.6 K/UL
TIBC SERPL-MCNC: 271 UG/DL
UIBC SERPL-MCNC: 240 UG/DL
VIT B12 SERPL-MCNC: 393 PG/ML
WBC # FLD AUTO: 7.64 K/UL

## 2024-04-24 LAB — M PROTEIN SPEC IFE-MCNC: NORMAL

## 2024-04-25 ENCOUNTER — APPOINTMENT (OUTPATIENT)
Dept: UROLOGY | Facility: CLINIC | Age: 84
End: 2024-04-25

## 2024-05-01 ENCOUNTER — RESULT REVIEW (OUTPATIENT)
Age: 84
End: 2024-05-01

## 2024-05-01 ENCOUNTER — APPOINTMENT (OUTPATIENT)
Age: 84
End: 2024-05-01

## 2024-05-01 LAB
ALBUMIN SERPL ELPH-MCNC: 3.8 G/DL — SIGNIFICANT CHANGE UP (ref 3.3–5)
ALP SERPL-CCNC: 127 U/L — HIGH (ref 40–120)
ALT FLD-CCNC: 7 U/L — LOW (ref 10–45)
ANION GAP SERPL CALC-SCNC: 10 MMOL/L — SIGNIFICANT CHANGE UP (ref 5–17)
AST SERPL-CCNC: 15 U/L — SIGNIFICANT CHANGE UP (ref 10–40)
BASOPHILS # BLD AUTO: 0.1 K/UL — SIGNIFICANT CHANGE UP (ref 0–0.2)
BASOPHILS NFR BLD AUTO: 0.7 % — SIGNIFICANT CHANGE UP (ref 0–2)
BILIRUB SERPL-MCNC: 0.4 MG/DL — SIGNIFICANT CHANGE UP (ref 0.2–1.2)
BUN SERPL-MCNC: 17 MG/DL — SIGNIFICANT CHANGE UP (ref 7–23)
CALCIUM SERPL-MCNC: 9.3 MG/DL — SIGNIFICANT CHANGE UP (ref 8.4–10.5)
CEA SERPL-MCNC: 1.1 NG/ML — SIGNIFICANT CHANGE UP (ref 0–3.8)
CHLORIDE SERPL-SCNC: 102 MMOL/L — SIGNIFICANT CHANGE UP (ref 96–108)
CO2 SERPL-SCNC: 27 MMOL/L — SIGNIFICANT CHANGE UP (ref 22–31)
CREAT SERPL-MCNC: 0.9 MG/DL — SIGNIFICANT CHANGE UP (ref 0.5–1.3)
EGFR: 85 ML/MIN/1.73M2 — SIGNIFICANT CHANGE UP
EOSINOPHIL # BLD AUTO: 0.1 K/UL — SIGNIFICANT CHANGE UP (ref 0–0.5)
EOSINOPHIL NFR BLD AUTO: 2 % — SIGNIFICANT CHANGE UP (ref 0–6)
GLUCOSE SERPL-MCNC: 98 MG/DL — SIGNIFICANT CHANGE UP (ref 70–99)
HCT VFR BLD CALC: 30 % — LOW (ref 39–50)
HGB BLD-MCNC: 9.4 G/DL — LOW (ref 13–17)
LYMPHOCYTES # BLD AUTO: 0.5 K/UL — LOW (ref 1–3.3)
LYMPHOCYTES # BLD AUTO: 6 % — LOW (ref 13–44)
MCHC RBC-ENTMCNC: 26.3 PG — LOW (ref 27–34)
MCHC RBC-ENTMCNC: 31.3 G/DL — LOW (ref 32–36)
MCV RBC AUTO: 84 FL — SIGNIFICANT CHANGE UP (ref 80–100)
MONOCYTES # BLD AUTO: 1 K/UL — HIGH (ref 0–0.9)
MONOCYTES NFR BLD AUTO: 12.5 % — SIGNIFICANT CHANGE UP (ref 2–14)
NEUTROPHILS # BLD AUTO: 6 K/UL — SIGNIFICANT CHANGE UP (ref 1.8–7.4)
NEUTROPHILS NFR BLD AUTO: 78.8 % — HIGH (ref 43–77)
PLATELET # BLD AUTO: 286 K/UL — SIGNIFICANT CHANGE UP (ref 150–400)
POTASSIUM SERPL-MCNC: 4 MMOL/L — SIGNIFICANT CHANGE UP (ref 3.5–5.3)
POTASSIUM SERPL-SCNC: 4 MMOL/L — SIGNIFICANT CHANGE UP (ref 3.5–5.3)
PROT SERPL-MCNC: 6.7 G/DL — SIGNIFICANT CHANGE UP (ref 6–8.3)
RBC # BLD: 3.58 M/UL — LOW (ref 4.2–5.8)
RBC # FLD: 20.8 % — HIGH (ref 10.3–14.5)
SODIUM SERPL-SCNC: 138 MMOL/L — SIGNIFICANT CHANGE UP (ref 135–145)
T4 FREE+ TSH PNL SERPL: 1.86 UIU/ML — SIGNIFICANT CHANGE UP (ref 0.27–4.2)
WBC # BLD: 7.7 K/UL — SIGNIFICANT CHANGE UP (ref 3.8–10.5)
WBC # FLD AUTO: 7.7 K/UL — SIGNIFICANT CHANGE UP (ref 3.8–10.5)

## 2024-05-08 ENCOUNTER — NON-APPOINTMENT (OUTPATIENT)
Age: 84
End: 2024-05-08

## 2024-05-09 ENCOUNTER — OUTPATIENT (OUTPATIENT)
Dept: OUTPATIENT SERVICES | Facility: HOSPITAL | Age: 84
LOS: 1 days | Discharge: HOME | End: 2024-05-09

## 2024-05-09 DIAGNOSIS — C34.90 MALIGNANT NEOPLASM OF UNSPECIFIED PART OF UNSPECIFIED BRONCHUS OR LUNG: ICD-10-CM

## 2024-05-15 ENCOUNTER — APPOINTMENT (OUTPATIENT)
Age: 84
End: 2024-05-15

## 2024-05-15 ENCOUNTER — RESULT REVIEW (OUTPATIENT)
Age: 84
End: 2024-05-15

## 2024-05-15 LAB
ALBUMIN SERPL ELPH-MCNC: 3.5 G/DL — SIGNIFICANT CHANGE UP (ref 3.3–5)
ALP SERPL-CCNC: 111 U/L — SIGNIFICANT CHANGE UP (ref 40–120)
ALT FLD-CCNC: 13 U/L — SIGNIFICANT CHANGE UP (ref 10–45)
ANION GAP SERPL CALC-SCNC: 11 MMOL/L — SIGNIFICANT CHANGE UP (ref 5–17)
AST SERPL-CCNC: 19 U/L — SIGNIFICANT CHANGE UP (ref 10–40)
BASOPHILS # BLD AUTO: 0.2 K/UL — SIGNIFICANT CHANGE UP (ref 0–0.2)
BASOPHILS NFR BLD AUTO: 1.9 % — SIGNIFICANT CHANGE UP (ref 0–2)
BILIRUB SERPL-MCNC: 0.4 MG/DL — SIGNIFICANT CHANGE UP (ref 0.2–1.2)
BUN SERPL-MCNC: 24 MG/DL — HIGH (ref 7–23)
CALCIUM SERPL-MCNC: 9.4 MG/DL — SIGNIFICANT CHANGE UP (ref 8.4–10.5)
CEA SERPL-MCNC: 1.2 NG/ML — SIGNIFICANT CHANGE UP (ref 0–3.8)
CHLORIDE SERPL-SCNC: 100 MMOL/L — SIGNIFICANT CHANGE UP (ref 96–108)
CO2 SERPL-SCNC: 27 MMOL/L — SIGNIFICANT CHANGE UP (ref 22–31)
CREAT SERPL-MCNC: 1.03 MG/DL — SIGNIFICANT CHANGE UP (ref 0.5–1.3)
EGFR: 73 ML/MIN/1.73M2 — SIGNIFICANT CHANGE UP
EOSINOPHIL # BLD AUTO: 0.2 K/UL — SIGNIFICANT CHANGE UP (ref 0–0.5)
EOSINOPHIL NFR BLD AUTO: 2.5 % — SIGNIFICANT CHANGE UP (ref 0–6)
GLUCOSE SERPL-MCNC: 97 MG/DL — SIGNIFICANT CHANGE UP (ref 70–99)
HCT VFR BLD CALC: 29.5 % — LOW (ref 39–50)
HGB BLD-MCNC: 9.3 G/DL — LOW (ref 13–17)
LYMPHOCYTES # BLD AUTO: 0.5 K/UL — LOW (ref 1–3.3)
LYMPHOCYTES # BLD AUTO: 5.4 % — LOW (ref 13–44)
MCHC RBC-ENTMCNC: 26 PG — LOW (ref 27–34)
MCHC RBC-ENTMCNC: 31.6 G/DL — LOW (ref 32–36)
MCV RBC AUTO: 82.4 FL — SIGNIFICANT CHANGE UP (ref 80–100)
MONOCYTES # BLD AUTO: 0.6 K/UL — SIGNIFICANT CHANGE UP (ref 0–0.9)
MONOCYTES NFR BLD AUTO: 7.3 % — SIGNIFICANT CHANGE UP (ref 2–14)
NEUTROPHILS # BLD AUTO: 7.1 K/UL — SIGNIFICANT CHANGE UP (ref 1.8–7.4)
NEUTROPHILS NFR BLD AUTO: 82.9 % — HIGH (ref 43–77)
PLATELET # BLD AUTO: 367 K/UL — SIGNIFICANT CHANGE UP (ref 150–400)
POTASSIUM SERPL-MCNC: 4.1 MMOL/L — SIGNIFICANT CHANGE UP (ref 3.5–5.3)
POTASSIUM SERPL-SCNC: 4.1 MMOL/L — SIGNIFICANT CHANGE UP (ref 3.5–5.3)
PROT SERPL-MCNC: 6.8 G/DL — SIGNIFICANT CHANGE UP (ref 6–8.3)
RBC # BLD: 3.58 M/UL — LOW (ref 4.2–5.8)
RBC # FLD: 18.7 % — HIGH (ref 10.3–14.5)
SODIUM SERPL-SCNC: 138 MMOL/L — SIGNIFICANT CHANGE UP (ref 135–145)
T4 FREE+ TSH PNL SERPL: 1.53 UIU/ML — SIGNIFICANT CHANGE UP (ref 0.27–4.2)
WBC # BLD: 8.5 K/UL — SIGNIFICANT CHANGE UP (ref 3.8–10.5)
WBC # FLD AUTO: 8.5 K/UL — SIGNIFICANT CHANGE UP (ref 3.8–10.5)

## 2024-05-16 DIAGNOSIS — Z51.11 ENCOUNTER FOR ANTINEOPLASTIC CHEMOTHERAPY: ICD-10-CM

## 2024-05-21 ENCOUNTER — APPOINTMENT (OUTPATIENT)
Dept: ORTHOPEDIC SURGERY | Facility: CLINIC | Age: 84
End: 2024-05-21
Payer: MEDICARE

## 2024-05-21 DIAGNOSIS — G95.9 DISEASE OF SPINAL CORD, UNSPECIFIED: ICD-10-CM

## 2024-05-21 DIAGNOSIS — M47.14 OTHER SPONDYLOSIS WITH MYELOPATHY, THORACIC REGION: ICD-10-CM

## 2024-05-21 PROCEDURE — 99024 POSTOP FOLLOW-UP VISIT: CPT

## 2024-05-21 NOTE — HISTORY OF PRESENT ILLNESS
[___ Weeks Post Op] : [unfilled] weeks post op [de-identified] :  s/p C5-C6 ACDF, T11-T12 laminectomy. DOS: 03/27/2024.   [de-identified] : 83-year-old male presenting today for his 2-month follow-up visit status post C5-C6 ACDF and T11-T12 laminectomy for cervical and thoracic myelopathy.  Duane continues to improve.  He still noticing significant improvement in his neck pain is right upper extremity range of motion as well as hand dexterity is no longer dropping objects he is able to button his shirt still.  Last visit he was having severe pitting lower extremity edema he is lost about 40 pounds of water weight.  This has decreased on his ambulation.  He is still able to ambulate in his house without any assistive devices using a platform rolling walker now because he still has some unsteadiness on his feet but he has not started his physical therapy yet. Otherwise he has no lower extremity pain he has no neck pain he still is very happy with his level of improvement in his progress to date. 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  Duane has had sustained improvement in his upper and lower extremity function mainly his upper extremities.  He is waiting to start physical therapy, he is very happy with the outcome of the surgery and his progress so far. [de-identified] : PT 2-3 times per week for 8 to 12 weeks to focus on gait and balance training Tylenol as needed for pain control he has no restrictions in terms of bending twisting lifting Incision care discussed he can soak or apply lotions or creams to the incision as needed [facility]  service referral for PT I will see him back in 3 months

## 2024-05-29 ENCOUNTER — RESULT REVIEW (OUTPATIENT)
Age: 84
End: 2024-05-29

## 2024-05-29 ENCOUNTER — APPOINTMENT (OUTPATIENT)
Age: 84
End: 2024-05-29

## 2024-05-29 LAB
ALBUMIN SERPL ELPH-MCNC: 3.7 G/DL — SIGNIFICANT CHANGE UP (ref 3.3–5)
ALP SERPL-CCNC: 99 U/L — SIGNIFICANT CHANGE UP (ref 40–120)
ALT FLD-CCNC: 10 U/L — SIGNIFICANT CHANGE UP (ref 10–45)
ANION GAP SERPL CALC-SCNC: 12 MMOL/L — SIGNIFICANT CHANGE UP (ref 5–17)
AST SERPL-CCNC: 16 U/L — SIGNIFICANT CHANGE UP (ref 10–40)
BASOPHILS # BLD AUTO: 0.1 K/UL — SIGNIFICANT CHANGE UP (ref 0–0.2)
BASOPHILS NFR BLD AUTO: 0.7 % — SIGNIFICANT CHANGE UP (ref 0–2)
BILIRUB SERPL-MCNC: 0.3 MG/DL — SIGNIFICANT CHANGE UP (ref 0.2–1.2)
BUN SERPL-MCNC: 24 MG/DL — HIGH (ref 7–23)
CALCIUM SERPL-MCNC: 9.1 MG/DL — SIGNIFICANT CHANGE UP (ref 8.4–10.5)
CEA SERPL-MCNC: 1.2 NG/ML — SIGNIFICANT CHANGE UP (ref 0–3.8)
CHLORIDE SERPL-SCNC: 103 MMOL/L — SIGNIFICANT CHANGE UP (ref 96–108)
CO2 SERPL-SCNC: 25 MMOL/L — SIGNIFICANT CHANGE UP (ref 22–31)
CREAT SERPL-MCNC: 0.95 MG/DL — SIGNIFICANT CHANGE UP (ref 0.5–1.3)
EGFR: 80 ML/MIN/1.73M2 — SIGNIFICANT CHANGE UP
EOSINOPHIL # BLD AUTO: 0.3 K/UL — SIGNIFICANT CHANGE UP (ref 0–0.5)
EOSINOPHIL NFR BLD AUTO: 3.2 % — SIGNIFICANT CHANGE UP (ref 0–6)
GLUCOSE SERPL-MCNC: 96 MG/DL — SIGNIFICANT CHANGE UP (ref 70–99)
HCT VFR BLD CALC: 30.1 % — LOW (ref 39–50)
HGB BLD-MCNC: 9.5 G/DL — LOW (ref 13–17)
LYMPHOCYTES # BLD AUTO: 0.5 K/UL — LOW (ref 1–3.3)
LYMPHOCYTES # BLD AUTO: 6 % — LOW (ref 13–44)
MCHC RBC-ENTMCNC: 25.4 PG — LOW (ref 27–34)
MCHC RBC-ENTMCNC: 31.5 G/DL — LOW (ref 32–36)
MCV RBC AUTO: 80.6 FL — SIGNIFICANT CHANGE UP (ref 80–100)
MONOCYTES # BLD AUTO: 0.8 K/UL — SIGNIFICANT CHANGE UP (ref 0–0.9)
MONOCYTES NFR BLD AUTO: 9.5 % — SIGNIFICANT CHANGE UP (ref 2–14)
NEUTROPHILS # BLD AUTO: 6.4 K/UL — SIGNIFICANT CHANGE UP (ref 1.8–7.4)
NEUTROPHILS NFR BLD AUTO: 80.5 % — HIGH (ref 43–77)
PLATELET # BLD AUTO: 345 K/UL — SIGNIFICANT CHANGE UP (ref 150–400)
POTASSIUM SERPL-MCNC: 3.7 MMOL/L — SIGNIFICANT CHANGE UP (ref 3.5–5.3)
POTASSIUM SERPL-SCNC: 3.7 MMOL/L — SIGNIFICANT CHANGE UP (ref 3.5–5.3)
PROT SERPL-MCNC: 6.7 G/DL — SIGNIFICANT CHANGE UP (ref 6–8.3)
RBC # BLD: 3.74 M/UL — LOW (ref 4.2–5.8)
RBC # FLD: 17.6 % — HIGH (ref 10.3–14.5)
SODIUM SERPL-SCNC: 140 MMOL/L — SIGNIFICANT CHANGE UP (ref 135–145)
T4 FREE+ TSH PNL SERPL: 1.7 UIU/ML — SIGNIFICANT CHANGE UP (ref 0.27–4.2)
WBC # BLD: 7.9 K/UL — SIGNIFICANT CHANGE UP (ref 3.8–10.5)
WBC # FLD AUTO: 7.9 K/UL — SIGNIFICANT CHANGE UP (ref 3.8–10.5)

## 2024-06-03 NOTE — HISTORY OF PRESENT ILLNESS
[de-identified] : JONE SOUTH is a 82 year M with PMHX of HTN, HLD, COPD, ASHD, 40 ppk year smoking history ( quit 2002) presents for initial consultation for Squamous Cell lung cancer  Patient presented to Dr. Reed on 11/29/22 c/o a productive cough in November 2022. He was treated with a course of antibiotics but also underwent a chest x-ray on 11/11/2022 at [facility]. Compared to a previous film from June of this past year a new 3 cm nodular right upper lobe lesion was found.  Subsequent CT Chest performed on 11/17/2022 revealing 2.8 x 2.8 x 2.3 right upper lobe mass in the posterior segment. Indeterminate nodule in anterior RLL measuring 6-7 mm. Small nodule left upper lobe, measuring 3-4 mm. Prominent nodes along right hilar structures, one of which shows similar density as right upper lobe mass , measuring 13 x 14x 11 mm, concerning for satellite lesion  PET/CT on 11/26/2022 reported right upper lobe mass with an SUV of 13.2, 2.3 x 2.6 cm. Adjacent activity in the right hilar region with questionable metastatic adenopathy( SUV 6.4, 1.2 cm ). Previous 6 mm nodule in the anterior right lung base is no longer seen.  Patient referred to Dr. Martin. He underwent flexible bronchoscopy, robotic-assisted bronchoscopy with the ion system, endobronchial ultrasound with transbronchial needle aspiration, endobronchial brushing, endobronchial biopsy on 1/18/23.   Started weekly chemoradiation with Carbo/Taxol on 2/27/23, s/p RT on 4/10/23 C6 weekly carbo/ taxol completed on 4/3/23 Patient tolerated treatment well, with minimal side effects   Pathology Lymph Node ( R4, R10, Level 7) positive for malignant cells for squamous cell carcinoma. Bronchoalveolar lavage was negative  PMH: HTN, hypercholesterolemia FMH: Sx: hip replacement sx Socx: Former smoker, quit 2002 (smoked 40 ppk year)  Care Team: Pulmonologist: Dr. Reed Primary care provider: Dr. Carballo.   [de-identified] : Patient presents for follow up with spouse and son  Patient c/o B/l shoulder and neck pain, ongoing for the past 4-5 months. Patient with generalized weakness and decline, appears to be stable since last visit. Walking with Walker  Spine specialist planning for possible posterior decompression and laminectomy at T11-T12, scheduled for 3/27/24 Patient denies any worsening SOB   3/4/24 CT CAP Interval resolution of previously new bi apical ground glass opacities, which were likely infectious/inflammatory. Stable right perihilar post radiation changes, obscuring the treated lesion. Small right pleural effusion. No new nodules. No metastatic disease in the abdomen or pelvis.  12/2/23 CT CAP Interval regression post radiation change right perihilar region. New biapical groundglass opacities, indeterminate. Continued attention on follow-up imaging. No CT evidence of metastatic disease in the abdomen and pelvis.

## 2024-06-03 NOTE — RESULTS/DATA
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 New small right pleural effusion with component in the oblique fissure, adjacent to the fibrosis.  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  No new lung nodule.  New small right pleural effusion.  No evidence of metastasis in the abdomen and pelvis.     Restaging CT CAP 5/15/23: after completion of Chemo/ RT  - Interval decrease of spiculated right upper lobe nodule. ( 3.5 x 2.4 cm , down from 4.3x 2.9 cm)  - New scattered nodular ground-glass opacities within right upper lobe may represent pneumonitis.  - Approximately 3.5 x 2.4 cm heterogeneous density nodule posterior to the left ischium is unchanged.     2/6/23 PET/CT  -Hypermetabolic lung nodule in the superior segment of the right lower lobe measures 3.1 x 2.8 cm with SUV 16.0; this is compatible with a malignancy.  -There is a peripheral nodular opacity more peripherally measuring 1.3 x 1.0 cm with minimal activity of SUV 1.4 likely reflecting a nonobstructive pattern of atelectasis.  -Hypermetabolic node superior and posterior to the right main bronchus measures 1.2 x 1.8 cm with SUV 11.3 compatible with dejon involvement.  -A subcentimeter right upper paratracheal node measuring 0.4 x 1.0 cm is nonavid.   IMPRESSION:  1. Hypermetabolic nodule in the superior segment of right lower lobe compatible with a malignancy; hypermetabolic right hilar node compatible with dejon involvement.  2. Soft tissue abutting the left ischium with specks of calcification and patchy activity appears to be related to a chronic inflammatory process; this can be further evaluated with MRI when clinically feasible.     2/3/23 MR Head  IMPRESSION:  -Indeterminate subcentimeter high FLAIR signal with associated tiny enhancement in the left temporal lobe as described above. Tiny metastasis not excluded. Follow-up exam recommended.  -No acute intracranial hemorrhage or acute infarct      1/18/2023 Pathology    Final Diagnosis  1. LYMPH NODE, R4, EBUS-GUIDED FNA    POSITIVE FOR MALIGNANT CELLS.  Squamous Cell Carcinoma.    The cytology slides are composed of syncytial sheets and single pleomorphic cells with irregular, hyperchromatic nuclei and dense cytoplasm in a background of necrosis and keratinized debris. No lymphoid cells present in the background.    2. LYMPH NODE, LEVEL 7, EBUS-GUIDED FNA  Squamous Cell Carcinoma.    The cytology slides are composed of syncytial sheets and single pleomorphic cells with irregular, hyperchromatic nuclei and dense  cytoplasm in a background of necrosis and keratinized debris. No lymphoid cells present in the background.      3. LYMPH NODE, R10, EBUS-GUIDED FNA  POSITIVE FOR MALIGNANT CELLS.  Metastatic squamous Cell Carcinoma.    The cytology slides are composed of syncytial sheets and single pleomorphic cells with irregular, hyperchromatic nuclei and dense cytoplasm in a background of necrosis and keratinized debris admixed with lymphoid cells.        4. LUNG, BRONCHOALVEOLAR LAVAGE  NEGATIVE FOR MALIGNANT CELLS.    The cytology slide and cell block are composed of groups of reactive ciliated bronchial epithelial cells, scattered alveolar macrophages and mixed inflammatory cells.        11/26/2022: PET/CT [facility]  -hyperactive right upper lobe mass with an SUV of 13.2, 2.3 x 2.6 cm  - Adjacent activity in the right hilar region with questionable metastatic adenopathy( SUV 6.4, 1.2 cm ).  -Previous 6 mm nodule in the anterior right lung base is no longer seen.    11/17/2022 CT Chest noncontrast : [facility]  -2.8 x 2.8 x 2.3 right upper lobe mass in the posterior segment  - Indeterminate nodule in anterior RLL measuring 6-7 mm  - Small nodule left upper lobe, measuring 3-4 mm  - Prominent nodes along right hilar structures, one of which shows similar density as right upper lobe mass , measuring 13 x 14x 11 mm, concerning for satellite lesion    11/11/2022 CXR at [facility]. Compared to a previous film from June 2022  -a new 3 cm nodular right upper lobe lesion.

## 2024-06-03 NOTE — ASSESSMENT
[Reviewed no changes] : Reviewed no changes [FreeTextEntry1] : JONE SOUTH, who was diagnosed with a squamous cell Carcinoma Lung at the age of 83 yo in Jan 2023 Patient with a PMHX of HTN, HLD, COPD, ASHD, 40 ppk year smoking history ( quit 2002)  Patient initially presented with a productive cough in November 2022, chest x-ray on 11/11/2022 at [facility] showed a new 3 cm nodular right upper lobe lesion.  Subsequent CT Chest and PET scan was done PET/CT on 11/26/2022 reported right upper lobe mass with an SUV of 13.2, 2.3 x 2.6 cm. Adjacent activity in the right hilar region with questionable metastatic adenopathy( SUV 6.4, 1.2 cm ). Previous 6 mm nodule in the anterior right lung base is no longer seen.  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 C6 weekly carbo/ taxol completed on 4/3/23 -4/17/23: He was admitted to [facility] last week for 3 days, Syncope attributed to Orthostatic hypotension -CT CAP 5/15/23: Interval decrease of spiculated right upper lobe nodule. ( 3.5 x 2.4 cm , down from 4.3x 2.9 cm) New scattered nodular ground-glass opacities within right upper lobe may represent pneumonitis. Approximately 3.5 x 2.4 cm heterogeneous density nodule posterior to the left ischium (series 2 image 173) is unchanged.  -Discussed treatment with immunotherapy which include durvalumab q 2 weeks 10mg/kg x 1y. AE of Durvalumab include fatigue, nausea, constipation, decreased appetite, abdominal pain, arthralgia, rash, and pyrexia.  STARTED 6/6/23  - CT CAP from Sept 2023- Stable. - CT Chest 12/2/23 : Interval regression post radiation change right perihilar region. New biapical groundglass opacities, indeterminate. Continued attention on follow-up imaging. No CT evidence of metastatic disease in the abdomen and pelvis. - 3/4/24 CT CAP Interval resolution of previously new bi apical ground glass opacities, which were likely infectious/inflammatory. Stable right perihilar post radiation changes, obscuring the treated lesion. Small right pleural effusion. No new nodules. No metastatic disease in the abdomen or pelvis. - Posterior decompression and laminectomy at T11-T12, scheduled for 3/27/24 - Repeat CT CAP June 2024 - Advised if SOB on exertion / breathing worsens to call office,  - f/u in 6 weeks   # DVT - 7/19/2023 Venous Dopppler: Acute below the knee left peroneal vein thrombus - Continue Eliquis  - Patient will have to stop Eliquis 2-3 days prior procedure - There is no hematological contraindication for upcoming procedure

## 2024-06-04 ENCOUNTER — APPOINTMENT (OUTPATIENT)
Dept: HEMATOLOGY ONCOLOGY | Facility: CLINIC | Age: 84
End: 2024-06-04
Payer: MEDICARE

## 2024-06-12 ENCOUNTER — RESULT REVIEW (OUTPATIENT)
Age: 84
End: 2024-06-12

## 2024-06-12 ENCOUNTER — APPOINTMENT (OUTPATIENT)
Dept: HEMATOLOGY ONCOLOGY | Facility: CLINIC | Age: 84
End: 2024-06-12
Payer: MEDICARE

## 2024-06-12 ENCOUNTER — APPOINTMENT (OUTPATIENT)
Age: 84
End: 2024-06-12

## 2024-06-12 DIAGNOSIS — C34.90 MALIGNANT NEOPLASM OF UNSPECIFIED PART OF UNSPECIFIED BRONCHUS OR LUNG: ICD-10-CM

## 2024-06-12 DIAGNOSIS — I82.409 ACUTE EMBOLISM AND THROMBOSIS OF UNSPECIFIED DEEP VEINS OF UNSPECIFIED LOWER EXTREMITY: ICD-10-CM

## 2024-06-12 LAB
ALBUMIN SERPL ELPH-MCNC: 3.5 G/DL — SIGNIFICANT CHANGE UP (ref 3.3–5)
ALP SERPL-CCNC: 101 U/L — SIGNIFICANT CHANGE UP (ref 40–120)
ALT FLD-CCNC: 10 U/L — SIGNIFICANT CHANGE UP (ref 10–45)
ANION GAP SERPL CALC-SCNC: 10 MMOL/L — SIGNIFICANT CHANGE UP (ref 5–17)
AST SERPL-CCNC: 16 U/L — SIGNIFICANT CHANGE UP (ref 10–40)
BASOPHILS # BLD AUTO: 0.1 K/UL — SIGNIFICANT CHANGE UP (ref 0–0.2)
BASOPHILS NFR BLD AUTO: 1.1 % — SIGNIFICANT CHANGE UP (ref 0–2)
BILIRUB SERPL-MCNC: 0.3 MG/DL — SIGNIFICANT CHANGE UP (ref 0.2–1.2)
BUN SERPL-MCNC: 20 MG/DL — SIGNIFICANT CHANGE UP (ref 7–23)
CALCIUM SERPL-MCNC: 9.2 MG/DL — SIGNIFICANT CHANGE UP (ref 8.4–10.5)
CEA SERPL-MCNC: 1.1 NG/ML — SIGNIFICANT CHANGE UP (ref 0–3.8)
CHLORIDE SERPL-SCNC: 103 MMOL/L — SIGNIFICANT CHANGE UP (ref 96–108)
CO2 SERPL-SCNC: 27 MMOL/L — SIGNIFICANT CHANGE UP (ref 22–31)
CREAT SERPL-MCNC: 0.87 MG/DL — SIGNIFICANT CHANGE UP (ref 0.5–1.3)
EGFR: 86 ML/MIN/1.73M2 — SIGNIFICANT CHANGE UP
EOSINOPHIL # BLD AUTO: 0.5 K/UL — SIGNIFICANT CHANGE UP (ref 0–0.5)
EOSINOPHIL NFR BLD AUTO: 6.7 % — HIGH (ref 0–6)
GLUCOSE SERPL-MCNC: 100 MG/DL — HIGH (ref 70–99)
HCT VFR BLD CALC: 30.1 % — LOW (ref 39–50)
HGB BLD-MCNC: 9.5 G/DL — LOW (ref 13–17)
LYMPHOCYTES # BLD AUTO: 0.6 K/UL — LOW (ref 1–3.3)
LYMPHOCYTES # BLD AUTO: 7.1 % — LOW (ref 13–44)
MCHC RBC-ENTMCNC: 25.7 PG — LOW (ref 27–34)
MCHC RBC-ENTMCNC: 31.6 G/DL — LOW (ref 32–36)
MCV RBC AUTO: 81.2 FL — SIGNIFICANT CHANGE UP (ref 80–100)
MONOCYTES # BLD AUTO: 0.6 K/UL — SIGNIFICANT CHANGE UP (ref 0–0.9)
MONOCYTES NFR BLD AUTO: 7.9 % — SIGNIFICANT CHANGE UP (ref 2–14)
NEUTROPHILS # BLD AUTO: 6 K/UL — SIGNIFICANT CHANGE UP (ref 1.8–7.4)
NEUTROPHILS NFR BLD AUTO: 77.2 % — HIGH (ref 43–77)
PLATELET # BLD AUTO: 302 K/UL — SIGNIFICANT CHANGE UP (ref 150–400)
POTASSIUM SERPL-MCNC: 3.9 MMOL/L — SIGNIFICANT CHANGE UP (ref 3.5–5.3)
POTASSIUM SERPL-SCNC: 3.9 MMOL/L — SIGNIFICANT CHANGE UP (ref 3.5–5.3)
PROT SERPL-MCNC: 6.7 G/DL — SIGNIFICANT CHANGE UP (ref 6–8.3)
RBC # BLD: 3.71 M/UL — LOW (ref 4.2–5.8)
RBC # FLD: 17.8 % — HIGH (ref 10.3–14.5)
SODIUM SERPL-SCNC: 140 MMOL/L — SIGNIFICANT CHANGE UP (ref 135–145)
T4 FREE+ TSH PNL SERPL: 1.81 UIU/ML — SIGNIFICANT CHANGE UP (ref 0.27–4.2)
WBC # BLD: 7.8 K/UL — SIGNIFICANT CHANGE UP (ref 3.8–10.5)
WBC # FLD AUTO: 7.8 K/UL — SIGNIFICANT CHANGE UP (ref 3.8–10.5)

## 2024-06-12 PROCEDURE — 99214 OFFICE O/P EST MOD 30 MIN: CPT

## 2024-06-12 RX ORDER — LOSARTAN/HYDROCHLOROTHIAZIDE 100MG-25MG
1 TABLET ORAL
Refills: 0 | DISCHARGE

## 2024-06-12 RX ORDER — CLOPIDOGREL BISULFATE 75 MG/1
1 TABLET, FILM COATED ORAL
Qty: 0 | Refills: 0 | DISCHARGE

## 2024-06-12 RX ORDER — ATORVASTATIN CALCIUM 80 MG/1
1 TABLET, FILM COATED ORAL
Qty: 0 | Refills: 0 | DISCHARGE

## 2024-06-12 RX ORDER — FLUTICASONE FUROATE, UMECLIDINIUM BROMIDE AND VILANTEROL TRIFENATATE 200; 62.5; 25 UG/1; UG/1; UG/1
1 POWDER RESPIRATORY (INHALATION)
Refills: 0 | DISCHARGE

## 2024-06-12 RX ORDER — CLOPIDOGREL BISULFATE 75 MG/1
1 TABLET, FILM COATED ORAL
Refills: 0 | DISCHARGE

## 2024-06-12 RX ORDER — METOPROLOL TARTRATE 50 MG
1 TABLET ORAL
Refills: 0 | DISCHARGE

## 2024-06-12 NOTE — ASSESSMENT
[Reviewed no changes] : Reviewed no changes [FreeTextEntry1] : JONE SOUTH, who was diagnosed with a squamous cell Carcinoma Lung at the age of 81 yo in Jan 2023 Patient with a PMHX of HTN, HLD, COPD, ASHD, 40 ppk year smoking history ( quit 2002)  Patient initially presented with a productive cough in November 2022, chest x-ray on 11/11/2022 at [facility] showed a new 3 cm nodular right upper lobe lesion.  Subsequent CT Chest and PET scan was done PET/CT on 11/26/2022 reported right upper lobe mass with an SUV of 13.2, 2.3 x 2.6 cm. Adjacent activity in the right hilar region with questionable metastatic adenopathy( SUV 6.4, 1.2 cm ). Previous 6 mm nodule in the anterior right lung base is no longer seen.  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 C6 weekly carbo/ taxol completed on 4/3/23 -4/17/23: He was admitted to [facility] last week for 3 days, Syncope attributed to Orthostatic hypotension -CT CAP 5/15/23: Interval decrease of spiculated right upper lobe nodule. ( 3.5 x 2.4 cm , down from 4.3x 2.9 cm) New scattered nodular ground-glass opacities within right upper lobe may represent pneumonitis. Approximately 3.5 x 2.4 cm heterogeneous density nodule posterior to the left ischium (series 2 image 173) is unchanged. - STARTED 6/6/23,  - CT CAP from Sept 2023- Stable. - CT Chest 12/2/23 : Interval regression post radiation change right perihilar region. New biapical groundglass opacities, indeterminate. Continued attention on follow-up imaging. No CT evidence of metastatic disease in the abdomen and pelvis. - 3/4/24 CT CAP Interval resolution of previously new bi apical ground glass opacities, which were likely infectious/inflammatory. Stable right perihilar post radiation changes, obscuring the treated lesion. Small right pleural effusion. No new nodules. No metastatic disease in the abdomen or pelvis. - s/p  Posterior decompression and laminectomy at T11-T12, on  3/27/24 - Durvalumab on 4/17/24, - Last tx (C26) given today, 6/12/24 - Repeat CT CAP July 2024 - Advised if SOB on exertion / breathing worsens to call office,   # DVT - 7/19/2023 Venous Dopppler: Acute below the knee left peroneal vein thrombus - repeat USg LE no evidence of acute VTE  - Eliquis stopped April 2024  F/U in 4 weeks with MD  [AdvancecareDate] : 06/12/2024

## 2024-06-12 NOTE — RESULTS/DATA
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 New small right pleural effusion with component in the oblique fissure, adjacent to the fibrosis.  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  No new lung nodule.  New small right pleural effusion.  No evidence of metastasis in the abdomen and pelvis.     Restaging CT CAP 5/15/23: after completion of Chemo/ RT  - Interval decrease of spiculated right upper lobe nodule. ( 3.5 x 2.4 cm , down from 4.3x 2.9 cm)  - New scattered nodular ground-glass opacities within right upper lobe may represent pneumonitis.  - Approximately 3.5 x 2.4 cm heterogeneous density nodule posterior to the left ischium is unchanged.     2/6/23 PET/CT  -Hypermetabolic lung nodule in the superior segment of the right lower lobe measures 3.1 x 2.8 cm with SUV 16.0; this is compatible with a malignancy.  -There is a peripheral nodular opacity more peripherally measuring 1.3 x 1.0 cm with minimal activity of SUV 1.4 likely reflecting a nonobstructive pattern of atelectasis.  -Hypermetabolic node superior and posterior to the right main bronchus measures 1.2 x 1.8 cm with SUV 11.3 compatible with dejon involvement.  -A subcentimeter right upper paratracheal node measuring 0.4 x 1.0 cm is nonavid.   IMPRESSION:  1. Hypermetabolic nodule in the superior segment of right lower lobe compatible with a malignancy; hypermetabolic right hilar node compatible with dejon involvement.  2. Soft tissue abutting the left ischium with specks of calcification and patchy activity appears to be related to a chronic inflammatory process; this can be further evaluated with MRI when clinically feasible.     2/3/23 MR Head  IMPRESSION:  -Indeterminate subcentimeter high FLAIR signal with associated tiny enhancement in the left temporal lobe as described above. Tiny metastasis not excluded. Follow-up exam recommended.  -No acute intracranial hemorrhage or acute infarct      1/18/2023 Pathology    Final Diagnosis  1. LYMPH NODE, R4, EBUS-GUIDED FNA    POSITIVE FOR MALIGNANT CELLS.  Squamous Cell Carcinoma.    The cytology slides are composed of syncytial sheets and single pleomorphic cells with irregular, hyperchromatic nuclei and dense cytoplasm in a background of necrosis and keratinized debris. No lymphoid cells present in the background.    2. LYMPH NODE, LEVEL 7, EBUS-GUIDED FNA  Squamous Cell Carcinoma.    The cytology slides are composed of syncytial sheets and single pleomorphic cells with irregular, hyperchromatic nuclei and dense  cytoplasm in a background of necrosis and keratinized debris. No lymphoid cells present in the background.      3. LYMPH NODE, R10, EBUS-GUIDED FNA  POSITIVE FOR MALIGNANT CELLS.  Metastatic squamous Cell Carcinoma.    The cytology slides are composed of syncytial sheets and single pleomorphic cells with irregular, hyperchromatic nuclei and dense cytoplasm in a background of necrosis and keratinized debris admixed with lymphoid cells.        4. LUNG, BRONCHOALVEOLAR LAVAGE  NEGATIVE FOR MALIGNANT CELLS.    The cytology slide and cell block are composed of groups of reactive ciliated bronchial epithelial cells, scattered alveolar macrophages and mixed inflammatory cells.        11/26/2022: PET/CT [facility]  -hyperactive right upper lobe mass with an SUV of 13.2, 2.3 x 2.6 cm  - Adjacent activity in the right hilar region with questionable metastatic adenopathy( SUV 6.4, 1.2 cm ).  -Previous 6 mm nodule in the anterior right lung base is no longer seen.    11/17/2022 CT Chest noncontrast : [facility]  -2.8 x 2.8 x 2.3 right upper lobe mass in the posterior segment  - Indeterminate nodule in anterior RLL measuring 6-7 mm  - Small nodule left upper lobe, measuring 3-4 mm  - Prominent nodes along right hilar structures, one of which shows similar density as right upper lobe mass , measuring 13 x 14x 11 mm, concerning for satellite lesion    11/11/2022 CXR at [facility]. Compared to a previous film from June 2022  -a new 3 cm nodular right upper lobe lesion.

## 2024-06-12 NOTE — HISTORY OF PRESENT ILLNESS
[de-identified] : JONE SOUTH is a 82 year M with PMHX of HTN, HLD, COPD, ASHD, 40 ppk year smoking history ( quit 2002) presents for initial consultation for Squamous Cell lung cancer  Patient presented to Dr. Reed on 11/29/22 c/o a productive cough in November 2022. He was treated with a course of antibiotics but also underwent a chest x-ray on 11/11/2022 at [facility]. Compared to a previous film from June of this past year a new 3 cm nodular right upper lobe lesion was found.  Subsequent CT Chest performed on 11/17/2022 revealing 2.8 x 2.8 x 2.3 right upper lobe mass in the posterior segment. Indeterminate nodule in anterior RLL measuring 6-7 mm. Small nodule left upper lobe, measuring 3-4 mm. Prominent nodes along right hilar structures, one of which shows similar density as right upper lobe mass , measuring 13 x 14x 11 mm, concerning for satellite lesion  PET/CT on 11/26/2022 reported right upper lobe mass with an SUV of 13.2, 2.3 x 2.6 cm. Adjacent activity in the right hilar region with questionable metastatic adenopathy( SUV 6.4, 1.2 cm ). Previous 6 mm nodule in the anterior right lung base is no longer seen.  Patient referred to Dr. Martin. He underwent flexible bronchoscopy, robotic-assisted bronchoscopy with the ion system, endobronchial ultrasound with transbronchial needle aspiration, endobronchial brushing, endobronchial biopsy on 1/18/23.   Started weekly chemoradiation with Carbo/Taxol on 2/27/23, s/p RT on 4/10/23 C6 weekly carbo/ taxol completed on 4/3/23 Patient tolerated treatment well, with minimal side effects   Pathology Lymph Node ( R4, R10, Level 7) positive for malignant cells for squamous cell carcinoma. Bronchoalveolar lavage was negative  PMH: HTN, hypercholesterolemia FMH: Sx: hip replacement sx Socx: Former smoker, quit 2002 (smoked 40 ppk year)  Care Team: Pulmonologist: Dr. Reed Primary care provider: Dr. Carballo.   [de-identified] : Patient presents for follow up with spouse and son  Patient c/o B/l shoulder and neck pain, ongoing for the past 4-5 months. Patient with generalized weakness and decline, appears to be stable since last visit. Walking with Walker  Spine specialist planning for possible posterior decompression and laminectomy at T11-T12, scheduled for 3/27/24 Patient denies any worsening SOB   3/4/24 CT CAP Interval resolution of previously new bi apical ground glass opacities, which were likely infectious/inflammatory. Stable right perihilar post radiation changes, obscuring the treated lesion. Small right pleural effusion. No new nodules. No metastatic disease in the abdomen or pelvis.  12/2/23 CT CAP Interval regression post radiation change right perihilar region. New biapical groundglass opacities, indeterminate. Continued attention on follow-up imaging. No CT evidence of metastatic disease in the abdomen and pelvis.  04/22/24 Patient presents for follow up with wife and daughter.  s/p Durvalumab treatment on 04/17/24 s/p Posterior decompression and laminectomy at T11-T12, 3/24 Currently on Eliquis 5 mg BID.  Generally doing weil His energy level are better and is able to do small things around the house.   6/12/24 C26 given today Sob on exertion slightly worsened since surgery Mobility improved, he is able to water garden. Starting PT next week  Patient otherwise feels well

## 2024-07-03 ENCOUNTER — OUTPATIENT (OUTPATIENT)
Dept: OUTPATIENT SERVICES | Facility: HOSPITAL | Age: 84
LOS: 1 days | End: 2024-07-03

## 2024-07-03 ENCOUNTER — APPOINTMENT (OUTPATIENT)
Dept: CT IMAGING | Facility: CLINIC | Age: 84
End: 2024-07-03
Payer: MEDICARE

## 2024-07-03 DIAGNOSIS — C34.90 MALIGNANT NEOPLASM OF UNSPECIFIED PART OF UNSPECIFIED BRONCHUS OR LUNG: ICD-10-CM

## 2024-07-03 DIAGNOSIS — Z96.642 PRESENCE OF LEFT ARTIFICIAL HIP JOINT: Chronic | ICD-10-CM

## 2024-07-03 DIAGNOSIS — Z96.641 PRESENCE OF RIGHT ARTIFICIAL HIP JOINT: Chronic | ICD-10-CM

## 2024-07-03 PROCEDURE — 74177 CT ABD & PELVIS W/CONTRAST: CPT | Mod: 26

## 2024-07-03 PROCEDURE — 71260 CT THORAX DX C+: CPT | Mod: 26

## 2024-07-08 ENCOUNTER — OUTPATIENT (OUTPATIENT)
Dept: OUTPATIENT SERVICES | Facility: HOSPITAL | Age: 84
LOS: 1 days | Discharge: HOME | End: 2024-07-08

## 2024-07-08 DIAGNOSIS — Z96.642 PRESENCE OF LEFT ARTIFICIAL HIP JOINT: Chronic | ICD-10-CM

## 2024-07-08 DIAGNOSIS — Z96.641 PRESENCE OF RIGHT ARTIFICIAL HIP JOINT: Chronic | ICD-10-CM

## 2024-07-08 DIAGNOSIS — C34.90 MALIGNANT NEOPLASM OF UNSPECIFIED PART OF UNSPECIFIED BRONCHUS OR LUNG: ICD-10-CM

## 2024-07-09 ENCOUNTER — RESULT REVIEW (OUTPATIENT)
Age: 84
End: 2024-07-09

## 2024-07-09 ENCOUNTER — APPOINTMENT (OUTPATIENT)
Dept: HEMATOLOGY ONCOLOGY | Facility: CLINIC | Age: 84
End: 2024-07-09
Payer: MEDICARE

## 2024-07-09 VITALS
HEART RATE: 57 BPM | WEIGHT: 174.02 LBS | OXYGEN SATURATION: 97 % | HEIGHT: 68 IN | SYSTOLIC BLOOD PRESSURE: 101 MMHG | DIASTOLIC BLOOD PRESSURE: 59 MMHG | BODY MASS INDEX: 26.37 KG/M2

## 2024-07-09 DIAGNOSIS — C34.90 MALIGNANT NEOPLASM OF UNSPECIFIED PART OF UNSPECIFIED BRONCHUS OR LUNG: ICD-10-CM

## 2024-07-09 DIAGNOSIS — D64.9 ANEMIA, UNSPECIFIED: ICD-10-CM

## 2024-07-09 DIAGNOSIS — I82.409 ACUTE EMBOLISM AND THROMBOSIS OF UNSPECIFIED DEEP VEINS OF UNSPECIFIED LOWER EXTREMITY: ICD-10-CM

## 2024-07-09 LAB
BASOPHILS # BLD AUTO: 0.1 K/UL — SIGNIFICANT CHANGE UP (ref 0–0.2)
BASOPHILS NFR BLD AUTO: 0.8 % — SIGNIFICANT CHANGE UP (ref 0–2)
EOSINOPHIL # BLD AUTO: 0.3 K/UL — SIGNIFICANT CHANGE UP (ref 0–0.5)
EOSINOPHIL NFR BLD AUTO: 3.6 % — SIGNIFICANT CHANGE UP (ref 0–6)
HCT VFR BLD CALC: 34.7 % — LOW (ref 39–50)
HGB BLD-MCNC: 10.7 G/DL — LOW (ref 13–17)
LYMPHOCYTES # BLD AUTO: 0.7 K/UL — LOW (ref 1–3.3)
LYMPHOCYTES # BLD AUTO: 9.3 % — LOW (ref 13–44)
MCHC RBC-ENTMCNC: 25.7 PG — LOW (ref 27–34)
MCHC RBC-ENTMCNC: 30.9 G/DL — LOW (ref 32–36)
MCV RBC AUTO: 83 FL — SIGNIFICANT CHANGE UP (ref 80–100)
MONOCYTES # BLD AUTO: 0.7 K/UL — SIGNIFICANT CHANGE UP (ref 0–0.9)
MONOCYTES NFR BLD AUTO: 10.3 % — SIGNIFICANT CHANGE UP (ref 2–14)
NEUTROPHILS # BLD AUTO: 5.5 K/UL — SIGNIFICANT CHANGE UP (ref 1.8–7.4)
NEUTROPHILS NFR BLD AUTO: 76 % — SIGNIFICANT CHANGE UP (ref 43–77)
PLATELET # BLD AUTO: 303 K/UL — SIGNIFICANT CHANGE UP (ref 150–400)
RBC # BLD: 4.18 M/UL — LOW (ref 4.2–5.8)
RBC # FLD: 18.6 % — HIGH (ref 10.3–14.5)
WBC # BLD: 7.3 K/UL — SIGNIFICANT CHANGE UP (ref 3.8–10.5)
WBC # FLD AUTO: 7.3 K/UL — SIGNIFICANT CHANGE UP (ref 3.8–10.5)

## 2024-07-09 PROCEDURE — 99215 OFFICE O/P EST HI 40 MIN: CPT

## 2024-07-10 LAB
ALBUMIN SERPL ELPH-MCNC: 4.3 G/DL
ALT SERPL-CCNC: 12 U/L
AST SERPL-CCNC: 14 U/L
BILIRUB SERPL-MCNC: 0.4 MG/DL
BUN SERPL-MCNC: 25 MG/DL
CALCIUM SERPL-MCNC: 9.7 MG/DL
CHLORIDE SERPL-SCNC: 100 MMOL/L
CO2 SERPL-SCNC: 25 MMOL/L
CREAT SERPL-MCNC: 1.07 MG/DL
EGFR: 69 ML/MIN/1.73M2
GLUCOSE SERPL-MCNC: 90 MG/DL
POTASSIUM SERPL-SCNC: 4.4 MMOL/L
SODIUM SERPL-SCNC: 138 MMOL/L

## 2024-07-15 NOTE — OCCUPATIONAL THERAPY INITIAL EVALUATION ADULT - AMBULATORY DEVICES NEEDED
Artificial Intelligence Monitoring in Nursing (AIMS Nursing) Study    Principle Investigator - Dr. Genaro Dickens  Research Coordinator - Nadira Zheng     Patient Name - Joe Alaniz  Date - 7/15/2024 5:12 PM  Location - [facility] [address]    Joe Alaniz was approached by Nadira Zheng to talk about participating in the AIMS Nursing Study. The patient wanted to read over the consent form and decide later. Study protocol was followed and patient was given study contact information.     Nadira Zheng      yes

## 2024-07-16 ENCOUNTER — LABORATORY RESULT (OUTPATIENT)
Age: 84
End: 2024-07-16

## 2024-07-16 ENCOUNTER — APPOINTMENT (OUTPATIENT)
Dept: INTERVENTIONAL RADIOLOGY/VASCULAR | Facility: CLINIC | Age: 84
End: 2024-07-16
Payer: MEDICARE

## 2024-07-16 ENCOUNTER — OUTPATIENT (OUTPATIENT)
Dept: OUTPATIENT SERVICES | Facility: HOSPITAL | Age: 84
LOS: 1 days | End: 2024-07-16

## 2024-07-16 ENCOUNTER — OUTPATIENT (OUTPATIENT)
Dept: OUTPATIENT SERVICES | Facility: HOSPITAL | Age: 84
LOS: 1 days | End: 2024-07-16
Payer: MEDICARE

## 2024-07-16 ENCOUNTER — RESULT REVIEW (OUTPATIENT)
Age: 84
End: 2024-07-16

## 2024-07-16 VITALS
OXYGEN SATURATION: 96 % | DIASTOLIC BLOOD PRESSURE: 58 MMHG | RESPIRATION RATE: 16 BRPM | HEART RATE: 70 BPM | SYSTOLIC BLOOD PRESSURE: 119 MMHG | TEMPERATURE: 98.1 F

## 2024-07-16 DIAGNOSIS — Z00.8 ENCOUNTER FOR OTHER GENERAL EXAMINATION: ICD-10-CM

## 2024-07-16 DIAGNOSIS — C34.90 MALIGNANT NEOPLASM OF UNSPECIFIED PART OF UNSPECIFIED BRONCHUS OR LUNG: ICD-10-CM

## 2024-07-16 DIAGNOSIS — Z96.641 PRESENCE OF RIGHT ARTIFICIAL HIP JOINT: Chronic | ICD-10-CM

## 2024-07-16 DIAGNOSIS — Z96.642 PRESENCE OF LEFT ARTIFICIAL HIP JOINT: Chronic | ICD-10-CM

## 2024-07-16 PROCEDURE — 32555 ASPIRATE PLEURA W/ IMAGING: CPT | Mod: RT

## 2024-07-16 PROCEDURE — 71045 X-RAY EXAM CHEST 1 VIEW: CPT | Mod: 26,59

## 2024-07-17 ENCOUNTER — RESULT REVIEW (OUTPATIENT)
Age: 84
End: 2024-07-17

## 2024-07-17 PROCEDURE — 88305 TISSUE EXAM BY PATHOLOGIST: CPT | Mod: 26

## 2024-07-17 PROCEDURE — 88112 CYTOPATH CELL ENHANCE TECH: CPT | Mod: 26

## 2024-07-18 ENCOUNTER — APPOINTMENT (OUTPATIENT)
Dept: RADIATION ONCOLOGY | Facility: CLINIC | Age: 84
End: 2024-07-18
Payer: MEDICARE

## 2024-07-18 VITALS
OXYGEN SATURATION: 92 % | HEART RATE: 70 BPM | SYSTOLIC BLOOD PRESSURE: 105 MMHG | RESPIRATION RATE: 16 BRPM | TEMPERATURE: 98.5 F | DIASTOLIC BLOOD PRESSURE: 58 MMHG | WEIGHT: 174 LBS | HEIGHT: 68 IN | BODY MASS INDEX: 26.37 KG/M2

## 2024-07-18 PROCEDURE — G2211 COMPLEX E/M VISIT ADD ON: CPT

## 2024-07-18 PROCEDURE — 99213 OFFICE O/P EST LOW 20 MIN: CPT

## 2024-07-19 LAB — NON-GYNECOLOGICAL CYTOLOGY STUDY: SIGNIFICANT CHANGE UP

## 2024-07-22 ENCOUNTER — APPOINTMENT (OUTPATIENT)
Dept: UROLOGY | Facility: CLINIC | Age: 84
End: 2024-07-22
Payer: MEDICARE

## 2024-07-22 VITALS
WEIGHT: 172 LBS | BODY MASS INDEX: 24.62 KG/M2 | HEIGHT: 70 IN | HEART RATE: 71 BPM | DIASTOLIC BLOOD PRESSURE: 65 MMHG | SYSTOLIC BLOOD PRESSURE: 118 MMHG

## 2024-07-22 DIAGNOSIS — R35.1 NOCTURIA: ICD-10-CM

## 2024-07-22 PROCEDURE — 99214 OFFICE O/P EST MOD 30 MIN: CPT

## 2024-07-22 RX ORDER — ALFUZOSIN HYDROCHLORIDE 10 MG/1
10 TABLET, EXTENDED RELEASE ORAL
Qty: 30 | Refills: 0 | Status: ACTIVE | COMMUNITY
Start: 2024-07-22 | End: 1900-01-01

## 2024-07-22 NOTE — HISTORY OF PRESENT ILLNESS
[FreeTextEntry1] : 82 yo M significant medical history with lung cancer, treated with surgery, radiation and chemotherapy recently treated for pleural effusion  no significant urologic complains does have nocturia mainly, voiding comfortably during the day, feels emptying not on any medication for voiding and LUTS no history of gross hematuria here for consultation due to repeat images with suspected bladder wall thickening  reviewed recent CT 7/2024: no lesions, due to bilateral hip replacement cannot see the entire bladder well. but no obvious lesion  discussed the procedure for cystoscopy, discussed possible complications including hematuria, infection, and retention  plan: - will try alfuzosin - will schedule cystoscopy

## 2024-07-23 ENCOUNTER — APPOINTMENT (OUTPATIENT)
Age: 84
End: 2024-07-23

## 2024-08-07 ENCOUNTER — NON-APPOINTMENT (OUTPATIENT)
Age: 84
End: 2024-08-07

## 2024-08-12 ENCOUNTER — APPOINTMENT (OUTPATIENT)
Dept: UROLOGY | Facility: CLINIC | Age: 84
End: 2024-08-12
Payer: MEDICARE

## 2024-08-12 DIAGNOSIS — N13.8 BENIGN PROSTATIC HYPERPLASIA WITH LOWER URINARY TRACT SYMPMS: ICD-10-CM

## 2024-08-12 DIAGNOSIS — N40.1 BENIGN PROSTATIC HYPERPLASIA WITH LOWER URINARY TRACT SYMPMS: ICD-10-CM

## 2024-08-12 DIAGNOSIS — N32.89 OTHER SPECIFIED DISORDERS OF BLADDER: ICD-10-CM

## 2024-08-12 DIAGNOSIS — R39.9 UNSPECIFIED SYMPTOMS AND SIGNS INVOLVING THE GENITOURINARY SYSTEM: ICD-10-CM

## 2024-08-12 PROCEDURE — 52000 CYSTOURETHROSCOPY: CPT

## 2024-08-12 PROCEDURE — 99213 OFFICE O/P EST LOW 20 MIN: CPT | Mod: 25

## 2024-08-13 ENCOUNTER — RESULT REVIEW (OUTPATIENT)
Age: 84
End: 2024-08-13

## 2024-08-13 ENCOUNTER — APPOINTMENT (OUTPATIENT)
Dept: HEMATOLOGY ONCOLOGY | Facility: CLINIC | Age: 84
End: 2024-08-13
Payer: MEDICARE

## 2024-08-13 VITALS
DIASTOLIC BLOOD PRESSURE: 67 MMHG | BODY MASS INDEX: 24.72 KG/M2 | RESPIRATION RATE: 16 BRPM | SYSTOLIC BLOOD PRESSURE: 123 MMHG | TEMPERATURE: 98 F | HEART RATE: 65 BPM | WEIGHT: 172.29 LBS | OXYGEN SATURATION: 97 %

## 2024-08-13 DIAGNOSIS — C34.90 MALIGNANT NEOPLASM OF UNSPECIFIED PART OF UNSPECIFIED BRONCHUS OR LUNG: ICD-10-CM

## 2024-08-13 PROBLEM — R39.9 LOWER URINARY TRACT SYMPTOMS (LUTS): Status: ACTIVE | Noted: 2024-08-13

## 2024-08-13 PROBLEM — N32.89 BLADDER WALL THICKENING: Status: ACTIVE | Noted: 2024-08-13

## 2024-08-13 PROBLEM — N40.1 BPH WITH URINARY OBSTRUCTION: Status: ACTIVE | Noted: 2024-08-13

## 2024-08-13 LAB
BASOPHILS # BLD AUTO: 0.1 K/UL — SIGNIFICANT CHANGE UP (ref 0–0.2)
BASOPHILS NFR BLD AUTO: 1.1 % — SIGNIFICANT CHANGE UP (ref 0–2)
EOSINOPHIL # BLD AUTO: 0.4 K/UL — SIGNIFICANT CHANGE UP (ref 0–0.5)
EOSINOPHIL NFR BLD AUTO: 5 % — SIGNIFICANT CHANGE UP (ref 0–6)
HCT VFR BLD CALC: 33.4 % — LOW (ref 39–50)
HGB BLD-MCNC: 10.7 G/DL — LOW (ref 13–17)
LYMPHOCYTES # BLD AUTO: 0.7 K/UL — LOW (ref 1–3.3)
LYMPHOCYTES # BLD AUTO: 9.4 % — LOW (ref 13–44)
MCHC RBC-ENTMCNC: 25.8 PG — LOW (ref 27–34)
MCHC RBC-ENTMCNC: 32 G/DL — SIGNIFICANT CHANGE UP (ref 32–36)
MCV RBC AUTO: 80.8 FL — SIGNIFICANT CHANGE UP (ref 80–100)
MONOCYTES # BLD AUTO: 0.9 K/UL — SIGNIFICANT CHANGE UP (ref 0–0.9)
MONOCYTES NFR BLD AUTO: 12.2 % — SIGNIFICANT CHANGE UP (ref 2–14)
NEUTROPHILS # BLD AUTO: 5.5 K/UL — SIGNIFICANT CHANGE UP (ref 1.8–7.4)
NEUTROPHILS NFR BLD AUTO: 72.3 % — SIGNIFICANT CHANGE UP (ref 43–77)
PLATELET # BLD AUTO: 268 K/UL — SIGNIFICANT CHANGE UP (ref 150–400)
RBC # BLD: 4.13 M/UL — LOW (ref 4.2–5.8)
RBC # FLD: 18.2 % — HIGH (ref 10.3–14.5)
WBC # BLD: 7.6 K/UL — SIGNIFICANT CHANGE UP (ref 3.8–10.5)
WBC # FLD AUTO: 7.6 K/UL — SIGNIFICANT CHANGE UP (ref 3.8–10.5)

## 2024-08-13 PROCEDURE — 99214 OFFICE O/P EST MOD 30 MIN: CPT

## 2024-08-13 NOTE — HISTORY OF PRESENT ILLNESS
[FreeTextEntry1] :   patient here for cystoscopy cystoscopy with no lesions, prostate is obstructing and bladder slightly trabeculated patient started on alfuzosin last visit was very dizzy and had to stop it after one pill, instructed not to take any more  discussed with patient and explained the option of prostate surgery if he is interested briefly explained what that would mean and possible complications patient is relatively comfortable and suggested no intervention for now - patient agrees

## 2024-08-14 LAB
ALBUMIN SERPL ELPH-MCNC: 4 G/DL
ALP BLD-CCNC: 114 U/L
ALT SERPL-CCNC: 10 U/L
ANION GAP SERPL CALC-SCNC: 14 MMOL/L
AST SERPL-CCNC: 16 U/L
BILIRUB SERPL-MCNC: 0.5 MG/DL
BUN SERPL-MCNC: 23 MG/DL
CALCIUM SERPL-MCNC: 9.7 MG/DL
CEA SERPL-MCNC: 1.3 NG/ML
CHLORIDE SERPL-SCNC: 101 MMOL/L
CO2 SERPL-SCNC: 24 MMOL/L
CREAT SERPL-MCNC: 0.93 MG/DL
EGFR: 82 ML/MIN/1.73M2
GLUCOSE SERPL-MCNC: 93 MG/DL
POTASSIUM SERPL-SCNC: 4.3 MMOL/L
PROT SERPL-MCNC: 6.8 G/DL
SODIUM SERPL-SCNC: 139 MMOL/L
TSH SERPL-ACNC: 1.5 UIU/ML

## 2024-08-14 NOTE — ASSESSMENT
[With Patient/Caregiver] : With Patient/Caregiver [FreeTextEntry1] : JONE SOUTH, who was diagnosed with a squamous cell Carcinoma Lung at the age of 83 yo in Jan 2023 Patient with a PMHX of HTN, HLD, COPD, ASHD, 40 ppk year smoking history ( quit 2002)  Patient initially presented with a productive cough in November 2022, chest x-ray on 11/11/2022 at [facility] showed a new 3 cm nodular right upper lobe lesion.  Subsequent CT Chest and PET scan was done PET/CT on 11/26/2022 reported right upper lobe mass with an SUV of 13.2, 2.3 x 2.6 cm. Adjacent activity in the right hilar region with questionable metastatic adenopathy( SUV 6.4, 1.2 cm ). Previous 6 mm nodule in the anterior right lung base is no longer seen.  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 C6 weekly carbo/ taxol completed on 4/3/23 -4/17/23: He was admitted to [facility] last week for 3 days, Syncope attributed to Orthostatic hypotension -CT CAP 5/15/23: Interval decrease of spiculated right upper lobe nodule. ( 3.5 x 2.4 cm , down from 4.3x 2.9 cm) New scattered nodular ground-glass opacities within right upper lobe may represent pneumonitis. Approximately 3.5 x 2.4 cm heterogeneous density nodule posterior to the left ischium (series 2 image 173) is unchanged. - STARTED 6/6/23,  - CT CAP from Sept 2023- Stable. - CT Chest 12/2/23 : Interval regression post radiation change right perihilar region. New biapical groundglass opacities, indeterminate. Continued attention on follow-up imaging. No CT evidence of metastatic disease in the abdomen and pelvis. - 3/4/24 CT CAP Interval resolution of previously new bi apical ground glass opacities, which were likely infectious/inflammatory. Stable right perihilar post radiation changes, obscuring the treated lesion. Small right pleural effusion. No new nodules. No metastatic disease in the abdomen or pelvis. - s/p  Posterior decompression and laminectomy at T11-T12, on  3/27/24 - Durvalumab on 4/17/24, - Last tx (C26) given today, 6/12/24 - CT CAP on 7/3/24: New consolidative opacity within the left apex and 0.8 cm basilar left lower lobe nodule. These are possibly infectious/inflammatory in etiology. Recommend attention on follow-up. Moderate layering right pleural effusion is increased. Postradiation changes within the right lung are stable. Circumferential urinary bladder wall thickening increased from prior exam. - s/p Thoracentesis on 7/17/2024, pathology-benign  - CBC, CMP, CEA, TSH ordered today - Advised to follow up with URO for urinary bladder wall thickening increase on recent CT CAP - 0.8 cm basilar left lower lobe nodule - Will monitor with repeat CT CAP Oct 2024 - Advised if SOB on exertion / breathing worsens to call office  # DVT - 7/19/2023 Venous Doppler: Acute below the knee left peroneal vein thrombus - repeat USG LE no evidence of acute VTE  - Eliquis stopped April 2024  F/U in 6-8 weeks   [AdvancecareDate] : 7/9/24

## 2024-08-14 NOTE — HISTORY OF PRESENT ILLNESS
[de-identified] : JONE SOUTH is a 82 year M with PMHX of HTN, HLD, COPD, ASHD, 40 ppk year smoking history ( quit 2002) presents for initial consultation for Squamous Cell lung cancer  Patient presented to Dr. Reed on 11/29/22 c/o a productive cough in November 2022. He was treated with a course of antibiotics but also underwent a chest x-ray on 11/11/2022 at [facility]. Compared to a previous film from June of this past year a new 3 cm nodular right upper lobe lesion was found.  Subsequent CT Chest performed on 11/17/2022 revealing 2.8 x 2.8 x 2.3 right upper lobe mass in the posterior segment. Indeterminate nodule in anterior RLL measuring 6-7 mm. Small nodule left upper lobe, measuring 3-4 mm. Prominent nodes along right hilar structures, one of which shows similar density as right upper lobe mass , measuring 13 x 14x 11 mm, concerning for satellite lesion  PET/CT on 11/26/2022 reported right upper lobe mass with an SUV of 13.2, 2.3 x 2.6 cm. Adjacent activity in the right hilar region with questionable metastatic adenopathy( SUV 6.4, 1.2 cm ). Previous 6 mm nodule in the anterior right lung base is no longer seen.  Patient referred to Dr. Martin. He underwent flexible bronchoscopy, robotic-assisted bronchoscopy with the ion system, endobronchial ultrasound with transbronchial needle aspiration, endobronchial brushing, endobronchial biopsy on 1/18/23.   Started weekly chemoradiation with Carbo/Taxol on 2/27/23, s/p RT on 4/10/23 C6 weekly carbo/ taxol completed on 4/3/23 Patient tolerated treatment well, with minimal side effects   Pathology Lymph Node ( R4, R10, Level 7) positive for malignant cells for squamous cell carcinoma. Bronchoalveolar lavage was negative  PMH: HTN, hypercholesterolemia FMH: Sx: hip replacement sx Socx: Former smoker, quit 2002 (smoked 40 ppk year)  Care Team: Pulmonologist: Dr. Reed Primary care provider: Dr. Carballo.   [de-identified] : Patient presents for follow up with spouse and son  Patient c/o B/l shoulder and neck pain, ongoing for the past 4-5 months. Patient with generalized weakness and decline, appears to be stable since last visit. Walking with Walker  Spine specialist planning for possible posterior decompression and laminectomy at T11-T12, scheduled for 3/27/24 Patient denies any worsening SOB   3/4/24 CT CAP Interval resolution of previously new bi apical ground glass opacities, which were likely infectious/inflammatory. Stable right perihilar post radiation changes, obscuring the treated lesion. Small right pleural effusion. No new nodules. No metastatic disease in the abdomen or pelvis.  12/2/23 CT CAP Interval regression post radiation change right perihilar region. New biapical groundglass opacities, indeterminate. Continued attention on follow-up imaging. No CT evidence of metastatic disease in the abdomen and pelvis.  04/22/24 Patient presents for follow up with wife and daughter.  s/p Durvalumab treatment on 04/17/24 s/p Posterior decompression and laminectomy at T11-T12, 3/24 Currently on Eliquis 5 mg BID.  Generally doing weil His energy level are better and is able to do small things around the house.   6/12/24 C26 given today Sob on exertion slightly worsened since surgery Mobility improved, he is able to water garden. Starting PT next week  Patient otherwise feels well  7/9/24 Pt presents for follow up Pt reports feeling well He reports not being as much SOB recently Denies difficulty urinating, blood in urine Has previously seen URO on 4/2/24, but has not seen them again Denies LE edema Had qureshi in place after surgery for 1 week  8/13/24: Patient presents for follow up  s/p Cystoscopy with Dr. Ahumada on 8/13/24, as per patient it was normal  s/p Thoracentesis on 7/17/2024, pathology-benign   Patient now undergoing PT 2x/week , seems to be helping Patient recently saw Audiologist for hearing problems, as per patient hearing test stable from last year. Will go to another Audiologist for 2nd opinion Reports Fatigue and SOB on exertion are stable

## 2024-08-14 NOTE — RESULTS/DATA
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 New small right pleural effusion with component in the oblique fissure, adjacent to the fibrosis.  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  No new lung nodule.  New small right pleural effusion.  No evidence of metastasis in the abdomen and pelvis.     Restaging CT CAP 5/15/23: after completion of Chemo/ RT  - Interval decrease of spiculated right upper lobe nodule. ( 3.5 x 2.4 cm , down from 4.3x 2.9 cm)  - New scattered nodular ground-glass opacities within right upper lobe may represent pneumonitis.  - Approximately 3.5 x 2.4 cm heterogeneous density nodule posterior to the left ischium is unchanged.     2/6/23 PET/CT  -Hypermetabolic lung nodule in the superior segment of the right lower lobe measures 3.1 x 2.8 cm with SUV 16.0; this is compatible with a malignancy.  -There is a peripheral nodular opacity more peripherally measuring 1.3 x 1.0 cm with minimal activity of SUV 1.4 likely reflecting a nonobstructive pattern of atelectasis.  -Hypermetabolic node superior and posterior to the right main bronchus measures 1.2 x 1.8 cm with SUV 11.3 compatible with dejon involvement.  -A subcentimeter right upper paratracheal node measuring 0.4 x 1.0 cm is nonavid.   IMPRESSION:  1. Hypermetabolic nodule in the superior segment of right lower lobe compatible with a malignancy; hypermetabolic right hilar node compatible with dejon involvement.  2. Soft tissue abutting the left ischium with specks of calcification and patchy activity appears to be related to a chronic inflammatory process; this can be further evaluated with MRI when clinically feasible.     2/3/23 MR Head  IMPRESSION:  -Indeterminate subcentimeter high FLAIR signal with associated tiny enhancement in the left temporal lobe as described above. Tiny metastasis not excluded. Follow-up exam recommended.  -No acute intracranial hemorrhage or acute infarct      1/18/2023 Pathology    Final Diagnosis  1. LYMPH NODE, R4, EBUS-GUIDED FNA    POSITIVE FOR MALIGNANT CELLS.  Squamous Cell Carcinoma.    The cytology slides are composed of syncytial sheets and single pleomorphic cells with irregular, hyperchromatic nuclei and dense cytoplasm in a background of necrosis and keratinized debris. No lymphoid cells present in the background.    2. LYMPH NODE, LEVEL 7, EBUS-GUIDED FNA  Squamous Cell Carcinoma.    The cytology slides are composed of syncytial sheets and single pleomorphic cells with irregular, hyperchromatic nuclei and dense  cytoplasm in a background of necrosis and keratinized debris. No lymphoid cells present in the background.      3. LYMPH NODE, R10, EBUS-GUIDED FNA  POSITIVE FOR MALIGNANT CELLS.  Metastatic squamous Cell Carcinoma.    The cytology slides are composed of syncytial sheets and single pleomorphic cells with irregular, hyperchromatic nuclei and dense cytoplasm in a background of necrosis and keratinized debris admixed with lymphoid cells.        4. LUNG, BRONCHOALVEOLAR LAVAGE  NEGATIVE FOR MALIGNANT CELLS.    The cytology slide and cell block are composed of groups of reactive ciliated bronchial epithelial cells, scattered alveolar macrophages and mixed inflammatory cells.        11/26/2022: PET/CT [facility]  -hyperactive right upper lobe mass with an SUV of 13.2, 2.3 x 2.6 cm  - Adjacent activity in the right hilar region with questionable metastatic adenopathy( SUV 6.4, 1.2 cm ).  -Previous 6 mm nodule in the anterior right lung base is no longer seen.    11/17/2022 CT Chest noncontrast : [facility]  -2.8 x 2.8 x 2.3 right upper lobe mass in the posterior segment  - Indeterminate nodule in anterior RLL measuring 6-7 mm  - Small nodule left upper lobe, measuring 3-4 mm  - Prominent nodes along right hilar structures, one of which shows similar density as right upper lobe mass , measuring 13 x 14x 11 mm, concerning for satellite lesion    11/11/2022 CXR at [facility]. Compared to a previous film from June 2022  -a new 3 cm nodular right upper lobe lesion.

## 2024-08-14 NOTE — ASSESSMENT
[With Patient/Caregiver] : With Patient/Caregiver [FreeTextEntry1] : JONE SOUTH, who was diagnosed with a squamous cell Carcinoma Lung at the age of 81 yo in Jan 2023 Patient with a PMHX of HTN, HLD, COPD, ASHD, 40 ppk year smoking history ( quit 2002)  Patient initially presented with a productive cough in November 2022, chest x-ray on 11/11/2022 at [facility] showed a new 3 cm nodular right upper lobe lesion.  Subsequent CT Chest and PET scan was done PET/CT on 11/26/2022 reported right upper lobe mass with an SUV of 13.2, 2.3 x 2.6 cm. Adjacent activity in the right hilar region with questionable metastatic adenopathy( SUV 6.4, 1.2 cm ). Previous 6 mm nodule in the anterior right lung base is no longer seen.  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 C6 weekly carbo/ taxol completed on 4/3/23 -4/17/23: He was admitted to [facility] last week for 3 days, Syncope attributed to Orthostatic hypotension -CT CAP 5/15/23: Interval decrease of spiculated right upper lobe nodule. ( 3.5 x 2.4 cm , down from 4.3x 2.9 cm) New scattered nodular ground-glass opacities within right upper lobe may represent pneumonitis. Approximately 3.5 x 2.4 cm heterogeneous density nodule posterior to the left ischium (series 2 image 173) is unchanged. - STARTED 6/6/23,  - CT CAP from Sept 2023- Stable. - CT Chest 12/2/23 : Interval regression post radiation change right perihilar region. New biapical groundglass opacities, indeterminate. Continued attention on follow-up imaging. No CT evidence of metastatic disease in the abdomen and pelvis. - 3/4/24 CT CAP Interval resolution of previously new bi apical ground glass opacities, which were likely infectious/inflammatory. Stable right perihilar post radiation changes, obscuring the treated lesion. Small right pleural effusion. No new nodules. No metastatic disease in the abdomen or pelvis. - s/p  Posterior decompression and laminectomy at T11-T12, on  3/27/24 - Durvalumab on 4/17/24, - Last tx (C26) given today, 6/12/24 - CT CAP on 7/3/24: New consolidative opacity within the left apex and 0.8 cm basilar left lower lobe nodule. These are possibly infectious/inflammatory in etiology. Recommend attention on follow-up. Moderate layering right pleural effusion is increased. Postradiation changes within the right lung are stable. Circumferential urinary bladder wall thickening increased from prior exam. - s/p Thoracentesis on 7/17/2024, pathology-benign  - CBC, CMP, CEA, TSH ordered today - Advised to follow up with URO for urinary bladder wall thickening increase on recent CT CAP - 0.8 cm basilar left lower lobe nodule - Will monitor with repeat CT CAP Oct 2024 - Advised if SOB on exertion / breathing worsens to call office  # DVT - 7/19/2023 Venous Doppler: Acute below the knee left peroneal vein thrombus - repeat USG LE no evidence of acute VTE  - Eliquis stopped April 2024  F/U in 6-8 weeks   [AdvancecareDate] : 7/9/24

## 2024-08-14 NOTE — HISTORY OF PRESENT ILLNESS
[de-identified] : JONE SOUTH is a 82 year M with PMHX of HTN, HLD, COPD, ASHD, 40 ppk year smoking history ( quit 2002) presents for initial consultation for Squamous Cell lung cancer  Patient presented to Dr. Reed on 11/29/22 c/o a productive cough in November 2022. He was treated with a course of antibiotics but also underwent a chest x-ray on 11/11/2022 at [facility]. Compared to a previous film from June of this past year a new 3 cm nodular right upper lobe lesion was found.  Subsequent CT Chest performed on 11/17/2022 revealing 2.8 x 2.8 x 2.3 right upper lobe mass in the posterior segment. Indeterminate nodule in anterior RLL measuring 6-7 mm. Small nodule left upper lobe, measuring 3-4 mm. Prominent nodes along right hilar structures, one of which shows similar density as right upper lobe mass , measuring 13 x 14x 11 mm, concerning for satellite lesion  PET/CT on 11/26/2022 reported right upper lobe mass with an SUV of 13.2, 2.3 x 2.6 cm. Adjacent activity in the right hilar region with questionable metastatic adenopathy( SUV 6.4, 1.2 cm ). Previous 6 mm nodule in the anterior right lung base is no longer seen.  Patient referred to Dr. Martin. He underwent flexible bronchoscopy, robotic-assisted bronchoscopy with the ion system, endobronchial ultrasound with transbronchial needle aspiration, endobronchial brushing, endobronchial biopsy on 1/18/23.   Started weekly chemoradiation with Carbo/Taxol on 2/27/23, s/p RT on 4/10/23 C6 weekly carbo/ taxol completed on 4/3/23 Patient tolerated treatment well, with minimal side effects   Pathology Lymph Node ( R4, R10, Level 7) positive for malignant cells for squamous cell carcinoma. Bronchoalveolar lavage was negative  PMH: HTN, hypercholesterolemia FMH: Sx: hip replacement sx Socx: Former smoker, quit 2002 (smoked 40 ppk year)  Care Team: Pulmonologist: Dr. Reed Primary care provider: Dr. Carballo.   [de-identified] : Patient presents for follow up with spouse and son  Patient c/o B/l shoulder and neck pain, ongoing for the past 4-5 months. Patient with generalized weakness and decline, appears to be stable since last visit. Walking with Walker  Spine specialist planning for possible posterior decompression and laminectomy at T11-T12, scheduled for 3/27/24 Patient denies any worsening SOB   3/4/24 CT CAP Interval resolution of previously new bi apical ground glass opacities, which were likely infectious/inflammatory. Stable right perihilar post radiation changes, obscuring the treated lesion. Small right pleural effusion. No new nodules. No metastatic disease in the abdomen or pelvis.  12/2/23 CT CAP Interval regression post radiation change right perihilar region. New biapical groundglass opacities, indeterminate. Continued attention on follow-up imaging. No CT evidence of metastatic disease in the abdomen and pelvis.  04/22/24 Patient presents for follow up with wife and daughter.  s/p Durvalumab treatment on 04/17/24 s/p Posterior decompression and laminectomy at T11-T12, 3/24 Currently on Eliquis 5 mg BID.  Generally doing weil His energy level are better and is able to do small things around the house.   6/12/24 C26 given today Sob on exertion slightly worsened since surgery Mobility improved, he is able to water garden. Starting PT next week  Patient otherwise feels well  7/9/24 Pt presents for follow up Pt reports feeling well He reports not being as much SOB recently Denies difficulty urinating, blood in urine Has previously seen URO on 4/2/24, but has not seen them again Denies LE edema Had qureshi in place after surgery for 1 week  8/13/24: Patient presents for follow up  s/p Cystoscopy with Dr. Ahumada on 8/13/24, as per patient it was normal  s/p Thoracentesis on 7/17/2024, pathology-benign   Patient now undergoing PT 2x/week , seems to be helping Patient recently saw Audiologist for hearing problems, as per patient hearing test stable from last year. Will go to another Audiologist for 2nd opinion Reports Fatigue and SOB on exertion are stable

## 2024-08-14 NOTE — RESULTS/DATA
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 New small right pleural effusion with component in the oblique fissure, adjacent to the fibrosis.  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  No new lung nodule.  New small right pleural effusion.  No evidence of metastasis in the abdomen and pelvis.     Restaging CT CAP 5/15/23: after completion of Chemo/ RT  - Interval decrease of spiculated right upper lobe nodule. ( 3.5 x 2.4 cm , down from 4.3x 2.9 cm)  - New scattered nodular ground-glass opacities within right upper lobe may represent pneumonitis.  - Approximately 3.5 x 2.4 cm heterogeneous density nodule posterior to the left ischium is unchanged.     2/6/23 PET/CT  -Hypermetabolic lung nodule in the superior segment of the right lower lobe measures 3.1 x 2.8 cm with SUV 16.0; this is compatible with a malignancy.  -There is a peripheral nodular opacity more peripherally measuring 1.3 x 1.0 cm with minimal activity of SUV 1.4 likely reflecting a nonobstructive pattern of atelectasis.  -Hypermetabolic node superior and posterior to the right main bronchus measures 1.2 x 1.8 cm with SUV 11.3 compatible with dejon involvement.  -A subcentimeter right upper paratracheal node measuring 0.4 x 1.0 cm is nonavid.   IMPRESSION:  1. Hypermetabolic nodule in the superior segment of right lower lobe compatible with a malignancy; hypermetabolic right hilar node compatible with dejon involvement.  2. Soft tissue abutting the left ischium with specks of calcification and patchy activity appears to be related to a chronic inflammatory process; this can be further evaluated with MRI when clinically feasible.     2/3/23 MR Head  IMPRESSION:  -Indeterminate subcentimeter high FLAIR signal with associated tiny enhancement in the left temporal lobe as described above. Tiny metastasis not excluded. Follow-up exam recommended.  -No acute intracranial hemorrhage or acute infarct      1/18/2023 Pathology    Final Diagnosis  1. LYMPH NODE, R4, EBUS-GUIDED FNA    POSITIVE FOR MALIGNANT CELLS.  Squamous Cell Carcinoma.    The cytology slides are composed of syncytial sheets and single pleomorphic cells with irregular, hyperchromatic nuclei and dense cytoplasm in a background of necrosis and keratinized debris. No lymphoid cells present in the background.    2. LYMPH NODE, LEVEL 7, EBUS-GUIDED FNA  Squamous Cell Carcinoma.    The cytology slides are composed of syncytial sheets and single pleomorphic cells with irregular, hyperchromatic nuclei and dense  cytoplasm in a background of necrosis and keratinized debris. No lymphoid cells present in the background.      3. LYMPH NODE, R10, EBUS-GUIDED FNA  POSITIVE FOR MALIGNANT CELLS.  Metastatic squamous Cell Carcinoma.    The cytology slides are composed of syncytial sheets and single pleomorphic cells with irregular, hyperchromatic nuclei and dense cytoplasm in a background of necrosis and keratinized debris admixed with lymphoid cells.        4. LUNG, BRONCHOALVEOLAR LAVAGE  NEGATIVE FOR MALIGNANT CELLS.    The cytology slide and cell block are composed of groups of reactive ciliated bronchial epithelial cells, scattered alveolar macrophages and mixed inflammatory cells.        11/26/2022: PET/CT [facility]  -hyperactive right upper lobe mass with an SUV of 13.2, 2.3 x 2.6 cm  - Adjacent activity in the right hilar region with questionable metastatic adenopathy( SUV 6.4, 1.2 cm ).  -Previous 6 mm nodule in the anterior right lung base is no longer seen.    11/17/2022 CT Chest noncontrast : [facility]  -2.8 x 2.8 x 2.3 right upper lobe mass in the posterior segment  - Indeterminate nodule in anterior RLL measuring 6-7 mm  - Small nodule left upper lobe, measuring 3-4 mm  - Prominent nodes along right hilar structures, one of which shows similar density as right upper lobe mass , measuring 13 x 14x 11 mm, concerning for satellite lesion    11/11/2022 CXR at [facility]. Compared to a previous film from June 2022  -a new 3 cm nodular right upper lobe lesion.

## 2024-08-20 LAB
BILIRUB UR QL STRIP: NORMAL
CLARITY UR: CLEAR
COLLECTION METHOD: NORMAL
GLUCOSE UR-MCNC: NORMAL
HCG UR QL: 0.2 EU/DL
HGB UR QL STRIP.AUTO: NORMAL
KETONES UR-MCNC: NORMAL
LEUKOCYTE ESTERASE UR QL STRIP: ABNORMAL
NITRITE UR QL STRIP: NORMAL
PH UR STRIP: 6
PROT UR STRIP-MCNC: NORMAL
SP GR UR STRIP: 1.02

## 2024-08-21 ENCOUNTER — APPOINTMENT (OUTPATIENT)
Dept: ORTHOPEDIC SURGERY | Facility: CLINIC | Age: 84
End: 2024-08-21
Payer: MEDICARE

## 2024-08-21 VITALS
BODY MASS INDEX: 24.62 KG/M2 | SYSTOLIC BLOOD PRESSURE: 110 MMHG | WEIGHT: 172 LBS | HEIGHT: 70 IN | HEART RATE: 66 BPM | DIASTOLIC BLOOD PRESSURE: 54 MMHG

## 2024-08-21 DIAGNOSIS — G95.9 DISEASE OF SPINAL CORD, UNSPECIFIED: ICD-10-CM

## 2024-08-21 DIAGNOSIS — M51.04 INTERVERTEBRAL DISC DISORDERS WITH MYELOPATHY, THORACIC REGION: ICD-10-CM

## 2024-08-21 PROCEDURE — 99213 OFFICE O/P EST LOW 20 MIN: CPT

## 2024-08-21 NOTE — PHYSICAL EXAM
[de-identified] : Duane is ambulating around the exam room with the use of a cane significant improved balance and gait stride Maintained RUE Shoulder ROM to 150 Abduction and FF Still having decreased left shoulder range of motion secondary to chronic glenohumeral joint arthritis rotator cuff arthropathy

## 2024-08-21 NOTE — DISCUSSION/SUMMARY
[de-identified] : 83-year-old male 5-month status post C5-C6 ACDF and T11-T12 laminectomy Duane is doing extremely well with his postoperative recovery and his cervical and thoracic myelopathy.  He currently has no complaints. He will continue his home exercises and physical therapy to improve his gait balance training I would like to see him back in 6 months for likely final set of x-rays and evaluation.  He will see me earlier if he is having any new symptoms.

## 2024-08-21 NOTE — REVIEW OF SYSTEMS
[Joint Pain] : joint pain [Joint Stiffness] : joint stiffness [Joint Swelling] : joint swelling [Negative] : Heme/Lymph [FreeTextEntry9] : back

## 2024-08-21 NOTE — HISTORY OF PRESENT ILLNESS
[de-identified] : Chief Complaint: Status post ACDF and thoracic laminectomy   History of Present Illness: Duane is now 5 months status post C5-C6 ACDF and T11-T12 laminectomy for thoracic and cervical myelopathy.  He is here today for his 3-month follow-up visit.  He continues to do extremely well he is now transitioning from the use of a walker he is now using a cane around the house and when out of the house.  He has been going to physical therapy which has improved his gait.  He is still having resolution of his right upper extremity pain his right shoulder pain and his right lower extremity symptoms.  Overall he is extremely happy with his level of progress his level of pain control and his level of function at this time.   Past medical history, past surgical history, medications, allergies, social history, and family history are as documented in our records today.  Notable items include: None   Review of Systems: I have reviewed the patient's documented Review of Systems data today, I concur with this documentation.

## 2024-09-04 ENCOUNTER — OUTPATIENT (OUTPATIENT)
Dept: OUTPATIENT SERVICES | Facility: HOSPITAL | Age: 84
LOS: 1 days | Discharge: HOME | End: 2024-09-04

## 2024-09-04 DIAGNOSIS — Z96.642 PRESENCE OF LEFT ARTIFICIAL HIP JOINT: Chronic | ICD-10-CM

## 2024-09-04 DIAGNOSIS — Z96.641 PRESENCE OF RIGHT ARTIFICIAL HIP JOINT: Chronic | ICD-10-CM

## 2024-09-04 DIAGNOSIS — C34.90 MALIGNANT NEOPLASM OF UNSPECIFIED PART OF UNSPECIFIED BRONCHUS OR LUNG: ICD-10-CM

## 2024-09-10 ENCOUNTER — APPOINTMENT (OUTPATIENT)
Age: 84
End: 2024-09-10

## 2024-09-18 ENCOUNTER — RX RENEWAL (OUTPATIENT)
Age: 84
End: 2024-09-18

## 2024-10-01 ENCOUNTER — OUTPATIENT (OUTPATIENT)
Dept: OUTPATIENT SERVICES | Facility: HOSPITAL | Age: 84
LOS: 1 days | End: 2024-10-01

## 2024-10-01 ENCOUNTER — APPOINTMENT (OUTPATIENT)
Dept: CT IMAGING | Facility: CLINIC | Age: 84
End: 2024-10-01
Payer: MEDICARE

## 2024-10-01 DIAGNOSIS — C34.90 MALIGNANT NEOPLASM OF UNSPECIFIED PART OF UNSPECIFIED BRONCHUS OR LUNG: ICD-10-CM

## 2024-10-01 DIAGNOSIS — Z96.642 PRESENCE OF LEFT ARTIFICIAL HIP JOINT: Chronic | ICD-10-CM

## 2024-10-01 DIAGNOSIS — Z96.641 PRESENCE OF RIGHT ARTIFICIAL HIP JOINT: Chronic | ICD-10-CM

## 2024-10-01 PROCEDURE — 74177 CT ABD & PELVIS W/CONTRAST: CPT | Mod: 26

## 2024-10-01 PROCEDURE — 71260 CT THORAX DX C+: CPT | Mod: 26

## 2024-10-08 ENCOUNTER — NON-APPOINTMENT (OUTPATIENT)
Age: 84
End: 2024-10-08

## 2024-10-08 ENCOUNTER — RESULT REVIEW (OUTPATIENT)
Age: 84
End: 2024-10-08

## 2024-10-08 ENCOUNTER — APPOINTMENT (OUTPATIENT)
Dept: HEMATOLOGY ONCOLOGY | Facility: CLINIC | Age: 84
End: 2024-10-08
Payer: MEDICARE

## 2024-10-08 VITALS
HEIGHT: 70 IN | SYSTOLIC BLOOD PRESSURE: 131 MMHG | WEIGHT: 175.15 LBS | DIASTOLIC BLOOD PRESSURE: 70 MMHG | BODY MASS INDEX: 25.08 KG/M2 | OXYGEN SATURATION: 95 % | HEART RATE: 55 BPM | TEMPERATURE: 97.5 F

## 2024-10-08 DIAGNOSIS — I82.409 ACUTE EMBOLISM AND THROMBOSIS OF UNSPECIFIED DEEP VEINS OF UNSPECIFIED LOWER EXTREMITY: ICD-10-CM

## 2024-10-08 DIAGNOSIS — Z29.89 ENCOUNTER. FOR OTHER SPECIFIED PROPHYLACTIC MEASURES: ICD-10-CM

## 2024-10-08 LAB
BASOPHILS # BLD AUTO: 0.1 K/UL — SIGNIFICANT CHANGE UP (ref 0–0.2)
BASOPHILS NFR BLD AUTO: 1.9 % — SIGNIFICANT CHANGE UP (ref 0–2)
EOSINOPHIL # BLD AUTO: 0.4 K/UL — SIGNIFICANT CHANGE UP (ref 0–0.5)
EOSINOPHIL NFR BLD AUTO: 5.1 % — SIGNIFICANT CHANGE UP (ref 0–6)
HCT VFR BLD CALC: 36.1 % — LOW (ref 39–50)
HGB BLD-MCNC: 11.2 G/DL — LOW (ref 13–17)
LYMPHOCYTES # BLD AUTO: 0.8 K/UL — LOW (ref 1–3.3)
LYMPHOCYTES # BLD AUTO: 11.2 % — LOW (ref 13–44)
MCHC RBC-ENTMCNC: 27.5 PG — SIGNIFICANT CHANGE UP (ref 27–34)
MCHC RBC-ENTMCNC: 31.1 G/DL — LOW (ref 32–36)
MCV RBC AUTO: 88.3 FL — SIGNIFICANT CHANGE UP (ref 80–100)
MONOCYTES # BLD AUTO: 0.7 K/UL — SIGNIFICANT CHANGE UP (ref 0–0.9)
MONOCYTES NFR BLD AUTO: 9.4 % — SIGNIFICANT CHANGE UP (ref 2–14)
NEUTROPHILS # BLD AUTO: 5.4 K/UL — SIGNIFICANT CHANGE UP (ref 1.8–7.4)
NEUTROPHILS NFR BLD AUTO: 72.4 % — SIGNIFICANT CHANGE UP (ref 43–77)
PLATELET # BLD AUTO: 261 K/UL — SIGNIFICANT CHANGE UP (ref 150–400)
RBC # BLD: 4.09 M/UL — LOW (ref 4.2–5.8)
RBC # FLD: 19.8 % — HIGH (ref 10.3–14.5)
WBC # BLD: 7.5 K/UL — SIGNIFICANT CHANGE UP (ref 3.8–10.5)
WBC # FLD AUTO: 7.5 K/UL — SIGNIFICANT CHANGE UP (ref 3.8–10.5)

## 2024-10-08 PROCEDURE — 99215 OFFICE O/P EST HI 40 MIN: CPT

## 2024-10-09 LAB
ALBUMIN SERPL ELPH-MCNC: 4.2 G/DL
ALP BLD-CCNC: 139 U/L
ALT SERPL-CCNC: 15 U/L
ANION GAP SERPL CALC-SCNC: 13 MMOL/L
AST SERPL-CCNC: 19 U/L
BILIRUB SERPL-MCNC: 0.3 MG/DL
BUN SERPL-MCNC: 19 MG/DL
CALCIUM SERPL-MCNC: 9.3 MG/DL
CEA SERPL-MCNC: 1.5 NG/ML
CHLORIDE SERPL-SCNC: 101 MMOL/L
CO2 SERPL-SCNC: 26 MMOL/L
CREAT SERPL-MCNC: 1.02 MG/DL
EGFR: 73 ML/MIN/1.73M2
GLUCOSE SERPL-MCNC: 94 MG/DL
POTASSIUM SERPL-SCNC: 4.3 MMOL/L
PROT SERPL-MCNC: 7.2 G/DL
SODIUM SERPL-SCNC: 140 MMOL/L

## 2024-10-15 ENCOUNTER — LABORATORY RESULT (OUTPATIENT)
Age: 84
End: 2024-10-15

## 2024-10-15 ENCOUNTER — OUTPATIENT (OUTPATIENT)
Dept: OUTPATIENT SERVICES | Facility: HOSPITAL | Age: 84
LOS: 1 days | End: 2024-10-15

## 2024-10-15 ENCOUNTER — APPOINTMENT (OUTPATIENT)
Dept: INTERVENTIONAL RADIOLOGY/VASCULAR | Facility: CLINIC | Age: 84
End: 2024-10-15
Payer: MEDICARE

## 2024-10-15 ENCOUNTER — RESULT REVIEW (OUTPATIENT)
Age: 84
End: 2024-10-15

## 2024-10-15 VITALS
DIASTOLIC BLOOD PRESSURE: 61 MMHG | OXYGEN SATURATION: 95 % | SYSTOLIC BLOOD PRESSURE: 143 MMHG | HEART RATE: 64 BPM | TEMPERATURE: 97.5 F | RESPIRATION RATE: 16 BRPM

## 2024-10-15 DIAGNOSIS — Z00.8 ENCOUNTER FOR OTHER GENERAL EXAMINATION: ICD-10-CM

## 2024-10-15 DIAGNOSIS — Z96.641 PRESENCE OF RIGHT ARTIFICIAL HIP JOINT: Chronic | ICD-10-CM

## 2024-10-15 DIAGNOSIS — C34.90 MALIGNANT NEOPLASM OF UNSPECIFIED PART OF UNSPECIFIED BRONCHUS OR LUNG: ICD-10-CM

## 2024-10-15 DIAGNOSIS — Z96.642 PRESENCE OF LEFT ARTIFICIAL HIP JOINT: Chronic | ICD-10-CM

## 2024-10-15 PROCEDURE — 71045 X-RAY EXAM CHEST 1 VIEW: CPT | Mod: 26

## 2024-10-15 PROCEDURE — 32555 ASPIRATE PLEURA W/ IMAGING: CPT | Mod: RT

## 2024-10-21 ENCOUNTER — RESULT REVIEW (OUTPATIENT)
Age: 84
End: 2024-10-21

## 2024-10-21 ENCOUNTER — APPOINTMENT (OUTPATIENT)
Dept: DERMATOLOGY | Facility: CLINIC | Age: 84
End: 2024-10-21
Payer: MEDICARE

## 2024-10-21 PROCEDURE — 17282 DSTR MAL LS F/E/E/N/L/M1.1-2: CPT

## 2024-10-21 PROCEDURE — 99213 OFFICE O/P EST LOW 20 MIN: CPT | Mod: 25

## 2024-10-21 PROCEDURE — 17000 DESTRUCT PREMALG LESION: CPT | Mod: 59

## 2024-10-22 ENCOUNTER — APPOINTMENT (OUTPATIENT)
Age: 84
End: 2024-10-22

## 2024-11-14 ENCOUNTER — APPOINTMENT (OUTPATIENT)
Dept: RADIATION ONCOLOGY | Facility: CLINIC | Age: 84
End: 2024-11-14
Payer: MEDICARE

## 2024-11-14 VITALS
RESPIRATION RATE: 16 BRPM | DIASTOLIC BLOOD PRESSURE: 73 MMHG | HEART RATE: 68 BPM | BODY MASS INDEX: 25.54 KG/M2 | WEIGHT: 178 LBS | SYSTOLIC BLOOD PRESSURE: 153 MMHG | OXYGEN SATURATION: 95 %

## 2024-11-14 DIAGNOSIS — C34.90 MALIGNANT NEOPLASM OF UNSPECIFIED PART OF UNSPECIFIED BRONCHUS OR LUNG: ICD-10-CM

## 2024-11-14 DIAGNOSIS — Z92.3 PERSONAL HISTORY OF IRRADIATION: ICD-10-CM

## 2024-11-14 DIAGNOSIS — R91.8 OTHER NONSPECIFIC ABNORMAL FINDING OF LUNG FIELD: ICD-10-CM

## 2024-11-14 PROCEDURE — G2211 COMPLEX E/M VISIT ADD ON: CPT

## 2024-11-14 PROCEDURE — 99212 OFFICE O/P EST SF 10 MIN: CPT

## 2024-12-12 ENCOUNTER — OUTPATIENT (OUTPATIENT)
Dept: OUTPATIENT SERVICES | Facility: HOSPITAL | Age: 84
LOS: 1 days | Discharge: HOME | End: 2024-12-12

## 2024-12-12 DIAGNOSIS — Z96.642 PRESENCE OF LEFT ARTIFICIAL HIP JOINT: Chronic | ICD-10-CM

## 2024-12-12 DIAGNOSIS — Z96.641 PRESENCE OF RIGHT ARTIFICIAL HIP JOINT: Chronic | ICD-10-CM

## 2024-12-12 DIAGNOSIS — C34.90 MALIGNANT NEOPLASM OF UNSPECIFIED PART OF UNSPECIFIED BRONCHUS OR LUNG: ICD-10-CM

## 2024-12-13 ENCOUNTER — APPOINTMENT (OUTPATIENT)
Age: 84
End: 2024-12-13

## 2024-12-13 ENCOUNTER — RESULT REVIEW (OUTPATIENT)
Age: 84
End: 2024-12-13

## 2024-12-13 ENCOUNTER — APPOINTMENT (OUTPATIENT)
Dept: HEMATOLOGY ONCOLOGY | Facility: CLINIC | Age: 84
End: 2024-12-13
Payer: MEDICARE

## 2024-12-13 VITALS
BODY MASS INDEX: 25.05 KG/M2 | WEIGHT: 175 LBS | OXYGEN SATURATION: 93 % | DIASTOLIC BLOOD PRESSURE: 50 MMHG | HEIGHT: 70 IN | HEART RATE: 59 BPM | SYSTOLIC BLOOD PRESSURE: 135 MMHG

## 2024-12-13 DIAGNOSIS — C34.90 MALIGNANT NEOPLASM OF UNSPECIFIED PART OF UNSPECIFIED BRONCHUS OR LUNG: ICD-10-CM

## 2024-12-13 DIAGNOSIS — I82.409 ACUTE EMBOLISM AND THROMBOSIS OF UNSPECIFIED DEEP VEINS OF UNSPECIFIED LOWER EXTREMITY: ICD-10-CM

## 2024-12-13 DIAGNOSIS — D50.8 OTHER IRON DEFICIENCY ANEMIAS: ICD-10-CM

## 2024-12-13 LAB
BASOPHILS # BLD AUTO: 0.1 K/UL — SIGNIFICANT CHANGE UP (ref 0–0.2)
BASOPHILS NFR BLD AUTO: 0.8 % — SIGNIFICANT CHANGE UP (ref 0–2)
EOSINOPHIL # BLD AUTO: 0.6 K/UL — HIGH (ref 0–0.5)
EOSINOPHIL NFR BLD AUTO: 6.8 % — HIGH (ref 0–6)
HCT VFR BLD CALC: 36.9 % — LOW (ref 39–50)
HGB BLD-MCNC: 11.2 G/DL — LOW (ref 13–17)
LYMPHOCYTES # BLD AUTO: 1 K/UL — SIGNIFICANT CHANGE UP (ref 1–3.3)
LYMPHOCYTES # BLD AUTO: 11.4 % — LOW (ref 13–44)
MCHC RBC-ENTMCNC: 27.4 PG — SIGNIFICANT CHANGE UP (ref 27–34)
MCHC RBC-ENTMCNC: 30.2 G/DL — LOW (ref 32–36)
MCV RBC AUTO: 90.8 FL — SIGNIFICANT CHANGE UP (ref 80–100)
MONOCYTES # BLD AUTO: 1 K/UL — HIGH (ref 0–0.9)
MONOCYTES NFR BLD AUTO: 11.5 % — SIGNIFICANT CHANGE UP (ref 2–14)
NEUTROPHILS # BLD AUTO: 6 K/UL — SIGNIFICANT CHANGE UP (ref 1.8–7.4)
NEUTROPHILS NFR BLD AUTO: 69.5 % — SIGNIFICANT CHANGE UP (ref 43–77)
PLATELET # BLD AUTO: 271 K/UL — SIGNIFICANT CHANGE UP (ref 150–400)
RBC # BLD: 4.07 M/UL — LOW (ref 4.2–5.8)
RBC # FLD: 17.8 % — HIGH (ref 10.3–14.5)
WBC # BLD: 8.6 K/UL — SIGNIFICANT CHANGE UP (ref 3.8–10.5)
WBC # FLD AUTO: 8.6 K/UL — SIGNIFICANT CHANGE UP (ref 3.8–10.5)

## 2024-12-13 PROCEDURE — 99214 OFFICE O/P EST MOD 30 MIN: CPT

## 2024-12-16 ENCOUNTER — APPOINTMENT (OUTPATIENT)
Dept: UROLOGY | Facility: CLINIC | Age: 84
End: 2024-12-16

## 2024-12-16 LAB
ALBUMIN SERPL ELPH-MCNC: 3.7 G/DL
ALP BLD-CCNC: 125 U/L
ALT SERPL-CCNC: 12 U/L
ANION GAP SERPL CALC-SCNC: 13 MMOL/L
AST SERPL-CCNC: 14 U/L
BILIRUB SERPL-MCNC: 0.3 MG/DL
BUN SERPL-MCNC: 21 MG/DL
CALCIUM SERPL-MCNC: 9.1 MG/DL
CEA SERPL-MCNC: 1.4 NG/ML
CHLORIDE SERPL-SCNC: 105 MMOL/L
CO2 SERPL-SCNC: 25 MMOL/L
CREAT SERPL-MCNC: 1.18 MG/DL
EGFR: 61 ML/MIN/1.73M2
GLUCOSE SERPL-MCNC: 108 MG/DL
POTASSIUM SERPL-SCNC: 4.9 MMOL/L
PROT SERPL-MCNC: 6.8 G/DL
SODIUM SERPL-SCNC: 143 MMOL/L

## 2025-01-17 ENCOUNTER — OUTPATIENT (OUTPATIENT)
Dept: OUTPATIENT SERVICES | Facility: HOSPITAL | Age: 85
LOS: 1 days | End: 2025-01-17
Payer: MEDICARE

## 2025-01-17 ENCOUNTER — APPOINTMENT (OUTPATIENT)
Dept: CT IMAGING | Facility: CLINIC | Age: 85
End: 2025-01-17

## 2025-01-17 DIAGNOSIS — Z96.642 PRESENCE OF LEFT ARTIFICIAL HIP JOINT: Chronic | ICD-10-CM

## 2025-01-17 DIAGNOSIS — Z96.641 PRESENCE OF RIGHT ARTIFICIAL HIP JOINT: Chronic | ICD-10-CM

## 2025-01-17 DIAGNOSIS — C34.90 MALIGNANT NEOPLASM OF UNSPECIFIED PART OF UNSPECIFIED BRONCHUS OR LUNG: ICD-10-CM

## 2025-01-17 PROCEDURE — 71260 CT THORAX DX C+: CPT | Mod: 26

## 2025-01-17 PROCEDURE — 74177 CT ABD & PELVIS W/CONTRAST: CPT | Mod: 26

## 2025-01-28 ENCOUNTER — RESULT REVIEW (OUTPATIENT)
Age: 85
End: 2025-01-28

## 2025-01-28 ENCOUNTER — APPOINTMENT (OUTPATIENT)
Dept: HEMATOLOGY ONCOLOGY | Facility: CLINIC | Age: 85
End: 2025-01-28
Payer: MEDICARE

## 2025-01-28 ENCOUNTER — APPOINTMENT (OUTPATIENT)
Age: 85
End: 2025-01-28

## 2025-01-28 ENCOUNTER — NON-APPOINTMENT (OUTPATIENT)
Age: 85
End: 2025-01-28

## 2025-01-28 VITALS
HEART RATE: 86 BPM | DIASTOLIC BLOOD PRESSURE: 61 MMHG | WEIGHT: 168.65 LBS | BODY MASS INDEX: 24.14 KG/M2 | SYSTOLIC BLOOD PRESSURE: 100 MMHG | TEMPERATURE: 97.7 F | OXYGEN SATURATION: 97 % | HEIGHT: 70 IN

## 2025-01-28 DIAGNOSIS — I82.409 ACUTE EMBOLISM AND THROMBOSIS OF UNSPECIFIED DEEP VEINS OF UNSPECIFIED LOWER EXTREMITY: ICD-10-CM

## 2025-01-28 DIAGNOSIS — D50.8 OTHER IRON DEFICIENCY ANEMIAS: ICD-10-CM

## 2025-01-28 LAB
ALBUMIN SERPL ELPH-MCNC: 3.2 G/DL — LOW (ref 3.3–5)
ALP SERPL-CCNC: 113 U/L — SIGNIFICANT CHANGE UP (ref 40–120)
ALT FLD-CCNC: 35 U/L — SIGNIFICANT CHANGE UP (ref 10–45)
ANION GAP SERPL CALC-SCNC: 12 MMOL/L — SIGNIFICANT CHANGE UP (ref 5–17)
AST SERPL-CCNC: 44 U/L — HIGH (ref 10–40)
BILIRUB SERPL-MCNC: 0.3 MG/DL — SIGNIFICANT CHANGE UP (ref 0.2–1.2)
BUN SERPL-MCNC: 41 MG/DL — HIGH (ref 7–23)
CALCIUM SERPL-MCNC: 9.2 MG/DL — SIGNIFICANT CHANGE UP (ref 8.4–10.5)
CEA SERPL-MCNC: 2.1 NG/ML — SIGNIFICANT CHANGE UP (ref 0–3.8)
CHLORIDE SERPL-SCNC: 104 MMOL/L — SIGNIFICANT CHANGE UP (ref 96–108)
CO2 SERPL-SCNC: 26 MMOL/L — SIGNIFICANT CHANGE UP (ref 22–31)
CREAT SERPL-MCNC: 1.63 MG/DL — HIGH (ref 0.5–1.3)
EGFR: 42 ML/MIN/1.73M2 — LOW
GLUCOSE SERPL-MCNC: 100 MG/DL — HIGH (ref 70–99)
HCT VFR BLD CALC: 36.2 % — LOW (ref 39–50)
HGB BLD-MCNC: 11.2 G/DL — LOW (ref 13–17)
MCHC RBC-ENTMCNC: 27.4 PG — SIGNIFICANT CHANGE UP (ref 27–34)
MCHC RBC-ENTMCNC: 30.8 G/DL — LOW (ref 32–36)
MCV RBC AUTO: 88.9 FL — SIGNIFICANT CHANGE UP (ref 80–100)
PLATELET # BLD AUTO: 315 K/UL — SIGNIFICANT CHANGE UP (ref 150–400)
POTASSIUM SERPL-MCNC: 4.2 MMOL/L — SIGNIFICANT CHANGE UP (ref 3.5–5.3)
POTASSIUM SERPL-SCNC: 4.2 MMOL/L — SIGNIFICANT CHANGE UP (ref 3.5–5.3)
PROT SERPL-MCNC: 7.1 G/DL — SIGNIFICANT CHANGE UP (ref 6–8.3)
RBC # BLD: 4.08 M/UL — LOW (ref 4.2–5.8)
RBC # FLD: 15.5 % — HIGH (ref 10.3–14.5)
SODIUM SERPL-SCNC: 141 MMOL/L — SIGNIFICANT CHANGE UP (ref 135–145)
WBC # BLD: 9.4 K/UL — SIGNIFICANT CHANGE UP (ref 3.8–10.5)
WBC # FLD AUTO: 9.4 K/UL — SIGNIFICANT CHANGE UP (ref 3.8–10.5)

## 2025-01-28 PROCEDURE — 99215 OFFICE O/P EST HI 40 MIN: CPT

## 2025-01-28 NOTE — BRIEF OPERATIVE NOTE - NSICDXBRIEFPROCEDURE_GEN_ALL_CORE_FT
28-Jan-2025 15:43
PROCEDURES:  Anterior cervical discectomy and fusion (ACDF) at single level 27-Mar-2024 17:33:55  Edwin Gonzalez  Thoracic laminotomy 27-Mar-2024 17:34:02  Edwin Gonzalez

## 2025-02-10 ENCOUNTER — NON-APPOINTMENT (OUTPATIENT)
Age: 85
End: 2025-02-10

## 2025-02-11 ENCOUNTER — APPOINTMENT (OUTPATIENT)
Dept: THORACIC SURGERY | Facility: CLINIC | Age: 85
End: 2025-02-11
Payer: MEDICARE

## 2025-02-11 VITALS
WEIGHT: 171 LBS | OXYGEN SATURATION: 95 % | HEIGHT: 70 IN | DIASTOLIC BLOOD PRESSURE: 66 MMHG | TEMPERATURE: 97.8 F | BODY MASS INDEX: 24.48 KG/M2 | HEART RATE: 95 BPM | SYSTOLIC BLOOD PRESSURE: 117 MMHG

## 2025-02-11 DIAGNOSIS — C34.90 MALIGNANT NEOPLASM OF UNSPECIFIED PART OF UNSPECIFIED BRONCHUS OR LUNG: ICD-10-CM

## 2025-02-11 PROCEDURE — 99214 OFFICE O/P EST MOD 30 MIN: CPT

## 2025-02-21 ENCOUNTER — APPOINTMENT (OUTPATIENT)
Dept: ORTHOPEDIC SURGERY | Facility: CLINIC | Age: 85
End: 2025-02-21
Payer: MEDICARE

## 2025-02-21 VITALS
HEART RATE: 80 BPM | WEIGHT: 175 LBS | HEIGHT: 70 IN | DIASTOLIC BLOOD PRESSURE: 66 MMHG | SYSTOLIC BLOOD PRESSURE: 120 MMHG | BODY MASS INDEX: 25.05 KG/M2

## 2025-02-21 DIAGNOSIS — G95.9 DISEASE OF SPINAL CORD, UNSPECIFIED: ICD-10-CM

## 2025-02-21 DIAGNOSIS — M47.14 OTHER SPONDYLOSIS WITH MYELOPATHY, THORACIC REGION: ICD-10-CM

## 2025-02-21 PROCEDURE — 99213 OFFICE O/P EST LOW 20 MIN: CPT

## 2025-02-28 ENCOUNTER — OUTPATIENT (OUTPATIENT)
Dept: OUTPATIENT SERVICES | Facility: HOSPITAL | Age: 85
LOS: 1 days | Discharge: HOME | End: 2025-02-28

## 2025-02-28 DIAGNOSIS — Z96.641 PRESENCE OF RIGHT ARTIFICIAL HIP JOINT: Chronic | ICD-10-CM

## 2025-02-28 DIAGNOSIS — Z96.642 PRESENCE OF LEFT ARTIFICIAL HIP JOINT: Chronic | ICD-10-CM

## 2025-02-28 DIAGNOSIS — C34.90 MALIGNANT NEOPLASM OF UNSPECIFIED PART OF UNSPECIFIED BRONCHUS OR LUNG: ICD-10-CM

## 2025-03-03 ENCOUNTER — NON-APPOINTMENT (OUTPATIENT)
Age: 85
End: 2025-03-03

## 2025-03-04 ENCOUNTER — RESULT REVIEW (OUTPATIENT)
Age: 85
End: 2025-03-04

## 2025-03-04 ENCOUNTER — INPATIENT (INPATIENT)
Facility: HOSPITAL | Age: 85
LOS: 17 days | Discharge: HOME | DRG: 871 | End: 2025-03-22
Attending: STUDENT IN AN ORGANIZED HEALTH CARE EDUCATION/TRAINING PROGRAM | Admitting: STUDENT IN AN ORGANIZED HEALTH CARE EDUCATION/TRAINING PROGRAM
Payer: MEDICARE

## 2025-03-04 VITALS
RESPIRATION RATE: 20 BRPM | DIASTOLIC BLOOD PRESSURE: 60 MMHG | TEMPERATURE: 98 F | HEART RATE: 101 BPM | WEIGHT: 179.9 LBS | OXYGEN SATURATION: 100 % | HEIGHT: 70 IN | SYSTOLIC BLOOD PRESSURE: 90 MMHG

## 2025-03-04 DIAGNOSIS — J96.01 ACUTE RESPIRATORY FAILURE WITH HYPOXIA: ICD-10-CM

## 2025-03-04 DIAGNOSIS — Z96.641 PRESENCE OF RIGHT ARTIFICIAL HIP JOINT: Chronic | ICD-10-CM

## 2025-03-04 DIAGNOSIS — Z96.642 PRESENCE OF LEFT ARTIFICIAL HIP JOINT: Chronic | ICD-10-CM

## 2025-03-04 LAB
ALBUMIN SERPL ELPH-MCNC: 2.8 G/DL — LOW (ref 3.3–5.2)
ALP SERPL-CCNC: 127 U/L — HIGH (ref 40–120)
ALT FLD-CCNC: 80 U/L — HIGH
ANION GAP SERPL CALC-SCNC: 13 MMOL/L — SIGNIFICANT CHANGE UP (ref 5–17)
APPEARANCE UR: ABNORMAL
APPEARANCE UR: ABNORMAL
APTT BLD: 27.5 SEC — SIGNIFICANT CHANGE UP (ref 24.5–35.6)
AST SERPL-CCNC: 54 U/L — HIGH
BACTERIA # UR AUTO: ABNORMAL /HPF
BACTERIA # UR AUTO: ABNORMAL /HPF
BASOPHILS # BLD AUTO: 0.02 K/UL — SIGNIFICANT CHANGE UP (ref 0–0.2)
BASOPHILS NFR BLD AUTO: 0.2 % — SIGNIFICANT CHANGE UP (ref 0–2)
BILIRUB SERPL-MCNC: 0.4 MG/DL — SIGNIFICANT CHANGE UP (ref 0.4–2)
BILIRUB UR-MCNC: NEGATIVE — SIGNIFICANT CHANGE UP
BILIRUB UR-MCNC: NEGATIVE — SIGNIFICANT CHANGE UP
BUN SERPL-MCNC: 65.7 MG/DL — HIGH (ref 8–20)
CALCIUM SERPL-MCNC: 8.7 MG/DL — SIGNIFICANT CHANGE UP (ref 8.4–10.5)
CAST: 1 /LPF — SIGNIFICANT CHANGE UP (ref 0–4)
CHLORIDE SERPL-SCNC: 98 MMOL/L — SIGNIFICANT CHANGE UP (ref 96–108)
CO2 SERPL-SCNC: 25 MMOL/L — SIGNIFICANT CHANGE UP (ref 22–29)
COLOR SPEC: YELLOW — SIGNIFICANT CHANGE UP
CREAT ?TM UR-MCNC: 27 MG/DL — SIGNIFICANT CHANGE UP
CREAT SERPL-MCNC: 2.06 MG/DL — HIGH (ref 0.5–1.3)
DIFF PNL FLD: ABNORMAL
DIFF PNL FLD: ABNORMAL
EGFR: 31 ML/MIN/1.73M2 — LOW
EGFR: 31 ML/MIN/1.73M2 — LOW
EOSINOPHIL # BLD AUTO: 0.01 K/UL — SIGNIFICANT CHANGE UP (ref 0–0.5)
EOSINOPHIL NFR BLD AUTO: 0.1 % — SIGNIFICANT CHANGE UP (ref 0–6)
FLUAV AG NPH QL: SIGNIFICANT CHANGE UP
FLUBV AG NPH QL: SIGNIFICANT CHANGE UP
GLUCOSE SERPL-MCNC: 120 MG/DL — HIGH (ref 70–99)
GLUCOSE UR QL: NEGATIVE MG/DL — SIGNIFICANT CHANGE UP
HCT VFR BLD CALC: 30.8 % — LOW (ref 39–50)
HGB BLD-MCNC: 9.6 G/DL — LOW (ref 13–17)
IMM GRANULOCYTES # BLD AUTO: 0.15 K/UL — HIGH (ref 0–0.07)
IMM GRANULOCYTES NFR BLD AUTO: 1.3 % — HIGH (ref 0–0.9)
INR BLD: 1.21 RATIO — HIGH (ref 0.85–1.16)
KETONES UR-MCNC: NEGATIVE MG/DL — SIGNIFICANT CHANGE UP
LEUKOCYTE ESTERASE UR-ACNC: ABNORMAL
LEUKOCYTE ESTERASE UR-ACNC: ABNORMAL
LYMPHOCYTES # BLD AUTO: 0.59 K/UL — LOW (ref 1–3.3)
LYMPHOCYTES NFR BLD AUTO: 5.1 % — LOW (ref 13–44)
MCHC RBC-ENTMCNC: 26.8 PG — LOW (ref 27–34)
MCHC RBC-ENTMCNC: 31.2 G/DL — LOW (ref 32–36)
MCV RBC AUTO: 86 FL — SIGNIFICANT CHANGE UP (ref 80–100)
MONOCYTES # BLD AUTO: 1.3 K/UL — HIGH (ref 0–0.9)
MONOCYTES NFR BLD AUTO: 11.2 % — SIGNIFICANT CHANGE UP (ref 2–14)
NEUTROPHILS # BLD AUTO: 9.54 K/UL — HIGH (ref 1.8–7.4)
NEUTROPHILS NFR BLD AUTO: 82.1 % — HIGH (ref 43–77)
NITRITE UR-MCNC: NEGATIVE — SIGNIFICANT CHANGE UP
NITRITE UR-MCNC: NEGATIVE — SIGNIFICANT CHANGE UP
NRBC # BLD AUTO: 0 K/UL — SIGNIFICANT CHANGE UP (ref 0–0)
NRBC # FLD: 0 K/UL — SIGNIFICANT CHANGE UP (ref 0–0)
NRBC BLD AUTO-RTO: 0 /100 WBCS — SIGNIFICANT CHANGE UP (ref 0–0)
NT-PROBNP SERPL-SCNC: 9214 PG/ML — HIGH (ref 0–300)
OSMOLALITY UR: 315 MOSM/KG — SIGNIFICANT CHANGE UP (ref 300–1000)
PH UR: 6 — SIGNIFICANT CHANGE UP (ref 5–8)
PH UR: 6.5 — SIGNIFICANT CHANGE UP (ref 5–8)
PLATELET # BLD AUTO: 332 K/UL — SIGNIFICANT CHANGE UP (ref 150–400)
PMV BLD: 9.9 FL — SIGNIFICANT CHANGE UP (ref 7–13)
POTASSIUM SERPL-SCNC: 4.5 MMOL/L — SIGNIFICANT CHANGE UP (ref 3.5–5.3)
POTASSIUM UR-SCNC: 16 MMOL/L — SIGNIFICANT CHANGE UP
PROT ?TM UR-MCNC: 54 MG/DL — HIGH (ref 0–12)
PROT SERPL-MCNC: 6.9 G/DL — SIGNIFICANT CHANGE UP (ref 6.6–8.7)
PROT UR-MCNC: NEGATIVE MG/DL — SIGNIFICANT CHANGE UP
PROT UR-MCNC: SIGNIFICANT CHANGE UP MG/DL
PROT/CREAT UR-RTO: 2 RATIO — HIGH
PROTHROM AB SERPL-ACNC: 13.6 SEC — HIGH (ref 9.9–13.4)
RBC # BLD: 3.58 M/UL — LOW (ref 4.2–5.8)
RBC # FLD: 18.4 % — HIGH (ref 10.3–14.5)
RBC CASTS # UR COMP ASSIST: 6 /HPF — HIGH (ref 0–4)
RSV RNA NPH QL NAA+NON-PROBE: SIGNIFICANT CHANGE UP
SARS-COV-2 RNA SPEC QL NAA+PROBE: SIGNIFICANT CHANGE UP
SODIUM SERPL-SCNC: 136 MMOL/L — SIGNIFICANT CHANGE UP (ref 135–145)
SODIUM UR-SCNC: 90 MMOL/L — SIGNIFICANT CHANGE UP
SP GR SPEC: 1.01 — SIGNIFICANT CHANGE UP (ref 1–1.03)
SP GR SPEC: 1.01 — SIGNIFICANT CHANGE UP (ref 1–1.03)
SQUAMOUS # UR AUTO: 0 /HPF — SIGNIFICANT CHANGE UP (ref 0–5)
SQUAMOUS # UR AUTO: 2 /HPF — SIGNIFICANT CHANGE UP (ref 0–5)
TROPONIN T, HIGH SENSITIVITY RESULT: 60 NG/L — HIGH (ref 0–51)
TROPONIN T, HIGH SENSITIVITY RESULT: 66 NG/L — HIGH (ref 0–51)
UROBILINOGEN FLD QL: 0.2 MG/DL — SIGNIFICANT CHANGE UP (ref 0.2–1)
UROBILINOGEN FLD QL: 0.2 MG/DL — SIGNIFICANT CHANGE UP (ref 0.2–1)
WBC # BLD: 11.61 K/UL — HIGH (ref 3.8–10.5)
WBC # FLD AUTO: 11.61 K/UL — HIGH (ref 3.8–10.5)
WBC UR QL: 257 /HPF — HIGH (ref 0–5)
WBC UR QL: >998 /HPF — HIGH (ref 0–5)

## 2025-03-04 PROCEDURE — 99285 EMERGENCY DEPT VISIT HI MDM: CPT

## 2025-03-04 PROCEDURE — 93010 ELECTROCARDIOGRAM REPORT: CPT

## 2025-03-04 PROCEDURE — 71275 CT ANGIOGRAPHY CHEST: CPT | Mod: 26

## 2025-03-04 PROCEDURE — 99223 1ST HOSP IP/OBS HIGH 75: CPT

## 2025-03-04 PROCEDURE — 93306 TTE W/DOPPLER COMPLETE: CPT | Mod: 26

## 2025-03-04 PROCEDURE — 71045 X-RAY EXAM CHEST 1 VIEW: CPT | Mod: 26

## 2025-03-04 PROCEDURE — 74176 CT ABD & PELVIS W/O CONTRAST: CPT | Mod: 26

## 2025-03-04 RX ORDER — ATORVASTATIN CALCIUM 80 MG/1
40 TABLET, FILM COATED ORAL AT BEDTIME
Refills: 0 | Status: DISCONTINUED | OUTPATIENT
Start: 2025-03-04 | End: 2025-03-22

## 2025-03-04 RX ORDER — MAGNESIUM, ALUMINUM HYDROXIDE 200-200 MG
30 TABLET,CHEWABLE ORAL EVERY 4 HOURS
Refills: 0 | Status: DISCONTINUED | OUTPATIENT
Start: 2025-03-04 | End: 2025-03-22

## 2025-03-04 RX ORDER — FUROSEMIDE 10 MG/ML
40 INJECTION INTRAMUSCULAR; INTRAVENOUS DAILY
Refills: 0 | Status: DISCONTINUED | OUTPATIENT
Start: 2025-03-04 | End: 2025-03-07

## 2025-03-04 RX ORDER — HEPARIN SODIUM 1000 [USP'U]/ML
5000 INJECTION INTRAVENOUS; SUBCUTANEOUS EVERY 12 HOURS
Refills: 0 | Status: DISCONTINUED | OUTPATIENT
Start: 2025-03-04 | End: 2025-03-04

## 2025-03-04 RX ORDER — AZITHROMYCIN 250 MG
CAPSULE ORAL
Refills: 0 | Status: DISCONTINUED | OUTPATIENT
Start: 2025-03-04 | End: 2025-03-06

## 2025-03-04 RX ORDER — CEFTRIAXONE 500 MG/1
2000 INJECTION, POWDER, FOR SOLUTION INTRAMUSCULAR; INTRAVENOUS EVERY 24 HOURS
Refills: 0 | Status: DISCONTINUED | OUTPATIENT
Start: 2025-03-04 | End: 2025-03-04

## 2025-03-04 RX ORDER — METOPROLOL SUCCINATE 50 MG/1
50 TABLET, EXTENDED RELEASE ORAL DAILY
Refills: 0 | Status: DISCONTINUED | OUTPATIENT
Start: 2025-03-04 | End: 2025-03-04

## 2025-03-04 RX ORDER — ONDANSETRON HCL/PF 4 MG/2 ML
4 VIAL (ML) INJECTION EVERY 8 HOURS
Refills: 0 | Status: DISCONTINUED | OUTPATIENT
Start: 2025-03-04 | End: 2025-03-22

## 2025-03-04 RX ORDER — HEPARIN SODIUM 1000 [USP'U]/ML
INJECTION INTRAVENOUS; SUBCUTANEOUS
Qty: 25000 | Refills: 0 | Status: DISCONTINUED | OUTPATIENT
Start: 2025-03-04 | End: 2025-03-07

## 2025-03-04 RX ORDER — CEFTRIAXONE 500 MG/1
2000 INJECTION, POWDER, FOR SOLUTION INTRAMUSCULAR; INTRAVENOUS EVERY 24 HOURS
Refills: 0 | Status: COMPLETED | OUTPATIENT
Start: 2025-03-04 | End: 2025-03-13

## 2025-03-04 RX ORDER — TAMSULOSIN HYDROCHLORIDE 0.4 MG/1
0.8 CAPSULE ORAL AT BEDTIME
Refills: 0 | Status: DISCONTINUED | OUTPATIENT
Start: 2025-03-04 | End: 2025-03-14

## 2025-03-04 RX ORDER — TIOTROPIUM BROMIDE INHALATION SPRAY 3.12 UG/1
2 SPRAY, METERED RESPIRATORY (INHALATION) DAILY
Refills: 0 | Status: DISCONTINUED | OUTPATIENT
Start: 2025-03-04 | End: 2025-03-22

## 2025-03-04 RX ORDER — ASPIRIN 325 MG
81 TABLET ORAL DAILY
Refills: 0 | Status: DISCONTINUED | OUTPATIENT
Start: 2025-03-04 | End: 2025-03-22

## 2025-03-04 RX ORDER — AZITHROMYCIN 250 MG
500 CAPSULE ORAL EVERY 24 HOURS
Refills: 0 | Status: DISCONTINUED | OUTPATIENT
Start: 2025-03-05 | End: 2025-03-06

## 2025-03-04 RX ORDER — AZITHROMYCIN 250 MG
500 CAPSULE ORAL ONCE
Refills: 0 | Status: COMPLETED | OUTPATIENT
Start: 2025-03-04 | End: 2025-03-04

## 2025-03-04 RX ORDER — CLOPIDOGREL BISULFATE 75 MG/1
75 TABLET, FILM COATED ORAL DAILY
Refills: 0 | Status: DISCONTINUED | OUTPATIENT
Start: 2025-03-04 | End: 2025-03-04

## 2025-03-04 RX ORDER — MELATONIN 5 MG
3 TABLET ORAL AT BEDTIME
Refills: 0 | Status: DISCONTINUED | OUTPATIENT
Start: 2025-03-04 | End: 2025-03-22

## 2025-03-04 RX ORDER — ACETAMINOPHEN 500 MG/5ML
650 LIQUID (ML) ORAL EVERY 6 HOURS
Refills: 0 | Status: DISCONTINUED | OUTPATIENT
Start: 2025-03-04 | End: 2025-03-22

## 2025-03-04 RX ORDER — HEPARIN SODIUM 1000 [USP'U]/ML
6500 INJECTION INTRAVENOUS; SUBCUTANEOUS EVERY 6 HOURS
Refills: 0 | Status: DISCONTINUED | OUTPATIENT
Start: 2025-03-04 | End: 2025-03-07

## 2025-03-04 RX ORDER — HEPARIN SODIUM 1000 [USP'U]/ML
3000 INJECTION INTRAVENOUS; SUBCUTANEOUS EVERY 6 HOURS
Refills: 0 | Status: DISCONTINUED | OUTPATIENT
Start: 2025-03-04 | End: 2025-03-07

## 2025-03-04 RX ORDER — CEFTRIAXONE 500 MG/1
1000 INJECTION, POWDER, FOR SOLUTION INTRAMUSCULAR; INTRAVENOUS ONCE
Refills: 0 | Status: COMPLETED | OUTPATIENT
Start: 2025-03-04 | End: 2025-03-04

## 2025-03-04 RX ORDER — METOPROLOL SUCCINATE 50 MG/1
50 TABLET, EXTENDED RELEASE ORAL
Refills: 0 | Status: DISCONTINUED | OUTPATIENT
Start: 2025-03-04 | End: 2025-03-07

## 2025-03-04 RX ADMIN — Medication 3 MILLIGRAM(S): at 21:49

## 2025-03-04 RX ADMIN — Medication 1000 MILLILITER(S): at 12:44

## 2025-03-04 RX ADMIN — METOPROLOL SUCCINATE 50 MILLIGRAM(S): 50 TABLET, EXTENDED RELEASE ORAL at 19:09

## 2025-03-04 RX ADMIN — FUROSEMIDE 40 MILLIGRAM(S): 10 INJECTION INTRAMUSCULAR; INTRAVENOUS at 17:02

## 2025-03-04 RX ADMIN — CEFTRIAXONE 2000 MILLIGRAM(S): 500 INJECTION, POWDER, FOR SOLUTION INTRAMUSCULAR; INTRAVENOUS at 21:50

## 2025-03-04 RX ADMIN — CEFTRIAXONE 1000 MILLIGRAM(S): 500 INJECTION, POWDER, FOR SOLUTION INTRAMUSCULAR; INTRAVENOUS at 15:10

## 2025-03-04 RX ADMIN — Medication 250 MILLIGRAM(S): at 15:48

## 2025-03-04 RX ADMIN — TAMSULOSIN HYDROCHLORIDE 0.8 MILLIGRAM(S): 0.4 CAPSULE ORAL at 21:49

## 2025-03-04 RX ADMIN — HEPARIN SODIUM 1400 UNIT(S)/HR: 1000 INJECTION INTRAVENOUS; SUBCUTANEOUS at 19:52

## 2025-03-04 RX ADMIN — Medication 1 DOSE(S): at 21:48

## 2025-03-04 RX ADMIN — ATORVASTATIN CALCIUM 40 MILLIGRAM(S): 80 TABLET, FILM COATED ORAL at 21:49

## 2025-03-04 RX ADMIN — HEPARIN SODIUM 1400 UNIT(S)/HR: 1000 INJECTION INTRAVENOUS; SUBCUTANEOUS at 18:09

## 2025-03-04 NOTE — ED ADULT NURSE NOTE - OBJECTIVE STATEMENT
Pt BIBA from home c/o SOB and intractable hiccups for the last few days. Pt denies h/o Afib. Was on Eliquis at one point but stopped taking it months ago.

## 2025-03-04 NOTE — CONSULT NOTE ADULT - ASSESSMENT
Patient is an 85 yo man with chronic hypertension, moderate CAD on remote [facility] with normal perfusion on PET MPI 2022, chronic RBBB/LAFB, frequent unifocal ventricular ectopy, lung cancer s/p radiation and chemotherapy with course complicated by DVT s/p several months systemic AC who presents with several days progressive worsening dyspnea without reported concurrent chest pain, dizziness and palpitations. He is s/p CTA which was negative for acute pulmonary embolism however with pleural effusion and upper lobe consolidation with air bronchograms. Tele with paroxsymal atrial fibrillation with frequent ventricular ectopic beats.     Dyspnea/hypoxic respiratory failure  - troponin flat (60) and proBNP 9214  - Suspect multifactorial including known lung CA, and on CT chest with pleural effusion and upper lobe consolidation with air bronchograms. Additionally, suspect component of HF in the setting of arrhythmia   - recommend lasix 40mg IV daily with close monitoring of renal indices  - increase metoprolol succinate to 50mg Q12  - heparin gtt given thromboembolic risk and PAF on tele   - continue ASA 81mg and hold plavix for now  - continue statin therapy  - TTE recommended   - consider procalcitonin given consolidation with air bronchogram   - strict I/O and daily weights  - tele   - additional recommendations pending clinical course and results of diagnostic testing  - recommend admission

## 2025-03-04 NOTE — H&P ADULT - NSHPPHYSICALEXAM_GEN_ALL_CORE
Gen : Non toxic appearing, comfortable, NAD, Elderly male, on NC saturating well.   HEENT : NCAT, MMM  CVS : S1S2, Irregular rate and rhythm, HELDER +ve  Resp : CTA b/l, Mild crackles Bibasilar bases appreciated   Abd : Soft, non distended, non tender, BS +ve   MSK : +ve LE edema  Neuro : CN II-XII intact, no focal motor or sensory deficits   Psych : Pleasant, no anxiety, normal affect    Vital Signs Last 24 Hrs  T(C): 36.5 (04 Mar 2025 15:21), Max: 36.8 (04 Mar 2025 09:50)  T(F): 97.7 (04 Mar 2025 15:21), Max: 98.2 (04 Mar 2025 09:50)  HR: 108 (04 Mar 2025 15:21) (101 - 112)  BP: 106/67 (04 Mar 2025 15:21) (90/60 - 111/57)  BP(mean): 81 (04 Mar 2025 15:21) (81 - 81)  RR: 19 (04 Mar 2025 15:21) (19 - 20)  SpO2: 92% (04 Mar 2025 15:21) (92% - 100%)    Parameters below as of 04 Mar 2025 15:21  Patient On (Oxygen Delivery Method): room air

## 2025-03-04 NOTE — ED ADULT NURSE NOTE - NSFALLUNIVINTERV_ED_ALL_ED
Bed/Stretcher in lowest position, wheels locked, appropriate side rails in place/Call bell, personal items and telephone in reach/Instruct patient to call for assistance before getting out of bed/chair/stretcher/Non-slip footwear applied when patient is off stretcher/[place] to call system/Physically safe environment - no spills, clutter or unnecessary equipment/Purposeful proactive rounding/Room/bathroom lighting operational, light cord in reach

## 2025-03-04 NOTE — PATIENT PROFILE ADULT - FALL HARM RISK - HARM RISK INTERVENTIONS

## 2025-03-04 NOTE — PATIENT PROFILE ADULT - FUNCTIONAL ASSESSMENT - BASIC MOBILITY 6.
3-calculated by average/Not able to assess (calculate score using [facility] averaging method)

## 2025-03-04 NOTE — ED PROVIDER NOTE - CLINICAL SUMMARY MEDICAL DECISION MAKING FREE TEXT BOX
85 y/o M w/ PMH lung cancer (on immunotherapy), DVT not AC, HTN, and CAD presenting for increasing SOB for the last 4 to 5 days. Per wife, Schedule for PET scan last week, was unable to go for continued fatigue, states has had progressive SOB for the last few days. States for the last week has had continued hiccups. Physical exam with evidence of Increase WOB. Concern for PE given hx of DVT, CA hx without current AC use. Will obtain labs, imaging. To be admit.

## 2025-03-04 NOTE — ED ADULT TRIAGE NOTE - CHIEF COMPLAINT QUOTE
pt states he has lung ca and has been having increased periods SOB and DE over the past few days. per ems, spos in 80's on RA, placed on o2 6l nc with improvement. pt denies fever, chills, cough

## 2025-03-04 NOTE — ED ADULT NURSE REASSESSMENT NOTE - NS ED NURSE REASSESS COMMENT FT1
transfer of care from JAQUELIN ENRIQUEZ at 1130, report received, family at bedside. pt received on CCM/PO. trop and urine sent, pending results. fluids running. pt transported to CT 1310. pending results and Cardiology consult.

## 2025-03-04 NOTE — PATIENT PROFILE ADULT - NSPRESCRALCFREQ_GEN_A_NUR
This RN noticed multiple small blisters inside pt's R elbow. Pt has no c/o pain. Will continue to monitor. Never

## 2025-03-04 NOTE — H&P ADULT - HISTORY OF PRESENT ILLNESS
83 y/o M w/ PMH lung cancer (on immunotherapy), DVT not AC, HTN, copd not on home O2 and CAD presenting for increasing SOB for the last 1 week. Per wife, Schedule for PET scan last week, was unable to go for continued fatigue,  has had progressive SOB for the last few days. Also endorses that he has been having increased urinary frequency though no dysuria. Family reached out to Dr. Modi office about symptoms yesterday and was told to present to hospital.  has a hx of Plueral effusion requiring drainage, last drainage last october, 1.5 liters drained. last radiation 1 year ago. Per wife and daughter at bedside, he has been more lethargic as well. He denies any recent fevers, chills, dizziness, sick contacts, n/v/d/ constipation.    In ED, Vitals BP 90/60, , temp 98.2. Hypoxic on arrival requiring o2. lab showed Leukocytosis, bandemia, Trop 66--> 60, Bun/Crea 65/2.06, PBNP >9K. UA grossly positive. RVP negative. CTA PE negative for acute PE though shows some pleural effusion on right similar in appearance to previous CT.  Patient was given 1 L bolus and ceft/azithromycin. Cardiology consulted for elevated trop. Patient is admitted for acute hypoxic respiratory and UTI.

## 2025-03-04 NOTE — ED PROVIDER NOTE - ATTENDING CONTRIBUTION TO CARE
I personally saw the patient with the resident, and completed the key components of the history and physical exam. I then discussed the management plan with the resident.    85 y/o M with PMH lung Ca, DVT not on AC, HTN, CAD presents for increasing SOB x 4-5 days, found to be hypoxic today, does not usually require O2. Stopped his Eliquis 7 months ago. He has a history of pleural effusion requiring thoracentesis in October. Also complaining of 1 week of intractable hiccups. Onc: Ly; Cards: [place]    NAD, tachycardic, irregularly irregular rate and rhythm. lungs CTA B/L, satting 94% on 3L NC, abd soft, NT/ND, no LE edema, 2+ symmetrical distal pulses, no calf TTP.    Patient in new A fib, hypotensive and hypoxic - known DVT and known lung Ca, not on DOAC - high risk for PE - will evaluate with CTA. CXR shows minimal right pleural effusion and lungs are clear - less likely cause of hypoxia. Patient also with intractable hiccups, concerning for diaphragmatic irritation.

## 2025-03-04 NOTE — H&P ADULT - ASSESSMENT
83 y/o M w/ PMH lung cancer (previously on immunotherapy),  chronic RBBB/LAFB,DVT not AC, HTN, copd not on home O2 and CAD presenting for increasing SOB for the last 1 week. Per wife, Schedule for PET scan last week, was unable to go for continued fatigue, states has had progressive SOB for the last few days. Also endorses that he has been having increased urinary frequency though no dysuria. Found to be hypoxic and UA grossly positive. Admitted for Sepsis present on admission and Acute hypoxic respiratory failure.    Plan:  Acute Hypoxic Respiratory Failure R/o PNA vs HFpEF  Sepsis present on admission due to likely UTI  HX of lung Ca (previously on immunotherapy)  pAF /Chronic RBBB/LAFB  - Admit to medicine/Tele/  - Seen by Dr. Modi and informed to present to hospital for sob and weakness  - CTA PE as above showing no PE, mild right sided effusion unchanged from previous, Bilateral renal pelves distention  - PBNP >9K  - TTE ordered  - Will give  lasix 40mg QD and evaluate for improvement  - Duonebs prn  - Wean off O2 as tolerated  - RVP negative  - UA grossly positive, FU urine/blood cultures  - C/w Ceft/ Azithro  - Cardiology consulted by ED  - Trop 66--> 60  - Bladder Scan and Straight cath Q6H if more than 350cc  - CT abdo pelvis w/o to assess renal hydronephrosis  - Heparin drip/asa as per cardiology  - Strict I/O and daily weights      SHANNEN on ?CKD in setting of possible urinary obstruction due to BPH  - Was previously discharged on flomax and had qureshi which was taken out by urology  - Endorses overflow incontinence symptoms  - CTA chest as above showing bilateral hydronephrosis  - Bladder Scan and Straight cath Q6H if more than 350cc  - Urine studies ordered  - Monitor BMP  - Will consider nephrology if not improving with above workup    Hx of CAD/HTN  - c/w Clopidogrel  - C/w Metoprolol 50mg BID  - Hold losartan-HCTZ in setting of soft BP    Hx of HLD  - C/w Atorvastatin    HX of COPD   - C/w Trelegy interchange  - Wean off O2 as tolerated    Dvt  PPX: Heparin    Diet: DASH    Dispo: Acute. Pending improvement in acute resp failure and UTI/SHANNEN on CKD.     D/w Wife and daughter at bedside and ED staff.

## 2025-03-04 NOTE — PATIENT PROFILE ADULT - VISION (WITH CORRECTIVE LENSES IF THE PATIENT USUALLY WEARS THEM):
Current Meds   1. Advil CAPS;   Therapy: (Recorded:[id number]) to Recorded   2. ALPRAZolam 0.5 MG Oral Tablet (Xanax); TAKE 1/2 TO 1 TABLET BY MOUTH EVERY 4-6   HOURS AS NEEDED FOR ANXIETY;   Therapy: 48Swb1530 to (Last Rx:38Lwq3037)  Requested for: 37Epr5067 Ordered   3. Crestor 10 MG Oral Tablet (Rosuvastatin Calcium);   Therapy: (Recorded:18Znr2037) to Recorded   4. Estradiol 0.05 MG/24HR Transdermal Patch Weekly; APPLY 1 PATCH WEEKLY AS   DIRECTED; Last Rx:[id number] Ordered   5. Fenofibrate Micronized 43 MG Oral Capsule;   Therapy: (Recorded:21Dqf2754) to Recorded   6. Levothyroxine Sodium 150 MCG Oral Tablet; TAKE 1 TABLET BY MOUTH EVERY   MORNING;   Therapy: 62Art9173 to (Evaluate:06Apr2018)  Requested for: 10Zct1366; Last   Rx:[id number] Ordered   7. Levothyroxine Sodium 175 MCG Oral Tablet; TAKE 1 TABLET BY MOUTH EVERY DAY;   Therapy: 20Jan2018 to (Last Rx:20Jan2018)  Requested for: 20Jan2018; Status: ACTIVE   - Transmit to Pharmacy - Awaiting Verification Ordered   8. MetroNIDAZOLE 0.75 % External Lotion; APPLY AND RUB IN A THIN FILM TO AFFECTED   AREA(S) DAILY  Requested for: 01Uwe2671; Last Rx:[id number] Ordered   9. Mupirocin Calcium 2 % External Cream; APPLY AND GENTLY MASSAGE IN TO THE   AFFECTED AREA TWICE DAILY;   Therapy: 67Mbz9484 to (Evaluate:01Oct2017)  Requested for: 31Rrl5759; Last   Rx:[id number] Ordered   10. Zetia 10 MG Oral Tablet (Ezetimibe);    Therapy: (Recorded:90Aoo0257) to Recorded    Allergies   1. Sulfur    Assessment   1. Abnormal finding on radiological examination of breast (R92.8)    Nurse Documentation  OB & Gyn Nurse Documentation:   Nurse Documentation: left breast diagnostic mammogram and ultrasound ordered per radiologist's recommendation. orders faxed to central scheduling. copy of orders in triage awaiting results.        Plan   1. MA MAMMOGRAM DIAGNOSTIC LT; Status:Active - Perform Order; Requested   for:07Jun2018;    2. US BREAST DIAGNOSTIC LT; Status:Active - Perform  Order; Requested for:42Gar0445;     Signatures   Electronically signed by : Jennifer Robins R.N.; Jun 7 2018  9:07AM CST     Normal vision: sees adequately in most situations; can see medication labels, newsprint

## 2025-03-04 NOTE — H&P ADULT - NSHPLABSRESULTS_GEN_ALL_CORE
LABS:                        9.6    11.61 )-----------( 332      ( 04 Mar 2025 10:28 )             30.8     -    136  |  98  |  65.7[H]  ----------------------------<  120[H]  4.5   |  25.0  |  2.06[H]    Ca    8.7      04 Mar 2025 10:28    TPro  6.9  /  Alb  2.8[L]  /  TBili  0.4  /  DBili  x   /  AST  54[H]  /  ALT  80[H]  /  AlkPhos  127[H]  -    PT/INR - ( 04 Mar 2025 10:28 )   PT: 13.6 sec;   INR: 1.21 ratio         PTT - ( 04 Mar 2025 10:28 )  PTT:27.5 sec      Urinalysis Basic - ( 04 Mar 2025 12:30 )    Color: Yellow / Appearance: Turbid / S.013 / pH: x  Gluc: x / Ketone: Negative mg/dL  / Bili: Negative / Urobili: 0.2 mg/dL   Blood: x / Protein: Trace mg/dL / Nitrite: Negative   Leuk Esterase: Large / RBC: 6 /HPF / WBC >998 /HPF   Sq Epi: x / Non Sq Epi: 2 /HPF / Bacteria: Moderate /HPF      < from: CT Angio Chest PE Protocol w/ IV Cont (25 @ 13:45) >    FINDINGS:    LUNGS AND LARGE AIRWAYS: Patent central airways. Little change in right   upper lobe parenchymal consolidation with air bronchograms.  PLEURA: Little changein moderate amount of right pleural fluid.  VESSELS: No pulmonary arterial filling defects. No evidence aortic   dissection. Tortuosity, calcification, and ectasia of aorta and   brachiocephalic vessels.  HEART: Borderline in size. No pericardial effusion.  MEDIASTINUM AND JOSE L: Small right hilar nodes.  CHEST WALL AND LOWER NECK: Right anterior chest wall port catheter.  VISUALIZED UPPER ABDOMEN: Newly evident marked dilatation of bilateral   renal pelves with moderate dilatation of left intrarenal collecting   system and mild dilatation of right intrarenal collecting system.  BONES: Unchanged focal sclerotic density in the lateral right sixth rib.   Marked multilevel degenerative changes in spine.    IMPRESSION:  No evidence acute pulmonaryemboli.    Grossly unchanged right pleural fluid and upper lobe parenchymal   consolidation with air bronchograms.    Marked dilatation of bilateral renal pelves with moderate dilatation of   left intrarenal collecting system and mild dilatation of right intrarenal   collecting system, newly evident since 2025.    --- End of Report ---            LEE ANN DAS MD; Attending Nuclear Medicine  This document has been electronically signed. Mar  4 2025  2:18PM    < end of copied text >    < from: TTE Echo Complete w/o Contrast w/ Doppler (23 @ 17:14) >    Summary:   1. Left ventricular ejection fraction, by visual estimation, is 60 to   65%.   2. Normal global left ventricular systolic function.   3. Spectral Doppler shows impaired relaxation pattern of left   ventricular myocardial filling (Grade I diastolic dysfunction).   4. Normal left atrial size.   5. Normal right atrial size.   6. Trivial pericardial effusion.   7. Mild mitral annular calcification.   8. Trace mitral valve regurgitation.   9. Mild to moderate aortic regurgitation.  10. Aortic root measured at Sinus of Valsalva is dilated.  11. The Dimesionless Index value is .43.    MD Yodit Electronically signed on 2023 at 8:33:23 AM    < end of copied text >

## 2025-03-04 NOTE — ED PROVIDER NOTE - OBJECTIVE STATEMENT
85 y/o M w/ PMH lung cancer (on immunotherapy), DVT (on apixaban), HTN, and CAD,    - Presenting for increasing SOB for the last 4 to 5 days.   - Schedule for PET scan last week, was unable to go for continued fatigue.   - last radiation 1 year ago  - States has had continued hiccups for the last week.   - Reached out to Dr. Ana María FLORES about symptoms and was told to present to hospital.   -October litter and 1/2 drained off chest.n   - Reports polyuria.     -hx of dvt. 85 y/o M w/ PMH lung cancer (on immunotherapy), DVT not AC, HTN, and CAD presenting for increasing SOB for the last 4 to 5 days. Per wife, Schedule for PET scan last week, was unable to go for continued fatigue, states has had progressive SOB for the last few days. States for the last week has had continued hiccups. Reached out to Dr. Modi office about symptoms yesterday and was told to present to hospital. States has a hx of Plueral effusion requiring drainage, last drainage last october, 1.5 liters drained. last radiation 1 year ago. Wife Reports new onset polyuria. Of note, SpO2 was noted to be in the 80's during EMS vitals.     Cardiologist: [place].

## 2025-03-04 NOTE — ED PROVIDER NOTE - PHYSICAL EXAMINATION
General: Uncomfortable appearing. +NC 6L   HEENT: Normocephalic, atraumatic. No scleral icterus or conjunctival injection. EOMI. Moist mucous membranes. Oropharynx clear.   Neck:. Soft and supple.  Cardiac: Tachycardiac (110)  Resp: Lungs CTAB. Borderline tachypnea(20)  Abd: Soft, non-tender, non-distended. No guarding, rebound, or rigidity.  Back: Spine midline and non-tender.   Skin: chronic skin changes appreciated b/l LE.   Psych: Appropriate mood and affect

## 2025-03-04 NOTE — PATIENT PROFILE ADULT - NSPROSPHOSPCHAPLAINYN_GEN_A_NUR
MANUEL BURRELL    [id number]    89y      Female    Patient is a 89y old  Female who presents with a chief complaint of Cough, generalized weakness (2018 23:42)      INTERVAL HPI/OVERNIGHT EVENTS:    REVIEW OF SYSTEMS:    Patient is feeling some improvement of her respiratory symptoms, still require 4 liters of supplemental oxygen, she has no chest pain, nausea, vomiting, dizziness    REVIEW OF SYSTEMS:    CONSTITUTIONAL: No fever, some fatigue  RESPIRATORY: Improving cough, shortness of breath  CARDIOVASCULAR: No chest pain, palpitations  GASTROINTESTINAL: No abdominal, No nausea, vomiting  NEUROLOGICAL: No headaches,  loss of strength.  MISCELLANEOUS: No joint swelling or pain       Vital Signs Last 24 Hrs  T(C): 36.8 (2018 06:06), Max: 36.8 (2018 06:06)  T(F): 98.3 (2018 06:06), Max: 98.3 (2018 06:06)  HR: 77 (2018 06:06) (77 - 89)  BP: 121/63 (2018 06:06) (106/56 - 121/63)  RR: 20 (2018 06:06) (17 - 20)  SpO2: 91% (2018 07:45) (91% - 93%)    PHYSICAL EXAM:    GENERAL: Elderly female looking comfortable   HEENT: PERRL, +EOMI  NECK: soft, Supple, No JVD,   CHEST/LUNG: decrease air entry bilaterally; right lower chest crackles and rhonchi, b/l crackles, No wheezing  HEART: S1S2+, Regular rate and rhythm; No murmurs  ABDOMEN: Soft, Nontender, Nondistended; Bowel sounds present  EXTREMITIES:  1+ Peripheral Pulses, No edema  SKIN: No rashes or lesions  NEURO: AAOX3, no focal deficits, no motor r sensory loss  PSYCH: normal mood      LABS:                        10.6   10.0  )-----------( 254      ( 2018 07:25 )             33.5     02-18    138  |  103  |  28.0<H>  ----------------------------<  130<H>  4.8   |  23.0  |  1.02    Ca    8.4<L>      2018 07:25    TPro  7.0  /  Alb  3.5  /  TBili  0.3<L>  /  DBili  x   /  AST  30  /  ALT  19  /  AlkPhos  66  02-16      Urinalysis Basic - ( 2018 17:23 )    Color: Yellow / Appearance: Clear / S.010 / pH: x  Gluc: x / Ketone: Negative  / Bili: Negative / Urobili: Negative mg/dL   Blood: x / Protein: 15 mg/dL / Nitrite: Positive   Leuk Esterase: Moderate / RBC: 0-2 /HPF / WBC 11-25   Sq Epi: x / Non Sq Epi: Occasional / Bacteria: Occasional          I&O's Summary      MEDICATIONS  (STANDING):  ALBUTerol    90 MICROgram(s) HFA Inhaler 1 Puff(s) Inhalation every 4 hours  ALBUTerol/ipratropium for Nebulization 3 milliLiter(s) Nebulizer every 8 hours  aspirin enteric coated 81 milliGRAM(s) Oral daily  azithromycin  IVPB 500 milliGRAM(s) IV Intermittent every 24 hours  azithromycin  IVPB      cefTRIAXone   IVPB 1 Gram(s) IV Intermittent every 24 hours  cefTRIAXone   IVPB      enoxaparin Injectable 40 milliGRAM(s) SubCutaneous daily  furosemide    Tablet 20 milliGRAM(s) Oral daily  metoprolol     tartrate 25 milliGRAM(s) Oral daily  nystatin Powder 1 Application(s) Topical two times a day  potassium chloride    Tablet ER 10 milliEquivalent(s) Oral daily  saccharomyces boulardii 250 milliGRAM(s) Oral two times a day  sodium chloride 0.9% lock flush 3 milliLiter(s) IV Push every 8 hours  tiotropium 18 MICROgram(s) Capsule 1 Capsule(s) Inhalation daily  valsartan 160 milliGRAM(s) Oral daily    MEDICATIONS  (PRN):  acetaminophen   Tablet 650 milliGRAM(s) Oral every 6 hours PRN For Temp greater than 38 C (100.4 F), pain and headache no

## 2025-03-05 LAB
A1C WITH ESTIMATED AVERAGE GLUCOSE RESULT: 6.5 % — HIGH (ref 4–5.6)
ACANTHOCYTES BLD QL SMEAR: SLIGHT — SIGNIFICANT CHANGE UP
ALBUMIN SERPL ELPH-MCNC: 2.4 G/DL — LOW (ref 3.3–5.2)
ALP SERPL-CCNC: 112 U/L — SIGNIFICANT CHANGE UP (ref 40–120)
ALT FLD-CCNC: 62 U/L — HIGH
ANION GAP SERPL CALC-SCNC: 13 MMOL/L — SIGNIFICANT CHANGE UP (ref 5–17)
ANION GAP SERPL CALC-SCNC: 13 MMOL/L — SIGNIFICANT CHANGE UP (ref 5–17)
APTT BLD: 31 SEC — SIGNIFICANT CHANGE UP (ref 24.5–35.6)
APTT BLD: 45.8 SEC — HIGH (ref 24.5–35.6)
APTT BLD: 76.4 SEC — HIGH (ref 24.5–35.6)
APTT BLD: 80.3 SEC — HIGH (ref 24.5–35.6)
AST SERPL-CCNC: 49 U/L — HIGH
BASOPHILS # BLD AUTO: 0.03 K/UL — SIGNIFICANT CHANGE UP (ref 0–0.2)
BASOPHILS # BLD MANUAL: 0 K/UL — SIGNIFICANT CHANGE UP (ref 0–0.2)
BASOPHILS NFR BLD AUTO: 0.2 % — SIGNIFICANT CHANGE UP (ref 0–2)
BASOPHILS NFR BLD MANUAL: 0 % — SIGNIFICANT CHANGE UP (ref 0–2)
BILIRUB SERPL-MCNC: 0.3 MG/DL — LOW (ref 0.4–2)
BUN SERPL-MCNC: 55.9 MG/DL — HIGH (ref 8–20)
BUN SERPL-MCNC: 59.4 MG/DL — HIGH (ref 8–20)
CALCIUM SERPL-MCNC: 8.1 MG/DL — LOW (ref 8.4–10.5)
CALCIUM SERPL-MCNC: 8.3 MG/DL — LOW (ref 8.4–10.5)
CHLORIDE SERPL-SCNC: 101 MMOL/L — SIGNIFICANT CHANGE UP (ref 96–108)
CHLORIDE SERPL-SCNC: 99 MMOL/L — SIGNIFICANT CHANGE UP (ref 96–108)
CHOLEST SERPL-MCNC: 93 MG/DL — SIGNIFICANT CHANGE UP
CO2 SERPL-SCNC: 24 MMOL/L — SIGNIFICANT CHANGE UP (ref 22–29)
CO2 SERPL-SCNC: 26 MMOL/L — SIGNIFICANT CHANGE UP (ref 22–29)
CREAT SERPL-MCNC: 2 MG/DL — HIGH (ref 0.5–1.3)
DACRYOCYTES BLD QL SMEAR: SLIGHT — SIGNIFICANT CHANGE UP
EGFR: 33 ML/MIN/1.73M2 — LOW
EGFR: 33 ML/MIN/1.73M2 — LOW
EGFR: 35 ML/MIN/1.73M2 — LOW
EGFR: 35 ML/MIN/1.73M2 — LOW
ELLIPTOCYTES BLD QL SMEAR: SLIGHT — SIGNIFICANT CHANGE UP
EOSINOPHIL # BLD AUTO: 0.02 K/UL — SIGNIFICANT CHANGE UP (ref 0–0.5)
EOSINOPHIL NFR BLD AUTO: 0.1 % — SIGNIFICANT CHANGE UP (ref 0–6)
EOSINOPHIL NFR BLD MANUAL: 0.9 % — SIGNIFICANT CHANGE UP (ref 0–6)
ESTIMATED AVERAGE GLUCOSE: 140 MG/DL — HIGH (ref 68–114)
GIANT PLATELETS BLD QL SMEAR: PRESENT
GLUCOSE BLDC GLUCOMTR-MCNC: 149 MG/DL — HIGH (ref 70–99)
GLUCOSE BLDC GLUCOMTR-MCNC: 153 MG/DL — HIGH (ref 70–99)
GLUCOSE BLDC GLUCOMTR-MCNC: 189 MG/DL — HIGH (ref 70–99)
GLUCOSE SERPL-MCNC: 150 MG/DL — HIGH (ref 70–99)
GLUCOSE SERPL-MCNC: 150 MG/DL — HIGH (ref 70–99)
HCT VFR BLD CALC: 28.8 % — LOW (ref 39–50)
HCT VFR BLD CALC: 29.6 % — LOW (ref 39–50)
HDLC SERPL-MCNC: 30 MG/DL — LOW
HGB BLD-MCNC: 9.7 G/DL — LOW (ref 13–17)
IMM GRANULOCYTES # BLD AUTO: 0.3 K/UL — HIGH (ref 0–0.07)
IMM GRANULOCYTES NFR BLD AUTO: 2 % — HIGH (ref 0–0.9)
LIPID PNL WITH DIRECT LDL SERPL: 50 MG/DL — SIGNIFICANT CHANGE UP
LYMPHOCYTES # BLD AUTO: 0.44 K/UL — LOW (ref 1–3.3)
LYMPHOCYTES # BLD MANUAL: 0.39 K/UL — LOW (ref 1–3.3)
LYMPHOCYTES NFR BLD AUTO: 2.9 % — LOW (ref 13–44)
LYMPHOCYTES NFR BLD MANUAL: 2.6 % — LOW (ref 13–44)
MAGNESIUM SERPL-MCNC: 1.9 MG/DL — SIGNIFICANT CHANGE UP (ref 1.6–2.6)
MCHC RBC-ENTMCNC: 27.6 PG — SIGNIFICANT CHANGE UP (ref 27–34)
MCHC RBC-ENTMCNC: 27.8 PG — SIGNIFICANT CHANGE UP (ref 27–34)
MCHC RBC-ENTMCNC: 32.6 G/DL — SIGNIFICANT CHANGE UP (ref 32–36)
MCV RBC AUTO: 84.7 FL — SIGNIFICANT CHANGE UP (ref 80–100)
MCV RBC AUTO: 84.8 FL — SIGNIFICANT CHANGE UP (ref 80–100)
MICROCYTES BLD QL: SLIGHT — SIGNIFICANT CHANGE UP
MONOCYTES # BLD AUTO: 0.92 K/UL — HIGH (ref 0–0.9)
MONOCYTES # BLD MANUAL: 0.65 K/UL — SIGNIFICANT CHANGE UP (ref 0–0.9)
MONOCYTES NFR BLD AUTO: 6.1 % — SIGNIFICANT CHANGE UP (ref 2–14)
MONOCYTES NFR BLD MANUAL: 4.3 % — SIGNIFICANT CHANGE UP (ref 2–14)
NEUTROPHILS # BLD AUTO: 13.37 K/UL — HIGH (ref 1.8–7.4)
NEUTROPHILS # BLD MANUAL: 13.9 K/UL — HIGH (ref 1.8–7.4)
NEUTROPHILS NFR BLD AUTO: 88.7 % — HIGH (ref 43–77)
NEUTROPHILS NFR BLD MANUAL: 92.2 % — HIGH (ref 43–77)
NON HDL CHOLESTEROL: 63 MG/DL — SIGNIFICANT CHANGE UP
NRBC # BLD AUTO: 0 K/UL — SIGNIFICANT CHANGE UP (ref 0–0)
NRBC # BLD AUTO: 0 K/UL — SIGNIFICANT CHANGE UP (ref 0–0)
NRBC # FLD: 0 K/UL — SIGNIFICANT CHANGE UP (ref 0–0)
NRBC # FLD: 0 K/UL — SIGNIFICANT CHANGE UP (ref 0–0)
NRBC BLD AUTO-RTO: 0 /100 WBCS — SIGNIFICANT CHANGE UP (ref 0–0)
NRBC BLD AUTO-RTO: 0 /100 WBCS — SIGNIFICANT CHANGE UP (ref 0–0)
OVALOCYTES BLD QL SMEAR: SLIGHT — SIGNIFICANT CHANGE UP
PLAT MORPH BLD: ABNORMAL
PLATELET # BLD AUTO: 334 K/UL — SIGNIFICANT CHANGE UP (ref 150–400)
PLATELET # BLD AUTO: 342 K/UL — SIGNIFICANT CHANGE UP (ref 150–400)
PLATELET COUNT - ESTIMATE: NORMAL — SIGNIFICANT CHANGE UP
PMV BLD: 10.2 FL — SIGNIFICANT CHANGE UP (ref 7–13)
PMV BLD: 10.3 FL — SIGNIFICANT CHANGE UP (ref 7–13)
POTASSIUM SERPL-MCNC: 3.5 MMOL/L — SIGNIFICANT CHANGE UP (ref 3.5–5.3)
POTASSIUM SERPL-MCNC: 3.6 MMOL/L — SIGNIFICANT CHANGE UP (ref 3.5–5.3)
POTASSIUM SERPL-SCNC: 3.5 MMOL/L — SIGNIFICANT CHANGE UP (ref 3.5–5.3)
POTASSIUM SERPL-SCNC: 3.6 MMOL/L — SIGNIFICANT CHANGE UP (ref 3.5–5.3)
PROT SERPL-MCNC: 5.6 G/DL — LOW (ref 6.6–8.7)
RBC # BLD: 3.49 M/UL — LOW (ref 4.2–5.8)
RBC # FLD: 18.6 % — HIGH (ref 10.3–14.5)
RBC # FLD: 18.7 % — HIGH (ref 10.3–14.5)
RBC BLD AUTO: ABNORMAL
SODIUM SERPL-SCNC: 137 MMOL/L — SIGNIFICANT CHANGE UP (ref 135–145)
SODIUM SERPL-SCNC: 138 MMOL/L — SIGNIFICANT CHANGE UP (ref 135–145)
TRIGL SERPL-MCNC: 53 MG/DL — SIGNIFICANT CHANGE UP
UUN UR-MCNC: 255 MG/DL — SIGNIFICANT CHANGE UP
WBC # BLD: 14.97 K/UL — HIGH (ref 3.8–10.5)
WBC # BLD: 15.08 K/UL — HIGH (ref 3.8–10.5)
WBC # FLD AUTO: 14.97 K/UL — HIGH (ref 3.8–10.5)
WBC # FLD AUTO: 15.08 K/UL — HIGH (ref 3.8–10.5)

## 2025-03-05 PROCEDURE — 93010 ELECTROCARDIOGRAM REPORT: CPT

## 2025-03-05 PROCEDURE — 99233 SBSQ HOSP IP/OBS HIGH 50: CPT

## 2025-03-05 PROCEDURE — 76937 US GUIDE VASCULAR ACCESS: CPT | Mod: 26

## 2025-03-05 PROCEDURE — 36000 PLACE NEEDLE IN VEIN: CPT

## 2025-03-05 RX ORDER — DEXTROSE 50 % IN WATER 50 %
25 SYRINGE (ML) INTRAVENOUS ONCE
Refills: 0 | Status: DISCONTINUED | OUTPATIENT
Start: 2025-03-05 | End: 2025-03-22

## 2025-03-05 RX ORDER — SODIUM CHLORIDE 9 G/1000ML
1000 INJECTION, SOLUTION INTRAVENOUS
Refills: 0 | Status: DISCONTINUED | OUTPATIENT
Start: 2025-03-05 | End: 2025-03-22

## 2025-03-05 RX ORDER — DEXTROSE 50 % IN WATER 50 %
12.5 SYRINGE (ML) INTRAVENOUS ONCE
Refills: 0 | Status: DISCONTINUED | OUTPATIENT
Start: 2025-03-05 | End: 2025-03-22

## 2025-03-05 RX ORDER — INSULIN LISPRO 100 U/ML
INJECTION, SOLUTION INTRAVENOUS; SUBCUTANEOUS
Refills: 0 | Status: DISCONTINUED | OUTPATIENT
Start: 2025-03-05 | End: 2025-03-22

## 2025-03-05 RX ORDER — GLUCAGON 3 MG/1
1 POWDER NASAL ONCE
Refills: 0 | Status: DISCONTINUED | OUTPATIENT
Start: 2025-03-05 | End: 2025-03-22

## 2025-03-05 RX ORDER — DEXTROSE 50 % IN WATER 50 %
15 SYRINGE (ML) INTRAVENOUS ONCE
Refills: 0 | Status: DISCONTINUED | OUTPATIENT
Start: 2025-03-05 | End: 2025-03-22

## 2025-03-05 RX ADMIN — TIOTROPIUM BROMIDE INHALATION SPRAY 2 PUFF(S): 3.12 SPRAY, METERED RESPIRATORY (INHALATION) at 14:25

## 2025-03-05 RX ADMIN — Medication 40 MILLIGRAM(S): at 05:11

## 2025-03-05 RX ADMIN — Medication 1 DOSE(S): at 14:25

## 2025-03-05 RX ADMIN — HEPARIN SODIUM 1700 UNIT(S)/HR: 1000 INJECTION INTRAVENOUS; SUBCUTANEOUS at 07:22

## 2025-03-05 RX ADMIN — Medication 250 MILLILITER(S): at 18:42

## 2025-03-05 RX ADMIN — HEPARIN SODIUM 1700 UNIT(S)/HR: 1000 INJECTION INTRAVENOUS; SUBCUTANEOUS at 01:10

## 2025-03-05 RX ADMIN — Medication 250 MILLIGRAM(S): at 14:26

## 2025-03-05 RX ADMIN — HEPARIN SODIUM 1900 UNIT(S)/HR: 1000 INJECTION INTRAVENOUS; SUBCUTANEOUS at 14:58

## 2025-03-05 RX ADMIN — Medication 81 MILLIGRAM(S): at 14:26

## 2025-03-05 RX ADMIN — FUROSEMIDE 40 MILLIGRAM(S): 10 INJECTION INTRAMUSCULAR; INTRAVENOUS at 05:11

## 2025-03-05 RX ADMIN — HEPARIN SODIUM 1900 UNIT(S)/HR: 1000 INJECTION INTRAVENOUS; SUBCUTANEOUS at 22:36

## 2025-03-05 RX ADMIN — METOPROLOL SUCCINATE 50 MILLIGRAM(S): 50 TABLET, EXTENDED RELEASE ORAL at 09:00

## 2025-03-05 RX ADMIN — HEPARIN SODIUM 3000 UNIT(S): 1000 INJECTION INTRAVENOUS; SUBCUTANEOUS at 15:04

## 2025-03-05 RX ADMIN — HEPARIN SODIUM 1900 UNIT(S)/HR: 1000 INJECTION INTRAVENOUS; SUBCUTANEOUS at 23:48

## 2025-03-05 RX ADMIN — HEPARIN SODIUM 1900 UNIT(S)/HR: 1000 INJECTION INTRAVENOUS; SUBCUTANEOUS at 19:04

## 2025-03-05 RX ADMIN — HEPARIN SODIUM 1700 UNIT(S)/HR: 1000 INJECTION INTRAVENOUS; SUBCUTANEOUS at 07:41

## 2025-03-05 RX ADMIN — INSULIN LISPRO 1: 100 INJECTION, SOLUTION INTRAVENOUS; SUBCUTANEOUS at 11:46

## 2025-03-05 RX ADMIN — TAMSULOSIN HYDROCHLORIDE 0.8 MILLIGRAM(S): 0.4 CAPSULE ORAL at 21:43

## 2025-03-05 RX ADMIN — CEFTRIAXONE 2000 MILLIGRAM(S): 500 INJECTION, POWDER, FOR SOLUTION INTRAMUSCULAR; INTRAVENOUS at 22:36

## 2025-03-05 RX ADMIN — HEPARIN SODIUM 1700 UNIT(S)/HR: 1000 INJECTION INTRAVENOUS; SUBCUTANEOUS at 09:15

## 2025-03-05 RX ADMIN — ATORVASTATIN CALCIUM 40 MILLIGRAM(S): 80 TABLET, FILM COATED ORAL at 21:43

## 2025-03-05 RX ADMIN — HEPARIN SODIUM 6500 UNIT(S): 1000 INJECTION INTRAVENOUS; SUBCUTANEOUS at 01:14

## 2025-03-05 RX ADMIN — Medication 1 DOSE(S): at 21:42

## 2025-03-05 NOTE — PROGRESS NOTE ADULT - SUBJECTIVE AND OBJECTIVE BOX
[place] [facility] - [facility], THE HEART CENTER                                   [address] [place] [address]                                                      PHONE: [phone]                                                         FAX: [phone]  [email or web address]  ---------------------------------------------------------------------------------------------------------------------------------    Overnight events/patient complaints:  no events       PAST MEDICAL & SURGICAL HISTORY:  Abnormal findings on diagnostic imaging of lung      Hypertension      Hyperlipidemia      CAD (coronary artery disease)      Left anterior fascicular block (LAFB)      RBBB      S/P hip replacement, right      S/P hip replacement, left          No Known Allergies    MEDICATIONS  (STANDING):  aspirin  chewable 81 milliGRAM(s) Oral daily  atorvastatin 40 milliGRAM(s) Oral at bedtime  azithromycin  IVPB 500 milliGRAM(s) IV Intermittent every 24 hours  azithromycin  IVPB      cefTRIAXone Injectable. 2000 milliGRAM(s) IV Push every 24 hours  dextrose 5%. 1000 milliLiter(s) (50 mL/Hr) IV Continuous <Continuous>  dextrose 5%. 1000 milliLiter(s) (100 mL/Hr) IV Continuous <Continuous>  dextrose 50% Injectable 25 Gram(s) IV Push once  dextrose 50% Injectable 12.5 Gram(s) IV Push once  dextrose 50% Injectable 25 Gram(s) IV Push once  fluticasone propionate/ salmeterol 100-50 MICROgram(s) Diskus 1 Dose(s) Inhalation two times a day  furosemide   Injectable 40 milliGRAM(s) IV Push daily  glucagon  Injectable 1 milliGRAM(s) IntraMuscular once  heparin  Infusion.  Unit(s)/Hr (14 mL/Hr) IV Continuous <Continuous>  insulin lispro (ADMELOG) corrective regimen sliding scale   SubCutaneous three times a day before meals  metoprolol succinate ER 50 milliGRAM(s) Oral two times a day  pantoprazole    Tablet 40 milliGRAM(s) Oral before breakfast  tamsulosin 0.8 milliGRAM(s) Oral at bedtime  tiotropium 2.5 MICROgram(s) Inhaler 2 Puff(s) Inhalation daily    MEDICATIONS  (PRN):  acetaminophen     Tablet .. 650 milliGRAM(s) Oral every 6 hours PRN Temp greater or equal to 38C (100.4F), Mild Pain (1 - 3)  aluminum hydroxide/magnesium hydroxide/simethicone Suspension 30 milliLiter(s) Oral every 4 hours PRN Dyspepsia  dextrose Oral Gel 15 Gram(s) Oral once PRN Blood Glucose LESS THAN 70 milliGRAM(s)/deciliter  heparin   Injectable 6500 Unit(s) IV Push every 6 hours PRN For aPTT less than 40  heparin   Injectable 3000 Unit(s) IV Push every 6 hours PRN For aPTT between 40 - 57  melatonin 3 milliGRAM(s) Oral at bedtime PRN Insomnia  ondansetron Injectable 4 milliGRAM(s) IV Push every 8 hours PRN Nausea and/or Vomiting      Vital Signs Last 24 Hrs  T(C): 37.2 (05 Mar 2025 04:47), Max: 37.5 (04 Mar 2025 23:17)  T(F): 98.9 (05 Mar 2025 04:47), Max: 99.5 (04 Mar 2025 23:17)  HR: 113 (05 Mar 2025 04:47) (99 - 113)  BP: 95/50 (05 Mar 2025 04:47) (90/60 - 129/72)  BP(mean): 81 (04 Mar 2025 15:21) (81 - 81)  RR: 18 (05 Mar 2025 04:47) (16 - 20)  SpO2: 97% (05 Mar 2025 04:47) (92% - 100%)    Parameters below as of 05 Mar 2025 04:47  Patient On (Oxygen Delivery Method): nasal cannula  O2 Flow (L/min): 3    ICU Vital Signs Last 24 Hrs  JONE SOUTH  I&O's Detail    04 Mar 2025 07:01  -  05 Mar 2025 07:00  --------------------------------------------------------  IN:    Heparin Infusion: 85 mL  Total IN: 85 mL    OUT:    Intermittent Catheterization - Urethral (mL): 400 mL  Total OUT: 400 mL    Total NET: -315 mL        I&O's Summary    04 Mar 2025 07:01  -  05 Mar 2025 07:00  --------------------------------------------------------  IN: 85 mL / OUT: 400 mL / NET: -315 mL      Drug Dosing Weight  JONE SOUTH      PHYSICAL EXAM:  General: Appears well developed, well nourished alert and cooperative.  HEENT: Head; normocephalic, atraumatic.  Eyes: Pupils reactive, cornea wnl.  Neck: Supple, no nodes adenopathy, no NVD or carotid bruit or thyromegaly.  CARDIOVASCULAR: irreg irreg , HELDER II/VI  LUNGS: No rales, rhonchi or wheeze. Normal breath sounds bilaterally.  ABDOMEN: Soft, nontender without mass or organomegaly. bowel sounds normoactive.  EXTREMITIES: No clubbing, cyanosis + edema. Distal pulses wnl.   SKIN: warm and dry with normal turgor.  NEURO: Alert/oriented x 3/normal motor exam. No pathologic reflexes.    PSYCH: normal affect.        LABS:                        9.4    15.08 )-----------( 334      ( 05 Mar 2025 06:49 )             28.8     03-05    137  |  101  |  55.9[H]  ----------------------------<  150[H]  3.5   |  24.0  |  1.87[H]    Ca    8.1[L]      05 Mar 2025 06:49  Mg     1.9     03-05    TPro  5.6[L]  /  Alb  2.4[L]  /  TBili  0.3[L]  /  DBili  x   /  AST  49[H]  /  ALT  62[H]  /  AlkPhos  112  03-05    JONE SOUTH      PT/INR - ( 04 Mar 2025 10:28 )   PT: 13.6 sec;   INR: 1.21 ratio         PTT - ( 05 Mar 2025 06:58 )  PTT:76.4 sec  Urinalysis Basic - ( 05 Mar 2025 06:49 )    Color: x / Appearance: x / SG: x / pH: x  Gluc: 150 mg/dL / Ketone: x  / Bili: x / Urobili: x   Blood: x / Protein: x / Nitrite: x   Leuk Esterase: x / RBC: x / WBC x   Sq Epi: x / Non Sq Epi: x / Bacteria: x        RADIOLOGY & ADDITIONAL STUDIES:    INTERPRETATION OF TELEMETRY (personally reviewed):    RADIOLOGY & ADDITIONAL STUDIES:    < from: CT Angio Chest PE Protocol w/ IV Cont (03.04.25 @ 13:45) >  Grossly unchanged right pleural fluid and upper lobe parenchymal   consolidation with air bronchograms.    Marked dilatation of bilateral renal pelves with moderate dilatation of   left intrarenal collecting system and mild dilatation of right intrarenal   collecting system, newly evident since 1/17/2025.    ECHO 3/4/2025  CONCLUSIONS:  1. Left ventricular cavity is normal in size. Left ventricular systolic function is mildly decreased with an   ejection fraction visually estimated at 40 to 45 %.  2. Normal right ventricular cavity size and normal right ventricular systolic function.  3. The right atrium is normal in size.  4. No pericardial effusion seen.  5. Analysis of left ventricular diastolic function and filling pressure is made challenging by the presence of   atrial fibrillation.  6. Estimated pulmonary artery systolic pressure is 18 mmHg.  7. No left ventricular hypertrophy.  8. No prior echocardiogram is available for comparison.  9. Technically difficult image quality.  10. There is moderate calcification of the aortic valve leaflets. Severe aortic stenosis.        ASSESSMENT AND PLAN:  In summary, Patient is an 85 yo man with chronic hypertension, moderate CAD on remote [facility] with normal perfusion on PET MPI 2022, chronic RBBB/LAFB, frequent unifocal ventricular ectopy, lung cancer s/p radiation and chemotherapy with course complicated by DVT s/p several months systemic AC who presents with several days progressive worsening dyspnea without reported concurrent chest pain, dizziness and palpitations. He is s/p CTA which was negative for acute pulmonary embolism however with pleural effusion and upper lobe consolidation with air bronchograms. Tele with paroxsymal atrial fibrillation with frequent ventricular ectopic beats.     Dyspnea/hypoxic respiratory failure  multifactotrial - PNA/New Onset HFmrEF/Aortic Stenosis  -Abx as per primary team    -  lasix 40mg IV daily with close monitoring of renal indices   - heparin gtt given thromboembolic risk and PAF on tele   - continue ASA 81mg and hold plavix for now  - continue statin therapy          Thank you for allowing [facility] to participate in the care of this patient.  Please feel free to call with any questions or concerns.                  [place] [facility] - [facility], THE HEART CENTER                                   [address] [place] [address]                                                      PHONE: [phone]                                                         FAX: [phone]  [email or web address]  ---------------------------------------------------------------------------------------------------------------------------------    Overnight events/patient complaints:  no events       PAST MEDICAL & SURGICAL HISTORY:  Abnormal findings on diagnostic imaging of lung      Hypertension      Hyperlipidemia      CAD (coronary artery disease)      Left anterior fascicular block (LAFB)      RBBB      S/P hip replacement, right      S/P hip replacement, left          No Known Allergies    MEDICATIONS  (STANDING):  aspirin  chewable 81 milliGRAM(s) Oral daily  atorvastatin 40 milliGRAM(s) Oral at bedtime  azithromycin  IVPB 500 milliGRAM(s) IV Intermittent every 24 hours  azithromycin  IVPB      cefTRIAXone Injectable. 2000 milliGRAM(s) IV Push every 24 hours  dextrose 5%. 1000 milliLiter(s) (50 mL/Hr) IV Continuous <Continuous>  dextrose 5%. 1000 milliLiter(s) (100 mL/Hr) IV Continuous <Continuous>  dextrose 50% Injectable 25 Gram(s) IV Push once  dextrose 50% Injectable 12.5 Gram(s) IV Push once  dextrose 50% Injectable 25 Gram(s) IV Push once  fluticasone propionate/ salmeterol 100-50 MICROgram(s) Diskus 1 Dose(s) Inhalation two times a day  furosemide   Injectable 40 milliGRAM(s) IV Push daily  glucagon  Injectable 1 milliGRAM(s) IntraMuscular once  heparin  Infusion.  Unit(s)/Hr (14 mL/Hr) IV Continuous <Continuous>  insulin lispro (ADMELOG) corrective regimen sliding scale   SubCutaneous three times a day before meals  metoprolol succinate ER 50 milliGRAM(s) Oral two times a day  pantoprazole    Tablet 40 milliGRAM(s) Oral before breakfast  tamsulosin 0.8 milliGRAM(s) Oral at bedtime  tiotropium 2.5 MICROgram(s) Inhaler 2 Puff(s) Inhalation daily    MEDICATIONS  (PRN):  acetaminophen     Tablet .. 650 milliGRAM(s) Oral every 6 hours PRN Temp greater or equal to 38C (100.4F), Mild Pain (1 - 3)  aluminum hydroxide/magnesium hydroxide/simethicone Suspension 30 milliLiter(s) Oral every 4 hours PRN Dyspepsia  dextrose Oral Gel 15 Gram(s) Oral once PRN Blood Glucose LESS THAN 70 milliGRAM(s)/deciliter  heparin   Injectable 6500 Unit(s) IV Push every 6 hours PRN For aPTT less than 40  heparin   Injectable 3000 Unit(s) IV Push every 6 hours PRN For aPTT between 40 - 57  melatonin 3 milliGRAM(s) Oral at bedtime PRN Insomnia  ondansetron Injectable 4 milliGRAM(s) IV Push every 8 hours PRN Nausea and/or Vomiting      Vital Signs Last 24 Hrs  T(C): 37.2 (05 Mar 2025 04:47), Max: 37.5 (04 Mar 2025 23:17)  T(F): 98.9 (05 Mar 2025 04:47), Max: 99.5 (04 Mar 2025 23:17)  HR: 113 (05 Mar 2025 04:47) (99 - 113)  BP: 95/50 (05 Mar 2025 04:47) (90/60 - 129/72)  BP(mean): 81 (04 Mar 2025 15:21) (81 - 81)  RR: 18 (05 Mar 2025 04:47) (16 - 20)  SpO2: 97% (05 Mar 2025 04:47) (92% - 100%)    Parameters below as of 05 Mar 2025 04:47  Patient On (Oxygen Delivery Method): nasal cannula  O2 Flow (L/min): 3    ICU Vital Signs Last 24 Hrs  JONE SOUTH  I&O's Detail    04 Mar 2025 07:01  -  05 Mar 2025 07:00  --------------------------------------------------------  IN:    Heparin Infusion: 85 mL  Total IN: 85 mL    OUT:    Intermittent Catheterization - Urethral (mL): 400 mL  Total OUT: 400 mL    Total NET: -315 mL        I&O's Summary    04 Mar 2025 07:01  -  05 Mar 2025 07:00  --------------------------------------------------------  IN: 85 mL / OUT: 400 mL / NET: -315 mL      Drug Dosing Weight  JONE SOUTH      PHYSICAL EXAM:  General: Appears well developed, well nourished alert and cooperative.  HEENT: Head; normocephalic, atraumatic.  Eyes: Pupils reactive, cornea wnl.  Neck: Supple, no nodes adenopathy, no NVD or carotid bruit or thyromegaly.  CARDIOVASCULAR: irreg irreg , HELDER II/VI  LUNGS: No rales, rhonchi or wheeze. Normal breath sounds bilaterally.  ABDOMEN: Soft, nontender without mass or organomegaly. bowel sounds normoactive.  EXTREMITIES: No clubbing, cyanosis + edema. Distal pulses wnl.   SKIN: warm and dry with normal turgor.  NEURO: Alert/oriented x 3/normal motor exam. No pathologic reflexes.    PSYCH: normal affect.        LABS:                        9.4    15.08 )-----------( 334      ( 05 Mar 2025 06:49 )             28.8     03-05    137  |  101  |  55.9[H]  ----------------------------<  150[H]  3.5   |  24.0  |  1.87[H]    Ca    8.1[L]      05 Mar 2025 06:49  Mg     1.9     03-05    TPro  5.6[L]  /  Alb  2.4[L]  /  TBili  0.3[L]  /  DBili  x   /  AST  49[H]  /  ALT  62[H]  /  AlkPhos  112  03-05    JONE SOUTH      PT/INR - ( 04 Mar 2025 10:28 )   PT: 13.6 sec;   INR: 1.21 ratio         PTT - ( 05 Mar 2025 06:58 )  PTT:76.4 sec  Urinalysis Basic - ( 05 Mar 2025 06:49 )    Color: x / Appearance: x / SG: x / pH: x  Gluc: 150 mg/dL / Ketone: x  / Bili: x / Urobili: x   Blood: x / Protein: x / Nitrite: x   Leuk Esterase: x / RBC: x / WBC x   Sq Epi: x / Non Sq Epi: x / Bacteria: x        RADIOLOGY & ADDITIONAL STUDIES:    INTERPRETATION OF TELEMETRY (personally reviewed):    RADIOLOGY & ADDITIONAL STUDIES:    < from: CT Angio Chest PE Protocol w/ IV Cont (03.04.25 @ 13:45) >  Grossly unchanged right pleural fluid and upper lobe parenchymal   consolidation with air bronchograms.    Marked dilatation of bilateral renal pelves with moderate dilatation of   left intrarenal collecting system and mild dilatation of right intrarenal   collecting system, newly evident since 1/17/2025.    ECHO 3/4/2025  CONCLUSIONS:  1. Left ventricular cavity is normal in size. Left ventricular systolic function is mildly decreased with an   ejection fraction visually estimated at 40 to 45 %.  2. Normal right ventricular cavity size and normal right ventricular systolic function.  3. The right atrium is normal in size.  4. No pericardial effusion seen.  5. Analysis of left ventricular diastolic function and filling pressure is made challenging by the presence of   atrial fibrillation.  6. Estimated pulmonary artery systolic pressure is 18 mmHg.  7. No left ventricular hypertrophy.  8. No prior echocardiogram is available for comparison.  9. Technically difficult image quality.  10. There is moderate calcification of the aortic valve leaflets. Severe aortic stenosis.        ASSESSMENT AND PLAN:  In summary, Patient is an 85 yo man with chronic hypertension, moderate CAD on remote [facility] with normal perfusion on PET MPI 2022, chronic RBBB/LAFB, frequent unifocal ventricular ectopy, lung cancer s/p radiation and chemotherapy with course complicated by DVT s/p several months systemic AC who presents with several days progressive worsening dyspnea without reported concurrent chest pain, dizziness and palpitations. He is s/p CTA which was negative for acute pulmonary embolism however with pleural effusion and upper lobe consolidation with air bronchograms. Tele with paroxsymal atrial fibrillation with frequent ventricular ectopic beats.     Dyspnea/hypoxic respiratory failure  multifactotrial - PNA, New Onset HFmrEF/Aortic Stenosis  -  Abx as per primary team    -  lasix 40mg IV daily with close monitoring of renal indices   - heparin gtt given thromboembolic risk and PAF on tele   - continue ASA 81mg and hold plavix for now  - continue statin therapy  - plan for outpt cardiac cath and TAVR eval      Thank you for allowing [facility] to participate in the care of this patient.  Please feel free to call with any questions or concerns.

## 2025-03-05 NOTE — RAPID RESPONSE TEAM SUMMARY - NSADDTLFINDINGSRRT_GEN_ALL_CORE
Upon arrival pt was sitting up in chair alert and oriented, stated he felt ok that things just went black pt now Awake and alert x4  CVS : S1S2, Irregular rate and rhythm, HELDER +ve  Resp : CTA b/l, Mild crackles Bibasilar bases appreciated   Abd : Soft, non distended, non tender, BS +ve   MSK : +ve LE edema    Vital Signs Last 24 Hrs  T(C): 36.9 (05 Mar 2025 07:30), Max: 37.5 (04 Mar 2025 23:17)  T(F): 98.5 (05 Mar 2025 07:30), Max: 99.5 (04 Mar 2025 23:17)  HR: 112 (05 Mar 2025 07:30) (99 - 113)  BP: 100/50 (05 Mar 2025 07:30) (92/53 - 129/72)  BP(mean): 81 (04 Mar 2025 15:21) (81 - 81)  RR: 18 (05 Mar 2025 07:30) (16 - 20)  SpO2: 96% (05 Mar 2025 07:30) (92% - 99%)    Parameters below as of 05 Mar 2025 07:30  Patient On (Oxygen Delivery Method): nasal cannula  O2 Flow (L/min): 3

## 2025-03-05 NOTE — RAPID RESPONSE TEAM SUMMARY - NSSITUATIONBACKGROUNDRRT_GEN_ALL_CORE
83 y/o M w/ PMH lung cancer (on immunotherapy), DVT not AC, HTN, copd not on home O2 and CAD presenting for increasing SOB for the last 1 week. Per wife, Schedule for PET scan last week, was unable to go for continued fatigue, states has had progressive SOB for the last few days. admitted with acute hypoxic respiratory and UTI.  Pt was being assisted to chair this am and upon standing he stated that he felt weak, and dizzy, pt then noted to synopsized into chair and then returned to baseline.   Pt noted to have afib and on betablocker, noted also severe aortic stenosis, cards following and will do outpatient TAVAR work up.

## 2025-03-05 NOTE — PROCEDURE NOTE - NSICDXPROCEDURE_GEN_ALL_CORE_FT
PROCEDURES:  US guided needle placement 05-Mar-2025 01:01:35  Gage Paul  Insertion of needle or catheter into vein 05-Mar-2025 01:01:43  Gage Paul

## 2025-03-05 NOTE — PROCEDURE NOTE - NSSITEPREP_SKIN_A_CORE
August Moreno MD  [facility] IM Hospitalist  Pager: [phone] chlorhexidine

## 2025-03-05 NOTE — PROGRESS NOTE ADULT - ASSESSMENT
83 y/o M w/ PMH lung cancer (previously on immunotherapy),  chronic RBBB/LAFB,DVT not AC, HTN, copd not on home O2 and CAD presenting for increasing SOB for the last 1 week. Per wife, Schedule for PET scan last week, was unable to go for continued fatigue, states has had progressive SOB for the last few days. Also endorses that he has been having increased urinary frequency though no dysuria. Found to be hypoxic and UA grossly positive. Admitted for Sepsis present on admission and Acute hypoxic respiratory failure.    Plan:  Acute Hypoxic Respiratory Failure R/o PNA vs HFpEF  Sepsis present on admission due to likely UTI  HX of lung Ca (previously on immunotherapy)  pAF /Chronic RBBB/LAFB  - Admit to medicine/Tele/  - Seen by Dr. Modi and informed to present to hospital for sob and weakness  - CTA PE as above showing no PE, mild right sided effusion unchanged from previous, Bilateral renal pelves distention  - PBNP >9K  - TTE with new EF 40- 45%, severe Aortic stenosis  - C/w  lasix 40mg QD and evaluate for improvement  - Duonebs prn  - Wean off O2 as tolerated  - RVP negative  - UA grossly positive, FU urine/blood cultures  - C/w Ceft/ Azithro  - Cardiology following  - Bladder Scan and Straight cath Q6H if more than 350cc  - CT abdo pelvis w/o personally reviewed- Agreed with radiology evaluation. Left sided pyelonephritis.  - Heparin drip/asa as per cardiology  - Strict I/O and daily weights      SHANNEN on ?CKD in setting of possible urinary obstruction due to BPH (improving)  - Was previously discharged on flomax and had qureshi which was taken out by urology  - Endorses overflow incontinence symptoms  - CTA chest as above showing bilateral hydronephrosis and left sided pyelonephritis  - Bladder Scan and Straight cath Q6H if more than 350cc  - Monitor BMP  - Will consider nephrology if not improving with above workup    Hx of CAD/HTN  - c/w Clopidogrel  - C/w Metoprolol 50mg BID as per cardiology recs  - Hold losartan-HCTZ     Hx of HLD  - C/w Atorvastatin    HX of COPD   - C/w Trelegy interchange  - Wean off O2 as tolerated    Dvt  PPX: Heparin    Diet: DASH    Dispo: Acute. Pending improvement in acute resp failure and UTI/SHANNEN on CKD.     D/w Wife and daughter at bedside.

## 2025-03-05 NOTE — PROGRESS NOTE ADULT - SUBJECTIVE AND OBJECTIVE BOX
Patient is a 84y old  Male who presents with a chief complaint of Sob and weakness for more 1 week. (05 Mar 2025 09:12)      SUBJECTIVE / OVERNIGHT EVENTS: Seen and examined. Feels ok. SOB improved from yesterday. still on 3L. CT abdo with left sided pyelo. Still retaining and has been straight cathed as needed. Denies chest pain, abd pain, N/V, fever, chills.     MEDICATIONS  (STANDING):  aspirin  chewable 81 milliGRAM(s) Oral daily  atorvastatin 40 milliGRAM(s) Oral at bedtime  azithromycin  IVPB 500 milliGRAM(s) IV Intermittent every 24 hours  azithromycin  IVPB      cefTRIAXone Injectable. 2000 milliGRAM(s) IV Push every 24 hours  dextrose 5%. 1000 milliLiter(s) (50 mL/Hr) IV Continuous <Continuous>  dextrose 5%. 1000 milliLiter(s) (100 mL/Hr) IV Continuous <Continuous>  dextrose 50% Injectable 25 Gram(s) IV Push once  dextrose 50% Injectable 12.5 Gram(s) IV Push once  dextrose 50% Injectable 25 Gram(s) IV Push once  fluticasone propionate/ salmeterol 100-50 MICROgram(s) Diskus 1 Dose(s) Inhalation two times a day  furosemide   Injectable 40 milliGRAM(s) IV Push daily  glucagon  Injectable 1 milliGRAM(s) IntraMuscular once  heparin  Infusion.  Unit(s)/Hr (14 mL/Hr) IV Continuous <Continuous>  insulin lispro (ADMELOG) corrective regimen sliding scale   SubCutaneous three times a day before meals  metoprolol succinate ER 50 milliGRAM(s) Oral two times a day  pantoprazole    Tablet 40 milliGRAM(s) Oral before breakfast  tamsulosin 0.8 milliGRAM(s) Oral at bedtime  tiotropium 2.5 MICROgram(s) Inhaler 2 Puff(s) Inhalation daily    MEDICATIONS  (PRN):  acetaminophen     Tablet .. 650 milliGRAM(s) Oral every 6 hours PRN Temp greater or equal to 38C (100.4F), Mild Pain (1 - 3)  aluminum hydroxide/magnesium hydroxide/simethicone Suspension 30 milliLiter(s) Oral every 4 hours PRN Dyspepsia  dextrose Oral Gel 15 Gram(s) Oral once PRN Blood Glucose LESS THAN 70 milliGRAM(s)/deciliter  heparin   Injectable 6500 Unit(s) IV Push every 6 hours PRN For aPTT less than 40  heparin   Injectable 3000 Unit(s) IV Push every 6 hours PRN For aPTT between 40 - 57  melatonin 3 milliGRAM(s) Oral at bedtime PRN Insomnia  ondansetron Injectable 4 milliGRAM(s) IV Push every 8 hours PRN Nausea and/or Vomiting        I&O's Summary    04 Mar 2025 07:01  -  05 Mar 2025 07:00  --------------------------------------------------------  IN: 85 mL / OUT: 400 mL / NET: -315 mL        PHYSICAL EXAM:  Vital Signs Last 24 Hrs  T(C): 36.9 (05 Mar 2025 07:30), Max: 37.5 (04 Mar 2025 23:17)  T(F): 98.5 (05 Mar 2025 07:30), Max: 99.5 (04 Mar 2025 23:17)  HR: 112 (05 Mar 2025 07:30) (99 - 113)  BP: 100/50 (05 Mar 2025 07:30) (92/53 - 129/72)  BP(mean): 81 (04 Mar 2025 15:21) (81 - 81)  RR: 18 (05 Mar 2025 07:30) (16 - 20)  SpO2: 96% (05 Mar 2025 07:30) (92% - 99%)    Parameters below as of 05 Mar 2025 07:30  Patient On (Oxygen Delivery Method): nasal cannula  O2 Flow (L/min): 3      Physical Exam:   Gen : Non toxic appearing, comfortable, NAD, Elderly male, on NC saturating well.   HEENT : NCAT, MMM  CVS : S1S2, Irregular rate and rhythm, HELDER +ve  Resp : CTA b/l, Mild crackles Bibasilar bases appreciated   Abd : Soft, non distended, non tender, BS +ve   MSK : Improved LE edema  Neuro : CN II-XII intact, no focal motor or sensory deficits   Psych : Pleasant, no anxiety, normal affect    LABS:                        9.4    15.08 )-----------( 334      ( 05 Mar 2025 06:49 )             28.8     03-05    137  |  101  |  55.9[H]  ----------------------------<  150[H]  3.5   |  24.0  |  1.87[H]    Ca    8.1[L]      05 Mar 2025 06:49  Mg     1.9     03-05    TPro  5.6[L]  /  Alb  2.4[L]  /  TBili  0.3[L]  /  DBili  x   /  AST  49[H]  /  ALT  62[H]  /  AlkPhos  112  03-05    PT/INR - ( 04 Mar 2025 10:28 )   PT: 13.6 sec;   INR: 1.21 ratio         PTT - ( 05 Mar 2025 06:58 )  PTT:76.4 sec      Urinalysis Basic - ( 05 Mar 2025 06:49 )    Color: x / Appearance: x / SG: x / pH: x  Gluc: 150 mg/dL / Ketone: x  / Bili: x / Urobili: x   Blood: x / Protein: x / Nitrite: x   Leuk Esterase: x / RBC: x / WBC x   Sq Epi: x / Non Sq Epi: x / Bacteria: x        CAPILLARY BLOOD GLUCOSE      POCT Blood Glucose.: 189 mg/dL (05 Mar 2025 11:05)  POCT Blood Glucose.: 153 mg/dL (05 Mar 2025 08:59)        RADIOLOGY & ADDITIONAL TESTS:  Results Reviewed:   Imaging Personally Reviewed:  Electrocardiogram Personally Reviewed:       Cephalexin Counseling: I counseled the patient regarding use of cephalexin as an antibiotic for prophylactic and/or therapeutic purposes. Cephalexin (commonly prescribed under brand name Keflex) is a cephalosporin antibiotic which is active against numerous classes of bacteria, including most skin bacteria. Side effects may include nausea, diarrhea, gastrointestinal upset, rash, hives, yeast infections, and in rare cases, hepatitis, kidney disease, seizures, fever, confusion, neurologic symptoms, and others. Patients with severe allergies to penicillin medications are cautioned that there is about a 10% incidence of cross-reactivity with cephalosporins. When possible, patients with penicillin allergies should use alternatives to cephalosporins for antibiotic therapy.

## 2025-03-06 LAB
-  AMOXICILLIN/CLAVULANIC ACID: SIGNIFICANT CHANGE UP
-  AMPICILLIN/SULBACTAM: SIGNIFICANT CHANGE UP
-  AMPICILLIN: SIGNIFICANT CHANGE UP
-  AZTREONAM: SIGNIFICANT CHANGE UP
-  CEFAZOLIN: SIGNIFICANT CHANGE UP
-  CEFEPIME: SIGNIFICANT CHANGE UP
-  CEFOXITIN: SIGNIFICANT CHANGE UP
-  CEFTRIAXONE: SIGNIFICANT CHANGE UP
-  CEFUROXIME: SIGNIFICANT CHANGE UP
-  CIPROFLOXACIN: SIGNIFICANT CHANGE UP
-  ERTAPENEM: SIGNIFICANT CHANGE UP
-  GENTAMICIN: SIGNIFICANT CHANGE UP
-  IMIPENEM: SIGNIFICANT CHANGE UP
-  LEVOFLOXACIN: SIGNIFICANT CHANGE UP
-  MEROPENEM: SIGNIFICANT CHANGE UP
-  NITROFURANTOIN: SIGNIFICANT CHANGE UP
-  PIPERACILLIN/TAZOBACTAM: SIGNIFICANT CHANGE UP
-  TOBRAMYCIN: SIGNIFICANT CHANGE UP
-  TRIMETHOPRIM/SULFAMETHOXAZOLE: SIGNIFICANT CHANGE UP
ALBUMIN SERPL ELPH-MCNC: 2.2 G/DL — LOW (ref 3.3–5.2)
ALP SERPL-CCNC: 113 U/L — SIGNIFICANT CHANGE UP (ref 40–120)
ALT FLD-CCNC: 51 U/L — HIGH
ANION GAP SERPL CALC-SCNC: 12 MMOL/L — SIGNIFICANT CHANGE UP (ref 5–17)
ANION GAP SERPL CALC-SCNC: 13 MMOL/L — SIGNIFICANT CHANGE UP (ref 5–17)
APTT BLD: 66 SEC — HIGH (ref 24.5–35.6)
AST SERPL-CCNC: 38 U/L — SIGNIFICANT CHANGE UP
BASOPHILS NFR BLD AUTO: 0.2 % — SIGNIFICANT CHANGE UP (ref 0–2)
BILIRUB SERPL-MCNC: <0.2 MG/DL — LOW (ref 0.4–2)
BUN SERPL-MCNC: 50.3 MG/DL — HIGH (ref 8–20)
BUN SERPL-MCNC: 50.9 MG/DL — HIGH (ref 8–20)
CALCIUM SERPL-MCNC: 8.2 MG/DL — LOW (ref 8.4–10.5)
CALCIUM SERPL-MCNC: 8.3 MG/DL — LOW (ref 8.4–10.5)
CHLORIDE SERPL-SCNC: 101 MMOL/L — SIGNIFICANT CHANGE UP (ref 96–108)
CHLORIDE SERPL-SCNC: 99 MMOL/L — SIGNIFICANT CHANGE UP (ref 96–108)
CO2 SERPL-SCNC: 26 MMOL/L — SIGNIFICANT CHANGE UP (ref 22–29)
CO2 SERPL-SCNC: 26 MMOL/L — SIGNIFICANT CHANGE UP (ref 22–29)
CREAT SERPL-MCNC: 1.76 MG/DL — HIGH (ref 0.5–1.3)
CREAT SERPL-MCNC: 1.82 MG/DL — HIGH (ref 0.5–1.3)
CULTURE RESULTS: ABNORMAL
EGFR: 36 ML/MIN/1.73M2 — LOW
EGFR: 36 ML/MIN/1.73M2 — LOW
EGFR: 38 ML/MIN/1.73M2 — LOW
EGFR: 38 ML/MIN/1.73M2 — LOW
EOSINOPHIL # BLD AUTO: 0.33 K/UL — SIGNIFICANT CHANGE UP (ref 0–0.5)
EOSINOPHIL NFR BLD AUTO: 2.2 % — SIGNIFICANT CHANGE UP (ref 0–6)
GLUCOSE BLDC GLUCOMTR-MCNC: 126 MG/DL — HIGH (ref 70–99)
GLUCOSE BLDC GLUCOMTR-MCNC: 129 MG/DL — HIGH (ref 70–99)
GLUCOSE BLDC GLUCOMTR-MCNC: 138 MG/DL — HIGH (ref 70–99)
GLUCOSE BLDC GLUCOMTR-MCNC: 158 MG/DL — HIGH (ref 70–99)
GLUCOSE SERPL-MCNC: 140 MG/DL — HIGH (ref 70–99)
GLUCOSE SERPL-MCNC: 143 MG/DL — HIGH (ref 70–99)
HCT VFR BLD CALC: 29.3 % — LOW (ref 39–50)
HGB BLD-MCNC: 9.3 G/DL — LOW (ref 13–17)
IMM GRANULOCYTES # BLD AUTO: 0.14 K/UL — HIGH (ref 0–0.07)
IMM GRANULOCYTES NFR BLD AUTO: 0.9 % — SIGNIFICANT CHANGE UP (ref 0–0.9)
LYMPHOCYTES # BLD AUTO: 0.62 K/UL — LOW (ref 1–3.3)
LYMPHOCYTES NFR BLD AUTO: 4.2 % — LOW (ref 13–44)
MAGNESIUM SERPL-MCNC: 1.7 MG/DL — SIGNIFICANT CHANGE UP (ref 1.6–2.6)
MAGNESIUM SERPL-MCNC: 1.8 MG/DL — SIGNIFICANT CHANGE UP (ref 1.6–2.6)
MCHC RBC-ENTMCNC: 27.4 PG — SIGNIFICANT CHANGE UP (ref 27–34)
MCHC RBC-ENTMCNC: 31.7 G/DL — LOW (ref 32–36)
METHOD TYPE: SIGNIFICANT CHANGE UP
MONOCYTES # BLD AUTO: 0.85 K/UL — SIGNIFICANT CHANGE UP (ref 0–0.9)
MONOCYTES NFR BLD AUTO: 5.8 % — SIGNIFICANT CHANGE UP (ref 2–14)
NEUTROPHILS # BLD AUTO: 12.81 K/UL — HIGH (ref 1.8–7.4)
NEUTROPHILS NFR BLD AUTO: 86.7 % — HIGH (ref 43–77)
NRBC # BLD AUTO: 0 K/UL — SIGNIFICANT CHANGE UP (ref 0–0)
NRBC # FLD: 0 K/UL — SIGNIFICANT CHANGE UP (ref 0–0)
NRBC BLD AUTO-RTO: 0 /100 WBCS — SIGNIFICANT CHANGE UP (ref 0–0)
ORGANISM # SPEC MICROSCOPIC CNT: ABNORMAL
ORGANISM # SPEC MICROSCOPIC CNT: SIGNIFICANT CHANGE UP
PMV BLD: 10.2 FL — SIGNIFICANT CHANGE UP (ref 7–13)
POTASSIUM SERPL-MCNC: 3.9 MMOL/L — SIGNIFICANT CHANGE UP (ref 3.5–5.3)
POTASSIUM SERPL-MCNC: 4 MMOL/L — SIGNIFICANT CHANGE UP (ref 3.5–5.3)
POTASSIUM SERPL-SCNC: 3.9 MMOL/L — SIGNIFICANT CHANGE UP (ref 3.5–5.3)
POTASSIUM SERPL-SCNC: 4 MMOL/L — SIGNIFICANT CHANGE UP (ref 3.5–5.3)
PROT SERPL-MCNC: 5.6 G/DL — LOW (ref 6.6–8.7)
RBC # BLD: 3.4 M/UL — LOW (ref 4.2–5.8)
RBC # FLD: 18.6 % — HIGH (ref 10.3–14.5)
SODIUM SERPL-SCNC: 137 MMOL/L — SIGNIFICANT CHANGE UP (ref 135–145)
SODIUM SERPL-SCNC: 140 MMOL/L — SIGNIFICANT CHANGE UP (ref 135–145)
SPECIMEN SOURCE: SIGNIFICANT CHANGE UP
WBC # BLD: 14.78 K/UL — HIGH (ref 3.8–10.5)
WBC # FLD AUTO: 14.78 K/UL — HIGH (ref 3.8–10.5)

## 2025-03-06 PROCEDURE — 99233 SBSQ HOSP IP/OBS HIGH 50: CPT

## 2025-03-06 PROCEDURE — 99223 1ST HOSP IP/OBS HIGH 75: CPT

## 2025-03-06 PROCEDURE — G0545: CPT

## 2025-03-06 RX ADMIN — Medication 81 MILLIGRAM(S): at 11:28

## 2025-03-06 RX ADMIN — Medication 250 MILLILITER(S): at 13:08

## 2025-03-06 RX ADMIN — Medication 1 DOSE(S): at 20:30

## 2025-03-06 RX ADMIN — FUROSEMIDE 40 MILLIGRAM(S): 10 INJECTION INTRAMUSCULAR; INTRAVENOUS at 05:03

## 2025-03-06 RX ADMIN — INSULIN LISPRO 1: 100 INJECTION, SOLUTION INTRAVENOUS; SUBCUTANEOUS at 12:28

## 2025-03-06 RX ADMIN — CEFTRIAXONE 2000 MILLIGRAM(S): 500 INJECTION, POWDER, FOR SOLUTION INTRAMUSCULAR; INTRAVENOUS at 21:36

## 2025-03-06 RX ADMIN — Medication 40 MILLIGRAM(S): at 05:03

## 2025-03-06 RX ADMIN — HEPARIN SODIUM 1900 UNIT(S)/HR: 1000 INJECTION INTRAVENOUS; SUBCUTANEOUS at 07:35

## 2025-03-06 RX ADMIN — Medication 250 MILLILITER(S): at 11:28

## 2025-03-06 RX ADMIN — ATORVASTATIN CALCIUM 40 MILLIGRAM(S): 80 TABLET, FILM COATED ORAL at 21:36

## 2025-03-06 RX ADMIN — HEPARIN SODIUM 1900 UNIT(S)/HR: 1000 INJECTION INTRAVENOUS; SUBCUTANEOUS at 19:23

## 2025-03-06 RX ADMIN — TAMSULOSIN HYDROCHLORIDE 0.8 MILLIGRAM(S): 0.4 CAPSULE ORAL at 21:36

## 2025-03-06 RX ADMIN — HEPARIN SODIUM 1900 UNIT(S)/HR: 1000 INJECTION INTRAVENOUS; SUBCUTANEOUS at 06:44

## 2025-03-06 NOTE — DIETITIAN NUTRITION RISK NOTIFICATION - TREATMENT: THE FOLLOWING DIET HAS BEEN RECOMMENDED
Diet, DASH/TLC:   Sodium & Cholesterol Restricted  1500mL Fluid Restriction ([id number])     Special Instructions for Nursin milliLiter(s) to 2000 milliLiter(s) fluid restriction (25 @ 14:50) [Active]

## 2025-03-06 NOTE — DIETITIAN INITIAL EVALUATION ADULT - ETIOLOGY
related to inability to meet sufficient protein-energy needs in setting of lung CA, COPD, sepsis, persistent decreased appetite

## 2025-03-06 NOTE — PROGRESS NOTE ADULT - ASSESSMENT
83 y/o M w/ PMH lung cancer (previously on immunotherapy),  chronic RBBB/LAFB,DVT not AC, HTN, copd not on home O2 and CAD presenting for increasing SOB for the last 1 week. Per wife, Schedule for PET scan last week, was unable to go for continued fatigue, states has had progressive SOB for the last few days. Also endorses that he has been having increased urinary frequency though no dysuria. Found to be hypoxic and UA grossly positive. Admitted for Sepsis present on admission and Acute hypoxic respiratory failure.    Plan:  Acute Hypoxic Respiratory Failure R/o PNA vs HFpEF  Sepsis present on admission due to likely UTI  HX of lung Ca (previously on immunotherapy)  pAF /Chronic RBBB/LAFB  - C/w Tele/  - Seen by Dr. Modi and informed to present to hospital for sob and weakness  - CTA PE as above showing no PE, mild right sided effusion unchanged from previous, Bilateral renal pelves distention  - PBNP >9K  - TTE with new EF 40- 45%, severe Aortic stenosis  - C/w  lasix 40mg QD  - Duonebs prn  - Wean off O2 as tolerated  - RVP negative  - UA grossly positive  - Urine cultures positive for e.coli  - Blood cultures negative so far  - C/w Ceft  - ID consulted  - DC azithro  - Cardiology following  - Bladder Scan and Straight cath Q6H if more than 350cc  - Heparin drip/asa as per cardiology  - Strict I/O and daily weights      SHANNEN on ?CKD in setting of possible urinary obstruction due to BPH (improving)  - Was previously discharged on flomax and had qureshi which was taken out by urology  - Endorses overflow incontinence symptoms  - CTA chest as above showing bilateral hydronephrosis and left sided pyelonephritis  - Bladder Scan and Straight cath Q6H if more than 350cc  - Monitor BMP  - Will consider nephrology if not improving with above workup    Hx of CAD/HTN  - c/w Clopidogrel  - C/w Metoprolol 50mg BID as per cardiology recs  - Hold losartan-HCTZ     Hx of HLD  - C/w Atorvastatin    HX of COPD   - C/w Trelegy interchange  - Wean off O2 as tolerated    Dvt  PPX: Heparin    Diet: DASH    Dispo: Acute. Pending improvement in acute resp failure and UTI/SHANNEN on CKD.     D/w Wife and daughter at bedside.

## 2025-03-06 NOTE — DIETITIAN INITIAL EVALUATION ADULT - CALCULATED TO (CAL/KG)
[employer] reached out to patient and was unable to reach them at this time. A detailed voicemail left for patient asking them to return [employer] call. The call is in regards to if patient has started [employer]. [facility] contact information left on voicemail.    [phone]

## 2025-03-06 NOTE — DIETITIAN NUTRITION RISK NOTIFICATION - ADDITIONAL COMMENTS/DIETITIAN RECOMMENDATIONS
1) Consider liberalize to low Na  2) Add Ensure HP/Enlive BID  3) Rx MVI daily  4) Encourage HBV protein sources  5) Provide encouragement/assistance as needed during mealtimes to inc PO   6) Monitor weights daily for trend/accuracy

## 2025-03-06 NOTE — PROGRESS NOTE ADULT - SUBJECTIVE AND OBJECTIVE BOX
[facility]                                   [address] [place] [address]                                                      PHONE: [phone]                                                         FAX: [phone]  [email or web address]  ---------------------------------------------------------------------------------------------------------------------------------    Overnight events/patient complaints:      NAD feeling ok today     No Known Allergies    MEDICATIONS  (STANDING):  aspirin  chewable 81 milliGRAM(s) Oral daily  atorvastatin 40 milliGRAM(s) Oral at bedtime  azithromycin  IVPB 500 milliGRAM(s) IV Intermittent every 24 hours  azithromycin  IVPB      cefTRIAXone Injectable. 2000 milliGRAM(s) IV Push every 24 hours  dextrose 5%. 1000 milliLiter(s) (50 mL/Hr) IV Continuous <Continuous>  dextrose 5%. 1000 milliLiter(s) (100 mL/Hr) IV Continuous <Continuous>  dextrose 50% Injectable 25 Gram(s) IV Push once  dextrose 50% Injectable 12.5 Gram(s) IV Push once  dextrose 50% Injectable 25 Gram(s) IV Push once  fluticasone propionate/ salmeterol 100-50 MICROgram(s) Diskus 1 Dose(s) Inhalation two times a day  furosemide   Injectable 40 milliGRAM(s) IV Push daily  glucagon  Injectable 1 milliGRAM(s) IntraMuscular once  heparin  Infusion.  Unit(s)/Hr (14 mL/Hr) IV Continuous <Continuous>  insulin lispro (ADMELOG) corrective regimen sliding scale   SubCutaneous three times a day before meals  metoprolol succinate ER 50 milliGRAM(s) Oral two times a day  pantoprazole    Tablet 40 milliGRAM(s) Oral before breakfast  tamsulosin 0.8 milliGRAM(s) Oral at bedtime  tiotropium 2.5 MICROgram(s) Inhaler 2 Puff(s) Inhalation daily    MEDICATIONS  (PRN):  acetaminophen     Tablet .. 650 milliGRAM(s) Oral every 6 hours PRN Temp greater or equal to 38C (100.4F), Mild Pain (1 - 3)  aluminum hydroxide/magnesium hydroxide/simethicone Suspension 30 milliLiter(s) Oral every 4 hours PRN Dyspepsia  dextrose Oral Gel 15 Gram(s) Oral once PRN Blood Glucose LESS THAN 70 milliGRAM(s)/deciliter  heparin   Injectable 6500 Unit(s) IV Push every 6 hours PRN For aPTT less than 40  heparin   Injectable 3000 Unit(s) IV Push every 6 hours PRN For aPTT between 40 - 57  melatonin 3 milliGRAM(s) Oral at bedtime PRN Insomnia  ondansetron Injectable 4 milliGRAM(s) IV Push every 8 hours PRN Nausea and/or Vomiting      Vital Signs Last 24 Hrs  T(C): 36.7 (06 Mar 2025 07:43), Max: 36.9 (06 Mar 2025 04:23)  T(F): 98 (06 Mar 2025 07:43), Max: 98.4 (06 Mar 2025 04:23)  HR: 98 (06 Mar 2025 07:43) (87 - 115)  BP: 95/51 (06 Mar 2025 07:43) (80/40 - 100/60)  BP(mean): 66 (06 Mar 2025 07:43) (66 - 66)  RR: 18 (06 Mar 2025 07:43) (16 - 22)  SpO2: 97% (06 Mar 2025 07:43) (92% - 99%)    Parameters below as of 06 Mar 2025 07:43  Patient On (Oxygen Delivery Method): nasal cannula  O2 Flow (L/min): 3    ICU Vital Signs Last 24 Hrs  JONE [facility]  I&O's Detail    05 Mar 2025 07:01  -  06 Mar 2025 07:00  --------------------------------------------------------  IN:    Heparin Infusion: 228 mL  Total IN: 228 mL    OUT:    Voided (mL): 650 mL  Total OUT: 650 mL    Total NET: -422 mL        I&O's Summary    05 Mar 2025 07:01  -  06 Mar 2025 07:00  --------------------------------------------------------  IN: 228 mL / OUT: 650 mL / NET: -422 mL      Drug Dosing Weight  JONE SOUTH      PHYSICAL EXAM:  General: Appears well developed, alert and cooperative.  HEENT: Head; normocephalic, atraumatic.  Eyes: Pupils reactive, cornea wnl.  Neck: Supple, no nodes adenopathy, no NVD or carotid bruit or thyromegaly.  CARDIOVASCULAR: Normal S1 and S2, 3/6 murmur, rub, gallop or lift.   LUNGS: Decrease BS at the lower bases   ABDOMEN: Soft, nontender without mass or organomegaly. bowel sounds normoactive.  EXTREMITIES: No clubbing, cyanosis or edema. Distal pulses wnl.   SKIN: warm and dry with normal turgor.  NEURO: Alert/oriented x 3/normal motor exam. No pathologic reflexes.    PSYCH: normal affect.        LABS:                        9.3    14.78 )-----------( 353      ( 06 Mar 2025 05:15 )             29.3     03-06    140  |  101  |  50.3[H]  ----------------------------<  140[H]  3.9   |  26.0  |  1.82[H]    Ca    8.2[L]      06 Mar 2025 05:15  Phos  3.0     03-06  Mg     1.7     03-06    TPro  5.6[L]  /  Alb  2.2[L]  /  TBili  <0.2[L]  /  DBili  x   /  AST  38  /  ALT  51[H]  /  AlkPhos  113  03-06    JONE SOUTH      PT/INR - ( 04 Mar 2025 10:28 )   PT: 13.6 sec;   INR: 1.21 ratio         PTT - ( 06 Mar 2025 05:15 )  PTT:66.0 sec  Urinalysis Basic - ( 06 Mar 2025 05:15 )    Color: x / Appearance: x / SG: x / pH: x  Gluc: 140 mg/dL / Ketone: x  / Bili: x / Urobili: x   Blood: x / Protein: x / Nitrite: x   Leuk Esterase: x / RBC: x / WBC x   Sq Epi: x / Non Sq Epi: x / Bacteria: x        RADIOLOGY & ADDITIONAL STUDIES:    INTERPRETATION OF TELEMETRY (personally reviewed):        Assessment and Recommendation:     Patient is an 85 yo man with chronic hypertension, moderate CAD on remote [facility] with normal perfusion on PET MPI 2022, chronic RBBB/LAFB, frequent unifocal ventricular ectopy, lung cancer s/p radiation and chemotherapy with course complicated by DVT s/p several months systemic AC who presents with several days progressive worsening dyspnea without reported concurrent chest pain, dizziness and palpitations. He is s/p CTA which was negative for acute pulmonary embolism however with pleural effusion and upper lobe consolidation with air bronchograms. Tele with paroxsymal atrial fibrillation with frequent ventricular ectopic beats.     Acute HFrEF in setting of severer AS and CKD   PNA     -  Abx as per primary team    -  Lasix 40mg IV daily with close monitoring of renal indices   - Heparin gtt given thromboembolic risk and PAF on tele   -  Continue ASA 81mg and hold plavix for now  -   Continue statin therapy  -   Plan for outpatient Right and Left cardiac cath and TAVR eval

## 2025-03-06 NOTE — DIETITIAN INITIAL EVALUATION ADULT - PERTINENT LABORATORY DATA
03-06    140  |  101  |  50.3[H]  ----------------------------<  140[H]  3.9   |  26.0  |  1.82[H]    Ca    8.2[L]      06 Mar 2025 05:15  Phos  3.0     03-06  Mg     1.7     03-06    TPro  5.6[L]  /  Alb  2.2[L]  /  TBili  <0.2[L]  /  DBili  x   /  AST  38  /  ALT  51[H]  /  AlkPhos  113  03-06  POCT Blood Glucose.: 158 mg/dL (03-06-25 @ 12:12)  A1C with Estimated Average Glucose Result: 6.5 % (03-05-25 @ 06:49)  A1C with Estimated Average Glucose Result: 5.8 % (03-08-24 @ 10:05)

## 2025-03-06 NOTE — DIETITIAN INITIAL EVALUATION ADULT - OTHER INFO
85 y/o M w/ PMH lung cancer (previously on immunotherapy),  chronic RBBB/LAFB,DVT not AC, HTN, copd not on home O2 and CAD presenting for increasing SOB for the last 1 week. Per wife, Schedule for PET scan last week, was unable to go for continued fatigue, states has had progressive SOB for the last few days. Also endorses that he has been having increased urinary frequency though no dysuria. Admitted for Sepsis present on admission and Acute hypoxic respiratory failure.

## 2025-03-06 NOTE — DIETITIAN INITIAL EVALUATION ADULT - ORAL INTAKE PTA/DIET HISTORY
Interview conducted with Pt/family at bedside who report Pt has had a decreased appetite/intake for prolonged period of time 2/2 treatment, generally not feeling well. Weight reported 6 mo ago ~180lbs (consistent with RD assessment March 2024), weight PTA 165lbs. Pt agreeable to trial Ensure (prefers chocolate), encouraged to sip in between meals to optimize intake at meals.

## 2025-03-06 NOTE — DIETITIAN INITIAL EVALUATION ADULT - ADD RECOMMEND
Add Ensure HP/Enlive BID; Rx MVI daily; Encourage HBV protein sources; Provide encouragement/assistance as needed during mealtimes to inc PO

## 2025-03-06 NOTE — PROGRESS NOTE ADULT - SUBJECTIVE AND OBJECTIVE BOX
Patient is a 84y old  Male who presents with a chief complaint of Sob and weakness for more 1 week. (06 Mar 2025 10:45)      SUBJECTIVE / OVERNIGHT EVENTS: Seen and examined. Feels ok. Still mild sob but improved from yesterday. D/w family at bedside. Denies chest pain, abd pain, N/V, fever, chills.     MEDICATIONS  (STANDING):  aspirin  chewable 81 milliGRAM(s) Oral daily  atorvastatin 40 milliGRAM(s) Oral at bedtime  cefTRIAXone Injectable. 2000 milliGRAM(s) IV Push every 24 hours  dextrose 5%. 1000 milliLiter(s) (50 mL/Hr) IV Continuous <Continuous>  dextrose 5%. 1000 milliLiter(s) (100 mL/Hr) IV Continuous <Continuous>  dextrose 50% Injectable 25 Gram(s) IV Push once  dextrose 50% Injectable 12.5 Gram(s) IV Push once  dextrose 50% Injectable 25 Gram(s) IV Push once  fluticasone propionate/ salmeterol 100-50 MICROgram(s) Diskus 1 Dose(s) Inhalation two times a day  furosemide   Injectable 40 milliGRAM(s) IV Push daily  glucagon  Injectable 1 milliGRAM(s) IntraMuscular once  heparin  Infusion.  Unit(s)/Hr (14 mL/Hr) IV Continuous <Continuous>  insulin lispro (ADMELOG) corrective regimen sliding scale   SubCutaneous three times a day before meals  metoprolol succinate ER 50 milliGRAM(s) Oral two times a day  pantoprazole    Tablet 40 milliGRAM(s) Oral before breakfast  tamsulosin 0.8 milliGRAM(s) Oral at bedtime  tiotropium 2.5 MICROgram(s) Inhaler 2 Puff(s) Inhalation daily    MEDICATIONS  (PRN):  acetaminophen     Tablet .. 650 milliGRAM(s) Oral every 6 hours PRN Temp greater or equal to 38C (100.4F), Mild Pain (1 - 3)  aluminum hydroxide/magnesium hydroxide/simethicone Suspension 30 milliLiter(s) Oral every 4 hours PRN Dyspepsia  dextrose Oral Gel 15 Gram(s) Oral once PRN Blood Glucose LESS THAN 70 milliGRAM(s)/deciliter  heparin   Injectable 6500 Unit(s) IV Push every 6 hours PRN For aPTT less than 40  heparin   Injectable 3000 Unit(s) IV Push every 6 hours PRN For aPTT between 40 - 57  melatonin 3 milliGRAM(s) Oral at bedtime PRN Insomnia  ondansetron Injectable 4 milliGRAM(s) IV Push every 8 hours PRN Nausea and/or Vomiting        I&O's Summary    05 Mar 2025 07:01  -  06 Mar 2025 07:00  --------------------------------------------------------  IN: 228 mL / OUT: 650 mL / NET: -422 mL    06 Mar 2025 07:01  -  06 Mar 2025 11:59  --------------------------------------------------------  IN: 513 mL / OUT: 1300 mL / NET: -787 mL        PHYSICAL EXAM:  Vital Signs Last 24 Hrs  T(C): 36.6 (06 Mar 2025 11:12), Max: 36.9 (06 Mar 2025 04:23)  T(F): 97.9 (06 Mar 2025 11:12), Max: 98.4 (06 Mar 2025 04:23)  HR: 102 (06 Mar 2025 11:12) (87 - 125)  BP: 81/54 (06 Mar 2025 11:12) (70/41 - 100/56)  BP(mean): 63 (06 Mar 2025 11:12) (50 - 66)  RR: 19 (06 Mar 2025 11:12) (16 - 20)  SpO2: 99% (06 Mar 2025 11:12) (92% - 100%)    Parameters below as of 06 Mar 2025 11:12  Patient On (Oxygen Delivery Method): nasal cannula  O2 Flow (L/min): 3        Physical Exam:   Gen : Non toxic appearing, comfortable, NAD, Elderly male, on NC saturating well.   HEENT : NCAT, MMM  CVS : S1S2, Irregular rate and rhythm, HELDER +ve  Resp : CTA b/l, improved breath sounds bilaterally  Abd : Soft, non distended, non tender, BS +ve   MSK : Improved LE edema  Neuro : CN II-XII intact, no focal motor or sensory deficits   Psych : Pleasant, no anxiety, normal affect    LABS:                        9.3    14.78 )-----------( 353      ( 06 Mar 2025 05:15 )             29.3     03-06    140  |  101  |  50.3[H]  ----------------------------<  140[H]  3.9   |  26.0  |  1.82[H]    Ca    8.2[L]      06 Mar 2025 05:15  Phos  3.0     03-06  Mg     1.7     03-06    TPro  5.6[L]  /  Alb  2.2[L]  /  TBili  <0.2[L]  /  DBili  x   /  AST  38  /  ALT  51[H]  /  AlkPhos  113  03-06    PTT - ( 06 Mar 2025 05:15 )  PTT:66.0 sec      Urinalysis Basic - ( 06 Mar 2025 05:15 )    Color: x / Appearance: x / SG: x / pH: x  Gluc: 140 mg/dL / Ketone: x  / Bili: x / Urobili: x   Blood: x / Protein: x / Nitrite: x   Leuk Esterase: x / RBC: x / WBC x   Sq Epi: x / Non Sq Epi: x / Bacteria: x        Culture - Blood (collected 04 Mar 2025 15:00)  Source: Blood Blood  Preliminary Report (06 Mar 2025 01:02):    No growth at 24 hours    Culture - Urine (collected 04 Mar 2025 12:30)  Source: Clean Catch Clean Catch (Midstream)  Preliminary Report (05 Mar 2025 23:13):    >100,000 CFU/ml Escherichia coli      CAPILLARY BLOOD GLUCOSE      POCT Blood Glucose.: 138 mg/dL (06 Mar 2025 08:40)  POCT Blood Glucose.: 144 mg/dL (05 Mar 2025 21:41)  POCT Blood Glucose.: 149 mg/dL (05 Mar 2025 16:49)        RADIOLOGY & ADDITIONAL TESTS:  Results Reviewed:   Imaging Personally Reviewed:  Electrocardiogram Personally Reviewed:

## 2025-03-06 NOTE — CONSULT NOTE ADULT - ASSESSMENT
84y  Male with h/o lung cancer (on immunotherapy) complicated by recurrent pleural effusions requiring drainage last drainage was October 2024, DVT not AC, HTN, COPD not on home O2 and CAD. Patient presented to the ED 3/4 with increasing SOB for the last 1 week. No associated fever or chills. No diarrhea. No abdominal pain. No sick contacts. In the ER patient is afebrile, hypoxic requiring O2. Found to have SHANNEN. CTA PE negative for acute PE though shows some pleural effusion on right similar in appearance to previous CT. Patient was started on treatment for heart failure and seen by cardiology. Was started on Azithromycin and Ceftriaxone for concern for infection. ID input requested 3/6.       Acute HFrEF  severe AS  SHANNEN/ CKD   Doubt PNA  UTI  Chronic urinary frequency due to non compliance to tamsulosin  Lung cancer      - Blood cultures  3/4 reporting No growth   - RVP/COVID 19 PCR 3/4 negative   - UA 3/4 with pyuria   - Urine Cx 3/4 reporting E coli   - CTA chest 3/4 reporting no PE. Little change in right upper lobe parenchymal consolidation with air bronchograms from prior CT 1/17  - CT A/P 3/4 reporting bilateral hydroureteronephrosis with asymmetric left perinephric edema  - UA 3/4 with pyuria   - Monitor Cr   - D/C Azithromycin  - Continue Ceftriaxone   - Hold off on PICC/Midline for now unless needed for reasons other than infectious diseases  - Follow up cultures  - Trend Fever  - Trend WBC      Thank you for allowing me to participate in the care of your patient.   Will Follow    Discussed treatment plan with: Dr Rutherford   84y  Male with h/o lung cancer (on immunotherapy) complicated by recurrent pleural effusions requiring drainage last drainage was October 2024, DVT not AC, HTN, COPD not on home O2 and CAD. Patient presented to the ED 3/4 with increasing SOB for the last 1 week. No associated fever or chills. No diarrhea. No abdominal pain. No sick contacts. In the ER patient is afebrile, hypoxic requiring O2. Found to have SHANNEN. CTA PE negative for acute PE though shows some pleural effusion on right similar in appearance to previous CT. Patient was started on treatment for heart failure and seen by cardiology. Was started on Azithromycin and Ceftriaxone for concern for infection. ID input requested 3/6.       Acute HFrEF  severe AS  SHANNEN/ CKD   Doubt PNA  UTI  Chronic urinary frequency due to non compliance to tamsulosin  Lung cancer      - Blood cultures  3/4 reporting No growth   - RVP/COVID 19 PCR 3/4 negative   - UA 3/4 with pyuria   - Urine Cx 3/4 reporting E coli   - CTA chest 3/4 reporting no PE. Little change in right upper lobe parenchymal consolidation with air bronchograms from prior CT 1/17  - CT A/P 3/4 reporting bilateral hydroureteronephrosis with asymmetric left perinephric edema  - UA 3/4 with pyuria   - Monitor Cr   - D/C Azithromycin  - Continue Ceftriaxone   - Plan to complete course on 3/13  - if discharged prior to completion of antibiotics (3/13) can complete course on Cefpodoxime PO   - Hold off on PICC/Midline for now unless needed for reasons other than infectious diseases  - Follow up cultures  - Trend Fever  - Trend WBC      Thank you for allowing me to participate in the care of your patient.   Will sign off. Please call PRN.     Discussed treatment plan with: Dr Rutherford

## 2025-03-06 NOTE — DIETITIAN INITIAL EVALUATION ADULT - NS FNS DIET ORDER
Diet, DASH/TLC:   Sodium & Cholesterol Restricted  1500mL Fluid Restriction ([id number])     Special Instructions for Nursin milliLiter(s) to 2000 milliLiter(s) fluid restriction (25 @ 14:50)

## 2025-03-06 NOTE — DIETITIAN INITIAL EVALUATION ADULT - PERTINENT MEDS FT
MEDICATIONS  (STANDING):  dextrose 50% Injectable 25 Gram(s) IV Push once  furosemide   Injectable 40 milliGRAM(s) IV Push daily  glucagon  Injectable 1 milliGRAM(s) IntraMuscular once  insulin lispro (ADMELOG) corrective regimen sliding scale   SubCutaneous three times a day before meals  pantoprazole    Tablet 40 milliGRAM(s) Oral before breakfast

## 2025-03-07 LAB
ALBUMIN SERPL ELPH-MCNC: 2.3 G/DL — LOW (ref 3.3–5.2)
ALP SERPL-CCNC: 105 U/L — SIGNIFICANT CHANGE UP (ref 40–120)
ALT FLD-CCNC: 52 U/L — HIGH
ANION GAP SERPL CALC-SCNC: 12 MMOL/L — SIGNIFICANT CHANGE UP (ref 5–17)
APTT BLD: 75.1 SEC — HIGH (ref 24.5–35.6)
AST SERPL-CCNC: 43 U/L — HIGH
BASOPHILS # BLD AUTO: 0.03 K/UL — SIGNIFICANT CHANGE UP (ref 0–0.2)
BASOPHILS NFR BLD AUTO: 0.2 % — SIGNIFICANT CHANGE UP (ref 0–2)
BILIRUB SERPL-MCNC: <0.2 MG/DL — LOW (ref 0.4–2)
BUN SERPL-MCNC: 36.2 MG/DL — HIGH (ref 8–20)
CALCIUM SERPL-MCNC: 8.2 MG/DL — LOW (ref 8.4–10.5)
CHLORIDE SERPL-SCNC: 101 MMOL/L — SIGNIFICANT CHANGE UP (ref 96–108)
CO2 SERPL-SCNC: 28 MMOL/L — SIGNIFICANT CHANGE UP (ref 22–29)
CREAT SERPL-MCNC: 1.51 MG/DL — HIGH (ref 0.5–1.3)
EGFR: 46 ML/MIN/1.73M2 — LOW
EGFR: 46 ML/MIN/1.73M2 — LOW
EOSINOPHIL # BLD AUTO: 0.23 K/UL — SIGNIFICANT CHANGE UP (ref 0–0.5)
EOSINOPHIL NFR BLD AUTO: 1.7 % — SIGNIFICANT CHANGE UP (ref 0–6)
GLUCOSE BLDC GLUCOMTR-MCNC: 115 MG/DL — HIGH (ref 70–99)
GLUCOSE BLDC GLUCOMTR-MCNC: 128 MG/DL — HIGH (ref 70–99)
GLUCOSE BLDC GLUCOMTR-MCNC: 129 MG/DL — HIGH (ref 70–99)
GLUCOSE BLDC GLUCOMTR-MCNC: 152 MG/DL — HIGH (ref 70–99)
GLUCOSE SERPL-MCNC: 117 MG/DL — HIGH (ref 70–99)
HCT VFR BLD CALC: 29.7 % — LOW (ref 39–50)
HCT VFR BLD CALC: 33.2 % — LOW (ref 39–50)
HGB BLD-MCNC: 10.5 G/DL — LOW (ref 13–17)
HGB BLD-MCNC: 9.2 G/DL — LOW (ref 13–17)
IMM GRANULOCYTES NFR BLD AUTO: 1.2 % — HIGH (ref 0–0.9)
LYMPHOCYTES # BLD AUTO: 0.72 K/UL — LOW (ref 1–3.3)
LYMPHOCYTES NFR BLD AUTO: 5.5 % — LOW (ref 13–44)
MAGNESIUM SERPL-MCNC: 1.8 MG/DL — SIGNIFICANT CHANGE UP (ref 1.6–2.6)
MCHC RBC-ENTMCNC: 27.1 PG — SIGNIFICANT CHANGE UP (ref 27–34)
MCHC RBC-ENTMCNC: 31 G/DL — LOW (ref 32–36)
MCHC RBC-ENTMCNC: 31.6 G/DL — LOW (ref 32–36)
MCV RBC AUTO: 86.9 FL — SIGNIFICANT CHANGE UP (ref 80–100)
MCV RBC AUTO: 87.4 FL — SIGNIFICANT CHANGE UP (ref 80–100)
MONOCYTES # BLD AUTO: 0.87 K/UL — SIGNIFICANT CHANGE UP (ref 0–0.9)
MONOCYTES NFR BLD AUTO: 6.6 % — SIGNIFICANT CHANGE UP (ref 2–14)
NEUTROPHILS # BLD AUTO: 11.17 K/UL — HIGH (ref 1.8–7.4)
NEUTROPHILS NFR BLD AUTO: 84.8 % — HIGH (ref 43–77)
NRBC # BLD AUTO: 0 K/UL — SIGNIFICANT CHANGE UP (ref 0–0)
NRBC # BLD AUTO: 0 K/UL — SIGNIFICANT CHANGE UP (ref 0–0)
NRBC # FLD: 0 K/UL — SIGNIFICANT CHANGE UP (ref 0–0)
NRBC # FLD: 0 K/UL — SIGNIFICANT CHANGE UP (ref 0–0)
NRBC BLD AUTO-RTO: 0 /100 WBCS — SIGNIFICANT CHANGE UP (ref 0–0)
PHOSPHATE SERPL-MCNC: 3.1 MG/DL — SIGNIFICANT CHANGE UP (ref 2.4–4.7)
PLATELET # BLD AUTO: 385 K/UL — SIGNIFICANT CHANGE UP (ref 150–400)
PLATELET # BLD AUTO: 409 K/UL — HIGH (ref 150–400)
PMV BLD: 10.3 FL — SIGNIFICANT CHANGE UP (ref 7–13)
PMV BLD: 10.4 FL — SIGNIFICANT CHANGE UP (ref 7–13)
POTASSIUM SERPL-MCNC: 3.6 MMOL/L — SIGNIFICANT CHANGE UP (ref 3.5–5.3)
POTASSIUM SERPL-SCNC: 3.6 MMOL/L — SIGNIFICANT CHANGE UP (ref 3.5–5.3)
PROT SERPL-MCNC: 5.8 G/DL — LOW (ref 6.6–8.7)
RBC # BLD: 3.4 M/UL — LOW (ref 4.2–5.8)
RBC # BLD: 3.82 M/UL — LOW (ref 4.2–5.8)
RBC # FLD: 18.6 % — HIGH (ref 10.3–14.5)
RBC # FLD: 18.6 % — HIGH (ref 10.3–14.5)
SODIUM SERPL-SCNC: 141 MMOL/L — SIGNIFICANT CHANGE UP (ref 135–145)
WBC # BLD: 13.02 K/UL — HIGH (ref 3.8–10.5)
WBC # BLD: 13.18 K/UL — HIGH (ref 3.8–10.5)
WBC # FLD AUTO: 13.02 K/UL — HIGH (ref 3.8–10.5)
WBC # FLD AUTO: 13.18 K/UL — HIGH (ref 3.8–10.5)

## 2025-03-07 PROCEDURE — 99233 SBSQ HOSP IP/OBS HIGH 50: CPT

## 2025-03-07 RX ORDER — ACETAMINOPHEN 500 MG/5ML
1000 LIQUID (ML) ORAL ONCE
Refills: 0 | Status: DISCONTINUED | OUTPATIENT
Start: 2025-03-07 | End: 2025-03-07

## 2025-03-07 RX ORDER — METOPROLOL SUCCINATE 50 MG/1
50 TABLET, EXTENDED RELEASE ORAL DAILY
Refills: 0 | Status: DISCONTINUED | OUTPATIENT
Start: 2025-03-07 | End: 2025-03-16

## 2025-03-07 RX ADMIN — FUROSEMIDE 40 MILLIGRAM(S): 10 INJECTION INTRAMUSCULAR; INTRAVENOUS at 05:37

## 2025-03-07 RX ADMIN — METOPROLOL SUCCINATE 50 MILLIGRAM(S): 50 TABLET, EXTENDED RELEASE ORAL at 05:37

## 2025-03-07 RX ADMIN — Medication 40 MILLIGRAM(S): at 05:37

## 2025-03-07 RX ADMIN — CEFTRIAXONE 2000 MILLIGRAM(S): 500 INJECTION, POWDER, FOR SOLUTION INTRAMUSCULAR; INTRAVENOUS at 22:28

## 2025-03-07 RX ADMIN — Medication 500 MILLILITER(S): at 10:48

## 2025-03-07 RX ADMIN — ATORVASTATIN CALCIUM 40 MILLIGRAM(S): 80 TABLET, FILM COATED ORAL at 22:28

## 2025-03-07 RX ADMIN — HEPARIN SODIUM 1900 UNIT(S)/HR: 1000 INJECTION INTRAVENOUS; SUBCUTANEOUS at 07:12

## 2025-03-07 RX ADMIN — TAMSULOSIN HYDROCHLORIDE 0.8 MILLIGRAM(S): 0.4 CAPSULE ORAL at 22:28

## 2025-03-07 RX ADMIN — Medication 81 MILLIGRAM(S): at 11:22

## 2025-03-07 NOTE — PROGRESS NOTE ADULT - SUBJECTIVE AND OBJECTIVE BOX
Tamir Nunes MD  [facility]  Contact via Teams or text/call at [phone]    Patient is a 84y old  Male who presents with a chief complaint of Acute respiratory failure with hypoxia   (06 Mar 2025 13:24)      Patient seen and examined at bedside. No overnight events reported.     ALLERGIES:  No Known Allergies    MEDICATIONS  (STANDING):  aspirin  chewable 81 milliGRAM(s) Oral daily  atorvastatin 40 milliGRAM(s) Oral at bedtime  cefTRIAXone Injectable. 2000 milliGRAM(s) IV Push every 24 hours  dextrose 5%. 1000 milliLiter(s) (50 mL/Hr) IV Continuous <Continuous>  dextrose 5%. 1000 milliLiter(s) (100 mL/Hr) IV Continuous <Continuous>  dextrose 50% Injectable 25 Gram(s) IV Push once  dextrose 50% Injectable 12.5 Gram(s) IV Push once  dextrose 50% Injectable 25 Gram(s) IV Push once  fluticasone propionate/ salmeterol 100-50 MICROgram(s) Diskus 1 Dose(s) Inhalation two times a day  furosemide   Injectable 40 milliGRAM(s) IV Push daily  glucagon  Injectable 1 milliGRAM(s) IntraMuscular once  heparin  Infusion.  Unit(s)/Hr (14 mL/Hr) IV Continuous <Continuous>  insulin lispro (ADMELOG) corrective regimen sliding scale   SubCutaneous three times a day before meals  metoprolol succinate ER 50 milliGRAM(s) Oral two times a day  pantoprazole    Tablet 40 milliGRAM(s) Oral before breakfast  tamsulosin 0.8 milliGRAM(s) Oral at bedtime  tiotropium 2.5 MICROgram(s) Inhaler 2 Puff(s) Inhalation daily    MEDICATIONS  (PRN):  acetaminophen     Tablet .. 650 milliGRAM(s) Oral every 6 hours PRN Temp greater or equal to 38C (100.4F), Mild Pain (1 - 3)  aluminum hydroxide/magnesium hydroxide/simethicone Suspension 30 milliLiter(s) Oral every 4 hours PRN Dyspepsia  dextrose Oral Gel 15 Gram(s) Oral once PRN Blood Glucose LESS THAN 70 milliGRAM(s)/deciliter  heparin   Injectable 6500 Unit(s) IV Push every 6 hours PRN For aPTT less than 40  heparin   Injectable 3000 Unit(s) IV Push every 6 hours PRN For aPTT between 40 - 57  melatonin 3 milliGRAM(s) Oral at bedtime PRN Insomnia  ondansetron Injectable 4 milliGRAM(s) IV Push every 8 hours PRN Nausea and/or Vomiting    Vital Signs Last 24 Hrs  T(F): 99.1 (07 Mar 2025 04:06), Max: 99.1 (06 Mar 2025 16:36)  HR: 102 (07 Mar 2025 04:06) (82 - 125)  BP: 105/65 (07 Mar 2025 04:06) (70/41 - 108/63)  RR: 18 (07 Mar 2025 08:43) (17 - 20)  SpO2: 100% (07 Mar 2025 08:43) (92% - 100%)  I&O's Summary    06 Mar 2025 07:01  -  07 Mar 2025 07:00  --------------------------------------------------------  IN: 1716 mL / OUT: 3497 mL / NET: -1781 mL    07 Mar 2025 07:01  -  07 Mar 2025 09:00  --------------------------------------------------------  IN: 0 mL / OUT: 1000 mL / NET: -1000 mL      PHYSICAL EXAM:  General: NAD, A/O x 3  ENT: No gross hearing impairment, Moist mucous membranes, no thrush  Neck: Supple, No JVD  Lungs: Clear to auscultation bilaterally, good air entry, non-labored breathing  Cardio: RRR, S1/S2, No murmur  Abdomen: Soft, Nontender, Nondistended; Bowel sounds present  Extremities: No calf tenderness, No cyanosis, No pitting edema  Psych: Appropriate mood and affect    LABS:                        9.2    13.18 )-----------( 385      ( 07 Mar 2025 05:28 )             29.7     03-07    141  |  101  |  36.2  ----------------------------<  117  3.6   |  28.0  |  1.51    Ca    8.2      07 Mar 2025 05:28  Phos  3.1     03-07  Mg     1.8     03-07    TPro  5.8  /  Alb  2.3  /  TBili  <0.2  /  DBili  x   /  AST  43  /  ALT  52  /  AlkPhos  105  03-07          PT/INR - ( 04 Mar 2025 10:28 )   PT: 13.6 sec;   INR: 1.21 ratio         PTT - ( 07 Mar 2025 05:28 )  PTT:75.1 sec          03-05 Chol 93 mg/dL LDL -- HDL 30 mg/dL Trig 53 mg/dL              POCT Blood Glucose.: 129 mg/dL (07 Mar 2025 07:32)  POCT Blood Glucose.: 129 mg/dL (06 Mar 2025 21:35)  POCT Blood Glucose.: 126 mg/dL (06 Mar 2025 17:07)  POCT Blood Glucose.: 158 mg/dL (06 Mar 2025 12:12)      Urinalysis Basic - ( 07 Mar 2025 05:28 )    Color: x / Appearance: x / SG: x / pH: x  Gluc: 117 mg/dL / Ketone: x  / Bili: x / Urobili: x   Blood: x / Protein: x / Nitrite: x   Leuk Esterase: x / RBC: x / WBC x   Sq Epi: x / Non Sq Epi: x / Bacteria: x        Culture - Blood (collected 04 Mar 2025 15:00)  Source: Blood Blood  Preliminary Report (07 Mar 2025 01:02):    No growth at 48 Hours    Culture - Urine (collected 04 Mar 2025 12:30)  Source: Clean Catch Clean Catch (Midstream)  Final Report (06 Mar 2025 21:12):    >100,000 CFU/ml Escherichia coli  Organism: Escherichia coli (06 Mar 2025 21:12)  Organism: Escherichia coli (06 Mar 2025 21:12)      Method Type: AKHIL      -  Amoxicillin/Clavulanic Acid: S <=8/4      -  Ampicillin: R >16 These ampicillin results predict results for amoxicillin      -  Ampicillin/Sulbactam: I 16/8      -  Aztreonam: S <=4      -  Cefazolin: S <=2 For uncomplicated UTI with K. pneumoniae, E. coli, or P. mirablis: AKHIL <=16 is sensitive and AKHIL >=32 is resistant. This also predicts results for oral agents cefaclor, cefdinir, cefpodoxime, cefprozil, cefuroxime axetil, cephalexin and locarbef for uncomplicated UTI. Note that some isolates may be susceptible to these agents while testing resistant to cefazolin.      -  Cefepime: S <=2      -  Cefoxitin: S <=8      -  Ceftriaxone: S <=1      -  Cefuroxime: S <=4      -  Ciprofloxacin: S <=0.25      -  Ertapenem: S <=0.5      -  Gentamicin: R >8      -  Imipenem: S <=1      -  Levofloxacin: S <=0.5      -  Meropenem: S <=1      -  Nitrofurantoin: S <=32 Should not be used to treat pyelonephritis      -  Piperacillin/Tazobactam: S <=8      -  Tobramycin: R >8      -  Trimethoprim/Sulfamethoxazole: R >2/38        RADIOLOGY & ADDITIONAL TESTS:    Care Discussed with Consultants/Other Providers:    Tamir Nunes MD  [facility]  Contact via Teams or text/call at [phone]    Patient is a 84y old  Male who presents with a chief complaint of Acute respiratory failure with hypoxia   (06 Mar 2025 13:24)    Feels okay.  blood from qureshi catheter this AM.      Patient seen and examined at bedside. No overnight events reported.     ALLERGIES:  No Known Allergies    MEDICATIONS  (STANDING):  aspirin  chewable 81 milliGRAM(s) Oral daily  atorvastatin 40 milliGRAM(s) Oral at bedtime  cefTRIAXone Injectable. 2000 milliGRAM(s) IV Push every 24 hours  dextrose 5%. 1000 milliLiter(s) (50 mL/Hr) IV Continuous <Continuous>  dextrose 5%. 1000 milliLiter(s) (100 mL/Hr) IV Continuous <Continuous>  dextrose 50% Injectable 25 Gram(s) IV Push once  dextrose 50% Injectable 12.5 Gram(s) IV Push once  dextrose 50% Injectable 25 Gram(s) IV Push once  fluticasone propionate/ salmeterol 100-50 MICROgram(s) Diskus 1 Dose(s) Inhalation two times a day  furosemide   Injectable 40 milliGRAM(s) IV Push daily  glucagon  Injectable 1 milliGRAM(s) IntraMuscular once  heparin  Infusion.  Unit(s)/Hr (14 mL/Hr) IV Continuous <Continuous>  insulin lispro (ADMELOG) corrective regimen sliding scale   SubCutaneous three times a day before meals  metoprolol succinate ER 50 milliGRAM(s) Oral two times a day  pantoprazole    Tablet 40 milliGRAM(s) Oral before breakfast  tamsulosin 0.8 milliGRAM(s) Oral at bedtime  tiotropium 2.5 MICROgram(s) Inhaler 2 Puff(s) Inhalation daily    MEDICATIONS  (PRN):  acetaminophen     Tablet .. 650 milliGRAM(s) Oral every 6 hours PRN Temp greater or equal to 38C (100.4F), Mild Pain (1 - 3)  aluminum hydroxide/magnesium hydroxide/simethicone Suspension 30 milliLiter(s) Oral every 4 hours PRN Dyspepsia  dextrose Oral Gel 15 Gram(s) Oral once PRN Blood Glucose LESS THAN 70 milliGRAM(s)/deciliter  heparin   Injectable 6500 Unit(s) IV Push every 6 hours PRN For aPTT less than 40  heparin   Injectable 3000 Unit(s) IV Push every 6 hours PRN For aPTT between 40 - 57  melatonin 3 milliGRAM(s) Oral at bedtime PRN Insomnia  ondansetron Injectable 4 milliGRAM(s) IV Push every 8 hours PRN Nausea and/or Vomiting    Vital Signs Last 24 Hrs  T(F): 99.1 (07 Mar 2025 04:06), Max: 99.1 (06 Mar 2025 16:36)  HR: 102 (07 Mar 2025 04:06) (82 - 125)  BP: 105/65 (07 Mar 2025 04:06) (70/41 - 108/63)  RR: 18 (07 Mar 2025 08:43) (17 - 20)  SpO2: 100% (07 Mar 2025 08:43) (92% - 100%)  I&O's Summary    06 Mar 2025 07:01  -  07 Mar 2025 07:00  --------------------------------------------------------  IN: 1716 mL / OUT: 3497 mL / NET: -1781 mL    07 Mar 2025 07:01  -  07 Mar 2025 09:00  --------------------------------------------------------  IN: 0 mL / OUT: 1000 mL / NET: -1000 mL    PHYSICAL EXAM:  General: NAD, A/O x 3  ENT: No gross hearing impairment, Moist mucous membranes, no thrush  Neck: Supple, No JVD  Lungs: Clear to auscultation bilaterally, good air entry, non-labored breathing  Cardio: RRR, S1/S2, No murmur  Abdomen: Soft, Nontender, Nondistended; Bowel sounds present  Extremities: No calf tenderness, No cyanosis, No pitting edema  Psych: Appropriate mood and affect  Qureshi: dark red urine noted    LABS:                        9.2    13.18 )-----------( 385      ( 07 Mar 2025 05:28 )             29.7     03-07    141  |  101  |  36.2  ----------------------------<  117  3.6   |  28.0  |  1.51    Ca    8.2      07 Mar 2025 05:28  Phos  3.1     03-07  Mg     1.8     03-07    TPro  5.8  /  Alb  2.3  /  TBili  <0.2  /  DBili  x   /  AST  43  /  ALT  52  /  AlkPhos  105  03-07          PT/INR - ( 04 Mar 2025 10:28 )   PT: 13.6 sec;   INR: 1.21 ratio         PTT - ( 07 Mar 2025 05:28 )  PTT:75.1 sec          03-05 Chol 93 mg/dL LDL -- HDL 30 mg/dL Trig 53 mg/dL              POCT Blood Glucose.: 129 mg/dL (07 Mar 2025 07:32)  POCT Blood Glucose.: 129 mg/dL (06 Mar 2025 21:35)  POCT Blood Glucose.: 126 mg/dL (06 Mar 2025 17:07)  POCT Blood Glucose.: 158 mg/dL (06 Mar 2025 12:12)      Urinalysis Basic - ( 07 Mar 2025 05:28 )    Color: x / Appearance: x / SG: x / pH: x  Gluc: 117 mg/dL / Ketone: x  / Bili: x / Urobili: x   Blood: x / Protein: x / Nitrite: x   Leuk Esterase: x / RBC: x / WBC x   Sq Epi: x / Non Sq Epi: x / Bacteria: x        Culture - Blood (collected 04 Mar 2025 15:00)  Source: Blood Blood  Preliminary Report (07 Mar 2025 01:02):    No growth at 48 Hours    Culture - Urine (collected 04 Mar 2025 12:30)  Source: Clean Catch Clean Catch (Midstream)  Final Report (06 Mar 2025 21:12):    >100,000 CFU/ml Escherichia coli  Organism: Escherichia coli (06 Mar 2025 21:12)  Organism: Escherichia coli (06 Mar 2025 21:12)      Method Type: AKHIL      -  Amoxicillin/Clavulanic Acid: S <=8/4      -  Ampicillin: R >16 These ampicillin results predict results for amoxicillin      -  Ampicillin/Sulbactam: I 16/8      -  Aztreonam: S <=4      -  Cefazolin: S <=2 For uncomplicated UTI with K. pneumoniae, E. coli, or P. mirablis: AKHIL <=16 is sensitive and AKHIL >=32 is resistant. This also predicts results for oral agents cefaclor, cefdinir, cefpodoxime, cefprozil, cefuroxime axetil, cephalexin and locarbef for uncomplicated UTI. Note that some isolates may be susceptible to these agents while testing resistant to cefazolin.      -  Cefepime: S <=2      -  Cefoxitin: S <=8      -  Ceftriaxone: S <=1      -  Cefuroxime: S <=4      -  Ciprofloxacin: S <=0.25      -  Ertapenem: S <=0.5      -  Gentamicin: R >8      -  Imipenem: S <=1      -  Levofloxacin: S <=0.5      -  Meropenem: S <=1      -  Nitrofurantoin: S <=32 Should not be used to treat pyelonephritis      -  Piperacillin/Tazobactam: S <=8      -  Tobramycin: R >8      -  Trimethoprim/Sulfamethoxazole: R >2/38        RADIOLOGY & ADDITIONAL TESTS:    Care Discussed with Consultants/Other Providers:

## 2025-03-07 NOTE — PROGRESS NOTE ADULT - ASSESSMENT
85 y/o M w/ PMH lung cancer (previously on immunotherapy),  chronic RBBB/LAFB,DVT not AC, HTN, copd not on home O2 and CAD presenting for increasing SOB for the last 1 week. Per wife, Schedule for PET scan last week, was unable to go for continued fatigue, states has had progressive SOB for the last few days. Also endorses that he has been having increased urinary frequency though no dysuria. Found to be hypoxic and UA grossly positive. Admitted for Sepsis present on admission and Acute hypoxic respiratory failure.    Acute Hypoxic Respiratory Failure R/o PNA vs  HF  Sepsis present on admission due to likely UTI  HX of lung Ca (previously on immunotherapy)  Right sided pleural effusion - unchanged from previous  - C/w Tele/  - Seen by Dr. Modi and informed to present to hospital for sob and weakness  - CTA PE as above showing no PE    - Duonebs prn  - Wean off O2 as tolerated  - RVP negative  - UA grossly positive  - Urine cultures positive for e.coli  - Blood cultures negative so far  - C/w Ceft  - Bladder Scan and Straight cath Q6H if more than 350cc  - Strict I/O and daily weights    pAF /Chronic RBBB/LAFB  acute on chronic systolic heart failure  Severe aortic stenosis  - cont heparin gtt per cardiology  - cont lasix 40mg QD  - elevated pBNP   - TTE with new EF 40- 45%, severe Aortic stenosis  - plan for outpatient right and left heart catheterization and TAVR eval    SHANNEN on ?CKD in setting of possible urinary obstruction due to BPH (improving)  - Was previously discharged on flomax and had qureshi which was taken out by urology  - Endorses overflow incontinence symptoms  - CTA chest as above showing bilateral hydronephrosis and left sided pyelonephritis  - Bladder Scan and Straight cath Q6H if more than 350cc  - Monitor BMP    Hx of CAD/HTN  - c/w Clopidogrel  - C/w Metoprolol 50mg BID as per cardiology recs  - Hold losartan-HCTZ     Hx of HLD  - C/w Atorvastatin    HX of COPD   - C/w Trelegy interchange  - Wean off O2 as tolerated    Dvt  PPX: Heparin    Diet: DASH    Dispo:  medically active, PNA, UTI, resp failure    D/w Wife and daughter at bedside.     85 y/o M w/ PMH lung cancer (previously on immunotherapy),  chronic RBBB/LAFB,DVT not AC, HTN, copd not on home O2 and CAD presenting for increasing SOB for the last 1 week. Per wife, Schedule for PET scan last week, was unable to go for continued fatigue, states has had progressive SOB for the last few days. Also endorses that he has been having increased urinary frequency though no dysuria. Found to be hypoxic and UA grossly positive. Admitted for Sepsis present on admission and Acute hypoxic respiratory failure.    Acute Hypoxic Respiratory Failure R/o PNA vs  HF  Sepsis present on admission due to likely UTI  HX of lung Ca (previously on immunotherapy)  Right sided pleural effusion - unchanged from previous  - C/w Tele/  - CT PE - negative  - cont rocephin for E. Coli UTI, pansensitive    Acute blood loss  UTI / Left sided pyelonephritis  Hematuria  - hold heparin gtt  - urology consult  - monitor qureshi output  - repeat CBC shows stable hemoglobin    pAF /Chronic RBBB/LAFB  acute on chronic systolic heart failure  Severe aortic stenosis  - hold AC   - d/c lasix as patient appears dry and hypotensive this AM   - elevated pBNP   - TTE with new EF 40- 45%, severe Aortic stenosis  - plan for outpatient versus inpatient intervention    SHANNEN on CKD stage 3  Bilateral hydronephrosis  Left sided pyelonephritis?  - improved from admission   - Was previously discharged on flomax and had qureshi which was taken out by urology  - Endorses overflow incontinence symptoms  - CTA chest as above showing bilateral hydronephrosis and left sided pyelonephritis  - Bladder Scan and Straight cath Q6H if more than 350cc  - Monitor BMP    Hx of CAD/HTN  - c/w Clopidogrel  - C/w Metoprolol 50mg BID as per cardiology recs  - Hold losartan-HCTZ     Hx of HLD  - C/w Atorvastatin    HX of COPD   - C/w Trelegy interchange  - Wean off O2 as tolerated    Dvt  PPX: Hold given bleeding     Diet: DASH    Dispo:  medically active, PNA with resp failure, UTI with pyelonephritis    D/w Wife and daughter at bedside.

## 2025-03-07 NOTE — PROGRESS NOTE ADULT - SUBJECTIVE AND OBJECTIVE BOX
[place] [facility]ARDIOVASCULAR - [facility], THE HEART CENTER                                   [address] [place] [address]                                                      PHONE: [phone]                                                         FAX: [phone]  [email or web address]  ---------------------------------------------------------------------------------------------------------------------------------    Overnight events/patient complaints:      PAST MEDICAL & SURGICAL HISTORY:  Abnormal findings on diagnostic imaging of lung      Hypertension      Hyperlipidemia      CAD (coronary artery disease)      Left anterior fascicular block (LAFB)      RBBB      S/P hip replacement, right      S/P hip replacement, left          No Known Allergies    MEDICATIONS  (STANDING):  aspirin  chewable 81 milliGRAM(s) Oral daily  atorvastatin 40 milliGRAM(s) Oral at bedtime  cefTRIAXone Injectable. 2000 milliGRAM(s) IV Push every 24 hours  dextrose 5%. 1000 milliLiter(s) (50 mL/Hr) IV Continuous <Continuous>  dextrose 5%. 1000 milliLiter(s) (100 mL/Hr) IV Continuous <Continuous>  dextrose 50% Injectable 25 Gram(s) IV Push once  dextrose 50% Injectable 12.5 Gram(s) IV Push once  dextrose 50% Injectable 25 Gram(s) IV Push once  fluticasone propionate/ salmeterol 100-50 MICROgram(s) Diskus 1 Dose(s) Inhalation two times a day  furosemide   Injectable 40 milliGRAM(s) IV Push daily  glucagon  Injectable 1 milliGRAM(s) IntraMuscular once  heparin  Infusion.  Unit(s)/Hr (14 mL/Hr) IV Continuous <Continuous>  insulin lispro (ADMELOG) corrective regimen sliding scale   SubCutaneous three times a day before meals  metoprolol succinate ER 50 milliGRAM(s) Oral two times a day  pantoprazole    Tablet 40 milliGRAM(s) Oral before breakfast  tamsulosin 0.8 milliGRAM(s) Oral at bedtime  tiotropium 2.5 MICROgram(s) Inhaler 2 Puff(s) Inhalation daily    MEDICATIONS  (PRN):  acetaminophen     Tablet .. 650 milliGRAM(s) Oral every 6 hours PRN Temp greater or equal to 38C (100.4F), Mild Pain (1 - 3)  aluminum hydroxide/magnesium hydroxide/simethicone Suspension 30 milliLiter(s) Oral every 4 hours PRN Dyspepsia  dextrose Oral Gel 15 Gram(s) Oral once PRN Blood Glucose LESS THAN 70 milliGRAM(s)/deciliter  heparin   Injectable 6500 Unit(s) IV Push every 6 hours PRN For aPTT less than 40  heparin   Injectable 3000 Unit(s) IV Push every 6 hours PRN For aPTT between 40 - 57  melatonin 3 milliGRAM(s) Oral at bedtime PRN Insomnia  ondansetron Injectable 4 milliGRAM(s) IV Push every 8 hours PRN Nausea and/or Vomiting      Vital Signs Last 24 Hrs  T(C): 37.3 (07 Mar 2025 04:06), Max: 37.3 (06 Mar 2025 16:36)  T(F): 99.1 (07 Mar 2025 04:06), Max: 99.1 (06 Mar 2025 16:36)  HR: 102 (07 Mar 2025 04:06) (82 - 125)  BP: 105/65 (07 Mar 2025 04:06) (70/41 - 108/63)  BP(mean): 73 (06 Mar 2025 14:10) (50 - 73)  RR: 18 (07 Mar 2025 08:43) (17 - 20)  SpO2: 100% (07 Mar 2025 08:43) (92% - 100%)    Parameters below as of 07 Mar 2025 08:43  Patient On (Oxygen Delivery Method): room air      ICU Vital Signs Last 24 Hrs  JONE SOUTH  I&O's Detail    06 Mar 2025 07:01  -  07 Mar 2025 07:00  --------------------------------------------------------  IN:    Heparin Infusion: 228 mL    Oral Fluid: 1238 mL    Sodium Chloride 0.9% Bolus: 250 mL  Total IN: 1716 mL    OUT:    Voided (mL): 3497 mL  Total OUT: 3497 mL    Total NET: -1781 mL      07 Mar 2025 07:01  -  07 Mar 2025 10:05  --------------------------------------------------------  IN:  Total IN: 0 mL    OUT:    Voided (mL): 1000 mL  Total OUT: 1000 mL    Total NET: -1000 mL        I&O's Summary    06 Mar 2025 07:01  -  07 Mar 2025 07:00  --------------------------------------------------------  IN: 1716 mL / OUT: 3497 mL / NET: -1781 mL    07 Mar 2025 07:01  -  07 Mar 2025 10:05  --------------------------------------------------------  IN: 0 mL / OUT: 1000 mL / NET: -1000 mL      Drug Dosing Weight  JONE SOUTH      PHYSICAL EXAM:  General: Appears well developed, well nourished alert and cooperative.  HEENT: Head; normocephalic, atraumatic.  Eyes: Pupils reactive, cornea wnl.  Neck: Supple, no nodes adenopathy, no NVD or carotid bruit or thyromegaly.  CARDIOVASCULAR: Normal S1 and S2, No rub, gallop or lift.  + murmur,  LUNGS: No rales, rhonchi or wheeze. Normal breath sounds bilaterally.  ABDOMEN: Soft, nontender without mass or organomegaly. bowel sounds normoactive.  EXTREMITIES: No clubbing, cyanosis or edema. Distal pulses wnl.   SKIN: warm and dry with normal turgor.  NEURO: Alert/oriented x 3/normal motor exam. No pathologic reflexes.    PSYCH: normal affect.        LABS:                        9.2    13.18 )-----------( 385      ( 07 Mar 2025 05:28 )             29.7     03-07    141  |  101  |  36.2[H]  ----------------------------<  117[H]  3.6   |  28.0  |  1.51[H]    Ca    8.2[L]      07 Mar 2025 05:28  Phos  3.1     03-07  Mg     1.8     03-07    TPro  5.8[L]  /  Alb  2.3[L]  /  TBili  <0.2[L]  /  DBili  x   /  AST  43[H]  /  ALT  52[H]  /  AlkPhos  105  03-07    JONE SOUTH      PTT - ( 07 Mar 2025 05:28 )  PTT:75.1 sec  Urinalysis Basic - ( 07 Mar 2025 05:28 )    Color: x / Appearance: x / SG: x / pH: x  Gluc: 117 mg/dL / Ketone: x  / Bili: x / Urobili: x   Blood: x / Protein: x / Nitrite: x   Leuk Esterase: x / RBC: x / WBC x   Sq Epi: x / Non Sq Epi: x / Bacteria: x         ASSESSMENT AND PLAN:Patient is an 83 yo man with chronic hypertension, moderate CAD on remote [facility] with normal perfusion on PET MPI 2022, chronic RBBB/LAFB, frequent unifocal ventricular ectopy, lung cancer s/p radiation and chemotherapy with course complicated by DVT s/p several months systemic AC who presents with several days progressive worsening dyspnea without reported concurrent chest pain, dizziness and palpitations. He is s/p CTA which was negative for acute pulmonary embolism however with pleural effusion and upper lobe consolidation with air bronchograms. Tele with paroxsymal atrial fibrillation with frequent ventricular ectopic beats.     Acute HFrEF in setting of severe AS and CKD     PNA   -  Abx as per primary team    -  will change lasix IV to PO given hypotension/hypovolemia    - Heparin gtt given thromboembolic risk and PAF on tele   -  Continue ASA 81mg and hold plavix for now  -   Continue statin therapy  -   Plan for outpatient Right and Left cardiac cath and TAVR eval     Thank you for allowing [facility] to participate in the care of this patient.  Please feel free to call with any questions or concerns.                  [place] CARDIOVASCULAR - [facility], THE HEART CENTER                                   [address] [place] [address]                                                      PHONE: [phone]                                                         FAX: [phone]  [email or web address]  ---------------------------------------------------------------------------------------------------------------------------------    Overnight events/patient complaints:  spontaneous hematuria this am     PAST MEDICAL & SURGICAL HISTORY:  Abnormal findings on diagnostic imaging of lung      Hypertension      Hyperlipidemia      CAD (coronary artery disease)      Left anterior fascicular block (LAFB)      RBBB      S/P hip replacement, right      S/P hip replacement, left          No Known Allergies    MEDICATIONS  (STANDING):  aspirin  chewable 81 milliGRAM(s) Oral daily  atorvastatin 40 milliGRAM(s) Oral at bedtime  cefTRIAXone Injectable. 2000 milliGRAM(s) IV Push every 24 hours  dextrose 5%. 1000 milliLiter(s) (50 mL/Hr) IV Continuous <Continuous>  dextrose 5%. 1000 milliLiter(s) (100 mL/Hr) IV Continuous <Continuous>  dextrose 50% Injectable 25 Gram(s) IV Push once  dextrose 50% Injectable 12.5 Gram(s) IV Push once  dextrose 50% Injectable 25 Gram(s) IV Push once  fluticasone propionate/ salmeterol 100-50 MICROgram(s) Diskus 1 Dose(s) Inhalation two times a day  furosemide   Injectable 40 milliGRAM(s) IV Push daily  glucagon  Injectable 1 milliGRAM(s) IntraMuscular once  heparin  Infusion.  Unit(s)/Hr (14 mL/Hr) IV Continuous <Continuous>  insulin lispro (ADMELOG) corrective regimen sliding scale   SubCutaneous three times a day before meals  metoprolol succinate ER 50 milliGRAM(s) Oral two times a day  pantoprazole    Tablet 40 milliGRAM(s) Oral before breakfast  tamsulosin 0.8 milliGRAM(s) Oral at bedtime  tiotropium 2.5 MICROgram(s) Inhaler 2 Puff(s) Inhalation daily    MEDICATIONS  (PRN):  acetaminophen     Tablet .. 650 milliGRAM(s) Oral every 6 hours PRN Temp greater or equal to 38C (100.4F), Mild Pain (1 - 3)  aluminum hydroxide/magnesium hydroxide/simethicone Suspension 30 milliLiter(s) Oral every 4 hours PRN Dyspepsia  dextrose Oral Gel 15 Gram(s) Oral once PRN Blood Glucose LESS THAN 70 milliGRAM(s)/deciliter  heparin   Injectable 6500 Unit(s) IV Push every 6 hours PRN For aPTT less than 40  heparin   Injectable 3000 Unit(s) IV Push every 6 hours PRN For aPTT between 40 - 57  melatonin 3 milliGRAM(s) Oral at bedtime PRN Insomnia  ondansetron Injectable 4 milliGRAM(s) IV Push every 8 hours PRN Nausea and/or Vomiting      Vital Signs Last 24 Hrs  T(C): 37.3 (07 Mar 2025 04:06), Max: 37.3 (06 Mar 2025 16:36)  T(F): 99.1 (07 Mar 2025 04:06), Max: 99.1 (06 Mar 2025 16:36)  HR: 102 (07 Mar 2025 04:06) (82 - 125)  BP: 105/65 (07 Mar 2025 04:06) (70/41 - 108/63)  BP(mean): 73 (06 Mar 2025 14:10) (50 - 73)  RR: 18 (07 Mar 2025 08:43) (17 - 20)  SpO2: 100% (07 Mar 2025 08:43) (92% - 100%)    Parameters below as of 07 Mar 2025 08:43  Patient On (Oxygen Delivery Method): room air      ICU Vital Signs Last 24 Hrs  JONE SOUTH  I&O's Detail    06 Mar 2025 07:01  -  07 Mar 2025 07:00  --------------------------------------------------------  IN:    Heparin Infusion: 228 mL    Oral Fluid: 1238 mL    Sodium Chloride 0.9% Bolus: 250 mL  Total IN: 1716 mL    OUT:    Voided (mL): 3497 mL  Total OUT: 3497 mL    Total NET: -1781 mL      07 Mar 2025 07:01  -  07 Mar 2025 10:05  --------------------------------------------------------  IN:  Total IN: 0 mL    OUT:    Voided (mL): 1000 mL  Total OUT: 1000 mL    Total NET: -1000 mL        I&O's Summary    06 Mar 2025 07:01  -  07 Mar 2025 07:00  --------------------------------------------------------  IN: 1716 mL / OUT: 3497 mL / NET: -1781 mL    07 Mar 2025 07:01  -  07 Mar 2025 10:05  --------------------------------------------------------  IN: 0 mL / OUT: 1000 mL / NET: -1000 mL      Drug Dosing Weight  JONE SOUTH      PHYSICAL EXAM:  General: Appears well developed, well nourished alert and cooperative.  HEENT: Head; normocephalic, atraumatic.  Eyes: Pupils reactive, cornea wnl.  Neck: Supple, no nodes adenopathy, no NVD or carotid bruit or thyromegaly.  CARDIOVASCULAR: Normal S1 and S2, No rub, gallop or lift.  + murmur,  LUNGS: No rales, rhonchi or wheeze. Normal breath sounds bilaterally.  ABDOMEN: Soft, nontender without mass or organomegaly. bowel sounds normoactive.  EXTREMITIES: No clubbing, cyanosis or edema. Distal pulses wnl.   SKIN: warm and dry with normal turgor.  NEURO: Alert/oriented x 3/normal motor exam. No pathologic reflexes.    PSYCH: normal affect.        LABS:                        9.2    13.18 )-----------( 385      ( 07 Mar 2025 05:28 )             29.7     03-07    141  |  101  |  36.2[H]  ----------------------------<  117[H]  3.6   |  28.0  |  1.51[H]    Ca    8.2[L]      07 Mar 2025 05:28  Phos  3.1     03-07  Mg     1.8     03-07    TPro  5.8[L]  /  Alb  2.3[L]  /  TBili  <0.2[L]  /  DBili  x   /  AST  43[H]  /  ALT  52[H]  /  AlkPhos  105  03-07    JONE SOUTH      PTT - ( 07 Mar 2025 05:28 )  PTT:75.1 sec  Urinalysis Basic - ( 07 Mar 2025 05:28 )    Color: x / Appearance: x / SG: x / pH: x  Gluc: 117 mg/dL / Ketone: x  / Bili: x / Urobili: x   Blood: x / Protein: x / Nitrite: x   Leuk Esterase: x / RBC: x / WBC x   Sq Epi: x / Non Sq Epi: x / Bacteria: x         ASSESSMENT AND PLAN:Patient is an 85 yo man with chronic hypertension, moderate CAD on remote [facility] with normal perfusion on PET MPI 2022, chronic RBBB/LAFB, frequent unifocal ventricular ectopy, lung cancer s/p radiation and chemotherapy with course complicated by DVT s/p several months systemic AC who presents with several days progressive worsening dyspnea without reported concurrent chest pain, dizziness and palpitations. He is s/p CTA which was negative for acute pulmonary embolism however with pleural effusion and upper lobe consolidation with air bronchograms. Tele with paroxsymal atrial fibrillation with frequent ventricular ectopic beats.     Acute HFrEF in setting of severe AS and CKD     PNA   -  Abx as per primary team    -  will change lasix IV to PO given hypotension/hypovolemia    - Heparin gtt given thromboembolic risk and PAF on tele -- on hold given hermaturia   -  Continue ASA 81mg and hold plavix for now  -   Continue statin therapy  -   Plan for outpatient Right and Left cardiac cath and TAVR eval    hematuria   -urology consulted      Thank you for allowing [facility] to participate in the care of this patient.  Please feel free to call with any questions or concerns.

## 2025-03-08 LAB
ANION GAP SERPL CALC-SCNC: 11 MMOL/L — SIGNIFICANT CHANGE UP (ref 5–17)
APTT BLD: 28.2 SEC — SIGNIFICANT CHANGE UP (ref 24.5–35.6)
BUN SERPL-MCNC: 34 MG/DL — HIGH (ref 8–20)
CALCIUM SERPL-MCNC: 8.2 MG/DL — LOW (ref 8.4–10.5)
CHLORIDE SERPL-SCNC: 102 MMOL/L — SIGNIFICANT CHANGE UP (ref 96–108)
CO2 SERPL-SCNC: 29 MMOL/L — SIGNIFICANT CHANGE UP (ref 22–29)
CREAT SERPL-MCNC: 1.44 MG/DL — HIGH (ref 0.5–1.3)
EGFR: 48 ML/MIN/1.73M2 — LOW
EGFR: 48 ML/MIN/1.73M2 — LOW
GLUCOSE BLDC GLUCOMTR-MCNC: 100 MG/DL — HIGH (ref 70–99)
GLUCOSE BLDC GLUCOMTR-MCNC: 109 MG/DL — HIGH (ref 70–99)
GLUCOSE BLDC GLUCOMTR-MCNC: 116 MG/DL — HIGH (ref 70–99)
GLUCOSE BLDC GLUCOMTR-MCNC: 98 MG/DL — SIGNIFICANT CHANGE UP (ref 70–99)
GLUCOSE SERPL-MCNC: 108 MG/DL — HIGH (ref 70–99)
HCT VFR BLD CALC: 29.8 % — LOW (ref 39–50)
HGB BLD-MCNC: 9.2 G/DL — LOW (ref 13–17)
MAGNESIUM SERPL-MCNC: 1.6 MG/DL — SIGNIFICANT CHANGE UP (ref 1.6–2.6)
MCHC RBC-ENTMCNC: 30.9 G/DL — LOW (ref 32–36)
MCV RBC AUTO: 86.9 FL — SIGNIFICANT CHANGE UP (ref 80–100)
NRBC # BLD AUTO: 0 K/UL — SIGNIFICANT CHANGE UP (ref 0–0)
NRBC # FLD: 0 K/UL — SIGNIFICANT CHANGE UP (ref 0–0)
NRBC BLD AUTO-RTO: 0 /100 WBCS — SIGNIFICANT CHANGE UP (ref 0–0)
PLATELET # BLD AUTO: 390 K/UL — SIGNIFICANT CHANGE UP (ref 150–400)
PMV BLD: 10 FL — SIGNIFICANT CHANGE UP (ref 7–13)
POTASSIUM SERPL-MCNC: 3.7 MMOL/L — SIGNIFICANT CHANGE UP (ref 3.5–5.3)
POTASSIUM SERPL-SCNC: 3.7 MMOL/L — SIGNIFICANT CHANGE UP (ref 3.5–5.3)
RBC # BLD: 3.43 M/UL — LOW (ref 4.2–5.8)
RBC # FLD: 18.4 % — HIGH (ref 10.3–14.5)
SODIUM SERPL-SCNC: 141 MMOL/L — SIGNIFICANT CHANGE UP (ref 135–145)
WBC # BLD: 12.8 K/UL — HIGH (ref 3.8–10.5)
WBC # FLD AUTO: 12.8 K/UL — HIGH (ref 3.8–10.5)

## 2025-03-08 PROCEDURE — 99222 1ST HOSP IP/OBS MODERATE 55: CPT | Mod: FS

## 2025-03-08 PROCEDURE — 99233 SBSQ HOSP IP/OBS HIGH 50: CPT

## 2025-03-08 RX ORDER — FINASTERIDE 1 MG/1
5 TABLET, FILM COATED ORAL DAILY
Refills: 0 | Status: DISCONTINUED | OUTPATIENT
Start: 2025-03-08 | End: 2025-03-12

## 2025-03-08 RX ORDER — IPRATROPIUM BROMIDE AND ALBUTEROL SULFATE .5; 2.5 MG/3ML; MG/3ML
3 SOLUTION RESPIRATORY (INHALATION) EVERY 6 HOURS
Refills: 0 | Status: DISCONTINUED | OUTPATIENT
Start: 2025-03-08 | End: 2025-03-22

## 2025-03-08 RX ADMIN — TAMSULOSIN HYDROCHLORIDE 0.8 MILLIGRAM(S): 0.4 CAPSULE ORAL at 22:00

## 2025-03-08 RX ADMIN — FINASTERIDE 5 MILLIGRAM(S): 1 TABLET, FILM COATED ORAL at 18:14

## 2025-03-08 RX ADMIN — TIOTROPIUM BROMIDE INHALATION SPRAY 2 PUFF(S): 3.12 SPRAY, METERED RESPIRATORY (INHALATION) at 08:38

## 2025-03-08 RX ADMIN — CEFTRIAXONE 2000 MILLIGRAM(S): 500 INJECTION, POWDER, FOR SOLUTION INTRAMUSCULAR; INTRAVENOUS at 22:00

## 2025-03-08 RX ADMIN — Medication 81 MILLIGRAM(S): at 12:36

## 2025-03-08 RX ADMIN — Medication 1 DOSE(S): at 22:04

## 2025-03-08 RX ADMIN — Medication 40 MILLIGRAM(S): at 05:46

## 2025-03-08 RX ADMIN — Medication 1 DOSE(S): at 08:38

## 2025-03-08 RX ADMIN — ATORVASTATIN CALCIUM 40 MILLIGRAM(S): 80 TABLET, FILM COATED ORAL at 22:00

## 2025-03-08 RX ADMIN — METOPROLOL SUCCINATE 50 MILLIGRAM(S): 50 TABLET, EXTENDED RELEASE ORAL at 05:46

## 2025-03-08 NOTE — PROGRESS NOTE ADULT - SUBJECTIVE AND OBJECTIVE BOX
[place] CARDIOVASCULAR - [facility], THE HEART CENTER                                   [address] [place] [address]                                                      PHONE: [phone]                                                         FAX: [phone]  [email or web address]  ---------------------------------------------------------------------------------------------------------------------------------    Overnight events/patient complaints: Sitting in chair, eating breakfast.  Feels "ok".  No F/C. +blood in [place].  Breathing "ok" "same as yesterday"      No Known Allergies    MEDICATIONS  (STANDING):  aspirin  chewable 81 milliGRAM(s) Oral daily  atorvastatin 40 milliGRAM(s) Oral at bedtime  cefTRIAXone Injectable. 2000 milliGRAM(s) IV Push every 24 hours  dextrose 5%. 1000 milliLiter(s) (50 mL/Hr) IV Continuous <Continuous>  dextrose 5%. 1000 milliLiter(s) (100 mL/Hr) IV Continuous <Continuous>  dextrose 50% Injectable 25 Gram(s) IV Push once  dextrose 50% Injectable 12.5 Gram(s) IV Push once  dextrose 50% Injectable 25 Gram(s) IV Push once  fluticasone propionate/ salmeterol 100-50 MICROgram(s) Diskus 1 Dose(s) Inhalation two times a day  glucagon  Injectable 1 milliGRAM(s) IntraMuscular once  insulin lispro (ADMELOG) corrective regimen sliding scale   SubCutaneous three times a day before meals  metoprolol succinate ER 50 milliGRAM(s) Oral daily  pantoprazole    Tablet 40 milliGRAM(s) Oral before breakfast  tamsulosin 0.8 milliGRAM(s) Oral at bedtime  tiotropium 2.5 MICROgram(s) Inhaler 2 Puff(s) Inhalation daily    MEDICATIONS  (PRN):  acetaminophen     Tablet .. 650 milliGRAM(s) Oral every 6 hours PRN Temp greater or equal to 38C (100.4F), Mild Pain (1 - 3)  aluminum hydroxide/magnesium hydroxide/simethicone Suspension 30 milliLiter(s) Oral every 4 hours PRN Dyspepsia  dextrose Oral Gel 15 Gram(s) Oral once PRN Blood Glucose LESS THAN 70 milliGRAM(s)/deciliter  melatonin 3 milliGRAM(s) Oral at bedtime PRN Insomnia  ondansetron Injectable 4 milliGRAM(s) IV Push every 8 hours PRN Nausea and/or Vomiting      Vital Signs Last 24 Hrs  T(C): 37 (08 Mar 2025 08:16), Max: 37.1 (07 Mar 2025 21:32)  T(F): 98.6 (08 Mar 2025 08:16), Max: 98.7 (07 Mar 2025 21:32)  HR: 98 (08 Mar 2025 08:16) (71 - 110)  BP: 108/66 (08 Mar 2025 08:16) (70/42 - 108/66)  BP(mean): 80 (08 Mar 2025 08:16) (80 - 80)  RR: 18 (08 Mar 2025 08:16) (18 - 18)  SpO2: 92% (08 Mar 2025 08:16) (92% - 100%)    Parameters below as of 08 Mar 2025 04:37  Patient On (Oxygen Delivery Method): room air      ICU Vital Signs Last 24 Hrs  JONE SOUTH  I&O's Detail    07 Mar 2025 07:01  -  08 Mar 2025 07:00  --------------------------------------------------------  IN:    Oral Fluid: 470 mL  Total IN: 470 mL    OUT:    Voided (mL): 2850 mL  Total OUT: 2850 mL    Total NET: -2380 mL        I&O's Summary    07 Mar 2025 07:01  -  08 Mar 2025 07:00  --------------------------------------------------------  IN: 470 mL / OUT: 2850 mL / NET: -2380 mL      Drug Dosing Weight  JONE WEEMSGIORGIO      PHYSICAL EXAM:  General: alert and cooperative.  HEENT: Head; normocephalic, atraumatic.  Eyes: Pupils reactive, cornea wnl.  Neck: Supple, no nodes adenopathy, no NVD or carotid bruit or thyromegaly.  CARDIOVASCULAR: irregularly irregular, 3/6 late peaking HELDER crescendo  LUNGS: decreased breath sounds R base  ABDOMEN: Soft, nontender without mass or organomegaly. bowel sounds normoactive.  EXTREMITIES: No clubbing, cyanosis or edema. Distal pulses wnl.   SKIN: warm and dry with normal turgor.  NEURO: Alert/oriented x 3/normal motor exam. No pathologic reflexes.    PSYCH: normal affect.        LABS:                        9.2    12.80 )-----------( 390      ( 08 Mar 2025 04:51 )             29.8     03-08    141  |  102  |  34.0[H]  ----------------------------<  108[H]  3.7   |  29.0  |  1.44[H]    Ca    8.2[L]      08 Mar 2025 04:51  Phos  3.1     03-07  Mg     1.6     03-08    TPro  5.8[L]  /  Alb  2.3[L]  /  TBili  <0.2[L]  /  DBili  x   /  AST  43[H]  /  ALT  52[H]  /  AlkPhos  105  03-07    JONE SOUTH      PTT - ( 08 Mar 2025 04:51 )  PTT:28.2 sec  Urinalysis Basic - ( 08 Mar 2025 04:51 )    Color: x / Appearance: x / SG: x / pH: x  Gluc: 108 mg/dL / Ketone: x  / Bili: x / Urobili: x   Blood: x / Protein: x / Nitrite: x   Leuk Esterase: x / RBC: x / WBC x   Sq Epi: x / Non Sq Epi: x / Bacteria: x        RADIOLOGY & ADDITIONAL STUDIES:    INTERPRETATION OF TELEMETRY (personally reviewed): AF/flutter, rates ok      ASSESSMENT AND PLAN:  sepsis, improving  UTI/pyelonephritis +/- PNA, on abx  new EF 40-45%, severe AS  does not seem volume overloaded  less azotemic  lasix held  consider starting po lasix, trend Chem 7  AF- no A/C due to hematuria  Trend CBC given hematuria  Continue ASA   Plavix stopped  outpt eval for ?TAVR

## 2025-03-08 NOTE — PROGRESS NOTE ADULT - SUBJECTIVE AND OBJECTIVE BOX
[facility] Division of Hospital Medicine  Star Sandoval, DO  I'm reachable on [employer] Teams    Patient is a 84y old  Male who presents with a chief complaint of Sob and weakness for more 1 week. (08 Mar 2025 08:52)      SUBJECTIVE / OVERNIGHT EVENTS:  Patient was seen and examined at bedside. No events overnight. Denies any acute symptoms such as chest pain, shortness of breath, or palpitations. Says that CVA pain has improved. Continues to drain red urine in qureshi. Daughter called and updated. Otherwise offers no complaints. Understands that he is being treated for an infection.       MEDICATIONS  (STANDING):  aspirin  chewable 81 milliGRAM(s) Oral daily  atorvastatin 40 milliGRAM(s) Oral at bedtime  cefTRIAXone Injectable. 2000 milliGRAM(s) IV Push every 24 hours  dextrose 5%. 1000 milliLiter(s) (50 mL/Hr) IV Continuous <Continuous>  dextrose 5%. 1000 milliLiter(s) (100 mL/Hr) IV Continuous <Continuous>  dextrose 50% Injectable 25 Gram(s) IV Push once  dextrose 50% Injectable 12.5 Gram(s) IV Push once  dextrose 50% Injectable 25 Gram(s) IV Push once  fluticasone propionate/ salmeterol 100-50 MICROgram(s) Diskus 1 Dose(s) Inhalation two times a day  glucagon  Injectable 1 milliGRAM(s) IntraMuscular once  insulin lispro (ADMELOG) corrective regimen sliding scale   SubCutaneous three times a day before meals  metoprolol succinate ER 50 milliGRAM(s) Oral daily  pantoprazole    Tablet 40 milliGRAM(s) Oral before breakfast  tamsulosin 0.8 milliGRAM(s) Oral at bedtime  tiotropium 2.5 MICROgram(s) Inhaler 2 Puff(s) Inhalation daily    MEDICATIONS  (PRN):  acetaminophen     Tablet .. 650 milliGRAM(s) Oral every 6 hours PRN Temp greater or equal to 38C (100.4F), Mild Pain (1 - 3)  aluminum hydroxide/magnesium hydroxide/simethicone Suspension 30 milliLiter(s) Oral every 4 hours PRN Dyspepsia  dextrose Oral Gel 15 Gram(s) Oral once PRN Blood Glucose LESS THAN 70 milliGRAM(s)/deciliter  melatonin 3 milliGRAM(s) Oral at bedtime PRN Insomnia  ondansetron Injectable 4 milliGRAM(s) IV Push every 8 hours PRN Nausea and/or Vomiting    CAPILLARY BLOOD GLUCOSE      POCT Blood Glucose.: 100 mg/dL (08 Mar 2025 08:15)  POCT Blood Glucose.: 128 mg/dL (07 Mar 2025 22:26)  POCT Blood Glucose.: 152 mg/dL (07 Mar 2025 16:24)    I&O's Summary    07 Mar 2025 07:01  -  08 Mar 2025 07:00  --------------------------------------------------------  IN: 470 mL / OUT: 2850 mL / NET: -2380 mL        PHYSICAL EXAM:  Vital Signs Last 24 Hrs  T(C): 37 (08 Mar 2025 08:16), Max: 37.1 (07 Mar 2025 21:32)  T(F): 98.6 (08 Mar 2025 08:16), Max: 98.7 (07 Mar 2025 21:32)  HR: 98 (08 Mar 2025 08:16) (71 - 100)  BP: 97/58 (08 Mar 2025 11:06) (84/50 - 108/66)  BP(mean): 71 (08 Mar 2025 11:06) (71 - 80)  RR: 18 (08 Mar 2025 08:16) (18 - 18)  SpO2: 92% (08 Mar 2025 08:16) (92% - 96%)    Parameters below as of 08 Mar 2025 04:37  Patient On (Oxygen Delivery Method): room air        CONSTITUTIONAL: NAD, well-developed, well-groomed  EYES:  EOMI, conjunctiva and sclera clear  ENMT: Moist oral mucosa  NECK: Supple, no JVD  RESPIRATORY: Normal respiratory effort; lungs are clear to auscultation bilaterally  CARDIOVASCULAR: Regular rate and rhythm, normal S1 and S2  ABDOMEN: normoactive bowel sounds, soft, nontender to palpation, no distension   : Qureshi in place, draining dark red urine into bag  MUSCULOSKELETAL:  no clubbing or cyanosis of digits; no joint swelling or tenderness to palpation  PSYCH: A+O x3; affect appropriate, calm and cooperative  NEUROLOGY: CN 2-12 are intact and symmetric; no gross sensory deficits     LABS:                        9.2    12.80 )-----------( 390      ( 08 Mar 2025 04:51 )             29.8     03-08    141  |  102  |  34.0[H]  ----------------------------<  108[H]  3.7   |  29.0  |  1.44[H]    Ca    8.2[L]      08 Mar 2025 04:51  Phos  3.1     03-07  Mg     1.6     03-08    TPro  5.8[L]  /  Alb  2.3[L]  /  TBili  <0.2[L]  /  DBili  x   /  AST  43[H]  /  ALT  52[H]  /  AlkPhos  105  03-07    PTT - ( 08 Mar 2025 04:51 )  PTT:28.2 sec      Urinalysis Basic - ( 08 Mar 2025 04:51 )    Color: x / Appearance: x / SG: x / pH: x  Gluc: 108 mg/dL / Ketone: x  / Bili: x / Urobili: x   Blood: x / Protein: x / Nitrite: x   Leuk Esterase: x / RBC: x / WBC x   Sq Epi: x / Non Sq Epi: x / Bacteria: x

## 2025-03-08 NOTE — PROGRESS NOTE ADULT - ASSESSMENT
83 y/o M w/ PMH lung cancer (previously on immunotherapy),  chronic RBBB/LAFB,DVT not AC, HTN, copd not on home O2 and CAD presenting for increasing SOB for the last 1 week. Per wife, Schedule for PET scan last week, was unable to go for continued fatigue, states has had progressive SOB for the last few days. Also endorses that he has been having increased urinary frequency though no dysuria. Found to be hypoxic and UA grossly positive. Imaging was significant for findings of pyelonephritis. ID was consulted and patient continues to require inpatient care for IV Abx and for qureshi management.    Sepsis secondary to complicated urinary tract infection   - Blood cultures  3/4 reporting No growth   - RVP/COVID 19 PCR 3/4 negative   - UA 3/4 with pyuria; Urine Cx 3/4 reporting E coli   - CTA chest 3/4 reporting no PE. Little change in right upper lobe parenchymal consolidation with air bronchograms from prior CT 1/17  - CT A/P 3/4 reporting bilateral hydroureteronephrosis with asymmetric left perinephric edema  - UA 3/4 with pyuria   - Continue ceftriaxone until 3/13 (may be discharged on Vantin if medically stable)  - Follow up procal and CXR in AM    Acute blood loss anemia  Hematuria in the setting of pyelonephritis  - Holding heparin gtt  - Qureshi catheter showing red urine drainage  - CBC stable for now  - Maintain active type and screen (ordered for 3/9)  - Urology consulted today, 3/8; Follow recommendations    Acute hypoxic respiratory failure secondary to HF vs PNA vs recurrent pleural effusions vs severe AS  Acute on chronic systolic congestive heart failure, CAD, HTN  History of COPD not on home O2  - TTE showing EF 40-45% with severe AS  - Holding Lasix, Plavix and AC as per Cardiology   - Continue aspirin  - Continue metoprolol 50mg BID  - Continue Trelegy interchange  - Started Duonebs PRN  - Will need outpatient follow up for TAVR evaluation  - Cardiology following, recs noted  - Follow up repeat proBNP and CXR in AM    SHANNEN on CKD stage 3  Bilateral hydronephrosis  - Was previously discharged on flomax and had qureshi which was taken out by urology  - Endorses overflow incontinence symptoms  - CTA chest as above showing bilateral hydronephrosis and left sided pyelonephritis  - Qureshi catheter placed  - Continue Flomax 0.8mg HS    HLD  - Continue atorvastatin    GERD  - Continue Protonix      DVT/GI ppx: Holding due to hematuria  Diet: DASH  Code Status: Full code  Dispo: Pending resolution of hematuria and PT evaluation, likely > 2 days

## 2025-03-08 NOTE — CONSULT NOTE ADULT - ASSESSMENT
Urology consulted for Hematuria. Patient required qureshi for urinary retention. one day after qureshi was placed patient started having hematuria. Family at bedside stating patient has hx of BPH- bleeding likley due to traumatic qureshi with enlarged prostate. No clots noted in bag. Patient family stated patient had hx of urinary retention of previous visit x1 year ago in which he left with qureshi and followed up with dr. Ahumada in the office.     Plan:   - recommending to start on flomax/ finasteride   - monitor urine, will hold off on CBI for now   - encourage oral intake of fluids   - will need hematuria workup outpatient with DR. Ahumada dc.   - will continue to follow     Plan discussed with Dr. Martin

## 2025-03-09 LAB
ANION GAP SERPL CALC-SCNC: 13 MMOL/L — SIGNIFICANT CHANGE UP (ref 5–17)
BUN SERPL-MCNC: 34.9 MG/DL — HIGH (ref 8–20)
CALCIUM SERPL-MCNC: 8.2 MG/DL — LOW (ref 8.4–10.5)
CHLORIDE SERPL-SCNC: 101 MMOL/L — SIGNIFICANT CHANGE UP (ref 96–108)
CO2 SERPL-SCNC: 28 MMOL/L — SIGNIFICANT CHANGE UP (ref 22–29)
CREAT SERPL-MCNC: 1.32 MG/DL — HIGH (ref 0.5–1.3)
EGFR: 54 ML/MIN/1.73M2 — LOW
EGFR: 54 ML/MIN/1.73M2 — LOW
GLUCOSE BLDC GLUCOMTR-MCNC: 106 MG/DL — HIGH (ref 70–99)
GLUCOSE BLDC GLUCOMTR-MCNC: 113 MG/DL — HIGH (ref 70–99)
GLUCOSE BLDC GLUCOMTR-MCNC: 121 MG/DL — HIGH (ref 70–99)
GLUCOSE BLDC GLUCOMTR-MCNC: 158 MG/DL — HIGH (ref 70–99)
GLUCOSE SERPL-MCNC: 129 MG/DL — HIGH (ref 70–99)
HCT VFR BLD CALC: 31.4 % — LOW (ref 39–50)
HGB BLD-MCNC: 9.7 G/DL — LOW (ref 13–17)
MAGNESIUM SERPL-MCNC: 1.8 MG/DL — SIGNIFICANT CHANGE UP (ref 1.6–2.6)
MCHC RBC-ENTMCNC: 26.8 PG — LOW (ref 27–34)
MCHC RBC-ENTMCNC: 30.9 G/DL — LOW (ref 32–36)
MCV RBC AUTO: 86.7 FL — SIGNIFICANT CHANGE UP (ref 80–100)
NRBC # BLD AUTO: 0 K/UL — SIGNIFICANT CHANGE UP (ref 0–0)
NRBC # FLD: 0 K/UL — SIGNIFICANT CHANGE UP (ref 0–0)
NRBC BLD AUTO-RTO: 0 /100 WBCS — SIGNIFICANT CHANGE UP (ref 0–0)
NT-PROBNP SERPL-SCNC: 5594 PG/ML — HIGH (ref 0–300)
PHOSPHATE SERPL-MCNC: 3.1 MG/DL — SIGNIFICANT CHANGE UP (ref 2.4–4.7)
PLATELET # BLD AUTO: 404 K/UL — HIGH (ref 150–400)
PMV BLD: 9.6 FL — SIGNIFICANT CHANGE UP (ref 7–13)
POTASSIUM SERPL-MCNC: 3.8 MMOL/L — SIGNIFICANT CHANGE UP (ref 3.5–5.3)
POTASSIUM SERPL-SCNC: 3.8 MMOL/L — SIGNIFICANT CHANGE UP (ref 3.5–5.3)
PROCALCITONIN SERPL-MCNC: 0.24 NG/ML — HIGH (ref 0.02–0.1)
RAPID RVP RESULT: SIGNIFICANT CHANGE UP
RBC # BLD: 3.62 M/UL — LOW (ref 4.2–5.8)
RBC # FLD: 18.4 % — HIGH (ref 10.3–14.5)
SARS-COV-2 RNA SPEC QL NAA+PROBE: SIGNIFICANT CHANGE UP
SODIUM SERPL-SCNC: 142 MMOL/L — SIGNIFICANT CHANGE UP (ref 135–145)
WBC # BLD: 13.22 K/UL — HIGH (ref 3.8–10.5)
WBC # FLD AUTO: 13.22 K/UL — HIGH (ref 3.8–10.5)

## 2025-03-09 PROCEDURE — 99232 SBSQ HOSP IP/OBS MODERATE 35: CPT

## 2025-03-09 PROCEDURE — 71045 X-RAY EXAM CHEST 1 VIEW: CPT | Mod: 26

## 2025-03-09 PROCEDURE — 99232 SBSQ HOSP IP/OBS MODERATE 35: CPT | Mod: FS

## 2025-03-09 RX ORDER — DOXYCYCLINE HYCLATE 100 MG
TABLET ORAL
Refills: 0 | Status: DISCONTINUED | OUTPATIENT
Start: 2025-03-09 | End: 2025-03-10

## 2025-03-09 RX ORDER — POLYETHYLENE GLYCOL 3350 17 G/17G
17 POWDER, FOR SOLUTION ORAL DAILY
Refills: 0 | Status: DISCONTINUED | OUTPATIENT
Start: 2025-03-09 | End: 2025-03-22

## 2025-03-09 RX ORDER — SENNA 187 MG
2 TABLET ORAL AT BEDTIME
Refills: 0 | Status: DISCONTINUED | OUTPATIENT
Start: 2025-03-09 | End: 2025-03-22

## 2025-03-09 RX ORDER — DOXYCYCLINE HYCLATE 100 MG
100 TABLET ORAL EVERY 12 HOURS
Refills: 0 | Status: DISCONTINUED | OUTPATIENT
Start: 2025-03-09 | End: 2025-03-10

## 2025-03-09 RX ORDER — MIDODRINE HYDROCHLORIDE 5 MG/1
5 TABLET ORAL THREE TIMES A DAY
Refills: 0 | Status: DISCONTINUED | OUTPATIENT
Start: 2025-03-09 | End: 2025-03-12

## 2025-03-09 RX ORDER — MAGNESIUM SULFATE 500 MG/ML
1 SYRINGE (ML) INJECTION ONCE
Refills: 0 | Status: COMPLETED | OUTPATIENT
Start: 2025-03-09 | End: 2025-03-09

## 2025-03-09 RX ORDER — FUROSEMIDE 10 MG/ML
20 INJECTION INTRAMUSCULAR; INTRAVENOUS DAILY
Refills: 0 | Status: DISCONTINUED | OUTPATIENT
Start: 2025-03-09 | End: 2025-03-17

## 2025-03-09 RX ORDER — DOXYCYCLINE HYCLATE 100 MG
100 TABLET ORAL ONCE
Refills: 0 | Status: COMPLETED | OUTPATIENT
Start: 2025-03-09 | End: 2025-03-09

## 2025-03-09 RX ADMIN — Medication 81 MILLIGRAM(S): at 11:27

## 2025-03-09 RX ADMIN — Medication 1 DOSE(S): at 21:17

## 2025-03-09 RX ADMIN — INSULIN LISPRO 1: 100 INJECTION, SOLUTION INTRAVENOUS; SUBCUTANEOUS at 11:28

## 2025-03-09 RX ADMIN — FINASTERIDE 5 MILLIGRAM(S): 1 TABLET, FILM COATED ORAL at 11:28

## 2025-03-09 RX ADMIN — Medication 40 MILLIGRAM(S): at 05:57

## 2025-03-09 RX ADMIN — MIDODRINE HYDROCHLORIDE 5 MILLIGRAM(S): 5 TABLET ORAL at 11:28

## 2025-03-09 RX ADMIN — Medication 100 GRAM(S): at 11:27

## 2025-03-09 RX ADMIN — CEFTRIAXONE 2000 MILLIGRAM(S): 500 INJECTION, POWDER, FOR SOLUTION INTRAMUSCULAR; INTRAVENOUS at 21:17

## 2025-03-09 RX ADMIN — MIDODRINE HYDROCHLORIDE 5 MILLIGRAM(S): 5 TABLET ORAL at 17:30

## 2025-03-09 RX ADMIN — POLYETHYLENE GLYCOL 3350 17 GRAM(S): 17 POWDER, FOR SOLUTION ORAL at 11:27

## 2025-03-09 RX ADMIN — TAMSULOSIN HYDROCHLORIDE 0.8 MILLIGRAM(S): 0.4 CAPSULE ORAL at 21:17

## 2025-03-09 RX ADMIN — FUROSEMIDE 20 MILLIGRAM(S): 10 INJECTION INTRAMUSCULAR; INTRAVENOUS at 17:30

## 2025-03-09 RX ADMIN — ATORVASTATIN CALCIUM 40 MILLIGRAM(S): 80 TABLET, FILM COATED ORAL at 21:17

## 2025-03-09 RX ADMIN — Medication 2 TABLET(S): at 21:17

## 2025-03-09 RX ADMIN — Medication 100 MILLIGRAM(S): at 09:50

## 2025-03-09 RX ADMIN — METOPROLOL SUCCINATE 50 MILLIGRAM(S): 50 TABLET, EXTENDED RELEASE ORAL at 05:57

## 2025-03-09 RX ADMIN — Medication 100 MILLIGRAM(S): at 17:30

## 2025-03-09 NOTE — PROGRESS NOTE ADULT - SUBJECTIVE AND OBJECTIVE BOX
[facility] - [facility]                                   [address] [place] [address]                                                      PHONE: [phone]                                                         FAX: [phone]  [email or web address]  ---------------------------------------------------------------------------------------------------------------------------------    Overnight events/patient complaints: No SOB.  No F/C. Georges catheter with blood tinged urine. +lightheadedness with standing      No Known Allergies    MEDICATIONS  (STANDING):  aspirin  chewable 81 milliGRAM(s) Oral daily  atorvastatin 40 milliGRAM(s) Oral at bedtime  cefTRIAXone Injectable. 2000 milliGRAM(s) IV Push every 24 hours  dextrose 5%. 1000 milliLiter(s) (50 mL/Hr) IV Continuous <Continuous>  dextrose 5%. 1000 milliLiter(s) (100 mL/Hr) IV Continuous <Continuous>  dextrose 50% Injectable 25 Gram(s) IV Push once  dextrose 50% Injectable 12.5 Gram(s) IV Push once  dextrose 50% Injectable 25 Gram(s) IV Push once  doxycycline IVPB      doxycycline IVPB 100 milliGRAM(s) IV Intermittent once  doxycycline IVPB 100 milliGRAM(s) IV Intermittent every 12 hours  finasteride 5 milliGRAM(s) Oral daily  fluticasone propionate/ salmeterol 100-50 MICROgram(s) Diskus 1 Dose(s) Inhalation two times a day  glucagon  Injectable 1 milliGRAM(s) IntraMuscular once  insulin lispro (ADMELOG) corrective regimen sliding scale   SubCutaneous three times a day before meals  magnesium sulfate  IVPB 1 Gram(s) IV Intermittent once  metoprolol succinate ER 50 milliGRAM(s) Oral daily  pantoprazole    Tablet 40 milliGRAM(s) Oral before breakfast  tamsulosin 0.8 milliGRAM(s) Oral at bedtime  tiotropium 2.5 MICROgram(s) Inhaler 2 Puff(s) Inhalation daily    MEDICATIONS  (PRN):  acetaminophen     Tablet .. 650 milliGRAM(s) Oral every 6 hours PRN Temp greater or equal to 38C (100.4F), Mild Pain (1 - 3)  albuterol/ipratropium for Nebulization 3 milliLiter(s) Nebulizer every 6 hours PRN Shortness of Breath and/or Wheezing  aluminum hydroxide/magnesium hydroxide/simethicone Suspension 30 milliLiter(s) Oral every 4 hours PRN Dyspepsia  dextrose Oral Gel 15 Gram(s) Oral once PRN Blood Glucose LESS THAN 70 milliGRAM(s)/deciliter  melatonin 3 milliGRAM(s) Oral at bedtime PRN Insomnia  ondansetron Injectable 4 milliGRAM(s) IV Push every 8 hours PRN Nausea and/or Vomiting      Vital Signs Last 24 Hrs  T(C): 36.5 (09 Mar 2025 08:00), Max: 36.7 (08 Mar 2025 12:00)  T(F): 97.7 (09 Mar 2025 08:00), Max: 98.1 (08 Mar 2025 12:00)  HR: 99 (09 Mar 2025 08:00) (93 - 99)  BP: 112/60 (09 Mar 2025 08:00) (92/55 - 112/60)  BP(mean): 67 (08 Mar 2025 12:00) (67 - 71)  RR: 18 (09 Mar 2025 08:00) (18 - 18)  SpO2: 95% (09 Mar 2025 08:00) (95% - 97%)    Parameters below as of 09 Mar 2025 08:00  Patient On (Oxygen Delivery Method): room air      ICU Vital Signs Last 24 Hrs  [facility]  I&O's Detail    08 Mar 2025 06:01  -  09 Mar 2025 07:00  --------------------------------------------------------  IN:    Oral Fluid: 720 mL  Total IN: 720 mL    OUT:    Voided (mL): 1100 mL  Total OUT: 1100 mL    Total NET: -380 mL        I&O's Summary    08 Mar 2025 06:01  -  09 Mar 2025 07:00  --------------------------------------------------------  IN: 720 mL / OUT: 1100 mL / NET: -380 mL      Drug Dosing Weight  [facility]ONE [facility]      PHYSICAL EXAM:  General: alert and cooperative.  HEENT: Head; normocephalic, atraumatic.  Eyes: Pupils reactive, cornea wnl.  Neck: Supple, no nodes adenopathy, no NVD or carotid bruit or thyromegaly.  CARDIOVASCULAR: irregularly irregular, 3/6 late peaking crescendo systolic murmurr, rub, gallop or lift.   LUNGS: No rales, rhonchi or wheeze. Normal breath sounds bilaterally.  ABDOMEN: Soft, nontender without mass or organomegaly. bowel sounds normoactive.  EXTREMITIES: No clubbing, cyanosis or edema. Distal pulses wnl.   SKIN: warm and dry with normal turgor.  NEURO: Alert/oriented x 3/normal motor exam. No pathologic reflexes.    PSYCH: normal affect.        LABS:                        9.7    13.22 )-----------( 404      ( 09 Mar 2025 06:46 )             31.4     03-09    142  |  101  |  34.9[H]  ----------------------------<  129[H]  3.8   |  28.0  |  1.32[H]    Ca    8.2[L]      09 Mar 2025 06:46  Phos  3.1     03-09  Mg     1.8     03-09      J[facility]      PTT - ( 08 Mar 2025 04:51 )  PTT:28.2 sec  Urinalysis Basic - ( 09 Mar 2025 06:46 )    Color: x / Appearance: x / SG: x / pH: x  Gluc: 129 mg/dL / Ketone: x  / Bili: x / Urobili: x   Blood: x / Protein: x / Nitrite: x   Leuk Esterase: x / RBC: x / WBC x   Sq Epi: x / Non Sq Epi: x / Bacteria: x        RADIOLOGY & ADDITIONAL STUDIES:    INTERPRETATION OF TELEMETRY (personally reviewed): AF, rates ok        ASSESSMENT AND PLAN:  sepsis, improving  UTI/pyelonephritis +/- PNA, on abx  new EF 40-45%, severe AS  does not seem volume overloaded  less azotemic  lasix held  Would start po lasix, trend Chem 7  AF- no A/C due to hematuria.  Continue B-blocker  Trend CBC given hematuria.  Hgb stable since yesterday  May not be able to tolerate both flomax and finasteride due to dizziness  Continue ASA   Plavix stopped  outpt eval for ?TAVR (sees Dr. Ferrera)

## 2025-03-09 NOTE — PROGRESS NOTE ADULT - SUBJECTIVE AND OBJECTIVE BOX
Subjective: Patient seen and examined at bedside. no overnight events. patients urine beginning to clear. No CBI required.       MEDICATIONS  (STANDING):  aspirin  chewable 81 milliGRAM(s) Oral daily  atorvastatin 40 milliGRAM(s) Oral at bedtime  cefTRIAXone Injectable. 2000 milliGRAM(s) IV Push every 24 hours  dextrose 5%. 1000 milliLiter(s) (50 mL/Hr) IV Continuous <Continuous>  dextrose 5%. 1000 milliLiter(s) (100 mL/Hr) IV Continuous <Continuous>  dextrose 50% Injectable 25 Gram(s) IV Push once  dextrose 50% Injectable 12.5 Gram(s) IV Push once  dextrose 50% Injectable 25 Gram(s) IV Push once  doxycycline IVPB      doxycycline IVPB 100 milliGRAM(s) IV Intermittent every 12 hours  finasteride 5 milliGRAM(s) Oral daily  fluticasone propionate/ salmeterol 100-50 MICROgram(s) Diskus 1 Dose(s) Inhalation two times a day  furosemide    Tablet 20 milliGRAM(s) Oral daily  glucagon  Injectable 1 milliGRAM(s) IntraMuscular once  insulin lispro (ADMELOG) corrective regimen sliding scale   SubCutaneous three times a day before meals  magnesium sulfate  IVPB 1 Gram(s) IV Intermittent once  metoprolol succinate ER 50 milliGRAM(s) Oral daily  midodrine. 5 milliGRAM(s) Oral three times a day  pantoprazole    Tablet 40 milliGRAM(s) Oral before breakfast  polyethylene glycol 3350 17 Gram(s) Oral daily  senna 2 Tablet(s) Oral at bedtime  tamsulosin 0.8 milliGRAM(s) Oral at bedtime  tiotropium 2.5 MICROgram(s) Inhaler 2 Puff(s) Inhalation daily    MEDICATIONS  (PRN):  acetaminophen     Tablet .. 650 milliGRAM(s) Oral every 6 hours PRN Temp greater or equal to 38C (100.4F), Mild Pain (1 - 3)  albuterol/ipratropium for Nebulization 3 milliLiter(s) Nebulizer every 6 hours PRN Shortness of Breath and/or Wheezing  aluminum hydroxide/magnesium hydroxide/simethicone Suspension 30 milliLiter(s) Oral every 4 hours PRN Dyspepsia  dextrose Oral Gel 15 Gram(s) Oral once PRN Blood Glucose LESS THAN 70 milliGRAM(s)/deciliter  melatonin 3 milliGRAM(s) Oral at bedtime PRN Insomnia  ondansetron Injectable 4 milliGRAM(s) IV Push every 8 hours PRN Nausea and/or Vomiting      Vital Signs Last 24 Hrs  T(C): 36.5 (09 Mar 2025 08:00), Max: 36.7 (08 Mar 2025 12:00)  T(F): 97.7 (09 Mar 2025 08:00), Max: 98.1 (08 Mar 2025 12:00)  HR: 99 (09 Mar 2025 08:00) (93 - 99)  BP: 112/60 (09 Mar 2025 08:00) (92/55 - 112/60)  BP(mean): 67 (08 Mar 2025 12:00) (67 - 71)  RR: 18 (09 Mar 2025 08:00) (18 - 18)  SpO2: 95% (09 Mar 2025 08:00) (95% - 97%)    Parameters below as of 09 Mar 2025 08:00  Patient On (Oxygen Delivery Method): room air        Physical Exam:  : Urine light fruit punch - improved from yesterday.       LABS:                        9.7    13.22 )-----------( 404      ( 09 Mar 2025 06:46 )             31.4     03-09    142  |  101  |  34.9[H]  ----------------------------<  129[H]  3.8   |  28.0  |  1.32[H]    Ca    8.2[L]      09 Mar 2025 06:46  Phos  3.1     03-09  Mg     1.8     03-09      PTT - ( 08 Mar 2025 04:51 )  PTT:28.2 sec  Urinalysis Basic - ( 09 Mar 2025 06:46 )    Color: x / Appearance: x / SG: x / pH: x  Gluc: 129 mg/dL / Ketone: x  / Bili: x / Urobili: x   Blood: x / Protein: x / Nitrite: x   Leuk Esterase: x / RBC: x / WBC x   Sq Epi: x / Non Sq Epi: x / Bacteria: x        A: Urology consulted for Hematuria. Patient required qureshi for urinary retention. one day after qureshi was placed patient started having hematuria. Family at bedside stating patient has hx of BPH- bleeding likley due to traumatic qureshi with enlarged prostate. No clots noted in bag. Patient family stated patient had hx of urinary retention of previous visit x1 year ago in which he left with qureshi and followed up with dr. Ahumada in the office. Urine starting to clear.        Plan:   - continue flomax/ finesteride   - monitor urine, will hold off on CBI for now   - encourage oral intake of fluids   - will need hematuria workup outpatient with DR. Ahumada dc.   - urology to sign off for now

## 2025-03-09 NOTE — PROGRESS NOTE ADULT - ASSESSMENT
83 y/o M w/ PMH lung cancer (previously on immunotherapy),  chronic RBBB/LAFB,DVT not AC, HTN, copd not on home O2 and CAD presenting for increasing SOB for the last 1 week. Per wife, Schedule for PET scan last week, was unable to go for continued fatigue, states has had progressive SOB for the last few days. Also endorses that he has been having increased urinary frequency though no dysuria. Found to be hypoxic and UA grossly positive. Imaging was significant for findings of pyelonephritis. ID was consulted and patient continues to require inpatient care for IV Abx and for hematuria.    Sepsis secondary to complicated urinary tract infection vs PNA  - Blood cultures  3/4 reporting No growth   - RVP/COVID 19 PCR 3/4 negative   - UA 3/4 with pyuria; Urine Cx 3/4 reporting E coli   - CTA chest 3/4 reporting no PE. Little change in right upper lobe parenchymal consolidation with air bronchograms from prior CT 1/17  - CT A/P 3/4 reporting bilateral hydroureteronephrosis with asymmetric left perinephric edema  - UA 3/4 with pyuria   - CXR on 3/9 showing worsening right lung consolidations (my read) from previous CXR  - Continue Rocephin IV for now; Added doxycyline to broaden coverage  - Follow up atypical pneumonia work up  - Follow up repeat procal in AM  - Follow up ID recommendations    Acute blood loss anemia  Hematuria in the setting of pyelonephritis  - Holding heparin gtt  - Condom catheter showing red urine drainage  - CBC stable for now  - Urology consulted but not recommending any intervention  - Monitor for hematuria resolution  - Needs to follow up with urology as an outpatient  - Has required qureshi in the past for BPH  - Continue Flomax and finasteride    Acute hypoxic respiratory failure secondary to HF vs PNA vs recurrent pleural effusions vs severe AS  Acute on chronic systolic congestive heart failure, CAD, HTN  History of COPD not on home O2  - TTE showing EF 40-45% with severe AS  - Holding Lasix, Plavix and AC as per Cardiology   - Continue aspirin  - Continue metoprolol 50mg BID  - Continue Trelegy interchange  - Started Duonebs PRN  - Will need outpatient follow up for TAVR evaluation  - Cardiology following, recs noted  - Started on Lasix as per Cardiology recommendations 3/9  - Started on midodrine 5mg TID for orthostatic hypotension    SHANNEN on CKD stage 3  Bilateral hydronephrosis  - Was previously discharged on flomax and had qureshi which was taken out by urology  - Endorses overflow incontinence symptoms  - CTA chest as above showing bilateral hydronephrosis and left sided pyelonephritis  - Qureshi catheter placed  - Continue Flomax 0.8mg HS and finasteride    HLD  - Continue atorvastatin    GERD  - Continue Protonix      DVT/GI ppx: Holding due to hematuria  Diet: DASH  Code Status: Full code  Dispo: Pending resolution of hematuria and PT evaluation, likely > 2 days 85 y/o M w/ PMH lung cancer (previously on immunotherapy),  chronic RBBB/LAFB,DVT not AC, HTN, copd not on home O2 and CAD presenting for increasing SOB for the last 1 week. Per wife, Schedule for PET scan last week, was unable to go for continued fatigue, states has had progressive SOB for the last few days. Also endorses that he has been having increased urinary frequency though no dysuria. Found to be hypoxic and UA grossly positive. Imaging was significant for findings of pyelonephritis. ID was consulted and patient continues to require inpatient care for IV Abx and for hematuria.    Sepsis secondary to complicated urinary tract infection vs PNA  - Blood cultures  3/4 reporting No growth   - RVP/COVID 19 PCR 3/4 negative   - UA 3/4 with pyuria; Urine Cx 3/4 reporting E coli   - CTA chest 3/4 reporting no PE. Little change in right upper lobe parenchymal consolidation with air bronchograms from prior CT 1/17  - CT A/P 3/4 reporting bilateral hydroureteronephrosis with asymmetric left perinephric edema  - UA 3/4 with pyuria   - CXR on 3/9 showing worsening right lung consolidations (my read) from previous CXR  - Continue Rocephin IV for now; Added doxycyline to broaden coverage  - Follow up atypical pneumonia work up  - Follow up repeat procal in AM  - Follow up ID recommendations    Acute blood loss anemia  Hematuria in the setting of pyelonephritis  - Holding heparin gtt  - Condom catheter showing red urine drainage  - CBC stable for now  - Urology consulted but not recommending any intervention  - Monitor for hematuria resolution  - Needs to follow up with urology as an outpatient  - Has required qureshi in the past for BPH  - Continue Flomax and finasteride    Acute hypoxic respiratory failure secondary to HF vs PNA vs recurrent pleural effusions vs severe AS  Acute on chronic systolic congestive heart failure, CAD, HTN  History of COPD not on home O2  - TTE showing EF 40-45% with severe AS  - Holding Lasix, Plavix and AC as per Cardiology   - Continue aspirin  - Continue metoprolol 50mg BID  - Continue Trelegy interchange  - Started Duonebs PRN  - Will need outpatient follow up for TAVR evaluation  - Cardiology following, recs noted  - Started on Lasix as per Cardiology recommendations 3/9  - Started on midodrine 5mg TID for orthostatic hypotension    SHANNEN on CKD stage 3  Bilateral hydronephrosis  - Was previously discharged on flomax and had qureshi which was taken out by urology  - Endorses overflow incontinence symptoms  - CTA chest as above showing bilateral hydronephrosis and left sided pyelonephritis  - Qureshi catheter placed  - Continue Flomax 0.8mg HS and finasteride    HLD  - Continue atorvastatin    GERD  - Continue Protonix    Constipation  - Started senna and miralax      DVT/GI ppx: Holding due to hematuria  Diet: DASH  Code Status: Full code  Dispo: Pending resolution of hematuria and PT evaluation, likely > 2 days

## 2025-03-09 NOTE — PROGRESS NOTE ADULT - SUBJECTIVE AND OBJECTIVE BOX
[facility] Division of Hospital Medicine  Star Sandoval, DO  I'm reachable on [employer] Teams    Patient is a 84y old  Male who presents with a chief complaint of Sob and weakness for more 1 week. (08 Mar 2025 08:52)      SUBJECTIVE / OVERNIGHT EVENTS:  Patient was seen and examined at bedside. No events overnight. Denies any acute symptoms such as chest pain, shortness of breath, or palpitations. Continues to drain red urine in collection bag. Otherwise offers no complaints. Understands that he is being treated for an infection. Family at bedside, updated on current clinical plan. CXR this morning showing worsening infiltrates in the right lung, Abx expanded and Lasix added as per Cardiology. Patient having intermittent orthostatic hypotension during movement, midodrine added.      MEDICATIONS  (STANDING):  aspirin  chewable 81 milliGRAM(s) Oral daily  atorvastatin 40 milliGRAM(s) Oral at bedtime  cefTRIAXone Injectable. 2000 milliGRAM(s) IV Push every 24 hours  dextrose 5%. 1000 milliLiter(s) (50 mL/Hr) IV Continuous <Continuous>  dextrose 5%. 1000 milliLiter(s) (100 mL/Hr) IV Continuous <Continuous>  dextrose 50% Injectable 25 Gram(s) IV Push once  dextrose 50% Injectable 12.5 Gram(s) IV Push once  dextrose 50% Injectable 25 Gram(s) IV Push once  fluticasone propionate/ salmeterol 100-50 MICROgram(s) Diskus 1 Dose(s) Inhalation two times a day  glucagon  Injectable 1 milliGRAM(s) IntraMuscular once  insulin lispro (ADMELOG) corrective regimen sliding scale   SubCutaneous three times a day before meals  metoprolol succinate ER 50 milliGRAM(s) Oral daily  pantoprazole    Tablet 40 milliGRAM(s) Oral before breakfast  tamsulosin 0.8 milliGRAM(s) Oral at bedtime  tiotropium 2.5 MICROgram(s) Inhaler 2 Puff(s) Inhalation daily    MEDICATIONS  (PRN):  acetaminophen     Tablet .. 650 milliGRAM(s) Oral every 6 hours PRN Temp greater or equal to 38C (100.4F), Mild Pain (1 - 3)  aluminum hydroxide/magnesium hydroxide/simethicone Suspension 30 milliLiter(s) Oral every 4 hours PRN Dyspepsia  dextrose Oral Gel 15 Gram(s) Oral once PRN Blood Glucose LESS THAN 70 milliGRAM(s)/deciliter  melatonin 3 milliGRAM(s) Oral at bedtime PRN Insomnia  ondansetron Injectable 4 milliGRAM(s) IV Push every 8 hours PRN Nausea and/or Vomiting    CAPILLARY BLOOD GLUCOSE      POCT Blood Glucose.: 100 mg/dL (08 Mar 2025 08:15)  POCT Blood Glucose.: 128 mg/dL (07 Mar 2025 22:26)  POCT Blood Glucose.: 152 mg/dL (07 Mar 2025 16:24)    I&O's Summary    07 Mar 2025 07:01  -  08 Mar 2025 07:00  --------------------------------------------------------  IN: 470 mL / OUT: 2850 mL / NET: -2380 mL        PHYSICAL EXAM:  Vital Signs Last 24 Hrs  T(C): 37 (08 Mar 2025 08:16), Max: 37.1 (07 Mar 2025 21:32)  T(F): 98.6 (08 Mar 2025 08:16), Max: 98.7 (07 Mar 2025 21:32)  HR: 98 (08 Mar 2025 08:16) (71 - 100)  BP: 97/58 (08 Mar 2025 11:06) (84/50 - 108/66)  BP(mean): 71 (08 Mar 2025 11:06) (71 - 80)  RR: 18 (08 Mar 2025 08:16) (18 - 18)  SpO2: 92% (08 Mar 2025 08:16) (92% - 96%)    Parameters below as of 08 Mar 2025 04:37  Patient On (Oxygen Delivery Method): room air        CONSTITUTIONAL: NAD, well-developed, well-groomed  EYES:  EOMI, conjunctiva and sclera clear  ENMT: Moist oral mucosa  NECK: Supple, no JVD  RESPIRATORY: Normal respiratory effort; lungs are clear to auscultation bilaterally  CARDIOVASCULAR: Regular rate and rhythm, normal S1 and S2  ABDOMEN: normoactive bowel sounds, soft, nontender to palpation, no distension   : Condom cath in place, draining dark red urine into bag  MUSCULOSKELETAL:  no clubbing or cyanosis of digits; no joint swelling or tenderness to palpation  PSYCH: A+O x3; affect appropriate, calm and cooperative  NEUROLOGY: CN 2-12 are intact and symmetric; no gross sensory deficits     LABS:                        9.2    12.80 )-----------( 390      ( 08 Mar 2025 04:51 )             29.8     03-08    141  |  102  |  34.0[H]  ----------------------------<  108[H]  3.7   |  29.0  |  1.44[H]    Ca    8.2[L]      08 Mar 2025 04:51  Phos  3.1     03-07  Mg     1.6     03-08    TPro  5.8[L]  /  Alb  2.3[L]  /  TBili  <0.2[L]  /  DBili  x   /  AST  43[H]  /  ALT  52[H]  /  AlkPhos  105  03-07    PTT - ( 08 Mar 2025 04:51 )  PTT:28.2 sec      Urinalysis Basic - ( 08 Mar 2025 04:51 )    Color: x / Appearance: x / SG: x / pH: x  Gluc: 108 mg/dL / Ketone: x  / Bili: x / Urobili: x   Blood: x / Protein: x / Nitrite: x   Leuk Esterase: x / RBC: x / WBC x   Sq Epi: x / Non Sq Epi: x / Bacteria: x       [facility] Division of Hospital Medicine  Star Sandoval, DO  I'm reachable on [employer] Teams    Patient is a 84y old  Male who presents with a chief complaint of Sob and weakness for more 1 week. (08 Mar 2025 08:52)      SUBJECTIVE / OVERNIGHT EVENTS:  Patient was seen and examined at bedside. No events overnight. Denies any acute symptoms such as chest pain, shortness of breath, or palpitations. Continues to drain red urine in collection bag. Understands that he is being treated for an infection. Family at bedside, updated on current clinical plan. CXR this morning showing worsening infiltrates in the right lung, Abx expanded and Lasix added as per Cardiology. Patient having intermittent orthostatic hypotension during movement, midodrine added. Complaining of some constipation as well.      MEDICATIONS  (STANDING):  aspirin  chewable 81 milliGRAM(s) Oral daily  atorvastatin 40 milliGRAM(s) Oral at bedtime  cefTRIAXone Injectable. 2000 milliGRAM(s) IV Push every 24 hours  dextrose 5%. 1000 milliLiter(s) (50 mL/Hr) IV Continuous <Continuous>  dextrose 5%. 1000 milliLiter(s) (100 mL/Hr) IV Continuous <Continuous>  dextrose 50% Injectable 25 Gram(s) IV Push once  dextrose 50% Injectable 12.5 Gram(s) IV Push once  dextrose 50% Injectable 25 Gram(s) IV Push once  fluticasone propionate/ salmeterol 100-50 MICROgram(s) Diskus 1 Dose(s) Inhalation two times a day  glucagon  Injectable 1 milliGRAM(s) IntraMuscular once  insulin lispro (ADMELOG) corrective regimen sliding scale   SubCutaneous three times a day before meals  metoprolol succinate ER 50 milliGRAM(s) Oral daily  pantoprazole    Tablet 40 milliGRAM(s) Oral before breakfast  tamsulosin 0.8 milliGRAM(s) Oral at bedtime  tiotropium 2.5 MICROgram(s) Inhaler 2 Puff(s) Inhalation daily    MEDICATIONS  (PRN):  acetaminophen     Tablet .. 650 milliGRAM(s) Oral every 6 hours PRN Temp greater or equal to 38C (100.4F), Mild Pain (1 - 3)  aluminum hydroxide/magnesium hydroxide/simethicone Suspension 30 milliLiter(s) Oral every 4 hours PRN Dyspepsia  dextrose Oral Gel 15 Gram(s) Oral once PRN Blood Glucose LESS THAN 70 milliGRAM(s)/deciliter  melatonin 3 milliGRAM(s) Oral at bedtime PRN Insomnia  ondansetron Injectable 4 milliGRAM(s) IV Push every 8 hours PRN Nausea and/or Vomiting    CAPILLARY BLOOD GLUCOSE      POCT Blood Glucose.: 100 mg/dL (08 Mar 2025 08:15)  POCT Blood Glucose.: 128 mg/dL (07 Mar 2025 22:26)  POCT Blood Glucose.: 152 mg/dL (07 Mar 2025 16:24)    I&O's Summary    07 Mar 2025 07:01  -  08 Mar 2025 07:00  --------------------------------------------------------  IN: 470 mL / OUT: 2850 mL / NET: -2380 mL        PHYSICAL EXAM:  Vital Signs Last 24 Hrs  T(C): 37 (08 Mar 2025 08:16), Max: 37.1 (07 Mar 2025 21:32)  T(F): 98.6 (08 Mar 2025 08:16), Max: 98.7 (07 Mar 2025 21:32)  HR: 98 (08 Mar 2025 08:16) (71 - 100)  BP: 97/58 (08 Mar 2025 11:06) (84/50 - 108/66)  BP(mean): 71 (08 Mar 2025 11:06) (71 - 80)  RR: 18 (08 Mar 2025 08:16) (18 - 18)  SpO2: 92% (08 Mar 2025 08:16) (92% - 96%)    Parameters below as of 08 Mar 2025 04:37  Patient On (Oxygen Delivery Method): room air        CONSTITUTIONAL: NAD, well-developed, well-groomed  EYES:  EOMI, conjunctiva and sclera clear  ENMT: Moist oral mucosa  NECK: Supple, no JVD  RESPIRATORY: Normal respiratory effort; lungs are clear to auscultation bilaterally  CARDIOVASCULAR: Regular rate and rhythm, normal S1 and S2  ABDOMEN: normoactive bowel sounds, soft, nontender to palpation, no distension   : Condom cath in place, draining dark red urine into bag  MUSCULOSKELETAL:  no clubbing or cyanosis of digits; no joint swelling or tenderness to palpation  PSYCH: A+O x3; affect appropriate, calm and cooperative  NEUROLOGY: CN 2-12 are intact and symmetric; no gross sensory deficits     LABS:                        9.2    12.80 )-----------( 390      ( 08 Mar 2025 04:51 )             29.8     03-08    141  |  102  |  34.0[H]  ----------------------------<  108[H]  3.7   |  29.0  |  1.44[H]    Ca    8.2[L]      08 Mar 2025 04:51  Phos  3.1     03-07  Mg     1.6     03-08    TPro  5.8[L]  /  Alb  2.3[L]  /  TBili  <0.2[L]  /  DBili  x   /  AST  43[H]  /  ALT  52[H]  /  AlkPhos  105  03-07    PTT - ( 08 Mar 2025 04:51 )  PTT:28.2 sec      Urinalysis Basic - ( 08 Mar 2025 04:51 )    Color: x / Appearance: x / SG: x / pH: x  Gluc: 108 mg/dL / Ketone: x  / Bili: x / Urobili: x   Blood: x / Protein: x / Nitrite: x   Leuk Esterase: x / RBC: x / WBC x   Sq Epi: x / Non Sq Epi: x / Bacteria: x

## 2025-03-10 ENCOUNTER — TRANSCRIPTION ENCOUNTER (OUTPATIENT)
Age: 85
End: 2025-03-10

## 2025-03-10 LAB
ANION GAP SERPL CALC-SCNC: 10 MMOL/L — SIGNIFICANT CHANGE UP (ref 5–17)
BUN SERPL-MCNC: 35.2 MG/DL — HIGH (ref 8–20)
CALCIUM SERPL-MCNC: 8.4 MG/DL — SIGNIFICANT CHANGE UP (ref 8.4–10.5)
CHLORIDE SERPL-SCNC: 102 MMOL/L — SIGNIFICANT CHANGE UP (ref 96–108)
CO2 SERPL-SCNC: 29 MMOL/L — SIGNIFICANT CHANGE UP (ref 22–29)
CREAT SERPL-MCNC: 1.36 MG/DL — HIGH (ref 0.5–1.3)
CULTURE RESULTS: SIGNIFICANT CHANGE UP
EGFR: 52 ML/MIN/1.73M2 — LOW
EGFR: 52 ML/MIN/1.73M2 — LOW
GLUCOSE BLDC GLUCOMTR-MCNC: 103 MG/DL — HIGH (ref 70–99)
GLUCOSE BLDC GLUCOMTR-MCNC: 110 MG/DL — HIGH (ref 70–99)
GLUCOSE BLDC GLUCOMTR-MCNC: 127 MG/DL — HIGH (ref 70–99)
GLUCOSE BLDC GLUCOMTR-MCNC: 99 MG/DL — SIGNIFICANT CHANGE UP (ref 70–99)
GLUCOSE SERPL-MCNC: 114 MG/DL — HIGH (ref 70–99)
HCT VFR BLD CALC: 30.4 % — LOW (ref 39–50)
HGB BLD-MCNC: 9.4 G/DL — LOW (ref 13–17)
LEGIONELLA AG UR QL: NEGATIVE — SIGNIFICANT CHANGE UP
MAGNESIUM SERPL-MCNC: 2 MG/DL — SIGNIFICANT CHANGE UP (ref 1.6–2.6)
MCHC RBC-ENTMCNC: 26.9 PG — LOW (ref 27–34)
MCHC RBC-ENTMCNC: 30.9 G/DL — LOW (ref 32–36)
MCV RBC AUTO: 86.9 FL — SIGNIFICANT CHANGE UP (ref 80–100)
NRBC # BLD AUTO: 0 K/UL — SIGNIFICANT CHANGE UP (ref 0–0)
NRBC # FLD: 0 K/UL — SIGNIFICANT CHANGE UP (ref 0–0)
NRBC BLD AUTO-RTO: 0 /100 WBCS — SIGNIFICANT CHANGE UP (ref 0–0)
PHOSPHATE SERPL-MCNC: 3 MG/DL — SIGNIFICANT CHANGE UP (ref 2.4–4.7)
PLATELET # BLD AUTO: 425 K/UL — HIGH (ref 150–400)
PMV BLD: 9.7 FL — SIGNIFICANT CHANGE UP (ref 7–13)
POTASSIUM SERPL-MCNC: 4.4 MMOL/L — SIGNIFICANT CHANGE UP (ref 3.5–5.3)
POTASSIUM SERPL-SCNC: 4.4 MMOL/L — SIGNIFICANT CHANGE UP (ref 3.5–5.3)
PROCALCITONIN SERPL-MCNC: 0.2 NG/ML — HIGH (ref 0.02–0.1)
RBC # BLD: 3.5 M/UL — LOW (ref 4.2–5.8)
RBC # FLD: 18.7 % — HIGH (ref 10.3–14.5)
SODIUM SERPL-SCNC: 141 MMOL/L — SIGNIFICANT CHANGE UP (ref 135–145)
SPECIMEN SOURCE: SIGNIFICANT CHANGE UP
WBC # BLD: 15.15 K/UL — HIGH (ref 3.8–10.5)
WBC # FLD AUTO: 15.15 K/UL — HIGH (ref 3.8–10.5)

## 2025-03-10 PROCEDURE — 99232 SBSQ HOSP IP/OBS MODERATE 35: CPT

## 2025-03-10 PROCEDURE — 99497 ADVNCD CARE PLAN 30 MIN: CPT | Mod: 25

## 2025-03-10 PROCEDURE — 99222 1ST HOSP IP/OBS MODERATE 55: CPT

## 2025-03-10 RX ORDER — TAMSULOSIN HYDROCHLORIDE 0.4 MG/1
1 CAPSULE ORAL
Qty: 30 | Refills: 0
Start: 2025-03-10 | End: 2025-04-08

## 2025-03-10 RX ORDER — CLOPIDOGREL BISULFATE 75 MG/1
1 TABLET, FILM COATED ORAL
Qty: 30 | Refills: 0
Start: 2025-03-10 | End: 2025-04-08

## 2025-03-10 RX ORDER — FINASTERIDE 1 MG/1
1 TABLET, FILM COATED ORAL
Qty: 0 | Refills: 0 | DISCHARGE
Start: 2025-03-10

## 2025-03-10 RX ORDER — BISACODYL 5 MG
5 TABLET, DELAYED RELEASE (ENTERIC COATED) ORAL EVERY 12 HOURS
Refills: 0 | Status: DISCONTINUED | OUTPATIENT
Start: 2025-03-10 | End: 2025-03-10

## 2025-03-10 RX ORDER — MIDODRINE HYDROCHLORIDE 5 MG/1
1 TABLET ORAL
Qty: 0 | Refills: 0 | DISCHARGE
Start: 2025-03-10

## 2025-03-10 RX ORDER — ATORVASTATIN CALCIUM 80 MG/1
1 TABLET, FILM COATED ORAL
Qty: 30 | Refills: 0
Start: 2025-03-10 | End: 2025-04-08

## 2025-03-10 RX ORDER — CEFPODOXIME PROXETIL 200 MG/1
1 TABLET, FILM COATED ORAL
Qty: 6 | Refills: 0
Start: 2025-03-10 | End: 2025-03-12

## 2025-03-10 RX ORDER — MIDODRINE HYDROCHLORIDE 5 MG/1
1 TABLET ORAL
Qty: 90 | Refills: 0
Start: 2025-03-10 | End: 2025-04-08

## 2025-03-10 RX ORDER — FINASTERIDE 1 MG/1
1 TABLET, FILM COATED ORAL
Qty: 30 | Refills: 0
Start: 2025-03-10 | End: 2025-04-08

## 2025-03-10 RX ORDER — FUROSEMIDE 10 MG/ML
1 INJECTION INTRAMUSCULAR; INTRAVENOUS
Qty: 0 | Refills: 0 | DISCHARGE
Start: 2025-03-10

## 2025-03-10 RX ORDER — METOPROLOL SUCCINATE 50 MG/1
1 TABLET, EXTENDED RELEASE ORAL
Qty: 30 | Refills: 0
Start: 2025-03-10 | End: 2025-04-08

## 2025-03-10 RX ORDER — SENNOSIDES, DOCUSATE SODIUM 8.6; 5 MG/1; MG/1
2 TABLET ORAL
Qty: 28 | Refills: 0
Start: 2025-03-10 | End: 2025-03-23

## 2025-03-10 RX ORDER — FUROSEMIDE 10 MG/ML
1 INJECTION INTRAMUSCULAR; INTRAVENOUS
Qty: 30 | Refills: 0
Start: 2025-03-10 | End: 2025-04-08

## 2025-03-10 RX ORDER — OMEPRAZOLE 20 MG/1
1 CAPSULE, DELAYED RELEASE ORAL
Qty: 30 | Refills: 0
Start: 2025-03-10 | End: 2025-04-08

## 2025-03-10 RX ADMIN — Medication 100 MILLIGRAM(S): at 05:38

## 2025-03-10 RX ADMIN — Medication 40 MILLIGRAM(S): at 05:38

## 2025-03-10 RX ADMIN — CEFTRIAXONE 2000 MILLIGRAM(S): 500 INJECTION, POWDER, FOR SOLUTION INTRAMUSCULAR; INTRAVENOUS at 22:05

## 2025-03-10 RX ADMIN — Medication 1 DOSE(S): at 22:05

## 2025-03-10 RX ADMIN — MIDODRINE HYDROCHLORIDE 5 MILLIGRAM(S): 5 TABLET ORAL at 10:47

## 2025-03-10 RX ADMIN — Medication 1 DOSE(S): at 09:47

## 2025-03-10 RX ADMIN — FINASTERIDE 5 MILLIGRAM(S): 1 TABLET, FILM COATED ORAL at 11:26

## 2025-03-10 RX ADMIN — METOPROLOL SUCCINATE 50 MILLIGRAM(S): 50 TABLET, EXTENDED RELEASE ORAL at 05:38

## 2025-03-10 RX ADMIN — Medication 81 MILLIGRAM(S): at 11:26

## 2025-03-10 RX ADMIN — TAMSULOSIN HYDROCHLORIDE 0.8 MILLIGRAM(S): 0.4 CAPSULE ORAL at 22:06

## 2025-03-10 RX ADMIN — MIDODRINE HYDROCHLORIDE 5 MILLIGRAM(S): 5 TABLET ORAL at 05:39

## 2025-03-10 RX ADMIN — ATORVASTATIN CALCIUM 40 MILLIGRAM(S): 80 TABLET, FILM COATED ORAL at 22:06

## 2025-03-10 RX ADMIN — FUROSEMIDE 20 MILLIGRAM(S): 10 INJECTION INTRAMUSCULAR; INTRAVENOUS at 05:39

## 2025-03-10 RX ADMIN — MIDODRINE HYDROCHLORIDE 5 MILLIGRAM(S): 5 TABLET ORAL at 17:23

## 2025-03-10 RX ADMIN — POLYETHYLENE GLYCOL 3350 17 GRAM(S): 17 POWDER, FOR SOLUTION ORAL at 11:26

## 2025-03-10 RX ADMIN — Medication 2 TABLET(S): at 22:05

## 2025-03-10 RX ADMIN — Medication 3 MILLIGRAM(S): at 22:05

## 2025-03-10 NOTE — DISCHARGE NOTE PROVIDER - DETAILS OF MALNUTRITION DIAGNOSIS/DIAGNOSES
This patient has been assessed with a concern for Malnutrition and was treated during this hospitalization for the following Nutrition diagnosis/diagnoses:     -  03/06/2025: Moderate protein-calorie malnutrition

## 2025-03-10 NOTE — PROGRESS NOTE ADULT - ASSESSMENT
85 y/o M w/ PMH lung cancer (previously on immunotherapy),  chronic RBBB/LAFB,DVT not AC, HTN, copd not on home O2 and CAD presenting for increasing SOB for the last 1 week. Per wife, Schedule for PET scan last week, was unable to go for continued fatigue, states has had progressive SOB for the last few days. Also endorses that he has been having increased urinary frequency though no dysuria. Found to be hypoxic and UA grossly positive. Imaging was significant for findings of pyelonephritis. ID was consulted and patient continues to require inpatient care for IV Abx and for hematuria.    Sepsis secondary to complicated urinary tract infection vs PNA  - Blood cultures  3/4 reporting No growth   - RVP/COVID 19 PCR 3/4 negative   - UA 3/4 with pyuria; Urine Cx 3/4 reporting E coli   - CTA chest 3/4 reporting no PE. Little change in right upper lobe parenchymal consolidation with air bronchograms from prior CT 1/17  - CT A/P 3/4 reporting bilateral hydroureteronephrosis with asymmetric left perinephric edema  - UA 3/4 with pyuria   - CXR on 3/9 showing worsening right lung consolidations (my read) from previous CXR  - Procal negative, Discontinued doxycyline on 3/10  - Continue Rocephin IV for now; Will transition to Vantin for discharge    Acute blood loss anemia  Hematuria in the setting of pyelonephritis  - Holding heparin gtt  - Condom catheter showing red urine drainage  - CBC stable for now  - Urology consulted but not recommending any intervention  - Monitor for hematuria resolution (resolved on 3/10)  - Needs to follow up with urology as an outpatient  - Has required qureshi in the past for BPH  - Continue Flomax and finasteride    Acute hypoxic respiratory failure secondary to HF vs PNA vs recurrent pleural effusions vs severe AS  Acute on chronic systolic congestive heart failure, CAD, HTN  History of COPD not on home O2  - TTE showing EF 40-45% with severe AS  - Holding Lasix, Plavix and AC as per Cardiology   - Continue aspirin  - Continue metoprolol 50mg BID  - Continue Trelegy interchange  - Started Duonebs PRN  - Will need outpatient follow up for TAVR evaluation  - Cardiology following, recs noted  - Started on Lasix as per Cardiology recommendations 3/9  - Started on midodrine 5mg TID for orthostatic hypotension    SHANNEN on CKD stage 3  Bilateral hydronephrosis  - Was previously discharged on flomax and had qureshi which was taken out by urology  - Endorses overflow incontinence symptoms  - CTA chest as above showing bilateral hydronephrosis and left sided pyelonephritis  - Continue Flomax 0.8mg HS and finasteride    HLD  - Continue atorvastatin    GERD  - Continue Protonix    Constipation  - Started senna and miralax      DVT/GI ppx: Holding due to hematuria  Diet: DASH  Code Status: Full code  Dispo: Pending PT evaluation, likely tomorrow

## 2025-03-10 NOTE — DISCHARGE NOTE PROVIDER - NSDCFUADDAPPT_GEN_ALL_CORE_FT
APPTS ARE READY TO BE MADE: [X] YES    Best Family or Patient Contact (if needed):    Additional Information about above appointments (if needed):    1: Follow up with Urology in 2 weeks   2: Follow up with PCP in 1 week  3:     Other comments or requests:    APPTS ARE READY TO BE MADE: [X] YES    Best Family or Patient Contact (if needed):    Additional Information about above appointments (if needed):    1: Follow up with Urology in 2 weeks   2: Follow up with PCP in 1 week  3: Follow up with Cardiology in 1 week    Other comments or requests:    APPTS ARE READY TO BE MADE: [X] YES    Best Family or Patient Contact (if needed):    Additional Information about above appointments (if needed):    1: Follow up with Urology in 1 week   2: Follow up with PCP in 1 week  3: Follow up with Cardiology in 1 week    Other comments or requests:    APPTS ARE READY TO BE MADE: [X] YES    Best Family or Patient Contact (if needed):    Additional Information about above appointments (if needed):    1: Follow up with Urology in 1 week   2: Follow up with PCP in 1 week  3: Follow up with Cardiology in 1 week    Other comments or requests:       Patient was outreached but did not answer nor could a voicemail be left goes to busy. PCP and Uro    Prior to outreaching the patient, it was visible that the patient has secured a follow up appointment which was not scheduled by our team.Dr. MARE Ferrera on 4/15 at 10:40am APPTS ARE READY TO BE MADE: [X] YES    Best Family or Patient Contact (if needed):    Additional Information about above appointments (if needed):    1: Follow up with Urology in 1 week   2: Follow up with PCP in 1 week  3: Follow up with Cardiology in 1 week    Other comments or requests:      Patient was outreached but did not answer. A voicemail was left for the patient to return our call.    Prior to outreaching the patient, it was visible that the patient has secured a follow up appointment which was not scheduled by our team.Dr. MARE Ferrera on 4/15 at 10:40am APPTS ARE READY TO BE MADE: [X] YES    Best Family or Patient Contact (if needed):    Additional Information about above appointments (if needed):    1: Follow up with Urology in 1 week   2: Follow up with PCP in 1 week  3: Follow up with Cardiology in 1 week    Other comments or requests:     Prior to outreaching the patient, it was visible that the patient has secured a follow up appointment which was not scheduled by our team. Cardio Dr. MARE Ferrera on 4/15 at 10:40am    Patient informed us they already have secured a follow up appointment which is not visible on [employer]. PCP on 3/25    Provided patient with provider referral information, however patient prefers to schedule the appointments on their own.

## 2025-03-10 NOTE — PROGRESS NOTE ADULT - ASSESSMENT
84 year old PMHx Lung cancer (immunotherapy) DVT HTN COPD arrived to [facility] with SOB, weakness. Patient was admitted for acute hypoxic respiratory and UTI. Palliative consulted for GOC.    Problem/Recommendation 1:Acute Respiratory failure  Monitor o2 saturations  Improving  tolerating nasal cannula  No SOB on lengthy conversation  Underlying history of lung cancer noted    Problem/Recommendation 2:Hematuria  Urology consulted  patient to f/u outpatient  No need for CBI per urology noted  improving hematuria     Problem/Recommendation 3: Constipation  Only complaint was for constipation, currently on miralax   May want to consider Senna at night as patient was on this at home daily as per his wife    Problem/Recommendation 4: Palliative Care Encounter  Met with patient at bedside, introduced Palliative Care Team and Services at [facility].  Patient's wife and daughter present , providing support  Patient identifies his wife Constanza as his HCP  Patient's code status remains full code. Educated patient on the difference between DNR/DNI vs CPR/I  Patient acknowledges this is not something he has ever thought about but will start thinking about   Patient's wife may remember completing a living will in the past but could not say for certain what it entailed  Only complaint was for constipation, currently on miralax   May want to consider Senna at night as patient was on this at home daily as per his wife  Palliative to follow    Total Time Spent_55___ minutes  Total time also includes discussion during interdisciplinary team rounds, chart review including but limited to prior admissions/   review of medications/ labs/ imaging, examination, care coordination with other health care professionals, documentation EXCLUDING advance care planning discussions.       COUNSELING:  Face to face meeting to discuss Advanced Care Planning - Time Spent __18____Minutes.      Thank you for the opportunity to assist with the care of this patient.   [facility] Palliative Medicine Consult Service [phone].    84 year old PMHx Lung cancer (immunotherapy) DVT HTN COPD arrived to [facility] with SOB, weakness. Patient was admitted for acute hypoxic respiratory and UTI. Palliative consulted for GOC.    Problem/Recommendation 1:Acute Respiratory failure  Monitor o2 saturations  Improving  tolerating nasal cannula  No SOB on lengthy conversation  Underlying history of lung cancer noted    Problem/Recommendation 2:Hematuria  Urology consulted  patient to f/u outpatient  No need for CBI per urology noted  improving hematuria     Problem/Recommendation 3: Constipation  Only complaint was for constipation, currently on miralax   May want to consider Senna at night as patient was on this at home daily as per his wife    Problem/Recommendation 4: Palliative Care Encounter  Met with patient at bedside, introduced Palliative Care Team and Services at [facility].  Patient's wife and daughter present , providing support  Patient identifies his wife Diana as his HCP  Patient's code status remains full code. Educated patient on the difference between DNR/DNI vs CPR/I  Patient acknowledges this is not something he has ever thought about but will start thinking about   Patient's wife may remember completing a living will in the past but could not say for certain what it entailed  Only complaint was for constipation, currently on miralax   May want to consider Senna at night as patient was on this at home daily as per his wife  Palliative to follow    Total Time Spent_55___ minutes  Total time also includes discussion during interdisciplinary team rounds, chart review including but limited to prior admissions/   review of medications/ labs/ imaging, examination, care coordination with other health care professionals, documentation EXCLUDING advance care planning discussions.       COUNSELING:  Face to face meeting to discuss Advanced Care Planning - Time Spent __18____Minutes.      Thank you for the opportunity to assist with the care of this patient.   [facility] Palliative Medicine Consult Service [phone].

## 2025-03-10 NOTE — PROGRESS NOTE ADULT - SUBJECTIVE AND OBJECTIVE BOX
[facility] Division of Hospital Medicine  Star Sandoval, DO  I'm reachable on [employer]    Patient is a 84y old  Male who presents with a chief complaint of Sob and weakness for more 1 week. (08 Mar 2025 08:52)      SUBJECTIVE / OVERNIGHT EVENTS:    Patient was seen and examined at bedside. No events overnight. Denies any acute symptoms such as chest pain, shortness of breath, or palpitations. Urine no longer having any hematuria. Continues to be on Abx. Understands current plan of care. He feels deconditioned now. Family not entirely open to idea of rehab but are interested in home PT if it is recommended by PT. Patient having intermittent orthostatic hypotension during movement, midodrine added.      MEDICATIONS  (STANDING):  aspirin  chewable 81 milliGRAM(s) Oral daily  atorvastatin 40 milliGRAM(s) Oral at bedtime  cefTRIAXone Injectable. 2000 milliGRAM(s) IV Push every 24 hours  dextrose 5%. 1000 milliLiter(s) (50 mL/Hr) IV Continuous <Continuous>  dextrose 5%. 1000 milliLiter(s) (100 mL/Hr) IV Continuous <Continuous>  dextrose 50% Injectable 25 Gram(s) IV Push once  dextrose 50% Injectable 12.5 Gram(s) IV Push once  dextrose 50% Injectable 25 Gram(s) IV Push once  fluticasone propionate/ salmeterol 100-50 MICROgram(s) Diskus 1 Dose(s) Inhalation two times a day  glucagon  Injectable 1 milliGRAM(s) IntraMuscular once  insulin lispro (ADMELOG) corrective regimen sliding scale   SubCutaneous three times a day before meals  metoprolol succinate ER 50 milliGRAM(s) Oral daily  pantoprazole    Tablet 40 milliGRAM(s) Oral before breakfast  tamsulosin 0.8 milliGRAM(s) Oral at bedtime  tiotropium 2.5 MICROgram(s) Inhaler 2 Puff(s) Inhalation daily    MEDICATIONS  (PRN):  acetaminophen     Tablet .. 650 milliGRAM(s) Oral every 6 hours PRN Temp greater or equal to 38C (100.4F), Mild Pain (1 - 3)  aluminum hydroxide/magnesium hydroxide/simethicone Suspension 30 milliLiter(s) Oral every 4 hours PRN Dyspepsia  dextrose Oral Gel 15 Gram(s) Oral once PRN Blood Glucose LESS THAN 70 milliGRAM(s)/deciliter  melatonin 3 milliGRAM(s) Oral at bedtime PRN Insomnia  ondansetron Injectable 4 milliGRAM(s) IV Push every 8 hours PRN Nausea and/or Vomiting    CAPILLARY BLOOD GLUCOSE      POCT Blood Glucose.: 100 mg/dL (08 Mar 2025 08:15)  POCT Blood Glucose.: 128 mg/dL (07 Mar 2025 22:26)  POCT Blood Glucose.: 152 mg/dL (07 Mar 2025 16:24)    I&O's Summary    07 Mar 2025 07:01  -  08 Mar 2025 07:00  --------------------------------------------------------  IN: 470 mL / OUT: 2850 mL / NET: -2380 mL        PHYSICAL EXAM:  Vital Signs Last 24 Hrs  T(C): 37 (08 Mar 2025 08:16), Max: 37.1 (07 Mar 2025 21:32)  T(F): 98.6 (08 Mar 2025 08:16), Max: 98.7 (07 Mar 2025 21:32)  HR: 98 (08 Mar 2025 08:16) (71 - 100)  BP: 97/58 (08 Mar 2025 11:06) (84/50 - 108/66)  BP(mean): 71 (08 Mar 2025 11:06) (71 - 80)  RR: 18 (08 Mar 2025 08:16) (18 - 18)  SpO2: 92% (08 Mar 2025 08:16) (92% - 96%)    Parameters below as of 08 Mar 2025 04:37  Patient On (Oxygen Delivery Method): room air        CONSTITUTIONAL: NAD, well-developed, well-groomed  EYES:  EOMI, conjunctiva and sclera clear  ENMT: Moist oral mucosa  NECK: Supple, no JVD  RESPIRATORY: Normal respiratory effort; lungs are clear to auscultation bilaterally  CARDIOVASCULAR: Regular rate and rhythm, normal S1 and S2  ABDOMEN: normoactive bowel sounds, soft, nontender to palpation, no distension   : Condom cath in place, draining dark red urine into bag  MUSCULOSKELETAL:  no clubbing or cyanosis of digits; no joint swelling or tenderness to palpation  PSYCH: A+O x3; affect appropriate, calm and cooperative  NEUROLOGY: CN 2-12 are intact and symmetric; no gross sensory deficits     LABS:                        9.2    12.80 )-----------( 390      ( 08 Mar 2025 04:51 )             29.8     03-08    141  |  102  |  34.0[H]  ----------------------------<  108[H]  3.7   |  29.0  |  1.44[H]    Ca    8.2[L]      08 Mar 2025 04:51  Phos  3.1     03-07  Mg     1.6     03-08    TPro  5.8[L]  /  Alb  2.3[L]  /  TBili  <0.2[L]  /  DBili  x   /  AST  43[H]  /  ALT  52[H]  /  AlkPhos  105  03-07    PTT - ( 08 Mar 2025 04:51 )  PTT:28.2 sec      Urinalysis Basic - ( 08 Mar 2025 04:51 )    Color: x / Appearance: x / SG: x / pH: x  Gluc: 108 mg/dL / Ketone: x  / Bili: x / Urobili: x   Blood: x / Protein: x / Nitrite: x   Leuk Esterase: x / RBC: x / WBC x   Sq Epi: x / Non Sq Epi: x / Bacteria: x

## 2025-03-10 NOTE — PHYSICAL THERAPY INITIAL EVALUATION ADULT - ADDITIONAL COMMENTS
as per pt, lives with wife in private house, has 2STE, no stairs inside, has 3 children that live close by and can assist if needed, modified independent with RW for 1 week PTA, otherwise used a cane or no AD, has not been driving

## 2025-03-10 NOTE — PROGRESS NOTE ADULT - SUBJECTIVE AND OBJECTIVE BOX
Palliative Care Consult  84 year old PMHx Lung cancer (immunotherapy) DVT HTN COPD arrived to [facility] with SOB, weakness. Patient was admitted for acute hypoxic respiratory and UTI. Palliative consulted for GOC.    <HPI:  83 y/o M w/ PMH lung cancer (on immunotherapy), DVT not AC, HTN, copd not on home O2 and CAD presenting for increasing SOB for the last 1 week. Per wife, Schedule for PET scan last week, was unable to go for continued fatigue, states has had progressive SOB for the last few days. Also endorses that he has been having increased urinary frequency though no dysuria. Family reached out to Dr. Modi office about symptoms yesterday and was told to present to hospital. States has a hx of Plueral effusion requiring drainage, last drainage last october, 1.5 liters drained. last radiation 1 year ago. Per wife and daughter at bedside, he has been more lethargic as well. He denies any recent fevers, chills, dizziness, sick contacts, n/v/d/ constipation.    In ED, Vitals BP 90/60, , temp 98.2. Hypoxic on arrival requiring o2. lab showed Leukocytosis, bandemia, Trop 66--> 60, Bun/Crea 65/2.06, PBNP >9K. UA grossly positive. RVP negative. CTA PE negative for acute PE though shows some pleural effusion on right similar in appearance to previous CT.  Patient was given 1 L bolus and ceft/azithromycin. Cardiology consulted for elevated trop. Patient is admitted for acute hypoxic respiratory and UTI. (04 Mar 2025 15:41)>end of copied text      PERTINENT PMH REVIEWED: Yes     PAST MEDICAL & SURGICAL HISTORY:  Abnormal findings on diagnostic imaging of lung      Hypertension      Hyperlipidemia      CAD (coronary artery disease)      Left anterior fascicular block (LAFB)      RBBB      S/P hip replacement, right      S/P hip replacement, left          SOCIAL HISTORY:                      Substance history:none                     Admitted from:  home                      Amish/spirituality:Yazidism                    Cultural concerns:none                      Surrogate/HCP/Guardian: Phone#:Diana Macias [phone]    FAMILY HISTORY:  Family history of CVA (Father)    FH: heart attack (Father)      Allergies    No Known Allergies      ADVANCE DIRECTIVES/TREATMENT PREFERENCES:  Full     Baseline ADLs (prior to admission):  Independent    Karnofsky/Palliative Performance Status Version 2:  %60-70  http://npcrc.org/files/news/[email or web address]_performance_scale_[email or web address]    Present Symptoms:     Dyspnea: no  Nausea/Vomiting: No  Anxiety:  No  Depression: No  Fatigue: No  Loss of appetite: No  Constipation: yes on miralax    Pain: no            Character-            Duration-            Effect-            Factors-            Frequency-            Location-            Severity-    Pain AD Score:0  [email or web address] (press ctrl + left click to view)    Review of Systems: Reviewed  Negative no pain  All others negative    MEDICATIONS  (STANDING):  aspirin  chewable 81 milliGRAM(s) Oral daily  atorvastatin 40 milliGRAM(s) Oral at bedtime  cefTRIAXone Injectable. 2000 milliGRAM(s) IV Push every 24 hours  dextrose 5%. 1000 milliLiter(s) (50 mL/Hr) IV Continuous <Continuous>  dextrose 5%. 1000 milliLiter(s) (100 mL/Hr) IV Continuous <Continuous>  dextrose 50% Injectable 25 Gram(s) IV Push once  dextrose 50% Injectable 12.5 Gram(s) IV Push once  dextrose 50% Injectable 25 Gram(s) IV Push once  finasteride 5 milliGRAM(s) Oral daily  fluticasone propionate/ salmeterol 100-50 MICROgram(s) Diskus 1 Dose(s) Inhalation two times a day  furosemide    Tablet 20 milliGRAM(s) Oral daily  glucagon  Injectable 1 milliGRAM(s) IntraMuscular once  insulin lispro (ADMELOG) corrective regimen sliding scale   SubCutaneous three times a day before meals  metoprolol succinate ER 50 milliGRAM(s) Oral daily  midodrine. 5 milliGRAM(s) Oral three times a day  pantoprazole    Tablet 40 milliGRAM(s) Oral before breakfast  polyethylene glycol 3350 17 Gram(s) Oral daily  senna 2 Tablet(s) Oral at bedtime  tamsulosin 0.8 milliGRAM(s) Oral at bedtime  tiotropium 2.5 MICROgram(s) Inhaler 2 Puff(s) Inhalation daily    MEDICATIONS  (PRN):  acetaminophen     Tablet .. 650 milliGRAM(s) Oral every 6 hours PRN Temp greater or equal to 38C (100.4F), Mild Pain (1 - 3)  albuterol/ipratropium for Nebulization 3 milliLiter(s) Nebulizer every 6 hours PRN Shortness of Breath and/or Wheezing  aluminum hydroxide/magnesium hydroxide/simethicone Suspension 30 milliLiter(s) Oral every 4 hours PRN Dyspepsia  dextrose Oral Gel 15 Gram(s) Oral once PRN Blood Glucose LESS THAN 70 milliGRAM(s)/deciliter  melatonin 3 milliGRAM(s) Oral at bedtime PRN Insomnia  ondansetron Injectable 4 milliGRAM(s) IV Push every 8 hours PRN Nausea and/or Vomiting      PHYSICAL EXAM:    Vital Signs Last 24 Hrs  T(C): 36.7 (10 Mar 2025 10:33), Max: 37.5 (10 Mar 2025 07:52)  T(F): 98 (10 Mar 2025 10:33), Max: 99.5 (10 Mar 2025 07:52)  HR: 87 (10 Mar 2025 10:33) (70 - 91)  BP: 90/54 (10 Mar 2025 10:33) (90/54 - 131/78)  BP(mean): --  RR: 18 (10 Mar 2025 10:33) (18 - 18)  SpO2: 92% (10 Mar 2025 10:33) (91% - 100%)    Parameters below as of 10 Mar 2025 07:52  Patient On (Oxygen Delivery Method): room air        General: alert  oriented x _3    HEENT: normal      Lungs: comfortable    CV: normal      GI: normal       :   qureshi    MSK: normal     Neuro: no focal deficits     Skin: normal      LABS:                        9.4    15.15 )-----------( 425      ( 10 Mar 2025 06:02 )             30.4     03-10    141  |  102  |  35.2[H]  ----------------------------<  114[H]  4.4   |  29.0  |  1.36[H]    Ca    8.4      10 Mar 2025 06:02  Phos  3.0     03-10  Mg     2.0     03-10        Urinalysis Basic - ( 10 Mar 2025 06:02 )    Color: x / Appearance: x / SG: x / pH: x  Gluc: 114 mg/dL / Ketone: x  / Bili: x / Urobili: x   Blood: x / Protein: x / Nitrite: x   Leuk Esterase: x / RBC: x / WBC x   Sq Epi: x / Non Sq Epi: x / Bacteria: x      I&O's Summary    09 Mar 2025 07:01  -  10 Mar 2025 07:00  --------------------------------------------------------  IN: 200 mL / OUT: 1150 mL / NET: -950 mL    10 Mar 2025 07:01  -  10 Mar 2025 13:26  --------------------------------------------------------  IN: 0 mL / OUT: 600 mL / NET: -600 mL        RADIOLOGY & ADDITIONAL STUDIES:  CT CHest 3/4/25  IMPRESSION:  No evidence acute pulmonaryemboli.    Grossly unchanged right pleural fluid and upper lobe parenchymal   consolidation with air bronchograms.    Marked dilatation of bilateral renal pelves with moderate dilatation of   left intrarenal collecting system and mild dilatation of right intrarenal   collecting system, newly evident since 1/17/2025.

## 2025-03-10 NOTE — PHYSICAL THERAPY INITIAL EVALUATION ADULT - PERTINENT HX OF CURRENT PROBLEM, REHAB EVAL
85 y/o M w/ PMH lung cancer (previously on immunotherapy),  chronic RBBB/LAFB,DVT not AC, HTN, copd not on home O2 and CAD presenting for increasing SOB. Also reported that he has been having increased urinary frequency though no dysuria. Found to be hypoxic and UA grossly positive. Imaging was significant for findings of pyelonephritis. ID was consulted and patient continues to require inpatient care for IV Abx and for hematuria.

## 2025-03-10 NOTE — PROGRESS NOTE ADULT - CONVERSATION DETAILS
Patient identifies his wife Constanza as his HCP  Patient's code status remains full code. Educated patient on the difference between DNR/DNI vs CPR/I  Patient acknowledges this is not something he has ever thought about but will start thinking about   Patient's wife may remember completing a living will in the past but could not say for certain what it entailed Patient identifies his wife Diana as his HCP  Patient's code status remains full code. Educated patient on the difference between DNR/DNI vs CPR/I  Patient acknowledges this is not something he has ever thought about but will start thinking about   Patient's wife may remember completing a living will in the past but could not say for certain what it entailed

## 2025-03-10 NOTE — DISCHARGE NOTE PROVIDER - HOSPITAL COURSE
83 y/o M w/ PMH lung cancer (previously on immunotherapy),  chronic RBBB/LAFB,DVT not AC, HTN, copd not on home O2 and CAD presenting for increasing SOB for the last 1 week. Per wife, Schedule for PET scan last week, was unable to go for continued fatigue, states has had progressive SOB for the last few days. Also endorses that he has been having increased urinary frequency though no dysuria. Found to be hypoxic and UA grossly positive. CTA chest 3/4 reporting no PE. Little change in right upper lobe parenchymal consolidation with air bronchograms from prior CT 1/17. CT A/P 3/4 reporting bilateral hydroureteronephrosis with asymmetric left perinephric edema. UA 3/4 with pyuria  He was started on Rocephin as per ID recommendations. There was a concern for pneumonia so during hospital course he was started on doxycycline as well but procal was negative and after discussion with ID, it was stopped. ID gave recommendations to transition Abx to PO Vantin. Of note, patient was having hematuria during hospital course and Urology was consulted. No intervention was planned and hematuria resolved. Patient may follow up as an outpatient for work up of hematuria. 83 y/o M w/ PMH lung cancer (previously on immunotherapy),  chronic RBBB/LAFB,DVT not AC, HTN, copd not on home O2 and CAD presenting for increasing SOB for the last 1 week. Per wife, Schedule for PET scan last week, was unable to go for continued fatigue, states has had progressive SOB for the last few days. Also endorses that he has been having increased urinary frequency though no dysuria. Found to be hypoxic and UA grossly positive. CTA chest 3/4 reporting no PE. Little change in right upper lobe parenchymal consolidation with air bronchograms from prior CT 1/17. CT A/P 3/4 reporting bilateral hydroureteronephrosis with asymmetric left perinephric edema. UA 3/4 with pyuria  He was started on Rocephin as per ID recommendations. There was a concern for pneumonia so during hospital course he was started on doxycycline as well but procal was negative and after discussion with ID, it was stopped. ID gave recommendations to transition Abx to PO Vantin. Of note, patient was having hematuria during hospital course and Urology was consulted. No intervention was planned and hematuria resolved. :Patient had episodes of orthostatic hypotension in hospital, TSH and corticol levels were found to be within normal limits. Patient started on midodrine and fludrocortisone with improvement of symptoms. Patient is medically stable for discharge to home.

## 2025-03-10 NOTE — PHYSICAL THERAPY INITIAL EVALUATION ADULT - LEVEL OF INDEPENDENCE: STAIR NEGOTIATION, REHAB EVAL
pt with increased fatigue with ambulation, unsafe to assess stairs at this time. 2 TBA/unable to perform

## 2025-03-10 NOTE — PHYSICAL THERAPY INITIAL EVALUATION ADULT - LIVES WITH, PROFILE
spouse Purse String (Intermediate) Text: Given the location of the defect and the characteristics of the surrounding skin a purse string intermediate closure was deemed most appropriate.  Undermining was performed circumferentially around the surgical defect.  A purse string suture was then placed and tightened.

## 2025-03-10 NOTE — DISCHARGE NOTE PROVIDER - ATTENDING DISCHARGE PHYSICAL EXAMINATION:
CONSTITUTIONAL: NAD, well-developed, well-groomed  EYES:  EOMI, conjunctiva and sclera clear  ENMT: Moist oral mucosa  NECK: Supple, no JVD  RESPIRATORY: Normal respiratory effort; lungs are clear to auscultation bilaterally  CARDIOVASCULAR: Regular rate and rhythm, normal S1 and S2  ABDOMEN: normoactive bowel sounds, soft, nontender to palpation, no distension   MUSCULOSKELETAL:  no clubbing or cyanosis of digits; no joint swelling or tenderness to palpation  PSYCH: A+O x3; affect appropriate, calm and cooperative  NEUROLOGY: CN 2-12 are intact and symmetric; no gross sensory deficits

## 2025-03-10 NOTE — DISCHARGE NOTE PROVIDER - NSDCFUADDINST_GEN_ALL_CORE_FT
Follow up with [facility] for continued care. Follow up with [facility] for continued care.    No heavy lifting, driving, sex, tub baths, swimming, or any activity that submerges the lower half of the body in water for 48 hours.  Limited walking and stairs for 48 hours.    Observe the site frequently.  If bleeding or a large lump (the size of a golf ball or bigger) occurs lie flat, apply continuous direct pressure just above the puncture site for at least 10 minutes, and notify your physician immediately.  If the bleeding cannot be controlled, call 911 immediately for assistance.  Notify your physician of pain, swelling or any drainage.    Notify your physician immediately if coldness, numbness, discoloration or pain in your foot occurs.

## 2025-03-10 NOTE — PHYSICAL THERAPY INITIAL EVALUATION ADULT - GENERAL OBSERVATIONS, REHAB EVAL
Pt received in bed with IV, cardiac monitor, , condom catheter, NAD. Son present. Pt agreeable to PT and reported feeling better.

## 2025-03-10 NOTE — DISCHARGE NOTE PROVIDER - NSDCCPCAREPLAN_GEN_ALL_CORE_FT
PRINCIPAL DISCHARGE DIAGNOSIS  Diagnosis: Acute hypoxic respiratory failure  Assessment and Plan of Treatment:

## 2025-03-10 NOTE — DISCHARGE NOTE PROVIDER - NSDCMRMEDTOKEN_GEN_ALL_CORE_FT
acetaminophen 325 mg oral tablet: 2 tab(s) orally every 6 hours  atorvastatin 40 mg oral tablet: 1 tab(s) orally once a day  cefpodoxime 200 mg oral tablet: 1 tab(s) orally 2 times a day  clopidogrel 75 mg oral tablet: 1 tab(s) orally once a day  finasteride 5 mg oral tablet: 1 tab(s) orally once a day  furosemide 20 mg oral tablet: 1 tab(s) orally once a day  metoprolol succinate 50 mg oral capsule, extended release: 1 cap(s) orally once a day  midodrine 5 mg oral tablet: 1 tab(s) orally 3 times a day  omeprazole 20 mg oral delayed release tablet: 1 tab(s) orally once a day (in the morning) MDD: 1  Senna S 50 mg-8.6 mg oral tablet: 2 tab(s) orally once a day (at bedtime) as needed for  constipation MDD: 2  tamsulosin 0.4 mg oral capsule: 1 cap(s) orally once a day (at bedtime)  Trelegy Ellipta 100 mcg-62.5 mcg-25 mcg/inh inhalation powder: 1 puff(s) inhaled   acetaminophen 325 mg oral tablet: 2 tab(s) orally every 6 hours  atorvastatin 40 mg oral tablet: 1 tab(s) orally once a day  clopidogrel 75 mg oral tablet: 1 tab(s) orally once a day  metoprolol succinate 50 mg oral capsule, extended release: 1 cap(s) orally once a day  midodrine 10 mg oral tablet: 1 tab(s) orally 3 times a day  omeprazole 20 mg oral delayed release tablet: 1 tab(s) orally once a day (in the morning) MDD: 1  Senna S 50 mg-8.6 mg oral tablet: 2 tab(s) orally once a day (at bedtime) as needed for  constipation MDD: 2  Trelegy Ellipta 100 mcg-62.5 mcg-25 mcg/inh inhalation powder: 1 puff(s) inhaled   acetaminophen 325 mg oral tablet: 2 tab(s) orally every 6 hours  apixaban 5 mg oral tablet: 1 tab(s) orally 2 times a day  atorvastatin 40 mg oral tablet: 1 tab(s) orally once a day  clopidogrel 75 mg oral tablet: 1 tab(s) orally once a day  fludrocortisone 0.1 mg oral tablet: 1 tab(s) orally once a day  midodrine 5 mg oral tablet: 3 tab(s) orally 3 times a day  omeprazole 20 mg oral delayed release tablet: 1 tab(s) orally once a day (in the morning) MDD: 1  Senna S 50 mg-8.6 mg oral tablet: 2 tab(s) orally once a day (at bedtime) as needed for  constipation MDD: 2  Trelegy Ellipta 100 mcg-62.5 mcg-25 mcg/inh inhalation powder: 1 puff(s) inhaled   acetaminophen 325 mg oral tablet: 2 tab(s) orally every 6 hours  apixaban 5 mg oral tablet: 1 tab(s) orally 2 times a day  aspirin 81 mg oral tablet, chewable: 1 tab(s) orally once a day  atorvastatin 40 mg oral tablet: 1 tab(s) orally once a day  clopidogrel 75 mg oral tablet: 1 tab(s) orally once a day  fludrocortisone 0.1 mg oral tablet: 1 tab(s) orally once a day  midodrine 5 mg oral tablet: 3 tab(s) orally 3 times a day  omeprazole 20 mg oral delayed release tablet: 1 tab(s) orally once a day (in the morning) MDD: 1  Senna S 50 mg-8.6 mg oral tablet: 2 tab(s) orally once a day (at bedtime) as needed for  constipation MDD: 2  Trelegy Ellipta 100 mcg-62.5 mcg-25 mcg/inh inhalation powder: 1 puff(s) inhaled

## 2025-03-10 NOTE — DISCHARGE NOTE PROVIDER - CARE PROVIDER_API CALL
Anup Carballo.  Family Medicine  [address] [place] [id number]  Phone: [phone]  Fax: [phone]  Follow Up Time:

## 2025-03-10 NOTE — DISCHARGE NOTE PROVIDER - NSDCFUSCHEDAPPT_GEN_ALL_CORE_FT
[facility]  [facility]  Scheduled Appointment: 03/19/2025    Reyna Loya  [facility]  Scheduled Appointment: 04/04/2025    Khai Dobson  [facility]  DERM [address]  Scheduled Appointment: 04/21/2025    Shakir James  [facility]  RADMED [address]  Scheduled Appointment: 05/15/2025     [facility]  [facility]  Scheduled Appointment: 03/19/2025    [facility]  [facility]  Scheduled Appointment: 04/04/2025    Khai Dobson  [facility]  DERM [address]  Scheduled Appointment: 04/21/2025    Shakir James  [facility]  RADMED [address]  Scheduled Appointment: 05/15/2025    [facility]  [facility]  Scheduled Appointment: 05/15/2025     Reyna Loya  [facility]  [facility]  Scheduled Appointment: 04/04/2025    Khai Dobson  [facility]  DERM [address]  Scheduled Appointment: 04/21/2025    Shakir James  [facility]  RADMED [address]  Scheduled Appointment: 05/15/2025    [facility]  [facility]  Scheduled Appointment: 05/15/2025     [facility]  CARENA[facility] Patient Christine  Scheduled Appointment: 03/27/2025    Reyna Loya  [facility]  [facility]  Scheduled Appointment: 04/04/2025    Khai Dobson  [facility]  DERM [address]  Scheduled Appointment: 04/21/2025    Shakir James  [facility]  RADMED [address]  Scheduled Appointment: 05/15/2025    [facility]  [facility]  Scheduled Appointment: 05/15/2025

## 2025-03-11 ENCOUNTER — TRANSCRIPTION ENCOUNTER (OUTPATIENT)
Age: 85
End: 2025-03-11

## 2025-03-11 LAB
GLUCOSE BLDC GLUCOMTR-MCNC: 105 MG/DL — HIGH (ref 70–99)
GLUCOSE BLDC GLUCOMTR-MCNC: 125 MG/DL — HIGH (ref 70–99)
GLUCOSE BLDC GLUCOMTR-MCNC: 146 MG/DL — HIGH (ref 70–99)
HCT VFR BLD CALC: 31.6 % — LOW (ref 39–50)
HGB BLD-MCNC: 9.8 G/DL — LOW (ref 13–17)
M PNEUMO IGM SER-ACNC: 0.44 INDEX — SIGNIFICANT CHANGE UP (ref 0–0.9)
MCHC RBC-ENTMCNC: 27 PG — SIGNIFICANT CHANGE UP (ref 27–34)
MCHC RBC-ENTMCNC: 31 G/DL — LOW (ref 32–36)
MCV RBC AUTO: 87.1 FL — SIGNIFICANT CHANGE UP (ref 80–100)
MYCOPLASMA AG SPEC QL: NEGATIVE — SIGNIFICANT CHANGE UP
NRBC # BLD AUTO: 0 K/UL — SIGNIFICANT CHANGE UP (ref 0–0)
NRBC # FLD: 0 K/UL — SIGNIFICANT CHANGE UP (ref 0–0)
NRBC BLD AUTO-RTO: 0 /100 WBCS — SIGNIFICANT CHANGE UP (ref 0–0)
PLATELET # BLD AUTO: 442 K/UL — HIGH (ref 150–400)
RBC # BLD: 3.63 M/UL — LOW (ref 4.2–5.8)
RBC # FLD: 18.8 % — HIGH (ref 10.3–14.5)
WBC # BLD: 14.95 K/UL — HIGH (ref 3.8–10.5)
WBC # FLD AUTO: 14.95 K/UL — HIGH (ref 3.8–10.5)

## 2025-03-11 PROCEDURE — 99232 SBSQ HOSP IP/OBS MODERATE 35: CPT

## 2025-03-11 RX ADMIN — FINASTERIDE 5 MILLIGRAM(S): 1 TABLET, FILM COATED ORAL at 11:11

## 2025-03-11 RX ADMIN — Medication 1 DOSE(S): at 16:50

## 2025-03-11 RX ADMIN — Medication 40 MILLIGRAM(S): at 05:18

## 2025-03-11 RX ADMIN — MIDODRINE HYDROCHLORIDE 5 MILLIGRAM(S): 5 TABLET ORAL at 11:11

## 2025-03-11 RX ADMIN — FUROSEMIDE 20 MILLIGRAM(S): 10 INJECTION INTRAMUSCULAR; INTRAVENOUS at 05:18

## 2025-03-11 RX ADMIN — Medication 81 MILLIGRAM(S): at 11:11

## 2025-03-11 RX ADMIN — MIDODRINE HYDROCHLORIDE 5 MILLIGRAM(S): 5 TABLET ORAL at 16:28

## 2025-03-11 RX ADMIN — Medication 2 TABLET(S): at 22:36

## 2025-03-11 RX ADMIN — CEFTRIAXONE 2000 MILLIGRAM(S): 500 INJECTION, POWDER, FOR SOLUTION INTRAMUSCULAR; INTRAVENOUS at 22:36

## 2025-03-11 RX ADMIN — MIDODRINE HYDROCHLORIDE 5 MILLIGRAM(S): 5 TABLET ORAL at 05:19

## 2025-03-11 RX ADMIN — TAMSULOSIN HYDROCHLORIDE 0.8 MILLIGRAM(S): 0.4 CAPSULE ORAL at 22:36

## 2025-03-11 RX ADMIN — ATORVASTATIN CALCIUM 40 MILLIGRAM(S): 80 TABLET, FILM COATED ORAL at 22:36

## 2025-03-11 RX ADMIN — METOPROLOL SUCCINATE 50 MILLIGRAM(S): 50 TABLET, EXTENDED RELEASE ORAL at 05:19

## 2025-03-11 NOTE — PROGRESS NOTE ADULT - ASSESSMENT
84 year old PMHx Lung cancer (immunotherapy) DVT HTN COPD arrived to [facility] with SOB, weakness. Patient was admitted for acute hypoxic respiratory and UTI. Palliative consulted for GOC.    Problem/Recommendation 1:Acute Respiratory failure  Monitor o2 saturations  Improving  tolerating nasal cannula  No SOB on lengthy conversation  Underlying history of lung cancer noted  Follows at [facility] Dr Modi    Problem/Recommendation 2:Hematuria  Urology consulted  patient to f/u outpatient  No need for CBI per urology noted  improving hematuria     Problem/Recommendation 3: Constipation  Only complaint was for constipation, currently on miralax   May want to consider Senna at night as patient was on this at home daily as per his wife  Improved, patient had BM yesterday and today 3/11/25    Problem/Recommendation 4: Palliative Care Encounter  Met with patient at bedside, patient's wife also present at bedside for support.  Patient with no complaints of discomforts, he is in good spirits.  Patient explained that he was told that he can be discharged today.  Patient plans to follow-up with outpatient providers.  Confirmed with patient he follows with Dr. Modi at the [facility] for oncologic interventions.    GOC have been established, no further needs to be addressed from Palliative care perspective. Noted patient with improvement on his constipation.   Will sign off at this time. Please reconsult if GOC change or condition decompensates requiring further palliative care assistance.    35 minutes Total time also includes discussion during interdisciplinary team rounds, chart review including but limited to prior admissions/   review of medications/ labs/ imaging, examination, care coordination with other health care professionals, documentation EXCLUDING advance care planning discussions.     Thank you for the opportunity to assist with the care of this patient.   [facility] Palliative Medicine Consult Service [phone].

## 2025-03-11 NOTE — DISCHARGE NOTE NURSING/CASE MANAGEMENT/SOCIAL WORK - FINANCIAL ASSISTANCE
[facility] provides services at a reduced cost to those who are determined to be eligible through [facility]’s financial assistance program. Information regarding [facility]’s financial assistance program can be found by going to [email or web address] or by calling [phone].

## 2025-03-11 NOTE — DISCHARGE NOTE NURSING/CASE MANAGEMENT/SOCIAL WORK - PATIENT PORTAL LINK FT
You can access the FollowMyHealth Patient Portal offered by [facility] by registering at the following website: [email or web address]. By joining [employer]’s FollowMyHealth portal, you will also be able to view your health information using other applications (apps) compatible with our system.

## 2025-03-11 NOTE — PROGRESS NOTE ADULT - SUBJECTIVE AND OBJECTIVE BOX
Palliative Care Followup    OVERNIGHT EVENTS: no overnight events     Present Symptoms:     Dyspnea: no  Nausea/Vomiting: No  Anxiety:  No  Depression: No  Fatigue: No  Loss of appetite: No  Constipation: yes on miralax    Pain: no            Character-            Duration-            Effect-            Factors-            Frequency-            Location-            Severity-    Pain AD Score:0  [email or web address]/cog/painad.pdf (press ctrl + left click to view)    Review of Systems: Reviewed  Negative no pain  All others negative    MEDICATIONS  (STANDING):  aspirin  chewable 81 milliGRAM(s) Oral daily  atorvastatin 40 milliGRAM(s) Oral at bedtime  cefTRIAXone Injectable. 2000 milliGRAM(s) IV Push every 24 hours  dextrose 5%. 1000 milliLiter(s) (100 mL/Hr) IV Continuous <Continuous>  dextrose 5%. 1000 milliLiter(s) (50 mL/Hr) IV Continuous <Continuous>  dextrose 50% Injectable 25 Gram(s) IV Push once  dextrose 50% Injectable 12.5 Gram(s) IV Push once  dextrose 50% Injectable 25 Gram(s) IV Push once  finasteride 5 milliGRAM(s) Oral daily  fluticasone propionate/ salmeterol 100-50 MICROgram(s) Diskus 1 Dose(s) Inhalation two times a day  furosemide    Tablet 20 milliGRAM(s) Oral daily  glucagon  Injectable 1 milliGRAM(s) IntraMuscular once  insulin lispro (ADMELOG) corrective regimen sliding scale   SubCutaneous three times a day before meals  metoprolol succinate ER 50 milliGRAM(s) Oral daily  midodrine. 5 milliGRAM(s) Oral three times a day  pantoprazole    Tablet 40 milliGRAM(s) Oral before breakfast  polyethylene glycol 3350 17 Gram(s) Oral daily  senna 2 Tablet(s) Oral at bedtime  tamsulosin 0.8 milliGRAM(s) Oral at bedtime  tiotropium 2.5 MICROgram(s) Inhaler 2 Puff(s) Inhalation daily    MEDICATIONS  (PRN):  acetaminophen     Tablet .. 650 milliGRAM(s) Oral every 6 hours PRN Temp greater or equal to 38C (100.4F), Mild Pain (1 - 3)  albuterol/ipratropium for Nebulization 3 milliLiter(s) Nebulizer every 6 hours PRN Shortness of Breath and/or Wheezing  aluminum hydroxide/magnesium hydroxide/simethicone Suspension 30 milliLiter(s) Oral every 4 hours PRN Dyspepsia  dextrose Oral Gel 15 Gram(s) Oral once PRN Blood Glucose LESS THAN 70 milliGRAM(s)/deciliter  melatonin 3 milliGRAM(s) Oral at bedtime PRN Insomnia  ondansetron Injectable 4 milliGRAM(s) IV Push every 8 hours PRN Nausea and/or Vomiting      PHYSICAL EXAM:    Vital Signs Last 24 Hrs  T(C): 36.3 (11 Mar 2025 10:52), Max: 36.7 (11 Mar 2025 00:25)  T(F): 97.3 (11 Mar 2025 10:52), Max: 98 (11 Mar 2025 00:25)  HR: 82 (11 Mar 2025 10:52) (77 - 95)  BP: 98/57 (11 Mar 2025 10:52) (98/57 - 111/68)  BP(mean): --  RR: 18 (11 Mar 2025 10:52) (18 - 18)  SpO2: 100% (11 Mar 2025 10:52) (94% - 100%)    Parameters below as of 11 Mar 2025 07:41  Patient On (Oxygen Delivery Method): room air        General: alert  oriented x _3    HEENT: normal      Lungs: comfortable    CV: normal      GI: normal       :   qureshi    MSK: normal     Neuro: no focal deficits     Skin: no rash      LABS:                          9.8    14.95 )-----------( 442      ( 11 Mar 2025 06:14 )             31.6     03-10    141  |  102  |  35.2[H]  ----------------------------<  114[H]  4.4   |  29.0  |  1.36[H]    Ca    8.4      10 Mar 2025 06:02  Phos  3.0     03-10  Mg     2.0     03-10        Urinalysis Basic - ( 10 Mar 2025 06:02 )    Color: x / Appearance: x / SG: x / pH: x  Gluc: 114 mg/dL / Ketone: x  / Bili: x / Urobili: x   Blood: x / Protein: x / Nitrite: x   Leuk Esterase: x / RBC: x / WBC x   Sq Epi: x / Non Sq Epi: x / Bacteria: x      I&O's Summary    10 Mar 2025 07:01  -  11 Mar 2025 07:00  --------------------------------------------------------  IN: 360 mL / OUT: 2750 mL / NET: -2390 mL        RADIOLOGY & ADDITIONAL STUDIES:  No new     ADVANCE DIRECTIVES/TREATMENT PREFERENCES:  Full Code

## 2025-03-11 NOTE — DISCHARGE NOTE NURSING/CASE MANAGEMENT/SOCIAL WORK - NSSCNAMETXT_GEN_ALL_CORE
[facility] Airway patent, Nasal mucosa clear. Mouth with normal mucosa. Throat has no vesicles, no oropharyngeal exudates and uvula is midline. [place] [facility]

## 2025-03-11 NOTE — PROGRESS NOTE ADULT - SUBJECTIVE AND OBJECTIVE BOX
[facility] Division of Hospital Medicine  Star Sandoval, DO  I'm reachable on "[employer]" Teams    Patient is a 84y old  Male who presents with a chief complaint of Sob and weakness for more 1 week. (08 Mar 2025 08:52)      SUBJECTIVE / OVERNIGHT EVENTS:    Patient was seen and examined at bedside. No events overnight. Denies any acute symptoms such as chest pain, shortness of breath, or palpitations. Urine no longer having any hematuria. Continues to be on Abx. He was recently started on midodrine due to orthostatic hypotension and was improving. Today he was being planned for discharge but while walking him to the bathroom today, PT and nursing staff saw that he had a near syncopal episode. Son and wife at bedside aware. Discharge to be held up today. He will be walked later on in the day and in the morning to assess again for conditioning. He lives with his wife who is also old and family is concerned about him falling again and nobody being able to get him up.      MEDICATIONS  (STANDING):  aspirin  chewable 81 milliGRAM(s) Oral daily  atorvastatin 40 milliGRAM(s) Oral at bedtime  cefTRIAXone Injectable. 2000 milliGRAM(s) IV Push every 24 hours  dextrose 5%. 1000 milliLiter(s) (50 mL/Hr) IV Continuous <Continuous>  dextrose 5%. 1000 milliLiter(s) (100 mL/Hr) IV Continuous <Continuous>  dextrose 50% Injectable 25 Gram(s) IV Push once  dextrose 50% Injectable 12.5 Gram(s) IV Push once  dextrose 50% Injectable 25 Gram(s) IV Push once  fluticasone propionate/ salmeterol 100-50 MICROgram(s) Diskus 1 Dose(s) Inhalation two times a day  glucagon  Injectable 1 milliGRAM(s) IntraMuscular once  insulin lispro (ADMELOG) corrective regimen sliding scale   SubCutaneous three times a day before meals  metoprolol succinate ER 50 milliGRAM(s) Oral daily  pantoprazole    Tablet 40 milliGRAM(s) Oral before breakfast  tamsulosin 0.8 milliGRAM(s) Oral at bedtime  tiotropium 2.5 MICROgram(s) Inhaler 2 Puff(s) Inhalation daily    MEDICATIONS  (PRN):  acetaminophen     Tablet .. 650 milliGRAM(s) Oral every 6 hours PRN Temp greater or equal to 38C (100.4F), Mild Pain (1 - 3)  aluminum hydroxide/magnesium hydroxide/simethicone Suspension 30 milliLiter(s) Oral every 4 hours PRN Dyspepsia  dextrose Oral Gel 15 Gram(s) Oral once PRN Blood Glucose LESS THAN 70 milliGRAM(s)/deciliter  melatonin 3 milliGRAM(s) Oral at bedtime PRN Insomnia  ondansetron Injectable 4 milliGRAM(s) IV Push every 8 hours PRN Nausea and/or Vomiting    CAPILLARY BLOOD GLUCOSE      POCT Blood Glucose.: 100 mg/dL (08 Mar 2025 08:15)  POCT Blood Glucose.: 128 mg/dL (07 Mar 2025 22:26)  POCT Blood Glucose.: 152 mg/dL (07 Mar 2025 16:24)    I&O's Summary    07 Mar 2025 07:01  -  08 Mar 2025 07:00  --------------------------------------------------------  IN: 470 mL / OUT: 2850 mL / NET: -2380 mL        PHYSICAL EXAM:  Vital Signs Last 24 Hrs  T(C): 37 (08 Mar 2025 08:16), Max: 37.1 (07 Mar 2025 21:32)  T(F): 98.6 (08 Mar 2025 08:16), Max: 98.7 (07 Mar 2025 21:32)  HR: 98 (08 Mar 2025 08:16) (71 - 100)  BP: 97/58 (08 Mar 2025 11:06) (84/50 - 108/66)  BP(mean): 71 (08 Mar 2025 11:06) (71 - 80)  RR: 18 (08 Mar 2025 08:16) (18 - 18)  SpO2: 92% (08 Mar 2025 08:16) (92% - 96%)    Parameters below as of 08 Mar 2025 04:37  Patient On (Oxygen Delivery Method): room air        CONSTITUTIONAL: NAD, well-developed, well-groomed  EYES:  EOMI, conjunctiva and sclera clear  ENMT: Moist oral mucosa  NECK: Supple, no JVD  RESPIRATORY: Normal respiratory effort; lungs are clear to auscultation bilaterally  CARDIOVASCULAR: Regular rate and rhythm, normal S1 and S2  ABDOMEN: normoactive bowel sounds, soft, nontender to palpation, no distension   : Condom cath in place, draining dark red urine into bag  MUSCULOSKELETAL:  no clubbing or cyanosis of digits; no joint swelling or tenderness to palpation  PSYCH: A+O x3; affect appropriate, calm and cooperative  NEUROLOGY: CN 2-12 are intact and symmetric; no gross sensory deficits     LABS:                        9.2    12.80 )-----------( 390      ( 08 Mar 2025 04:51 )             29.8     03-08    141  |  102  |  34.0[H]  ----------------------------<  108[H]  3.7   |  29.0  |  1.44[H]    Ca    8.2[L]      08 Mar 2025 04:51  Phos  3.1     03-07  Mg     1.6     03-08    TPro  5.8[L]  /  Alb  2.3[L]  /  TBili  <0.2[L]  /  DBili  x   /  AST  43[H]  /  ALT  52[H]  /  AlkPhos  105  03-07    PTT - ( 08 Mar 2025 04:51 )  PTT:28.2 sec      Urinalysis Basic - ( 08 Mar 2025 04:51 )    Color: x / Appearance: x / SG: x / pH: x  Gluc: 108 mg/dL / Ketone: x  / Bili: x / Urobili: x   Blood: x / Protein: x / Nitrite: x   Leuk Esterase: x / RBC: x / WBC x   Sq Epi: x / Non Sq Epi: x / Bacteria: x

## 2025-03-11 NOTE — DISCHARGE NOTE NURSING/CASE MANAGEMENT/SOCIAL WORK - NSDCFUADDAPPT_GEN_ALL_CORE_FT
APPTS ARE READY TO BE MADE: [X] YES    Best Family or Patient Contact (if needed):    Additional Information about above appointments (if needed):    1: Follow up with Urology in 2 weeks   2: Follow up with PCP in 1 week  3: Follow up with Cardiology in 1 week    Other comments or requests:    APPTS ARE READY TO BE MADE: [X] YES    Best Family or Patient Contact (if needed): Aurora ( daughter ) [phone]  Your Follow up appointments have been made for you.  Cardio - Follow up with Dr MARE Ferrera on 4/14/25 at 10:40 am at Address: [address] [place] [address]  Phone: [phone].  Please call the office and inform them to get you a new appointment if you needed to change.    Your meds sent to your preferred pharmacy , you reported no concerns in obtaining meds.     Homecare is requested for home follow up for your condition .          Additional Information about above appointments (if needed):    1: Follow up with Urology in 2 weeks   2: Follow up with PCP in 1 week  3: Follow up with Cardiology in 1 week    Other comments or requests:    APPTS ARE READY TO BE MADE: [X] YES    Best Family or Patient Contact (if needed): Aurora ( daughter ) [phone]  Your Follow up appointments have been made for you.  Cardio - Follow up with Dr MARE Ferrera on 4/14/25 at 10:40 am at Address: [address] [place] [address]  Phone: [phone].  Please call the office and inform them to get you a new appointment if you needed to change.  Pt will make his own PCP appt  Your meds sent to your preferred pharmacy , you reported no concerns in obtaining meds.     Homecare is requested for home follow up for your condition .    HA in place    Additional Information about above appointments (if needed):    1: Follow up with Urology in 2 weeks   2: Follow up with PCP in 1 week  3: Follow up with Cardiology in 1 week    Other comments or requests:

## 2025-03-11 NOTE — PROGRESS NOTE ADULT - ASSESSMENT
83 y/o M w/ PMH lung cancer (previously on immunotherapy),  chronic RBBB/LAFB,DVT not AC, HTN, copd not on home O2 and CAD presenting for increasing SOB for the last 1 week. Per wife, Schedule for PET scan last week, was unable to go for continued fatigue, states has had progressive SOB for the last few days. Also endorses that he has been having increased urinary frequency though no dysuria. Found to be hypoxic and UA grossly positive. Imaging was significant for findings of pyelonephritis. ID was consulted and patient continues to require inpatient care for IV Abx and for hematuria.    Sepsis secondary to complicated urinary tract infection vs PNA  - Blood cultures  3/4 reporting No growth   - RVP/COVID 19 PCR 3/4 negative   - UA 3/4 with pyuria; Urine Cx 3/4 reporting E coli   - CTA chest 3/4 reporting no PE. Little change in right upper lobe parenchymal consolidation with air bronchograms from prior CT 1/17  - CT A/P 3/4 reporting bilateral hydroureteronephrosis with asymmetric left perinephric edema  - UA 3/4 with pyuria   - CXR on 3/9 showing worsening right lung consolidations (my read) from previous CXR  - Procal negative, Discontinued doxycyline on 3/10  - Continue Rocephin IV for now; Will transition to Vantin for discharge    Acute blood loss anemia  Hematuria in the setting of pyelonephritis  - Holding heparin gtt  - Condom catheter showing red urine drainage  - CBC stable for now  - Urology consulted but not recommending any intervention  - Monitor for hematuria resolution (resolved on 3/10)  - Needs to follow up with urology as an outpatient  - Has required qureshi in the past for BPH  - Continue Flomax and finasteride    Acute hypoxic respiratory failure secondary to HF vs PNA vs recurrent pleural effusions vs severe AS  Acute on chronic systolic congestive heart failure, CAD, HTN  History of COPD not on home O2  - TTE showing EF 40-45% with severe AS  - Holding Lasix, Plavix and AC as per Cardiology   - Continue aspirin  - Continue metoprolol 50mg BID  - Continue Trelegy interchange  - Started Duonebs PRN  - Will need outpatient follow up for TAVR evaluation  - Cardiology following, recs noted  - Started on Lasix as per Cardiology recommendations 3/9  - Started on midodrine 5mg TID for orthostatic hypotension    SHANNEN on CKD stage 3  Bilateral hydronephrosis  - Was previously discharged on flomax and had qureshi which was taken out by urology  - Endorses overflow incontinence symptoms  - CTA chest as above showing bilateral hydronephrosis and left sided pyelonephritis  - Continue Flomax 0.8mg HS and finasteride    HLD  - Continue atorvastatin    GERD  - Continue Protonix    Constipation  - Started senna and miralax      DVT/GI ppx: Holding due to hematuria  Diet: DASH  Code Status: Full code  Dispo: Pending reconditioning, likely tomorrow

## 2025-03-12 LAB
GLUCOSE BLDC GLUCOMTR-MCNC: 100 MG/DL — HIGH (ref 70–99)
GLUCOSE BLDC GLUCOMTR-MCNC: 113 MG/DL — HIGH (ref 70–99)
GLUCOSE BLDC GLUCOMTR-MCNC: 126 MG/DL — HIGH (ref 70–99)
GLUCOSE BLDC GLUCOMTR-MCNC: 128 MG/DL — HIGH (ref 70–99)

## 2025-03-12 PROCEDURE — 99232 SBSQ HOSP IP/OBS MODERATE 35: CPT

## 2025-03-12 RX ORDER — MIDODRINE HYDROCHLORIDE 5 MG/1
1 TABLET ORAL
Qty: 0 | Refills: 0 | DISCHARGE
Start: 2025-03-12

## 2025-03-12 RX ORDER — MIDODRINE HYDROCHLORIDE 5 MG/1
10 TABLET ORAL THREE TIMES A DAY
Refills: 0 | Status: DISCONTINUED | OUTPATIENT
Start: 2025-03-12 | End: 2025-03-18

## 2025-03-12 RX ADMIN — Medication 40 MILLIGRAM(S): at 06:54

## 2025-03-12 RX ADMIN — Medication 1 DOSE(S): at 06:55

## 2025-03-12 RX ADMIN — MIDODRINE HYDROCHLORIDE 10 MILLIGRAM(S): 5 TABLET ORAL at 12:07

## 2025-03-12 RX ADMIN — FUROSEMIDE 20 MILLIGRAM(S): 10 INJECTION INTRAMUSCULAR; INTRAVENOUS at 06:54

## 2025-03-12 RX ADMIN — MIDODRINE HYDROCHLORIDE 5 MILLIGRAM(S): 5 TABLET ORAL at 06:54

## 2025-03-12 RX ADMIN — MIDODRINE HYDROCHLORIDE 10 MILLIGRAM(S): 5 TABLET ORAL at 17:02

## 2025-03-12 RX ADMIN — TAMSULOSIN HYDROCHLORIDE 0.8 MILLIGRAM(S): 0.4 CAPSULE ORAL at 22:12

## 2025-03-12 RX ADMIN — TIOTROPIUM BROMIDE INHALATION SPRAY 2 PUFF(S): 3.12 SPRAY, METERED RESPIRATORY (INHALATION) at 09:19

## 2025-03-12 RX ADMIN — METOPROLOL SUCCINATE 50 MILLIGRAM(S): 50 TABLET, EXTENDED RELEASE ORAL at 06:54

## 2025-03-12 RX ADMIN — Medication 1 DOSE(S): at 12:07

## 2025-03-12 RX ADMIN — Medication 81 MILLIGRAM(S): at 12:07

## 2025-03-12 RX ADMIN — CEFTRIAXONE 2000 MILLIGRAM(S): 500 INJECTION, POWDER, FOR SOLUTION INTRAMUSCULAR; INTRAVENOUS at 22:12

## 2025-03-12 RX ADMIN — ATORVASTATIN CALCIUM 40 MILLIGRAM(S): 80 TABLET, FILM COATED ORAL at 22:12

## 2025-03-12 NOTE — PROGRESS NOTE ADULT - SUBJECTIVE AND OBJECTIVE BOX
[facility] Division of Hospital Medicine  Star Sandoval, DO  I'm reachable on [employer] Teams    Patient is a 84y old  Male who presents with a chief complaint of Sob and weakness for more 1 week. (08 Mar 2025 08:52)      SUBJECTIVE / OVERNIGHT EVENTS:    Patient was seen and examined at bedside. No events overnight. Denies any acute symptoms such as chest pain, shortness of breath, or palpitations. Urine no longer having any hematuria. Continues to be on Abx. He was recently started on midodrine due to orthostatic hypotension and was improving. Dose increased. Discharge to be held up today because he was orthostatic earlier. He will be walked later on in the day and in the morning to assess again for conditioning.      MEDICATIONS  (STANDING):  aspirin  chewable 81 milliGRAM(s) Oral daily  atorvastatin 40 milliGRAM(s) Oral at bedtime  cefTRIAXone Injectable. 2000 milliGRAM(s) IV Push every 24 hours  dextrose 5%. 1000 milliLiter(s) (50 mL/Hr) IV Continuous <Continuous>  dextrose 5%. 1000 milliLiter(s) (100 mL/Hr) IV Continuous <Continuous>  dextrose 50% Injectable 25 Gram(s) IV Push once  dextrose 50% Injectable 12.5 Gram(s) IV Push once  dextrose 50% Injectable 25 Gram(s) IV Push once  fluticasone propionate/ salmeterol 100-50 MICROgram(s) Diskus 1 Dose(s) Inhalation two times a day  glucagon  Injectable 1 milliGRAM(s) IntraMuscular once  insulin lispro (ADMELOG) corrective regimen sliding scale   SubCutaneous three times a day before meals  metoprolol succinate ER 50 milliGRAM(s) Oral daily  pantoprazole    Tablet 40 milliGRAM(s) Oral before breakfast  tamsulosin 0.8 milliGRAM(s) Oral at bedtime  tiotropium 2.5 MICROgram(s) Inhaler 2 Puff(s) Inhalation daily    MEDICATIONS  (PRN):  acetaminophen     Tablet .. 650 milliGRAM(s) Oral every 6 hours PRN Temp greater or equal to 38C (100.4F), Mild Pain (1 - 3)  aluminum hydroxide/magnesium hydroxide/simethicone Suspension 30 milliLiter(s) Oral every 4 hours PRN Dyspepsia  dextrose Oral Gel 15 Gram(s) Oral once PRN Blood Glucose LESS THAN 70 milliGRAM(s)/deciliter  melatonin 3 milliGRAM(s) Oral at bedtime PRN Insomnia  ondansetron Injectable 4 milliGRAM(s) IV Push every 8 hours PRN Nausea and/or Vomiting    CAPILLARY BLOOD GLUCOSE      POCT Blood Glucose.: 100 mg/dL (08 Mar 2025 08:15)  POCT Blood Glucose.: 128 mg/dL (07 Mar 2025 22:26)  POCT Blood Glucose.: 152 mg/dL (07 Mar 2025 16:24)    I&O's Summary    07 Mar 2025 07:01  -  08 Mar 2025 07:00  --------------------------------------------------------  IN: 470 mL / OUT: 2850 mL / NET: -2380 mL        PHYSICAL EXAM:  Vital Signs Last 24 Hrs  T(C): 37 (08 Mar 2025 08:16), Max: 37.1 (07 Mar 2025 21:32)  T(F): 98.6 (08 Mar 2025 08:16), Max: 98.7 (07 Mar 2025 21:32)  HR: 98 (08 Mar 2025 08:16) (71 - 100)  BP: 97/58 (08 Mar 2025 11:06) (84/50 - 108/66)  BP(mean): 71 (08 Mar 2025 11:06) (71 - 80)  RR: 18 (08 Mar 2025 08:16) (18 - 18)  SpO2: 92% (08 Mar 2025 08:16) (92% - 96%)    Parameters below as of 08 Mar 2025 04:37  Patient On (Oxygen Delivery Method): room air        CONSTITUTIONAL: NAD, well-developed, well-groomed  EYES:  EOMI, conjunctiva and sclera clear  ENMT: Moist oral mucosa  NECK: Supple, no JVD  RESPIRATORY: Normal respiratory effort; lungs are clear to auscultation bilaterally  CARDIOVASCULAR: Regular rate and rhythm, normal S1 and S2  ABDOMEN: normoactive bowel sounds, soft, nontender to palpation, no distension   : Condom cath in place, draining dark red urine into bag  MUSCULOSKELETAL:  no clubbing or cyanosis of digits; no joint swelling or tenderness to palpation  PSYCH: A+O x3; affect appropriate, calm and cooperative  NEUROLOGY: CN 2-12 are intact and symmetric; no gross sensory deficits     LABS:                        9.2    12.80 )-----------( 390      ( 08 Mar 2025 04:51 )             29.8     03-08    141  |  102  |  34.0[H]  ----------------------------<  108[H]  3.7   |  29.0  |  1.44[H]    Ca    8.2[L]      08 Mar 2025 04:51  Phos  3.1     03-07  Mg     1.6     03-08    TPro  5.8[L]  /  Alb  2.3[L]  /  TBili  <0.2[L]  /  DBili  x   /  AST  43[H]  /  ALT  52[H]  /  AlkPhos  105  03-07    PTT - ( 08 Mar 2025 04:51 )  PTT:28.2 sec      Urinalysis Basic - ( 08 Mar 2025 04:51 )    Color: x / Appearance: x / SG: x / pH: x  Gluc: 108 mg/dL / Ketone: x  / Bili: x / Urobili: x   Blood: x / Protein: x / Nitrite: x   Leuk Esterase: x / RBC: x / WBC x   Sq Epi: x / Non Sq Epi: x / Bacteria: x

## 2025-03-12 NOTE — PROGRESS NOTE ADULT - ASSESSMENT
85 y/o M w/ PMH lung cancer (previously on immunotherapy),  chronic RBBB/LAFB,DVT not AC, HTN, copd not on home O2 and CAD presenting for increasing SOB for the last 1 week. Per wife, Schedule for PET scan last week, was unable to go for continued fatigue, states has had progressive SOB for the last few days. Also endorses that he has been having increased urinary frequency though no dysuria. Found to be hypoxic and UA grossly positive. Imaging was significant for findings of pyelonephritis. ID was consulted and patient continues to require inpatient care for IV Abx and for hematuria.    Sepsis secondary to complicated urinary tract infection vs PNA  - Blood cultures  3/4 reporting No growth   - RVP/COVID 19 PCR 3/4 negative   - UA 3/4 with pyuria; Urine Cx 3/4 reporting E coli   - CTA chest 3/4 reporting no PE. Little change in right upper lobe parenchymal consolidation with air bronchograms from prior CT 1/17  - CT A/P 3/4 reporting bilateral hydroureteronephrosis with asymmetric left perinephric edema  - UA 3/4 with pyuria   - CXR on 3/9 showing worsening right lung consolidations (my read) from previous CXR  - Procal negative, Discontinued doxycyline on 3/10  - Continue Rocephin IV for now; Last day tomorrow, 3/13    Acute blood loss anemia  Hematuria in the setting of pyelonephritis  - Holding heparin gtt  - Condom catheter showing red urine drainage  - CBC stable for now  - Urology consulted but not recommending any intervention  - Monitor for hematuria resolution (resolved on 3/10)  - Needs to follow up with urology as an outpatient  - Has required qureshi in the past for BPH  - Discontinue Flomax and finasteride    Acute hypoxic respiratory failure secondary to HF vs PNA vs recurrent pleural effusions vs severe AS  Acute on chronic systolic congestive heart failure, CAD, HTN  History of COPD not on home O2  - TTE showing EF 40-45% with severe AS  - Holding Lasix, Plavix and AC as per Cardiology   - Continue aspirin  - Continue metoprolol 50mg BID  - Continue Trelegy interchange  - Started Duonebs PRN  - Will need outpatient follow up for TAVR evaluation  - Cardiology following, recs noted  - Discontinue Lasix  - Increased midodrine to 10mg TID for orthostatic hypotension    SHANNEN on CKD stage 3  Bilateral hydronephrosis  - Was previously discharged on flomax and had qureshi which was taken out by urology  - Endorses overflow incontinence symptoms  - CTA chest as above showing bilateral hydronephrosis and left sided pyelonephritis  - Discontinue Flomax and finasteride    HLD  - Continue atorvastatin    GERD  - Continue Protonix    Constipation  - Started senna and miralax      DVT/GI ppx: Holding due to hematuria  Diet: DASH  Code Status: Full code  Dispo: Pending reconditioning, likely tomorrow

## 2025-03-13 LAB
GLUCOSE BLDC GLUCOMTR-MCNC: 104 MG/DL — HIGH (ref 70–99)
GLUCOSE BLDC GLUCOMTR-MCNC: 117 MG/DL — HIGH (ref 70–99)
GLUCOSE BLDC GLUCOMTR-MCNC: 95 MG/DL — SIGNIFICANT CHANGE UP (ref 70–99)
GLUCOSE BLDC GLUCOMTR-MCNC: 98 MG/DL — SIGNIFICANT CHANGE UP (ref 70–99)

## 2025-03-13 PROCEDURE — 99232 SBSQ HOSP IP/OBS MODERATE 35: CPT

## 2025-03-13 RX ADMIN — CEFTRIAXONE 2000 MILLIGRAM(S): 500 INJECTION, POWDER, FOR SOLUTION INTRAMUSCULAR; INTRAVENOUS at 21:42

## 2025-03-13 RX ADMIN — MIDODRINE HYDROCHLORIDE 10 MILLIGRAM(S): 5 TABLET ORAL at 11:16

## 2025-03-13 RX ADMIN — TAMSULOSIN HYDROCHLORIDE 0.8 MILLIGRAM(S): 0.4 CAPSULE ORAL at 21:41

## 2025-03-13 RX ADMIN — MIDODRINE HYDROCHLORIDE 10 MILLIGRAM(S): 5 TABLET ORAL at 17:36

## 2025-03-13 RX ADMIN — FUROSEMIDE 20 MILLIGRAM(S): 10 INJECTION INTRAMUSCULAR; INTRAVENOUS at 05:37

## 2025-03-13 RX ADMIN — Medication 40 MILLIGRAM(S): at 05:37

## 2025-03-13 RX ADMIN — Medication 81 MILLIGRAM(S): at 11:16

## 2025-03-13 RX ADMIN — ATORVASTATIN CALCIUM 40 MILLIGRAM(S): 80 TABLET, FILM COATED ORAL at 21:42

## 2025-03-13 RX ADMIN — MIDODRINE HYDROCHLORIDE 10 MILLIGRAM(S): 5 TABLET ORAL at 05:37

## 2025-03-13 RX ADMIN — METOPROLOL SUCCINATE 50 MILLIGRAM(S): 50 TABLET, EXTENDED RELEASE ORAL at 05:37

## 2025-03-13 NOTE — PROGRESS NOTE ADULT - SUBJECTIVE AND OBJECTIVE BOX
[facility] Division of Hospital Medicine  Star Sandoval, DO  I'm reachable on [employer]    Patient is a 84y old  Male who presents with a chief complaint of Sob and weakness for more 1 week. (08 Mar 2025 08:52)      SUBJECTIVE / OVERNIGHT EVENTS:    Patient was seen and examined at bedside. No events overnight. Denies any acute symptoms such as chest pain, shortness of breath, or palpitations. Urine no longer having any hematuria. He finished his course of Abx. Continues to get orthostatic with movement. Cardiology reconsulted to evaluate for TAVR planning.      MEDICATIONS  (STANDING):  aspirin  chewable 81 milliGRAM(s) Oral daily  atorvastatin 40 milliGRAM(s) Oral at bedtime  cefTRIAXone Injectable. 2000 milliGRAM(s) IV Push every 24 hours  dextrose 5%. 1000 milliLiter(s) (50 mL/Hr) IV Continuous <Continuous>  dextrose 5%. 1000 milliLiter(s) (100 mL/Hr) IV Continuous <Continuous>  dextrose 50% Injectable 25 Gram(s) IV Push once  dextrose 50% Injectable 12.5 Gram(s) IV Push once  dextrose 50% Injectable 25 Gram(s) IV Push once  fluticasone propionate/ salmeterol 100-50 MICROgram(s) Diskus 1 Dose(s) Inhalation two times a day  glucagon  Injectable 1 milliGRAM(s) IntraMuscular once  insulin lispro (ADMELOG) corrective regimen sliding scale   SubCutaneous three times a day before meals  metoprolol succinate ER 50 milliGRAM(s) Oral daily  pantoprazole    Tablet 40 milliGRAM(s) Oral before breakfast  tamsulosin 0.8 milliGRAM(s) Oral at bedtime  tiotropium 2.5 MICROgram(s) Inhaler 2 Puff(s) Inhalation daily    MEDICATIONS  (PRN):  acetaminophen     Tablet .. 650 milliGRAM(s) Oral every 6 hours PRN Temp greater or equal to 38C (100.4F), Mild Pain (1 - 3)  aluminum hydroxide/magnesium hydroxide/simethicone Suspension 30 milliLiter(s) Oral every 4 hours PRN Dyspepsia  dextrose Oral Gel 15 Gram(s) Oral once PRN Blood Glucose LESS THAN 70 milliGRAM(s)/deciliter  melatonin 3 milliGRAM(s) Oral at bedtime PRN Insomnia  ondansetron Injectable 4 milliGRAM(s) IV Push every 8 hours PRN Nausea and/or Vomiting    CAPILLARY BLOOD GLUCOSE      POCT Blood Glucose.: 100 mg/dL (08 Mar 2025 08:15)  POCT Blood Glucose.: 128 mg/dL (07 Mar 2025 22:26)  POCT Blood Glucose.: 152 mg/dL (07 Mar 2025 16:24)    I&O's Summary    07 Mar 2025 07:01  -  08 Mar 2025 07:00  --------------------------------------------------------  IN: 470 mL / OUT: 2850 mL / NET: -2380 mL        PHYSICAL EXAM:  Vital Signs Last 24 Hrs  T(C): 37 (08 Mar 2025 08:16), Max: 37.1 (07 Mar 2025 21:32)  T(F): 98.6 (08 Mar 2025 08:16), Max: 98.7 (07 Mar 2025 21:32)  HR: 98 (08 Mar 2025 08:16) (71 - 100)  BP: 97/58 (08 Mar 2025 11:06) (84/50 - 108/66)  BP(mean): 71 (08 Mar 2025 11:06) (71 - 80)  RR: 18 (08 Mar 2025 08:16) (18 - 18)  SpO2: 92% (08 Mar 2025 08:16) (92% - 96%)    Parameters below as of 08 Mar 2025 04:37  Patient On (Oxygen Delivery Method): room air        CONSTITUTIONAL: NAD, well-developed, well-groomed  EYES:  EOMI, conjunctiva and sclera clear  ENMT: Moist oral mucosa  NECK: Supple, no JVD  RESPIRATORY: Normal respiratory effort; lungs are clear to auscultation bilaterally  CARDIOVASCULAR: Regular rate and rhythm, normal S1 and S2  ABDOMEN: normoactive bowel sounds, soft, nontender to palpation, no distension   : Condom cath in place, draining dark red urine into bag  MUSCULOSKELETAL:  no clubbing or cyanosis of digits; no joint swelling or tenderness to palpation  PSYCH: A+O x3; affect appropriate, calm and cooperative  NEUROLOGY: CN 2-12 are intact and symmetric; no gross sensory deficits     LABS:                        9.2    12.80 )-----------( 390      ( 08 Mar 2025 04:51 )             29.8     03-08    141  |  102  |  34.0[H]  ----------------------------<  108[H]  3.7   |  29.0  |  1.44[H]    Ca    8.2[L]      08 Mar 2025 04:51  Phos  3.1     03-07  Mg     1.6     03-08    TPro  5.8[L]  /  Alb  2.3[L]  /  TBili  <0.2[L]  /  DBili  x   /  AST  43[H]  /  ALT  52[H]  /  AlkPhos  105  03-07    PTT - ( 08 Mar 2025 04:51 )  PTT:28.2 sec      Urinalysis Basic - ( 08 Mar 2025 04:51 )    Color: x / Appearance: x / SG: x / pH: x  Gluc: 108 mg/dL / Ketone: x  / Bili: x / Urobili: x   Blood: x / Protein: x / Nitrite: x   Leuk Esterase: x / RBC: x / WBC x   Sq Epi: x / Non Sq Epi: x / Bacteria: x

## 2025-03-13 NOTE — PROGRESS NOTE ADULT - SUBJECTIVE AND OBJECTIVE BOX
[facility]                                   [address] [place] [address]                                                      PHONE: [phone]                                                         FAX: [phone]  [email or web address]  ---------------------------------------------------------------------------------------------------------------------------------    Overnight events/patient complaints:  pt ortho static  pt family requesting inpt tavr work up     PAST MEDICAL & SURGICAL HISTORY:  Abnormal findings on diagnostic imaging of lung      Hypertension      Hyperlipidemia      CAD (coronary artery disease)      Left anterior fascicular block (LAFB)      RBBB      S/P hip replacement, right      S/P hip replacement, left          No Known Allergies    MEDICATIONS  (STANDING):  aspirin  chewable 81 milliGRAM(s) Oral daily  atorvastatin 40 milliGRAM(s) Oral at bedtime  cefTRIAXone Injectable. 2000 milliGRAM(s) IV Push every 24 hours  dextrose 5%. 1000 milliLiter(s) (100 mL/Hr) IV Continuous <Continuous>  dextrose 5%. 1000 milliLiter(s) (50 mL/Hr) IV Continuous <Continuous>  dextrose 50% Injectable 25 Gram(s) IV Push once  dextrose 50% Injectable 12.5 Gram(s) IV Push once  dextrose 50% Injectable 25 Gram(s) IV Push once  fluticasone propionate/ salmeterol 100-50 MICROgram(s) Diskus 1 Dose(s) Inhalation two times a day  furosemide    Tablet 20 milliGRAM(s) Oral daily  glucagon  Injectable 1 milliGRAM(s) IntraMuscular once  insulin lispro (ADMELOG) corrective regimen sliding scale   SubCutaneous three times a day before meals  metoprolol succinate ER 50 milliGRAM(s) Oral daily  midodrine. 10 milliGRAM(s) Oral three times a day  pantoprazole    Tablet 40 milliGRAM(s) Oral before breakfast  polyethylene glycol 3350 17 Gram(s) Oral daily  senna 2 Tablet(s) Oral at bedtime  tamsulosin 0.8 milliGRAM(s) Oral at bedtime  tiotropium 2.5 MICROgram(s) Inhaler 2 Puff(s) Inhalation daily    MEDICATIONS  (PRN):  acetaminophen     Tablet .. 650 milliGRAM(s) Oral every 6 hours PRN Temp greater or equal to 38C (100.4F), Mild Pain (1 - 3)  albuterol/ipratropium for Nebulization 3 milliLiter(s) Nebulizer every 6 hours PRN Shortness of Breath and/or Wheezing  aluminum hydroxide/magnesium hydroxide/simethicone Suspension 30 milliLiter(s) Oral every 4 hours PRN Dyspepsia  dextrose Oral Gel 15 Gram(s) Oral once PRN Blood Glucose LESS THAN 70 milliGRAM(s)/deciliter  melatonin 3 milliGRAM(s) Oral at bedtime PRN Insomnia  ondansetron Injectable 4 milliGRAM(s) IV Push every 8 hours PRN Nausea and/or Vomiting      Vital Signs Last 24 Hrs  T(C): 36.4 (13 Mar 2025 04:36), Max: 37.1 (12 Mar 2025 17:17)  T(F): 97.6 (13 Mar 2025 04:36), Max: 98.7 (12 Mar 2025 17:17)  HR: 87 (13 Mar 2025 04:36) (62 - 91)  BP: 112/64 (13 Mar 2025 04:36) (100/60 - 112/64)  BP(mean): --  RR: 17 (13 Mar 2025 04:36) (17 - 19)  SpO2: 96% (13 Mar 2025 04:36) (96% - 99%)    Parameters below as of 13 Mar 2025 04:36  Patient On (Oxygen Delivery Method): room air      ICU Vital Signs Last 24 Hrs  JONE SOUTH  I&O's Detail    12 Mar 2025 07:01  -  13 Mar 2025 07:00  --------------------------------------------------------  IN:  Total IN: 0 mL    OUT:    Voided (mL): 1000 mL  Total OUT: 1000 mL    Total NET: -1000 mL        I&O's Summary    12 Mar 2025 07:01  -  13 Mar 2025 07:00  --------------------------------------------------------  IN: 0 mL / OUT: 1000 mL / NET: -1000 mL      Drug Dosing Weight  JONE SOUTH      PHYSICAL EXAM:  General: Appears well developed, well nourished alert and cooperative.  HEENT: Head; normocephalic, atraumatic.  Eyes: Pupils reactive, cornea wnl.  Neck: Supple, no nodes adenopathy, no NVD or carotid bruit or thyromegaly.  CARDIOVASCULAR: Normal S1 and S2, No rub, gallop or lift.   + murmur,  LUNGS: No rales, rhonchi or wheeze. Normal breath sounds bilaterally.  ABDOMEN: Soft, nontender without mass or organomegaly. bowel sounds normoactive.  EXTREMITIES: No clubbing, cyanosis or edema. Distal pulses wnl.   SKIN: warm and dry with normal turgor.  NEURO: Alert/oriented x 3/normal motor exam. No pathologic reflexes.    PSYCH: normal affect.           ASSESSMENT AND PLAN:  Patient is an 83 yo man with chronic hypertension, moderate CAD on remote [facility] with normal perfusion on PET MPI 2022, chronic RBBB/LAFB, frequent unifocal ventricular ectopy, lung cancer s/p radiation and chemotherapy with course complicated by DVT s/p several months systemic AC who presents with several days progressive worsening dyspnea without reported concurrent chest pain, dizziness and palpitations. He is s/p CTA which was negative for acute pulmonary embolism however with pleural effusion and upper lobe consolidation with air bronchograms. Tele with paroxsymal atrial fibrillation with frequent ventricular ectopic beats.     Acute HFrEF in setting of severe AS and CKD   -as per family request plan for R+L heart cath tomorrow  -plan to have CTS team eval pt tomorrow s/p cath          Thank you for allowing [facility] to participate in the care of this patient.  Please feel free to call with any questions or concerns.

## 2025-03-13 NOTE — PROGRESS NOTE ADULT - ASSESSMENT
83 y/o M w/ PMH lung cancer (previously on immunotherapy),  chronic RBBB/LAFB,DVT not AC, HTN, copd not on home O2 and CAD presenting for increasing SOB for the last 1 week. Per wife, Schedule for PET scan last week, was unable to go for continued fatigue, states has had progressive SOB for the last few days. Also endorses that he has been having increased urinary frequency though no dysuria. Found to be hypoxic and UA grossly positive. Imaging was significant for findings of pyelonephritis. ID was consulted and patient continues to require inpatient care for IV Abx and for hematuria.    Acute hypoxic respiratory failure secondary to HF vs PNA vs recurrent pleural effusions vs severe AS  Acute on chronic systolic congestive heart failure, CAD, HTN  History of COPD not on home O2  - TTE showing EF 40-45% with severe AS  - Holding Lasix, Plavix and AC as per Cardiology   - Continue aspirin  - Continue metoprolol 50mg BID  - Continue Trelegy interchange  - Started Duonebs PRN  - Cardiology following, recs noted  - Discontinue Lasix  - Increased midodrine to 10mg TID for orthostatic hypotension  - Cardiology reconsulted today for RHC/LHC tomorrow for TAVR evaluation    Acute blood loss anemia  Hematuria in the setting of pyelonephritis  - Holding heparin gtt  - Condom catheter showing red urine drainage  - CBC stable for now  - Urology consulted but not recommending any intervention  - Monitor for hematuria resolution (resolved on 3/10)  - Needs to follow up with urology as an outpatient  - Has required qureshi in the past for BPH  - Discontinue Flomax and finasteride    Sepsis secondary to complicated urinary tract infection vs PNA  - Blood cultures  3/4 reporting No growth   - RVP/COVID 19 PCR 3/4 negative   - UA 3/4 with pyuria; Urine Cx 3/4 reporting E coli   - CTA chest 3/4 reporting no PE. Little change in right upper lobe parenchymal consolidation with air bronchograms from prior CT 1/17  - CT A/P 3/4 reporting bilateral hydroureteronephrosis with asymmetric left perinephric edema  - UA 3/4 with pyuria   - CXR on 3/9 showing worsening right lung consolidations (my read) from previous CXR  - Procal negative, Discontinued doxycyline on 3/10  - Finished course of Rocephin    SHANNEN on CKD stage 3  Bilateral hydronephrosis  - Was previously discharged on flomax and had qureshi which was taken out by urology  - Endorses overflow incontinence symptoms  - CTA chest as above showing bilateral hydronephrosis and left sided pyelonephritis  - Discontinue Flomax and finasteride    HLD  - Continue atorvastatin    GERD  - Continue Protonix    Constipation  - Started senna and miralax      DVT/GI ppx: Holding due to hematuria  Diet: DASH  Code Status: Full code  Dispo: Pending reconditioning, likely tomorrow oriented to person, place and time

## 2025-03-14 LAB
ANION GAP SERPL CALC-SCNC: 13 MMOL/L — SIGNIFICANT CHANGE UP (ref 5–17)
BUN SERPL-MCNC: 49.1 MG/DL — HIGH (ref 8–20)
CALCIUM SERPL-MCNC: 8.9 MG/DL — SIGNIFICANT CHANGE UP (ref 8.4–10.5)
CHLORIDE SERPL-SCNC: 101 MMOL/L — SIGNIFICANT CHANGE UP (ref 96–108)
CO2 SERPL-SCNC: 27 MMOL/L — SIGNIFICANT CHANGE UP (ref 22–29)
CREAT SERPL-MCNC: 1.31 MG/DL — HIGH (ref 0.5–1.3)
EGFR: 54 ML/MIN/1.73M2 — LOW
EGFR: 54 ML/MIN/1.73M2 — LOW
GLUCOSE BLDC GLUCOMTR-MCNC: 78 MG/DL — SIGNIFICANT CHANGE UP (ref 70–99)
GLUCOSE BLDC GLUCOMTR-MCNC: 97 MG/DL — SIGNIFICANT CHANGE UP (ref 70–99)
GLUCOSE SERPL-MCNC: 104 MG/DL — HIGH (ref 70–99)
HCT VFR BLD CALC: 31.7 % — LOW (ref 39–50)
HGB BLD-MCNC: 10 G/DL — LOW (ref 13–17)
MAGNESIUM SERPL-MCNC: 2.2 MG/DL — SIGNIFICANT CHANGE UP (ref 1.6–2.6)
MCHC RBC-ENTMCNC: 27.7 PG — SIGNIFICANT CHANGE UP (ref 27–34)
MCHC RBC-ENTMCNC: 31.5 G/DL — LOW (ref 32–36)
MCV RBC AUTO: 87.8 FL — SIGNIFICANT CHANGE UP (ref 80–100)
NRBC # BLD AUTO: 0 K/UL — SIGNIFICANT CHANGE UP (ref 0–0)
NRBC BLD AUTO-RTO: 0 /100 WBCS — SIGNIFICANT CHANGE UP (ref 0–0)
PHOSPHATE SERPL-MCNC: 3.5 MG/DL — SIGNIFICANT CHANGE UP (ref 2.4–4.7)
PLATELET # BLD AUTO: 427 K/UL — HIGH (ref 150–400)
POTASSIUM SERPL-MCNC: 4.4 MMOL/L — SIGNIFICANT CHANGE UP (ref 3.5–5.3)
POTASSIUM SERPL-SCNC: 4.4 MMOL/L — SIGNIFICANT CHANGE UP (ref 3.5–5.3)
RBC # BLD: 3.61 M/UL — LOW (ref 4.2–5.8)
RBC # FLD: 19.7 % — HIGH (ref 10.3–14.5)
SODIUM SERPL-SCNC: 141 MMOL/L — SIGNIFICANT CHANGE UP (ref 135–145)
WBC # BLD: 12.95 K/UL — HIGH (ref 3.8–10.5)
WBC # FLD AUTO: 12.95 K/UL — HIGH (ref 3.8–10.5)

## 2025-03-14 PROCEDURE — 99232 SBSQ HOSP IP/OBS MODERATE 35: CPT

## 2025-03-14 RX ADMIN — Medication 1 DOSE(S): at 21:17

## 2025-03-14 RX ADMIN — FUROSEMIDE 20 MILLIGRAM(S): 10 INJECTION INTRAMUSCULAR; INTRAVENOUS at 05:23

## 2025-03-14 RX ADMIN — METOPROLOL SUCCINATE 50 MILLIGRAM(S): 50 TABLET, EXTENDED RELEASE ORAL at 05:22

## 2025-03-14 RX ADMIN — Medication 81 MILLIGRAM(S): at 11:31

## 2025-03-14 RX ADMIN — MIDODRINE HYDROCHLORIDE 10 MILLIGRAM(S): 5 TABLET ORAL at 05:22

## 2025-03-14 RX ADMIN — ATORVASTATIN CALCIUM 40 MILLIGRAM(S): 80 TABLET, FILM COATED ORAL at 22:12

## 2025-03-14 RX ADMIN — TIOTROPIUM BROMIDE INHALATION SPRAY 2 PUFF(S): 3.12 SPRAY, METERED RESPIRATORY (INHALATION) at 07:56

## 2025-03-14 RX ADMIN — MIDODRINE HYDROCHLORIDE 10 MILLIGRAM(S): 5 TABLET ORAL at 17:50

## 2025-03-14 RX ADMIN — Medication 40 MILLIGRAM(S): at 05:22

## 2025-03-14 RX ADMIN — POLYETHYLENE GLYCOL 3350 17 GRAM(S): 17 POWDER, FOR SOLUTION ORAL at 17:49

## 2025-03-14 RX ADMIN — Medication 1 DOSE(S): at 07:57

## 2025-03-14 NOTE — PROGRESS NOTE ADULT - ASSESSMENT
85 y/o M w/ PMH lung cancer (previously on immunotherapy),  chronic RBBB/LAFB,DVT not AC, HTN, copd not on home O2 and CAD presenting for increasing SOB for the last 1 week. Per wife, Schedule for PET scan last week, was unable to go for continued fatigue, states has had progressive SOB for the last few days. Also endorses that he has been having increased urinary frequency though no dysuria. Found to be hypoxic and UA grossly positive. Imaging was significant for findings of pyelonephritis. ID was consulted and patient continues to require inpatient care for IV Abx and for hematuria.    Acute hypoxic respiratory failure secondary to HF vs PNA vs recurrent pleural effusions vs severe AS  Acute on chronic systolic congestive heart failure, CAD, HTN  History of COPD not on home O2  - TTE showing EF 40-45% with severe AS  - Holding Lasix, Plavix and AC as per Cardiology   - Continue aspirin  - Continue metoprolol 50mg BID  - Continue Trelegy interchange  - Started Duonebs PRN  - Cardiology following, recs noted  - Discontinue Lasix  - Increased midodrine to 10mg TID for orthostatic hypotension  - Cardiology reconsulted today for RHC/LHC tomorrow for TAVR evaluation    Acute blood loss anemia  Hematuria in the setting of pyelonephritis  - Holding heparin gtt  - Condom catheter showing red urine drainage  - CBC stable for now  - Urology consulted but not recommending any intervention  - Monitor for hematuria resolution (resolved on 3/10)  - Needs to follow up with urology as an outpatient  - Has required qureshi in the past for BPH  - Discontinue Flomax and finasteride    Sepsis secondary to complicated urinary tract infection vs PNA  - Blood cultures  3/4 reporting No growth   - RVP/COVID 19 PCR 3/4 negative   - UA 3/4 with pyuria; Urine Cx 3/4 reporting E coli   - CTA chest 3/4 reporting no PE. Little change in right upper lobe parenchymal consolidation with air bronchograms from prior CT 1/17  - CT A/P 3/4 reporting bilateral hydroureteronephrosis with asymmetric left perinephric edema  - UA 3/4 with pyuria   - CXR on 3/9 showing worsening right lung consolidations (my read) from previous CXR  - Procal negative, Discontinued doxycyline on 3/10  - Finished course of Rocephin    SHANNEN on CKD stage 3  Bilateral hydronephrosis  - Was previously discharged on flomax and had qureshi which was taken out by urology  - Endorses overflow incontinence symptoms  - CTA chest as above showing bilateral hydronephrosis and left sided pyelonephritis  - Discontinue Flomax and finasteride    HLD  - Continue atorvastatin    GERD  - Continue Protonix    Constipation  - Started senna and miralax      DVT/GI ppx: Holding due to hematuria  Diet: DASH  Code Status: Full code  Dispo: Pending reconditioning, likely tomorrow 85 y/o M w/ PMH lung cancer (previously on immunotherapy),  chronic RBBB/LAFB,DVT not AC, HTN, copd not on home O2 and CAD presenting for increasing SOB for the last 1 week. Per wife, Schedule for PET scan last week, was unable to go for continued fatigue, states has had progressive SOB for the last few days. Also endorses that he has been having increased urinary frequency though no dysuria. Found to be hypoxic and UA grossly positive. Imaging was significant for findings of pyelonephritis. ID was consulted and patient continues to require inpatient care for IV Abx and for hematuria.    Acute hypoxic respiratory failure secondary to HF vs PNA vs recurrent pleural effusions vs severe AS  Acute on chronic systolic congestive heart failure, CAD, HTN  History of COPD not on home O2  - TTE showing EF 40-45% with severe AS  - Holding Lasix, Plavix and AC as per Cardiology   - Continue aspirin  - Continue metoprolol 50mg BID  - Continue Trelegy interchange  - Started Duonebs PRN  - Cardiology following, recs noted  - Discontinue Lasix  - Increased midodrine to 10mg TID for orthostatic hypotension  - Cardiology reconsulted today for RHC/LHC today for TAVR evaluation    Acute blood loss anemia  Hematuria in the setting of pyelonephritis  - Holding heparin gtt  - Condom catheter showing red urine drainage  - CBC stable for now  - Urology consulted but not recommending any intervention  - Monitor for hematuria resolution (resolved on 3/10)  - Needs to follow up with urology as an outpatient  - Has required qureshi in the past for BPH  - Discontinue Flomax and finasteride    Sepsis secondary to complicated urinary tract infection vs PNA  - Blood cultures  3/4 reporting No growth   - RVP/COVID 19 PCR 3/4 negative   - UA 3/4 with pyuria; Urine Cx 3/4 reporting E coli   - CTA chest 3/4 reporting no PE. Little change in right upper lobe parenchymal consolidation with air bronchograms from prior CT 1/17  - CT A/P 3/4 reporting bilateral hydroureteronephrosis with asymmetric left perinephric edema  - UA 3/4 with pyuria   - CXR on 3/9 showing worsening right lung consolidations (my read) from previous CXR  - Procal negative, Discontinued doxycyline on 3/10  - Finished course of Rocephin    SHANNEN on CKD stage 3  Bilateral hydronephrosis  - Was previously discharged on flomax and had qureshi which was taken out by urology  - Endorses overflow incontinence symptoms  - CTA chest as above showing bilateral hydronephrosis and left sided pyelonephritis  - Discontinue Flomax and finasteride    HLD  - Continue atorvastatin    GERD  - Continue Protonix    Constipation  - Started senna and miralax      DVT/GI ppx: Holding due to hematuria  Diet: DASH  Code Status: Full code  Dispo: Pending Cardiology work up for TAVR planning, possibly over the weekend if symptoms improve and no procedure is planned, possibly next week if TAVR is planned for inpatient setting

## 2025-03-14 NOTE — PROGRESS NOTE ADULT - SUBJECTIVE AND OBJECTIVE BOX
[facility] Division of Hospital Medicine  Star Sandoval, DO  I'm reachable on [employer] Teams    Patient is a 84y old  Male who presents with a chief complaint of Sob and weakness for more 1 week. (08 Mar 2025 08:52)      SUBJECTIVE / OVERNIGHT EVENTS:    Patient was seen and examined at bedside. No events overnight. Denies any acute symptoms such as chest pain, shortness of breath, or palpitations. Urine no longer having any hematuria. He finished his course of Abx. Continues to get orthostatic with movement. Cardiology planning for RHC/LHC today for TAVR planning.       MEDICATIONS  (STANDING):  aspirin  chewable 81 milliGRAM(s) Oral daily  atorvastatin 40 milliGRAM(s) Oral at bedtime  cefTRIAXone Injectable. 2000 milliGRAM(s) IV Push every 24 hours  dextrose 5%. 1000 milliLiter(s) (50 mL/Hr) IV Continuous <Continuous>  dextrose 5%. 1000 milliLiter(s) (100 mL/Hr) IV Continuous <Continuous>  dextrose 50% Injectable 25 Gram(s) IV Push once  dextrose 50% Injectable 12.5 Gram(s) IV Push once  dextrose 50% Injectable 25 Gram(s) IV Push once  fluticasone propionate/ salmeterol 100-50 MICROgram(s) Diskus 1 Dose(s) Inhalation two times a day  glucagon  Injectable 1 milliGRAM(s) IntraMuscular once  insulin lispro (ADMELOG) corrective regimen sliding scale   SubCutaneous three times a day before meals  metoprolol succinate ER 50 milliGRAM(s) Oral daily  pantoprazole    Tablet 40 milliGRAM(s) Oral before breakfast  tamsulosin 0.8 milliGRAM(s) Oral at bedtime  tiotropium 2.5 MICROgram(s) Inhaler 2 Puff(s) Inhalation daily    MEDICATIONS  (PRN):  acetaminophen     Tablet .. 650 milliGRAM(s) Oral every 6 hours PRN Temp greater or equal to 38C (100.4F), Mild Pain (1 - 3)  aluminum hydroxide/magnesium hydroxide/simethicone Suspension 30 milliLiter(s) Oral every 4 hours PRN Dyspepsia  dextrose Oral Gel 15 Gram(s) Oral once PRN Blood Glucose LESS THAN 70 milliGRAM(s)/deciliter  melatonin 3 milliGRAM(s) Oral at bedtime PRN Insomnia  ondansetron Injectable 4 milliGRAM(s) IV Push every 8 hours PRN Nausea and/or Vomiting    CAPILLARY BLOOD GLUCOSE      POCT Blood Glucose.: 100 mg/dL (08 Mar 2025 08:15)  POCT Blood Glucose.: 128 mg/dL (07 Mar 2025 22:26)  POCT Blood Glucose.: 152 mg/dL (07 Mar 2025 16:24)    I&O's Summary    07 Mar 2025 07:01  -  08 Mar 2025 07:00  --------------------------------------------------------  IN: 470 mL / OUT: 2850 mL / NET: -2380 mL        PHYSICAL EXAM:  Vital Signs Last 24 Hrs  T(C): 37 (08 Mar 2025 08:16), Max: 37.1 (07 Mar 2025 21:32)  T(F): 98.6 (08 Mar 2025 08:16), Max: 98.7 (07 Mar 2025 21:32)  HR: 98 (08 Mar 2025 08:16) (71 - 100)  BP: 97/58 (08 Mar 2025 11:06) (84/50 - 108/66)  BP(mean): 71 (08 Mar 2025 11:06) (71 - 80)  RR: 18 (08 Mar 2025 08:16) (18 - 18)  SpO2: 92% (08 Mar 2025 08:16) (92% - 96%)    Parameters below as of 08 Mar 2025 04:37  Patient On (Oxygen Delivery Method): room air        CONSTITUTIONAL: NAD, well-developed, well-groomed  EYES:  EOMI, conjunctiva and sclera clear  ENMT: Moist oral mucosa  NECK: Supple, no JVD  RESPIRATORY: Normal respiratory effort; lungs are clear to auscultation bilaterally  CARDIOVASCULAR: Regular rate and rhythm, normal S1 and S2  ABDOMEN: normoactive bowel sounds, soft, nontender to palpation, no distension   : Condom cath in place, draining dark red urine into bag  MUSCULOSKELETAL:  no clubbing or cyanosis of digits; no joint swelling or tenderness to palpation  PSYCH: A+O x3; affect appropriate, calm and cooperative  NEUROLOGY: CN 2-12 are intact and symmetric; no gross sensory deficits     LABS:                        9.2    12.80 )-----------( 390      ( 08 Mar 2025 04:51 )             29.8     03-08    141  |  102  |  34.0[H]  ----------------------------<  108[H]  3.7   |  29.0  |  1.44[H]    Ca    8.2[L]      08 Mar 2025 04:51  Phos  3.1     03-07  Mg     1.6     03-08    TPro  5.8[L]  /  Alb  2.3[L]  /  TBili  <0.2[L]  /  DBili  x   /  AST  43[H]  /  ALT  52[H]  /  AlkPhos  105  03-07    PTT - ( 08 Mar 2025 04:51 )  PTT:28.2 sec      Urinalysis Basic - ( 08 Mar 2025 04:51 )    Color: x / Appearance: x / SG: x / pH: x  Gluc: 108 mg/dL / Ketone: x  / Bili: x / Urobili: x   Blood: x / Protein: x / Nitrite: x   Leuk Esterase: x / RBC: x / WBC x   Sq Epi: x / Non Sq Epi: x / Bacteria: x

## 2025-03-15 LAB
ANION GAP SERPL CALC-SCNC: 13 MMOL/L — SIGNIFICANT CHANGE UP (ref 5–17)
BUN SERPL-MCNC: 39.4 MG/DL — HIGH (ref 8–20)
CALCIUM SERPL-MCNC: 8.8 MG/DL — SIGNIFICANT CHANGE UP (ref 8.4–10.5)
CHLORIDE SERPL-SCNC: 102 MMOL/L — SIGNIFICANT CHANGE UP (ref 96–108)
CO2 SERPL-SCNC: 25 MMOL/L — SIGNIFICANT CHANGE UP (ref 22–29)
CREAT SERPL-MCNC: 1.15 MG/DL — SIGNIFICANT CHANGE UP (ref 0.5–1.3)
EGFR: 63 ML/MIN/1.73M2 — SIGNIFICANT CHANGE UP
EGFR: 63 ML/MIN/1.73M2 — SIGNIFICANT CHANGE UP
GLUCOSE BLDC GLUCOMTR-MCNC: 104 MG/DL — HIGH (ref 70–99)
GLUCOSE BLDC GLUCOMTR-MCNC: 110 MG/DL — HIGH (ref 70–99)
GLUCOSE BLDC GLUCOMTR-MCNC: 117 MG/DL — HIGH (ref 70–99)
GLUCOSE BLDC GLUCOMTR-MCNC: 119 MG/DL — HIGH (ref 70–99)
GLUCOSE SERPL-MCNC: 94 MG/DL — SIGNIFICANT CHANGE UP (ref 70–99)
HCT VFR BLD CALC: 31.7 % — LOW (ref 39–50)
HGB BLD-MCNC: 9.9 G/DL — LOW (ref 13–17)
MAGNESIUM SERPL-MCNC: 2.1 MG/DL — SIGNIFICANT CHANGE UP (ref 1.8–2.6)
MCHC RBC-ENTMCNC: 27.3 PG — SIGNIFICANT CHANGE UP (ref 27–34)
MCHC RBC-ENTMCNC: 31.2 G/DL — LOW (ref 32–36)
MCV RBC AUTO: 87.6 FL — SIGNIFICANT CHANGE UP (ref 80–100)
NRBC # BLD AUTO: 0 K/UL — SIGNIFICANT CHANGE UP (ref 0–0)
NRBC # FLD: 0 K/UL — SIGNIFICANT CHANGE UP (ref 0–0)
NRBC BLD AUTO-RTO: 0 /100 WBCS — SIGNIFICANT CHANGE UP (ref 0–0)
NT-PROBNP SERPL-SCNC: 2627 PG/ML — HIGH (ref 0–300)
PHOSPHATE SERPL-MCNC: 3.2 MG/DL — SIGNIFICANT CHANGE UP (ref 2.4–4.7)
PLATELET # BLD AUTO: 405 K/UL — HIGH (ref 150–400)
POTASSIUM SERPL-MCNC: 4 MMOL/L — SIGNIFICANT CHANGE UP (ref 3.5–5.3)
POTASSIUM SERPL-SCNC: 4 MMOL/L — SIGNIFICANT CHANGE UP (ref 3.5–5.3)
RBC # BLD: 3.62 M/UL — LOW (ref 4.2–5.8)
RBC # FLD: 19.7 % — HIGH (ref 10.3–14.5)
SODIUM SERPL-SCNC: 140 MMOL/L — SIGNIFICANT CHANGE UP (ref 135–145)
WBC # BLD: 11.54 K/UL — HIGH (ref 3.8–10.5)
WBC # FLD AUTO: 11.54 K/UL — HIGH (ref 3.8–10.5)

## 2025-03-15 PROCEDURE — 74018 RADEX ABDOMEN 1 VIEW: CPT | Mod: 26

## 2025-03-15 PROCEDURE — 99233 SBSQ HOSP IP/OBS HIGH 50: CPT

## 2025-03-15 PROCEDURE — 99221 1ST HOSP IP/OBS SF/LOW 40: CPT | Mod: FS

## 2025-03-15 RX ADMIN — METOPROLOL SUCCINATE 50 MILLIGRAM(S): 50 TABLET, EXTENDED RELEASE ORAL at 06:59

## 2025-03-15 RX ADMIN — TIOTROPIUM BROMIDE INHALATION SPRAY 2 PUFF(S): 3.12 SPRAY, METERED RESPIRATORY (INHALATION) at 08:01

## 2025-03-15 RX ADMIN — Medication 2 TABLET(S): at 21:51

## 2025-03-15 RX ADMIN — Medication 81 MILLIGRAM(S): at 11:54

## 2025-03-15 RX ADMIN — POLYETHYLENE GLYCOL 3350 17 GRAM(S): 17 POWDER, FOR SOLUTION ORAL at 11:53

## 2025-03-15 RX ADMIN — MIDODRINE HYDROCHLORIDE 10 MILLIGRAM(S): 5 TABLET ORAL at 17:36

## 2025-03-15 RX ADMIN — Medication 40 MILLIGRAM(S): at 06:58

## 2025-03-15 RX ADMIN — Medication 1 DOSE(S): at 08:00

## 2025-03-15 RX ADMIN — Medication 1 DOSE(S): at 21:09

## 2025-03-15 RX ADMIN — MIDODRINE HYDROCHLORIDE 10 MILLIGRAM(S): 5 TABLET ORAL at 11:54

## 2025-03-15 RX ADMIN — ATORVASTATIN CALCIUM 40 MILLIGRAM(S): 80 TABLET, FILM COATED ORAL at 21:51

## 2025-03-15 RX ADMIN — MIDODRINE HYDROCHLORIDE 10 MILLIGRAM(S): 5 TABLET ORAL at 07:00

## 2025-03-15 NOTE — CONSULT NOTE ADULT - REASON FOR ADMISSION
Sob and weakness for more 1 week.

## 2025-03-15 NOTE — PROGRESS NOTE ADULT - SUBJECTIVE AND OBJECTIVE BOX
[facility] Division of Hospital Medicine    pt seen and examined at bedside  pt sp [facility] day prior - showing mild rca and lad disease  pt still feeling lightheaded when standing up  at bedside this am, when attempt made pt felt very dizzy, sbp 90s  tolerating po diet    MEDICATIONS  (STANDING):  aspirin  chewable 81 milliGRAM(s) Oral daily  atorvastatin 40 milliGRAM(s) Oral at bedtime  dextrose 5%. 1000 milliLiter(s) (50 mL/Hr) IV Continuous <Continuous>  dextrose 5%. 1000 milliLiter(s) (100 mL/Hr) IV Continuous <Continuous>  dextrose 50% Injectable 25 Gram(s) IV Push once  dextrose 50% Injectable 12.5 Gram(s) IV Push once  dextrose 50% Injectable 25 Gram(s) IV Push once  fluticasone propionate/ salmeterol 100-50 MICROgram(s) Diskus 1 Dose(s) Inhalation two times a day  furosemide    Tablet 20 milliGRAM(s) Oral daily  glucagon  Injectable 1 milliGRAM(s) IntraMuscular once  insulin lispro (ADMELOG) corrective regimen sliding scale   SubCutaneous three times a day before meals  metoprolol succinate ER 50 milliGRAM(s) Oral daily  midodrine. 10 milliGRAM(s) Oral three times a day  pantoprazole    Tablet 40 milliGRAM(s) Oral before breakfast  polyethylene glycol 3350 17 Gram(s) Oral daily  senna 2 Tablet(s) Oral at bedtime  tiotropium 2.5 MICROgram(s) Inhaler 2 Puff(s) Inhalation daily    MEDICATIONS  (PRN):  acetaminophen     Tablet .. 650 milliGRAM(s) Oral every 6 hours PRN Temp greater or equal to 38C (100.4F), Mild Pain (1 - 3)  albuterol/ipratropium for Nebulization 3 milliLiter(s) Nebulizer every 6 hours PRN Shortness of Breath and/or Wheezing  aluminum hydroxide/magnesium hydroxide/simethicone Suspension 30 milliLiter(s) Oral every 4 hours PRN Dyspepsia  dextrose Oral Gel 15 Gram(s) Oral once PRN Blood Glucose LESS THAN 70 milliGRAM(s)/deciliter  melatonin 3 milliGRAM(s) Oral at bedtime PRN Insomnia  ondansetron Injectable 4 milliGRAM(s) IV Push every 8 hours PRN Nausea and/or Vomiting        I&O's Summary    14 Mar 2025 07:01  -  15 Mar 2025 07:00  --------------------------------------------------------  IN: 360 mL / OUT: 1700 mL / NET: -1340 mL        PHYSICAL EXAM:  Vital Signs Last 24 Hrs  T(C): 36.4 (15 Mar 2025 11:26), Max: 36.6 (14 Mar 2025 20:28)  T(F): 97.5 (15 Mar 2025 11:26), Max: 97.9 (14 Mar 2025 20:28)  HR: 80 (15 Mar 2025 11:26) (65 - 89)  BP: 95/50 (15 Mar 2025 11:26) (95/50 - 118/74)  BP(mean): --  RR: 18 (15 Mar 2025 11:26) (16 - 19)  SpO2: 96% (15 Mar 2025 11:26) (96% - 100%)    Parameters below as of 15 Mar 2025 08:05  Patient On (Oxygen Delivery Method): room air            CONSTITUTIONAL: NAD, well-groomed  ENMT: Moist oral mucosa, no pharyngeal injection or exudates; normal dentition  RESPIRATORY: Normal respiratory effort; lungs are clear to auscultation bilaterally  CARDIOVASCULAR: Regular rate and rhythm, normal S1 and S2, no murmur/rub/gallop; No lower extremity edema; Peripheral pulses are 2+ bilaterally  ABDOMEN: Nontender to palpation, normoactive bowel sounds, no rebound/guarding; No hepatosplenomegaly  MUSCLOSKELETAL:  Normal gait; no clubbing or cyanosis of digits; no joint swelling or tenderness to palpation  PSYCH: A+O to person, place, and time; affect appropriate  NEUROLOGY: CN 2-12 are intact and symmetric; no gross sensory deficits;   SKIN: No rashes; no palpable lesions    LABS:                        9.9    11.54 )-----------( 405      ( 15 Mar 2025 06:38 )             31.7     03-15    140  |  102  |  39.4[H]  ----------------------------<  94  4.0   |  25.0  |  1.15    Ca    8.8      15 Mar 2025 06:38  Phos  3.2     03-15  Mg     2.1     03-15            Urinalysis Basic - ( 15 Mar 2025 06:38 )    Color: x / Appearance: x / SG: x / pH: x  Gluc: 94 mg/dL / Ketone: x  / Bili: x / Urobili: x   Blood: x / Protein: x / Nitrite: x   Leuk Esterase: x / RBC: x / WBC x   Sq Epi: x / Non Sq Epi: x / Bacteria: x        CAPILLARY BLOOD GLUCOSE      POCT Blood Glucose.: 117 mg/dL (15 Mar 2025 11:51)  POCT Blood Glucose.: 104 mg/dL (15 Mar 2025 07:58)  POCT Blood Glucose.: 78 mg/dL (14 Mar 2025 17:46)

## 2025-03-15 NOTE — PROGRESS NOTE ADULT - SUBJECTIVE AND OBJECTIVE BOX
[facility] - [facility]                                   [address] [place] [address]                                                      PHONE: [phone]                                                         FAX: [phone]  [email or web address]  ---------------------------------------------------------------------------------------------------------------------------------    Overnight events/patient complaints:  S/p cath yesterday revealing mild RCA and LAD disease, normal wedge pressure. Feels well this morning but had not stood up    No Known Allergies    MEDICATIONS  (STANDING):  aspirin  chewable 81 milliGRAM(s) Oral daily  atorvastatin 40 milliGRAM(s) Oral at bedtime  dextrose 5%. 1000 milliLiter(s) (50 mL/Hr) IV Continuous <Continuous>  dextrose 5%. 1000 milliLiter(s) (100 mL/Hr) IV Continuous <Continuous>  dextrose 50% Injectable 25 Gram(s) IV Push once  dextrose 50% Injectable 12.5 Gram(s) IV Push once  dextrose 50% Injectable 25 Gram(s) IV Push once  fluticasone propionate/ salmeterol 100-50 MICROgram(s) Diskus 1 Dose(s) Inhalation two times a day  furosemide    Tablet 20 milliGRAM(s) Oral daily  glucagon  Injectable 1 milliGRAM(s) IntraMuscular once  insulin lispro (ADMELOG) corrective regimen sliding scale   SubCutaneous three times a day before meals  metoprolol succinate ER 50 milliGRAM(s) Oral daily  midodrine. 10 milliGRAM(s) Oral three times a day  pantoprazole    Tablet 40 milliGRAM(s) Oral before breakfast  polyethylene glycol 3350 17 Gram(s) Oral daily  senna 2 Tablet(s) Oral at bedtime  tiotropium 2.5 MICROgram(s) Inhaler 2 Puff(s) Inhalation daily    MEDICATIONS  (PRN):  acetaminophen     Tablet .. 650 milliGRAM(s) Oral every 6 hours PRN Temp greater or equal to 38C (100.4F), Mild Pain (1 - 3)  albuterol/ipratropium for Nebulization 3 milliLiter(s) Nebulizer every 6 hours PRN Shortness of Breath and/or Wheezing  aluminum hydroxide/magnesium hydroxide/simethicone Suspension 30 milliLiter(s) Oral every 4 hours PRN Dyspepsia  dextrose Oral Gel 15 Gram(s) Oral once PRN Blood Glucose LESS THAN 70 milliGRAM(s)/deciliter  melatonin 3 milliGRAM(s) Oral at bedtime PRN Insomnia  ondansetron Injectable 4 milliGRAM(s) IV Push every 8 hours PRN Nausea and/or Vomiting      Vital Signs Last 24 Hrs  T(C): 36.3 (15 Mar 2025 04:52), Max: 36.6 (14 Mar 2025 20:28)  T(F): 97.3 (15 Mar 2025 04:52), Max: 97.9 (14 Mar 2025 20:28)  HR: 77 (15 Mar 2025 06:59) (65 - 89)  BP: 106/65 (15 Mar 2025 06:59) (96/62 - 118/74)  BP(mean): --  RR: 18 (15 Mar 2025 04:52) (15 - 19)  SpO2: 98% (15 Mar 2025 04:52) (97% - 100%)    Parameters below as of 15 Mar 2025 08:05  Patient On (Oxygen Delivery Method): room air      ICU Vital Signs Last 24 Hrs  JONE SOUTH  I&O's Detail    14 Mar 2025 07:01  -  15 Mar 2025 07:00  --------------------------------------------------------  IN:    Oral Fluid: 360 mL  Total IN: 360 mL    OUT:    Voided (mL): 1700 mL  Total OUT: 1700 mL    Total NET: -1340 mL        I&O's Summary    14 Mar 2025 07:01  -  15 Mar 2025 07:00  --------------------------------------------------------  IN: 360 mL / OUT: 1700 mL / NET: -1340 mL      Drug Dosing Weight  JONE SOUTH      PHYSICAL EXAM:  General: Appears comfortable, alert and cooperative.  HEENT: Normocephalic, atraumatic.  Eyes: Pupils reactive, cornea wnl.  Neck: Supple, no nodes adenopathy; no JVD, carotid bruit or thyromegaly.  CARDIOVASCULAR: Regular S1, soft S2 with 3/6 late peaking HELDER  LUNGS: No rales, rhonchi or wheeze. Normal breath sounds bilaterally.  ABDOMEN: Soft, nontender without mass or organomegaly. bowel sounds normoactive.  EXTREMITIES: No clubbing, cyanosis or edema. Distal pulses wnl.   SKIN: Warm and dry with normal turgor.  NEURO: Alert/oriented x 3/normal motor exam  PSYCH: Appropriate affect        LABS:                        9.9    11.54 )-----------( 405      ( 15 Mar 2025 06:38 )             31.7     03-15    140  |  102  |  39.4[H]  ----------------------------<  94  4.0   |  25.0  |  1.15    Ca    8.8      15 Mar 2025 06:38  Phos  3.2     03-15  Mg     2.1     03-15      JONE SOUTH        Urinalysis Basic - ( 15 Mar 2025 06:38 )    Color: x / Appearance: x / SG: x / pH: x  Gluc: 94 mg/dL / Ketone: x  / Bili: x / Urobili: x   Blood: x / Protein: x / Nitrite: x   Leuk Esterase: x / RBC: x / WBC x   Sq Epi: x / Non Sq Epi: x / Bacteria: x        RADIOLOGY & ADDITIONAL STUDIES:    INTERPRETATION OF TELEMETRY (personally reviewed):  AF, PVC    ASSESSMENT AND PLAN:  In summary, JONE SOUTH is an 84y Male with moderate CAD, hypertension, lung CA s/p chemo and radiation, history of DVT s/p AC who presented with shortness of breath. CTA negative for PE. Tele with paroxysmal atrial fibrillation with PVC. TTE showed EF 40-45% with severe AS and cath on 3/14 showed mild RCA and LAD disease, normal wedge    - If he feels well on standing, can discharge with outpatient follow up and TAVR evaluation  - Continue aspirin 81, atorvastatin 40 mg, Toprol 50 daily, lasix 20 daily  - Continue midodrine 50 tid  - Avoid meds with potential orthostatic effects  - D/w patient and family at bedside

## 2025-03-15 NOTE — CONSULT NOTE ADULT - SUBJECTIVE AND OBJECTIVE BOX
[facility], [facility]                [address] [place] [address]                                     PHONE: [phone]                                       FAX: [phone]  [email or web address]      Reason for Consult: shortness of breath     HPI:  Patient is an 83 yo man with chronic hypertension, moderate CAD on remote [facility] with normal perfusion on PET MPI , chronic RBBB/LAFB, frequent unifocal ventricular ectopy, lung cancer s/p radiation and chemotherapy with course complicated by DVT s/p several months systemic AC who presents with several days progressive worsening dyspnea without reported concurrent chest pain, dizziness and palpitations. He is s/p CTA which was negative for acute pulmonary embolism however with pleural effusion and upper lobe consolidation with air bronchograms. Tele with paroxsymal atrial fibrillation with frequent ventricular ectopic beats.     PAST MEDICAL & SURGICAL HISTORY:  Abnormal findings on diagnostic imaging of lung  Hypertension  hyperlipidemia  CAD (coronary artery disease)  Left anterior fascicular block (LAFB)  RBBB  S/P hip replacement, right  S/P hip replacement, left      Telemetry: atrial fibrillation with PVCs, RBBB    PREVIOUS DIAGNOSTIC TESTING:     ECHO FINDINGS:    STRESS FINDINGS:    CATHETERIZATION FINDINGS:    MEDICATIONS  (STANDING):  atorvastatin 40 milliGRAM(s) Oral at bedtime  azithromycin  IVPB 500 milliGRAM(s) IV Intermittent once  azithromycin  IVPB      cefTRIAXone Injectable. 1000 milliGRAM(s) IV Push Once  clopidogrel Tablet 75 milliGRAM(s) Oral daily  fluticasone propionate/ salmeterol 100-50 MICROgram(s) Diskus 1 Dose(s) Inhalation two times a day  furosemide   Injectable 40 milliGRAM(s) IV Push daily  heparin   Injectable 5000 Unit(s) SubCutaneous every 12 hours  metoprolol succinate ER 50 milliGRAM(s) Oral daily  pantoprazole    Tablet 40 milliGRAM(s) Oral before breakfast  tamsulosin 0.8 milliGRAM(s) Oral at bedtime  tiotropium 2.5 MICROgram(s) Inhaler 2 Puff(s) Inhalation daily    MEDICATIONS  (PRN):  acetaminophen     Tablet .. 650 milliGRAM(s) Oral every 6 hours PRN Temp greater or equal to 38C (100.4F), Mild Pain (1 - 3)  aluminum hydroxide/magnesium hydroxide/simethicone Suspension 30 milliLiter(s) Oral every 4 hours PRN Dyspepsia  melatonin 3 milliGRAM(s) Oral at bedtime PRN Insomnia  ondansetron Injectable 4 milliGRAM(s) IV Push every 8 hours PRN Nausea and/or Vomiting      FAMILY HISTORY:  Family history of CVA (Father)  FH: heart attack (Father)      SOCIAL HISTORY:  CIGARETTES: former   ALCOHOL: denies     ROS: Negative other than as mentioned in HPI.    Vital Signs Last 24 Hrs  T(C): 36.5 (04 Mar 2025 15:21), Max: 36.8 (04 Mar 2025 09:50)  T(F): 97.7 (04 Mar 2025 15:21), Max: 98.2 (04 Mar 2025 09:50)  HR: 108 (04 Mar 2025 15:21) (101 - 112)  BP: 106/67 (04 Mar 2025 15:21) (90/60 - 111/57)  BP(mean): 81 (04 Mar 2025 15:21) (81 - 81)  RR: 19 (04 Mar 2025 15:21) (19 - 20)  SpO2: 92% (04 Mar 2025 15:21) (92% - 100%)    Parameters below as of 04 Mar 2025 15:21  Patient On (Oxygen Delivery Method): room air        PHYSICAL EXAM:  General: chronically ill appearing   HEENT: Head; normocephalic, atraumatic. sclera anicteric, MMM, no JVD, neck is supple   CARDIOVASCULAR; irregular 2/6 HELDER, no rub, gallop or lift. Normal S1 and variable S2.  LUNGS; No rales, rhonchi or wheeze. Normal breath sounds bilaterally.  ABDOMEN ; Soft, nontender without mass or organomegaly. bowel sounds normoactive.  EXTREMITIES; No clubbing, cyanosis, (+) edema. Distal pulses wnl. ROM normal.  SKIN; warm and dry with normal turgor.  NEURO; Alert/oriented x 3/normal motor exam.     PSYCH; normal affect.        I&O's Detail      LABS:                        9.6    11.61 )-----------( 332      ( 04 Mar 2025 10:28 )             30.8     03-04    136  |  98  |  65.7[H]  ----------------------------<  120[H]  4.5   |  25.0  |  2.06[H]    Ca    8.7      04 Mar 2025 10:28    TPro  6.9  /  Alb  2.8[L]  /  TBili  0.4  /  DBili  x   /  AST  54[H]  /  ALT  80[H]  /  AlkPhos  127[H]  03-        PT/INR - ( 04 Mar 2025 10:28 )   PT: 13.6 sec;   INR: 1.21 ratio         PTT - ( 04 Mar 2025 10:28 )  PTT:27.5 sec  Urinalysis Basic - ( 04 Mar 2025 12:30 )    Color: Yellow / Appearance: Turbid / S.013 / pH: x  Gluc: x / Ketone: Negative mg/dL  / Bili: Negative / Urobili: 0.2 mg/dL   Blood: x / Protein: Trace mg/dL / Nitrite: Negative   Leuk Esterase: Large / RBC: 6 /HPF / WBC >998 /HPF   Sq Epi: x / Non Sq Epi: 2 /HPF / Bacteria: Moderate /HPF      RADIOLOGY & ADDITIONAL STUDIES:    < from: CT Angio Chest PE Protocol w/ IV Cont (25 @ 13:45) >  Grossly unchanged right pleural fluid and upper lobe parenchymal   consolidation with air bronchograms.    Marked dilatation of bilateral renal pelves with moderate dilatation of   left intrarenal collecting system and mild dilatation of right intrarenal   collecting system, newly evident since 2025.    
History of Present Illness:   84y Male with moderate CAD, hypertension, lung CA s/p chemo and radiation 2023 history of DVT ,PAF,  orthostatic on admission, s/p AC who presented with shortness of breath. CTA negative for PE  . TTE showed EF 40-45% with severe AS and cath on 3/14 showed mild RCA and LAD disease, normal wedge > CTS consulted TAVR evaluation      Past Medical History  Abnormal findings on diagnostic imaging of lung    Hypertension    Hyperlipidemia    CAD (coronary artery disease)    Left anterior fascicular block (LAFB)    RBBB        Past Surgical History  S/P hip replacement, right    S/P hip replacement, left        MEDICATIONS  (STANDING):  aspirin  chewable 81 milliGRAM(s) Oral daily  atorvastatin 40 milliGRAM(s) Oral at bedtime  dextrose 5%. 1000 milliLiter(s) (50 mL/Hr) IV Continuous <Continuous>  dextrose 5%. 1000 milliLiter(s) (100 mL/Hr) IV Continuous <Continuous>  dextrose 50% Injectable 25 Gram(s) IV Push once  dextrose 50% Injectable 12.5 Gram(s) IV Push once  dextrose 50% Injectable 25 Gram(s) IV Push once  fluticasone propionate/ salmeterol 100-50 MICROgram(s) Diskus 1 Dose(s) Inhalation two times a day  furosemide    Tablet 20 milliGRAM(s) Oral daily  glucagon  Injectable 1 milliGRAM(s) IntraMuscular once  insulin lispro (ADMELOG) corrective regimen sliding scale   SubCutaneous three times a day before meals  metoprolol succinate ER 50 milliGRAM(s) Oral daily  midodrine. 10 milliGRAM(s) Oral three times a day  pantoprazole    Tablet 40 milliGRAM(s) Oral before breakfast  polyethylene glycol 3350 17 Gram(s) Oral daily  senna 2 Tablet(s) Oral at bedtime  tiotropium 2.5 MICROgram(s) Inhaler 2 Puff(s) Inhalation daily    MEDICATIONS  (PRN):  acetaminophen     Tablet .. 650 milliGRAM(s) Oral every 6 hours PRN Temp greater or equal to 38C (100.4F), Mild Pain (1 - 3)  albuterol/ipratropium for Nebulization 3 milliLiter(s) Nebulizer every 6 hours PRN Shortness of Breath and/or Wheezing  aluminum hydroxide/magnesium hydroxide/simethicone Suspension 30 milliLiter(s) Oral every 4 hours PRN Dyspepsia  dextrose Oral Gel 15 Gram(s) Oral once PRN Blood Glucose LESS THAN 70 milliGRAM(s)/deciliter  melatonin 3 milliGRAM(s) Oral at bedtime PRN Insomnia  ondansetron Injectable 4 milliGRAM(s) IV Push every 8 hours PRN Nausea and/or Vomiting    Antiplatelet therapy:                           Last dose/amt:    Allergies: No Known Allergies      SOCIAL HISTORY:  Smoker: [ ] Yes  [ x] No        PACK YEARS:                         WHEN QUIT?  30 years ago  ETOH use: [ ] Yes  [ x] No              FREQUENCY / QUANTITY:  Ilicit Drug use:  [ ] Yes  [x ] No  Occupation:  Live with: Wife      Relevant Family History  FAMILY HISTORY:  Family history of CVA (Father)    FH: heart attack (Father)        Review of Systems  GENERAL:  Fevers[] chills[] sweats[] fatigue[x] weight loss[] weight gain []                                        NEURO:  parathesias[] seizures []  syncope []  confusion []                                                                                  EYES: glasses[x]  blurry vision[]  discharge[] pain[] glaucoma []                                                                            ENMT:  difficulty hearing []  vertigo[]  dysphagia[] epistaxis[] recent dental work []                                      CV:  chest pain[] palpitations[] DE [x] diaphoresis [] edema[]                                                                                             RESPIRATORY:  wheezing[] SOBx[] cough [] sputum[] hemoptysis[]                                                                    GI:  nausea[]  vomiting []  diarrhea[] constipation [] melena []                                                                        : hematuria[ ]  dysuria[ ] urgency[] incontinence[]                                                                                              MUSCULOSKELETAL:  arthritis[ ]  joint swelling [ ] muscle weakness [ ]                                                                  SKIN/BREAST:  rash[ ] itching [ ]  hair loss[ ] masses[ ]                                                                                                PSYCH:  dementia [ ] depression [ ] anxiety[ ]                                                                                                                  HEME/LYMPH:  bruises easily[ x] enlarged lymph nodes[ ] tender lymph nodes[ ]                                                 ENDOCRINE:  cold intolerance[ ] heat intolerance[ ] polydipsia[ ]                                                                              PHYSICAL EXAM  Vital Signs Last 24 Hrs  T(C): 36.4 (15 Mar 2025 11:26), Max: 36.6 (14 Mar 2025 20:28)  T(F): 97.5 (15 Mar 2025 11:26), Max: 97.9 (14 Mar 2025 20:28)  HR: 80 (15 Mar 2025 11:26) (65 - 89)  BP: 95/50 (15 Mar 2025 11:26) (95/50 - 118/74)  BP(mean): --  RR: 18 (15 Mar 2025 11:26) (16 - 19)  SpO2: 96% (15 Mar 2025 11:26) (96% - 100%)    Parameters below as of 15 Mar 2025 08:05  Patient On (Oxygen Delivery Method): room air        General: Well nourished, well developed, no acute distress.                                                         Neuro: Normal exam oriented to person/place & time with no focal motor or sensory  deficits.                    Eyes: Normal exam of conjunctiva & lids, pupils equally reactive.   ENT: Normal exam of nasal/oral mucosa with absence of cyanosis.   Neck: Normal exam of jugular veins, trachea & thyroid.   Chest: Normal lung exam with good air movement absence of wheezes, rales, or rhonchi:                                                                          CV:  Auscultation: normal [x ] S3[ ] S4[ ] Irregular [ ] Rub[ ] Clicks[ ]  Murmurs none:[ ]systolic [ x]  diastolic [ ] holosystolic [ ]  Carotids: No Bruits[ x] Other____________ Abdominal Aorta: normal [ ] nonpalpable[ ]                                                                         GI: Normal exam of abdomen, liver & spleen with no noted masses or tenderness.                                                                                              Extremities: Normal no evidence of cyanosis or deformity Edema: none[ ]trace[ ]1+[x ]2+[ ]3+[ ]4+[ ]  Lower Extremity Pulses: Right[ ] Left[ ]Varicosities[x ]  SKIN : Normal exam to inspection & palpation.                                                           LABS:                        9.9    11.54 )-----------( 405      ( 15 Mar 2025 06:38 )             31.7     03-15    140  |  102  |  39.4[H]  ----------------------------<  94  4.0   |  25.0  |  1.15    Ca    8.8      15 Mar 2025 06:38  Phos  3.2     03-15  Mg     2.1     03-15        Urinalysis Basic - ( 15 Mar 2025 06:38 )    Color: x / Appearance: x / SG: x / pH: x  Gluc: 94 mg/dL / Ketone: x  / Bili: x / Urobili: x   Blood: x / Protein: x / Nitrite: x   Leuk Esterase: x / RBC: x / WBC x   Sq Epi: x / Non Sq Epi: x / Bacteria: x              Cardiac Cath:  < from: Cardiac Catheterization (03.14.25 @ 12:58) >  Diagnostic Conclusions:     Mild RCA and LAD disease   LVEF 45%   Normal wedge pressure and pulmonary pressures   Borderline low cardiac output   Recommendations:     Consult with valve clinic   Can consider TEEto evaluate aortic valve as aortic valve gradient  minimal on cath    Acute complication:    No complications     Hemodynamic:           Hemodynamic Impressions   General Impressions: Hemodynamic assessment demonstrates normal  pulmonary capillary wedge  pressure, borderline low cardiac output.      Procedure Narrative:   The risks and alternatives of the procedures and conscious sedation  were explained to the patient and informed consent was  obtained. The patient was brought to the cath lab and placed on the  exam table.  Access   Right femoral vein:   Vascular access was obtained using modified seldinger technique.      Coronary Angiography     Patient: JONE SOUTH        MRN: [id number]  Study Date: 03/14/2025   12:58 PM      Page 1 of 4          Left Coronary System:   A catheter was positioned into the vessel ostia under fluoroscopic  guidance. Angiograms were obtained in multiple views.  Right Coronary System:   A catheter was positioned into the vessel ostia under fluoroscopic  guidance. Angiograms were obtained in multiple views.    Right Heart Cath   Measurements of pressures were obtained.      Diagnostic Findings:     Coronary Angiography   The coronary circulation is right dominant.      LM   Left main artery: Angiography shows minor irregularities.      LAD   Left anterior descending artery: Angiography shows mild  atherosclerosis. There is a 20 % stenosis.    CX   Circumflex: Angiography shows minor irregularities.      RCA   Right coronary artery: Angiography shows mild atherosclerosis. There  is a 30 % stenosis in the proximal third portion of the  segment.      Left Heart Cath   Ejection fraction was visually estimated by echo with a value of 45%.  The left ventricular end diastolic pressure was 7 mmHg.  LHC performed: Aortic valve crossed and left ventricular pressures  were obtained.      < end of copied text >      TTE < from: TTE W or WO Ultrasound Enhancing Agent (03.04.25 @ 15:59) >  CONCLUSIONS:      1. Left ventricular cavity is normal in size. Left ventricular systolic function is mildly decreased with an ejection fraction visually estimated at 40 to45 %.   2. Normal right ventricular cavity size and normal right ventricular systolic function.   3. The right atrium is normal in size.   4. No pericardial effusion seen.   5. Analysis of left ventricular diastolic function and filling pressure is made challenging by the presence of atrial fibrillation.   6. Estimated pulmonary artery systolic pressure is 18 mmHg.   7. No left ventricular hypertrophy.   8. No prior echocardiogram is available for comparison.   9. Technically difficult image quality.  10. There is moderate calcification of the aortic valve leaflets. Severe aortic stenosis.    ________________________________________________________________________________________  FINDINGS:     Left Ventricle:  The left ventricular cavity is normal in size. Left ventricular systolic function is mildly decreased with an ejection fraction visually estimated at 40 to 45%. No left ventricular hypertrophy. There is no evidence of a left ventricular thrombus. Analysis of left ventricular diastolic function and filling pressure is made challenging by the presence of atrial fibrillation.     Right Ventricle:  The right ventricular cavity is normal in size and right ventricular systolic function is normal. Tricuspid annular plane systolic excursion (TAPSE) is 1.7 cm (normal >=1.7 cm). Tricuspid annular tissue Doppler S' is 8.5 cm/s (normal >10 cm/s).     Left Atrium:  The left atrium was not well visualized, and the LA volume could not be calculated.     Right Atrium:  The right atrium is normal in size with an indexed volume of 23.79 ml/m² and an indexed area of 8.95 cm²/m².     Interatrial Septum:  The interatrial septum appears intact.     Aortic Valve:  The aortic valve anatomy cannot be determined with reduced systolic excursion. There is moderate calcification of the aortic valve leaflets. There is severe aortic stenosis. There is no evidence of aortic regurgitation.     Mitral Valve:  Structurally normal mitral valve with normal leaflet excursion. There is mild calcificationof the mitral valve annulus. There is trace mitral regurgitation.     Tricuspid Valve:  Structurally normal tricuspid valve with normal leaflet excursion. There is trace tricuspid regurgitation. Estimated pulmonary artery systolic pressure is 18 mmHg.     Pulmonic Valve:  Structurally normal pulmonic valve with normal leaflet excursion. There is trace pulmonic regurgitation.     Aorta:  The aortic root at the sinuses of Valsalva is normal in size, measuring 3.20 cm (indexed 1.66 cm/m²). The ascending aorta is normal in size, measuring 3.10 cm (indexed 1.60 cm/m²).     Pericardium:  No pericardial effusion seen.     Systemic Veins:  The inferior vena cava is normal in size (normal <2.1cm) with normal inspiratory collapse (normal >50%) consistent with normal right atrial pressure (~3, range 0-5mmHg).  ____________________________________________________________________    < end of copied text >      Assessment:     84y Male with moderate CAD, hypertension, lung CA s/p chemo and radiation 2023 history of DVT ,PAF,  orthostatic on admission, s/p AC who presented with shortness of breath. CTA negative for PE  . TTE showed EF 40-45% with severe AS and cath on 3/14 showed mild RCA and LAD disease, normal wedge > CTS consulted TAVR evaluation      Plan:  Midodrine for orthostatic  Will CARLOS assess AV  Cath > non obs  TAVR Ct this week  Dr Almaraz to review  Discussed w/ pt/family at bedside        
[facility]  INFECTIOUS DISEASES at [place] / [place] / [place]  =======================================================                              Ross Garg MD                              Professor Emeritus:  Dr Otis Sanchez MD            Diplomates American Board of Internal Medicine & Infectious Diseases                                   Tel  [phone] Fax [phone]                                  Hospital Consult line:  [phone]  =======================================================      MRN-[id number]  JONE SOUTH    CC: Patient is a 84y old  Male who presents with a chief complaint of Sob and weakness for more 1 week. (06 Mar 2025 10:03)      84y  Male with h/o lung cancer (on immunotherapy) complicated by recurrent pleural effusions requiring drainage last drainage was October 2024, DVT not AC, HTN, COPD not on home O2 and CAD. Patient presented to the ED 3/4 with increasing SOB for the last 1 week. No associated fever or chills. No diarrhea. No abdominal pain. No sick contacts. In the ER patient is afebrile, hypoxic requiring O2. Found to have SHANNEN. CTA PE negative for acute PE though shows some pleural effusion on right similar in appearance to previous CT. Patient was started on treatment for heart failure and seen by cardiology. Was started on Azithromycin and Ceftriaxone for concern for infection. ID input requested 3/6.       Past Medical & Surgical Hx:  Abnormal findings on diagnostic imaging of lung  Hypertension  Hyperlipidemia  CAD (coronary artery disease)  Left anterior fascicular block (LAFB)  RBBB  S/P hip replacement, right  S/P hip replacement, left      Social Hx:  Former smoker       FAMILY HISTORY:  Family history of CVA (Father)  FH: heart attack (Father)      Allergies  No Known Allergies       REVIEW OF SYSTEMS:  CONSTITUTIONAL:  No Fever or chills  HEENT:  No diplopia or blurred vision.  No earache, sore throat or runny nose.  CARDIOVASCULAR:  No chest pain  RESPIRATORY:  No cough, shortness of breath  GASTROINTESTINAL:  No nausea, vomiting or diarrhea.  GENITOURINARY:  No dysuria, frequency or urgency. No Blood in urine  MUSCULOSKELETAL:  no joint aches, no muscle pain  SKIN:  No change in skin, hair or nails.  NEUROLOGIC:  No Headaches      Physical Exam:  GEN: NAD  HEENT: normocephalic and atraumatic. EOMI. PERRL.    NECK: Supple.   LUNGS: CTA B/L.  HEART: RRR  ABDOMEN: Soft, NT, ND.  +BS.    : No CVA tenderness  EXTREMITIES: Without  edema.  MSK: No joint swelling  NEUROLOGIC: No Focal Deficits   SKIN: No rash      Vitals:  T(F): 98 (06 Mar 2025 07:43), Max: 98.4 (06 Mar 2025 04:23)  HR: 98 (06 Mar 2025 07:43)  BP: 95/51 (06 Mar 2025 07:43)  RR: 18 (06 Mar 2025 07:43)  SpO2: 97% (06 Mar 2025 07:43) (92% - 99%)  temp max in last 48H T(F): , Max: 99.5 (03-04-25 @ 23:17)    Current Antibiotics:  azithromycin  IVPB 500 milliGRAM(s) IV Intermittent every 24 hours  azithromycin  IVPB      cefTRIAXone Injectable. 2000 milliGRAM(s) IV Push every 24 hours    Other medications:  aspirin  chewable 81 milliGRAM(s) Oral daily  atorvastatin 40 milliGRAM(s) Oral at bedtime  dextrose 5%. 1000 milliLiter(s) IV Continuous <Continuous>  dextrose 5%. 1000 milliLiter(s) IV Continuous <Continuous>  dextrose 50% Injectable 25 Gram(s) IV Push once  dextrose 50% Injectable 12.5 Gram(s) IV Push once  dextrose 50% Injectable 25 Gram(s) IV Push once  fluticasone propionate/ salmeterol 100-50 MICROgram(s) Diskus 1 Dose(s) Inhalation two times a day  furosemide   Injectable 40 milliGRAM(s) IV Push daily  glucagon  Injectable 1 milliGRAM(s) IntraMuscular once  heparin  Infusion.  Unit(s)/Hr IV Continuous <Continuous>  insulin lispro (ADMELOG) corrective regimen sliding scale   SubCutaneous three times a day before meals  metoprolol succinate ER 50 milliGRAM(s) Oral two times a day  pantoprazole    Tablet 40 milliGRAM(s) Oral before breakfast  tamsulosin 0.8 milliGRAM(s) Oral at bedtime  tiotropium 2.5 MICROgram(s) Inhaler 2 Puff(s) Inhalation daily                 9.3    14.78 )-----------( 353      ( 06 Mar 2025 05:15 )             29.3     03-06    140  |  101  |  50.3[H]  ----------------------------<  140[H]  3.9   |  26.0  |  1.82[H]    Ca    8.2[L]      06 Mar 2025 05:15  Phos  3.0     03-06  Mg     1.7     03-06    TPro  5.6[L]  /  Alb  2.2[L]  /  TBili  <0.2[L]  /  DBili  x   /  AST  38  /  ALT  51[H]  /  AlkPhos  113  03-06      RECENT CULTURES:  03-04 @ 15:00 Blood Blood     No growth at 24 hours    03-04 @ 12:30 Clean Catch Clean Catch (Midstream)     >100,000 CFU/ml Escherichia coli      WBC Count: 14.78 K/uL (03-06-25 @ 05:15)  WBC Count: 15.08 K/uL (03-05-25 @ 06:49)  WBC Count: 14.97 K/uL (03-05-25 @ 00:10)  WBC Count: 11.61 K/uL (03-04-25 @ 10:28)    Creatinine: 1.82 mg/dL (03-06-25 @ 05:15)  Creatinine: 1.76 mg/dL (03-06-25 @ 05:15)  Creatinine: 1.87 mg/dL (03-05-25 @ 06:49)  Creatinine: 2.00 mg/dL (03-05-25 @ 00:10)  Creatinine: 2.06 mg/dL (03-04-25 @ 10:28)    SARS-CoV-2 Result: NotDetec (03-04-25 @ 10:26)      Urinalysis (03.04.25 @ 21:00)    pH Urine: 6.5   Glucose Qualitative, Urine: Negative mg/dL   Blood, Urine: Small   Color: Yellow   Urine Appearance: Cloudy   Bilirubin: Negative   Ketone - Urine: Negative mg/dL   Specific Gravity: 1.013   Protein, Urine: Negative mg/dL   Urobilinogen: 0.2 mg/dL   Nitrite: Negative   Leukocyte Esterase Concentration: Large  Urine Microscopic-Add On (NC) (03.04.25 @ 21:00)    White Blood Cell - Urine: 257 /HPF   Red Blood Cell - Urine: 3 /HPF   Bacteria: Moderate /HPF   Epithelial Cells: 0 /HPF        < from: CT Angio Chest PE Protocol w/ IV Cont (03.04.25 @ 13:45) >  ACC: [id number] EXAM:  CT ANGIO CHEST PULM [facility]   ORDERED BY: LILIAM MOELLER     PROCEDURE DATE:  03/04/2025      INTERPRETATION:  CLINICAL INFORMATION: chest pain, shortness of breath,   history of lung carcinoma; evaluate for pulmonary emboli    COMPARISON: Chest radiograph 3/4/2025, CT chest/abdomen/pelvis 1/17/2025    CONTRAST/COMPLICATIONS:  IV Contrast: Omnipaque 350  77 cc administered   23 cc discarded  Oral Contrast: NONE    PROCEDURE:  CT Angiography of the Chest.  Sagittal and coronal reformats were performed as well as 3D (MIP)   reconstructions.    FINDINGS:    LUNGS AND LARGE AIRWAYS: Patent central airways. Little change in right   upper lobe parenchymal consolidation with air bronchograms.  PLEURA: Little changein moderate amount of right pleural fluid.  VESSELS: No pulmonary arterial filling defects. No evidence aortic   dissection. Tortuosity, calcification, and ectasia of aorta and   brachiocephalic vessels.  HEART: Borderline in size. No pericardial effusion.  MEDIASTINUM AND JOSE L: Small right hilar nodes.  CHEST WALL AND LOWER NECK: Right anterior chest wall port catheter.  VISUALIZED UPPER ABDOMEN: Newly evident marked dilatation of bilateral   renal pelves with moderate dilatation of left intrarenal collecting   system and mild dilatation of right intrarenal collecting system.  BONES: Unchanged focal sclerotic density in the lateral right sixth rib.   Marked multilevel degenerative changes in spine.    IMPRESSION:  No evidence acute pulmonaryemboli.    Grossly unchanged right pleural fluid and upper lobe parenchymal   consolidation with air bronchograms.    Marked dilatation of bilateral renal pelves with moderate dilatation of   left intrarenal collecting system and mild dilatation of right intrarenal   collecting system, newly evident since 1/17/2025.    --- End of Report ---    < end of copied text >        < from: CT Abdomen and Pelvis No Cont (03.04.25 @ 17:01) >  ACC: [id number] EXAM:  CT ABDOMEN AND PELVIS   ORDERED BY: MANNY CONTRERAS     PROCEDURE DATE:  03/04/2025      INTERPRETATION:  CLINICAL INFORMATION: Sepsis of unknown origin    COMPARISON: October 1, 2024    CONTRAST/COMPLICATIONS:  IV Contrast: NONE  Oral Contrast: NONE    PROCEDURE:  CT of the Abdomen and Pelvis was performed.  Sagittal and coronal reformats were performed.    FINDINGS:  Note: The exam is limited because some types of pathology may not be   adequately demonstrated due to lack of contrast enhancement.  LOWER CHEST: Small right pleural effusion. Coronary artery calcifications.    LIVER: Unremarkable  BILE DUCTS: Unremarkable  GALLBLADDER: Unremarkable  SPLEEN: Unremarkable  PANCREAS: Unremarkable  ADRENALS: Unremarkable  KIDNEYS/URETERS: Excreted contrast is present in the bilateral renal   collecting systems. Mild right hydronephrosis and moderate proximal right   hydroureter. Asymmetrically prominent left perinephric fat stranding and   moderate to severe left hydroureter nephroptosis and moderate left   hydroureter. Left periureteral fat stranding.    BLADDER: Circumferential wall thickening with multiple diverticula.  REPRODUCTIVE ORGANS: Prostate is obscured by streak artifact from   orthopedic hardware    BOWEL: No bowel obstruction. Diverticulosis coli.Normal appendix.  PERITONEUM/RETROPERITONEUM: Within normal limits.  VESSELS: Atherosclerotic changes.  LYMPH NODES: Increased number of nonenlarged retroperitoneal lymph nodes,  ABDOMINAL WALL: Left inguinal hernia contains a portion of the sigmoid   colon.  BONES: Degenerative changes. Bilateral total hip arthroplasties.    IMPRESSION:  Interval development of bilateral hydroureteronephrosis with asymmetric   left perinephric edema. These findings are concerning for bilateral   ascending urinary tract infection and possible left-sided pyelonephritis.    --- End of Report ---  < end of copied text >      
HPI:   83 y/o M w/ PMH lung cancer (on immunotherapy), DVT not AC, HTN, copd not on home O2 and CAD presenting for increasing SOB for the last 1 week. Per wife, Schedule for PET scan last week, was unable to go for continued fatigue, states has had progressive SOB for the last few days. Also endorses that he has been having increased urinary frequency though no dysuria. Family reached out to Dr. Modi office about symptoms yesterday and was told to present to hospital.  has a hx of Plueral effusion requiring drainage, last drainage last october, 1.5 liters drained. last radiation 1 year ago. Per wife and daughter at bedside, he has been more lethargic as well. He denies any recent fevers, chills, dizziness, sick contacts, n/v/d/ constipation.  In ED, Vitals BP 90/60, , temp 98.2. Hypoxic on arrival requiring o2. lab showed Leukocytosis, bandemia, Trop 66--> 60, Bun/Crea 65/2.06, PBNP >9K. UA grossly positive. RVP negative. CTA PE negative for acute PE though shows some pleural effusion on right similar in appearance to previous CT.  Patient was given 1 L bolus and ceft/azithromycin. Cardiology consulted for elevated trop. Patient is admitted for acute hypoxic respiratory and UTI. (04 Mar 2025 15:41)    Urology consult: Urology consulted for Hematuria. Patient required qureshi for urinary retention. one day after qureshi was placed patient started having hematuria. Family at bedside stating patient has hx of BPH- bleeding likley due to traumatic qureshi with enlarged prostate. No clots noted in bag. Patient family stated patient had hx of urinary retention of previous visit x1 year ago in which he left with qureshi and followed up with dr. Ahumada in the office.     PAST MEDICAL & SURGICAL HISTORY:  Abnormal findings on diagnostic imaging of lung    Hypertension    Hyperlipidemia    CAD (coronary artery disease)    Left anterior fascicular block (LAFB)    RBBB    S/P hip replacement, right    S/P hip replacement, left          acetaminophen     Tablet .. 650 milliGRAM(s) Oral every 6 hours PRN  albuterol/ipratropium for Nebulization 3 milliLiter(s) Nebulizer every 6 hours PRN  aluminum hydroxide/magnesium hydroxide/simethicone Suspension 30 milliLiter(s) Oral every 4 hours PRN  aspirin  chewable 81 milliGRAM(s) Oral daily  atorvastatin 40 milliGRAM(s) Oral at bedtime  cefTRIAXone Injectable. 2000 milliGRAM(s) IV Push every 24 hours  dextrose 5%. 1000 milliLiter(s) IV Continuous <Continuous>  dextrose 5%. 1000 milliLiter(s) IV Continuous <Continuous>  dextrose 50% Injectable 25 Gram(s) IV Push once  dextrose 50% Injectable 12.5 Gram(s) IV Push once  dextrose 50% Injectable 25 Gram(s) IV Push once  dextrose Oral Gel 15 Gram(s) Oral once PRN  fluticasone propionate/ salmeterol 100-50 MICROgram(s) Diskus 1 Dose(s) Inhalation two times a day  glucagon  Injectable 1 milliGRAM(s) IntraMuscular once  insulin lispro (ADMELOG) corrective regimen sliding scale   SubCutaneous three times a day before meals  melatonin 3 milliGRAM(s) Oral at bedtime PRN  metoprolol succinate ER 50 milliGRAM(s) Oral daily  ondansetron Injectable 4 milliGRAM(s) IV Push every 8 hours PRN  pantoprazole    Tablet 40 milliGRAM(s) Oral before breakfast  tamsulosin 0.8 milliGRAM(s) Oral at bedtime  tiotropium 2.5 MICROgram(s) Inhaler 2 Puff(s) Inhalation daily      No Known Allergies      T(C): 36.7 (03-08-25 @ 12:00), Max: 37.1 (03-07-25 @ 21:32)  HR: 96 (03-08-25 @ 12:00) (71 - 98)  BP: 92/55 (03-08-25 @ 12:00) (92/55 - 108/66)  RR: 18 (03-08-25 @ 12:00) (18 - 18)  SpO2: 96% (03-08-25 @ 12:00) (92% - 96%)      03-07-25 @ 07:01  -  03-08-25 @ 07:00  --------------------------------------------------------  IN: 470 mL / OUT: 2850 mL / NET: -2380 mL        PE:   : qureshi in place draining well, hematuria without clots.   Urine: wine color                            9.2    12.80 )-----------( 390      ( 08 Mar 2025 04:51 )             29.8       03-08    141  |  102  |  34.0[H]  ----------------------------<  108[H]  3.7   |  29.0  |  1.44[H]    Ca    8.2[L]      08 Mar 2025 04:51  Phos  3.1     03-07  Mg     1.6     03-08    TPro  5.8[L]  /  Alb  2.3[L]  /  TBili  <0.2[L]  /  DBili  x   /  AST  43[H]  /  ALT  52[H]  /  AlkPhos  105  03-07      Urinalysis Basic - ( 08 Mar 2025 04:51 )    Color: x / Appearance: x / SG: x / pH: x  Gluc: 108 mg/dL / Ketone: x  / Bili: x / Urobili: x   Blood: x / Protein: x / Nitrite: x   Leuk Esterase: x / RBC: x / WBC x   Sq Epi: x / Non Sq Epi: x / Bacteria: x

## 2025-03-15 NOTE — PROGRESS NOTE ADULT - ASSESSMENT
83 y/o M w/ PMH lung cancer (previously on immunotherapy),  chronic RBBB/LAFB,DVT not AC, HTN, copd not on home O2 and CAD presenting for increasing SOB for the last 1 week. Per wife, Schedule for PET scan last week, was unable to go for continued fatigue, states has had progressive SOB for the last few days. Also endorses that he has been having increased urinary frequency though no dysuria. Found to be hypoxic and UA grossly positive. Imaging was significant for findings of pyelonephritis. ID was consulted and patient continues to require inpatient care for IV Abx and for hematuria.    Acute hypoxic respiratory failure secondary to HF vs PNA vs recurrent pleural effusions vs severe AS  Acute on chronic systolic congestive heart failure, CAD, HTN  History of COPD not on home O2  - TTE showing EF 40-45% with severe AS  - Holding Lasix, Plavix and AC as per Cardiology   - Continue aspirin  - Continue metoprolol 50mg BID  - Continue Trelegy interchange  - Started Duonebs PRN  - Cardiology following, recs noted  - Discontinue Lasix  - Increased midodrine to 10mg TID for orthostatic hypotension, encouraging oral hydration  - sp lhc showing mild rca and lad disease     Acute blood loss anemia  Hematuria in the setting of pyelonephritis  - Holding heparin gtt  - Condom catheter showing red urine drainage  - CBC stable for now  - Urology consulted but not recommending any intervention  - Monitor for hematuria resolution (resolved on 3/10)  - Needs to follow up with urology as an outpatient  - Has required qureshi in the past for BPH  - Discontinue Flomax and finasteride    Sepsis secondary to complicated urinary tract infection vs PNA  - Blood cultures  3/4 reporting No growth   - RVP/COVID 19 PCR 3/4 negative   - UA 3/4 with pyuria; Urine Cx 3/4 reporting E coli   - CTA chest 3/4 reporting no PE. Little change in right upper lobe parenchymal consolidation with air bronchograms from prior CT 1/17  - CT A/P 3/4 reporting bilateral hydroureteronephrosis with asymmetric left perinephric edema  - UA 3/4 with pyuria   - CXR on 3/9 showing worsening right lung consolidations (my read) from previous CXR  - Procal negative, Discontinued doxycyline on 3/10  - Finished course of Rocephin    SHANNEN on CKD stage 3  Bilateral hydronephrosis  - Was previously discharged on flomax and had qureshi which was taken out by urology  - Endorses overflow incontinence symptoms  - CTA chest as above showing bilateral hydronephrosis and left sided pyelonephritis  - Discontinued Flomax and finasteride    HLD  - Continue atorvastatin    GERD  - Continue Protonix    Constipation  - cw senna and miralax      DVT/GI ppx: Holding due to hematuria  Diet: DASH  Code Status: Full code  Dispo: medically active. still unable to maintain bp when standing. pending CTS eval for inpatient TAVR. PT rec for home PT   83 y/o M w/ PMH lung cancer (previously on immunotherapy),  chronic RBBB/LAFB,DVT not AC, HTN, copd not on home O2 and CAD presenting for increasing SOB for the last 1 week. Per wife, Schedule for PET scan last week, was unable to go for continued fatigue, states has had progressive SOB for the last few days. Also endorses that he has been having increased urinary frequency though no dysuria. Found to be hypoxic and UA grossly positive. Imaging was significant for findings of pyelonephritis. ID was consulted and patient continues to require inpatient care for IV Abx and for hematuria.    Acute hypoxic respiratory failure secondary to HF vs PNA vs recurrent pleural effusions vs severe AS  Acute on chronic systolic congestive heart failure, CAD, HTN  pAF  History of COPD not on home O2  - TTE showing EF 40-45% with severe AS  - Holding Lasix, Plavix and AC as per Cardiology   - Continue aspirin  - Continue metoprolol 50mg BID, may need to decrease dose depending on HR and BP  - Continue Trelegy interchange  - Started Duonebs PRN  - Cardiology following, recs noted  - Discontinue Lasix  - Increased midodrine to 10mg TID for orthostatic hypotension, encouraging oral hydration  - sp lhc showing mild rca and lad disease     Acute blood loss anemia  Hematuria in the setting of pyelonephritis  - Holding heparin gtt  - Condom catheter showing red urine drainage  - CBC stable for now  - Urology consulted but not recommending any intervention  - Monitor for hematuria resolution (resolved on 3/10)  - Needs to follow up with urology as an outpatient  - Has required qureshi in the past for BPH  - Discontinue Flomax and finasteride    Sepsis secondary to complicated urinary tract infection vs PNA  - Blood cultures  3/4 reporting No growth   - RVP/COVID 19 PCR 3/4 negative   - UA 3/4 with pyuria; Urine Cx 3/4 reporting E coli   - CTA chest 3/4 reporting no PE. Little change in right upper lobe parenchymal consolidation with air bronchograms from prior CT 1/17  - CT A/P 3/4 reporting bilateral hydroureteronephrosis with asymmetric left perinephric edema  - UA 3/4 with pyuria   - CXR on 3/9 showing worsening right lung consolidations (my read) from previous CXR  - Procal negative, Discontinued doxycyline on 3/10  - Finished course of Rocephin    SHANNEN on CKD stage 3  Bilateral hydronephrosis  - Was previously discharged on flomax and had qureshi which was taken out by urology  - Endorses overflow incontinence symptoms  - CTA chest as above showing bilateral hydronephrosis and left sided pyelonephritis  - Discontinued Flomax and finasteride    HLD  - Continue atorvastatin    GERD  - Continue Protonix    Constipation  - cw senna and miralax      DVT/GI ppx: Holding due to hematuria  Diet: DASH  Code Status: Full code  Dispo: medically active. still unable to maintain bp when standing. pending CTS eval for inpatient TAVR. PT rec for home PT

## 2025-03-16 LAB
A1C WITH ESTIMATED AVERAGE GLUCOSE RESULT: 6.2 % — HIGH (ref 4–5.6)
ALBUMIN SERPL ELPH-MCNC: 2.6 G/DL — LOW (ref 3.3–5.2)
ALP SERPL-CCNC: 98 U/L — SIGNIFICANT CHANGE UP (ref 40–120)
ALT FLD-CCNC: 23 U/L — SIGNIFICANT CHANGE UP
ANION GAP SERPL CALC-SCNC: 10 MMOL/L — SIGNIFICANT CHANGE UP (ref 5–17)
APTT BLD: 26 SEC — SIGNIFICANT CHANGE UP (ref 24.5–35.6)
APTT BLD: 57.2 SEC — HIGH (ref 24.5–35.6)
AST SERPL-CCNC: 32 U/L — SIGNIFICANT CHANGE UP
BILIRUB SERPL-MCNC: 0.3 MG/DL — LOW (ref 0.4–2)
BLD GP AB SCN SERPL QL: SIGNIFICANT CHANGE UP
BUN SERPL-MCNC: 46.6 MG/DL — HIGH (ref 8–20)
CALCIUM SERPL-MCNC: 8.5 MG/DL — SIGNIFICANT CHANGE UP (ref 8.4–10.5)
CHLORIDE SERPL-SCNC: 102 MMOL/L — SIGNIFICANT CHANGE UP (ref 96–108)
CO2 SERPL-SCNC: 26 MMOL/L — SIGNIFICANT CHANGE UP (ref 22–29)
CREAT SERPL-MCNC: 1.11 MG/DL — SIGNIFICANT CHANGE UP (ref 0.5–1.3)
EGFR: 66 ML/MIN/1.73M2 — SIGNIFICANT CHANGE UP
ESTIMATED AVERAGE GLUCOSE: 131 MG/DL — HIGH (ref 68–114)
GLUCOSE BLDC GLUCOMTR-MCNC: 102 MG/DL — HIGH (ref 70–99)
GLUCOSE BLDC GLUCOMTR-MCNC: 110 MG/DL — HIGH (ref 70–99)
GLUCOSE BLDC GLUCOMTR-MCNC: 112 MG/DL — HIGH (ref 70–99)
GLUCOSE BLDC GLUCOMTR-MCNC: 120 MG/DL — HIGH (ref 70–99)
GLUCOSE SERPL-MCNC: 113 MG/DL — HIGH (ref 70–99)
HCT VFR BLD CALC: 31.9 % — LOW (ref 39–50)
HCT VFR BLD CALC: 32 % — LOW (ref 39–50)
HGB BLD-MCNC: 10 G/DL — LOW (ref 13–17)
HGB BLD-MCNC: 9.9 G/DL — LOW (ref 13–17)
INR BLD: 1.1 RATIO — SIGNIFICANT CHANGE UP (ref 0.85–1.16)
MAGNESIUM SERPL-MCNC: 2.2 MG/DL — SIGNIFICANT CHANGE UP (ref 1.6–2.6)
MCHC RBC-ENTMCNC: 27.5 PG — SIGNIFICANT CHANGE UP (ref 27–34)
MCHC RBC-ENTMCNC: 31 G/DL — LOW (ref 32–36)
MCHC RBC-ENTMCNC: 31.3 G/DL — LOW (ref 32–36)
MCV RBC AUTO: 88.9 FL — SIGNIFICANT CHANGE UP (ref 80–100)
NRBC # BLD AUTO: 0 K/UL — SIGNIFICANT CHANGE UP (ref 0–0)
NRBC # BLD AUTO: 0 K/UL — SIGNIFICANT CHANGE UP (ref 0–0)
NRBC # FLD: 0 K/UL — SIGNIFICANT CHANGE UP (ref 0–0)
NRBC # FLD: 0 K/UL — SIGNIFICANT CHANGE UP (ref 0–0)
NRBC BLD AUTO-RTO: 0 /100 WBCS — SIGNIFICANT CHANGE UP (ref 0–0)
PLATELET # BLD AUTO: 387 K/UL — SIGNIFICANT CHANGE UP (ref 150–400)
PLATELET # BLD AUTO: 403 K/UL — HIGH (ref 150–400)
PMV BLD: 10.1 FL — SIGNIFICANT CHANGE UP (ref 7–13)
PMV BLD: 10.1 FL — SIGNIFICANT CHANGE UP (ref 7–13)
POTASSIUM SERPL-MCNC: 4.8 MMOL/L — SIGNIFICANT CHANGE UP (ref 3.5–5.3)
POTASSIUM SERPL-SCNC: 4.8 MMOL/L — SIGNIFICANT CHANGE UP (ref 3.5–5.3)
PROT SERPL-MCNC: 6.2 G/DL — LOW (ref 6.6–8.7)
PROTHROM AB SERPL-ACNC: 12.4 SEC — SIGNIFICANT CHANGE UP (ref 9.9–13.4)
RBC # BLD: 3.59 M/UL — LOW (ref 4.2–5.8)
RBC # BLD: 3.63 M/UL — LOW (ref 4.2–5.8)
RBC # FLD: 19.7 % — HIGH (ref 10.3–14.5)
RBC # FLD: 19.8 % — HIGH (ref 10.3–14.5)
SODIUM SERPL-SCNC: 137 MMOL/L — SIGNIFICANT CHANGE UP (ref 135–145)
TSH SERPL-MCNC: 5.49 UIU/ML — HIGH (ref 0.27–4.2)
WBC # BLD: 10.54 K/UL — HIGH (ref 3.8–10.5)
WBC # BLD: 11.99 K/UL — HIGH (ref 3.8–10.5)
WBC # FLD AUTO: 10.54 K/UL — HIGH (ref 3.8–10.5)
WBC # FLD AUTO: 11.99 K/UL — HIGH (ref 3.8–10.5)

## 2025-03-16 PROCEDURE — 99233 SBSQ HOSP IP/OBS HIGH 50: CPT

## 2025-03-16 RX ORDER — HEPARIN SODIUM 1000 [USP'U]/ML
INJECTION INTRAVENOUS; SUBCUTANEOUS
Qty: 25000 | Refills: 0 | Status: DISCONTINUED | OUTPATIENT
Start: 2025-03-16 | End: 2025-03-17

## 2025-03-16 RX ORDER — FUROSEMIDE 10 MG/ML
20 INJECTION INTRAMUSCULAR; INTRAVENOUS ONCE
Refills: 0 | Status: COMPLETED | OUTPATIENT
Start: 2025-03-17 | End: 2025-03-17

## 2025-03-16 RX ORDER — SALINE 7; 19 G/118ML; G/118ML
1 ENEMA RECTAL ONCE
Refills: 0 | Status: COMPLETED | OUTPATIENT
Start: 2025-03-16 | End: 2025-03-16

## 2025-03-16 RX ORDER — METOPROLOL SUCCINATE 50 MG/1
12.5 TABLET, EXTENDED RELEASE ORAL DAILY
Refills: 0 | Status: DISCONTINUED | OUTPATIENT
Start: 2025-03-17 | End: 2025-03-17

## 2025-03-16 RX ADMIN — ATORVASTATIN CALCIUM 40 MILLIGRAM(S): 80 TABLET, FILM COATED ORAL at 21:06

## 2025-03-16 RX ADMIN — MIDODRINE HYDROCHLORIDE 10 MILLIGRAM(S): 5 TABLET ORAL at 17:34

## 2025-03-16 RX ADMIN — MIDODRINE HYDROCHLORIDE 10 MILLIGRAM(S): 5 TABLET ORAL at 12:34

## 2025-03-16 RX ADMIN — Medication 2 TABLET(S): at 21:05

## 2025-03-16 RX ADMIN — Medication 81 MILLIGRAM(S): at 12:34

## 2025-03-16 RX ADMIN — POLYETHYLENE GLYCOL 3350 17 GRAM(S): 17 POWDER, FOR SOLUTION ORAL at 12:34

## 2025-03-16 RX ADMIN — HEPARIN SODIUM UNIT(S)/HR: 1000 INJECTION INTRAVENOUS; SUBCUTANEOUS at 19:51

## 2025-03-16 RX ADMIN — TIOTROPIUM BROMIDE INHALATION SPRAY 2 PUFF(S): 3.12 SPRAY, METERED RESPIRATORY (INHALATION) at 08:36

## 2025-03-16 RX ADMIN — Medication 1 DOSE(S): at 21:18

## 2025-03-16 RX ADMIN — HEPARIN SODIUM 1400 UNIT(S)/HR: 1000 INJECTION INTRAVENOUS; SUBCUTANEOUS at 13:28

## 2025-03-16 RX ADMIN — Medication 40 MILLIGRAM(S): at 06:12

## 2025-03-16 RX ADMIN — SALINE 1 ENEMA: 7; 19 ENEMA RECTAL at 13:49

## 2025-03-16 RX ADMIN — MIDODRINE HYDROCHLORIDE 10 MILLIGRAM(S): 5 TABLET ORAL at 06:12

## 2025-03-16 RX ADMIN — HEPARIN SODIUM 1400 UNIT(S)/HR: 1000 INJECTION INTRAVENOUS; SUBCUTANEOUS at 19:29

## 2025-03-16 RX ADMIN — Medication 1 DOSE(S): at 08:36

## 2025-03-16 RX ADMIN — METOPROLOL SUCCINATE 50 MILLIGRAM(S): 50 TABLET, EXTENDED RELEASE ORAL at 06:12

## 2025-03-16 NOTE — PROGRESS NOTE ADULT - SUBJECTIVE AND OBJECTIVE BOX
[facility] Division of Hospital Medicine    pt seen and examined at bedside  naeon  feeling well, no complaints  tolerating po diet  Patient denies chest pain, SOB, abd pain, N/V, fever, chills, dysuria or any other complaints. All remainder ROS negative.     MEDICATIONS  (STANDING):  aspirin  chewable 81 milliGRAM(s) Oral daily  atorvastatin 40 milliGRAM(s) Oral at bedtime  dextrose 5%. 1000 milliLiter(s) (50 mL/Hr) IV Continuous <Continuous>  dextrose 5%. 1000 milliLiter(s) (100 mL/Hr) IV Continuous <Continuous>  dextrose 50% Injectable 25 Gram(s) IV Push once  dextrose 50% Injectable 12.5 Gram(s) IV Push once  dextrose 50% Injectable 25 Gram(s) IV Push once  fluticasone propionate/ salmeterol 100-50 MICROgram(s) Diskus 1 Dose(s) Inhalation two times a day  furosemide    Tablet 20 milliGRAM(s) Oral daily  glucagon  Injectable 1 milliGRAM(s) IntraMuscular once  insulin lispro (ADMELOG) corrective regimen sliding scale   SubCutaneous three times a day before meals  metoprolol succinate ER 50 milliGRAM(s) Oral daily  midodrine. 10 milliGRAM(s) Oral three times a day  pantoprazole    Tablet 40 milliGRAM(s) Oral before breakfast  polyethylene glycol 3350 17 Gram(s) Oral daily  senna 2 Tablet(s) Oral at bedtime  tiotropium 2.5 MICROgram(s) Inhaler 2 Puff(s) Inhalation daily    MEDICATIONS  (PRN):  acetaminophen     Tablet .. 650 milliGRAM(s) Oral every 6 hours PRN Temp greater or equal to 38C (100.4F), Mild Pain (1 - 3)  albuterol/ipratropium for Nebulization 3 milliLiter(s) Nebulizer every 6 hours PRN Shortness of Breath and/or Wheezing  aluminum hydroxide/magnesium hydroxide/simethicone Suspension 30 milliLiter(s) Oral every 4 hours PRN Dyspepsia  dextrose Oral Gel 15 Gram(s) Oral once PRN Blood Glucose LESS THAN 70 milliGRAM(s)/deciliter  melatonin 3 milliGRAM(s) Oral at bedtime PRN Insomnia  ondansetron Injectable 4 milliGRAM(s) IV Push every 8 hours PRN Nausea and/or Vomiting        I&O's Summary    15 Mar 2025 07:01  -  16 Mar 2025 07:00  --------------------------------------------------------  IN: 720 mL / OUT: 1000 mL / NET: -280 mL        PHYSICAL EXAM:  Vital Signs Last 24 Hrs  T(C): 36.6 (16 Mar 2025 10:26), Max: 36.8 (15 Mar 2025 16:30)  T(F): 97.9 (16 Mar 2025 10:26), Max: 98.2 (15 Mar 2025 16:30)  HR: 61 (16 Mar 2025 10:26) (61 - 82)  BP: 105/59 (16 Mar 2025 10:26) (103/59 - 119/69)  BP(mean): --  RR: 18 (16 Mar 2025 10:26) (17 - 18)  SpO2: 98% (16 Mar 2025 10:26) (91% - 99%)    Parameters below as of 16 Mar 2025 08:01  Patient On (Oxygen Delivery Method): room air            CONSTITUTIONAL: NAD, well-groomed  ENMT: Moist oral mucosa, no pharyngeal injection or exudates; normal dentition  RESPIRATORY: Normal respiratory effort; lungs are clear to auscultation bilaterally  CARDIOVASCULAR: Regular rate and rhythm, normal S1 and S2, no murmur/rub/gallop; No lower extremity edema; Peripheral pulses are 2+ bilaterally  ABDOMEN: Nontender to palpation, normoactive bowel sounds, no rebound/guarding; No hepatosplenomegaly  MUSCLOSKELETAL:  Normal gait; no clubbing or cyanosis of digits; no joint swelling or tenderness to palpation  PSYCH: A+O to person, place, and time; affect appropriate  NEUROLOGY: CN 2-12 are intact and symmetric; no gross sensory deficits;   SKIN: No rashes; no palpable lesions    LABS:                        10.0   10.54 )-----------( 387      ( 16 Mar 2025 05:14 )             32.0     03-16    137  |  102  |  46.6[H]  ----------------------------<  113[H]  4.8   |  26.0  |  1.11    Ca    8.5      16 Mar 2025 05:14  Phos  3.2     03-15  Mg     2.2     03-16    TPro  6.2[L]  /  Alb  2.6[L]  /  TBili  0.3[L]  /  DBili  x   /  AST  32  /  ALT  23  /  AlkPhos  98  03-16    PT/INR - ( 16 Mar 2025 05:14 )   PT: 12.4 sec;   INR: 1.10 ratio         PTT - ( 16 Mar 2025 05:14 )  PTT:26.0 sec      Urinalysis Basic - ( 16 Mar 2025 05:14 )    Color: x / Appearance: x / SG: x / pH: x  Gluc: 113 mg/dL / Ketone: x  / Bili: x / Urobili: x   Blood: x / Protein: x / Nitrite: x   Leuk Esterase: x / RBC: x / WBC x   Sq Epi: x / Non Sq Epi: x / Bacteria: x        CAPILLARY BLOOD GLUCOSE      POCT Blood Glucose.: 102 mg/dL (16 Mar 2025 07:44)  POCT Blood Glucose.: 119 mg/dL (15 Mar 2025 21:50)  POCT Blood Glucose.: 110 mg/dL (15 Mar 2025 16:58)  POCT Blood Glucose.: 117 mg/dL (15 Mar 2025 11:51)

## 2025-03-16 NOTE — PROGRESS NOTE ADULT - ASSESSMENT
83 y/o M w/ PMH lung cancer (previously on immunotherapy),  chronic RBBB/LAFB,DVT not AC, HTN, copd not on home O2 and CAD presenting for increasing SOB for the last 1 week. Per wife, Schedule for PET scan last week, was unable to go for continued fatigue, states has had progressive SOB for the last few days. Also endorses that he has been having increased urinary frequency though no dysuria. Found to be hypoxic and UA grossly positive. Imaging was significant for findings of pyelonephritis. ID was consulted and patient continues to require inpatient care for IV Abx and for hematuria.    Acute hypoxic respiratory failure secondary to HF vs PNA vs recurrent pleural effusions vs severe AS  Acute on chronic systolic congestive heart failure, CAD, HTN  pAF  History of COPD not on home O2  - TTE showing EF 40-45% with severe AS  - lasix 20mg daily  - will start heparin drip as per cardiology recs  - Continue aspirin  - dec metoprolol to 25mg bid  - Continue Trelegy interchange  - Started Duonebs PRN  - Cardiology following, recs noted  - Discontinue Lasix  - Increased midodrine to 10mg TID for orthostatic hypotension, encouraging oral hydration  - sp lhc showing mild rca and lad disease   -CARLOS on 03/17, npo midnight    Acute blood loss anemia  Hematuria in the setting of pyelonephritis  - Holding heparin gtt  - Condom catheter showing red urine drainage  - CBC stable for now  - Urology consulted but not recommending any intervention  - Monitor for hematuria resolution (resolved on 3/10)  - Needs to follow up with urology as an outpatient  - Has required qureshi in the past for BPH  - Discontinue Flomax and finasteride    Sepsis secondary to complicated urinary tract infection vs PNA  - Blood cultures  3/4 reporting No growth   - RVP/COVID 19 PCR 3/4 negative   - UA 3/4 with pyuria; Urine Cx 3/4 reporting E coli   - CTA chest 3/4 reporting no PE. Little change in right upper lobe parenchymal consolidation with air bronchograms from prior CT 1/17  - CT A/P 3/4 reporting bilateral hydroureteronephrosis with asymmetric left perinephric edema  - UA 3/4 with pyuria   - CXR on 3/9 showing worsening right lung consolidations (my read) from previous CXR  - Procal negative, Discontinued doxycyline on 3/10  - Finished course of Rocephin    SHANNEN on CKD stage 3  Bilateral hydronephrosis  - Was previously discharged on flomax and had qureshi which was taken out by urology  - Endorses overflow incontinence symptoms  - CTA chest as above showing bilateral hydronephrosis and left sided pyelonephritis  - Discontinued Flomax and finasteride    HLD  - Continue atorvastatin    GERD  - Continue Protonix    Constipation  - cw senna and miralax      DVT/GI ppx: Holding due to hematuria  Diet: DASH, npo midnight  Code Status: Full code  Dispo: medically active. still unable to maintain bp when standing. pending CTS eval for inpatient TAVR. PT rec for home PT   85 y/o M w/ PMH lung cancer (previously on immunotherapy),  chronic RBBB/LAFB,DVT not AC, HTN, copd not on home O2 and CAD presenting for increasing SOB for the last 1 week. Per wife, Schedule for PET scan last week, was unable to go for continued fatigue, states has had progressive SOB for the last few days. Also endorses that he has been having increased urinary frequency though no dysuria. Found to be hypoxic and UA grossly positive. Imaging was significant for findings of pyelonephritis. ID was consulted and patient continues to require inpatient care for IV Abx and for hematuria.    Acute hypoxic respiratory failure secondary to HF vs PNA vs recurrent pleural effusions vs severe AS  Acute on chronic systolic congestive heart failure, CAD, HTN  pAF  History of COPD not on home O2  - TTE showing EF 40-45% with severe AS  - lasix 20mg daily  - will start heparin drip as per cardiology recs  - Continue aspirin  - dec metoprolol to 25mg  - Continue Trelegy interchange  - Started Duonebs PRN  - Cardiology following, recs noted  - Discontinue Lasix  - Increased midodrine to 10mg TID for orthostatic hypotension, encouraging oral hydration  - sp lhc showing mild rca and lad disease   -CARLOS on 03/17, npo midnight    Acute blood loss anemia  Hematuria in the setting of pyelonephritis  - Holding heparin gtt  - Condom catheter showing red urine drainage  - CBC stable for now  - Urology consulted but not recommending any intervention  - Monitor for hematuria resolution (resolved on 3/10)  - Needs to follow up with urology as an outpatient  - Has required qureshi in the past for BPH  - Discontinue Flomax and finasteride    Sepsis secondary to complicated urinary tract infection vs PNA  - Blood cultures  3/4 reporting No growth   - RVP/COVID 19 PCR 3/4 negative   - UA 3/4 with pyuria; Urine Cx 3/4 reporting E coli   - CTA chest 3/4 reporting no PE. Little change in right upper lobe parenchymal consolidation with air bronchograms from prior CT 1/17  - CT A/P 3/4 reporting bilateral hydroureteronephrosis with asymmetric left perinephric edema  - UA 3/4 with pyuria   - CXR on 3/9 showing worsening right lung consolidations (my read) from previous CXR  - Procal negative, Discontinued doxycyline on 3/10  - Finished course of Rocephin    SHANNEN on CKD stage 3  Bilateral hydronephrosis  - Was previously discharged on flomax and had qureshi which was taken out by urology  - Endorses overflow incontinence symptoms  - CTA chest as above showing bilateral hydronephrosis and left sided pyelonephritis  - Discontinued Flomax and finasteride    HLD  - Continue atorvastatin    GERD  - Continue Protonix    Constipation  - cw senna and miralax      DVT/GI ppx: Holding due to hematuria  Diet: DASH, npo midnight  Code Status: Full code  Dispo: medically active. still unable to maintain bp when standing. pending CTS eval for inpatient TAVR. PT rec for home PT

## 2025-03-16 NOTE — PROGRESS NOTE ADULT - SUBJECTIVE AND OBJECTIVE BOX
[place] [facility] - [facility], THE HEART CENTER                                   [address] [place] [address]                                                      PHONE: [phone]                                                         FAX: [phone]  [email or web address]  ---------------------------------------------------------------------------------------------------------------------------------    Overnight events/patient complaints:  No complaint this morning. Did feel lightheaded again on standing yesterday    No Known Allergies    MEDICATIONS  (STANDING):  aspirin  chewable 81 milliGRAM(s) Oral daily  atorvastatin 40 milliGRAM(s) Oral at bedtime  dextrose 5%. 1000 milliLiter(s) (50 mL/Hr) IV Continuous <Continuous>  dextrose 5%. 1000 milliLiter(s) (100 mL/Hr) IV Continuous <Continuous>  dextrose 50% Injectable 25 Gram(s) IV Push once  dextrose 50% Injectable 12.5 Gram(s) IV Push once  dextrose 50% Injectable 25 Gram(s) IV Push once  fluticasone propionate/ salmeterol 100-50 MICROgram(s) Diskus 1 Dose(s) Inhalation two times a day  furosemide    Tablet 20 milliGRAM(s) Oral daily  glucagon  Injectable 1 milliGRAM(s) IntraMuscular once  insulin lispro (ADMELOG) corrective regimen sliding scale   SubCutaneous three times a day before meals  metoprolol succinate ER 50 milliGRAM(s) Oral daily  midodrine. 10 milliGRAM(s) Oral three times a day  pantoprazole    Tablet 40 milliGRAM(s) Oral before breakfast  polyethylene glycol 3350 17 Gram(s) Oral daily  senna 2 Tablet(s) Oral at bedtime  tiotropium 2.5 MICROgram(s) Inhaler 2 Puff(s) Inhalation daily    MEDICATIONS  (PRN):  acetaminophen     Tablet .. 650 milliGRAM(s) Oral every 6 hours PRN Temp greater or equal to 38C (100.4F), Mild Pain (1 - 3)  albuterol/ipratropium for Nebulization 3 milliLiter(s) Nebulizer every 6 hours PRN Shortness of Breath and/or Wheezing  aluminum hydroxide/magnesium hydroxide/simethicone Suspension 30 milliLiter(s) Oral every 4 hours PRN Dyspepsia  dextrose Oral Gel 15 Gram(s) Oral once PRN Blood Glucose LESS THAN 70 milliGRAM(s)/deciliter  melatonin 3 milliGRAM(s) Oral at bedtime PRN Insomnia  ondansetron Injectable 4 milliGRAM(s) IV Push every 8 hours PRN Nausea and/or Vomiting      Vital Signs Last 24 Hrs  T(C): 36.6 (16 Mar 2025 10:26), Max: 36.8 (15 Mar 2025 16:30)  T(F): 97.9 (16 Mar 2025 10:26), Max: 98.2 (15 Mar 2025 16:30)  HR: 61 (16 Mar 2025 10:26) (61 - 82)  BP: 105/59 (16 Mar 2025 10:26) (95/50 - 119/69)  BP(mean): --  RR: 18 (16 Mar 2025 10:26) (17 - 18)  SpO2: 98% (16 Mar 2025 10:26) (91% - 99%)    Parameters below as of 16 Mar 2025 08:01  Patient On (Oxygen Delivery Method): room air      ICU Vital Signs Last 24 Hrs  JONE SOUTH  I&O's Detail    15 Mar 2025 07:01  -  16 Mar 2025 07:00  --------------------------------------------------------  IN:    Oral Fluid: 720 mL  Total IN: 720 mL    OUT:    Voided (mL): 1000 mL  Total OUT: 1000 mL    Total NET: -280 mL        I&O's Summary    15 Mar 2025 07:01  -  16 Mar 2025 07:00  --------------------------------------------------------  IN: 720 mL / OUT: 1000 mL / NET: -280 mL      Drug Dosing Weight  JONE SOUTH      PHYSICAL EXAM:  General: Appears comfortable, alert and cooperative.  HEENT: Normocephalic, atraumatic.  Eyes: Pupils reactive, cornea wnl.  Neck: Supple, no nodes adenopathy; no JVD, carotid bruit or thyromegaly.  CARDIOVASCULAR: Regular S1 S2 with no murmur, rub, gallop or lift.   LUNGS: No rales, rhonchi or wheeze. Normal breath sounds bilaterally.  ABDOMEN: Soft, nontender without mass or organomegaly. bowel sounds normoactive.  EXTREMITIES: No clubbing, cyanosis or edema. Distal pulses wnl.   SKIN: Warm and dry with normal turgor.  NEURO: Alert/oriented x 3/normal motor exam  PSYCH: Appropriate affect        LABS:                        10.0   10.54 )-----------( 387      ( 16 Mar 2025 05:14 )             32.0     03-16    137  |  102  |  46.6[H]  ----------------------------<  113[H]  4.8   |  26.0  |  1.11    Ca    8.5      16 Mar 2025 05:14  Phos  3.2     03-15  Mg     2.2     03-16    TPro  6.2[L]  /  Alb  2.6[L]  /  TBili  0.3[L]  /  DBili  x   /  AST  32  /  ALT  23  /  AlkPhos  98  03-16    JONE SOUTH      PT/INR - ( 16 Mar 2025 05:14 )   PT: 12.4 sec;   INR: 1.10 ratio         PTT - ( 16 Mar 2025 05:14 )  PTT:26.0 sec  Urinalysis Basic - ( 16 Mar 2025 05:14 )    Color: x / Appearance: x / SG: x / pH: x  Gluc: 113 mg/dL / Ketone: x  / Bili: x / Urobili: x   Blood: x / Protein: x / Nitrite: x   Leuk Esterase: x / RBC: x / WBC x   Sq Epi: x / Non Sq Epi: x / Bacteria: x        RADIOLOGY & ADDITIONAL STUDIES:    INTERPRETATION OF TELEMETRY (personally reviewed):    ECG:    ECHO:    STRESS TEST:    CARDIAC CATHETERIZATION:    ASSESSMENT AND PLAN:  In summary, JONE SOUTH is an 84y Male with moderate CAD, hypertension, lung CA s/p chemo and radiation, history of DVT s/p AC who presented with shortness of breath. CTA negative for PE. Tele with paroxysmal atrial fibrillation with PVC. TTE showed EF 40-45% with severe AS and cath on 3/14 showed mild RCA and LAD disease, normal wedge    - CTS recs reviewed  - Will tentatively plan for CARLOS tomorrow for further assessment of aortic valve  - Continues to have symptoms of orthostatic hypotension. Would decrease Toprol to 25 mg daily. Consider trial off BB altogether  - Continue midodrine  - NPO p MN  - D/w family                      [place] [facility] - [facility], THE HEART [facility]ENTER                                   [address] [place] [address]                                                      PHONE: [phone]                                                         FAX: [phone]  [email or web address]  ---------------------------------------------------------------------------------------------------------------------------------    Overnight events/patient complaints:  No complaint this morning. Did feel lightheaded again on standing yesterday    No Known Allergies    MEDICATIONS  (STANDING):  aspirin  chewable 81 milliGRAM(s) Oral daily  atorvastatin 40 milliGRAM(s) Oral at bedtime  dextrose 5%. 1000 milliLiter(s) (50 mL/Hr) IV Continuous <Continuous>  dextrose 5%. 1000 milliLiter(s) (100 mL/Hr) IV Continuous <Continuous>  dextrose 50% Injectable 25 Gram(s) IV Push once  dextrose 50% Injectable 12.5 Gram(s) IV Push once  dextrose 50% Injectable 25 Gram(s) IV Push once  fluticasone propionate/ salmeterol 100-50 MICROgram(s) Diskus 1 Dose(s) Inhalation two times a day  furosemide    Tablet 20 milliGRAM(s) Oral daily  glucagon  Injectable 1 milliGRAM(s) IntraMuscular once  insulin lispro (ADMELOG) corrective regimen sliding scale   SubCutaneous three times a day before meals  metoprolol succinate ER 50 milliGRAM(s) Oral daily  midodrine. 10 milliGRAM(s) Oral three times a day  pantoprazole    Tablet 40 milliGRAM(s) Oral before breakfast  polyethylene glycol 3350 17 Gram(s) Oral daily  senna 2 Tablet(s) Oral at bedtime  tiotropium 2.5 MICROgram(s) Inhaler 2 Puff(s) Inhalation daily    MEDICATIONS  (PRN):  acetaminophen     Tablet .. 650 milliGRAM(s) Oral every 6 hours PRN Temp greater or equal to 38C (100.4F), Mild Pain (1 - 3)  albuterol/ipratropium for Nebulization 3 milliLiter(s) Nebulizer every 6 hours PRN Shortness of Breath and/or Wheezing  aluminum hydroxide/magnesium hydroxide/simethicone Suspension 30 milliLiter(s) Oral every 4 hours PRN Dyspepsia  dextrose Oral Gel 15 Gram(s) Oral once PRN Blood Glucose LESS THAN 70 milliGRAM(s)/deciliter  melatonin 3 milliGRAM(s) Oral at bedtime PRN Insomnia  ondansetron Injectable 4 milliGRAM(s) IV Push every 8 hours PRN Nausea and/or Vomiting      Vital Signs Last 24 Hrs  T(C): 36.6 (16 Mar 2025 10:26), Max: 36.8 (15 Mar 2025 16:30)  T(F): 97.9 (16 Mar 2025 10:26), Max: 98.2 (15 Mar 2025 16:30)  HR: 61 (16 Mar 2025 10:26) (61 - 82)  BP: 105/59 (16 Mar 2025 10:26) (95/50 - 119/69)  BP(mean): --  RR: 18 (16 Mar 2025 10:26) (17 - 18)  SpO2: 98% (16 Mar 2025 10:26) (91% - 99%)    Parameters below as of 16 Mar 2025 08:01  Patient On (Oxygen Delivery Method): room air      ICU Vital Signs Last 24 Hrs  JONE SOUTH  I&O's Detail    15 Mar 2025 07:01  -  16 Mar 2025 07:00  --------------------------------------------------------  IN:    Oral Fluid: 720 mL  Total IN: 720 mL    OUT:    Voided (mL): 1000 mL  Total OUT: 1000 mL    Total NET: -280 mL        I&O's Summary    15 Mar 2025 07:01  -  16 Mar 2025 07:00  --------------------------------------------------------  IN: 720 mL / OUT: 1000 mL / NET: -280 mL      Drug Dosing Weight  JONEFERNANDO WEEMSGIORGIO      PHYSICAL EXAM:  General: Appears comfortable, alert and cooperative.  HEENT: Normocephalic, atraumatic.  Eyes: Pupils reactive, cornea wnl.  Neck: Supple, no nodes adenopathy; no JVD, carotid bruit or thyromegaly.  CARDIOVASCULAR: soft S2 with 3/6 late peaking HELDER  LUNGS: No rales, rhonchi or wheeze. Normal breath sounds bilaterally.  ABDOMEN: Soft, nontender without mass or organomegaly. bowel sounds normoactive.  EXTREMITIES: No clubbing, cyanosis or edema. Distal pulses wnl.   SKIN: Warm and dry with normal turgor.  NEURO: Alert/oriented x 3/normal motor exam  PSYCH: Appropriate affect        LABS:                        10.0   10.54 )-----------( 387      ( 16 Mar 2025 05:14 )             32.0     03-16    137  |  102  |  46.6[H]  ----------------------------<  113[H]  4.8   |  26.0  |  1.11    Ca    8.5      16 Mar 2025 05:14  Phos  3.2     03-15  Mg     2.2     03-16    TPro  6.2[L]  /  Alb  2.6[L]  /  TBili  0.3[L]  /  DBili  x   /  AST  32  /  ALT  23  /  AlkPhos  98  03-16    JONE SOUTH      PT/INR - ( 16 Mar 2025 05:14 )   PT: 12.4 sec;   INR: 1.10 ratio         PTT - ( 16 Mar 2025 05:14 )  PTT:26.0 sec  Urinalysis Basic - ( 16 Mar 2025 05:14 )    Color: x / Appearance: x / SG: x / pH: x  Gluc: 113 mg/dL / Ketone: x  / Bili: x / Urobili: x   Blood: x / Protein: x / Nitrite: x   Leuk Esterase: x / RBC: x / WBC x   Sq Epi: x / Non Sq Epi: x / Bacteria: x        RADIOLOGY & ADDITIONAL STUDIES:    INTERPRETATION OF TELEMETRY (personally reviewed): atrial fibrillation, nocturnal brief pauses    ASSESSMENT AND PLAN:  In summary, JONE SOUTH is an 84y Male with moderate CAD, hypertension, lung CA s/p chemo and radiation, history of DVT s/p AC who presented with shortness of breath. CTA negative for PE. Tele with paroxysmal atrial fibrillation with PVC. TTE showed EF 40-45% with severe AS and cath on 3/14 showed mild RCA and LAD disease, normal wedge    - CTS recs reviewed  - Will tentatively plan for CARLOS tomorrow for further assessment of aortic valve  - ? Toprol contributing to orthostatic hypotension, decrease to 25 mg daily  - BUN/Cr also elevated, hold lasix  - Continue midodrine  - AF episodes more prolonged, would start AC with heparin gtt, long term DOAC  - NPO p MN                   [place] [facility] - [facility], [facility]                                   [address] [place] [address]                                                      PHONE: [phone]                                                         FAX: [phone]  [email or web address]  ---------------------------------------------------------------------------------------------------------------------------------    Overnight events/patient complaints:  No complaint this morning. Did feel lightheaded again on standing yesterday    No Known Allergies    MEDICATIONS  (STANDING):  aspirin  chewable 81 milliGRAM(s) Oral daily  atorvastatin 40 milliGRAM(s) Oral at bedtime  dextrose 5%. 1000 milliLiter(s) (50 mL/Hr) IV Continuous <Continuous>  dextrose 5%. 1000 milliLiter(s) (100 mL/Hr) IV Continuous <Continuous>  dextrose 50% Injectable 25 Gram(s) IV Push once  dextrose 50% Injectable 12.5 Gram(s) IV Push once  dextrose 50% Injectable 25 Gram(s) IV Push once  fluticasone propionate/ salmeterol 100-50 MICROgram(s) Diskus 1 Dose(s) Inhalation two times a day  furosemide    Tablet 20 milliGRAM(s) Oral daily  glucagon  Injectable 1 milliGRAM(s) IntraMuscular once  insulin lispro (ADMELOG) corrective regimen sliding scale   SubCutaneous three times a day before meals  metoprolol succinate ER 50 milliGRAM(s) Oral daily  midodrine. 10 milliGRAM(s) Oral three times a day  pantoprazole    Tablet 40 milliGRAM(s) Oral before breakfast  polyethylene glycol 3350 17 Gram(s) Oral daily  senna 2 Tablet(s) Oral at bedtime  tiotropium 2.5 MICROgram(s) Inhaler 2 Puff(s) Inhalation daily    MEDICATIONS  (PRN):  acetaminophen     Tablet .. 650 milliGRAM(s) Oral every 6 hours PRN Temp greater or equal to 38C (100.4F), Mild Pain (1 - 3)  albuterol/ipratropium for Nebulization 3 milliLiter(s) Nebulizer every 6 hours PRN Shortness of Breath and/or Wheezing  aluminum hydroxide/magnesium hydroxide/simethicone Suspension 30 milliLiter(s) Oral every 4 hours PRN Dyspepsia  dextrose Oral Gel 15 Gram(s) Oral once PRN Blood Glucose LESS THAN 70 milliGRAM(s)/deciliter  melatonin 3 milliGRAM(s) Oral at bedtime PRN Insomnia  ondansetron Injectable 4 milliGRAM(s) IV Push every 8 hours PRN Nausea and/or Vomiting      Vital Signs Last 24 Hrs  T(C): 36.6 (16 Mar 2025 10:26), Max: 36.8 (15 Mar 2025 16:30)  T(F): 97.9 (16 Mar 2025 10:26), Max: 98.2 (15 Mar 2025 16:30)  HR: 61 (16 Mar 2025 10:26) (61 - 82)  BP: 105/59 (16 Mar 2025 10:26) (95/50 - 119/69)  BP(mean): --  RR: 18 (16 Mar 2025 10:26) (17 - 18)  SpO2: 98% (16 Mar 2025 10:26) (91% - 99%)    Parameters below as of 16 Mar 2025 08:01  Patient On (Oxygen Delivery Method): room air      ICU Vital Signs Last 24 Hrs  JONE SOUTH  I&O's Detail    15 Mar 2025 07:01  -  16 Mar 2025 07:00  --------------------------------------------------------  IN:    Oral Fluid: 720 mL  Total IN: 720 mL    OUT:    Voided (mL): 1000 mL  Total OUT: 1000 mL    Total NET: -280 mL        I&O's Summary    15 Mar 2025 07:01  -  16 Mar 2025 07:00  --------------------------------------------------------  IN: 720 mL / OUT: 1000 mL / NET: -280 mL      Drug Dosing Weight  JONEFERNANDO WEEMSGIORGIO      PHYSICAL EXAM:  General: Appears comfortable, alert and cooperative.  HEENT: Normocephalic, atraumatic.  Eyes: Pupils reactive, cornea wnl.  Neck: Supple, no nodes adenopathy; no JVD, carotid bruit or thyromegaly.  CARDIOVASCULAR: soft S2 with 3/6 late peaking HELDER  LUNGS: No rales, rhonchi or wheeze. Normal breath sounds bilaterally.  ABDOMEN: Soft, nontender without mass or organomegaly. bowel sounds normoactive.  EXTREMITIES: No clubbing, cyanosis or edema. Distal pulses wnl.   SKIN: Warm and dry with normal turgor.  NEURO: Alert/oriented x 3/normal motor exam  PSYCH: Appropriate affect        LABS:                        10.0   10.54 )-----------( 387      ( 16 Mar 2025 05:14 )             32.0     03-16    137  |  102  |  46.6[H]  ----------------------------<  113[H]  4.8   |  26.0  |  1.11    Ca    8.5      16 Mar 2025 05:14  Phos  3.2     03-15  Mg     2.2     03-16    TPro  6.2[L]  /  Alb  2.6[L]  /  TBili  0.3[L]  /  DBili  x   /  AST  32  /  ALT  23  /  AlkPhos  98  03-16    JONE SOUTH      PT/INR - ( 16 Mar 2025 05:14 )   PT: 12.4 sec;   INR: 1.10 ratio         PTT - ( 16 Mar 2025 05:14 )  PTT:26.0 sec  Urinalysis Basic - ( 16 Mar 2025 05:14 )    Color: x / Appearance: x / SG: x / pH: x  Gluc: 113 mg/dL / Ketone: x  / Bili: x / Urobili: x   Blood: x / Protein: x / Nitrite: x   Leuk Esterase: x / RBC: x / WBC x   Sq Epi: x / Non Sq Epi: x / Bacteria: x        RADIOLOGY & ADDITIONAL STUDIES:    INTERPRETATION OF TELEMETRY (personally reviewed): atrial fibrillation, nocturnal brief pauses    ASSESSMENT AND PLAN:  In summary, JONE SOUTH is an 84y Male with moderate CAD, hypertension, lung CA s/p chemo and radiation, history of DVT s/p AC who presented with shortness of breath. CTA negative for PE. Tele with paroxysmal atrial fibrillation with PVC. TTE showed EF 40-45% with severe AS and cath on 3/14 showed mild RCA and LAD disease, normal wedge    - CTS recs reviewed  - Will tentatively plan for CARLOS tomorrow for further assessment of aortic valve  - ? Toprol contributing to orthostatic hypotension, decrease to 25 mg daily  - BUN/Cr also elevated, hold lasix  - Continue midodrine  - AF episodes more prolonged, would start AC with heparin gtt, long term DOAC. Discussed with patient and family. He does have a history of hematuria on AC, but was also able to tolerate months of Eliquis. At this point, benefits of AC outweigh risks. If truly unable to tolerate longterm AC, can consider Watchman  - NPO p MN

## 2025-03-17 DIAGNOSIS — I35.0 NONRHEUMATIC AORTIC (VALVE) STENOSIS: ICD-10-CM

## 2025-03-17 LAB
ALBUMIN SERPL ELPH-MCNC: 2.6 G/DL — LOW (ref 3.3–5.2)
ALP SERPL-CCNC: 97 U/L — SIGNIFICANT CHANGE UP (ref 40–120)
ALT FLD-CCNC: 23 U/L — SIGNIFICANT CHANGE UP
ANION GAP SERPL CALC-SCNC: 11 MMOL/L — SIGNIFICANT CHANGE UP (ref 5–17)
APTT BLD: 140.3 SEC — CRITICAL HIGH (ref 24.5–35.6)
APTT BLD: 24 SEC — LOW (ref 24.5–35.6)
APTT BLD: 31.8 SEC — SIGNIFICANT CHANGE UP (ref 24.5–35.6)
AST SERPL-CCNC: 26 U/L — SIGNIFICANT CHANGE UP
BILIRUB SERPL-MCNC: 0.3 MG/DL — LOW (ref 0.4–2)
BUN SERPL-MCNC: 40.8 MG/DL — HIGH (ref 8–20)
CALCIUM SERPL-MCNC: 8.8 MG/DL — SIGNIFICANT CHANGE UP (ref 8.4–10.5)
CHLORIDE SERPL-SCNC: 104 MMOL/L — SIGNIFICANT CHANGE UP (ref 96–108)
CO2 SERPL-SCNC: 25 MMOL/L — SIGNIFICANT CHANGE UP (ref 22–29)
CREAT SERPL-MCNC: 1.11 MG/DL — SIGNIFICANT CHANGE UP (ref 0.5–1.3)
EGFR: 66 ML/MIN/1.73M2 — SIGNIFICANT CHANGE UP
GLUCOSE BLDC GLUCOMTR-MCNC: 102 MG/DL — HIGH (ref 70–99)
GLUCOSE BLDC GLUCOMTR-MCNC: 110 MG/DL — HIGH (ref 70–99)
GLUCOSE BLDC GLUCOMTR-MCNC: 124 MG/DL — HIGH (ref 70–99)
GLUCOSE BLDC GLUCOMTR-MCNC: 127 MG/DL — HIGH (ref 70–99)
GLUCOSE SERPL-MCNC: 108 MG/DL — HIGH (ref 70–99)
HCT VFR BLD CALC: 30.8 % — LOW (ref 39–50)
HGB BLD-MCNC: 9.6 G/DL — LOW (ref 13–17)
MCHC RBC-ENTMCNC: 27.6 PG — SIGNIFICANT CHANGE UP (ref 27–34)
MCHC RBC-ENTMCNC: 31.2 G/DL — LOW (ref 32–36)
NRBC # BLD AUTO: 0 K/UL — SIGNIFICANT CHANGE UP (ref 0–0)
NRBC # FLD: 0 K/UL — SIGNIFICANT CHANGE UP (ref 0–0)
NRBC BLD AUTO-RTO: 0 /100 WBCS — SIGNIFICANT CHANGE UP (ref 0–0)
PLATELET # BLD AUTO: 377 K/UL — SIGNIFICANT CHANGE UP (ref 150–400)
PMV BLD: 10 FL — SIGNIFICANT CHANGE UP (ref 7–13)
POTASSIUM SERPL-MCNC: 4.3 MMOL/L — SIGNIFICANT CHANGE UP (ref 3.5–5.3)
POTASSIUM SERPL-SCNC: 4.3 MMOL/L — SIGNIFICANT CHANGE UP (ref 3.5–5.3)
PROT SERPL-MCNC: 6 G/DL — LOW (ref 6.6–8.7)
RBC # BLD: 3.48 M/UL — LOW (ref 4.2–5.8)
RBC # FLD: 19.9 % — HIGH (ref 10.3–14.5)
SODIUM SERPL-SCNC: 140 MMOL/L — SIGNIFICANT CHANGE UP (ref 135–145)
WBC # BLD: 9.58 K/UL — SIGNIFICANT CHANGE UP (ref 3.8–10.5)
WBC # FLD AUTO: 9.58 K/UL — SIGNIFICANT CHANGE UP (ref 3.8–10.5)

## 2025-03-17 PROCEDURE — 99232 SBSQ HOSP IP/OBS MODERATE 35: CPT | Mod: FS

## 2025-03-17 PROCEDURE — 99233 SBSQ HOSP IP/OBS HIGH 50: CPT

## 2025-03-17 RX ORDER — HEPARIN SODIUM 1000 [USP'U]/ML
1500 INJECTION INTRAVENOUS; SUBCUTANEOUS
Qty: 25000 | Refills: 0 | Status: DISCONTINUED | OUTPATIENT
Start: 2025-03-17 | End: 2025-03-19

## 2025-03-17 RX ADMIN — MIDODRINE HYDROCHLORIDE 10 MILLIGRAM(S): 5 TABLET ORAL at 06:33

## 2025-03-17 RX ADMIN — HEPARIN SODIUM 0 UNIT(S)/HR: 1000 INJECTION INTRAVENOUS; SUBCUTANEOUS at 10:28

## 2025-03-17 RX ADMIN — HEPARIN SODIUM UNIT(S)/HR: 1000 INJECTION INTRAVENOUS; SUBCUTANEOUS at 06:34

## 2025-03-17 RX ADMIN — HEPARIN SODIUM UNIT(S)/HR: 1000 INJECTION INTRAVENOUS; SUBCUTANEOUS at 18:45

## 2025-03-17 RX ADMIN — HEPARIN SODIUM UNIT(S)/HR: 1000 INJECTION INTRAVENOUS; SUBCUTANEOUS at 03:39

## 2025-03-17 RX ADMIN — Medication 1 DOSE(S): at 21:08

## 2025-03-17 RX ADMIN — HEPARIN SODIUM 1500 UNIT(S)/HR: 1000 INJECTION INTRAVENOUS; SUBCUTANEOUS at 19:11

## 2025-03-17 RX ADMIN — Medication 2 TABLET(S): at 22:30

## 2025-03-17 RX ADMIN — HEPARIN SODIUM UNIT(S)/HR: 1000 INJECTION INTRAVENOUS; SUBCUTANEOUS at 11:29

## 2025-03-17 RX ADMIN — TIOTROPIUM BROMIDE INHALATION SPRAY 2 PUFF(S): 3.12 SPRAY, METERED RESPIRATORY (INHALATION) at 09:00

## 2025-03-17 RX ADMIN — MIDODRINE HYDROCHLORIDE 10 MILLIGRAM(S): 5 TABLET ORAL at 11:26

## 2025-03-17 RX ADMIN — HEPARIN SODIUM 1500 UNIT(S)/HR: 1000 INJECTION INTRAVENOUS; SUBCUTANEOUS at 19:09

## 2025-03-17 RX ADMIN — Medication 40 MILLIGRAM(S): at 06:33

## 2025-03-17 RX ADMIN — POLYETHYLENE GLYCOL 3350 17 GRAM(S): 17 POWDER, FOR SOLUTION ORAL at 11:26

## 2025-03-17 RX ADMIN — HEPARIN SODIUM UNIT(S)/HR: 1000 INJECTION INTRAVENOUS; SUBCUTANEOUS at 07:35

## 2025-03-17 RX ADMIN — FUROSEMIDE 20 MILLIGRAM(S): 10 INJECTION INTRAMUSCULAR; INTRAVENOUS at 06:33

## 2025-03-17 RX ADMIN — ATORVASTATIN CALCIUM 40 MILLIGRAM(S): 80 TABLET, FILM COATED ORAL at 22:30

## 2025-03-17 RX ADMIN — Medication 1 DOSE(S): at 09:20

## 2025-03-17 RX ADMIN — MIDODRINE HYDROCHLORIDE 10 MILLIGRAM(S): 5 TABLET ORAL at 17:26

## 2025-03-17 RX ADMIN — METOPROLOL SUCCINATE 12.5 MILLIGRAM(S): 50 TABLET, EXTENDED RELEASE ORAL at 06:33

## 2025-03-17 RX ADMIN — Medication 81 MILLIGRAM(S): at 11:26

## 2025-03-17 NOTE — PROGRESS NOTE ADULT - ASSESSMENT
83 y/o M w/ PMH lung cancer (previously on immunotherapy),  chronic RBBB/LAFB,DVT not AC, HTN, copd not on home O2 and CAD presenting for increasing SOB for the last 1 week. Per wife, Schedule for PET scan last week, was unable to go for continued fatigue, states has had progressive SOB for the last few days. Also endorses that he has been having increased urinary frequency though no dysuria. Found to be hypoxic and UA grossly positive. Imaging was significant for findings of pyelonephritis. ID was consulted and patient continues to require inpatient care for IV Abx and for hematuria.    Acute hypoxic respiratory failure secondary to HF vs PNA vs recurrent pleural effusions vs severe AS  Acute on chronic systolic congestive heart failure, CAD, HTN  pAF  History of COPD not on home O2  - TTE showing EF 40-45% with severe AS  - will start heparin drip as per cardiology recs  - Continue aspirin  - Continue Trelegy interchange  - Started Duonebs PRN  - Cardiology following, recs noted  - Discontinue Lasix and beta blocker as pt continues to have persistent orthostatic hypotension  - Increased midodrine to 10mg TID for orthostatic hypotension, encouraging oral hydration  - sp lhc showing mild rca and lad disease   -CARLOS on 03/18, npo midnight    Acute blood loss anemia  Hematuria in the setting of pyelonephritis  - Holding heparin gtt  - Condom catheter showing red urine drainage  - CBC stable for now  - Urology consulted but not recommending any intervention  - Monitor for hematuria resolution (resolved on 3/10)  - Needs to follow up with urology as an outpatient  - Has required qureshi in the past for BPH  - Discontinue Flomax and finasteride    Sepsis secondary to complicated urinary tract infection vs PNA  - Blood cultures  3/4 reporting No growth   - RVP/COVID 19 PCR 3/4 negative   - UA 3/4 with pyuria; Urine Cx 3/4 reporting E coli   - CTA chest 3/4 reporting no PE. Little change in right upper lobe parenchymal consolidation with air bronchograms from prior CT 1/17  - CT A/P 3/4 reporting bilateral hydroureteronephrosis with asymmetric left perinephric edema  - UA 3/4 with pyuria   - CXR on 3/9 showing worsening right lung consolidations (my read) from previous CXR  - Procal negative, Discontinued doxycyline on 3/10  - Finished course of Rocephin    SHANNEN on CKD stage 3  Bilateral hydronephrosis  - Was previously discharged on flomax and had qureshi which was taken out by urology  - Endorses overflow incontinence symptoms  - CTA chest as above showing bilateral hydronephrosis and left sided pyelonephritis  - Discontinued Flomax and finasteride    HLD  - Continue atorvastatin    GERD  - Continue Protonix    Constipation  - cw senna and miralax      DVT/GI ppx: Holding due to hematuria  Diet: DASH, npo midnight  Code Status: Full code  Dispo: medically active. still unable to maintain bp when standing. pending CTS eval for inpatient TAVR. PT rec for home PT

## 2025-03-17 NOTE — PROGRESS NOTE ADULT - SUBJECTIVE AND OBJECTIVE BOX
SIGNIFICANT RECENT/PAST 24 HR EVENTS:  No acute events reported overnight.     SUBJECTIVE:  Pt currently lying in bed in NAD. Still complaining of dizziness when standing. Denies walking Denies fevers, chills, HA, dizziness, CP, SOB, abd pain, N/V/D, numbness/tingling in extremities, or any other acute complaints.     PAST MEDICAL & SURGICAL HISTORY:  Abnormal findings on diagnostic imaging of lung    Hypertension    Hyperlipidemia    CAD (coronary artery disease)    Left anterior fascicular block (LAFB)    RBBB      S/P hip replacement, right    S/P hip replacement, left        DAILY REVIEW:  Telemetry:  Vitals   ICU Vital Signs Last 24 Hrs  T(C): 36.5 (17 Mar 2025 11:17), Max: 37 (17 Mar 2025 05:04)  T(F): 97.7 (17 Mar 2025 11:17), Max: 98.6 (17 Mar 2025 05:04)  HR: 82 (17 Mar 2025 11:17) (65 - 82)  BP: 94/64 (17 Mar 2025 11:17) (94/64 - 127/69)  BP(mean): --  ABP: --  ABP(mean): --  RR: 18 (17 Mar 2025 11:17) (18 - 18)  SpO2: 98% (17 Mar 2025 11:17) (94% - 98%)    O2 Parameters below as of 16 Mar 2025 23:47  Patient On (Oxygen Delivery Method): room air            I&O's Detail    16 Mar 2025 07:01  -  17 Mar 2025 07:00  --------------------------------------------------------  IN:    Oral Fluid: 840 mL  Total IN: 840 mL    OUT:    Voided (mL): 1800 mL  Total OUT: 1800 mL    Total NET: -960 mL          Daily     Daily Weight in k.2 (17 Mar 2025 05:04)  Admit Wt: Drug Dosing Weight  Height (cm): 177.8 (04 Mar 2025 09:50)  Weight (kg): 76 (04 Mar 2025 10:46)  BMI (kg/m2): 24 (04 Mar 2025 10:46)  BSA (m2): 1.94 (04 Mar 2025 10:46)    LABS:                        9.6    9.58  )-----------( 377      ( 17 Mar 2025 05:59 )             30.8         140  |  104  |  40.8[H]  ----------------------------<  108[H]  4.3   |  25.0  |  1.11    Ca    8.8      17 Mar 2025 05:59  Mg     2.2         TPro  6.0[L]  /  Alb  2.6[L]  /  TBili  0.3[L]  /  DBili  x   /  AST  26  /  ALT  23  /  AlkPhos  97      LIVER FUNCTIONS - ( 17 Mar 2025 05:59 )  Alb: 2.6 g/dL / Pro: 6.0 g/dL / ALK PHOS: 97 U/L / ALT: 23 U/L / AST: 26 U/L / GGT: x           PT/INR - ( 16 Mar 2025 05:14 )   PT: 12.4 sec;   INR: 1.10 ratio         PTT - ( 17 Mar 2025 09:46 )  PTT:140.3 sec        Urinalysis Basic - ( 17 Mar 2025 05:59 )    Color: x / Appearance: x / SG: x / pH: x  Gluc: 108 mg/dL / Ketone: x  / Bili: x / Urobili: x   Blood: x / Protein: x / Nitrite: x   Leuk Esterase: x / RBC: x / WBC x   Sq Epi: x / Non Sq Epi: x / Bacteria: x      POCT Blood Glucose.: 127 mg/dL (25 @ 11:25)  POCT Blood Glucose.: 110 mg/dL (25 @ 07:42)  POCT Blood Glucose.: 120 mg/dL (25 @ 21:04)  POCT Blood Glucose.: 112 mg/dL (25 @ 17:33)          MEDICATIONS  MEDICATIONS  (STANDING):  aspirin  chewable 81 milliGRAM(s) Oral daily  atorvastatin 40 milliGRAM(s) Oral at bedtime  dextrose 5%. 1000 milliLiter(s) (50 mL/Hr) IV Continuous <Continuous>  dextrose 5%. 1000 milliLiter(s) (100 mL/Hr) IV Continuous <Continuous>  dextrose 50% Injectable 25 Gram(s) IV Push once  dextrose 50% Injectable 12.5 Gram(s) IV Push once  dextrose 50% Injectable 25 Gram(s) IV Push once  fluticasone propionate/ salmeterol 100-50 MICROgram(s) Diskus 1 Dose(s) Inhalation two times a day  glucagon  Injectable 1 milliGRAM(s) IntraMuscular once  heparin  Infusion.  Unit(s)/Hr (14 mL/Hr) IV Continuous <Continuous>  insulin lispro (ADMELOG) corrective regimen sliding scale   SubCutaneous three times a day before meals  midodrine. 10 milliGRAM(s) Oral three times a day  pantoprazole    Tablet 40 milliGRAM(s) Oral before breakfast  polyethylene glycol 3350 17 Gram(s) Oral daily  senna 2 Tablet(s) Oral at bedtime  tiotropium 2.5 MICROgram(s) Inhaler 2 Puff(s) Inhalation daily    MEDICATIONS  (PRN):  acetaminophen     Tablet .. 650 milliGRAM(s) Oral every 6 hours PRN Temp greater or equal to 38C (100.4F), Mild Pain (1 - 3)  albuterol/ipratropium for Nebulization 3 milliLiter(s) Nebulizer every 6 hours PRN Shortness of Breath and/or Wheezing  aluminum hydroxide/magnesium hydroxide/simethicone Suspension 30 milliLiter(s) Oral every 4 hours PRN Dyspepsia  dextrose Oral Gel 15 Gram(s) Oral once PRN Blood Glucose LESS THAN 70 milliGRAM(s)/deciliter  melatonin 3 milliGRAM(s) Oral at bedtime PRN Insomnia  ondansetron Injectable 4 milliGRAM(s) IV Push every 8 hours PRN Nausea and/or Vomiting      ALLERGIES:  No Known Allergies      DIAGNOSTICS:  All laboratory results, radiology and medications reviewed.    PHYSICAL EXAM:  Constitutional: NAD  Neck: supple, trachea midline. No JVD   Respiratory: Breath sounds diminished b/l lower fields to auscultation, no accessory muscle use noted. No wheezing, rales, or rhonchi noted b/l   Cardiovascular: Regular rate, regular rhythm, normal S1, S2; no murmurs or rub   Gastrointestinal: Soft, non-tender, non-distended, hypoactive bowel sounds   Extremities: VILLAGOMEZ x 4, trace LE peripheral edema, no cyanosis, no clubbing    Vascular: Equal and normal pulses: 2+ peripheral pulses throughout  Neurological: A+O x 3; speech clear and intact; no gross sensory/motor deficits  Psychiatric: calm, normal mood, normal affect   Skin: warm, dry, well perfused, no rashes   Tubes/Lines: Mediastinal & Lt pleural CTs to sxn, serosang drainage, no obvious air leak noted; RIJ Cordis, LR joce  Incision: Sternum with Mepilex dressing CDI - No audible/palpable click; R EVH without bleeding/discharge - wrapped with ACE bandage / Mepilex dressing CDI  Epicardial Wires: 1V1G connected to generator

## 2025-03-17 NOTE — PROVIDER CONTACT NOTE (OTHER) - ASSESSMENT
rest of vs stable, pt not complaining of any dizziness
Pt is asymptomatic; Pt is still in rate controlled afib rhythm

## 2025-03-17 NOTE — PROGRESS NOTE ADULT - SUBJECTIVE AND OBJECTIVE BOX
[facility] Division of Hospital Medicine    pt seen and examined at bedside  still having orthostatic hypotension -- d/w family, will dc standing lasix and beta blocker  pt feels okay, no complaints  CARLOS rescheduled for tomorrow  no ha, dizziness, cp, sob, abd pain, nausea, vomiting, chills, fevers    MEDICATIONS  (STANDING):  aspirin  chewable 81 milliGRAM(s) Oral daily  atorvastatin 40 milliGRAM(s) Oral at bedtime  dextrose 5%. 1000 milliLiter(s) (50 mL/Hr) IV Continuous <Continuous>  dextrose 5%. 1000 milliLiter(s) (100 mL/Hr) IV Continuous <Continuous>  dextrose 50% Injectable 25 Gram(s) IV Push once  dextrose 50% Injectable 12.5 Gram(s) IV Push once  dextrose 50% Injectable 25 Gram(s) IV Push once  fluticasone propionate/ salmeterol 100-50 MICROgram(s) Diskus 1 Dose(s) Inhalation two times a day  glucagon  Injectable 1 milliGRAM(s) IntraMuscular once  heparin  Infusion.  Unit(s)/Hr (14 mL/Hr) IV Continuous <Continuous>  insulin lispro (ADMELOG) corrective regimen sliding scale   SubCutaneous three times a day before meals  midodrine. 10 milliGRAM(s) Oral three times a day  pantoprazole    Tablet 40 milliGRAM(s) Oral before breakfast  polyethylene glycol 3350 17 Gram(s) Oral daily  senna 2 Tablet(s) Oral at bedtime  tiotropium 2.5 MICROgram(s) Inhaler 2 Puff(s) Inhalation daily    MEDICATIONS  (PRN):  acetaminophen     Tablet .. 650 milliGRAM(s) Oral every 6 hours PRN Temp greater or equal to 38C (100.4F), Mild Pain (1 - 3)  albuterol/ipratropium for Nebulization 3 milliLiter(s) Nebulizer every 6 hours PRN Shortness of Breath and/or Wheezing  aluminum hydroxide/magnesium hydroxide/simethicone Suspension 30 milliLiter(s) Oral every 4 hours PRN Dyspepsia  dextrose Oral Gel 15 Gram(s) Oral once PRN Blood Glucose LESS THAN 70 milliGRAM(s)/deciliter  melatonin 3 milliGRAM(s) Oral at bedtime PRN Insomnia  ondansetron Injectable 4 milliGRAM(s) IV Push every 8 hours PRN Nausea and/or Vomiting        I&O's Summary    16 Mar 2025 07:01  -  17 Mar 2025 07:00  --------------------------------------------------------  IN: 840 mL / OUT: 1800 mL / NET: -960 mL        PHYSICAL EXAM:  Vital Signs Last 24 Hrs  T(C): 36.5 (17 Mar 2025 11:17), Max: 37 (17 Mar 2025 05:04)  T(F): 97.7 (17 Mar 2025 11:17), Max: 98.6 (17 Mar 2025 05:04)  HR: 82 (17 Mar 2025 11:17) (65 - 84)  BP: 94/64 (17 Mar 2025 11:17) (94/64 - 127/69)  BP(mean): 89 (16 Mar 2025 12:15) (89 - 89)  RR: 18 (17 Mar 2025 11:17) (18 - 18)  SpO2: 98% (17 Mar 2025 11:17) (94% - 98%)    Parameters below as of 16 Mar 2025 23:47  Patient On (Oxygen Delivery Method): room air            CONSTITUTIONAL: NAD, well-groomed  ENMT: Moist oral mucosa, no pharyngeal injection or exudates; normal dentition  RESPIRATORY: Normal respiratory effort; lungs are clear to auscultation bilaterally  CARDIOVASCULAR: systolic murmur present most prominent in aortic region  ABDOMEN: Nontender to palpation, normoactive bowel sounds, no rebound/guarding; No hepatosplenomegaly  MUSCLOSKELETAL:  Normal gait; no clubbing or cyanosis of digits; no joint swelling or tenderness to palpation  PSYCH: A+O to person, place, and time; affect appropriate  NEUROLOGY: CN 2-12 are intact and symmetric; no gross sensory deficits;   SKIN: No rashes; no palpable lesions    LABS:                        9.6    9.58  )-----------( 377      ( 17 Mar 2025 05:59 )             30.8     03-17    140  |  104  |  40.8[H]  ----------------------------<  108[H]  4.3   |  25.0  |  1.11    Ca    8.8      17 Mar 2025 05:59  Mg     2.2     03-16    TPro  6.0[L]  /  Alb  2.6[L]  /  TBili  0.3[L]  /  DBili  x   /  AST  26  /  ALT  23  /  AlkPhos  97  03-17    PT/INR - ( 16 Mar 2025 05:14 )   PT: 12.4 sec;   INR: 1.10 ratio         PTT - ( 17 Mar 2025 09:46 )  PTT:140.3 sec      Urinalysis Basic - ( 17 Mar 2025 05:59 )    Color: x / Appearance: x / SG: x / pH: x  Gluc: 108 mg/dL / Ketone: x  / Bili: x / Urobili: x   Blood: x / Protein: x / Nitrite: x   Leuk Esterase: x / RBC: x / WBC x   Sq Epi: x / Non Sq Epi: x / Bacteria: x        CAPILLARY BLOOD GLUCOSE      POCT Blood Glucose.: 127 mg/dL (17 Mar 2025 11:25)  POCT Blood Glucose.: 110 mg/dL (17 Mar 2025 07:42)  POCT Blood Glucose.: 120 mg/dL (16 Mar 2025 21:04)  POCT Blood Glucose.: 112 mg/dL (16 Mar 2025 17:33)  POCT Blood Glucose.: 110 mg/dL (16 Mar 2025 12:31)        RADIOLOGY & ADDITIONAL TESTS:  Results Reviewed:   Imaging Personally Reviewed:  Electrocardiogram Personally Reviewed:

## 2025-03-17 NOTE — PROGRESS NOTE ADULT - ASSESSMENT
84y Male with moderate CAD, hypertension, lung CA s/p chemo and radiation 2023 history of DVT ,PAF,  orthostatic on admission, s/p AC who presented with shortness of breath. CTA negative for PE. TTE showed EF 40-45% with severe AS and cath on 3/14 showed mild RCA and LAD disease, normal wedge > CTS consulted TAVR evaluation

## 2025-03-17 NOTE — PROVIDER CONTACT NOTE (OTHER) - ACTION/TREATMENT ORDERED:
Pt legs elevated in recliner, 2L NC applied, 500 ml bolus of NS ordered and will be given. Repeated BP after bolus.
Afib can have pauses up to 4 seconds without concern; continue monitoring and notify if he becomes asymptomatic
Keep bed alarm on, patient cant walk around the room, needs to get up with assistance. continue giving midodrine as ordered, ok to not get standing bp and hr

## 2025-03-17 NOTE — PROGRESS NOTE ADULT - SUBJECTIVE AND OBJECTIVE BOX
[facility]                                   [address] [address]                                                      PHONE: [phone]                                                         FAX: [phone]  [email or web address]  ---------------------------------------------------------------------------------------------------------------------------------    Overnight events/patient complaints:      NAD feeling well today     No Known Allergies    MEDICATIONS  (STANDING):  aspirin  chewable 81 milliGRAM(s) Oral daily  atorvastatin 40 milliGRAM(s) Oral at bedtime  dextrose 5%. 1000 milliLiter(s) (50 mL/Hr) IV Continuous <Continuous>  dextrose 5%. 1000 milliLiter(s) (100 mL/Hr) IV Continuous <Continuous>  dextrose 50% Injectable 25 Gram(s) IV Push once  dextrose 50% Injectable 12.5 Gram(s) IV Push once  dextrose 50% Injectable 25 Gram(s) IV Push once  fluticasone propionate/ salmeterol 100-50 MICROgram(s) Diskus 1 Dose(s) Inhalation two times a day  glucagon  Injectable 1 milliGRAM(s) IntraMuscular once  heparin  Infusion.  Unit(s)/Hr (14 mL/Hr) IV Continuous <Continuous>  insulin lispro (ADMELOG) corrective regimen sliding scale   SubCutaneous three times a day before meals  midodrine. 10 milliGRAM(s) Oral three times a day  pantoprazole    Tablet 40 milliGRAM(s) Oral before breakfast  polyethylene glycol 3350 17 Gram(s) Oral daily  senna 2 Tablet(s) Oral at bedtime  tiotropium 2.5 MICROgram(s) Inhaler 2 Puff(s) Inhalation daily    MEDICATIONS  (PRN):  acetaminophen     Tablet .. 650 milliGRAM(s) Oral every 6 hours PRN Temp greater or equal to 38C (100.4F), Mild Pain (1 - 3)  albuterol/ipratropium for Nebulization 3 milliLiter(s) Nebulizer every 6 hours PRN Shortness of Breath and/or Wheezing  aluminum hydroxide/magnesium hydroxide/simethicone Suspension 30 milliLiter(s) Oral every 4 hours PRN Dyspepsia  dextrose Oral Gel 15 Gram(s) Oral once PRN Blood Glucose LESS THAN 70 milliGRAM(s)/deciliter  melatonin 3 milliGRAM(s) Oral at bedtime PRN Insomnia  ondansetron Injectable 4 milliGRAM(s) IV Push every 8 hours PRN Nausea and/or Vomiting      Vital Signs Last 24 Hrs  T(C): 37 (17 Mar 2025 05:04), Max: 37 (17 Mar 2025 05:04)  T(F): 98.6 (17 Mar 2025 05:04), Max: 98.6 (17 Mar 2025 05:04)  HR: 65 (17 Mar 2025 05:04) (61 - 84)  BP: 127/69 (17 Mar 2025 05:04) (102/63 - 127/69)  BP(mean): 89 (16 Mar 2025 12:15) (89 - 89)  RR: 18 (17 Mar 2025 05:04) (18 - 18)  SpO2: 98% (17 Mar 2025 05:04) (94% - 98%)    Parameters below as of 16 Mar 2025 23:47  Patient On (Oxygen Delivery Method): room air      ICU Vital Signs Last 24 Hrs  JONE SOUTH  I&O's Detail    16 Mar 2025 07:01  -  17 Mar 2025 07:00  --------------------------------------------------------  IN:    Oral Fluid: 840 mL  Total IN: 840 mL    OUT:    Voided (mL): 1800 mL  Total OUT: 1800 mL    Total NET: -960 mL        I&O's Summary    16 Mar 2025 07:01  -  17 Mar 2025 07:00  --------------------------------------------------------  IN: 840 mL / OUT: 1800 mL / NET: -960 mL      Drug Dosing Weight  JONE SOUTH      PHYSICAL EXAM:  General: Appears well developed, alert and cooperative.  HEENT: Head; normocephalic, atraumatic.  Eyes: Pupils reactive, cornea wnl.  Neck: Supple, no nodes adenopathy, no NVD or carotid bruit or thyromegaly.  CARDIOVASCULAR: Normal S1 and S2, 3/6 murmur, rub, gallop or lift.   LUNGS: No rales, rhonchi or wheeze. Normal breath sounds bilaterally.  ABDOMEN: Soft, nontender without mass or organomegaly. bowel sounds normoactive.  EXTREMITIES: No clubbing, cyanosis or edema. Distal pulses wnl.   SKIN: warm and dry with normal turgor.  NEURO: Alert/oriented x 3/normal motor exam. No pathologic reflexes.    PSYCH: normal affect.        LABS:                        9.6    9.58  )-----------( 377      ( 17 Mar 2025 05:59 )             30.8     03-17    140  |  104  |  40.8[H]  ----------------------------<  108[H]  4.3   |  25.0  |  1.11    Ca    8.8      17 Mar 2025 05:59  Mg     2.2     03-16    TPro  6.0[L]  /  Alb  2.6[L]  /  TBili  0.3[L]  /  DBili  x   /  AST  26  /  ALT  23  /  AlkPhos  97  03-17    JONE SOUTH      PT/INR - ( 16 Mar 2025 05:14 )   PT: 12.4 sec;   INR: 1.10 ratio         PTT - ( 17 Mar 2025 01:40 )  PTT:24.0 sec  Urinalysis Basic - ( 17 Mar 2025 05:59 )    Color: x / Appearance: x / SG: x / pH: x  Gluc: 108 mg/dL / Ketone: x  / Bili: x / Urobili: x   Blood: x / Protein: x / Nitrite: x   Leuk Esterase: x / RBC: x / WBC x   Sq Epi: x / Non Sq Epi: x / Bacteria: x        RADIOLOGY & ADDITIONAL STUDIES:    INTERPRETATION OF TELEMETRY (personally reviewed):      < from: TTE W or WO Ultrasound Enhancing Agent (03.04.25 @ 15:59) >     CONCLUSIONS:      1. Left ventricular cavity is normal in size. Left ventricular systolic function is mildly decreased with an ejection fraction visually estimated at 40 to45 %.   2. Normal right ventricular cavity size and normal right ventricular systolic function.   3. The right atrium is normal in size.   4. No pericardial effusion seen.   5. Analysis of left ventricular diastolic function and filling pressure is made challenging by the presence of atrial fibrillation.   6. Estimated pulmonary artery systolic pressure is 18 mmHg.   7. No left ventricular hypertrophy.   8. No prior echocardiogram is available for comparison.   9. Technically difficult image quality.  10. There is moderate calcification of the aortic valve leaflets. Severe aortic stenosis.    < end of copied text >      < from: Cardiac Catheterization (03.14.25 @ 12:58) >  Procedures Performed   Procedures:              1.    Ultrasound Guided Access     2.    Venous Access - Right Femoral   3.    RHC   4.    Diagnostic Coronary Angiography   5.    Left Heart Cath   6.    AngioSeal     Indications:               Severe aortic stenosis   Dyspnea   Suspected coronary artery disease     Diagnostic Conclusions:     Mild RCA and LAD disease   LVEF 45%   Normal wedge pressure and pulmonary pressures   Borderline low cardiac output   Recommendations:     Consult with valve clinic   Can consider TEEto evaluate aortic valve as aortic valve gradient  minimal on cath    < end of copied text >        Assessment and Recommendation:     Patient is an 83 yo man with chronic hypertension, moderate CAD on remote [facility] with normal perfusion on PET MPI 2022, chronic RBBB/LAFB, frequent unifocal ventricular ectopy, lung cancer s/p radiation and chemotherapy with course complicated by DVT s/p several months systemic AC who presents with several days progressive worsening dyspnea without reported concurrent chest pain, dizziness and palpitations. He is s/p CTA which was negative for acute pulmonary embolism however with pleural effusion and upper lobe consolidation with air bronchograms. Tele with paroxsymal atrial fibrillation with frequent ventricular ectopic beats.     Acute HFrEF in setting of severer AS and CKD PNA     + orthostatic on Midodrine TID      - Heparin gtt given thromboembolic risk and PAF on tele   -  Continue ASA 81mg and statin therapy  - Lasix PRN volume status table   - Compression lower extremity due to orthostatic hypotension   - NPO after midnight for CARLOS as per CT surgery recommendations   - Outpatient WATCHMAN evaluations

## 2025-03-17 NOTE — PROVIDER CONTACT NOTE (OTHER) - BACKGROUND
Pt is here for new onset afib, with new onset orthostatic hypotension. Pt has midodrine ordered
Pt is here for acute respiratory failure with new onset afib

## 2025-03-17 NOTE — PROGRESS NOTE ADULT - PROBLEM SELECTOR PLAN 1
Midodrine for orthostatic  Will CARLOS assess AV; Plan for CARLOS tuesday 3/18 with cardiology  Cath > non obs  TAVR Ct this week after CARLOS  Dr Almaraz to review  Discussed w/ pt/family at bedside

## 2025-03-18 ENCOUNTER — RESULT REVIEW (OUTPATIENT)
Age: 85
End: 2025-03-18

## 2025-03-18 LAB
ALP SERPL-CCNC: 104 U/L — SIGNIFICANT CHANGE UP (ref 40–120)
ALT FLD-CCNC: 25 U/L — SIGNIFICANT CHANGE UP
ANION GAP SERPL CALC-SCNC: 13 MMOL/L — SIGNIFICANT CHANGE UP (ref 5–17)
APTT BLD: 89 SEC — HIGH (ref 24.5–35.6)
APTT BLD: 89.8 SEC — HIGH (ref 24.5–35.6)
AST SERPL-CCNC: 24 U/L — SIGNIFICANT CHANGE UP
BILIRUB SERPL-MCNC: 0.3 MG/DL — LOW (ref 0.4–2)
BUN SERPL-MCNC: 40.4 MG/DL — HIGH (ref 8–20)
CALCIUM SERPL-MCNC: 8.9 MG/DL — SIGNIFICANT CHANGE UP (ref 8.4–10.5)
CHLORIDE SERPL-SCNC: 103 MMOL/L — SIGNIFICANT CHANGE UP (ref 96–108)
CO2 SERPL-SCNC: 25 MMOL/L — SIGNIFICANT CHANGE UP (ref 22–29)
CREAT SERPL-MCNC: 1.25 MG/DL — SIGNIFICANT CHANGE UP (ref 0.5–1.3)
EGFR: 57 ML/MIN/1.73M2 — LOW
EGFR: 57 ML/MIN/1.73M2 — LOW
GLUCOSE BLDC GLUCOMTR-MCNC: 113 MG/DL — HIGH (ref 70–99)
GLUCOSE BLDC GLUCOMTR-MCNC: 83 MG/DL — SIGNIFICANT CHANGE UP (ref 70–99)
GLUCOSE BLDC GLUCOMTR-MCNC: 99 MG/DL — SIGNIFICANT CHANGE UP (ref 70–99)
GLUCOSE SERPL-MCNC: 102 MG/DL — HIGH (ref 70–99)
HCT VFR BLD CALC: 32.8 % — LOW (ref 39–50)
HGB BLD-MCNC: 10.2 G/DL — LOW (ref 13–17)
HGB BLD-MCNC: 9.6 G/DL — LOW (ref 13–17)
MAGNESIUM SERPL-MCNC: 2.4 MG/DL — SIGNIFICANT CHANGE UP (ref 1.6–2.6)
MCHC RBC-ENTMCNC: 27.8 PG — SIGNIFICANT CHANGE UP (ref 27–34)
MCHC RBC-ENTMCNC: 27.9 PG — SIGNIFICANT CHANGE UP (ref 27–34)
MCHC RBC-ENTMCNC: 31.1 G/DL — LOW (ref 32–36)
MCHC RBC-ENTMCNC: 31.4 G/DL — LOW (ref 32–36)
MCV RBC AUTO: 88.7 FL — SIGNIFICANT CHANGE UP (ref 80–100)
MCV RBC AUTO: 89.9 FL — SIGNIFICANT CHANGE UP (ref 80–100)
NRBC # BLD AUTO: 0 K/UL — SIGNIFICANT CHANGE UP (ref 0–0)
NRBC # BLD AUTO: 0 K/UL — SIGNIFICANT CHANGE UP (ref 0–0)
NRBC # FLD: 0 K/UL — SIGNIFICANT CHANGE UP (ref 0–0)
NRBC # FLD: 0 K/UL — SIGNIFICANT CHANGE UP (ref 0–0)
NRBC BLD AUTO-RTO: 0 /100 WBCS — SIGNIFICANT CHANGE UP (ref 0–0)
NRBC BLD AUTO-RTO: 0 /100 WBCS — SIGNIFICANT CHANGE UP (ref 0–0)
PLATELET # BLD AUTO: 366 K/UL — SIGNIFICANT CHANGE UP (ref 150–400)
PLATELET # BLD AUTO: 371 K/UL — SIGNIFICANT CHANGE UP (ref 150–400)
PMV BLD: 10 FL — SIGNIFICANT CHANGE UP (ref 7–13)
PMV BLD: 9.8 FL — SIGNIFICANT CHANGE UP (ref 7–13)
POTASSIUM SERPL-MCNC: 4.7 MMOL/L — SIGNIFICANT CHANGE UP (ref 3.5–5.3)
POTASSIUM SERPL-SCNC: 4.7 MMOL/L — SIGNIFICANT CHANGE UP (ref 3.5–5.3)
PROT SERPL-MCNC: 6.1 G/DL — LOW (ref 6.6–8.7)
RBC # BLD: 3.45 M/UL — LOW (ref 4.2–5.8)
RBC # BLD: 3.65 M/UL — LOW (ref 4.2–5.8)
RBC # FLD: 19.8 % — HIGH (ref 10.3–14.5)
RBC # FLD: 19.8 % — HIGH (ref 10.3–14.5)
SODIUM SERPL-SCNC: 141 MMOL/L — SIGNIFICANT CHANGE UP (ref 135–145)
WBC # BLD: 9.65 K/UL — SIGNIFICANT CHANGE UP (ref 3.8–10.5)
WBC # BLD: 9.91 K/UL — SIGNIFICANT CHANGE UP (ref 3.8–10.5)
WBC # FLD AUTO: 9.65 K/UL — SIGNIFICANT CHANGE UP (ref 3.8–10.5)
WBC # FLD AUTO: 9.91 K/UL — SIGNIFICANT CHANGE UP (ref 3.8–10.5)

## 2025-03-18 PROCEDURE — 99233 SBSQ HOSP IP/OBS HIGH 50: CPT

## 2025-03-18 RX ORDER — MIDODRINE HYDROCHLORIDE 5 MG/1
15 TABLET ORAL THREE TIMES A DAY
Refills: 0 | Status: DISCONTINUED | OUTPATIENT
Start: 2025-03-18 | End: 2025-03-22

## 2025-03-18 RX ADMIN — POLYETHYLENE GLYCOL 3350 17 GRAM(S): 17 POWDER, FOR SOLUTION ORAL at 12:54

## 2025-03-18 RX ADMIN — MIDODRINE HYDROCHLORIDE 10 MILLIGRAM(S): 5 TABLET ORAL at 06:01

## 2025-03-18 RX ADMIN — HEPARIN SODIUM 1500 UNIT(S)/HR: 1000 INJECTION INTRAVENOUS; SUBCUTANEOUS at 01:21

## 2025-03-18 RX ADMIN — Medication 2 TABLET(S): at 22:09

## 2025-03-18 RX ADMIN — HEPARIN SODIUM 1500 UNIT(S)/HR: 1000 INJECTION INTRAVENOUS; SUBCUTANEOUS at 08:38

## 2025-03-18 RX ADMIN — HEPARIN SODIUM 1500 UNIT(S)/HR: 1000 INJECTION INTRAVENOUS; SUBCUTANEOUS at 01:18

## 2025-03-18 RX ADMIN — ATORVASTATIN CALCIUM 40 MILLIGRAM(S): 80 TABLET, FILM COATED ORAL at 22:09

## 2025-03-18 RX ADMIN — HEPARIN SODIUM 1500 UNIT(S)/HR: 1000 INJECTION INTRAVENOUS; SUBCUTANEOUS at 19:40

## 2025-03-18 RX ADMIN — MIDODRINE HYDROCHLORIDE 15 MILLIGRAM(S): 5 TABLET ORAL at 17:05

## 2025-03-18 RX ADMIN — HEPARIN SODIUM 1500 UNIT(S)/HR: 1000 INJECTION INTRAVENOUS; SUBCUTANEOUS at 19:45

## 2025-03-18 RX ADMIN — Medication 40 MILLIGRAM(S): at 06:01

## 2025-03-18 RX ADMIN — HEPARIN SODIUM 1500 UNIT(S)/HR: 1000 INJECTION INTRAVENOUS; SUBCUTANEOUS at 07:26

## 2025-03-18 RX ADMIN — Medication 81 MILLIGRAM(S): at 12:54

## 2025-03-18 RX ADMIN — MIDODRINE HYDROCHLORIDE 10 MILLIGRAM(S): 5 TABLET ORAL at 12:54

## 2025-03-18 NOTE — PROGRESS NOTE ADULT - ASSESSMENT
85 y/o M w/ PMH lung cancer (previously on immunotherapy),  chronic RBBB/LAFB,DVT not AC, HTN, copd not on home O2 and CAD presenting for increasing SOB for the last 1 week. Per wife, Schedule for PET scan last week, was unable to go for continued fatigue, states has had progressive SOB for the last few days. Also endorses that he has been having increased urinary frequency though no dysuria. Found to be hypoxic and UA grossly positive. Imaging was significant for findings of pyelonephritis. ID was consulted and patient continues to require inpatient care for IV Abx and for hematuria.    Acute hypoxic respiratory failure secondary to HF vs PNA vs recurrent pleural effusions vs severe AS  Acute on chronic systolic congestive heart failure, CAD, HTN  pAF  History of COPD not on home O2  - TTE showing EF 40-45% with severe AS  - will start heparin drip as per cardiology recs  - Continue aspirin  - Continue Trelegy interchange  - Started Duonebs PRN  - Cardiology following, recs noted  - Discontinue Lasix and beta blocker as pt continues to have persistent orthostatic hypotension  - Increased midodrine to 10mg TID for orthostatic hypotension, encouraging oral hydration  - sp lhc showing mild rca and lad disease   -CARLOS showing moderate AS    Postural hypotension  -will check stat cortistol, am cortistol, tsh  -inc midodrine to 15mg tid    Acute blood loss anemia  Hematuria in the setting of pyelonephritis  - Holding heparin gtt  - Condom catheter showing red urine drainage  - CBC stable for now  - Urology consulted but not recommending any intervention  - Monitor for hematuria resolution (resolved on 3/10)  - Needs to follow up with urology as an outpatient  - Has required qureshi in the past for BPH  - Discontinue Flomax and finasteride    Sepsis secondary to complicated urinary tract infection vs PNA  - Blood cultures  3/4 reporting No growth   - RVP/COVID 19 PCR 3/4 negative   - UA 3/4 with pyuria; Urine Cx 3/4 reporting E coli   - CTA chest 3/4 reporting no PE. Little change in right upper lobe parenchymal consolidation with air bronchograms from prior CT 1/17  - CT A/P 3/4 reporting bilateral hydroureteronephrosis with asymmetric left perinephric edema  - UA 3/4 with pyuria   - CXR on 3/9 showing worsening right lung consolidations (my read) from previous CXR  - Procal negative, Discontinued doxycyline on 3/10  - Finished course of Rocephin    SHANNEN on CKD stage 3  Bilateral hydronephrosis  - Was previously discharged on flomax and had qureshi which was taken out by urology  - Endorses overflow incontinence symptoms  - CTA chest as above showing bilateral hydronephrosis and left sided pyelonephritis  - Discontinued Flomax and finasteride    HLD  - Continue atorvastatin    GERD  - Continue Protonix    Constipation  - cw senna and miralax      DVT/GI ppx: scd  Diet: DASH  Code Status: Full code  Dispo: medically active.

## 2025-03-18 NOTE — PROGRESS NOTE ADULT - SUBJECTIVE AND OBJECTIVE BOX
[facility] Division of Hospital Medicine    pt seen and examined at bedside  pt sp zack - showing moderate AS  pt still having episodes of hypotension after standing for 2-3 mins despite lasix and beta blocker  no cp, sob, abd pain, nausea, vomiting    MEDICATIONS  (STANDING):  aspirin  chewable 81 milliGRAM(s) Oral daily  atorvastatin 40 milliGRAM(s) Oral at bedtime  dextrose 5%. 1000 milliLiter(s) (50 mL/Hr) IV Continuous <Continuous>  dextrose 5%. 1000 milliLiter(s) (100 mL/Hr) IV Continuous <Continuous>  dextrose 50% Injectable 25 Gram(s) IV Push once  dextrose 50% Injectable 12.5 Gram(s) IV Push once  dextrose 50% Injectable 25 Gram(s) IV Push once  fluticasone propionate/ salmeterol 100-50 MICROgram(s) Diskus 1 Dose(s) Inhalation two times a day  glucagon  Injectable 1 milliGRAM(s) IntraMuscular once  heparin  Infusion. 1500 Unit(s)/Hr (15 mL/Hr) IV Continuous <Continuous>  insulin lispro (ADMELOG) corrective regimen sliding scale   SubCutaneous three times a day before meals  midodrine. 10 milliGRAM(s) Oral three times a day  pantoprazole    Tablet 40 milliGRAM(s) Oral before breakfast  polyethylene glycol 3350 17 Gram(s) Oral daily  senna 2 Tablet(s) Oral at bedtime  tiotropium 2.5 MICROgram(s) Inhaler 2 Puff(s) Inhalation daily    MEDICATIONS  (PRN):  acetaminophen     Tablet .. 650 milliGRAM(s) Oral every 6 hours PRN Temp greater or equal to 38C (100.4F), Mild Pain (1 - 3)  albuterol/ipratropium for Nebulization 3 milliLiter(s) Nebulizer every 6 hours PRN Shortness of Breath and/or Wheezing  aluminum hydroxide/magnesium hydroxide/simethicone Suspension 30 milliLiter(s) Oral every 4 hours PRN Dyspepsia  dextrose Oral Gel 15 Gram(s) Oral once PRN Blood Glucose LESS THAN 70 milliGRAM(s)/deciliter  melatonin 3 milliGRAM(s) Oral at bedtime PRN Insomnia  ondansetron Injectable 4 milliGRAM(s) IV Push every 8 hours PRN Nausea and/or Vomiting        I&O's Summary    17 Mar 2025 07:01  -  18 Mar 2025 07:00  --------------------------------------------------------  IN: 0 mL / OUT: 1000 mL / NET: -1000 mL    18 Mar 2025 07:01  -  18 Mar 2025 16:22  --------------------------------------------------------  IN: 0 mL / OUT: 1100 mL / NET: -1100 mL        PHYSICAL EXAM:  Vital Signs Last 24 Hrs  T(C): 36.4 (18 Mar 2025 04:40), Max: 36.7 (17 Mar 2025 20:24)  T(F): 97.6 (18 Mar 2025 04:40), Max: 98.1 (17 Mar 2025 20:24)  HR: 68 (18 Mar 2025 12:20) (63 - 97)  BP: 108/71 (18 Mar 2025 12:20) (96/63 - 117/62)  BP(mean): --  RR: 15 (18 Mar 2025 12:20) (15 - 19)  SpO2: 100% (18 Mar 2025 12:20) (94% - 100%)    Parameters below as of 18 Mar 2025 12:20  Patient On (Oxygen Delivery Method): room air            CONSTITUTIONAL: NAD, well-groomed  ENMT: Moist oral mucosa, no pharyngeal injection or exudates; normal dentition  RESPIRATORY: Normal respiratory effort; lungs are clear to auscultation bilaterally  CARDIOVASCULAR: Regular rate and rhythm, normal S1 and S2, no murmur/rub/gallop; No lower extremity edema; Peripheral pulses are 2+ bilaterally  ABDOMEN: Nontender to palpation, normoactive bowel sounds, no rebound/guarding; No hepatosplenomegaly  MUSCLOSKELETAL:  Normal gait; no clubbing or cyanosis of digits; no joint swelling or tenderness to palpation  PSYCH: A+O to person, place, and time; affect appropriate  NEUROLOGY: CN 2-12 are intact and symmetric; no gross sensory deficits;   SKIN: No rashes; no palpable lesions    LABS:                        10.2   9.65  )-----------( 371      ( 18 Mar 2025 06:12 )             32.8     03-18    141  |  103  |  40.4[H]  ----------------------------<  102[H]  4.7   |  25.0  |  1.25    Ca    8.9      18 Mar 2025 06:12  Mg     2.4     03-18    TPro  6.1[L]  /  Alb  2.6[L]  /  TBili  0.3[L]  /  DBili  x   /  AST  24  /  ALT  25  /  AlkPhos  104  03-18    PTT - ( 18 Mar 2025 06:12 )  PTT:89.0 sec      Urinalysis Basic - ( 18 Mar 2025 06:12 )    Color: x / Appearance: x / SG: x / pH: x  Gluc: 102 mg/dL / Ketone: x  / Bili: x / Urobili: x   Blood: x / Protein: x / Nitrite: x   Leuk Esterase: x / RBC: x / WBC x   Sq Epi: x / Non Sq Epi: x / Bacteria: x        CAPILLARY BLOOD GLUCOSE      POCT Blood Glucose.: 83 mg/dL (18 Mar 2025 12:48)  POCT Blood Glucose.: 99 mg/dL (18 Mar 2025 07:34)  POCT Blood Glucose.: 124 mg/dL (17 Mar 2025 22:29)  POCT Blood Glucose.: 102 mg/dL (17 Mar 2025 17:25)        RADIOLOGY & ADDITIONAL TESTS:  Results Reviewed:   Imaging Personally Reviewed:  Electrocardiogram Personally Reviewed:

## 2025-03-18 NOTE — PROGRESS NOTE ADULT - SUBJECTIVE AND OBJECTIVE BOX
[facility], THE HEART CENTER                                   [address] [address]                                                      PHONE: [phone]                                                         FAX: [phone]  --------------------------------------------------------------------------------------------------------    CARLOS performed     1. Left ventricular systolic function is normal.   2. Moderate mitral regurgitation.   3. The aortic valve is calcified with decreased leaflet opening. peak gradient: 27 mm Hg, mean gradient: 15 mmHg, MACKENZIE by planimetry: 1.15 cm2 all consistent with moderate aortic stenosis.   4. No evidence of left atrial appendage thrombus.    Full report to follow    Rec:  CTS FU  Results discussed with pt in detail

## 2025-03-19 ENCOUNTER — APPOINTMENT (OUTPATIENT)
Age: 85
End: 2025-03-19

## 2025-03-19 LAB
ALBUMIN SERPL ELPH-MCNC: 2.6 G/DL — LOW (ref 3.3–5.2)
ALP SERPL-CCNC: 102 U/L — SIGNIFICANT CHANGE UP (ref 40–120)
ALT FLD-CCNC: 22 U/L — SIGNIFICANT CHANGE UP
ANION GAP SERPL CALC-SCNC: 9 MMOL/L — SIGNIFICANT CHANGE UP (ref 5–17)
APTT BLD: 137.8 SEC — CRITICAL HIGH (ref 24.5–35.6)
AST SERPL-CCNC: 21 U/L — SIGNIFICANT CHANGE UP
BILIRUB SERPL-MCNC: 0.3 MG/DL — LOW (ref 0.4–2)
BUN SERPL-MCNC: 38.3 MG/DL — HIGH (ref 8–20)
CALCIUM SERPL-MCNC: 9.3 MG/DL — SIGNIFICANT CHANGE UP (ref 8.4–10.5)
CHLORIDE SERPL-SCNC: 106 MMOL/L — SIGNIFICANT CHANGE UP (ref 96–108)
CO2 SERPL-SCNC: 26 MMOL/L — SIGNIFICANT CHANGE UP (ref 22–29)
CORTIS AM PEAK SERPL-MCNC: 20.9 UG/DL — HIGH (ref 6–18.4)
CREAT SERPL-MCNC: 1.21 MG/DL — SIGNIFICANT CHANGE UP (ref 0.5–1.3)
EGFR: 59 ML/MIN/1.73M2 — LOW
GLUCOSE BLDC GLUCOMTR-MCNC: 104 MG/DL — HIGH (ref 70–99)
GLUCOSE BLDC GLUCOMTR-MCNC: 113 MG/DL — HIGH (ref 70–99)
GLUCOSE BLDC GLUCOMTR-MCNC: 136 MG/DL — HIGH (ref 70–99)
GLUCOSE BLDC GLUCOMTR-MCNC: 86 MG/DL — SIGNIFICANT CHANGE UP (ref 70–99)
GLUCOSE SERPL-MCNC: 108 MG/DL — HIGH (ref 70–99)
HCT VFR BLD CALC: 32 % — LOW (ref 39–50)
HGB BLD-MCNC: 9.9 G/DL — LOW (ref 13–17)
MAGNESIUM SERPL-MCNC: 2.3 MG/DL — SIGNIFICANT CHANGE UP (ref 1.6–2.6)
MCHC RBC-ENTMCNC: 27.8 PG — SIGNIFICANT CHANGE UP (ref 27–34)
MCHC RBC-ENTMCNC: 30.9 G/DL — LOW (ref 32–36)
MCV RBC AUTO: 89.9 FL — SIGNIFICANT CHANGE UP (ref 80–100)
NRBC # BLD AUTO: 0 K/UL — SIGNIFICANT CHANGE UP (ref 0–0)
NRBC # FLD: 0 K/UL — SIGNIFICANT CHANGE UP (ref 0–0)
NRBC BLD AUTO-RTO: 0 /100 WBCS — SIGNIFICANT CHANGE UP (ref 0–0)
PLATELET # BLD AUTO: 366 K/UL — SIGNIFICANT CHANGE UP (ref 150–400)
PMV BLD: 9.9 FL — SIGNIFICANT CHANGE UP (ref 7–13)
POTASSIUM SERPL-MCNC: 5.6 MMOL/L — HIGH (ref 3.5–5.3)
POTASSIUM SERPL-SCNC: 5.6 MMOL/L — HIGH (ref 3.5–5.3)
PROT SERPL-MCNC: 6.4 G/DL — LOW (ref 6.6–8.7)
RBC # BLD: 3.56 M/UL — LOW (ref 4.2–5.8)
RBC # FLD: 19.9 % — HIGH (ref 10.3–14.5)
SODIUM SERPL-SCNC: 141 MMOL/L — SIGNIFICANT CHANGE UP (ref 135–145)
T4 FREE SERPL-MCNC: 1.2 NG/DL — SIGNIFICANT CHANGE UP (ref 0.9–1.7)
WBC # BLD: 8.97 K/UL — SIGNIFICANT CHANGE UP (ref 3.8–10.5)
WBC # FLD AUTO: 8.97 K/UL — SIGNIFICANT CHANGE UP (ref 3.8–10.5)

## 2025-03-19 PROCEDURE — 99233 SBSQ HOSP IP/OBS HIGH 50: CPT

## 2025-03-19 RX ORDER — APIXABAN 2.5 MG/1
5 TABLET, FILM COATED ORAL
Refills: 0 | Status: DISCONTINUED | OUTPATIENT
Start: 2025-03-19 | End: 2025-03-22

## 2025-03-19 RX ORDER — APIXABAN 2.5 MG/1
5 TABLET, FILM COATED ORAL ONCE
Refills: 0 | Status: COMPLETED | OUTPATIENT
Start: 2025-03-19 | End: 2025-03-19

## 2025-03-19 RX ADMIN — APIXABAN 5 MILLIGRAM(S): 2.5 TABLET, FILM COATED ORAL at 17:51

## 2025-03-19 RX ADMIN — HEPARIN SODIUM 1500 UNIT(S)/HR: 1000 INJECTION INTRAVENOUS; SUBCUTANEOUS at 07:36

## 2025-03-19 RX ADMIN — HEPARIN SODIUM 0 UNIT(S)/HR: 1000 INJECTION INTRAVENOUS; SUBCUTANEOUS at 07:53

## 2025-03-19 RX ADMIN — MIDODRINE HYDROCHLORIDE 15 MILLIGRAM(S): 5 TABLET ORAL at 17:51

## 2025-03-19 RX ADMIN — POLYETHYLENE GLYCOL 3350 17 GRAM(S): 17 POWDER, FOR SOLUTION ORAL at 12:04

## 2025-03-19 RX ADMIN — APIXABAN 5 MILLIGRAM(S): 2.5 TABLET, FILM COATED ORAL at 12:03

## 2025-03-19 RX ADMIN — Medication 81 MILLIGRAM(S): at 12:04

## 2025-03-19 RX ADMIN — Medication 2 TABLET(S): at 21:35

## 2025-03-19 RX ADMIN — MIDODRINE HYDROCHLORIDE 15 MILLIGRAM(S): 5 TABLET ORAL at 06:20

## 2025-03-19 RX ADMIN — Medication 40 MILLIGRAM(S): at 06:20

## 2025-03-19 RX ADMIN — MIDODRINE HYDROCHLORIDE 15 MILLIGRAM(S): 5 TABLET ORAL at 12:04

## 2025-03-19 RX ADMIN — ATORVASTATIN CALCIUM 40 MILLIGRAM(S): 80 TABLET, FILM COATED ORAL at 21:36

## 2025-03-19 NOTE — PROGRESS NOTE ADULT - SUBJECTIVE AND OBJECTIVE BOX
[place] [facility] - [facility], THE HEART CENTER                                   [address] [place] [address]                                                      PHONE: [phone]                                                         FAX: [phone]  [email or web address]  ---------------------------------------------------------------------------------------------------------------------------------    Overnight events/patient complaints:      NAD feeling well today     No Known Allergies    MEDICATIONS  (STANDING):  apixaban 5 milliGRAM(s) Oral two times a day  apixaban 5 milliGRAM(s) Oral once  aspirin  chewable 81 milliGRAM(s) Oral daily  atorvastatin 40 milliGRAM(s) Oral at bedtime  dextrose 5%. 1000 milliLiter(s) (50 mL/Hr) IV Continuous <Continuous>  dextrose 5%. 1000 milliLiter(s) (100 mL/Hr) IV Continuous <Continuous>  dextrose 50% Injectable 25 Gram(s) IV Push once  dextrose 50% Injectable 12.5 Gram(s) IV Push once  dextrose 50% Injectable 25 Gram(s) IV Push once  fluticasone propionate/ salmeterol 100-50 MICROgram(s) Diskus 1 Dose(s) Inhalation two times a day  glucagon  Injectable 1 milliGRAM(s) IntraMuscular once  insulin lispro (ADMELOG) corrective regimen sliding scale   SubCutaneous three times a day before meals  midodrine. 15 milliGRAM(s) Oral three times a day  pantoprazole    Tablet 40 milliGRAM(s) Oral before breakfast  polyethylene glycol 3350 17 Gram(s) Oral daily  senna 2 Tablet(s) Oral at bedtime  tiotropium 2.5 MICROgram(s) Inhaler 2 Puff(s) Inhalation daily    MEDICATIONS  (PRN):  acetaminophen     Tablet .. 650 milliGRAM(s) Oral every 6 hours PRN Temp greater or equal to 38C (100.4F), Mild Pain (1 - 3)  albuterol/ipratropium for Nebulization 3 milliLiter(s) Nebulizer every 6 hours PRN Shortness of Breath and/or Wheezing  aluminum hydroxide/magnesium hydroxide/simethicone Suspension 30 milliLiter(s) Oral every 4 hours PRN Dyspepsia  dextrose Oral Gel 15 Gram(s) Oral once PRN Blood Glucose LESS THAN 70 milliGRAM(s)/deciliter  melatonin 3 milliGRAM(s) Oral at bedtime PRN Insomnia  ondansetron Injectable 4 milliGRAM(s) IV Push every 8 hours PRN Nausea and/or Vomiting      Vital Signs Last 24 Hrs  T(C): 36.3 (19 Mar 2025 10:37), Max: 36.7 (19 Mar 2025 04:52)  T(F): 97.3 (19 Mar 2025 10:37), Max: 98 (19 Mar 2025 04:52)  HR: 72 (19 Mar 2025 10:37) (63 - 90)  BP: 152/72 (19 Mar 2025 10:37) (96/63 - 152/72)  BP(mean): --  RR: 18 (19 Mar 2025 10:37) (15 - 18)  SpO2: 97% (19 Mar 2025 10:37) (95% - 100%)    Parameters below as of 19 Mar 2025 10:37  Patient On (Oxygen Delivery Method): room air      ICU Vital Signs Last 24 Hrs  JONE SOUTH  I&O's Detail    18 Mar 2025 07:01  -  19 Mar 2025 07:00  --------------------------------------------------------  IN:  Total IN: 0 mL    OUT:    Voided (mL): 1600 mL  Total OUT: 1600 mL    Total NET: -1600 mL        I&O's Summary    18 Mar 2025 07:01  -  19 Mar 2025 07:00  --------------------------------------------------------  IN: 0 mL / OUT: 1600 mL / NET: -1600 mL      Drug Dosing Weight  JONE WEEMSGIORGIO      PHYSICAL EXAM:  General: Appears well developed, alert and cooperative.  HEENT: Head; normocephalic, atraumatic.  Eyes: Pupils reactive, cornea wnl.  Neck: Supple, no nodes adenopathy, no NVD or carotid bruit or thyromegaly.  CARDIOVASCULAR: Normal S1 and S2, 2/6 murmur, rub, gallop or lift.   LUNGS: No rales, rhonchi or wheeze. Normal breath sounds bilaterally.  ABDOMEN: Soft, nontender without mass or organomegaly. bowel sounds normoactive.  EXTREMITIES: No clubbing, cyanosis or edema. Distal pulses wnl.   SKIN: warm and dry with normal turgor.  NEURO: Alert/oriented x 3/normal motor exam. No pathologic reflexes.    PSYCH: normal affect.        LABS:                        9.9    8.97  )-----------( 366      ( 19 Mar 2025 06:14 )             32.0     03-19    141  |  106  |  38.3[H]  ----------------------------<  108[H]  5.6[H]   |  26.0  |  1.21    Ca    9.3      19 Mar 2025 06:14  Mg     2.3     03-19    TPro  6.4[L]  /  Alb  2.6[L]  /  TBili  0.3[L]  /  DBili  x   /  AST  21  /  ALT  22  /  AlkPhos  102  03-19    JONE SOUTH      PTT - ( 19 Mar 2025 06:14 )  PTT:137.8 sec  Urinalysis Basic - ( 19 Mar 2025 06:14 )    Color: x / Appearance: x / SG: x / pH: x  Gluc: 108 mg/dL / Ketone: x  / Bili: x / Urobili: x   Blood: x / Protein: x / Nitrite: x   Leuk Esterase: x / RBC: x / WBC x   Sq Epi: x / Non Sq Epi: x / Bacteria: x        RADIOLOGY & ADDITIONAL STUDIES:    INTERPRETATION OF TELEMETRY (personally reviewed):    < from: CARLOS W or WO Ultrasound Enhancing Agent (03.18.25 @ 10:27) >  CONCLUSIONS:      1. Left ventricular systolic function is normal.   2. Moderate mitral regurgitation.   3. The aortic valve is calcified with decreased leaflet opening. peak gradient: 27 mm Hg, mean gradient: 15 mmHg, MACKENZIE by planimetry: 1.15 cm2 all consistent with moderate aortic stenosis.   4. No evidence of left atrial appendage thrombus.    < end of copied text >    < from: TTE W or WO Ultrasound Enhancing Agent (03.04.25 @ 15:59) >     CONCLUSIONS:      1. Left ventricular cavity is normal in size. Left ventricular systolic function is mildly decreased with an ejection fraction visually estimated at 40 to45 %.   2. Normal right ventricular cavity size and normal right ventricular systolic function.   3. The right atrium is normal in size.   4. No pericardial effusion seen.   5. Analysis of left ventricular diastolic function and filling pressure is made challenging by the presence of atrial fibrillation.   6. Estimated pulmonary artery systolic pressure is 18 mmHg.   7. No left ventricular hypertrophy.   8. No prior echocardiogram is available for comparison.   9. Technically difficult image quality.  10. There is moderate calcification of the aortic valve leaflets. Severe aortic stenosis.    < end of copied text >      < from: Cardiac Catheterization (03.14.25 @ 12:58) >  Procedures Performed   Procedures:              1.    Ultrasound Guided Access     2.    Venous Access - Right Femoral   3.    RHC   4.    Diagnostic Coronary Angiography   5.    Left Heart Cath   6.    AngioSeal     Indications:               Severe aortic stenosis   Dyspnea   Suspected coronary artery disease     Diagnostic Conclusions:     Mild RCA and LAD disease   LVEF 45%   Normal wedge pressure and pulmonary pressures   Borderline low cardiac output   Recommendations:     Consult with valve clinic   Can consider TEEto evaluate aortic valve as aortic valve gradient  minimal on cath    < end of copied text >        Assessment and Recommendation:     Patient is an 83 yo man with chronic hypertension, moderate CAD on remote C with normal perfusion on PET MPI 2022, chronic RBBB/LAFB, frequent unifocal ventricular ectopy, lung cancer s/p radiation and chemotherapy with course complicated by DVT s/p several months systemic AC who presents with several days progressive worsening dyspnea without reported concurrent chest pain, dizziness and palpitations. He is s/p CTA which was negative for acute pulmonary embolism however with pleural effusion and upper lobe consolidation with air bronchograms. Tele with paroxsymal atrial fibrillation with frequent ventricular ectopic beats.     Acute HFrEF in setting of severer AS and CKD PNA     CARLOS Left ventricular systolic function is normal, Moderate mitral regurgitation, The aortic valve is calcified with decreased leaflet opening. peak gradient: 27 mm Hg, mean gradient: 15 mmHg, MACKENZIE by planimetry: 1.15 cm2 all consistent with moderate aortic stenosis, No evidence of left atrial appendage thrombus.    + orthostatic on Midodrine TID     - Long term AC   - Continue ASA 81mg and statin therapy  - Compression lower extremity due to orthostatic hypotension   - Outpatient WATCHMAN evaluations     Please recall if needed

## 2025-03-19 NOTE — PROGRESS NOTE ADULT - ASSESSMENT
83 y/o M w/ PMH lung cancer (previously on immunotherapy),  chronic RBBB/LAFB,DVT not AC, HTN, copd not on home O2 and CAD presenting for increasing SOB for the last 1 week. Per wife, Schedule for PET scan last week, was unable to go for continued fatigue, states has had progressive SOB for the last few days. Also endorses that he has been having increased urinary frequency though no dysuria. Found to be hypoxic and UA grossly positive. Imaging was significant for findings of pyelonephritis. ID was consulted and patient continues to require inpatient care for IV Abx and for hematuria.    Acute hypoxic respiratory failure secondary to HF vs PNA vs recurrent pleural effusions vs severe AS  Acute on chronic systolic congestive heart failure, CAD, HTN  pAF  History of COPD not on home O2  - TTE showing EF 40-45% with severe AS  - will start heparin drip as per cardiology recs  - Continue aspirin  - Continue Trelegy interchange  - Started Duonebs PRN  - Cardiology following, recs noted  - Discontinue Lasix and beta blocker as pt continues to have persistent orthostatic hypotension  - Increased midodrine to 10mg TID for orthostatic hypotension, encouraging oral hydration  - sp lhc showing mild rca and lad disease   -CARLOS showing moderate AS    Postural hypotension  -tfts nl  -pending cortisol   -midodrine to 15mg tid  -may add fludrocortisone pending cortisol levels    Acute blood loss anemia  Hematuria in the setting of pyelonephritis  - Holding heparin gtt  - Condom catheter showing red urine drainage  - CBC stable for now  - Urology consulted but not recommending any intervention  - Monitor for hematuria resolution (resolved on 3/10)  - Needs to follow up with urology as an outpatient  - Has required qureshi in the past for BPH  - Discontinue Flomax and finasteride    Sepsis secondary to complicated urinary tract infection vs PNA  - Blood cultures  3/4 reporting No growth   - RVP/COVID 19 PCR 3/4 negative   - UA 3/4 with pyuria; Urine Cx 3/4 reporting E coli   - CTA chest 3/4 reporting no PE. Little change in right upper lobe parenchymal consolidation with air bronchograms from prior CT 1/17  - CT A/P 3/4 reporting bilateral hydroureteronephrosis with asymmetric left perinephric edema  - UA 3/4 with pyuria   - CXR on 3/9 showing worsening right lung consolidations (my read) from previous CXR  - Procal negative, Discontinued doxycyline on 3/10  - Finished course of Rocephin    SHANNEN on CKD stage 3  Bilateral hydronephrosis  - Was previously discharged on flomax and had qureshi which was taken out by urology  - Endorses overflow incontinence symptoms  - CTA chest as above showing bilateral hydronephrosis and left sided pyelonephritis  - Discontinued Flomax and finasteride    HLD  - Continue atorvastatin    GERD  - Continue Protonix    Constipation  - cw senna and miralax      DVT/GI ppx: scd  Diet: DASH  Code Status: Full code  Dispo: medically active.

## 2025-03-19 NOTE — PROGRESS NOTE ADULT - SUBJECTIVE AND OBJECTIVE BOX
[facility] Division of Hospital Medicine    pt seen and examined at bedside  had orthostatic hypotension when attempt made to participate in pt  pt at bedside feels okay, no ha, cp, sob, palpitations  tolerating po diet  normal stooling and urination    MEDICATIONS  (STANDING):  apixaban 5 milliGRAM(s) Oral two times a day  aspirin  chewable 81 milliGRAM(s) Oral daily  atorvastatin 40 milliGRAM(s) Oral at bedtime  dextrose 5%. 1000 milliLiter(s) (50 mL/Hr) IV Continuous <Continuous>  dextrose 5%. 1000 milliLiter(s) (100 mL/Hr) IV Continuous <Continuous>  dextrose 50% Injectable 25 Gram(s) IV Push once  dextrose 50% Injectable 12.5 Gram(s) IV Push once  dextrose 50% Injectable 25 Gram(s) IV Push once  fluticasone propionate/ salmeterol 100-50 MICROgram(s) Diskus 1 Dose(s) Inhalation two times a day  glucagon  Injectable 1 milliGRAM(s) IntraMuscular once  insulin lispro (ADMELOG) corrective regimen sliding scale   SubCutaneous three times a day before meals  midodrine. 15 milliGRAM(s) Oral three times a day  pantoprazole    Tablet 40 milliGRAM(s) Oral before breakfast  polyethylene glycol 3350 17 Gram(s) Oral daily  senna 2 Tablet(s) Oral at bedtime  tiotropium 2.5 MICROgram(s) Inhaler 2 Puff(s) Inhalation daily    MEDICATIONS  (PRN):  acetaminophen     Tablet .. 650 milliGRAM(s) Oral every 6 hours PRN Temp greater or equal to 38C (100.4F), Mild Pain (1 - 3)  albuterol/ipratropium for Nebulization 3 milliLiter(s) Nebulizer every 6 hours PRN Shortness of Breath and/or Wheezing  aluminum hydroxide/magnesium hydroxide/simethicone Suspension 30 milliLiter(s) Oral every 4 hours PRN Dyspepsia  dextrose Oral Gel 15 Gram(s) Oral once PRN Blood Glucose LESS THAN 70 milliGRAM(s)/deciliter  melatonin 3 milliGRAM(s) Oral at bedtime PRN Insomnia  ondansetron Injectable 4 milliGRAM(s) IV Push every 8 hours PRN Nausea and/or Vomiting        I&O's Summary    18 Mar 2025 07:01  -  19 Mar 2025 07:00  --------------------------------------------------------  IN: 0 mL / OUT: 1600 mL / NET: -1600 mL        PHYSICAL EXAM:  Vital Signs Last 24 Hrs  T(C): 36.3 (19 Mar 2025 10:37), Max: 36.7 (19 Mar 2025 04:52)  T(F): 97.3 (19 Mar 2025 10:37), Max: 98 (19 Mar 2025 04:52)  HR: 72 (19 Mar 2025 10:37) (68 - 90)  BP: 152/72 (19 Mar 2025 10:37) (105/61 - 152/72)  BP(mean): --  RR: 18 (19 Mar 2025 10:37) (15 - 18)  SpO2: 97% (19 Mar 2025 10:37) (95% - 100%)    Parameters below as of 19 Mar 2025 10:37  Patient On (Oxygen Delivery Method): room air            CONSTITUTIONAL: NAD, well-groomed  ENMT: Moist oral mucosa, no pharyngeal injection or exudates; normal dentition  RESPIRATORY: Normal respiratory effort; lungs are clear to auscultation bilaterally  CARDIOVASCULAR: Regular rate and rhythm, normal S1 and S2, no murmur/rub/gallop; No lower extremity edema; Peripheral pulses are 2+ bilaterally  ABDOMEN: Nontender to palpation, normoactive bowel sounds, no rebound/guarding; No hepatosplenomegaly  MUSCLOSKELETAL:  Normal gait; no clubbing or cyanosis of digits; no joint swelling or tenderness to palpation  PSYCH: A+O to person, place, and time; affect appropriate  NEUROLOGY: CN 2-12 are intact and symmetric; no gross sensory deficits;   SKIN: No rashes; no palpable lesions    LABS:                        9.9    8.97  )-----------( 366      ( 19 Mar 2025 06:14 )             32.0     03-19    141  |  106  |  38.3[H]  ----------------------------<  108[H]  5.6[H]   |  26.0  |  1.21    Ca    9.3      19 Mar 2025 06:14  Mg     2.3     03-19    TPro  6.4[L]  /  Alb  2.6[L]  /  TBili  0.3[L]  /  DBili  x   /  AST  21  /  ALT  22  /  AlkPhos  102  03-19    PTT - ( 19 Mar 2025 06:14 )  PTT:137.8 sec      Urinalysis Basic - ( 19 Mar 2025 06:14 )    Color: x / Appearance: x / SG: x / pH: x  Gluc: 108 mg/dL / Ketone: x  / Bili: x / Urobili: x   Blood: x / Protein: x / Nitrite: x   Leuk Esterase: x / RBC: x / WBC x   Sq Epi: x / Non Sq Epi: x / Bacteria: x        CAPILLARY BLOOD GLUCOSE      POCT Blood Glucose.: 86 mg/dL (19 Mar 2025 12:00)  POCT Blood Glucose.: 113 mg/dL (19 Mar 2025 08:21)  POCT Blood Glucose.: 113 mg/dL (18 Mar 2025 22:08)  POCT Blood Glucose.: 95 mg/dL (18 Mar 2025 17:04)  POCT Blood Glucose.: 83 mg/dL (18 Mar 2025 12:48)

## 2025-03-20 LAB
ALBUMIN SERPL ELPH-MCNC: 2.8 G/DL — LOW (ref 3.3–5.2)
ALP SERPL-CCNC: 101 U/L — SIGNIFICANT CHANGE UP (ref 40–120)
ALT FLD-CCNC: 23 U/L — SIGNIFICANT CHANGE UP
ANION GAP SERPL CALC-SCNC: 10 MMOL/L — SIGNIFICANT CHANGE UP (ref 5–17)
AST SERPL-CCNC: 26 U/L — SIGNIFICANT CHANGE UP
BILIRUB SERPL-MCNC: 0.2 MG/DL — LOW (ref 0.4–2)
BUN SERPL-MCNC: 40 MG/DL — HIGH (ref 8–20)
CALCIUM SERPL-MCNC: 9.2 MG/DL — SIGNIFICANT CHANGE UP (ref 8.4–10.5)
CHLORIDE SERPL-SCNC: 105 MMOL/L — SIGNIFICANT CHANGE UP (ref 96–108)
CO2 SERPL-SCNC: 26 MMOL/L — SIGNIFICANT CHANGE UP (ref 22–29)
CREAT SERPL-MCNC: 1.26 MG/DL — SIGNIFICANT CHANGE UP (ref 0.5–1.3)
EGFR: 57 ML/MIN/1.73M2 — LOW
EGFR: 57 ML/MIN/1.73M2 — LOW
GLUCOSE BLDC GLUCOMTR-MCNC: 106 MG/DL — HIGH (ref 70–99)
GLUCOSE BLDC GLUCOMTR-MCNC: 116 MG/DL — HIGH (ref 70–99)
GLUCOSE BLDC GLUCOMTR-MCNC: 120 MG/DL — HIGH (ref 70–99)
GLUCOSE BLDC GLUCOMTR-MCNC: 79 MG/DL — SIGNIFICANT CHANGE UP (ref 70–99)
GLUCOSE SERPL-MCNC: 104 MG/DL — HIGH (ref 70–99)
HCT VFR BLD CALC: 31.6 % — LOW (ref 39–50)
HGB BLD-MCNC: 9.8 G/DL — LOW (ref 13–17)
MAGNESIUM SERPL-MCNC: 2.3 MG/DL — SIGNIFICANT CHANGE UP (ref 1.8–2.6)
MCHC RBC-ENTMCNC: 28 PG — SIGNIFICANT CHANGE UP (ref 27–34)
MCHC RBC-ENTMCNC: 31 G/DL — LOW (ref 32–36)
MCV RBC AUTO: 90.3 FL — SIGNIFICANT CHANGE UP (ref 80–100)
NRBC # BLD AUTO: 0 K/UL — SIGNIFICANT CHANGE UP (ref 0–0)
NRBC # FLD: 0 K/UL — SIGNIFICANT CHANGE UP (ref 0–0)
NRBC BLD AUTO-RTO: 0 /100 WBCS — SIGNIFICANT CHANGE UP (ref 0–0)
PLATELET # BLD AUTO: 358 K/UL — SIGNIFICANT CHANGE UP (ref 150–400)
PMV BLD: 10.5 FL — SIGNIFICANT CHANGE UP (ref 7–13)
POTASSIUM SERPL-MCNC: 5.5 MMOL/L — HIGH (ref 3.5–5.3)
POTASSIUM SERPL-SCNC: 5.5 MMOL/L — HIGH (ref 3.5–5.3)
PROT SERPL-MCNC: 6.3 G/DL — LOW (ref 6.6–8.7)
RBC # BLD: 3.5 M/UL — LOW (ref 4.2–5.8)
RBC # FLD: 20.1 % — HIGH (ref 10.3–14.5)
SODIUM SERPL-SCNC: 141 MMOL/L — SIGNIFICANT CHANGE UP (ref 135–145)
WBC # BLD: 8.21 K/UL — SIGNIFICANT CHANGE UP (ref 3.8–10.5)
WBC # FLD AUTO: 8.21 K/UL — SIGNIFICANT CHANGE UP (ref 3.8–10.5)

## 2025-03-20 PROCEDURE — 99233 SBSQ HOSP IP/OBS HIGH 50: CPT

## 2025-03-20 RX ORDER — SODIUM ZIRCONIUM CYCLOSILICATE 5 G/5G
5 POWDER, FOR SUSPENSION ORAL ONCE
Refills: 0 | Status: COMPLETED | OUTPATIENT
Start: 2025-03-20 | End: 2025-03-20

## 2025-03-20 RX ORDER — FLUDROCORTISONE ACETATE 0.1 MG
0.1 TABLET ORAL DAILY
Refills: 0 | Status: DISCONTINUED | OUTPATIENT
Start: 2025-03-20 | End: 2025-03-22

## 2025-03-20 RX ADMIN — MIDODRINE HYDROCHLORIDE 15 MILLIGRAM(S): 5 TABLET ORAL at 12:12

## 2025-03-20 RX ADMIN — APIXABAN 5 MILLIGRAM(S): 2.5 TABLET, FILM COATED ORAL at 05:46

## 2025-03-20 RX ADMIN — SODIUM ZIRCONIUM CYCLOSILICATE 5 GRAM(S): 5 POWDER, FOR SUSPENSION ORAL at 09:55

## 2025-03-20 RX ADMIN — POLYETHYLENE GLYCOL 3350 17 GRAM(S): 17 POWDER, FOR SOLUTION ORAL at 12:12

## 2025-03-20 RX ADMIN — Medication 40 MILLIGRAM(S): at 05:47

## 2025-03-20 RX ADMIN — MIDODRINE HYDROCHLORIDE 15 MILLIGRAM(S): 5 TABLET ORAL at 05:46

## 2025-03-20 RX ADMIN — MIDODRINE HYDROCHLORIDE 15 MILLIGRAM(S): 5 TABLET ORAL at 17:20

## 2025-03-20 RX ADMIN — APIXABAN 5 MILLIGRAM(S): 2.5 TABLET, FILM COATED ORAL at 17:20

## 2025-03-20 RX ADMIN — Medication 3 MILLIGRAM(S): at 21:47

## 2025-03-20 RX ADMIN — Medication 0.1 MILLIGRAM(S): at 09:55

## 2025-03-20 RX ADMIN — ATORVASTATIN CALCIUM 40 MILLIGRAM(S): 80 TABLET, FILM COATED ORAL at 21:47

## 2025-03-20 RX ADMIN — Medication 81 MILLIGRAM(S): at 12:12

## 2025-03-20 RX ADMIN — Medication 2 TABLET(S): at 21:47

## 2025-03-20 NOTE — CHART NOTE - NSCHARTNOTEFT_GEN_A_CORE
CARLOS with moderate AS  no indication for TAVR at this time  CT surgery to sign off  remainder of care per cardiology and primary team  please reconsult as needed  d/w Dr. Almaraz
Department of Cardiology                                                                  [facility]/[place] [address] [address] [facility]                                                            Telephone: [phone]. Fax:[phone]    Pre-CARLOS Note    Narrative:  84y Male with moderate CAD, hypertension, lung CA s/p chemo and radiation 2023 history of DVT ,PAF,  orthostatic on admission, s/p AC who presented with shortness of breath. CTA negative for PE. TTE showed EF 40-45% with severe AS and cath on 3/14 showed mild RCA and LAD disease, normal wedge > CTS consulted TAVR evaluation  EE assess AV    ROM: Asymptomatic     ASA and Mallampati: Per Anesthesia    VITAL SIGNS:  T(C): 36.4 (03-18-25 @ 04:40), Max: 36.7 (03-17-25 @ 20:24)  HR: 75 (03-18-25 @ 04:40) (75 - 97)  BP: 110/66 (03-18-25 @ 04:40) (94/64 - 117/62)  RR: 18 (03-18-25 @ 04:40) (18 - 18)  SpO2: 97% (03-18-25 @ 04:40) (94% - 98%)  Wt(kg): --    PHYSICAL EXAM:  Constitutional: A & O x 3  HEENT:   Normal oral mucosa, PERRL, EOMI	  Cardiovascular: Normal S1 S2, No JVD, No murmurs, No edema  Respiratory: Lungs clear to auscultation	b/l  Gastrointestinal:  Soft, Non-tender, + BS	  Skin: No rashes noted  Neurologic: Non-focal  Extremities: No edema  Vascular: Peripheral pulses palpable 2+ bilaterally    LABS                         10.2   9.65  )-----------( 371      ( 18 Mar 2025 06:12 )             32.8     03-18    141  |  103  |  40.4[H]  ----------------------------<  102[H]  4.7   |  25.0  |  1.25    Ca    8.9      18 Mar 2025 06:12  Mg     2.4     03-18    TPro  6.1[L]  /  Alb  2.6[L]  /  TBili  0.3[L]  /  DBili  x   /  AST  24  /  ALT  25  /  AlkPhos  104  03-18    TTE W or WO Ultrasound Enhancing Agent (03.04.25 @ 15:59) >  CONCLUSIONS:     1. Left ventricular cavity is normal in size. Left ventricular systolic function is mildly decreased with an ejection fraction visually estimated at 40 to45 %.   2. Normal right ventricular cavity size and normal right ventricular systolic function.   3. The right atrium is normal in size.   4. No pericardial effusion seen.   5. Analysis of left ventricular diastolic function and filling pressure is made challenging by the presence of atrial fibrillation.   6. Estimated pulmonary artery systolic pressure is 18 mmHg.   7. No left ventricular hypertrophy.   8. No prior echocardiogram is available for comparison.   9. Technically difficult image quality.  10. There is moderate calcification of the aortic valve leaflets. Severe aortic stenosis.  < end of copied text >    ASSESSMENT:  -CARLOS as ordered  -Procedure discussed with patient; risks and benefits explained; questions answered  -Labs and Tele/ECG reviewed
Palliative care social work note.    SW met with patients and spouse at bedside to provide support in coping with patients medical issues and hospitalization. Both actively engaged and discussed dealing with lung cancer and now with other health issues and hospitalization. patient wanting to return home and is motivated towards recovery. Spouse acknowledges large support system to help. Patient reporting no symptom issues at present.
Source: Patient [ x]  Family [ ]   other [x] EMR, staff and ID rounds     Current Diet:   Diet, DASH/TLC:   Sodium & Cholesterol Restricted  1500mL Fluid Restriction ([id number])  Supplement Feeding Modality:  Oral  Ensure Enlive Cans or Servings Per Day:  1       Frequency:  Two Times a day (03-11-25 @ 14:47)    Patient reports [ ] nausea  [ ] vomiting [ ] diarrhea [ ] constipation  [ ]chewing problems [ ] swallowing issues  [ ] other: denies    PO intake:  < 50% [ ]   50-75%  [x]   %  [ ]  other :    Source for PO intake [ x] Patient [ ] family [ ] chart [ ] staff [ ] other    Current Weight:   (3/12) 169.4 lbs  (3/10) 169 lbs  (3/8) 170.1 lbs  (3/6) 169.3 lbs  (3/5) 165.3 lbs    % Weight Change: Unclear accuracy of weight 2/2 inconsistency     Pertinent Medications: MEDICATIONS  (STANDING):  dextrose 5%. 1000 milliLiter(s) (100 mL/Hr) IV Continuous <Continuous>  fluticasone propionate/ salmeterol 100-50 MICROgram(s) Diskus 1 Dose(s) Inhalation two times a day  furosemide    Tablet 20 milliGRAM(s) Oral daily  glucagon  Injectable 1 milliGRAM(s) IntraMuscular once  insulin lispro (ADMELOG) corrective regimen sliding scale   SubCutaneous three times a day before meals  pantoprazole    Tablet 40 milliGRAM(s) Oral before breakfast  polyethylene glycol 3350 17 Gram(s) Oral daily  senna 2 Tablet(s) Oral at bedtime      Pertinent Labs: CBC Full  -  ( 11 Mar 2025 06:14 )  WBC Count : 14.95 K/uL  RBC Count : 3.63 M/uL  Hemoglobin : 9.8 g/dL  Hematocrit : 31.6 %  P/latelet Count - Automated : 442 K/uL  Mean Cell Volume : 87.1 fl  Mean Cell Hemoglobin : 27.0 pg  Mean Cell Hemoglobin Concentration : 31.0 g/dL    Skin: IAD    Nutrition focused physical exam conducted - found signs of malnutrition [ ]absent [ x]present    Subcutaneous fat loss: [x ] Orbital fat pads region, [ x]Buccal fat region, [ ]Triceps region,  [ ]Ribs region    Muscle wasting: [x ]Temples region, [ ]Clavicle region, [ ]Shoulder region, [ ]Scapula region, [x ]Interosseous region,  [ ]thigh region, [ ]Calf region    Estimated Needs:   [ x] no change since previous assessment  [ ] recalculated:     Brief Hospital Course: 83 y/o M w/ PMH lung cancer (previously on immunotherapy),  chronic RBBB/LAFB,DVT not AC, HTN, copd not on home O2 and CAD presenting for increasing SOB for the last 1 week. Per wife, Schedule for PET scan last week, was unable to go for continued fatigue, states has had progressive SOB for the last few days. Also endorses that he has been having increased urinary frequency though no dysuria. Found to be hypoxic and UA grossly positive. Imaging was significant for findings of pyelonephritis. ID was consulted and patient continues to require inpatient care for IV Abx and for hematuria.     Current Nutrition Diagnosis: Pt remains at high nutrition risk secondary to malnutrition (moderate chronic) related to inability to meet sufficient protein-energy needs in setting of lung CA, COPD, sepsis, persistent decreased appetite as evidenced by 15lb (8%) wt loss x6 mo, mod muscle/fat loss, meeting <75% EER >1 mo. PT continues with suboptimal PO intake completing 50-75% of meals, drinking Ensure well. Aware plan for d/c pending conditioning, CM/SW following.       Recommendations:   1) Rx MVI daily   2) Continue with current nutrition plan   3) Encourage HBV protein sources   4) Monitor weights daily for trend/accuracy     Monitoring and Evaluation:   [x] PO intake [x] Tolerance to diet prescription [X] Weights  [X] Follow up per protocol [X] Labs:
s/p CARLOS today with mod AS (MACEKNZIE 1.15, pG 27, mG 15), echo needs to be reviewed by Dr. Almaraz, likely not cause of pt's orthostasis,   further plan to be determined tomorrow  d/w Dr. Almaraz
Interval History: Now s/p LHC via RFA with Dr. Velez on 3/14/25, pt tolerated procedure well. Pt RFA access site stable no bleed/hematoma, distal pulse stable lower, extremity remans acyanotic; warm to touch; motor/sensory function intact..         Procedure Site: RFA      A/P: S/P    -cont medication  -groin precautions reviewed  -follow up with Dr. Velez  -Plan per primary service
Now s/p R/LHC via RFA/RFV with Dr. Velez, tolerated procedure well. Pt arrived to recovery in NAD and HDS, right groin access site stable, no bleed/hematoma, distal pulse +, denies chest pain, sob, palps.  < from: Cardiac Catheterization (03.14.25 @ 12:58) >    Diagnostic Conclusions:     Mild RCA and LAD disease   LVEF 45%   Normal wedge pressure and pulmonary pressures   Borderline low cardiac output     < end of copied text >        Intraprocedural findings: no intervention see above  Medications:  P2Y12 inhibitor: n/a  Fentanyl: 25mcg  Versed: 1mg  Heparin: n/a  Omnipaque:200ml  Closure Device: Angioseal        Plan:  -Formal cath report as above  -Post procedure management/monitoring per protocol  -Access site precautions  -Bedrest x  3 hours post procedure  -NS 0.9% post procedure LUCILA ppx deferred due to low EF  -Continue current medical therapy  -Educated regarding post procedure management and care  -Discussed the importance of RF modification      ***PLEASE DO NOT ADMINISTER BLOOD TRANSFUSION ON THIS PATIENT WITHIN 72 HOURS OF CARDIAC CATHERIZATION (UNLESS PATIENT IS HEMODYNAMICALLY UNSTABLE OR ACTIVELY BLEEDING) WITHOUT FIRST DISCUSSING WITH INTERVENTIONALIST  ____Agustin__________ ON TEAMS OR CALLING CATH LAB HOLDING -[phone]***
Post-CARLOS Note:    Preliminary CARLOS Results:  Moderate AS and Moderate MR  Pt states asymptomatic  Pt hemodynamically stable     Plan  -Post CARLOS orders  -preliminary verbal report; centricity pending  - Pt can go back to the floor once criteria met with laura score>8  -follow a soft, bland diet for the next 4 hours, avoiding hot, spicy, crunchy foods
Pre Cath Note    83 yo man with chronic hypertension, moderate CAD on remote [facility] with normal perfusion on PET MPI 2022, chronic RBBB/LAFB, frequent unifocal ventricular ectopy, lung cancer s/p radiation and chemotherapy with course complicated by DVT s/p several months systemic AC who presents with several days progressive worsening dyspnea without reported concurrent chest pain, dizziness and palpitations. He is s/p CTA which was negative for acute pulmonary embolism however with pleural effusion and upper lobe consolidation with air bronchograms. Tele with paroxsymal atrial fibrillation with frequent ventricular ectopic beats.     Planned Procedure: R/LHC       ASA: 3  Mallampati: 2  BRA: 5.2%    TTE: < from: TTE W or WO Ultrasound Enhancing Agent (03.04.25 @ 15:59) >    CONCLUSIONS:      1. Left ventricular cavity is normal in size. Left ventricular systolic function is mildly decreased with an ejection fraction visually estimated at 40 to45 %.   2. Normal right ventricular cavity size and normal right ventricular systolic function.   3. The right atrium is normal in size.   4. No pericardial effusion seen.   5. Analysis of left ventricular diastolic function and filling pressure is made challenging by the presence of atrial fibrillation.   6. Estimated pulmonary artery systolic pressure is 18 mmHg.   7. No left ventricular hypertrophy.   8. No prior echocardiogram is available for comparison.   9. Technically difficult image quality.  10. There is moderate calcification of the aortic valve leaflets. Severe aortic stenosis.    < end of copied text >          EKG:    Allergies    No Known Allergies    Intolerances        PAST MEDICAL & SURGICAL HISTORY:  Abnormal findings on diagnostic imaging of lung      Hypertension      Hyperlipidemia      CAD (coronary artery disease)      Left anterior fascicular block (LAFB)      RBBB      S/P hip replacement, right      S/P hip replacement, left                                10.0   12.95 )-----------( 427      ( 14 Mar 2025 06:45 )             31.7     03-14    141  |  101  |  49.1[H]  ----------------------------<  104[H]  4.4   |  27.0  |  1.31[H]    Ca    8.9      14 Mar 2025 06:45  Phos  3.5     03-14  Mg     2.2     03-14    83 yo man with chronic hypertension, moderate CAD on remote [facility] with normal perfusion on PET MPI 2022, chronic RBBB/LAFB, frequent unifocal ventricular ectopy, lung cancer s/p radiation and chemotherapy with course complicated by DVT s/p several months systemic AC who presents with several days progressive worsening dyspnea without reported concurrent chest pain, dizziness and palpitations. He is s/p CTA which was negative for acute pulmonary embolism however with pleural effusion and upper lobe consolidation with air bronchograms. Tele with paroxsymal atrial fibrillation with frequent ventricular ectopic beats. Now for R/LHC.  -Hydration deferred due to decreased EF  -Proceed with R/LHC as planned  -Keep NPO  -Aspirin 81mg po x 1   -Procedure disucssed at length with patient and verbalizes understanding  -Informed consent to be obtained by interventionalist  Pt assessed, appropriate for sedation, pt educated regarding the plan for Versed/Fentanyl as needed.
Pt and family seen at bedside requesting to stand up to see if pt can tolerate   Pt assisted to stand from chair, approximately 1 min into standing pt appeared to semi-lose ability to communicate and starts to tremble   Pt then assisted back to chair, and symptoms resolved     BP taken 90/50    Vital Signs Last 24 Hrs  T(C): 36.4 (15 Mar 2025 11:26), Max: 36.6 (14 Mar 2025 20:28)  T(F): 97.5 (15 Mar 2025 11:26), Max: 97.9 (14 Mar 2025 20:28)  HR: 80 (15 Mar 2025 11:26) (65 - 89)  BP: 95/50 (15 Mar 2025 11:26) (95/50 - 118/74)  BP(mean): --  RR: 18 (15 Mar 2025 11:26) (15 - 19)  SpO2: 96% (15 Mar 2025 11:26) (96% - 100%)    Parameters below as of 15 Mar 2025 08:05  Patient On (Oxygen Delivery Method): room air    Attending at bedside updated on episode will discuss further with cardiology
Pt. earlier on the day became hypotensive after pt. stood up for a brief time.   Pt. denies CP, SOB, palpitations, diaphoresis, blurry/double vision, abd.p/N/V, bladde discomfort, chills/fever.  As per wife pt. has had nasal congestion and runny nose for about 2 weeks which became better after he was admitted to the hospital.  Pt. has appetite, in NAD, family at the bedside.  Pt. received NS 250ml bolus x2 during the day.      Vital Signs Last 24 Hrs  T(C): 37.3 (06 Mar 2025 16:36), Max: 37.3 (06 Mar 2025 16:36)  T(F): 99.1 (06 Mar 2025 16:36), Max: 99.1 (06 Mar 2025 16:36)  HR: 87 (06 Mar 2025 16:36) (82 - 125)  BP: 101/56 (06 Mar 2025 16:36) (70/41 - 101/56)  BP(mean): 73 (06 Mar 2025 14:10) (50 - 73)  RR: 19 (06 Mar 2025 16:36) (16 - 20)  SpO2: 98% (06 Mar 2025 16:36) (92% - 100%)    Parameters below as of 06 Mar 2025 11:12  Patient On (Oxygen Delivery Method): nasal cannula  O2 Flow (L/min): 3    A/P: 83 y/o M w/ PMH lung cancer (previously on immunotherapy),  chronic RBBB/LAFB,DVT not AC, HTN, copd not on home O2 and CAD presenting for increasing SOB for the last 1 week. Per wife, Schedule for PET scan last week, was unable to go for continued fatigue, states has had progressive SOB for the last few days. Also endorses that he has been having increased urinary frequency though no dysuria. Found to be hypoxic and UA grossly positive. Admitted for Sepsis present on admission and Acute hypoxic respiratory failure.    Hypotension  hold today's doses of Metoprolol 50mg Q12H  low BP could be d/t diuretic therapy  Lasix 40 mg  c/w close monitoring  labs in am  Cardiology on board (will determine Lasix dose/frequency)  Medicine attending made aware
RN reports that pt with BP 85/45 HR in the 90"s afib pt remains asymptomatic  Pt seen at bedside with family, no distress, denies dizziness, cp, states still some SOB.     Vital Signs Last 24 Hrs  T(C): 36.6 (05 Mar 2025 17:18), Max: 37.5 (04 Mar 2025 23:17)  T(F): 97.9 (05 Mar 2025 17:18), Max: 99.5 (04 Mar 2025 23:17)  HR: 93 (05 Mar 2025 18:37) (93 - 115)  BP: 80/44 (05 Mar 2025 18:37) (80/40 - 129/72)  BP(mean): --  RR: 16 (05 Mar 2025 18:37) (16 - 22)  SpO2: 97% (05 Mar 2025 18:37) (92% - 99%)    Parameters below as of 05 Mar 2025 18:37  Patient On (Oxygen Delivery Method): nasal cannula  O2 Flow (L/min): 3        Plan:   Discussed case with attending,   Held pt metoprolol for this evening  250 NS bolus over one hour  sign out to night PA
Source: Patient [x ]  Family [ x]   other [ ]  83 y/o M w/ PMH lung cancer (previously on immunotherapy),  chronic RBBB/LAFB,DVT not AC, HTN, copd not on home O2 and CAD presenting for increasing SOB for the last 1 week. Per wife, Schedule for PET scan last week, was unable to go for continued fatigue, states has had progressive SOB for the last few days. Also endorses that he has been having increased urinary frequency though no dysuria. Found to be hypoxic and UA grossly positive. Imaging was significant for findings of pyelonephritis. ID was consulted and patient continues to require inpatient care for IV Abx and for hematuria.      Current Diet: Diet, DASH/TLC:   Sodium & Cholesterol Restricted  Supplement Feeding Modality:  Oral  Ensure Enlive Cans or Servings Per Day:  1       Frequency:  Three Times a day (03-15-25 @ 17:21)      Patient reports [ ] nausea  [ ] vomiting [ ] diarrhea [ ] constipation  [ ]chewing problems [ ] swallowing issues  [ ] other:     PO intake:  < 50% [ ]   50-75%  [ ]   %  [x ]  other :    Source for PO intake [ x] Patient [ ]x family [ ] chart [ ] staff [ ] other        Current Weight:   3/20 73.2 kg  3/18 76.1 kg  3/16 78.8 kg  % Weight Change     Pertinent Medications: MEDICATIONS  (STANDING):  apixaban 5 milliGRAM(s) Oral two times a day  aspirin  chewable 81 milliGRAM(s) Oral daily  atorvastatin 40 milliGRAM(s) Oral at bedtime  dextrose 5%. 1000 milliLiter(s) (100 mL/Hr) IV Continuous <Continuous>  fludroCORTISONE 0.1 milliGRAM(s) Oral daily  fluticasone propionate/ salmeterol 100-50 MICROgram(s) Diskus 1 Dose(s) Inhalation two times a day  glucagon  Injectable 1 milliGRAM(s) IntraMuscular once  insulin lispro (ADMELOG) corrective regimen sliding scale   SubCutaneous three times a day before meals  midodrine. 15 milliGRAM(s) Oral three times a day  pantoprazole    Tablet 40 milliGRAM(s) Oral before breakfast  polyethylene glycol 3350 17 Gram(s) Oral daily  senna 2 Tablet(s) Oral at bedtime  tiotropium 2.5 MICROgram(s) Inhaler 2 Puff(s) Inhalation daily    MEDICATIONS  (PRN):  acetaminophen     Tablet .. 650 milliGRAM(s) Oral every 6 hours PRN Temp greater or equal to 38C (100.4F), Mild Pain (1 - 3)  albuterol/ipratropium for Nebulization 3 milliLiter(s) Nebulizer every 6 hours PRN Shortness of Breath and/or Wheezing  aluminum hydroxide/magnesium hydroxide/simethicone Suspension 30 milliLiter(s) Oral every 4 hours PRN Dyspepsia  dextrose Oral Gel 15 Gram(s) Oral once PRN Blood Glucose LESS THAN 70 milliGRAM(s)/deciliter  melatonin 3 milliGRAM(s) Oral at bedtime PRN Insomnia  ondansetron Injectable 4 milliGRAM(s) IV Push every 8 hours PRN Nausea and/or Vomiting    Pertinent Labs: CBC Full  -  ( 20 Mar 2025 05:53 )  WBC Count : 8.21 K/uL  RBC Count : 3.50 M/uL  Hemoglobin : 9.8 g/dL  Hematocrit : 31.6 %  Platelet Count - Automated : 358 K/uL  Mean Cell Volume : 90.3 fl  Mean Cell Hemoglobin : 28.0 pg  Mean Cell Hemoglobin Concentration : 31.0 g/dL          Skin: IAD    Nutrition focused physical exam conducted - found signs of malnutrition [ ]absent [x ]present    Subcutaneous fat loss: [x ] Orbital fat pads region, [ ]Buccal fat region, [ ]Triceps region,  [ ]Ribs region    Muscle wasting: [ x]Temples region, [x ]Clavicle region, [ ]Shoulder region, [ ]Scapula region, [ ]Interosseous region,  [ ]thigh region, [ ]Calf region    Estimated Needs:   [ x] no change since previous assessment  [ ] recalculated:     Current Nutrition Diagnosis: Pt remains at high nutrition risk secondary to malnutrition (moderate chronic) related to inability to meet sufficient protein-energy needs in setting of lung CA, COPD, sepsis, persistent decreased appetite as evidenced by 15lb (8%) wt loss x6 mo, mod muscle/fat loss, meeting <75% EER >1 mo. Pt and wife report PO intake 50-75% of meals, drinking Ensure well. Weight decline noted.      Recommendations:   1) Continue diet  2) Consider appetite stimulant  3) Daily weight  4) RX MVI    Monitoring and Evaluation:   [ ] PO intake [ ] Tolerance to diet prescription [X] Weights  [X] Follow up per protocol [X] Labs:

## 2025-03-20 NOTE — PROGRESS NOTE ADULT - SUBJECTIVE AND OBJECTIVE BOX
[facility] Division of Hospital Medicine    pt seen and examined at bedside  pt still feeling lightheaded when standing prolonged   added fludrocortisone today  tolerating po diet  naeon    MEDICATIONS  (STANDING):  apixaban 5 milliGRAM(s) Oral two times a day  aspirin  chewable 81 milliGRAM(s) Oral daily  atorvastatin 40 milliGRAM(s) Oral at bedtime  dextrose 5%. 1000 milliLiter(s) (100 mL/Hr) IV Continuous <Continuous>  dextrose 5%. 1000 milliLiter(s) (50 mL/Hr) IV Continuous <Continuous>  dextrose 50% Injectable 25 Gram(s) IV Push once  dextrose 50% Injectable 12.5 Gram(s) IV Push once  dextrose 50% Injectable 25 Gram(s) IV Push once  fludroCORTISONE 0.1 milliGRAM(s) Oral daily  fluticasone propionate/ salmeterol 100-50 MICROgram(s) Diskus 1 Dose(s) Inhalation two times a day  glucagon  Injectable 1 milliGRAM(s) IntraMuscular once  insulin lispro (ADMELOG) corrective regimen sliding scale   SubCutaneous three times a day before meals  midodrine. 15 milliGRAM(s) Oral three times a day  pantoprazole    Tablet 40 milliGRAM(s) Oral before breakfast  polyethylene glycol 3350 17 Gram(s) Oral daily  senna 2 Tablet(s) Oral at bedtime  tiotropium 2.5 MICROgram(s) Inhaler 2 Puff(s) Inhalation daily    MEDICATIONS  (PRN):  acetaminophen     Tablet .. 650 milliGRAM(s) Oral every 6 hours PRN Temp greater or equal to 38C (100.4F), Mild Pain (1 - 3)  albuterol/ipratropium for Nebulization 3 milliLiter(s) Nebulizer every 6 hours PRN Shortness of Breath and/or Wheezing  aluminum hydroxide/magnesium hydroxide/simethicone Suspension 30 milliLiter(s) Oral every 4 hours PRN Dyspepsia  dextrose Oral Gel 15 Gram(s) Oral once PRN Blood Glucose LESS THAN 70 milliGRAM(s)/deciliter  melatonin 3 milliGRAM(s) Oral at bedtime PRN Insomnia  ondansetron Injectable 4 milliGRAM(s) IV Push every 8 hours PRN Nausea and/or Vomiting        I&O's Summary    19 Mar 2025 07:01  -  20 Mar 2025 07:00  --------------------------------------------------------  IN: 1025 mL / OUT: 1200 mL / NET: -175 mL        PHYSICAL EXAM:  Vital Signs Last 24 Hrs  T(C): 36.7 (20 Mar 2025 11:10), Max: 36.8 (19 Mar 2025 17:19)  T(F): 98 (20 Mar 2025 11:10), Max: 98.2 (19 Mar 2025 17:19)  HR: 75 (20 Mar 2025 11:10) (71 - 83)  BP: 123/59 (20 Mar 2025 11:10) (110/68 - 156/70)  BP(mean): --  RR: 18 (20 Mar 2025 11:10) (18 - 18)  SpO2: 95% (20 Mar 2025 11:10) (95% - 100%)    Parameters below as of 20 Mar 2025 11:10  Patient On (Oxygen Delivery Method): room air            CONSTITUTIONAL: NAD, well-groomed  ENMT: Moist oral mucosa, no pharyngeal injection or exudates; normal dentition  RESPIRATORY: Normal respiratory effort; lungs are clear to auscultation bilaterally  CARDIOVASCULAR: Regular rate and rhythm, normal S1 and S2, no murmur/rub/gallop; No lower extremity edema; Peripheral pulses are 2+ bilaterally  ABDOMEN: Nontender to palpation, normoactive bowel sounds, no rebound/guarding; No hepatosplenomegaly  MUSCLOSKELETAL:  Normal gait; no clubbing or cyanosis of digits; no joint swelling or tenderness to palpation  PSYCH: A+O to person, place, and time; affect appropriate  NEUROLOGY: CN 2-12 are intact and symmetric; no gross sensory deficits;   SKIN: No rashes; no palpable lesions    LABS:                        9.8    8.21  )-----------( 358      ( 20 Mar 2025 05:53 )             31.6     03-20    141  |  105  |  40.0[H]  ----------------------------<  104[H]  5.5[H]   |  26.0  |  1.26    Ca    9.2      20 Mar 2025 05:53  Mg     2.3     03-20    TPro  6.3[L]  /  Alb  2.8[L]  /  TBili  0.2[L]  /  DBili  x   /  AST  26  /  ALT  23  /  AlkPhos  101  03-20    PTT - ( 19 Mar 2025 06:14 )  PTT:137.8 sec      Urinalysis Basic - ( 20 Mar 2025 05:53 )    Color: x / Appearance: x / SG: x / pH: x  Gluc: 104 mg/dL / Ketone: x  / Bili: x / Urobili: x   Blood: x / Protein: x / Nitrite: x   Leuk Esterase: x / RBC: x / WBC x   Sq Epi: x / Non Sq Epi: x / Bacteria: x        CAPILLARY BLOOD GLUCOSE      POCT Blood Glucose.: 79 mg/dL (20 Mar 2025 12:09)  POCT Blood Glucose.: 116 mg/dL (20 Mar 2025 07:57)  POCT Blood Glucose.: 136 mg/dL (19 Mar 2025 21:35)  POCT Blood Glucose.: 104 mg/dL (19 Mar 2025 17:11)        RADIOLOGY & ADDITIONAL TESTS:  Results Reviewed:   Imaging Personally Reviewed:  Electrocardiogram Personally Reviewed:

## 2025-03-20 NOTE — CHART NOTE - NSCHARTNOTESELECT_GEN_ALL_CORE
Event Note
Interventional Cardiology Site Check Note/Event Note
Nutrition Services
post CARLOS/Event Note
CARLOS pre-procedure/Event Note
Event Note
Nutrition Services
ct surgery/Event Note
ct surgery/Event Note

## 2025-03-20 NOTE — PROGRESS NOTE ADULT - ASSESSMENT
83 y/o M w/ PMH lung cancer (previously on immunotherapy),  chronic RBBB/LAFB,DVT not AC, HTN, copd not on home O2 and CAD presenting for increasing SOB for the last 1 week. Per wife, Schedule for PET scan last week, was unable to go for continued fatigue, states has had progressive SOB for the last few days. Also endorses that he has been having increased urinary frequency though no dysuria. Found to be hypoxic and UA grossly positive. Imaging was significant for findings of pyelonephritis. ID was consulted and patient continues to require inpatient care for IV Abx and for hematuria.    Acute hypoxic respiratory failure secondary to HF vs PNA vs recurrent pleural effusions vs severe AS  Acute on chronic systolic congestive heart failure, CAD, HTN  pAF  History of COPD not on home O2  - TTE showing EF 40-45% with severe AS  - cw eliquis  - Continue aspirin  - Continue Trelegy interchange  - Started Duonebs PRN  - Cardiology following, recs noted  - Discontinue Lasix and beta blocker as pt continues to have persistent orthostatic hypotension  - Increased midodrine to 10mg TID for orthostatic hypotension, encouraging oral hydration  - sp lhc showing mild rca and lad disease   - CARLOS showing moderate AS    Postural hypotension  -tfts nl  -cortisol nl  -midodrine to 15mg tid  -added to fludrocortisone    Acute blood loss anemia (resolved)  Hematuria in the setting of pyelonephritis (resolved)  - Condom catheter showing red urine drainage  - CBC stable for now  - Urology consulted but not recommending any intervention  - Monitor for hematuria resolution (resolved on 3/10)  - Needs to follow up with urology as an outpatient  - Has required qureshi in the past for BPH  - Discontinue Flomax and finasteride    Sepsis secondary to complicated urinary tract infection vs PNA  - Blood cultures  3/4 reporting No growth   - RVP/COVID 19 PCR 3/4 negative   - UA 3/4 with pyuria; Urine Cx 3/4 reporting E coli   - CTA chest 3/4 reporting no PE. Little change in right upper lobe parenchymal consolidation with air bronchograms from prior CT 1/17  - CT A/P 3/4 reporting bilateral hydroureteronephrosis with asymmetric left perinephric edema  - UA 3/4 with pyuria   - CXR on 3/9 showing worsening right lung consolidations (my read) from previous CXR  - Procal negative, Discontinued doxycyline on 3/10  - Finished course of Rocephin    SHANNEN on CKD stage 3  Bilateral hydronephrosis  - Was previously discharged on flomax and had qureshi which was taken out by urology  - Endorses overflow incontinence symptoms  - CTA chest as above showing bilateral hydronephrosis and left sided pyelonephritis  - Discontinued Flomax and finasteride    HLD  - Continue atorvastatin    GERD  - Continue Protonix    Constipation  - cw senna and miralax      DVT/GI ppx:eliquis  Diet: DASH  Code Status: Full code  Dispo: medically active.

## 2025-03-21 LAB
ALBUMIN SERPL ELPH-MCNC: 2.8 G/DL — LOW (ref 3.3–5.2)
ALP SERPL-CCNC: 109 U/L — SIGNIFICANT CHANGE UP (ref 40–120)
ALT FLD-CCNC: 24 U/L — SIGNIFICANT CHANGE UP
ANION GAP SERPL CALC-SCNC: 13 MMOL/L — SIGNIFICANT CHANGE UP (ref 5–17)
AST SERPL-CCNC: 24 U/L — SIGNIFICANT CHANGE UP
BILIRUB SERPL-MCNC: 0.4 MG/DL — SIGNIFICANT CHANGE UP (ref 0.4–2)
BUN SERPL-MCNC: 37.6 MG/DL — HIGH (ref 8–20)
CALCIUM SERPL-MCNC: 8.8 MG/DL — SIGNIFICANT CHANGE UP (ref 8.4–10.5)
CHLORIDE SERPL-SCNC: 105 MMOL/L — SIGNIFICANT CHANGE UP (ref 96–108)
CO2 SERPL-SCNC: 24 MMOL/L — SIGNIFICANT CHANGE UP (ref 22–29)
CREAT SERPL-MCNC: 1.38 MG/DL — HIGH (ref 0.5–1.3)
EGFR: 51 ML/MIN/1.73M2 — LOW
EGFR: 51 ML/MIN/1.73M2 — LOW
GLUCOSE BLDC GLUCOMTR-MCNC: 114 MG/DL — HIGH (ref 70–99)
GLUCOSE BLDC GLUCOMTR-MCNC: 130 MG/DL — HIGH (ref 70–99)
GLUCOSE BLDC GLUCOMTR-MCNC: 88 MG/DL — SIGNIFICANT CHANGE UP (ref 70–99)
GLUCOSE SERPL-MCNC: 98 MG/DL — SIGNIFICANT CHANGE UP (ref 70–99)
HCT VFR BLD CALC: 29.2 % — LOW (ref 39–50)
HGB BLD-MCNC: 9.1 G/DL — LOW (ref 13–17)
MCHC RBC-ENTMCNC: 28 PG — SIGNIFICANT CHANGE UP (ref 27–34)
MCHC RBC-ENTMCNC: 31.2 G/DL — LOW (ref 32–36)
MCV RBC AUTO: 89.8 FL — SIGNIFICANT CHANGE UP (ref 80–100)
NRBC # BLD AUTO: 0 K/UL — SIGNIFICANT CHANGE UP (ref 0–0)
NRBC # FLD: 0 K/UL — SIGNIFICANT CHANGE UP (ref 0–0)
NRBC BLD AUTO-RTO: 0 /100 WBCS — SIGNIFICANT CHANGE UP (ref 0–0)
PLATELET # BLD AUTO: 318 K/UL — SIGNIFICANT CHANGE UP (ref 150–400)
PMV BLD: 10.1 FL — SIGNIFICANT CHANGE UP (ref 7–13)
POTASSIUM SERPL-SCNC: 4.3 MMOL/L — SIGNIFICANT CHANGE UP (ref 3.5–5.3)
PROT SERPL-MCNC: 6.3 G/DL — LOW (ref 6.6–8.7)
RBC # BLD: 3.25 M/UL — LOW (ref 4.2–5.8)
RBC # FLD: 19.9 % — HIGH (ref 10.3–14.5)
SODIUM SERPL-SCNC: 142 MMOL/L — SIGNIFICANT CHANGE UP (ref 135–145)
WBC # BLD: 7.92 K/UL — SIGNIFICANT CHANGE UP (ref 3.8–10.5)
WBC # FLD AUTO: 7.92 K/UL — SIGNIFICANT CHANGE UP (ref 3.8–10.5)

## 2025-03-21 PROCEDURE — 99233 SBSQ HOSP IP/OBS HIGH 50: CPT

## 2025-03-21 RX ORDER — FLUDROCORTISONE ACETATE 0.1 MG
1 TABLET ORAL
Qty: 30 | Refills: 0
Start: 2025-03-21 | End: 2025-04-19

## 2025-03-21 RX ORDER — CLOPIDOGREL BISULFATE 75 MG/1
1 TABLET, FILM COATED ORAL
Qty: 30 | Refills: 0
Start: 2025-03-21 | End: 2025-04-19

## 2025-03-21 RX ORDER — APIXABAN 2.5 MG/1
1 TABLET, FILM COATED ORAL
Qty: 60 | Refills: 0
Start: 2025-03-21 | End: 2025-04-19

## 2025-03-21 RX ORDER — MIDODRINE HYDROCHLORIDE 5 MG/1
3 TABLET ORAL
Qty: 270 | Refills: 0
Start: 2025-03-21 | End: 2025-04-19

## 2025-03-21 RX ORDER — ASPIRIN 325 MG
1 TABLET ORAL
Qty: 0 | Refills: 0 | DISCHARGE
Start: 2025-03-21

## 2025-03-21 RX ORDER — ATORVASTATIN CALCIUM 80 MG/1
1 TABLET, FILM COATED ORAL
Qty: 30 | Refills: 0
Start: 2025-03-21 | End: 2025-04-19

## 2025-03-21 RX ADMIN — MIDODRINE HYDROCHLORIDE 15 MILLIGRAM(S): 5 TABLET ORAL at 05:43

## 2025-03-21 RX ADMIN — POLYETHYLENE GLYCOL 3350 17 GRAM(S): 17 POWDER, FOR SOLUTION ORAL at 12:02

## 2025-03-21 RX ADMIN — Medication 0.1 MILLIGRAM(S): at 05:43

## 2025-03-21 RX ADMIN — APIXABAN 5 MILLIGRAM(S): 2.5 TABLET, FILM COATED ORAL at 05:43

## 2025-03-21 RX ADMIN — Medication 40 MILLIGRAM(S): at 05:43

## 2025-03-21 RX ADMIN — TIOTROPIUM BROMIDE INHALATION SPRAY 2 PUFF(S): 3.12 SPRAY, METERED RESPIRATORY (INHALATION) at 08:50

## 2025-03-21 RX ADMIN — MIDODRINE HYDROCHLORIDE 15 MILLIGRAM(S): 5 TABLET ORAL at 12:02

## 2025-03-21 RX ADMIN — Medication 1 DOSE(S): at 08:59

## 2025-03-21 RX ADMIN — Medication 1 DOSE(S): at 08:50

## 2025-03-21 RX ADMIN — ATORVASTATIN CALCIUM 40 MILLIGRAM(S): 80 TABLET, FILM COATED ORAL at 21:28

## 2025-03-21 RX ADMIN — APIXABAN 5 MILLIGRAM(S): 2.5 TABLET, FILM COATED ORAL at 17:04

## 2025-03-21 RX ADMIN — MIDODRINE HYDROCHLORIDE 15 MILLIGRAM(S): 5 TABLET ORAL at 16:58

## 2025-03-21 RX ADMIN — Medication 1 DOSE(S): at 08:58

## 2025-03-21 RX ADMIN — Medication 2 TABLET(S): at 21:28

## 2025-03-21 RX ADMIN — Medication 81 MILLIGRAM(S): at 12:02

## 2025-03-21 NOTE — PROGRESS NOTE ADULT - NUTRITIONAL ASSESSMENT
This patient has been assessed with a concern for Malnutrition and has been determined to have a diagnosis/diagnoses of Moderate protein-calorie malnutrition.    This patient is being managed with:   Diet DASH/TLC-  Sodium & Cholesterol Restricted  1500mL Fluid Restriction ([id number])     Special Instructions for Nursin milliLiter(s) to 2000 milliLiter(s) fluid restriction  Entered: Mar  4 2025  2:49PM  
This patient has been assessed with a concern for Malnutrition and has been determined to have a diagnosis/diagnoses of Moderate protein-calorie malnutrition.    This patient is being managed with:   Diet DASH/TLC-  Sodium & Cholesterol Restricted  1500mL Fluid Restriction ([id number])     Special Instructions for Nursin milliLiter(s) to 2000 milliLiter(s) fluid restriction  Entered: Mar  4 2025  2:49PM  
This patient has been assessed with a concern for Malnutrition and has been determined to have a diagnosis/diagnoses of Moderate protein-calorie malnutrition.    This patient is being managed with:   Diet DASH/TLC-  Sodium & Cholesterol Restricted  1500mL Fluid Restriction ([id number])     Special Instructions for Nursin milliLiter(s) to 2000 milliLiter(s) fluid restriction  Entered: Mar  4 2025  2:49PM  
This patient has been assessed with a concern for Malnutrition and has been determined to have a diagnosis/diagnoses of Moderate protein-calorie malnutrition.    This patient is being managed with:   Diet DASH/TLC-  Sodium & Cholesterol Restricted     Special Instructions for Nursing:  Sodium & Cholesterol Restricted  Entered: Mar 14 2025  1:34PM    Diet NPO after Midnight-     NPO Start Date: 13-Mar-2025   NPO Start Time: 23:59  Entered: Mar 13 2025  3:56PM  
This patient has been assessed with a concern for Malnutrition and has been determined to have a diagnosis/diagnoses of Moderate protein-calorie malnutrition.    This patient is being managed with:   Diet DASH/TLC-  Sodium & Cholesterol Restricted  Supplement Feeding Modality:  Oral  Ensure Enlive Cans or Servings Per Day:  1       Frequency:  Three Times a day  Entered: Mar 15 2025  5:21PM  
This patient has been assessed with a concern for Malnutrition and has been determined to have a diagnosis/diagnoses of Moderate protein-calorie malnutrition.    This patient is being managed with:   Diet DASH/TLC-  Sodium & Cholesterol Restricted  1500mL Fluid Restriction ([id number])     Special Instructions for Nursin milliLiter(s) to 2000 milliLiter(s) fluid restriction  Entered: Mar  4 2025  2:49PM  
This patient has been assessed with a concern for Malnutrition and has been determined to have a diagnosis/diagnoses of Moderate protein-calorie malnutrition.    This patient is being managed with:   Diet DASH/TLC-  Sodium & Cholesterol Restricted  Supplement Feeding Modality:  Oral  Ensure Enlive Cans or Servings Per Day:  1       Frequency:  Three Times a day  Entered: Mar 15 2025  5:21PM  
This patient has been assessed with a concern for Malnutrition and has been determined to have a diagnosis/diagnoses of Moderate protein-calorie malnutrition.    This patient is being managed with:   Diet DASH/TLC-  Sodium & Cholesterol Restricted  Supplement Feeding Modality:  Oral  Ensure Enlive Cans or Servings Per Day:  1       Frequency:  Three Times a day  Entered: Mar 15 2025  5:21PM  
This patient has been assessed with a concern for Malnutrition and has been determined to have a diagnosis/diagnoses of Moderate protein-calorie malnutrition.    This patient is being managed with:   Diet DASH/TLC-  Sodium & Cholesterol Restricted  Supplement Feeding Modality:  Oral  Ensure Enlive Cans or Servings Per Day:  1       Frequency:  Three Times a day  Entered: Mar 15 2025  5:21PM    Diet NPO after Midnight-     NPO Start Date: 13-Mar-2025   NPO Start Time: 23:59  Entered: Mar 13 2025  3:56PM  
This patient has been assessed with a concern for Malnutrition and has been determined to have a diagnosis/diagnoses of Moderate protein-calorie malnutrition.    This patient is being managed with:   Diet DASH/TLC-  Sodium & Cholesterol Restricted  Supplement Feeding Modality:  Oral  Ensure Enlive Cans or Servings Per Day:  1       Frequency:  Three Times a day  Entered: Mar 15 2025  5:21PM    Diet NPO after Midnight-     NPO Start Date: 13-Mar-2025   NPO Start Time: 23:59  Entered: Mar 13 2025  3:56PM  
This patient has been assessed with a concern for Malnutrition and has been determined to have a diagnosis/diagnoses of Moderate protein-calorie malnutrition.    This patient is being managed with:   Diet DASH/TLC-  Sodium & Cholesterol Restricted  1500mL Fluid Restriction ([id number])     Special Instructions for Nursin milliLiter(s) to 2000 milliLiter(s) fluid restriction  Entered: Mar  4 2025  2:49PM  
This patient has been assessed with a concern for Malnutrition and has been determined to have a diagnosis/diagnoses of Moderate protein-calorie malnutrition.    This patient is being managed with:   Diet DASH/TLC-  Sodium & Cholesterol Restricted  1500mL Fluid Restriction ([id number])  Supplement Feeding Modality:  Oral  Ensure Enlive Cans or Servings Per Day:  1       Frequency:  Two Times a day  Entered: Mar 11 2025  2:47PM  
This patient has been assessed with a concern for Malnutrition and has been determined to have a diagnosis/diagnoses of Moderate protein-calorie malnutrition.    This patient is being managed with:   Diet DASH/TLC-  Sodium & Cholesterol Restricted  1500mL Fluid Restriction ([id number])     Special Instructions for Nursin milliLiter(s) to 2000 milliLiter(s) fluid restriction  Entered: Mar  4 2025  2:49PM  
This patient has been assessed with a concern for Malnutrition and has been determined to have a diagnosis/diagnoses of Moderate protein-calorie malnutrition.    This patient is being managed with:   Diet DASH/TLC-  Sodium & Cholesterol Restricted  Supplement Feeding Modality:  Oral  Ensure Enlive Cans or Servings Per Day:  1       Frequency:  Three Times a day  Entered: Mar 15 2025  5:21PM  
This patient has been assessed with a concern for Malnutrition and has been determined to have a diagnosis/diagnoses of Moderate protein-calorie malnutrition.    This patient is being managed with:   Diet DASH/TLC-  Sodium & Cholesterol Restricted  Supplement Feeding Modality:  Oral  Ensure Enlive Cans or Servings Per Day:  1       Frequency:  Two Times a day  Entered: Mar 13 2025  4:07PM    Diet NPO after Midnight-     NPO Start Date: 13-Mar-2025   NPO Start Time: 23:59  Entered: Mar 13 2025  3:56PM  
This patient has been assessed with a concern for Malnutrition and has been determined to have a diagnosis/diagnoses of Moderate protein-calorie malnutrition.    This patient is being managed with:   Diet DASH/TLC-  Sodium & Cholesterol Restricted  Supplement Feeding Modality:  Oral  Ensure Enlive Cans or Servings Per Day:  1       Frequency:  Two Times a day  Entered: Mar 13 2025  4:07PM    Diet NPO after Midnight-     NPO Start Date: 13-Mar-2025   NPO Start Time: 23:59  Entered: Mar 13 2025  3:56PM

## 2025-03-21 NOTE — PROGRESS NOTE ADULT - TIME BILLING
Time spent reviewing the chart documentation, reviewing labs and imaging studies, evaluating the patient, discussing the plan of care with the consultants & medical team, and documenting.
Time spent reviewing patient's chart, labwork, orders, documenting care, coordinating care with consultants, and discussing patient's care with family members.
Time spent reviewing the chart documentation, reviewing labs and imaging studies, evaluating the patient, discussing the plan of care with the consultants & medical team, and documenting.
Time spent reviewing patient's chart, labwork, orders, documenting care, coordinating care with consultants, and discussing patient's care with family members.
Time spent reviewing the chart documentation, reviewing labs and imaging studies, evaluating the patient, discussing the plan of care with the consultants & medical team, and documenting.

## 2025-03-21 NOTE — PROGRESS NOTE ADULT - ASSESSMENT
83 y/o M w/ PMH lung cancer (previously on immunotherapy),  chronic RBBB/LAFB,DVT not AC, HTN, copd not on home O2 and CAD presenting for increasing SOB for the last 1 week. Per wife, Schedule for PET scan last week, was unable to go for continued fatigue, states has had progressive SOB for the last few days. Also endorses that he has been having increased urinary frequency though no dysuria. Found to be hypoxic and UA grossly positive. Imaging was significant for findings of pyelonephritis. ID was consulted and patient continues to require inpatient care for IV Abx and for hematuria.    Acute hypoxic respiratory failure secondary to HF vs PNA vs recurrent pleural effusions vs severe AS  Acute on chronic systolic congestive heart failure, CAD, HTN  pAF  History of COPD not on home O2  - TTE showing EF 40-45% with severe AS  - cw eliquis  - Continue aspirin  - Continue Trelegy interchange  - Duonebs PRN  - Cardiology following, recs noted  - Discontinue Lasix and beta blocker as pt continues to have persistent orthostatic hypotension  - Increased midodrine to 10mg TID for orthostatic hypotension, encouraging oral hydration  - sp lhc showing mild rca and lad disease   - CARLOS showing moderate AS, no plan for surgical intervention/tavr    Postural hypotension  -tfts nl  -cortisol nl  -midodrine 15mg tid  -cw fludrocortisone    Acute blood loss anemia (resolved)  Hematuria in the setting of pyelonephritis (resolved)  - Condom catheter showing red urine drainage  - CBC stable for now  - Urology consulted but not recommending any intervention  - Monitor for hematuria resolution (resolved on 3/10)  - Needs to follow up with urology as an outpatient  - Has required qureshi in the past for BPH  - Discontinue Flomax and finasteride    Sepsis secondary to complicated urinary tract infection vs PNA  - Blood cultures  3/4 reporting No growth   - RVP/COVID 19 PCR 3/4 negative   - UA 3/4 with pyuria; Urine Cx 3/4 reporting E coli   - CTA chest 3/4 reporting no PE. Little change in right upper lobe parenchymal consolidation with air bronchograms from prior CT 1/17  - CT A/P 3/4 reporting bilateral hydroureteronephrosis with asymmetric left perinephric edema  - UA 3/4 with pyuria   - CXR on 3/9 showing worsening right lung consolidations (my read) from previous CXR  - Procal negative, Discontinued doxycyline on 3/10  - Finished course of Rocephin    SHANNEN on CKD stage 3  Bilateral hydronephrosis  - Was previously discharged on flomax and had qureshi which was taken out by urology  - Endorses overflow incontinence symptoms  - CTA chest as above showing bilateral hydronephrosis and left sided pyelonephritis  - cw finasteride  - Discontinued Flomax    HLD  - Continue atorvastatin    GERD  - Continue Protonix    Constipation  - cw senna and miralax      DVT/GI ppx:eliquis  Diet: DASH  Code Status: Full code  Dispo: medically active.    83 y/o M w/ PMH lung cancer (previously on immunotherapy),  chronic RBBB/LAFB,DVT not AC, HTN, copd not on home O2 and CAD presenting for increasing SOB for the last 1 week. Per wife, Schedule for PET scan last week, was unable to go for continued fatigue, states has had progressive SOB for the last few days. Also endorses that he has been having increased urinary frequency though no dysuria. Found to be hypoxic and UA grossly positive. Imaging was significant for findings of pyelonephritis. ID was consulted and patient continues to require inpatient care for IV Abx and for hematuria.    Acute hypoxic respiratory failure secondary to HF vs PNA vs recurrent pleural effusions vs severe AS  Acute on chronic systolic congestive heart failure, CAD, HTN  pAF  History of COPD not on home O2  - TTE showing EF 40-45% with severe AS  - cw eliquis  - Continue aspirin  - Continue Trelegy interchange  - Duonebs PRN  - Cardiology following, recs noted  - Discontinue Lasix and beta blocker as pt continues to have persistent orthostatic hypotension  - Increased midodrine to 10mg TID for orthostatic hypotension, encouraging oral hydration  - sp lhc showing mild rca and lad disease   - CARLOS showing moderate AS, no plan for surgical intervention/tavr    Postural hypotension  -tfts nl  -cortisol nl  -midodrine 15mg tid  -cw fludrocortisone    Acute blood loss anemia (resolved)  Hematuria in the setting of pyelonephritis (resolved)  - Condom catheter showing red urine drainage  - CBC stable for now  - Urology consulted but not recommending any intervention  - Monitor for hematuria resolution (resolved on 3/10)  - Needs to follow up with urology as an outpatient  - Has required qureshi in the past for BPH  - Discontinue Flomax    Sepsis secondary to complicated urinary tract infection vs PNA  - Blood cultures  3/4 reporting No growth   - RVP/COVID 19 PCR 3/4 negative   - UA 3/4 with pyuria; Urine Cx 3/4 reporting E coli   - CTA chest 3/4 reporting no PE. Little change in right upper lobe parenchymal consolidation with air bronchograms from prior CT 1/17  - CT A/P 3/4 reporting bilateral hydroureteronephrosis with asymmetric left perinephric edema  - UA 3/4 with pyuria   - CXR on 3/9 showing worsening right lung consolidations (my read) from previous CXR  - Procal negative, Discontinued doxycyline on 3/10  - Finished course of Rocephin    SHANNEN on CKD stage 3  Bilateral hydronephrosis  - Was previously discharged on flomax and had qureshi which was taken out by urology  - Endorses overflow incontinence symptoms  - CTA chest as above showing bilateral hydronephrosis and left sided pyelonephritis  - cw finasteride  - Discontinued Flomax    HLD  - Continue atorvastatin    GERD  - Continue Protonix    Constipation  - cw senna and miralax      DVT/GI ppx:eliquis  Diet: DASH  Code Status: Full code  Dispo: medically active.

## 2025-03-21 NOTE — PROGRESS NOTE ADULT - PROVIDER SPECIALTY LIST ADULT
Cardiology
Hospitalist
Cardiology
Hospitalist
Palliative Care
Cardiology
Palliative Care
Hospitalist
Urology
Hospitalist
CT Surgery
Hospitalist

## 2025-03-21 NOTE — PROGRESS NOTE ADULT - SUBJECTIVE AND OBJECTIVE BOX
[facility] Division of Hospital Medicine    pt seen and examined at bedside  pt feeling well, no complaints  yesterday able to ambulate short distances without hypotension  no cp, sob, palpitations, nausea  tolerating po diet    MEDICATIONS  (STANDING):  apixaban 5 milliGRAM(s) Oral two times a day  aspirin  chewable 81 milliGRAM(s) Oral daily  atorvastatin 40 milliGRAM(s) Oral at bedtime  dextrose 5%. 1000 milliLiter(s) (100 mL/Hr) IV Continuous <Continuous>  dextrose 5%. 1000 milliLiter(s) (50 mL/Hr) IV Continuous <Continuous>  dextrose 50% Injectable 25 Gram(s) IV Push once  dextrose 50% Injectable 12.5 Gram(s) IV Push once  dextrose 50% Injectable 25 Gram(s) IV Push once  fludroCORTISONE 0.1 milliGRAM(s) Oral daily  fluticasone propionate/ salmeterol 100-50 MICROgram(s) Diskus 1 Dose(s) Inhalation two times a day  glucagon  Injectable 1 milliGRAM(s) IntraMuscular once  insulin lispro (ADMELOG) corrective regimen sliding scale   SubCutaneous three times a day before meals  midodrine. 15 milliGRAM(s) Oral three times a day  pantoprazole    Tablet 40 milliGRAM(s) Oral before breakfast  polyethylene glycol 3350 17 Gram(s) Oral daily  senna 2 Tablet(s) Oral at bedtime  tiotropium 2.5 MICROgram(s) Inhaler 2 Puff(s) Inhalation daily    MEDICATIONS  (PRN):  acetaminophen     Tablet .. 650 milliGRAM(s) Oral every 6 hours PRN Temp greater or equal to 38C (100.4F), Mild Pain (1 - 3)  albuterol/ipratropium for Nebulization 3 milliLiter(s) Nebulizer every 6 hours PRN Shortness of Breath and/or Wheezing  aluminum hydroxide/magnesium hydroxide/simethicone Suspension 30 milliLiter(s) Oral every 4 hours PRN Dyspepsia  dextrose Oral Gel 15 Gram(s) Oral once PRN Blood Glucose LESS THAN 70 milliGRAM(s)/deciliter  melatonin 3 milliGRAM(s) Oral at bedtime PRN Insomnia  ondansetron Injectable 4 milliGRAM(s) IV Push every 8 hours PRN Nausea and/or Vomiting        I&O's Summary    20 Mar 2025 07:01  -  21 Mar 2025 07:00  --------------------------------------------------------  IN: 0 mL / OUT: 1000 mL / NET: -1000 mL        PHYSICAL EXAM:  Vital Signs Last 24 Hrs  T(C): 36.8 (21 Mar 2025 08:24), Max: 36.8 (20 Mar 2025 20:16)  T(F): 98.2 (21 Mar 2025 08:24), Max: 98.3 (20 Mar 2025 20:16)  HR: 68 (21 Mar 2025 09:00) (62 - 80)  BP: 151/67 (21 Mar 2025 08:24) (106/52 - 151/67)  BP(mean): --  RR: 18 (21 Mar 2025 08:24) (18 - 18)  SpO2: 98% (21 Mar 2025 09:00) (91% - 98%)    Parameters below as of 21 Mar 2025 09:00  Patient On (Oxygen Delivery Method): room air            CONSTITUTIONAL: NAD, well-groomed  ENMT: Moist oral mucosa, no pharyngeal injection or exudates; normal dentition  RESPIRATORY: Normal respiratory effort; lungs are clear to auscultation bilaterally  CARDIOVASCULAR: systolic murmur present throughout all cardiac zones  ABDOMEN: Nontender to palpation, normoactive bowel sounds, no rebound/guarding; No hepatosplenomegaly  MUSCLOSKELETAL:  Normal gait; no clubbing or cyanosis of digits; no joint swelling or tenderness to palpation  PSYCH: A+O to person, place, and time; affect appropriate  NEUROLOGY: CN 2-12 are intact and symmetric; no gross sensory deficits;   SKIN: No rashes; no palpable lesions    LABS:                        9.1    7.92  )-----------( 318      ( 21 Mar 2025 05:10 )             29.2     03-21    142  |  105  |  37.6[H]  ----------------------------<  98  4.3   |  24.0  |  1.38[H]    Ca    8.8      21 Mar 2025 05:10  Mg     2.3     03-20    TPro  6.3[L]  /  Alb  2.8[L]  /  TBili  0.4  /  DBili  x   /  AST  24  /  ALT  24  /  AlkPhos  109  03-21          Urinalysis Basic - ( 21 Mar 2025 05:10 )    Color: x / Appearance: x / SG: x / pH: x  Gluc: 98 mg/dL / Ketone: x  / Bili: x / Urobili: x   Blood: x / Protein: x / Nitrite: x   Leuk Esterase: x / RBC: x / WBC x   Sq Epi: x / Non Sq Epi: x / Bacteria: x        CAPILLARY BLOOD GLUCOSE      POCT Blood Glucose.: 88 mg/dL (21 Mar 2025 08:45)  POCT Blood Glucose.: 106 mg/dL (20 Mar 2025 21:46)  POCT Blood Glucose.: 120 mg/dL (20 Mar 2025 17:18)  POCT Blood Glucose.: 79 mg/dL (20 Mar 2025 12:09)        RADIOLOGY & ADDITIONAL TESTS:  Results Reviewed:   Imaging Personally Reviewed:  Electrocardiogram Personally Reviewed:

## 2025-03-21 NOTE — PROGRESS NOTE ADULT - REASON FOR ADMISSION
Sob and weakness for more 1 week.
Shortness of breath and weakness
Sob and weakness for more 1 week.

## 2025-03-22 VITALS
OXYGEN SATURATION: 92 % | RESPIRATION RATE: 18 BRPM | DIASTOLIC BLOOD PRESSURE: 68 MMHG | HEART RATE: 83 BPM | SYSTOLIC BLOOD PRESSURE: 115 MMHG | TEMPERATURE: 98 F

## 2025-03-22 LAB
ANION GAP SERPL CALC-SCNC: 10 MMOL/L — SIGNIFICANT CHANGE UP (ref 5–17)
BUN SERPL-MCNC: 34.4 MG/DL — HIGH (ref 8–20)
CHLORIDE SERPL-SCNC: 105 MMOL/L — SIGNIFICANT CHANGE UP (ref 96–108)
CO2 SERPL-SCNC: 26 MMOL/L — SIGNIFICANT CHANGE UP (ref 22–29)
CREAT SERPL-MCNC: 1.22 MG/DL — SIGNIFICANT CHANGE UP (ref 0.5–1.3)
EGFR: 59 ML/MIN/1.73M2 — LOW
EGFR: 59 ML/MIN/1.73M2 — LOW
GLUCOSE BLDC GLUCOMTR-MCNC: 112 MG/DL — HIGH (ref 70–99)
GLUCOSE BLDC GLUCOMTR-MCNC: 98 MG/DL — SIGNIFICANT CHANGE UP (ref 70–99)
GLUCOSE SERPL-MCNC: 110 MG/DL — HIGH (ref 70–99)
HCT VFR BLD CALC: 31.6 % — LOW (ref 39–50)
HGB BLD-MCNC: 9.8 G/DL — LOW (ref 13–17)
MAGNESIUM SERPL-MCNC: 2.4 MG/DL — SIGNIFICANT CHANGE UP (ref 1.6–2.6)
MCHC RBC-ENTMCNC: 27.9 PG — SIGNIFICANT CHANGE UP (ref 27–34)
MCHC RBC-ENTMCNC: 31 G/DL — LOW (ref 32–36)
MCV RBC AUTO: 90 FL — SIGNIFICANT CHANGE UP (ref 80–100)
NRBC # BLD AUTO: 0 K/UL — SIGNIFICANT CHANGE UP (ref 0–0)
NRBC # FLD: 0 K/UL — SIGNIFICANT CHANGE UP (ref 0–0)
PLATELET # BLD AUTO: 311 K/UL — SIGNIFICANT CHANGE UP (ref 150–400)
PMV BLD: 9.7 FL — SIGNIFICANT CHANGE UP (ref 7–13)
POTASSIUM SERPL-MCNC: 5 MMOL/L — SIGNIFICANT CHANGE UP (ref 3.5–5.3)
POTASSIUM SERPL-SCNC: 5 MMOL/L — SIGNIFICANT CHANGE UP (ref 3.5–5.3)
RBC # BLD: 3.51 M/UL — LOW (ref 4.2–5.8)
RBC # FLD: 20 % — HIGH (ref 10.3–14.5)
SODIUM SERPL-SCNC: 141 MMOL/L — SIGNIFICANT CHANGE UP (ref 135–145)
WBC # BLD: 7.79 K/UL — SIGNIFICANT CHANGE UP (ref 3.8–10.5)
WBC # FLD AUTO: 7.79 K/UL — SIGNIFICANT CHANGE UP (ref 3.8–10.5)

## 2025-03-22 PROCEDURE — 84300 ASSAY OF URINE SODIUM: CPT

## 2025-03-22 PROCEDURE — 97116 GAIT TRAINING THERAPY: CPT

## 2025-03-22 PROCEDURE — 0225U NFCT DS DNA&RNA 21 SARSCOV2: CPT

## 2025-03-22 PROCEDURE — C8929: CPT

## 2025-03-22 PROCEDURE — 87186 SC STD MICRODIL/AGAR DIL: CPT

## 2025-03-22 PROCEDURE — 36415 COLL VENOUS BLD VENIPUNCTURE: CPT

## 2025-03-22 PROCEDURE — 84145 PROCALCITONIN (PCT): CPT

## 2025-03-22 PROCEDURE — 83735 ASSAY OF MAGNESIUM: CPT

## 2025-03-22 PROCEDURE — 84133 ASSAY OF URINE POTASSIUM: CPT

## 2025-03-22 PROCEDURE — 83036 HEMOGLOBIN GLYCOSYLATED A1C: CPT

## 2025-03-22 PROCEDURE — 71045 X-RAY EXAM CHEST 1 VIEW: CPT

## 2025-03-22 PROCEDURE — 86738 MYCOPLASMA ANTIBODY: CPT

## 2025-03-22 PROCEDURE — 87899 AGENT NOS ASSAY W/OPTIC: CPT

## 2025-03-22 PROCEDURE — 93005 ELECTROCARDIOGRAM TRACING: CPT

## 2025-03-22 PROCEDURE — 84484 ASSAY OF TROPONIN QUANT: CPT

## 2025-03-22 PROCEDURE — 85025 COMPLETE CBC W/AUTO DIFF WBC: CPT

## 2025-03-22 PROCEDURE — 83880 ASSAY OF NATRIURETIC PEPTIDE: CPT

## 2025-03-22 PROCEDURE — 80053 COMPREHEN METABOLIC PANEL: CPT

## 2025-03-22 PROCEDURE — 94640 AIRWAY INHALATION TREATMENT: CPT

## 2025-03-22 PROCEDURE — 83935 ASSAY OF URINE OSMOLALITY: CPT

## 2025-03-22 PROCEDURE — 93460 R&L HRT ART/VENTRICLE ANGIO: CPT

## 2025-03-22 PROCEDURE — 82570 ASSAY OF URINE CREATININE: CPT

## 2025-03-22 PROCEDURE — 86850 RBC ANTIBODY SCREEN: CPT

## 2025-03-22 PROCEDURE — C1894: CPT

## 2025-03-22 PROCEDURE — 86900 BLOOD TYPING SEROLOGIC ABO: CPT

## 2025-03-22 PROCEDURE — 93325 DOPPLER ECHO COLOR FLOW MAPG: CPT

## 2025-03-22 PROCEDURE — 93320 DOPPLER ECHO COMPLETE: CPT

## 2025-03-22 PROCEDURE — 84540 ASSAY OF URINE/UREA-N: CPT

## 2025-03-22 PROCEDURE — 86901 BLOOD TYPING SEROLOGIC RH(D): CPT

## 2025-03-22 PROCEDURE — 80061 LIPID PANEL: CPT

## 2025-03-22 PROCEDURE — 87449 NOS EACH ORGANISM AG IA: CPT

## 2025-03-22 PROCEDURE — 82962 GLUCOSE BLOOD TEST: CPT

## 2025-03-22 PROCEDURE — 87040 BLOOD CULTURE FOR BACTERIA: CPT

## 2025-03-22 PROCEDURE — 84156 ASSAY OF PROTEIN URINE: CPT

## 2025-03-22 PROCEDURE — 71275 CT ANGIOGRAPHY CHEST: CPT | Mod: MC

## 2025-03-22 PROCEDURE — C1760: CPT

## 2025-03-22 PROCEDURE — C1769: CPT

## 2025-03-22 PROCEDURE — 85610 PROTHROMBIN TIME: CPT

## 2025-03-22 PROCEDURE — 94010 BREATHING CAPACITY TEST: CPT

## 2025-03-22 PROCEDURE — 80048 BASIC METABOLIC PNL TOTAL CA: CPT

## 2025-03-22 PROCEDURE — 87086 URINE CULTURE/COLONY COUNT: CPT

## 2025-03-22 PROCEDURE — 84443 ASSAY THYROID STIM HORMONE: CPT

## 2025-03-22 PROCEDURE — 87637 SARSCOV2&INF A&B&RSV AMP PRB: CPT

## 2025-03-22 PROCEDURE — 84449 ASSAY OF TRANSCORTIN: CPT

## 2025-03-22 PROCEDURE — 97163 PT EVAL HIGH COMPLEX 45 MIN: CPT

## 2025-03-22 PROCEDURE — 74018 RADEX ABDOMEN 1 VIEW: CPT

## 2025-03-22 PROCEDURE — 99239 HOSP IP/OBS DSCHRG MGMT >30: CPT

## 2025-03-22 PROCEDURE — 84100 ASSAY OF PHOSPHORUS: CPT

## 2025-03-22 PROCEDURE — 84439 ASSAY OF FREE THYROXINE: CPT

## 2025-03-22 PROCEDURE — 93355 ECHO TRANSESOPHAGEAL (TEE): CPT

## 2025-03-22 PROCEDURE — 81001 URINALYSIS AUTO W/SCOPE: CPT

## 2025-03-22 PROCEDURE — 85730 THROMBOPLASTIN TIME PARTIAL: CPT

## 2025-03-22 PROCEDURE — 85027 COMPLETE CBC AUTOMATED: CPT

## 2025-03-22 PROCEDURE — 93312 ECHO TRANSESOPHAGEAL: CPT

## 2025-03-22 PROCEDURE — 76376 3D RENDER W/INTRP POSTPROCES: CPT

## 2025-03-22 PROCEDURE — 82533 TOTAL CORTISOL: CPT

## 2025-03-22 PROCEDURE — 97530 THERAPEUTIC ACTIVITIES: CPT

## 2025-03-22 PROCEDURE — 74176 CT ABD & PELVIS W/O CONTRAST: CPT | Mod: MC

## 2025-03-22 PROCEDURE — 99285 EMERGENCY DEPT VISIT HI MDM: CPT | Mod: 25

## 2025-03-22 RX ORDER — FLUDROCORTISONE ACETATE 0.1 MG
1 TABLET ORAL
Qty: 30 | Refills: 0
Start: 2025-03-22 | End: 2025-04-20

## 2025-03-22 RX ORDER — MIDODRINE HYDROCHLORIDE 5 MG/1
3 TABLET ORAL
Qty: 270 | Refills: 0
Start: 2025-03-22 | End: 2025-04-20

## 2025-03-22 RX ADMIN — Medication 40 MILLIGRAM(S): at 06:02

## 2025-03-22 RX ADMIN — APIXABAN 5 MILLIGRAM(S): 2.5 TABLET, FILM COATED ORAL at 06:02

## 2025-03-22 RX ADMIN — Medication 0.1 MILLIGRAM(S): at 06:02

## 2025-03-22 RX ADMIN — Medication 81 MILLIGRAM(S): at 11:22

## 2025-03-22 RX ADMIN — MIDODRINE HYDROCHLORIDE 15 MILLIGRAM(S): 5 TABLET ORAL at 11:22

## 2025-03-22 RX ADMIN — TIOTROPIUM BROMIDE INHALATION SPRAY 2 PUFF(S): 3.12 SPRAY, METERED RESPIRATORY (INHALATION) at 08:27

## 2025-03-22 RX ADMIN — MIDODRINE HYDROCHLORIDE 15 MILLIGRAM(S): 5 TABLET ORAL at 06:02

## 2025-03-22 RX ADMIN — Medication 1 DOSE(S): at 08:27

## 2025-03-25 ENCOUNTER — TRANSCRIPTION ENCOUNTER (OUTPATIENT)
Age: 85
End: 2025-03-25

## 2025-03-27 ENCOUNTER — TRANSCRIPTION ENCOUNTER (OUTPATIENT)
Age: 85
End: 2025-03-27

## 2025-03-27 ENCOUNTER — APPOINTMENT (OUTPATIENT)
Dept: CARE COORDINATION | Facility: HOME HEALTH | Age: 85
End: 2025-03-27
Payer: MEDICARE

## 2025-03-27 VITALS
DIASTOLIC BLOOD PRESSURE: 60 MMHG | SYSTOLIC BLOOD PRESSURE: 110 MMHG | HEART RATE: 89 BPM | OXYGEN SATURATION: 97 % | RESPIRATION RATE: 16 BRPM

## 2025-03-27 DIAGNOSIS — I35.0 NONRHEUMATIC AORTIC (VALVE) STENOSIS: ICD-10-CM

## 2025-03-27 DIAGNOSIS — J44.9 CHRONIC OBSTRUCTIVE PULMONARY DISEASE, UNSPECIFIED: ICD-10-CM

## 2025-03-27 DIAGNOSIS — I48.0 PAROXYSMAL ATRIAL FIBRILLATION: ICD-10-CM

## 2025-03-27 DIAGNOSIS — I50.23 ACUTE ON CHRONIC SYSTOLIC (CONGESTIVE) HEART FAILURE: ICD-10-CM

## 2025-03-27 PROCEDURE — 99495 TRANSJ CARE MGMT MOD F2F 14D: CPT

## 2025-03-27 RX ORDER — APIXABAN 5 MG/1
5 TABLET, FILM COATED ORAL
Refills: 0 | Status: ACTIVE | COMMUNITY

## 2025-03-27 RX ORDER — SENNOSIDES 8.6 MG
8.6 TABLET ORAL
Refills: 0 | Status: ACTIVE | COMMUNITY

## 2025-03-27 RX ORDER — FLUDROCORTISONE ACETATE 0.1 MG
0.1 TABLET ORAL
Refills: 0 | Status: ACTIVE | COMMUNITY

## 2025-03-27 RX ORDER — MIDODRINE HYDROCHLORIDE 5 MG/1
5 TABLET ORAL 3 TIMES DAILY
Refills: 0 | Status: ACTIVE | COMMUNITY

## 2025-03-27 RX ORDER — ASPIRIN 81 MG/1
81 TABLET ORAL DAILY
Qty: 90 | Refills: 3 | Status: ACTIVE | COMMUNITY

## 2025-03-27 RX ORDER — OMEPRAZOLE 20 MG/1
20 TABLET, DELAYED RELEASE ORAL DAILY
Qty: 30 | Refills: 0 | Status: ACTIVE | COMMUNITY
Start: 2025-03-27 | End: 1900-01-01

## 2025-04-01 ENCOUNTER — TRANSCRIPTION ENCOUNTER (OUTPATIENT)
Age: 85
End: 2025-04-01

## 2025-04-01 LAB
CORTICOSTEROID BINDING GLOBULIN RESULT: 2.5 MG/DL — SIGNIFICANT CHANGE UP
CORTIS F/TOTAL MFR SERPL: 21 % — SIGNIFICANT CHANGE UP
CORTIS SERPL-MCNC: 20 UG/DL — HIGH
CORTISOL, FREE RESULT: 4.2 UG/DL — HIGH

## 2025-04-04 ENCOUNTER — RESULT REVIEW (OUTPATIENT)
Age: 85
End: 2025-04-04

## 2025-04-04 ENCOUNTER — APPOINTMENT (OUTPATIENT)
Age: 85
End: 2025-04-04

## 2025-04-04 ENCOUNTER — APPOINTMENT (OUTPATIENT)
Dept: HEMATOLOGY ONCOLOGY | Facility: CLINIC | Age: 85
End: 2025-04-04
Payer: MEDICARE

## 2025-04-04 VITALS
OXYGEN SATURATION: 95 % | BODY MASS INDEX: 25.77 KG/M2 | DIASTOLIC BLOOD PRESSURE: 85 MMHG | SYSTOLIC BLOOD PRESSURE: 135 MMHG | HEIGHT: 70 IN | WEIGHT: 180 LBS | HEART RATE: 89 BPM

## 2025-04-04 DIAGNOSIS — C34.90 MALIGNANT NEOPLASM OF UNSPECIFIED PART OF UNSPECIFIED BRONCHUS OR LUNG: ICD-10-CM

## 2025-04-04 DIAGNOSIS — I82.409 ACUTE EMBOLISM AND THROMBOSIS OF UNSPECIFIED DEEP VEINS OF UNSPECIFIED LOWER EXTREMITY: ICD-10-CM

## 2025-04-04 LAB
BASOPHILS # BLD AUTO: 0.04 K/UL — SIGNIFICANT CHANGE UP (ref 0–0.2)
BASOPHILS NFR BLD AUTO: 0.5 % — SIGNIFICANT CHANGE UP (ref 0–2)
EOSINOPHIL # BLD AUTO: 0.21 K/UL — SIGNIFICANT CHANGE UP (ref 0–0.5)
EOSINOPHIL NFR BLD AUTO: 2.7 % — SIGNIFICANT CHANGE UP (ref 0–6)
HCT VFR BLD CALC: 30.4 % — LOW (ref 39–50)
HGB BLD-MCNC: 9.1 G/DL — LOW (ref 13–17)
IMM GRANULOCYTES # BLD AUTO: 0.02 K/UL — SIGNIFICANT CHANGE UP (ref 0–0.07)
IMM GRANULOCYTES NFR BLD AUTO: 0.3 % — SIGNIFICANT CHANGE UP (ref 0–0.9)
LYMPHOCYTES # BLD AUTO: 0.88 K/UL — LOW (ref 1–3.3)
LYMPHOCYTES NFR BLD AUTO: 11.1 % — LOW (ref 13–44)
MCHC RBC-ENTMCNC: 28.5 PG — SIGNIFICANT CHANGE UP (ref 27–34)
MCHC RBC-ENTMCNC: 29.9 G/DL — LOW (ref 32–36)
MCV RBC AUTO: 95.3 FL — SIGNIFICANT CHANGE UP (ref 80–100)
MONOCYTES # BLD AUTO: 0.87 K/UL — SIGNIFICANT CHANGE UP (ref 0–0.9)
MONOCYTES NFR BLD AUTO: 11 % — SIGNIFICANT CHANGE UP (ref 2–14)
NEUTROPHILS # BLD AUTO: 5.88 K/UL — SIGNIFICANT CHANGE UP (ref 1.8–7.4)
NEUTROPHILS NFR BLD AUTO: 74.4 % — SIGNIFICANT CHANGE UP (ref 43–77)
NRBC # BLD AUTO: 0 K/UL — SIGNIFICANT CHANGE UP (ref 0–0)
NRBC # FLD: 0 K/UL — SIGNIFICANT CHANGE UP (ref 0–0)
NRBC BLD AUTO-RTO: 0 /100 WBCS — SIGNIFICANT CHANGE UP (ref 0–0)
PLATELET # BLD AUTO: 282 K/UL — SIGNIFICANT CHANGE UP (ref 150–400)
PMV BLD: 9.8 FL — SIGNIFICANT CHANGE UP (ref 7–13)
RBC # BLD: 3.19 M/UL — LOW (ref 4.2–5.8)
RBC # FLD: 21.2 % — HIGH (ref 10.3–14.5)
WBC # BLD: 7.9 K/UL — SIGNIFICANT CHANGE UP (ref 3.8–10.5)
WBC # FLD AUTO: 7.9 K/UL — SIGNIFICANT CHANGE UP (ref 3.8–10.5)

## 2025-04-04 PROCEDURE — 99215 OFFICE O/P EST HI 40 MIN: CPT

## 2025-04-05 LAB
ALBUMIN SERPL ELPH-MCNC: 3.5 G/DL — SIGNIFICANT CHANGE UP (ref 3.3–5)
ALP SERPL-CCNC: 133 U/L — HIGH (ref 40–120)
ALT FLD-CCNC: 14 U/L — SIGNIFICANT CHANGE UP (ref 10–45)
ANION GAP SERPL CALC-SCNC: 12 MMOL/L — SIGNIFICANT CHANGE UP (ref 5–17)
AST SERPL-CCNC: 19 U/L — SIGNIFICANT CHANGE UP (ref 10–40)
BILIRUB SERPL-MCNC: 0.2 MG/DL — SIGNIFICANT CHANGE UP (ref 0.2–1.2)
BUN SERPL-MCNC: 22 MG/DL — SIGNIFICANT CHANGE UP (ref 7–23)
CALCIUM SERPL-MCNC: 8.9 MG/DL — SIGNIFICANT CHANGE UP (ref 8.4–10.5)
CEA SERPL-MCNC: 1.9 NG/ML — SIGNIFICANT CHANGE UP (ref 0–3.8)
CHLORIDE SERPL-SCNC: 105 MMOL/L — SIGNIFICANT CHANGE UP (ref 96–108)
CO2 SERPL-SCNC: 24 MMOL/L — SIGNIFICANT CHANGE UP (ref 22–31)
CREAT SERPL-MCNC: 1.07 MG/DL — SIGNIFICANT CHANGE UP (ref 0.5–1.3)
EGFR: 69 ML/MIN/1.73M2 — SIGNIFICANT CHANGE UP
EGFR: 69 ML/MIN/1.73M2 — SIGNIFICANT CHANGE UP
GLUCOSE SERPL-MCNC: 83 MG/DL — SIGNIFICANT CHANGE UP (ref 70–99)
POTASSIUM SERPL-MCNC: 4.4 MMOL/L — SIGNIFICANT CHANGE UP (ref 3.5–5.3)
POTASSIUM SERPL-SCNC: 4.4 MMOL/L — SIGNIFICANT CHANGE UP (ref 3.5–5.3)
PROT SERPL-MCNC: 6.5 G/DL — SIGNIFICANT CHANGE UP (ref 6–8.3)
SODIUM SERPL-SCNC: 142 MMOL/L — SIGNIFICANT CHANGE UP (ref 135–145)

## 2025-04-07 ENCOUNTER — APPOINTMENT (OUTPATIENT)
Dept: UROLOGY | Facility: CLINIC | Age: 85
End: 2025-04-07

## 2025-04-07 DIAGNOSIS — N39.0 URINARY TRACT INFECTION, SITE NOT SPECIFIED: ICD-10-CM

## 2025-04-07 PROCEDURE — 99215 OFFICE O/P EST HI 40 MIN: CPT

## 2025-04-07 PROCEDURE — 51798 US URINE CAPACITY MEASURE: CPT

## 2025-04-08 ENCOUNTER — APPOINTMENT (OUTPATIENT)
Dept: ULTRASOUND IMAGING | Facility: CLINIC | Age: 85
End: 2025-04-08
Payer: MEDICARE

## 2025-04-08 ENCOUNTER — OUTPATIENT (OUTPATIENT)
Dept: OUTPATIENT SERVICES | Facility: HOSPITAL | Age: 85
LOS: 1 days | End: 2025-04-08

## 2025-04-08 DIAGNOSIS — Z96.642 PRESENCE OF LEFT ARTIFICIAL HIP JOINT: Chronic | ICD-10-CM

## 2025-04-08 DIAGNOSIS — R33.9 RETENTION OF URINE, UNSPECIFIED: ICD-10-CM

## 2025-04-08 DIAGNOSIS — Z96.641 PRESENCE OF RIGHT ARTIFICIAL HIP JOINT: Chronic | ICD-10-CM

## 2025-04-08 LAB
ANION GAP SERPL CALC-SCNC: 11 MMOL/L
BUN SERPL-MCNC: 24 MG/DL
CALCIUM SERPL-MCNC: 9.4 MG/DL
CHLORIDE SERPL-SCNC: 108 MMOL/L
CO2 SERPL-SCNC: 25 MMOL/L
CREAT SERPL-MCNC: 1.29 MG/DL
EGFRCR SERPLBLD CKD-EPI 2021: 55 ML/MIN/1.73M2
GLUCOSE SERPL-MCNC: 101 MG/DL
POTASSIUM SERPL-SCNC: 5 MMOL/L
SODIUM SERPL-SCNC: 145 MMOL/L

## 2025-04-08 PROCEDURE — 71046 X-RAY EXAM CHEST 2 VIEWS: CPT | Mod: 26

## 2025-04-08 PROCEDURE — 76770 US EXAM ABDO BACK WALL COMP: CPT | Mod: 26

## 2025-04-09 ENCOUNTER — TRANSCRIPTION ENCOUNTER (OUTPATIENT)
Age: 85
End: 2025-04-09

## 2025-04-09 LAB — BACTERIA UR CULT: NORMAL

## 2025-04-12 ENCOUNTER — NON-APPOINTMENT (OUTPATIENT)
Age: 85
End: 2025-04-12

## 2025-04-14 ENCOUNTER — APPOINTMENT (OUTPATIENT)
Dept: UROLOGY | Facility: CLINIC | Age: 85
End: 2025-04-14

## 2025-04-14 DIAGNOSIS — N13.8 BENIGN PROSTATIC HYPERPLASIA WITH LOWER URINARY TRACT SYMPMS: ICD-10-CM

## 2025-04-14 DIAGNOSIS — N13.30 UNSPECIFIED HYDRONEPHROSIS: ICD-10-CM

## 2025-04-14 DIAGNOSIS — R33.9 RETENTION OF URINE, UNSPECIFIED: ICD-10-CM

## 2025-04-14 DIAGNOSIS — N40.1 BENIGN PROSTATIC HYPERPLASIA WITH LOWER URINARY TRACT SYMPMS: ICD-10-CM

## 2025-04-14 PROCEDURE — 99215 OFFICE O/P EST HI 40 MIN: CPT

## 2025-04-21 ENCOUNTER — APPOINTMENT (OUTPATIENT)
Dept: DERMATOLOGY | Facility: CLINIC | Age: 85
End: 2025-04-21
Payer: MEDICARE

## 2025-04-21 PROCEDURE — 99213 OFFICE O/P EST LOW 20 MIN: CPT

## 2025-04-22 ENCOUNTER — TRANSCRIPTION ENCOUNTER (OUTPATIENT)
Age: 85
End: 2025-04-22

## 2025-04-23 ENCOUNTER — APPOINTMENT (OUTPATIENT)
Dept: NUCLEAR MEDICINE | Facility: CLINIC | Age: 85
End: 2025-04-23
Payer: MEDICARE

## 2025-04-23 ENCOUNTER — OUTPATIENT (OUTPATIENT)
Dept: OUTPATIENT SERVICES | Facility: HOSPITAL | Age: 85
LOS: 1 days | End: 2025-04-23

## 2025-04-23 DIAGNOSIS — C34.90 MALIGNANT NEOPLASM OF UNSPECIFIED PART OF UNSPECIFIED BRONCHUS OR LUNG: ICD-10-CM

## 2025-04-23 PROCEDURE — 78815 PET IMAGE W/CT SKULL-THIGH: CPT | Mod: 26,PS

## 2025-04-28 ENCOUNTER — APPOINTMENT (OUTPATIENT)
Dept: SURGERY | Facility: CLINIC | Age: 85
End: 2025-04-28
Payer: COMMERCIAL

## 2025-04-28 VITALS
OXYGEN SATURATION: 94 % | SYSTOLIC BLOOD PRESSURE: 114 MMHG | HEIGHT: 70 IN | BODY MASS INDEX: 25.48 KG/M2 | DIASTOLIC BLOOD PRESSURE: 66 MMHG | WEIGHT: 178 LBS | HEART RATE: 86 BPM | TEMPERATURE: 97.5 F | RESPIRATION RATE: 16 BRPM

## 2025-04-28 DIAGNOSIS — K40.90 UNILATERAL INGUINAL HERNIA, W/OUT OBSTRUCTION OR GANGRENE, NOT SPECIFIED AS RECURRENT: ICD-10-CM

## 2025-04-28 PROCEDURE — 99205 OFFICE O/P NEW HI 60 MIN: CPT

## 2025-04-28 RX ORDER — METOPROLOL TARTRATE 75 MG/1
TABLET, FILM COATED ORAL
Refills: 0 | Status: ACTIVE | COMMUNITY

## 2025-04-28 RX ORDER — FUROSEMIDE 40 MG/1
40 TABLET ORAL
Refills: 0 | Status: ACTIVE | COMMUNITY

## 2025-05-07 ENCOUNTER — INPATIENT (INPATIENT)
Facility: HOSPITAL | Age: 85
LOS: 8 days | Discharge: HOME | DRG: 264 | End: 2025-05-16
Attending: INTERNAL MEDICINE | Admitting: HOSPITALIST
Payer: MEDICARE

## 2025-05-07 VITALS
OXYGEN SATURATION: 99 % | DIASTOLIC BLOOD PRESSURE: 74 MMHG | HEART RATE: 83 BPM | TEMPERATURE: 98 F | HEIGHT: 66 IN | WEIGHT: 182.98 LBS | RESPIRATION RATE: 20 BRPM | SYSTOLIC BLOOD PRESSURE: 129 MMHG

## 2025-05-07 DIAGNOSIS — Z96.642 PRESENCE OF LEFT ARTIFICIAL HIP JOINT: Chronic | ICD-10-CM

## 2025-05-07 DIAGNOSIS — Z96.641 PRESENCE OF RIGHT ARTIFICIAL HIP JOINT: Chronic | ICD-10-CM

## 2025-05-07 LAB
ALBUMIN SERPL ELPH-MCNC: 3.9 G/DL — SIGNIFICANT CHANGE UP (ref 3.3–5.2)
ALP SERPL-CCNC: 126 U/L — HIGH (ref 40–120)
ALT FLD-CCNC: 10 U/L — SIGNIFICANT CHANGE UP
ANION GAP SERPL CALC-SCNC: 11 MMOL/L — SIGNIFICANT CHANGE UP (ref 5–17)
APTT BLD: 38.2 SEC — HIGH (ref 26.1–36.8)
AST SERPL-CCNC: 16 U/L — SIGNIFICANT CHANGE UP
BASOPHILS # BLD AUTO: 0.03 K/UL — SIGNIFICANT CHANGE UP (ref 0–0.2)
BASOPHILS NFR BLD AUTO: 0.4 % — SIGNIFICANT CHANGE UP (ref 0–2)
BILIRUB SERPL-MCNC: 0.7 MG/DL — SIGNIFICANT CHANGE UP (ref 0.4–2)
BUN SERPL-MCNC: 23.3 MG/DL — HIGH (ref 8–20)
CALCIUM SERPL-MCNC: 9.3 MG/DL — SIGNIFICANT CHANGE UP (ref 8.4–10.5)
CHLORIDE SERPL-SCNC: 106 MMOL/L — SIGNIFICANT CHANGE UP (ref 96–108)
CO2 SERPL-SCNC: 29 MMOL/L — SIGNIFICANT CHANGE UP (ref 22–29)
CREAT SERPL-MCNC: 1.07 MG/DL — SIGNIFICANT CHANGE UP (ref 0.5–1.3)
EGFR: 69 ML/MIN/1.73M2 — SIGNIFICANT CHANGE UP
EGFR: 69 ML/MIN/1.73M2 — SIGNIFICANT CHANGE UP
EOSINOPHIL # BLD AUTO: 0.09 K/UL — SIGNIFICANT CHANGE UP (ref 0–0.5)
EOSINOPHIL NFR BLD AUTO: 1.1 % — SIGNIFICANT CHANGE UP (ref 0–6)
GLUCOSE SERPL-MCNC: 95 MG/DL — SIGNIFICANT CHANGE UP (ref 70–99)
HCT VFR BLD CALC: 37.6 % — LOW (ref 39–50)
HGB BLD-MCNC: 11.3 G/DL — LOW (ref 13–17)
IMM GRANULOCYTES # BLD AUTO: 0.03 K/UL — SIGNIFICANT CHANGE UP (ref 0–0.07)
IMM GRANULOCYTES NFR BLD AUTO: 0.4 % — SIGNIFICANT CHANGE UP (ref 0–0.9)
INR BLD: 1.47 RATIO — HIGH (ref 0.85–1.16)
LYMPHOCYTES # BLD AUTO: 0.64 K/UL — LOW (ref 1–3.3)
LYMPHOCYTES NFR BLD AUTO: 8 % — LOW (ref 13–44)
MAGNESIUM SERPL-MCNC: 2.2 MG/DL — SIGNIFICANT CHANGE UP (ref 1.6–2.6)
MCHC RBC-ENTMCNC: 28 PG — SIGNIFICANT CHANGE UP (ref 27–34)
MCHC RBC-ENTMCNC: 30.1 G/DL — LOW (ref 32–36)
MCV RBC AUTO: 93.1 FL — SIGNIFICANT CHANGE UP (ref 80–100)
MONOCYTES # BLD AUTO: 0.59 K/UL — SIGNIFICANT CHANGE UP (ref 0–0.9)
MONOCYTES NFR BLD AUTO: 7.4 % — SIGNIFICANT CHANGE UP (ref 2–14)
NEUTROPHILS # BLD AUTO: 6.6 K/UL — SIGNIFICANT CHANGE UP (ref 1.8–7.4)
NEUTROPHILS NFR BLD AUTO: 82.7 % — HIGH (ref 43–77)
NRBC # BLD AUTO: 0 K/UL — SIGNIFICANT CHANGE UP (ref 0–0)
NRBC # FLD: 0 K/UL — SIGNIFICANT CHANGE UP (ref 0–0)
NRBC BLD AUTO-RTO: 0 /100 WBCS — SIGNIFICANT CHANGE UP (ref 0–0)
NT-PROBNP SERPL-SCNC: 2144 PG/ML — HIGH (ref 0–300)
PLATELET # BLD AUTO: 284 K/UL — SIGNIFICANT CHANGE UP (ref 150–400)
PMV BLD: 10.8 FL — SIGNIFICANT CHANGE UP (ref 7–13)
POTASSIUM SERPL-MCNC: 4.5 MMOL/L — SIGNIFICANT CHANGE UP (ref 3.5–5.3)
POTASSIUM SERPL-SCNC: 4.5 MMOL/L — SIGNIFICANT CHANGE UP (ref 3.5–5.3)
PROT SERPL-MCNC: 7.4 G/DL — SIGNIFICANT CHANGE UP (ref 6.6–8.7)
PROTHROM AB SERPL-ACNC: 17 SEC — HIGH (ref 9.9–13.4)
RBC # BLD: 4.04 M/UL — LOW (ref 4.2–5.8)
RBC # FLD: 17.4 % — HIGH (ref 10.3–14.5)
SODIUM SERPL-SCNC: 146 MMOL/L — HIGH (ref 135–145)
TROPONIN T, HIGH SENSITIVITY RESULT: 34 NG/L — SIGNIFICANT CHANGE UP (ref 0–51)
TROPONIN T, HIGH SENSITIVITY RESULT: 37 NG/L — SIGNIFICANT CHANGE UP (ref 0–51)
WBC # BLD: 7.98 K/UL — SIGNIFICANT CHANGE UP (ref 3.8–10.5)
WBC # FLD AUTO: 7.98 K/UL — SIGNIFICANT CHANGE UP (ref 3.8–10.5)

## 2025-05-07 PROCEDURE — 99285 EMERGENCY DEPT VISIT HI MDM: CPT

## 2025-05-07 PROCEDURE — 71275 CT ANGIOGRAPHY CHEST: CPT | Mod: 26

## 2025-05-07 PROCEDURE — 71045 X-RAY EXAM CHEST 1 VIEW: CPT | Mod: 26

## 2025-05-07 RX ORDER — CEFTRIAXONE 500 MG/1
1000 INJECTION, POWDER, FOR SOLUTION INTRAMUSCULAR; INTRAVENOUS ONCE
Refills: 0 | Status: DISCONTINUED | OUTPATIENT
Start: 2025-05-07 | End: 2025-05-07

## 2025-05-07 RX ORDER — CEFTRIAXONE 500 MG/1
1000 INJECTION, POWDER, FOR SOLUTION INTRAMUSCULAR; INTRAVENOUS ONCE
Refills: 0 | Status: COMPLETED | OUTPATIENT
Start: 2025-05-07 | End: 2025-05-07

## 2025-05-07 RX ORDER — AZITHROMYCIN 250 MG
500 CAPSULE ORAL ONCE
Refills: 0 | Status: COMPLETED | OUTPATIENT
Start: 2025-05-07 | End: 2025-05-07

## 2025-05-07 RX ADMIN — Medication 250 MILLIGRAM(S): at 20:15

## 2025-05-07 RX ADMIN — CEFTRIAXONE 1000 MILLIGRAM(S): 500 INJECTION, POWDER, FOR SOLUTION INTRAMUSCULAR; INTRAVENOUS at 20:16

## 2025-05-07 NOTE — ED ADULT TRIAGE NOTE - ESI TRIAGE ACUITY LEVEL, MLM
Spoke with patient. Results given. Patient started omeprazole yesterday, scheduled for 1 month f/u.   2

## 2025-05-07 NOTE — ED PROVIDER NOTE - CLINICAL SUMMARY MEDICAL DECISION MAKING FREE TEXT BOX
84 yoM; with PMH significant for Lung CA (last chemo 2 years ago, last immunotherapy 1 year ago), A-fib (diagnosed 8 weeks ago, currently on Eliquis), COPD (not on home O2, CAD, RBBB/RIF PE, DVT in the past; now presenting with progressive worsening shortness of breath x 3 weeks.  Patient reports trace cough–nonproductive.  Denies fever, chills, sweats.  Denies any chest pain or palpitations.  Complaining of mild dyspnea on exertion.  Complaining of generalized malaise.  will do labs, cta, ekg, cardiac monitor, re-eval s/p results.

## 2025-05-07 NOTE — ED ADULT TRIAGE NOTE - CHIEF COMPLAINT QUOTE
pt sent in from PCP for worsening SOB. recently dx with afib in march, placed on Eliquis. sent in to r/o PE.

## 2025-05-07 NOTE — ED PROVIDER NOTE - OBJECTIVE STATEMENT
84 yoM; with PMH significant for Lung CA (last chemo 2 years ago, last immunotherapy 1 year ago), A-fib (diagnosed 8 weeks ago, currently on Eliquis), COPD (not on home O2, CAD, RBBB/RIF PE, DVT in the past; now presenting with progressive worsening shortness of breath x 3 weeks.  Patient reports trace cough–nonproductive.  Denies fever, chills, sweats.  Denies any chest pain or palpitations.  Complaining of mild dyspnea on exertion.  Complaining of generalized malaise.

## 2025-05-07 NOTE — ED ADULT NURSE NOTE - NSFALLUNIVINTERV_ED_ALL_ED
Bed/Stretcher in lowest position, wheels locked, appropriate side rails in place/Call bell, personal items and telephone in reach/Instruct patient to call for assistance before getting out of bed/chair/stretcher/Non-slip footwear applied when patient is off stretcher/[place] to call system/Physically safe environment - no spills, clutter or unnecessary equipment/Purposeful proactive rounding/Room/bathroom lighting operational, light cord in reach

## 2025-05-07 NOTE — ED ADULT NURSE NOTE - OBJECTIVE STATEMENT
Pt A7Ox4 presenting to the ED c/o worsening  shortness of breath x3 weeks. PMH sepsis from UTI (3 week admission at [facility]), lung cancer (in remission), afib (on eliquis)  "fluid in lungs since the lung cancer that has had to be drained". Pt denies chest pain, fever, chills, sick contacts. Pt endorses cough and DE. Pt states "my doctor sent me here to rule out a PE". Pt resps are even and nonlabored, pt is NAD. Pt remains on continuos cardiac monitoring, HR 86 and . Bed locked and in lowest position with safety maintained and wife and daughter at bedside.

## 2025-05-07 NOTE — ED ADULT NURSE NOTE - NSFALLRISK_ED_ALL_ED
Patient here for CBC evaluation.  States that he has been sick and on an antibiotic for upper respiratory infection.  Taking Hydrea 500 mg daily and tolerating it well.  Reviewed labs with patient, WBC 32.17, Hgb 9.7, Hct 33.5, Platelets 971,000.  MAURILIO Zuniga reviewed labs and wants patient to increase his Hydrea to 500 mg twice a day alternating with 500 mg once a day and repeat CBC in 2 weeks.  Patient v/u and is agreeable to plan.    No

## 2025-05-07 NOTE — ED PROVIDER NOTE - PRO INTERPRETER NEED 2
Today you were given 975 mg of Tylenol at 1008 am. The recommended daily maximum dose is 4000 mg.     Today you received Toradol, an antiinflammatory medication similar to Ibuprofen.  You should not take other antiinflammatory medication, such as Ibuprofen, Motrin, Advil, Aleve, Naprosyn, etc until 1015 am.     Same Day Surgery Discharge Instructions for  Sedation and General Anesthesia     It's not unusual to feel dizzy, light-headed or faint for up to 24 hours after surgery or while taking pain medication.  If you have these symptoms: sit for a few minutes before standing and have someone assist you when you get up to walk or use the bathroom.    You should rest and relax for the next 24 hours. We recommend you make arrangements to have an adult stay with you for at least 24 hours after your discharge.  Avoid hazardous and strenuous activity.    DO NOT DRIVE any vehicle or operate mechanical equipment for 24 hours following the end of your surgery.  Even though you may feel normal, your reactions may be affected by the medication you have received.    Do not drink alcoholic beverages for 24 hours following surgery.     Slowly progress to your regular diet as you feel able. It's not unusual to feel nauseated and/or vomit after receiving anesthesia.  If you develop these symptoms, drink clear liquids (apple juice, ginger ale, broth, 7-up, etc. ) until you feel better.  If your nausea and vomiting persists for 24 hours, please notify your surgeon.      All narcotic pain medications, along with inactivity and anesthesia, can cause constipation. Drinking plenty of liquids and increasing fiber intake will help.    For any questions of a medical nature, call your surgeon.    Do not make important decisions for 24 hours.    If you had general anesthesia, you may have a sore throat for a couple of days related to the breathing tube used during surgery.  You may use Cepacol lozenges to help with this discomfort.  If it  worsens or if you develop a fever, contact your surgeon.     If you feel your pain is not well managed with the pain medications prescribed by your surgeon, please contact your surgeon's office to let them know so they can address your concerns.     [facility]  Discharge Instructions: Post-Operative Robotic Cholecystectomy    ACTIVITY  Expect to feel tired after your surgery.  This will gradually resolve.    Take frequent, short walks and increase your activity gradually.    Avoid strenuous physical activity or heavy lifting greater than 15-20 lbs. for 2-3 weeks.  You may climb stairs.  You may drive without restrictions when you are not using any prescription pain medication and feel comfortable in a car.  You may return to work/school when you are comfortable without any prescription pain medication.    WOUND CARE  You may remove your outer dressing or Band-Aids and shower 48 hours after the surgery.  Pat your incisions dry and leave them open to air.  Re-apply dressing (Band-Aids or gauze/tape) as needed for comfort or drainage.  You may have steri-strips (looks like white tape) on your incision.  You may peel off the steri-strips 2 weeks after your surgery if they have not peeled off on their own.   Do not soak your incisions in a tub or pool for 2 weeks.   Do not apply any lotions, creams, or ointments to your incisions.  A ridge under your incisions is normal and will gradually resolve.    DIET  Start with liquids, then gradually resume your regular diet as tolerated.  Avoid heavy, spicy, and greasy meals for 2-3 days.  Drink plenty of fluids to stay hydrated.  It is not uncommon to experience some loose stools or diarrhea after surgery.  This is your body's way of adapting to the bile which will slowly drain into your intestine.  A low fat diet may help with this.  This should improve over 1-2 months.    PAIN  Expect some tenderness and discomfort at the incision sites.  Use the  prescribed pain medication at your discretion.  Expect gradual resolution of your pain over several days.  You may take ibuprofen with food (unless you have been told not to) or acetaminophen/Tylenol instead of or in addition to your prescribed pain medication.  However, if you are taking Norco or Percocet, do not take any additional acetaminophen/Tylenol.  Do not drink alcohol or drive while you are taking pain medications.  You may apply ice to your incisions in 20 minute intervals as needed for the next 48 hours.  After that time, consider switching to heat if you prefer.    EXPECTATIONS  Pain medications can cause constipation.  Limit use when possible.  Take an over the counter or prescribed stool softener/stimulant, such as Colace or Senna, 1-2 times a day with plenty of water.  You may take a mild over the counter laxative, such as Miralax or a suppository, as needed.  You may discontinue these medications once you are having regular bowel movements and/or are no longer taking your narcotic pain medication.    You may have shoulder or upper back discomfort due to the gas used in surgery.  This is temporary and should resolve in 48-72 hours.  Short, frequent walks may help with this.  If you are unable to urinate for 8 hours or feel as though you are not emptying your bladder adequately, we recommend you seek care at an ER or Urgent Care facility for possible catheter placement.     FOLLOW UP  Our office will contact you in approximately 2-3 weeks to check on your progress and answer any questions you may have.  If you are doing well, you will not need to return for a follow up appointment.  If any concerns are identified over the phone, we will help you make an appointment to see a provider.   If you have not received a phone call, have any questions or concerns, or would like to be seen, please call us at [phone] and ask to speak with our nurse.  We are located at [address] [place] [address]  CHRISTEN Webb [id number].    CALL OUR OFFICE -[phone] IF YOU HAVE:   Chills or fever above 101 F.  Increased redness, warmth, or drainage at your incisions.  Significant bleeding.  Pain not relieved by your pain medication or rest.  Increasing pain after the first 48 hours.  Any other concerns or questions.                      Revised December 2021            English

## 2025-05-08 DIAGNOSIS — J90 PLEURAL EFFUSION, NOT ELSEWHERE CLASSIFIED: ICD-10-CM

## 2025-05-08 DIAGNOSIS — J18.9 PNEUMONIA, UNSPECIFIED ORGANISM: ICD-10-CM

## 2025-05-08 PROBLEM — R91.8 OTHER NONSPECIFIC ABNORMAL FINDING OF LUNG FIELD: Chronic | Status: INACTIVE | Noted: 2023-01-12 | Resolved: 2025-05-08

## 2025-05-08 PROBLEM — I45.10 UNSPECIFIED RIGHT BUNDLE-BRANCH BLOCK: Chronic | Status: INACTIVE | Noted: 2023-01-12 | Resolved: 2025-05-08

## 2025-05-08 PROBLEM — I44.4 LEFT ANTERIOR FASCICULAR BLOCK: Chronic | Status: INACTIVE | Noted: 2023-01-12 | Resolved: 2025-05-08

## 2025-05-08 LAB
ANION GAP SERPL CALC-SCNC: 12 MMOL/L — SIGNIFICANT CHANGE UP (ref 5–17)
BASOPHILS # BLD AUTO: 0.04 K/UL — SIGNIFICANT CHANGE UP (ref 0–0.2)
BASOPHILS NFR BLD AUTO: 0.4 % — SIGNIFICANT CHANGE UP (ref 0–2)
BUN SERPL-MCNC: 23.5 MG/DL — HIGH (ref 8–20)
CALCIUM SERPL-MCNC: 8.6 MG/DL — SIGNIFICANT CHANGE UP (ref 8.4–10.5)
CHLORIDE SERPL-SCNC: 104 MMOL/L — SIGNIFICANT CHANGE UP (ref 96–108)
CO2 SERPL-SCNC: 26 MMOL/L — SIGNIFICANT CHANGE UP (ref 22–29)
CREAT SERPL-MCNC: 1.12 MG/DL — SIGNIFICANT CHANGE UP (ref 0.5–1.3)
EGFR: 65 ML/MIN/1.73M2 — SIGNIFICANT CHANGE UP
EGFR: 65 ML/MIN/1.73M2 — SIGNIFICANT CHANGE UP
EOSINOPHIL # BLD AUTO: 0.24 K/UL — SIGNIFICANT CHANGE UP (ref 0–0.5)
EOSINOPHIL NFR BLD AUTO: 2.6 % — SIGNIFICANT CHANGE UP (ref 0–6)
GLUCOSE SERPL-MCNC: 97 MG/DL — SIGNIFICANT CHANGE UP (ref 70–99)
HCT VFR BLD CALC: 34.5 % — LOW (ref 39–50)
HGB BLD-MCNC: 10.4 G/DL — LOW (ref 13–17)
IMM GRANULOCYTES # BLD AUTO: 0.03 K/UL — SIGNIFICANT CHANGE UP (ref 0–0.07)
IMM GRANULOCYTES NFR BLD AUTO: 0.3 % — SIGNIFICANT CHANGE UP (ref 0–0.9)
LYMPHOCYTES # BLD AUTO: 0.73 K/UL — LOW (ref 1–3.3)
LYMPHOCYTES NFR BLD AUTO: 8.1 % — LOW (ref 13–44)
MAGNESIUM SERPL-MCNC: 2.3 MG/DL — SIGNIFICANT CHANGE UP (ref 1.6–2.6)
MCHC RBC-ENTMCNC: 28.2 PG — SIGNIFICANT CHANGE UP (ref 27–34)
MCHC RBC-ENTMCNC: 30.1 G/DL — LOW (ref 32–36)
MCV RBC AUTO: 93.5 FL — SIGNIFICANT CHANGE UP (ref 80–100)
MONOCYTES # BLD AUTO: 0.77 K/UL — SIGNIFICANT CHANGE UP (ref 0–0.9)
MONOCYTES NFR BLD AUTO: 8.5 % — SIGNIFICANT CHANGE UP (ref 2–14)
NEUTROPHILS # BLD AUTO: 7.25 K/UL — SIGNIFICANT CHANGE UP (ref 1.8–7.4)
NEUTROPHILS NFR BLD AUTO: 80.1 % — HIGH (ref 43–77)
NRBC # BLD AUTO: 0 K/UL — SIGNIFICANT CHANGE UP (ref 0–0)
NRBC # FLD: 0 K/UL — SIGNIFICANT CHANGE UP (ref 0–0)
NRBC BLD AUTO-RTO: 0 /100 WBCS — SIGNIFICANT CHANGE UP (ref 0–0)
PLATELET # BLD AUTO: 272 K/UL — SIGNIFICANT CHANGE UP (ref 150–400)
PMV BLD: 10.6 FL — SIGNIFICANT CHANGE UP (ref 7–13)
POTASSIUM SERPL-MCNC: 4 MMOL/L — SIGNIFICANT CHANGE UP (ref 3.5–5.3)
POTASSIUM SERPL-SCNC: 4 MMOL/L — SIGNIFICANT CHANGE UP (ref 3.5–5.3)
RBC # BLD: 3.69 M/UL — LOW (ref 4.2–5.8)
RBC # FLD: 17.3 % — HIGH (ref 10.3–14.5)
SODIUM SERPL-SCNC: 142 MMOL/L — SIGNIFICANT CHANGE UP (ref 135–145)
WBC # BLD: 9.06 K/UL — SIGNIFICANT CHANGE UP (ref 3.8–10.5)
WBC # FLD AUTO: 9.06 K/UL — SIGNIFICANT CHANGE UP (ref 3.8–10.5)

## 2025-05-08 PROCEDURE — 99222 1ST HOSP IP/OBS MODERATE 55: CPT

## 2025-05-08 PROCEDURE — 99223 1ST HOSP IP/OBS HIGH 75: CPT | Mod: GC

## 2025-05-08 RX ORDER — ACETAMINOPHEN 500 MG/5ML
650 LIQUID (ML) ORAL EVERY 6 HOURS
Refills: 0 | Status: DISCONTINUED | OUTPATIENT
Start: 2025-05-08 | End: 2025-05-16

## 2025-05-08 RX ORDER — APIXABAN 2.5 MG/1
5 TABLET, FILM COATED ORAL
Refills: 0 | Status: DISCONTINUED | OUTPATIENT
Start: 2025-05-08 | End: 2025-05-08

## 2025-05-08 RX ORDER — MIDODRINE HYDROCHLORIDE 5 MG/1
5 TABLET ORAL THREE TIMES A DAY
Refills: 0 | Status: DISCONTINUED | OUTPATIENT
Start: 2025-05-08 | End: 2025-05-08

## 2025-05-08 RX ORDER — MIDODRINE HYDROCHLORIDE 5 MG/1
15 TABLET ORAL THREE TIMES A DAY
Refills: 0 | Status: DISCONTINUED | OUTPATIENT
Start: 2025-05-08 | End: 2025-05-12

## 2025-05-08 RX ORDER — ATORVASTATIN CALCIUM 80 MG/1
40 TABLET, FILM COATED ORAL AT BEDTIME
Refills: 0 | Status: DISCONTINUED | OUTPATIENT
Start: 2025-05-08 | End: 2025-05-16

## 2025-05-08 RX ORDER — FUROSEMIDE 10 MG/ML
40 INJECTION INTRAMUSCULAR; INTRAVENOUS ONCE
Refills: 0 | Status: COMPLETED | OUTPATIENT
Start: 2025-05-08 | End: 2025-05-08

## 2025-05-08 RX ORDER — ONDANSETRON HCL/PF 4 MG/2 ML
4 VIAL (ML) INJECTION EVERY 8 HOURS
Refills: 0 | Status: DISCONTINUED | OUTPATIENT
Start: 2025-05-08 | End: 2025-05-16

## 2025-05-08 RX ORDER — FUROSEMIDE 10 MG/ML
40 INJECTION INTRAMUSCULAR; INTRAVENOUS
Refills: 0 | Status: COMPLETED | OUTPATIENT
Start: 2025-05-08 | End: 2025-05-10

## 2025-05-08 RX ORDER — MAGNESIUM, ALUMINUM HYDROXIDE 200-200 MG
30 TABLET,CHEWABLE ORAL EVERY 4 HOURS
Refills: 0 | Status: DISCONTINUED | OUTPATIENT
Start: 2025-05-08 | End: 2025-05-16

## 2025-05-08 RX ORDER — TIOTROPIUM BROMIDE INHALATION SPRAY 3.12 UG/1
2 SPRAY, METERED RESPIRATORY (INHALATION) DAILY
Refills: 0 | Status: DISCONTINUED | OUTPATIENT
Start: 2025-05-08 | End: 2025-05-16

## 2025-05-08 RX ORDER — ASPIRIN 325 MG
81 TABLET ORAL DAILY
Refills: 0 | Status: DISCONTINUED | OUTPATIENT
Start: 2025-05-08 | End: 2025-05-16

## 2025-05-08 RX ORDER — SENNA 187 MG
2 TABLET ORAL AT BEDTIME
Refills: 0 | Status: DISCONTINUED | OUTPATIENT
Start: 2025-05-08 | End: 2025-05-16

## 2025-05-08 RX ORDER — MIDODRINE HYDROCHLORIDE 5 MG/1
15 TABLET ORAL THREE TIMES A DAY
Refills: 0 | Status: DISCONTINUED | OUTPATIENT
Start: 2025-05-08 | End: 2025-05-08

## 2025-05-08 RX ORDER — MELATONIN 5 MG
3 TABLET ORAL AT BEDTIME
Refills: 0 | Status: DISCONTINUED | OUTPATIENT
Start: 2025-05-08 | End: 2025-05-16

## 2025-05-08 RX ORDER — METOPROLOL SUCCINATE 50 MG/1
12.5 TABLET, EXTENDED RELEASE ORAL DAILY
Refills: 0 | Status: DISCONTINUED | OUTPATIENT
Start: 2025-05-08 | End: 2025-05-16

## 2025-05-08 RX ADMIN — METOPROLOL SUCCINATE 12.5 MILLIGRAM(S): 50 TABLET, EXTENDED RELEASE ORAL at 05:30

## 2025-05-08 RX ADMIN — ATORVASTATIN CALCIUM 40 MILLIGRAM(S): 80 TABLET, FILM COATED ORAL at 21:21

## 2025-05-08 RX ADMIN — FUROSEMIDE 40 MILLIGRAM(S): 10 INJECTION INTRAMUSCULAR; INTRAVENOUS at 05:30

## 2025-05-08 RX ADMIN — Medication 1 DOSE(S): at 20:23

## 2025-05-08 RX ADMIN — Medication 1 DOSE(S): at 09:21

## 2025-05-08 RX ADMIN — APIXABAN 5 MILLIGRAM(S): 2.5 TABLET, FILM COATED ORAL at 05:30

## 2025-05-08 RX ADMIN — TIOTROPIUM BROMIDE INHALATION SPRAY 2 PUFF(S): 3.12 SPRAY, METERED RESPIRATORY (INHALATION) at 09:26

## 2025-05-08 RX ADMIN — MIDODRINE HYDROCHLORIDE 15 MILLIGRAM(S): 5 TABLET ORAL at 17:24

## 2025-05-08 RX ADMIN — FUROSEMIDE 40 MILLIGRAM(S): 10 INJECTION INTRAMUSCULAR; INTRAVENOUS at 00:18

## 2025-05-08 RX ADMIN — Medication 2 TABLET(S): at 21:21

## 2025-05-08 RX ADMIN — FUROSEMIDE 40 MILLIGRAM(S): 10 INJECTION INTRAMUSCULAR; INTRAVENOUS at 14:31

## 2025-05-08 RX ADMIN — Medication 81 MILLIGRAM(S): at 14:30

## 2025-05-08 NOTE — PATIENT PROFILE ADULT - FALL HARM RISK - HARM RISK INTERVENTIONS

## 2025-05-08 NOTE — CONSULT NOTE ADULT - SUBJECTIVE AND OBJECTIVE BOX
REASON FOR CONSULTATION:     HPI:  72 y/o M w/ hx of HFrEF, afib (on Eliquis), COPD (no home O2), severe AS, prior PE (no longer on AC), and stage 3 SCC lung (treated w/ platinum doublet chemoRT and adjuvant durvalumab 1 year ago finished) who presented w/ acute on chronic dyspnea and cough. Pt was in the hospital in March for dyspnea. At that time, was found to have pleural effusions. Also underwent cardiac workup, which revealed mod-severe AS and patent coronaries on LHC. No thoracentesis at that time. Since discharge, pt has remained at baseline respiratory status (mild dyspnea on exertion), but over past couple days has had worsening. Also developed intermittent cough which is productive but pt is swallowing phlegm so not able to describe it further. Also complaining of orthopnea and increased LE swelling during this time. Denies chest pain, pleuritic pain, abd pain, hematuria, dysuria. Denies sick contact and travel.    In the ED, pt was hypoxic and tachypneic requiring 3L NC, but otherwise hemodynamically stable and afebrile. Labs w/o leukocytosis. Trop 37 --> 34. BNP at 2144. CT-A PE study w/o PE but showed worsening of large right-sided pleural effusion with concern for interstitial edema. 1.1-cm nodule identified on CT but PET at the end of April negative for FGD-avid lesions.  (08 May 2025 03:34)      REVIEW OF SYSTEMS:  Constitutional, Eyes, ENT, Cardiovascular, Respiratory, Gastrointestinal, Genitourinary, Musculoskeletal, Integumentary, Neurological, Psychiatric, Endocrine, Heme/Lymph, and Allergic/Immunologic review of systems are otherwise negative except as noted in the HPI.    PAST MEDICAL & SURGICAL HISTORY:  Hypertension      Hyperlipidemia      CAD (coronary artery disease)      Lung cancer      Afib      Chronic HFrEF (heart failure with reduced ejection fraction)      Aortic stenosis      History of COPD      S/P hip replacement, right      S/P hip replacement, left          FAMILY HISTORY:  Family history of CVA (Father)    FH: heart attack (Father)        SOCIAL HISTORY:    Allergies    No Known Allergies    Intolerances        MEDICATIONS  (STANDING):  apixaban 5 milliGRAM(s) Oral two times a day  aspirin  chewable 81 milliGRAM(s) Oral daily  atorvastatin 40 milliGRAM(s) Oral at bedtime  fluticasone propionate/ salmeterol 100-50 MICROgram(s) Diskus 1 Dose(s) Inhalation two times a day  furosemide   Injectable 40 milliGRAM(s) IV Push two times a day  metoprolol succinate ER 12.5 milliGRAM(s) Oral daily  midodrine. 15 milliGRAM(s) Oral three times a day  senna 2 Tablet(s) Oral at bedtime  tiotropium 2.5 MICROgram(s) Inhaler 2 Puff(s) Inhalation daily    MEDICATIONS  (PRN):  acetaminophen     Tablet .. 650 milliGRAM(s) Oral every 6 hours PRN Temp greater or equal to 38C (100.4F), Mild Pain (1 - 3)  aluminum hydroxide/magnesium hydroxide/simethicone Suspension 30 milliLiter(s) Oral every 4 hours PRN Dyspepsia  melatonin 3 milliGRAM(s) Oral at bedtime PRN Insomnia  ondansetron Injectable 4 milliGRAM(s) IV Push every 8 hours PRN Nausea and/or Vomiting      Vital Signs Last 24 Hrs  T(C): 36.4 (08 May 2025 10:38), Max: 36.7 (07 May 2025 19:11)  T(F): 97.5 (08 May 2025 10:38), Max: 98.1 (07 May 2025 19:11)  HR: 102 (08 May 2025 10:38) (87 - 102)  BP: 135/85 (08 May 2025 10:38) (123/65 - 162/90)  BP(mean): 92 (08 May 2025 03:17) (92 - 109)  RR: 18 (08 May 2025 10:38) (16 - 23)  SpO2: 97% (08 May 2025 10:38) (92% - 100%)    Parameters below as of 08 May 2025 10:38  Patient On (Oxygen Delivery Method): nasal cannula  O2 Flow (L/min): 4      PHYSICAL EXAM:    GENERAL: NAD, well-groomed, well-developed  HEAD:  Atraumatic, Normocephalic  EYES: EOMI, PERRLA, conjunctiva and sclera clear  ENMT: No tonsillar erythema, exudates, or enlargement; Moist mucous membranes, Good dentition, No lesions  NECK: Supple, No JVD, Normal thyroid  NERVOUS SYSTEM:  Alert & Oriented X3, Good concentration; Motor Strength 5/5 B/L upper and lower extremities; DTRs 2+ intact and symmetric  CHEST/LUNG: Clear to auscultation bilaterally; No rales, rhonchi, wheezing, or rubs  HEART: Regular rate and rhythm; No murmurs, rubs, or gallops  ABDOMEN: Soft, Nontender, Nondistended; Bowel sounds present  EXTREMITIES:  2+ Peripheral Pulses, No clubbing, cyanosis, or edema  LYMPH: No lymphadenopathy noted  SKIN: No rashes or lesions      LABS:                        10.4   9.06  )-----------( 272      ( 08 May 2025 02:30 )             34.5     05-08    142  |  104  |  23.5[H]  ----------------------------<  97  4.0   |  26.0  |  1.12    Ca    8.6      08 May 2025 02:30  Mg     2.3     05-08    TPro  7.4  /  Alb  3.9  /  TBili  0.7  /  DBili  x   /  AST  16  /  ALT  10  /  AlkPhos  126[H]  05-07    PT/INR - ( 07 May 2025 13:24 )   PT: 17.0 sec;   INR: 1.47 ratio         PTT - ( 07 May 2025 13:24 )  PTT:38.2 sec  Urinalysis Basic - ( 08 May 2025 02:30 )    Color: x / Appearance: x / SG: x / pH: x  Gluc: 97 mg/dL / Ketone: x  / Bili: x / Urobili: x   Blood: x / Protein: x / Nitrite: x   Leuk Esterase: x / RBC: x / WBC x   Sq Epi: x / Non Sq Epi: x / Bacteria: x          RADIOLOGY & ADDITIONAL STUDIES:    < from: CT Angio Chest PE Protocol w/ IV Cont (05.07.25 @ 17:17) >  MPRESSION:  1. The subsegmental pulmonary arteries are somewhat limited evaluation   due to motion artifact. No pulmonary embolism within this limitation.  2. Probable slight increase in size of the right pleural effusion with   persistent consolidation versus atelectasis/scar throughout the right   lower lobe and trace left pleural effusion.  3. New 1.1 cm nodular opacity within the left upper lobe which is   nonspecific and could be infectious or inflammatory in etiology.   Attention on follow-up is recommended.    --- End of Report ---            BEV PETTIT MD; Attending Radiologist  This document has been electronically signed. May  7 2025  7:31PM    < end of copied text >       REASON FOR CONSULTATION:     HPI:  72 y/o M w/ hx of HFrEF, afib (on Eliquis), COPD (no home O2), severe AS, prior PE (no longer on AC), and stage 3 SCC lung (treated w/ platinum doublet chemoRT and adjuvant durvalumab 1 year ago finished) who presented w/ acute on chronic dyspnea and cough. Pt was in the hospital in March for dyspnea. At that time, was found to have pleural effusions. Also underwent cardiac workup, which revealed mod-severe AS and patent coronaries on LHC. No thoracentesis at that time. Since discharge, pt has remained at baseline respiratory status (mild dyspnea on exertion), but over past couple days has had worsening. Also developed intermittent cough which is productive but pt is swallowing phlegm so not able to describe it further. Also complaining of orthopnea and increased LE swelling during this time. Denies chest pain, pleuritic pain, abd pain, hematuria, dysuria. Denies sick contact and travel.    In the ED, pt was hypoxic and tachypneic requiring 3L NC, but otherwise hemodynamically stable and afebrile. Labs w/o leukocytosis. Trop 37 --> 34. BNP at 2144. CT-A PE study w/o PE but showed worsening of large right-sided pleural effusion with concern for interstitial edema. 1.1-cm nodule identified on CT but PET at the end of April negative for FGD-avid lesions.  (08 May 2025 03:34)      REVIEW OF SYSTEMS:  Constitutional, Eyes, ENT, Cardiovascular, Respiratory, Gastrointestinal, Genitourinary, Musculoskeletal, Integumentary, Neurological, Psychiatric, Endocrine, Heme/Lymph, and Allergic/Immunologic review of systems are otherwise negative except as noted in the HPI.    PAST MEDICAL & SURGICAL HISTORY:  Hypertension      Hyperlipidemia      CAD (coronary artery disease)      Lung cancer      Afib      Chronic HFrEF (heart failure with reduced ejection fraction)      Aortic stenosis      History of COPD      S/P hip replacement, right      S/P hip replacement, left          FAMILY HISTORY:  Family history of CVA (Father)    FH: heart attack (Father)        SOCIAL HISTORY:    Allergies    No Known Allergies    Intolerances        MEDICATIONS  (STANDING):  apixaban 5 milliGRAM(s) Oral two times a day  aspirin  chewable 81 milliGRAM(s) Oral daily  atorvastatin 40 milliGRAM(s) Oral at bedtime  fluticasone propionate/ salmeterol 100-50 MICROgram(s) Diskus 1 Dose(s) Inhalation two times a day  furosemide   Injectable 40 milliGRAM(s) IV Push two times a day  metoprolol succinate ER 12.5 milliGRAM(s) Oral daily  midodrine. 15 milliGRAM(s) Oral three times a day  senna 2 Tablet(s) Oral at bedtime  tiotropium 2.5 MICROgram(s) Inhaler 2 Puff(s) Inhalation daily    MEDICATIONS  (PRN):  acetaminophen     Tablet .. 650 milliGRAM(s) Oral every 6 hours PRN Temp greater or equal to 38C (100.4F), Mild Pain (1 - 3)  aluminum hydroxide/magnesium hydroxide/simethicone Suspension 30 milliLiter(s) Oral every 4 hours PRN Dyspepsia  melatonin 3 milliGRAM(s) Oral at bedtime PRN Insomnia  ondansetron Injectable 4 milliGRAM(s) IV Push every 8 hours PRN Nausea and/or Vomiting      Vital Signs Last 24 Hrs  T(C): 36.4 (08 May 2025 10:38), Max: 36.7 (07 May 2025 19:11)  T(F): 97.5 (08 May 2025 10:38), Max: 98.1 (07 May 2025 19:11)  HR: 102 (08 May 2025 10:38) (87 - 102)  BP: 135/85 (08 May 2025 10:38) (123/65 - 162/90)  BP(mean): 92 (08 May 2025 03:17) (92 - 109)  RR: 18 (08 May 2025 10:38) (16 - 23)  SpO2: 97% (08 May 2025 10:38) (92% - 100%)    Parameters below as of 08 May 2025 10:38  Patient On (Oxygen Delivery Method): nasal cannula  O2 Flow (L/min): 4      PHYSICAL EXAM:    GENERAL: NAD, well-groomed, well-developed  HEAD:  Atraumatic, Normocephalic  EYES: EOMI, PERRLA,  NECK: Supple,   NERVOUS SYSTEM:  Alert & Oriented X3, Good concentration;   CHEST/LUNG: decreased breath sounds right base  HEART: Regular rate and rhythm;   ABDOMEN: Soft, Nontender,   EXTREMITIES:  no edema  LYMPH: No lymphadenopathy noted  SKIN: No rashes or lesions      LABS:                        10.4   9.06  )-----------( 272      ( 08 May 2025 02:30 )             34.5     05-08    142  |  104  |  23.5[H]  ----------------------------<  97  4.0   |  26.0  |  1.12    Ca    8.6      08 May 2025 02:30  Mg     2.3     05-08    TPro  7.4  /  Alb  3.9  /  TBili  0.7  /  DBili  x   /  AST  16  /  ALT  10  /  AlkPhos  126[H]  05-07    PT/INR - ( 07 May 2025 13:24 )   PT: 17.0 sec;   INR: 1.47 ratio         PTT - ( 07 May 2025 13:24 )  PTT:38.2 sec  Urinalysis Basic - ( 08 May 2025 02:30 )    Color: x / Appearance: x / SG: x / pH: x  Gluc: 97 mg/dL / Ketone: x  / Bili: x / Urobili: x   Blood: x / Protein: x / Nitrite: x   Leuk Esterase: x / RBC: x / WBC x   Sq Epi: x / Non Sq Epi: x / Bacteria: x          RADIOLOGY & ADDITIONAL STUDIES:    < from: CT Angio Chest PE Protocol w/ IV Cont (05.07.25 @ 17:17) >  MPRESSION:  1. The subsegmental pulmonary arteries are somewhat limited evaluation   due to motion artifact. No pulmonary embolism within this limitation.  2. Probable slight increase in size of the right pleural effusion with   persistent consolidation versus atelectasis/scar throughout the right   lower lobe and trace left pleural effusion.  3. New 1.1 cm nodular opacity within the left upper lobe which is   nonspecific and could be infectious or inflammatory in etiology.   Attention on follow-up is recommended.    --- End of Report ---            BEV PETTIT MD; Attending Radiologist  This document has been electronically signed. May  7 2025  7:31PM    < end of copied text >

## 2025-05-08 NOTE — CONSULT NOTE ADULT - ASSESSMENT
72 y/o M w/ hx of HFrEF, afib (on Eliquis), COPD (no home O2), severe AS, prior PE (no longer on AC), and stage 3 SCC lung (treated w/ platinum doublet chemoRT and adjuvant durvalumab 1 year ago finished), presenting with SOB/DE. Admitted acute on chronic hypoxic resp failure and Acute on chronic diastolic heart failure  Oncology consulted for history of lung cancer    #h/o stage III squamous cell carcinoma  #chronic right pleural effusion  -s/p chemoradiation completed 4/2023  -completed 1 year of durvaluamb 6/2024  -known chronic R pleural effusion - last cytology 10/2024 negative for malignant cells. If this is tapped during this admission - recommend sending cytology again  -no acute findings on CT chest, 1.1 cm nodular opacity could be infectious/inflammatory  -plan for repeat imaging / PET after discharge  -follow up with Dr. Modi after discharge  -will sign off, please call with questions 74 y/o M w/ hx of HFrEF, afib (on Eliquis), COPD (no home O2), severe AS, prior PE (no longer on AC), and stage 3 SCC lung (treated w/ platinum doublet chemoRT and adjuvant durvalumab 1 year ago finished), presenting with SOB/DE. Admitted acute on chronic hypoxic resp failure and Acute on chronic diastolic heart failure  Oncology consulted for history of lung cancer    #h/o stage III squamous cell carcinoma  #chronic right pleural effusion  -s/p chemoradiation completed 4/2023  -completed 1 year of durvaluamb 6/2024  -known chronic R pleural effusion - last cytology 10/2024 negative for malignant cells. If this is tapped during this admission - recommend sending cytology again  -no acute findings on CT chest, 1.1 cm nodular opacity could be infectious/inflammatory  -recent 4/23/25 PET with no FDG avid disease  -will sign off, please call with questions

## 2025-05-08 NOTE — CONSULT NOTE ADULT - ASSESSMENT
72 y/o M w/ hx of HFrEF, afib (on Eliquis), COPD (no home O2), severe AS, prior PE (no longer on AC), and stage 3 SCC lung (treated w/ platinum doublet chemoRT and adjuvant durvalumab 1 year ago finished) who presented w/ acute on chronic dyspnea and cough. Pt was in the hospital in March for dyspnea. At that time, was found to have pleural effusions. Also underwent cardiac workup, which revealed mod-severe AS and patent coronaries on LHC. No thoracentesis at that time. Since discharge, pt has remained at baseline respiratory status (mild dyspnea on exertion), but over past couple days has had worsening. Also developed intermittent cough which is productive but pt is swallowing phlegm so not able to describe it further. Also complaining of orthopnea and increased LE swelling during this time. Patient placed on 3L NC in the ED due to hypoxic and tachypneic.    Thoracic surgery consulted for right pleural effusion.

## 2025-05-08 NOTE — CONSULT NOTE ADULT - PROBLEM SELECTOR RECOMMENDATION 9
Effusion likely 2/2 CHF exacerbation and lung CA  Follows with [facility] [place]. Had seen Dr. Martin outpatient, most recently in Feb 2025  POCUS with large pocket on right  Patient amendable for PTC drainage   Last dose Eliquis 5/8 AM. PLEASE HOLD ELIQUIS to allow for drainage tomorrow  Continue diuretics   Thoracic surgery to follow  Rest of care per primary care  Discussed with Dr. Martin

## 2025-05-08 NOTE — CONSULT NOTE ADULT - SUBJECTIVE AND OBJECTIVE BOX
[place] [facility], [facility]                                   [address] [place] [address]                                                      PHONE: [phone]                                                         FAX: [phone]  [email or web address]  ---------------------------------------------------------------------------------------------------------------------------------    HPI:  JONE SOUTH is an 84y Male with chronic hypertension, HLD, mod-Severe AS, moderate CAD on remote [facility] with normal perfusion on PET MPI 2022, chronic RBBB/LAFB, frequent unifocal ventricular ectopy,  stage 3 SCC lung (treated w/ platinum doublet chemoRT and adjuvant durvalumab 1 year ago finished)  with course complicated by DVT s/p several months systemic AC who presents with several days progressive worsening dyspnea.  Pt was just hospitalized last month with same  complaints and underwent full cardiac compliment of testing.  Pt admitted for recurrent acute on chronic HFpEF.         PAST MEDICAL & SURGICAL HISTORY:  Hypertension      Hyperlipidemia      CAD (coronary artery disease)      Lung cancer      Afib      Chronic HFrEF (heart failure with reduced ejection fraction)      Aortic stenosis      History of COPD      S/P hip replacement, right      S/P hip replacement, left          No Known Allergies      MEDICATIONS  (STANDING):  apixaban 5 milliGRAM(s) Oral two times a day  aspirin  chewable 81 milliGRAM(s) Oral daily  atorvastatin 40 milliGRAM(s) Oral at bedtime  fluticasone propionate/ salmeterol 100-50 MICROgram(s) Diskus 1 Dose(s) Inhalation two times a day  furosemide   Injectable 40 milliGRAM(s) IV Push two times a day  metoprolol succinate ER 12.5 milliGRAM(s) Oral daily  midodrine. 15 milliGRAM(s) Oral three times a day  senna 2 Tablet(s) Oral at bedtime  tiotropium 2.5 MICROgram(s) Inhaler 2 Puff(s) Inhalation daily    MEDICATIONS  (PRN):  acetaminophen     Tablet .. 650 milliGRAM(s) Oral every 6 hours PRN Temp greater or equal to 38C (100.4F), Mild Pain (1 - 3)  aluminum hydroxide/magnesium hydroxide/simethicone Suspension 30 milliLiter(s) Oral every 4 hours PRN Dyspepsia  melatonin 3 milliGRAM(s) Oral at bedtime PRN Insomnia  ondansetron Injectable 4 milliGRAM(s) IV Push every 8 hours PRN Nausea and/or Vomiting      Family History: Pt denies hx of early cad, SCD, or congenital heart disease.      Social History:  Cigarettes:   former                 Alchohol:   no              Illicit Drug Abuse:  no    ROS:    Extensively Reviewed with pertinents as per HPI the remainder were negative.      Vital Signs Last 24 Hrs  T(C): 36.4 (08 May 2025 07:39), Max: 36.7 (07 May 2025 11:01)  T(F): 97.6 (08 May 2025 07:39), Max: 98.1 (07 May 2025 19:11)  HR: 89 (08 May 2025 07:39) (83 - 96)  BP: 123/65 (08 May 2025 07:39) (123/65 - 162/90)  BP(mean): 92 (08 May 2025 03:17) (92 - 109)  RR: 16 (08 May 2025 07:39) (16 - 23)  SpO2: 97% (08 May 2025 07:39) (92% - 100%)    Parameters below as of 08 May 2025 07:39  Patient On (Oxygen Delivery Method): nasal cannula  O2 Flow (L/min): 4    ICU Vital Signs Last 24 Hrs  JONE SOUTH  I&O's Detail    I&O's Summary    Drug Dosing Weight  JONE SOUTH      PHYSICAL EXAM:  General: Appears well developed, well nourished alert and cooperative.  HEENT: Head; normocephalic, atraumatic.  Eyes: Pupils reactive, cornea wnl.  Neck: Supple, no nodes adenopathy, no NVD or carotid bruit or thyromegaly.  CARDIOVASCULAR: Normal S1 and S2, No murmur, rub, gallop or lift.   LUNGS: decereased bs b/l bases   ABDOMEN: Soft, nontender without mass or organomegaly. bowel sounds normoactive.  EXTREMITIES: No clubbing, cyanosis + edema. Distal pulses wnl.   SKIN: warm and dry with normal turgor.  NEURO: Alert/oriented x 3/normal motor exam. No pathologic reflexes.    PSYCH: normal affect.        LABS:                        10.4   9.06  )-----------( 272      ( 08 May 2025 02:30 )             34.5     05-08    142  |  104  |  23.5[H]  ----------------------------<  97  4.0   |  26.0  |  1.12    Ca    8.6      08 May 2025 02:30  Mg     2.3     05-08    TPro  7.4  /  Alb  3.9  /  TBili  0.7  /  DBili  x   /  AST  16  /  ALT  10  /  AlkPhos  126[H]  05-07    JONE SOUTH      PT/INR - ( 07 May 2025 13:24 )   PT: 17.0 sec;   INR: 1.47 ratio         PTT - ( 07 May 2025 13:24 )  PTT:38.2 sec  Urinalysis Basic - ( 08 May 2025 02:30 )    Color: x / Appearance: x / SG: x / pH: x  Gluc: 97 mg/dL / Ketone: x  / Bili: x / Urobili: x   Blood: x / Protein: x / Nitrite: x   Leuk Esterase: x / RBC: x / WBC x   Sq Epi: x / Non Sq Epi: x / Bacteria: x        RADIOLOGY & ADDITIONAL STUDIES:    INTERPRETATION OF TELEMETRY (personally reviewed):    ECG: af, rbbb     < from: CARLOS W or WO Ultrasound Enhancing Agent (03.18.25 @ 10:27) >  CONCLUSIONS:      1. Left ventricular systolic function is normal.   2. Moderate mitral regurgitation.   3. The aortic valve is calcified with decreased leaflet opening. peak gradient: 27 mm Hg, mean gradient: 15 mmHg, MACKENZIE by planimetry: 1.15 cm2 all consistent with moderate aortic stenosis.   4. No evidence of left atrial appendage thrombus.    < end of copied text >    < from: TTE W or WO Ultrasound Enhancing Agent (03.04.25 @ 15:59) >     CONCLUSIONS:      1. Left ventricular cavity is normal in size. Left ventricular systolic function is mildly decreased with an ejection fraction visually estimated at 40 to45 %.   2. Normal right ventricular cavity size and normal right ventricular systolic function.   3. The right atrium is normal in size.   4. No pericardial effusion seen.   5. Analysis of left ventricular diastolic function and filling pressure is made challenging by the presence of atrial fibrillation.   6. Estimated pulmonary artery systolic pressure is 18 mmHg.   7. No left ventricular hypertrophy.   8. No prior echocardiogram is available for comparison.   9. Technically difficult image quality.  10. There is moderate calcification of the aortic valve leaflets. Severe aortic stenosis.  CARLOS  CARLOS Left ventricular systolic function is normal, Moderate mitral regurgitation, The aortic valve is calcified with decreased leaflet opening. peak gradient: 27 mm Hg, mean gradient: 15 mmHg, MACKENZIE by planimetry: 1.15 cm2 all consistent with moderate aortic stenosis, No evidence of left atrial appendage thrombus.      < from: Cardiac Catheterization (03.14.25 @ 12:58) >  Procedures Performed   Procedures:              1.    Ultrasound Guided Access     2.    Venous Access - Right Femoral   3.    RHC   4.    Diagnostic Coronary Angiography   5.    Left Heart Cath   6.    AngioSeal     Indications:               Severe aortic stenosis   Dyspnea   Suspected coronary artery disease     Diagnostic Conclusions:     Mild RCA and LAD disease   LVEF 45%   Normal wedge pressure and pulmonary pressures   Borderline low cardiac output   Recommendations:     Consult with valve clinic   Can consider TEEto evaluate aortic valve as aortic valve gradient  minimal on cath    Assessment and Plan:  In summary, JONE SOUTH is an 84y Male with chronic hypertension, HLD, mod-Severe AS, moderate CAD on remote [facility] with normal perfusion on PET MPI 2022, chronic RBBB/LAFB, frequent unifocal ventricular ectopy,  stage 3 SCC lung (treated w/ platinum doublet chemoRT and adjuvant durvalumab 1 year ago finished)  with course complicated by DVT s/p several months systemic AC who presents with several days progressive worsening dyspnea.  Pt was just hospitalized last month with same  complaints and underwent full cardiac compliment of testing.  Pt admitted for recurrent acute on chronic HFpEF.     Recurrent Acute on Chronic HFpEF.   - lasix 40 mg iv bid  -dietary consult  -thoracic team called for possible thoracentesis          Thank you for allowing [facility] to participate in the care of this patient.  Please feel free to call with any questions or concerns.                  [place] [facility], [facility]                                   [address] [place] [address]                                                      PHONE: [phone]                                                         FAX: [phone]  [email or web address]  ---------------------------------------------------------------------------------------------------------------------------------    HPI:  JONE SOUTH is an 84y Male with chronic hypertension, HLD, mod-Severe AS, moderate CAD on remote [facility] with normal perfusion on PET MPI 2022, chronic RBBB/LAFB, frequent unifocal ventricular ectopy,  stage 3 SCC lung (treated w/ platinum doublet chemoRT and adjuvant durvalumab 1 year ago finished)  with course complicated by DVT s/p several months systemic AC who presents with several days progressive worsening dyspnea.  Pt was just hospitalized last month with same  complaints and underwent full cardiac compliment of testing.  Pt admitted for recurrent acute on chronic HFpEF.         PAST MEDICAL & SURGICAL HISTORY:  Hypertension      Hyperlipidemia      CAD (coronary artery disease)      Lung cancer      Afib      Chronic HFrEF (heart failure with reduced ejection fraction)      Aortic stenosis      History of COPD      S/P hip replacement, right      S/P hip replacement, left          No Known Allergies      MEDICATIONS  (STANDING):  apixaban 5 milliGRAM(s) Oral two times a day  aspirin  chewable 81 milliGRAM(s) Oral daily  atorvastatin 40 milliGRAM(s) Oral at bedtime  fluticasone propionate/ salmeterol 100-50 MICROgram(s) Diskus 1 Dose(s) Inhalation two times a day  furosemide   Injectable 40 milliGRAM(s) IV Push two times a day  metoprolol succinate ER 12.5 milliGRAM(s) Oral daily  midodrine. 15 milliGRAM(s) Oral three times a day  senna 2 Tablet(s) Oral at bedtime  tiotropium 2.5 MICROgram(s) Inhaler 2 Puff(s) Inhalation daily    MEDICATIONS  (PRN):  acetaminophen     Tablet .. 650 milliGRAM(s) Oral every 6 hours PRN Temp greater or equal to 38C (100.4F), Mild Pain (1 - 3)  aluminum hydroxide/magnesium hydroxide/simethicone Suspension 30 milliLiter(s) Oral every 4 hours PRN Dyspepsia  melatonin 3 milliGRAM(s) Oral at bedtime PRN Insomnia  ondansetron Injectable 4 milliGRAM(s) IV Push every 8 hours PRN Nausea and/or Vomiting      Family History: Pt denies hx of early cad, SCD, or congenital heart disease.      Social History:  Cigarettes:   former                 Alchohol:   no              Illicit Drug Abuse:  no    ROS:    Extensively Reviewed with pertinents as per HPI the remainder were negative.      Vital Signs Last 24 Hrs  T(C): 36.4 (08 May 2025 07:39), Max: 36.7 (07 May 2025 11:01)  T(F): 97.6 (08 May 2025 07:39), Max: 98.1 (07 May 2025 19:11)  HR: 89 (08 May 2025 07:39) (83 - 96)  BP: 123/65 (08 May 2025 07:39) (123/65 - 162/90)  BP(mean): 92 (08 May 2025 03:17) (92 - 109)  RR: 16 (08 May 2025 07:39) (16 - 23)  SpO2: 97% (08 May 2025 07:39) (92% - 100%)    Parameters below as of 08 May 2025 07:39  Patient On (Oxygen Delivery Method): nasal cannula  O2 Flow (L/min): 4    ICU Vital Signs Last 24 Hrs  JONE SOUTH  I&O's Detail    I&O's Summary    Drug Dosing Weight  JONE SOUTH      PHYSICAL EXAM:  General: Appears well developed, well nourished alert and cooperative.  HEENT: Head; normocephalic, atraumatic.  Eyes: Pupils reactive, cornea wnl.  Neck: Supple, no nodes adenopathy, no NVD or carotid bruit or thyromegaly.  CARDIOVASCULAR: Normal S1 and S2, No murmur, rub, gallop or lift.   LUNGS: decereased bs b/l bases   ABDOMEN: Soft, nontender without mass or organomegaly. bowel sounds normoactive.  EXTREMITIES: No clubbing, cyanosis + edema. Distal pulses wnl.   SKIN: warm and dry with normal turgor.  NEURO: Alert/oriented x 3/normal motor exam. No pathologic reflexes.    PSYCH: normal affect.        LABS:                        10.4   9.06  )-----------( 272      ( 08 May 2025 02:30 )             34.5     05-08    142  |  104  |  23.5[H]  ----------------------------<  97  4.0   |  26.0  |  1.12    Ca    8.6      08 May 2025 02:30  Mg     2.3     05-08    TPro  7.4  /  Alb  3.9  /  TBili  0.7  /  DBili  x   /  AST  16  /  ALT  10  /  AlkPhos  126[H]  05-07    JONE SOUTH      PT/INR - ( 07 May 2025 13:24 )   PT: 17.0 sec;   INR: 1.47 ratio         PTT - ( 07 May 2025 13:24 )  PTT:38.2 sec  Urinalysis Basic - ( 08 May 2025 02:30 )    Color: x / Appearance: x / SG: x / pH: x  Gluc: 97 mg/dL / Ketone: x  / Bili: x / Urobili: x   Blood: x / Protein: x / Nitrite: x   Leuk Esterase: x / RBC: x / WBC x   Sq Epi: x / Non Sq Epi: x / Bacteria: x        RADIOLOGY & ADDITIONAL STUDIES:    INTERPRETATION OF TELEMETRY (personally reviewed):    ECG: af, rbbb     < from: CARLOS W or WO Ultrasound Enhancing Agent (03.18.25 @ 10:27) >  CONCLUSIONS:      1. Left ventricular systolic function is normal.   2. Moderate mitral regurgitation.   3. The aortic valve is calcified with decreased leaflet opening. peak gradient: 27 mm Hg, mean gradient: 15 mmHg, MACKENZIE by planimetry: 1.15 cm2 all consistent with moderate aortic stenosis.   4. No evidence of left atrial appendage thrombus.    < end of copied text >    < from: TTE W or WO Ultrasound Enhancing Agent (03.04.25 @ 15:59) >     CONCLUSIONS:      1. Left ventricular cavity is normal in size. Left ventricular systolic function is mildly decreased with an ejection fraction visually estimated at 40 to45 %.   2. Normal right ventricular cavity size and normal right ventricular systolic function.   3. The right atrium is normal in size.   4. No pericardial effusion seen.   5. Analysis of left ventricular diastolic function and filling pressure is made challenging by the presence of atrial fibrillation.   6. Estimated pulmonary artery systolic pressure is 18 mmHg.   7. No left ventricular hypertrophy.   8. No prior echocardiogram is available for comparison.   9. Technically difficult image quality.  10. There is moderate calcification of the aortic valve leaflets. Severe aortic stenosis.  CARLOS  CARLOS Left ventricular systolic function is normal, Moderate mitral regurgitation, The aortic valve is calcified with decreased leaflet opening. peak gradient: 27 mm Hg, mean gradient: 15 mmHg, MACKENZIE by planimetry: 1.15 cm2 all consistent with moderate aortic stenosis, No evidence of left atrial appendage thrombus.      < from: Cardiac Catheterization (03.14.25 @ 12:58) >  Procedures Performed   Procedures:              1.    Ultrasound Guided Access     2.    Venous Access - Right Femoral   3.    RHC   4.    Diagnostic Coronary Angiography   5.    Left Heart Cath   6.    AngioSeal     Indications:               Severe aortic stenosis   Dyspnea   Suspected coronary artery disease     Diagnostic Conclusions:     Mild RCA and LAD disease   LVEF 45%   Normal wedge pressure and pulmonary pressures   Borderline low cardiac output   Recommendations:     Consult with valve clinic   Can consider TEEto evaluate aortic valve as aortic valve gradient  minimal on cath    Assessment and Plan:  In summary, JONE SOUTH is an 84y Male with chronic hypertension, HLD, mod-Severe AS, moderate CAD on remote [facility] with normal perfusion on PET MPI 2022, chronic RBBB/LAFB, frequent unifocal ventricular ectopy,  stage 3 SCC lung (treated w/ platinum doublet chemoRT and adjuvant durvalumab 1 year ago finished)  with course complicated by DVT s/p several months systemic AC who presents with several days progressive worsening dyspnea.  Pt was just hospitalized last month with same  complaints and underwent full cardiac compliment of testing.  Pt admitted for recurrent acute on chronic HFpEF.     Recurrent Acute on Chronic HFpEF.   - lasix 40 mg iv bid  -dietary consult  -thoracic team called for possible thoracentesis  -consider cardiomems prior to discharge     Thank you for allowing [facility] to participate in the care of this patient.  Please feel free to call with any questions or concerns.

## 2025-05-08 NOTE — CONSULT NOTE ADULT - SUBJECTIVE AND OBJECTIVE BOX
HPI:    Objective:   74 y/o M w/ hx of HFrEF, afib (on Eliquis), COPD (no home O2), severe AS, prior PE (no longer on AC), and stage 3 SCC lung (treated w/ platinum doublet chemoRT and adjuvant durvalumab 1 year ago finished) who presented w/ acute on chronic dyspnea and cough. Pt was in the hospital in March for dyspnea. At that time, was found to have pleural effusions. Also underwent cardiac workup, which revealed mod-severe AS and patent coronaries on LHC. No thoracentesis at that time. Since discharge, pt has remained at baseline respiratory status (mild dyspnea on exertion), but over past couple days has had worsening. Also developed intermittent cough which is productive but pt is swallowing phlegm so not able to describe it further. Also complaining of orthopnea and increased LE swelling during this time. Denies chest pain, pleuritic pain, abd pain, hematuria, dysuria. Denies sick contact and travel.    In the ED, pt was hypoxic and tachypneic requiring 3L NC, but otherwise hemodynamically stable and afebrile. Labs w/o leukocytosis. Trop 37 --> 34. BNP at 2144. CT-A PE study w/o PE but showed worsening of large right-sided pleural effusion with concern for interstitial edema. 1.1-cm nodule identified on CT but PET at the end of April negative for FGD-avid lesions.  (08 May 2025 03:34)    Subjective:   Patient seen at bedside with wife and son present. Patient explains that at this current time he is feeling comfortable on oxygen but that he still does not feel his best/normal self. Over the last 3 weeks he has noticed a progressive decline in his ability to ambulate and live his life due to DE. Per wife, previously had a thoracentesis about 3 months ago. Has a planned hernia operation in June and was planning on seeing his pulmonologist and oncologist ([facility]) for clearance. Wife states that his oncologist was aware that there was a newly developing effusion but was monitoring it. Patient had previously seen Dr. Martin outpatient, most recently 2/11/25.       PAST MEDICAL & SURGICAL HISTORY:  Hypertension      Hyperlipidemia      CAD (coronary artery disease)      Lung cancer      Afib      Chronic HFrEF (heart failure with reduced ejection fraction)      Aortic stenosis      History of COPD      S/P hip replacement, right      S/P hip replacement, left          MEDICATIONS  (STANDING):  apixaban 5 milliGRAM(s) Oral two times a day  aspirin  chewable 81 milliGRAM(s) Oral daily  atorvastatin 40 milliGRAM(s) Oral at bedtime  fluticasone propionate/ salmeterol 100-50 MICROgram(s) Diskus 1 Dose(s) Inhalation two times a day  furosemide   Injectable 40 milliGRAM(s) IV Push two times a day  metoprolol succinate ER 12.5 milliGRAM(s) Oral daily  midodrine. 15 milliGRAM(s) Oral three times a day  senna 2 Tablet(s) Oral at bedtime  tiotropium 2.5 MICROgram(s) Inhaler 2 Puff(s) Inhalation daily    MEDICATIONS  (PRN):  acetaminophen     Tablet .. 650 milliGRAM(s) Oral every 6 hours PRN Temp greater or equal to 38C (100.4F), Mild Pain (1 - 3)  aluminum hydroxide/magnesium hydroxide/simethicone Suspension 30 milliLiter(s) Oral every 4 hours PRN Dyspepsia  melatonin 3 milliGRAM(s) Oral at bedtime PRN Insomnia  ondansetron Injectable 4 milliGRAM(s) IV Push every 8 hours PRN Nausea and/or Vomiting      Allergies    No Known Allergies    Intolerances        FAMILY HISTORY:  Family history of CVA (Father)    FH: heart attack (Father)          SOCIAL HISTORY:    Smoking: former smoker, quit 22 years ago. Smoked 1.5 ppd for 45 years    Exposure History: unknown    Ambulatory status: usually independent, but over the last 3 weeks a rollator when out and rolling walker in the home due to DE      Vital Signs Last 24 Hrs  T(C): 36.4 (08 May 2025 10:38), Max: 36.7 (07 May 2025 19:11)  T(F): 97.5 (08 May 2025 10:38), Max: 98.1 (07 May 2025 19:11)  HR: 102 (08 May 2025 10:38) (87 - 102)  BP: 135/85 (08 May 2025 10:38) (123/65 - 162/90)  BP(mean): 92 (08 May 2025 03:17) (92 - 109)  RR: 18 (08 May 2025 10:38) (16 - 23)  SpO2: 97% (08 May 2025 10:38) (92% - 100%)    Parameters below as of 08 May 2025 10:38  Patient On (Oxygen Delivery Method): nasal cannula  O2 Flow (L/min): 4      PHYSICAL EXAM:  GENERAL: No acute distress, comfortable on NC O2  CHEST/LUNG: decrease breath sounds right base; No wheezes, rales, or rhonchi  HEART: irregular; + murmur. No rubs or gallops  NEUROLOGY: AAO x 4      LABS:                        10.4   9.06  )-----------( 272      ( 08 May 2025 02:30 )             34.5     05-08    142  |  104  |  23.5[H]  ----------------------------<  97  4.0   |  26.0  |  1.12    Ca    8.6      08 May 2025 02:30  Mg     2.3     05-08    TPro  7.4  /  Alb  3.9  /  TBili  0.7  /  DBili  x   /  AST  16  /  ALT  10  /  AlkPhos  126[H]  05-07    PT/INR - ( 07 May 2025 13:24 )   PT: 17.0 sec;   INR: 1.47 ratio         PTT - ( 07 May 2025 13:24 )  PTT:38.2 sec  Urinalysis Basic - ( 08 May 2025 02:30 )    Color: x / Appearance: x / SG: x / pH: x  Gluc: 97 mg/dL / Ketone: x  / Bili: x / Urobili: x   Blood: x / Protein: x / Nitrite: x   Leuk Esterase: x / RBC: x / WBC x   Sq Epi: x / Non Sq Epi: x / Bacteria: x        RADIOLOGY & ADDITIONAL STUDIES:    < from: CT Angio Chest PE Protocol w/ IV Cont (05.07.25 @ 17:17) >  IMPRESSION:  1. The subsegmental pulmonary arteries are somewhat limited evaluation   due to motion artifact. No pulmonary embolism within this limitation.  2. Probable slight increase in size of the right pleural effusion with   persistent consolidation versus atelectasis/scar throughout the right   lower lobe and trace left pleural effusion.  3. New 1.1 cm nodular opacity within the left upper lobe which is   nonspecific and could be infectious or inflammatory in etiology.   Attention on follow-up is recommended.    < end of copied text >

## 2025-05-08 NOTE — H&P ADULT - ATTENDING COMMENTS
74 y/o M w/ hx of HFrEF, afib (on Eliquis), COPD (no home O2), severe AS, prior PE (no longer on AC), and stage 3 SCC lung (treated w/ platinum doublet chemoRT and adjuvant durvalumab 1 year ago finished) who presented w/ acute on chronic dyspnea and cough. Pt found to have moderate to large pleural effusion on CT admitted for with acute CHF in the setting of pleural effusion, doubt pneumonia at this time as CT finding unchanged from prior study. I agree with plan as outlined above and will hold off abx at this time. CT surg consulted for possible chest tube placement. Plan discussed with resident Dr. Crawford.       I attest that I have completed more than 50% of the documentation, physical exam and orders      Time-based billing (NON-critical care).     80 minutes spent on total encounter. The necessity of the time spent during the encounter on this date of service was due to:   chart review, patient evaluation, independent history taking, documentation, coordination of care and discussion with patient, nurses,  and patient's family.

## 2025-05-08 NOTE — PROGRESS NOTE ADULT - SUBJECTIVE AND OBJECTIVE BOX
[facility]  ----------------------------------------  The patient was seen earlier at bedside. Patient with dyspnea and hypoxia. Noted some improvement in the dyspnea. Denied chest pain.    Vital Signs Last 24 Hrs  T(C): 36.4 (08 May 2025 10:38), Max: 36.7 (07 May 2025 19:11)  T(F): 97.5 (08 May 2025 10:38), Max: 98.1 (07 May 2025 19:11)  HR: 102 (08 May 2025 10:38) (87 - 102)  BP: 135/85 (08 May 2025 10:38) (123/65 - 162/90)  BP(mean): 92 (08 May 2025 03:17) (92 - 109)  RR: 18 (08 May 2025 10:38) (16 - 23)  SpO2: 97% (08 May 2025 10:38) (92% - 100%)    Parameters below as of 08 May 2025 10:38  Patient On (Oxygen Delivery Method): nasal cannula  O2 Flow (L/min): 4    PHYSICAL EXAMINATION:  ----------------------------------------  General appearance: No acute distress, Awake, Alert  HEENT: Normocephalic, Atraumatic, Conjunctiva clear, EOMI  Neck: Supple, No JVD, No tenderness  Lungs: Breath sound equal bilaterally, No wheezes, Baialr rales  Cardiovascular: S1S2, Regular rhythm  Abdomen: Soft, Nontender, Nondistended, No guarding/rebound, Positive bowel sounds  Extremities: No clubbing, No cyanosis, No calf tenderness, Lower extremity edema  Neuro: Strength equal bilaterally, No tremors  Psychiatric: Appropriate mood, Normal affect    LABORATORY STUDIES:  ----------------------------------------             10.4   9.06  )-----------( 272      ( 08 May 2025 02:30 )             34.5     05-08    142  |  104  |  23.5[H]  ----------------------------<  97  4.0   |  26.0  |  1.12    Ca    8.6      08 May 2025 02:30  Mg     2.3     05-08    TPro  7.4  /  Alb  3.9  /  TBili  0.7  /  DBili  x   /  AST  16  /  ALT  10  /  AlkPhos  126[H]  05-07    LIVER FUNCTIONS - ( 07 May 2025 13:24 )  Alb: 3.9 g/dL / Pro: 7.4 g/dL / ALK PHOS: 126 U/L / ALT: 10 U/L / AST: 16 U/L / GGT: x           PT/INR - ( 07 May 2025 13:24 )   PT: 17.0 sec;   INR: 1.47 ratio    PTT - ( 07 May 2025 13:24 )  PTT:38.2 sec    Urinalysis Basic - ( 08 May 2025 02:30 )  Color: x / Appearance: x / SG: x / pH: x  Gluc: 97 mg/dL / Ketone: x  / Bili: x / Urobili: x   Blood: x / Protein: x / Nitrite: x   Leuk Esterase: x / RBC: x / WBC x   Sq Epi: x / Non Sq Epi: x / Bacteria: x    MEDICATIONS  (STANDING):  apixaban 5 milliGRAM(s) Oral two times a day  aspirin  chewable 81 milliGRAM(s) Oral daily  atorvastatin 40 milliGRAM(s) Oral at bedtime  fluticasone propionate/ salmeterol 100-50 MICROgram(s) Diskus 1 Dose(s) Inhalation two times a day  furosemide   Injectable 40 milliGRAM(s) IV Push two times a day  metoprolol succinate ER 12.5 milliGRAM(s) Oral daily  midodrine. 15 milliGRAM(s) Oral three times a day  senna 2 Tablet(s) Oral at bedtime  tiotropium 2.5 MICROgram(s) Inhaler 2 Puff(s) Inhalation daily    MEDICATIONS  (PRN):  acetaminophen     Tablet .. 650 milliGRAM(s) Oral every 6 hours PRN Temp greater or equal to 38C (100.4F), Mild Pain (1 - 3)  aluminum hydroxide/magnesium hydroxide/simethicone Suspension 30 milliLiter(s) Oral every 4 hours PRN Dyspepsia  melatonin 3 milliGRAM(s) Oral at bedtime PRN Insomnia  ondansetron Injectable 4 milliGRAM(s) IV Push every 8 hours PRN Nausea and/or Vomiting      ASSESSMENT / PLAN:  ----------------------------------------  84M with a history of heart failure, atrial fibrillation, COPD, aortic stenosis, prior pulmonary embolism, and lung cancer, who presented with dyspnea and found to have hypoxia.    Acute hypoxic respiratory failure / Acute on chronic diastolic heart failure  - On intravenous furosemide  - Monitoring urine output  - For titration of supplemental oxygen as tolerated  - Cardiology evaluation noted  - CT of the chest was without pulmonary embolus, noted a moderate to large right pleural effusion slightly increased from prior study, new 1.1 cm nodular opacity within the left upper lobe  - Pending Thoracic Surgery evaluation    Atrial fibrillation  - On metoprolol for rate control  - On apixaban for anticoagulation, will need to hold medication if thoracentesis is required    COPD  - Not in acute exacerbation  - On fluticasone/salmeterol and tiotropium    Orthostatic hypotension  - On midodrine    Discussed with the patient and his family at the bedside, results discussed        Dispo: Anticipate eventual discharge home pending response to treatment and improvement in hypoxia [facility]  ----------------------------------------  The patient was seen earlier at bedside. Patient with dyspnea and hypoxia. Noted some improvement in the dyspnea. Denied chest pain.    Vital Signs Last 24 Hrs  T(C): 36.4 (08 May 2025 10:38), Max: 36.7 (07 May 2025 19:11)  T(F): 97.5 (08 May 2025 10:38), Max: 98.1 (07 May 2025 19:11)  HR: 102 (08 May 2025 10:38) (87 - 102)  BP: 135/85 (08 May 2025 10:38) (123/65 - 162/90)  BP(mean): 92 (08 May 2025 03:17) (92 - 109)  RR: 18 (08 May 2025 10:38) (16 - 23)  SpO2: 97% (08 May 2025 10:38) (92% - 100%)    Parameters below as of 08 May 2025 10:38  Patient On (Oxygen Delivery Method): nasal cannula  O2 Flow (L/min): 4    PHYSICAL EXAMINATION:  ----------------------------------------  General appearance: No acute distress, Awake, Alert  HEENT: Normocephalic, Atraumatic, Conjunctiva clear, EOMI  Neck: Supple, No JVD, No tenderness  Lungs: Breath sound equal bilaterally, No wheezes, Baialr rales  Cardiovascular: S1S2, Regular rhythm  Abdomen: Soft, Nontender, Nondistended, No guarding/rebound, Positive bowel sounds  Extremities: No clubbing, No cyanosis, No calf tenderness, Lower extremity edema  Neuro: Strength equal bilaterally, No tremors  Psychiatric: Appropriate mood, Normal affect    LABORATORY STUDIES:  ----------------------------------------             10.4   9.06  )-----------( 272      ( 08 May 2025 02:30 )             34.5     05-08    142  |  104  |  23.5[H]  ----------------------------<  97  4.0   |  26.0  |  1.12    Ca    8.6      08 May 2025 02:30  Mg     2.3     05-08    TPro  7.4  /  Alb  3.9  /  TBili  0.7  /  DBili  x   /  AST  16  /  ALT  10  /  AlkPhos  126[H]  05-07    LIVER FUNCTIONS - ( 07 May 2025 13:24 )  Alb: 3.9 g/dL / Pro: 7.4 g/dL / ALK PHOS: 126 U/L / ALT: 10 U/L / AST: 16 U/L / GGT: x           PT/INR - ( 07 May 2025 13:24 )   PT: 17.0 sec;   INR: 1.47 ratio    PTT - ( 07 May 2025 13:24 )  PTT:38.2 sec    Urinalysis Basic - ( 08 May 2025 02:30 )  Color: x / Appearance: x / SG: x / pH: x  Gluc: 97 mg/dL / Ketone: x  / Bili: x / Urobili: x   Blood: x / Protein: x / Nitrite: x   Leuk Esterase: x / RBC: x / WBC x   Sq Epi: x / Non Sq Epi: x / Bacteria: x    MEDICATIONS  (STANDING):  apixaban 5 milliGRAM(s) Oral two times a day  aspirin  chewable 81 milliGRAM(s) Oral daily  atorvastatin 40 milliGRAM(s) Oral at bedtime  fluticasone propionate/ salmeterol 100-50 MICROgram(s) Diskus 1 Dose(s) Inhalation two times a day  furosemide   Injectable 40 milliGRAM(s) IV Push two times a day  metoprolol succinate ER 12.5 milliGRAM(s) Oral daily  midodrine. 15 milliGRAM(s) Oral three times a day  senna 2 Tablet(s) Oral at bedtime  tiotropium 2.5 MICROgram(s) Inhaler 2 Puff(s) Inhalation daily    MEDICATIONS  (PRN):  acetaminophen     Tablet .. 650 milliGRAM(s) Oral every 6 hours PRN Temp greater or equal to 38C (100.4F), Mild Pain (1 - 3)  aluminum hydroxide/magnesium hydroxide/simethicone Suspension 30 milliLiter(s) Oral every 4 hours PRN Dyspepsia  melatonin 3 milliGRAM(s) Oral at bedtime PRN Insomnia  ondansetron Injectable 4 milliGRAM(s) IV Push every 8 hours PRN Nausea and/or Vomiting      ASSESSMENT / PLAN:  ----------------------------------------  84M with a history of heart failure, atrial fibrillation, COPD, aortic stenosis, prior pulmonary embolism, and lung cancer, who presented with dyspnea and found to have hypoxia.    Acute hypoxic respiratory failure / Acute on chronic diastolic heart failure  - On intravenous furosemide  - Monitoring urine output  - For titration of supplemental oxygen as tolerated  - Cardiology evaluation noted, may benefit from CardioMEMS  - CT of the chest was without pulmonary embolus, noted a moderate to large right pleural effusion slightly increased from prior study, new 1.1 cm nodular opacity within the left upper lobe  - Pending Thoracic Surgery evaluation    Atrial fibrillation  - On metoprolol for rate control  - On apixaban for anticoagulation, will need to hold medication if thoracentesis is required    COPD  - Not in acute exacerbation  - On fluticasone/salmeterol and tiotropium    Orthostatic hypotension  - On midodrine    Discussed with the patient and his family at the bedside, results discussed        Dispo: Anticipate eventual discharge home pending response to treatment and improvement in hypoxia

## 2025-05-08 NOTE — H&P ADULT - NSICDXPASTMEDICALHX_GEN_ALL_CORE_FT
PAST MEDICAL HISTORY:  Afib     Aortic stenosis     CAD (coronary artery disease)     Chronic HFrEF (heart failure with reduced ejection fraction)     History of COPD     Hyperlipidemia     Hypertension     Lung cancer

## 2025-05-08 NOTE — H&P ADULT - HISTORY OF PRESENT ILLNESS
74 y/o M w/ hx of HFrEF, afib (on Eliquis), COPD (no home O2), severe AS, prior PE (no longer on AC), and stage 3 SCC lung (treated w/ platinum doublet chemoRT and adjuvant durvalumab 1 year ago finished) who presented w/ acute on chronic dyspnea and cough. Pt was in the hospital in March for dyspnea. At that time, was found to have pleural effusions. Also underwent cardiac workup, which revealed mod-severe AS and patent coronaries on LHC. No thoracentesis at that time. Since discharge, pt has remained at baseline respiratory status (mild dyspnea on exertion), but over past couple days has had worsening. Also developed intermittent cough which is productive but pt is swallowing phlegm so not able to describe it further. Also complaining of orthopnea and increased LE swelling during this time. Denies chest pain, pleuritic pain, abd pain, hematuria, dysuria. Denies sick contact and travel.    In the ED, pt was hypoxic and tachypneic requiring 3L NC, but otherwise hemodynamically stable and afebrile. Labs w/o leukocytosis. Trop 37 --> 34. BNP at 2144. CT-A PE study w/o PE but showed worsening of large right-sided pleural effusion with concern for interstitial edema. 1.1-cm nodule identified on CT but PET at the end of April negative for FGD-avid lesions.

## 2025-05-08 NOTE — H&P ADULT - NSHPSOCIALHISTORY_GEN_ALL_CORE
Lives at home w/ wife. Former smoker quit 2002. No alcohol use. Typically somewhat dependent w/ ADLs, but ambulates independently.

## 2025-05-08 NOTE — H&P ADULT - NSHPLABSRESULTS_GEN_ALL_CORE
11.3   7.98  )-----------( 284      ( 07 May 2025 13:24 )             37.6     05-07    146[H]  |  106  |  23.3[H]  ----------------------------<  95  4.5   |  29.0  |  1.07    Ca    9.3      07 May 2025 13:24  Mg     2.2     05-07    TPro  7.4  /  Alb  3.9  /  TBili  0.7  /  DBili  x   /  AST  16  /  ALT  10  /  AlkPhos  126[H]  05-07    < from: CT Angio Chest PE Protocol w/ IV Cont (05.07.25 @ 17:17) >    IMPRESSION:  1. The subsegmental pulmonary arteries are somewhat limited evaluation   due to motion artifact. No pulmonary embolism within this limitation.  2. Probable slight increase in size of the right pleural effusion with   persistent consolidation versus atelectasis/scar throughout the right   lower lobe and trace left pleural effusion.  3. New 1.1 cm nodular opacity within the left upper lobe which is   nonspecific and could be infectious or inflammatory in etiology.   Attention on follow-up is recommended.    --- End of Report ---    < end of copied text >

## 2025-05-08 NOTE — H&P ADULT - NSHPPHYSICALEXAM_GEN_ALL_CORE
Vital Signs Last 24 Hrs  T(C): 36.3 (08 May 2025 03:17), Max: 36.7 (07 May 2025 11:01)  T(F): 97.3 (08 May 2025 03:17), Max: 98.1 (07 May 2025 19:11)  HR: 96 (08 May 2025 03:17) (83 - 96)  BP: 136/76 (08 May 2025 03:17) (128/92 - 162/90)  BP(mean): 92 (08 May 2025 03:17) (92 - 109)  RR: 23 (08 May 2025 03:17) (20 - 23)  SpO2: 100% (08 May 2025 03:17) (92% - 100%)    Parameters below as of 08 May 2025 03:17  Patient On (Oxygen Delivery Method): nasal cannula  O2 Flow (L/min): 3    GENERAL: No acute distress, comfortably in bed  HEAD: Atraumatic, normocephalic  ENT: PERRLA B/L, non-icteric sclera, no JVD  LUNGS: Decreased air entry on R, bibasilar crackes  HEART: Irregularly irregular rhythm but w/o tachycardia, +systolic murmur  ABD: Soft, non-tender, non-distended, no organomegaly, no appreciable masses, +bs all 4 quadrants  EXTREMITIES: +3 pitting edema B/L to mid-upper calf  SKIN: Warm, dry, no rashes present  NEURO: A&Ox3, no focal deficits, moving all extremities spontaneously, no dysarthria, CN II-XII grossly intact  PSYCH: Normal affect, calm

## 2025-05-08 NOTE — H&P ADULT - ASSESSMENT
84y Male w/ PMH of HFrEF, afib (on Eliquis), COPD (no home O2), severe AS, prior PE (no longer on AC), and stage 3 SCC lung (treated w/ platinum doublet chemoRT and adjuvant durvalumab 1 year ago finished) admitted for acute hypoxic respiratory failure 2/2 pleural effusions in setting of acute HFrEF.    Plan  #Acute HFrEF  #Acute hypoxic respiratory failure  #Pleural effusion  -Pt has long-standing pleural effusions -?first started w/ cancer diagnosis  -Has had drained in the past  -Most recent hospitalization w/ patent coronaries on ischemic eval  -Most recent TTE 3/2025 w/ mildly reduced EF 40-45% and mod-severe AS  -Has been seen by CT surgery in the past for AS, no surgery planned at that time  -Hypoxic in ED requiring 3L  -CT-A PE study w/o PE but shows worsening of right sided effusion  -BNP on admission 2144  -Diuresis w/ 40mg IV Lasix BID  -GDMT limited by orthostasis/hypotension on midodrine at home, but on Toprol 12.5mg QD  -Monitor I/O  -Supplemental O2 PRN  -Daily BMP and Mg levels w/ Mg goal > 2 and K goal > 4  -Monitor on telemetry/   -Hurley consulted  -Thoracic consulted ?thoracentesis    #Afib  -First diagnosed in 3/2025  -[id number] score 3  -Currently rate controlled  -C/w home Eliquis for CVA ppx  -C/w home Toprol for rate control  -Monitor on telemetry  -Cardiology consulted and following     #COPD  -Not in acute exacerbation  -C/w home Trelegy    #Orthostasis  -Required midodrine and fludrocortisone in 3/2025, discharged on both weaned to only midodrine  -C/w home midrodine     DVT PPx: On Eliquis  Dispo: Admit to tele/, needs IV diuresis thoracic consult for possible thoracentesis, anticipate 4-5 days LOS

## 2025-05-09 ENCOUNTER — RESULT REVIEW (OUTPATIENT)
Age: 85
End: 2025-05-09

## 2025-05-09 ENCOUNTER — TRANSCRIPTION ENCOUNTER (OUTPATIENT)
Age: 85
End: 2025-05-09

## 2025-05-09 LAB
ALBUMIN FLD-MCNC: 2 G/DL — SIGNIFICANT CHANGE UP
ANION GAP SERPL CALC-SCNC: 11 MMOL/L — SIGNIFICANT CHANGE UP (ref 5–17)
APTT BLD: 34 SEC — SIGNIFICANT CHANGE UP (ref 26.1–36.8)
B PERT IGG+IGM PNL SER: ABNORMAL
BUN SERPL-MCNC: 24.9 MG/DL — HIGH (ref 8–20)
CALCIUM SERPL-MCNC: 9 MG/DL — SIGNIFICANT CHANGE UP (ref 8.4–10.5)
CHLORIDE SERPL-SCNC: 102 MMOL/L — SIGNIFICANT CHANGE UP (ref 96–108)
CO2 SERPL-SCNC: 31 MMOL/L — HIGH (ref 22–29)
COLOR FLD: ABNORMAL
CREAT SERPL-MCNC: 1.17 MG/DL — SIGNIFICANT CHANGE UP (ref 0.5–1.3)
EGFR: 62 ML/MIN/1.73M2 — SIGNIFICANT CHANGE UP
EGFR: 62 ML/MIN/1.73M2 — SIGNIFICANT CHANGE UP
FLUID INTAKE SUBSTANCE CLASS: SIGNIFICANT CHANGE UP
GLUCOSE FLD-MCNC: 113 MG/DL — SIGNIFICANT CHANGE UP
GLUCOSE SERPL-MCNC: 92 MG/DL — SIGNIFICANT CHANGE UP (ref 70–99)
GRAM STN FLD: SIGNIFICANT CHANGE UP
HCT VFR BLD CALC: 35.3 % — LOW (ref 39–50)
HGB BLD-MCNC: 10.6 G/DL — LOW (ref 13–17)
INR BLD: 1.18 RATIO — HIGH (ref 0.85–1.16)
LDH SERPL L TO P-CCNC: 73 U/L — SIGNIFICANT CHANGE UP
LYMPHOCYTES # FLD: 81 % — SIGNIFICANT CHANGE UP
MAGNESIUM SERPL-MCNC: 2.2 MG/DL — SIGNIFICANT CHANGE UP (ref 1.6–2.6)
MCHC RBC-ENTMCNC: 28 PG — SIGNIFICANT CHANGE UP (ref 27–34)
MCHC RBC-ENTMCNC: 30 G/DL — LOW (ref 32–36)
MCV RBC AUTO: 93.1 FL — SIGNIFICANT CHANGE UP (ref 80–100)
MESOTHL CELL # FLD: 2 % — SIGNIFICANT CHANGE UP
MONOS+MACROS # FLD: 7 % — SIGNIFICANT CHANGE UP
NEUTROPHILS-BODY FLUID: 12 % — SIGNIFICANT CHANGE UP
NRBC # BLD AUTO: 0 K/UL — SIGNIFICANT CHANGE UP (ref 0–0)
NRBC # FLD: 0 K/UL — SIGNIFICANT CHANGE UP (ref 0–0)
NRBC BLD AUTO-RTO: 0 /100 WBCS — SIGNIFICANT CHANGE UP (ref 0–0)
PH FLD: 8 — SIGNIFICANT CHANGE UP
PLATELET # BLD AUTO: 260 K/UL — SIGNIFICANT CHANGE UP (ref 150–400)
PMV BLD: 10.6 FL — SIGNIFICANT CHANGE UP (ref 7–13)
POTASSIUM SERPL-MCNC: 4.4 MMOL/L — SIGNIFICANT CHANGE UP (ref 3.5–5.3)
POTASSIUM SERPL-SCNC: 4.4 MMOL/L — SIGNIFICANT CHANGE UP (ref 3.5–5.3)
PROT FLD-MCNC: 3 G/DL — SIGNIFICANT CHANGE UP
PROTHROM AB SERPL-ACNC: 13.7 SEC — HIGH (ref 9.9–13.4)
RBC # BLD: 3.79 M/UL — LOW (ref 4.2–5.8)
RBC # FLD: 17.2 % — HIGH (ref 10.3–14.5)
RCV VOL RI: 4000 CELLS/UL — SIGNIFICANT CHANGE UP
SODIUM SERPL-SCNC: 144 MMOL/L — SIGNIFICANT CHANGE UP (ref 135–145)
SPECIMEN SOURCE: SIGNIFICANT CHANGE UP
TOTAL NUCLEATED CELL COUNT, BODY FLUID: 312 CELLS/UL — SIGNIFICANT CHANGE UP
TUBE TYPE: SIGNIFICANT CHANGE UP
WBC # BLD: 8.58 K/UL — SIGNIFICANT CHANGE UP (ref 3.8–10.5)
WBC # FLD AUTO: 8.58 K/UL — SIGNIFICANT CHANGE UP (ref 3.8–10.5)
WBC COUNT.: 306 CELLS/UL — SIGNIFICANT CHANGE UP

## 2025-05-09 PROCEDURE — 32556 INSERT CATH PLEURA W/O IMAGE: CPT | Mod: RT

## 2025-05-09 PROCEDURE — 88112 CYTOPATH CELL ENHANCE TECH: CPT | Mod: 26

## 2025-05-09 PROCEDURE — 99231 SBSQ HOSP IP/OBS SF/LOW 25: CPT | Mod: 25

## 2025-05-09 PROCEDURE — 88305 TISSUE EXAM BY PATHOLOGIST: CPT | Mod: 26

## 2025-05-09 PROCEDURE — 71045 X-RAY EXAM CHEST 1 VIEW: CPT | Mod: 26

## 2025-05-09 PROCEDURE — 99233 SBSQ HOSP IP/OBS HIGH 50: CPT | Mod: GC

## 2025-05-09 RX ORDER — ACETAMINOPHEN 500 MG/5ML
1000 LIQUID (ML) ORAL ONCE
Refills: 0 | Status: COMPLETED | OUTPATIENT
Start: 2025-05-09 | End: 2025-05-09

## 2025-05-09 RX ORDER — APIXABAN 2.5 MG/1
5 TABLET, FILM COATED ORAL
Refills: 0 | Status: DISCONTINUED | OUTPATIENT
Start: 2025-05-09 | End: 2025-05-13

## 2025-05-09 RX ADMIN — Medication 400 MILLIGRAM(S): at 14:47

## 2025-05-09 RX ADMIN — FUROSEMIDE 40 MILLIGRAM(S): 10 INJECTION INTRAMUSCULAR; INTRAVENOUS at 05:23

## 2025-05-09 RX ADMIN — FUROSEMIDE 40 MILLIGRAM(S): 10 INJECTION INTRAMUSCULAR; INTRAVENOUS at 14:47

## 2025-05-09 RX ADMIN — Medication 1000 MILLIGRAM(S): at 15:47

## 2025-05-09 RX ADMIN — Medication 1 DOSE(S): at 12:15

## 2025-05-09 RX ADMIN — Medication 81 MILLIGRAM(S): at 12:15

## 2025-05-09 RX ADMIN — MIDODRINE HYDROCHLORIDE 15 MILLIGRAM(S): 5 TABLET ORAL at 05:23

## 2025-05-09 RX ADMIN — TIOTROPIUM BROMIDE INHALATION SPRAY 2 PUFF(S): 3.12 SPRAY, METERED RESPIRATORY (INHALATION) at 12:15

## 2025-05-09 RX ADMIN — MIDODRINE HYDROCHLORIDE 15 MILLIGRAM(S): 5 TABLET ORAL at 17:28

## 2025-05-09 RX ADMIN — MIDODRINE HYDROCHLORIDE 15 MILLIGRAM(S): 5 TABLET ORAL at 12:15

## 2025-05-09 RX ADMIN — Medication 2 TABLET(S): at 21:52

## 2025-05-09 RX ADMIN — ATORVASTATIN CALCIUM 40 MILLIGRAM(S): 80 TABLET, FILM COATED ORAL at 21:52

## 2025-05-09 RX ADMIN — METOPROLOL SUCCINATE 12.5 MILLIGRAM(S): 50 TABLET, EXTENDED RELEASE ORAL at 05:23

## 2025-05-09 RX ADMIN — APIXABAN 5 MILLIGRAM(S): 2.5 TABLET, FILM COATED ORAL at 17:28

## 2025-05-09 NOTE — DISCHARGE NOTE PROVIDER - NSDCCPTREATMENT_GEN_ALL_CORE_FT
PRINCIPAL PROCEDURE  Procedure: Right heart catheterization  Findings and Treatment: Ptaient underwent Cardiomems insertion on 05/15/25  Follow up with your Cradiologist in 2 weeks  No heavy lifting, driving, sex, tub baths, swimming, or any activity that submerges the lower half of the body in water for 48 hours.  Limited walking and stairs for 48 hours.    Change the bandaid after 24 hours and every 24 hours after that.  Keep the puncture site dry and covered with a bandaid until a scab forms.    Observe the site frequently.  If bleeding or a large lump (the size of a golf ball or bigger) occurs lie flat, apply continuous direct pressure just above the puncture site for at least 10 minutes, and notify your physician immediately.  If the bleeding cannot be controlled, call 911 immediately for assistance.  Notify your physician of pain, swelling or any drainage.    Notify your physician immediately if coldness, numbness, discoloration or pain in your foot occurs.

## 2025-05-09 NOTE — DISCHARGE NOTE PROVIDER - NSDCFUSCHEDAPPT_GEN_ALL_CORE_FT
Shakir James  [facility] [place] Physician [facility]  [facility] [address]  Scheduled Appointment: 05/15/2025    Harry Yates  [facility]  Scheduled Appointment: 05/21/2025    Reyna Loya  [facility] [place] Physician [facility]  [facility]  Scheduled Appointment: 05/27/2025    Harry Yates  [facility] [place] Physician [facility]  [facility] [address]  Scheduled Appointment: 06/10/2025    Harry Yates  [facility]  Scheduled Appointment: 06/10/2025    Jono Ahumada  [facility] [place] Physician [facility]  UROLOGY [address]  Scheduled Appointment: 06/16/2025    [facility] [place] Physician [facility]  [facility]  Scheduled Appointment: 07/10/2025     Harry Yates  [facility]  Scheduled Appointment: 05/21/2025    Reyna Loya  [facility] Physician [facility]  [facility]  Scheduled Appointment: 05/27/2025    Harry Yates  [facility] Physician [facility]  [facility] [address] [address] [facility] [address]  Scheduled Appointment: 06/10/2025    Harry Yates  [facility]  Scheduled Appointment: 06/10/2025    Jono Ahumada  [facility] Physician [facility]  UROLOGY [address] [facility] [address]  Scheduled Appointment: 06/16/2025    [facility] Physician [facility]  [facility] [employer]  Scheduled Appointment: 07/10/2025     Harry Yates  [facility]  Scheduled Appointment: 05/21/2025    [facility]  CARENAV Patient Christine  Scheduled Appointment: 05/22/2025    Reyna Loya  [facility]  [facility]  Scheduled Appointment: 05/27/2025    Harry Yates  [facility]  [facility] [address]  Scheduled Appointment: 06/10/2025    Harry Yates  [facility]  Scheduled Appointment: 06/10/2025    Jono Ahumada  [facility]  UROLOGY [address]  Scheduled Appointment: 06/16/2025    [facility]  [facility] [employer]  Scheduled Appointment: 07/10/2025

## 2025-05-09 NOTE — DISCHARGE NOTE PROVIDER - ATTENDING DISCHARGE PHYSICAL EXAMINATION:
GENERAL: No acute distress, comfortable in chair  HEENT: Atraumatic, normocephalic, non-icteric  NECK: Supple, full range of motion, no observable masses  NEURO: A&Ox3, no focal deficits, moving all extremities spontaneously, no dysarthria, CN II-XII grossly intact  PSYCH: Normal affect, appropriate insight and judgment, fluent speech  LUNGS: Decreased breath sounds on right, improved from prior, otherwise clear bl, s/p chest tube removal, dressing clean and dry   HEART: Irregular, systolic murmur   ABD: Soft, non-tender, non-distended, no appreciable masses  EXTREMITIES: Minimal pitting edema bl  SKIN: Warm and dry    GENERAL: No acute distress, comfortable in chair  HEENT: Atraumatic, normocephalic, non-icteric  NECK: Supple, full range of motion, no observable masses  NEURO: A&Ox3, no focal deficits, moving all extremities spontaneously, no dysarthria, CN II-XII grossly intact  PSYCH: Normal affect, appropriate insight and judgment, fluent speech  LUNGS: Decreased breath sounds on right, improved from prior, otherwise clear bl, s/p chest tube removal, dressing clean and dry   HEART: Irregular, systolic murmur   ABD: Soft, non-tender, non-distended, no appreciable masses; groin approach for cardiomems yesterday, no hematoma  EXTREMITIES: Minimal pitting edema bl, peripheral pulses 2 +  SKIN: Warm and dry    GENERAL: No acute distress, comfortable in chair  HEENT: Atraumatic, normocephalic, non-icteric  NECK: Supple, full range of motion, no observable masses  NEURO: A&Ox3, no focal deficits, moving all extremities spontaneously, no dysarthria, CN II-XII grossly intact  PSYCH: Normal affect, appropriate insight and judgment, fluent speech  LUNGS: Decreased breath sounds on right, improved from prior, otherwise clear bl, s/p chest tube removal, dressing clean and dry   HEART: Irregular, systolic murmur   ABD: Soft, non-tender, non-distended, no appreciable masses; groin approach for cardiomems yesterday, no hematoma  EXTREMITIES: Minimal pitting edema bl, peripheral pulses 2 +  SKIN: Warm and dry   Vital Signs Last 24 Hrs  T(C): 36.6 (16 May 2025 08:27), Max: 36.6 (15 May 2025 17:30)  T(F): 97.8 (16 May 2025 08:27), Max: 97.8 (15 May 2025 17:30)  HR: 77 (16 May 2025 09:00) (77 - 83)  BP: 107/64 (16 May 2025 08:27) (104/60 - 122/78)  BP(mean): --  RR: 18 (16 May 2025 08:27) (16 - 20)  SpO2: 94% (16 May 2025 09:00) (92% - 98%)    Parameters below as of 16 May 2025 09:00  Patient On (Oxygen Delivery Method): room air

## 2025-05-09 NOTE — PROGRESS NOTE ADULT - SUBJECTIVE AND OBJECTIVE BOX
[facility], THE [facility]ART [facility]ENTER                                   [address] [place] [address]                                                      PHONE: [phone]                                                         FAX: [phone]  [email or web address]  ---------------------------------------------------------------------------------------------------------------------------------    Overnight events/patient complaints: Patient s/p right sided chest tube. No cardiac complaints this AM. States he is feeling better.       No Known Allergies    MEDICATIONS  (STANDING):  aspirin  chewable 81 milliGRAM(s) Oral daily  atorvastatin 40 milliGRAM(s) Oral at bedtime  fluticasone propionate/ salmeterol 100-50 MICROgram(s) Diskus 1 Dose(s) Inhalation two times a day  furosemide   Injectable 40 milliGRAM(s) IV Push two times a day  metoprolol succinate ER 12.5 milliGRAM(s) Oral daily  midodrine. 15 milliGRAM(s) Oral three times a day  senna 2 Tablet(s) Oral at bedtime  tiotropium 2.5 MICROgram(s) Inhaler 2 Puff(s) Inhalation daily    MEDICATIONS  (PRN):  acetaminophen     Tablet .. 650 milliGRAM(s) Oral every 6 hours PRN Temp greater or equal to 38C (100.4F), Mild Pain (1 - 3)  aluminum hydroxide/magnesium hydroxide/simethicone Suspension 30 milliLiter(s) Oral every 4 hours PRN Dyspepsia  melatonin 3 milliGRAM(s) Oral at bedtime PRN Insomnia  ondansetron Injectable 4 milliGRAM(s) IV Push every 8 hours PRN Nausea and/or Vomiting      Vital Signs Last 24 Hrs  T(C): 36.3 (09 May 2025 08:10), Max: 36.4 (08 May 2025 16:17)  T(F): 97.4 (09 May 2025 08:10), Max: 97.5 (08 May 2025 16:17)  HR: 81 (09 May 2025 08:10) (76 - 82)  BP: 132/67 (09 May 2025 08:10) (111/65 - 132/67)  BP(mean): --  RR: 19 (09 May 2025 08:10) (17 - 19)  SpO2: 99% (09 May 2025 08:10) (94% - 99%)    Parameters below as of 09 May 2025 08:10  Patient On (Oxygen Delivery Method): nasal cannula  O2 Flow (L/min): 3    ICU Vital Signs Last 24 Hrs  [facility]  I&O's Detail    08 May 2025 07:01  -  09 May 2025 07:00  --------------------------------------------------------  IN:  Total IN: 0 mL    OUT:    Voided (mL): 1750 mL  Total OUT: 1750 mL    Total NET: -1750 mL        Drug Dosing Weight  [facility]      PHYSICAL EXAM:  General: NAD  HEENT: Head; normocephalic, atraumatic. [place]  Eyes: EOMI, conjunctiva normal  Neck: Supple  CARDIOVASCULAR: RRR, S1 S2, No murmurs or gallops  LUNGS: Clear to auscultation on left. Right side with decreased breath sounds  ABDOMEN: Soft, nontender, non-distended, +bowel sounds  EXTREMITIES: 1+ edema b/l, no cyanosis   SKIN: warm and dry  NEURO: Alert/oriented x 3  PSYCH: normal affect.        LABS:                        10.6   8.58  )-----------( 260      ( 09 May 2025 04:41 )             35.3     05-09    144  |  102  |  24.9[H]  ----------------------------<  92  4.4   |  31.0[H]  |  1.17    Ca    9.0      09 May 2025 04:41  Mg     2.2     05-09    TPro  7.4  /  Alb  3.9  /  TBili  0.7  /  DBili  x   /  AST  16  /  ALT  10  /  AlkPhos  126[H]  05-07    [facility]      PT/INR - ( 09 May 2025 04:41 )   PT: 13.7 sec;   INR: 1.18 ratio         PTT - ( 09 May 2025 04:41 )  PTT:34.0 sec  Urinalysis Basic - ( 09 May 2025 04:41 )    Color: x / Appearance: x / SG: x / pH: x  Gluc: 92 mg/dL / Ketone: x  / Bili: x / Urobili: x   Blood: x / Protein: x / Nitrite: x   Leuk Esterase: x / RBC: x / WBC x   Sq Epi: x / Non Sq Epi: x / Bacteria: x      ASSESSMENT AND PLAN:  84 year old male with a PMHx of chronic hypertension, HLD, mod-Severe AS, moderate CAD on remote LHC with normal perfusion on PET MPI 2022, chronic RBBB/LAFB, frequent unifocal ventricular ectopy, stage 3 SCC lung (treated w/ platinum doublet chemoRT and adjuvant durvalumab 1 year ago finished)  with course complicated by DVT s/p several months systemic AC who presents with several days progressive worsening dyspnea.  Pt was just hospitalized last month with same  complaints and underwent full cardiac compliment of testing.  Pt admitted for recurrent acute on chronic HFpEF.     Recurrent Acute on Chronic HFpEF.   -remains hypervolemic  -c/w lasix 40 mg iv bid  -s/p chest tube on the right. Management per thoracic surgery  -consider cardiomems prior to discharge   -daily weights  -strict I/O  -keep K to 4, Mg to 2  -daily chemistry to evaluate renal function and electrolytes    Thank you for letting [facility] to assist in the management of this patient. Please call with any questions.

## 2025-05-09 NOTE — PROGRESS NOTE ADULT - ASSESSMENT
74 y/o M w/ hx of HFrEF, afib (on Eliquis), COPD (no home O2), severe AS, prior PE (no longer on AC), and stage 3 SCC lung (treated w/ platinum doublet chemoRT and adjuvant durvalumab 1 year ago finished) who presented w/ acute on chronic dyspnea and cough. Pt was in the hospital in March for dyspnea. At that time, was found to have pleural effusions. Also underwent cardiac workup, which revealed mod-severe AS and patent coronaries on LHC. No thoracentesis at that time. Since discharge, pt has remained at baseline respiratory status (mild dyspnea on exertion), but over past couple days has had worsening. Also developed intermittent cough which is productive but pt is swallowing phlegm so not able to describe it further. Also complaining of orthopnea and increased LE swelling during this time. Patient placed on 3L NC in the ED due to hypoxic and tachypneic.    Thoracic surgery consulted for right pleural effusion.

## 2025-05-09 NOTE — DISCHARGE NOTE PROVIDER - NSDCACTIVITY_GEN_ALL_CORE
Activity as tolerated Do not drive or operate machinery/Showering allowed/Do not make important decisions/Stairs allowed/Walking - Indoors allowed/No heavy lifting/straining/Walking - Outdoors allowed/Activity as tolerated

## 2025-05-09 NOTE — DISCHARGE NOTE PROVIDER - NSDCCPCAREPLAN_GEN_ALL_CORE_FT
PRINCIPAL DISCHARGE DIAGNOSIS  Diagnosis: Acute and chronic respiratory failure with hypoxia  Assessment and Plan of Treatment: - Improved  - Chest tube was placed on right  - Weaned off O2  - You were treated with Lasix  - Please follow up with your cardiologist and pulmonigist      SECONDARY DISCHARGE DIAGNOSES  Diagnosis: Pleural effusion, right  Assessment and Plan of Treatment: - Improved  - Chest tube was placed on right  - You were treated with Lasix  - Please follow up with your cardiologist and pulmonigist    Diagnosis: HFrEF (heart failure with reduced ejection fraction)  Assessment and Plan of Treatment: - Improved  - You were treated with Lasix  - Please follow up with your cardiologist    Diagnosis: Afib  Assessment and Plan of Treatment: - Please continue Eliquis and Toprol  - Please follow up with cardiology    Diagnosis: COPD without exacerbation  Assessment and Plan of Treatment: - Not in acute exacerbation  - Please continue to use Trelegy    Diagnosis: History of lung cancer  Assessment and Plan of Treatment: - Please follow up with your oncologist    Diagnosis: Orthostatic hypotension  Assessment and Plan of Treatment: - Please continue to take Midodrine    Diagnosis: Nodule of left lung  Assessment and Plan of Treatment: - Incidental finding on CT, please follow up wiht oncologist and pulmonologist     PRINCIPAL DISCHARGE DIAGNOSIS  Diagnosis: Acute and chronic respiratory failure with hypoxia  Assessment and Plan of Treatment: - Improved  - Chest tube was placed on right and removed  - Weaned off O2  - You were treated with Lasix, please take lasix every other day  - Please follow up with your cardiologist and pulmonigist      SECONDARY DISCHARGE DIAGNOSES  Diagnosis: Pleural effusion, right  Assessment and Plan of Treatment: - Improved  - Chest tube was placed on right  - You were treated with Lasix, please take lasix every other day  - Please follow up with your cardiologist and pulmonigist    Diagnosis: HFrEF (heart failure with reduced ejection fraction)  Assessment and Plan of Treatment: - Improved  - Cardiomems was placed  - You were treated with Lasix  - Please follow up with your cardiologist    Diagnosis: Afib  Assessment and Plan of Treatment: - Please continue Eliquis and Toprol  - Please follow up with cardiology    Diagnosis: COPD without exacerbation  Assessment and Plan of Treatment: - Not in acute exacerbation  - Please continue to use Trelegy    Diagnosis: History of lung cancer  Assessment and Plan of Treatment: - Please follow up with your oncologist    Diagnosis: Orthostatic hypotension  Assessment and Plan of Treatment: - Please stop taking Midodrine    Diagnosis: Nodule of left lung  Assessment and Plan of Treatment: - Incidental finding on CT, please follow up wiht oncologist and pulmonologist     PRINCIPAL DISCHARGE DIAGNOSIS  Diagnosis: Acute and chronic respiratory failure with hypoxia  Assessment and Plan of Treatment: - Improved  - Chest tube was placed on right and removed  - Weaned off O2  - You were treated with Lasix, please take lasix every other day  - Please follow up with your cardiologist and pulmonigist      SECONDARY DISCHARGE DIAGNOSES  Diagnosis: Pleural effusion, right  Assessment and Plan of Treatment: - Improved  - Chest tube was placed on right  - You were treated with Lasix, please take lasix every other day  - Please follow up with your cardiologist and pulmonigist    Diagnosis: HFrEF (heart failure with reduced ejection fraction)  Assessment and Plan of Treatment: - Improved  - Cardiomems was placed  - You were treated with Lasix  - Please follow up with your cardiologist    Diagnosis: Afib  Assessment and Plan of Treatment: - Please continue Eliquis and Toprol  - Please follow up with cardiology    Diagnosis: COPD without exacerbation  Assessment and Plan of Treatment: - Not in acute exacerbation  - Please continue to use Trelegy    Diagnosis: History of lung cancer  Assessment and Plan of Treatment: - Please follow up with your oncologist    Diagnosis: Orthostatic hypotension  Assessment and Plan of Treatment: - Please stop taking Midodrine    Diagnosis: Nodule of left lung  Assessment and Plan of Treatment: - Incidental finding on CT, please follow up wiht oncologist and pulmonologist    Diagnosis: Femoral artery occlusion  Assessment and Plan of Treatment: - Follow up with vascular surgery

## 2025-05-09 NOTE — DISCHARGE NOTE PROVIDER - HOSPITAL COURSE
73-year-old male with history of HFrEF, atrial fibrillation (on Eliquis), COPD, severe aortic stenosis, prior PE, and stage 3 SCC of the lung (s/p chemoradiation and durvalumab) presented with acute-on-chronic dyspnea and worsening lower extremity edema. Patient was hypoxic requiring 3L NC, with elevated BNP (2144) and imaging showing a large right-sided pleural effusion. A previously known 1.1-cm lung nodule was noted. Admitted for acute hypoxic respiratory failure 2/2 pleural effusions in setting of acute HFrEF.     For the acute hypoxic respiratory failure secondary to heart failure exacerbation and pleural effusion, the patient underwent right-sided chest tube placement by Thoracic Surgery with Eliquis temporarily held for the procedure. He was treated with IV Lasix 40mg BID with close monitoring of electrolytes and fluid status. His oxygen requirements gradually improved with effective diuresis and drainage of the pleural effusion.    Cardiology was consulted for management of his HFrEF and severe aortic stenosis, with limited GDMT options due to underlying orthostatic hypotension requiring midodrine. His atrial fibrillation remained rate-controlled on Toprol. The team considered placement of CardioMEMS for improved heart failure monitoring prior to discharge.    The patient's COPD remained stable on Trelegy without signs of acute exacerbation. Hematology/Oncology confirmed no active concerns regarding his previously treated lung cancer. The left upper lobe nodule was recommended for outpatient follow-up.    The hospital course was notable for gradual improvement in respiratory status following chest tube placement and diuresis. The patient was ultimately discharged with plans for close outpatient follow-up with cardiology and instructions for monitoring of the pulmonary nodule. 73-year-old male with history of HFrEF, atrial fibrillation (on Eliquis), COPD, severe aortic stenosis, prior PE, and stage 3 SCC of the lung (s/p chemoradiation and durvalumab) presented with acute-on-chronic dyspnea and worsening lower extremity edema. Patient was hypoxic requiring 3L NC, with elevated BNP (2144) and imaging showing a large right-sided pleural effusion. A previously known 1.1-cm lung nodule was noted. Admitted for acute hypoxic respiratory failure 2/2 pleural effusions in setting of acute HFrEF. Underwent right-sided chest tube placement. He was treated with IV Lasix. The hospital course was notable for gradual improvement in respiratory status following chest tube placement and diuresis. The patient was ultimately discharged with plans for close outpatient follow-up with cardiology and instructions for monitoring of the pulmonary nodule.    #Acute hypoxic respiratory failure  #Acute HFrEF  #R Pleural effusion, known effusion, drainage in the past   #Afib  #COPD  #Hx of stage 3 SCC lung cancer   #Orthostatic hypotension  #Nodular opacity, left upper lobe         73-year-old male with history of HFrEF, atrial fibrillation (on Eliquis), COPD, severe aortic stenosis, prior PE, and stage 3 SCC of the lung (s/p chemoradiation and durvalumab) presented with acute-on-chronic dyspnea and worsening lower extremity edema. Patient was hypoxic requiring 3L NC, with elevated BNP (2144) and imaging showing a large right-sided pleural effusion. A previously known 1.1-cm lung nodule was noted. Admitted for acute hypoxic respiratory failure 2/2 pleural effusions in setting of acute HFrEF. Underwent right-sided chest tube placement. Transudative effusion by Light's criterion. Culture NGTD, cytology negative for malignant cells. He was treated with IV Lasix. The hospital course was notable for gradual improvement in respiratory status and chest tube was removed. Cardiomems was placed prior to DC. The patient was ultimately discharged with plans for close outpatient follow-up with cardiology and heme/onc.     #Acute hypoxic respiratory failure  #Acute HFrEF  #R Pleural effusion, known effusion, drainage in the past   #Afib  #COPD  #Hx of stage 3 SCC lung cancer   #Orthostatic hypotension  #Nodular opacity, left upper lobe         73-year-old male with history of HFrEF, atrial fibrillation (on Eliquis), COPD, severe aortic stenosis, prior PE, and stage 3 SCC of the lung (s/p chemoradiation and durvalumab) presented with acute-on-chronic dyspnea and worsening lower extremity edema. Patient was hypoxic requiring 3L NC, with elevated BNP (2144) and imaging showing a large right-sided pleural effusion. A previously known 1.1-cm lung nodule was noted. Admitted for acute hypoxic respiratory failure 2/2 pleural effusions in setting of acute HFrEF. Underwent right-sided chest tube placement. Transudative effusion by Light's criterion. Culture NGTD, cytology negative for malignant cells. He was treated with IV Lasix. The hospital course was notable for gradual improvement in respiratory status and chest tube was removed. Cardiomems was placed prior to DC. The patient was ultimately discharged with plans for close outpatient follow-up with cardiology and heme/onc.     #Acute hypoxic respiratory failure  #Acute HFrEF  #R Pleural effusion, known effusion, drainage in the past   #Afib  #COPD  #Hx of stage 3 SCC lung cancer   #Orthostatic hypotension  #Nodular opacity, left upper lobe               73-year-old male with history of HFrEF, atrial fibrillation (on Eliquis), COPD, severe aortic stenosis, prior PE, and stage 3 SCC of the lung (s/p chemoradiation and durvalumab) presented with acute-on-chronic dyspnea and worsening lower extremity edema. Patient was hypoxic requiring 3L NC, with elevated BNP (2144) and imaging showing a large right-sided pleural effusion. A previously known 1.1-cm lung nodule was noted. Admitted for acute hypoxic respiratory failure 2/2 pleural effusions in setting of acute HFrEF. Underwent right-sided chest tube placement. Transudative effusion by Light's criterion. Culture NGTD, cytology negative for malignant cells. He was treated with IV Lasix. The hospital course was notable for gradual improvement in respiratory status and chest tube was removed. Cardiomems was placed prior to DC. Concerns for decreased pulses post-op in the right leg. Arterial duplex of RLE showed occlusion of the right superficial femoral artery with reconstitution of the distal SFA, likely chronic finding. Vascular surgery was consulted, rec outpatient follow up. The patient was ultimately discharged with plans for close outpatient follow-up with cardiology, heme/onc, and vascular surgery.     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

## 2025-05-09 NOTE — DISCHARGE NOTE PROVIDER - NSDCFUADDAPPT_GEN_ALL_CORE_FT
APPTS ARE READY TO BE MADE: [X] YES    Best Family or Patient Contact (if needed):    Additional Information about above appointments (if needed):    1: Cardiology   2: PCP  3: Oncology     Other comments or requests:  APPTS ARE READY TO BE MADE: [X] YES    Best Family or Patient Contact (if needed):    Additional Information about above appointments (if needed):    1: Cardiology   2: PCP  3: Oncology       Other comments or requests:  APPTS ARE READY TO BE MADE: [X] YES    Best Family or Patient Contact (if needed):    Additional Information about above appointments (if needed):    1: Cardiology   2: PCP  3: Oncology   4. Vascular surgery      Other comments or requests:  APPTS ARE READY TO BE MADE: [X] YES    Best Family or Patient Contact (if needed):    Additional Information about above appointments (if needed):    1: Cardiology   2: PCP  3: Oncology   4. Vascular surgery      Other comments or requests:     Prior to outreaching the patient, it was visible that the patient has secured a follow up appointment which was not scheduled by our team. Dr. Carballo on 5/22 at 1pm  Dr. Hernandez on 5/27 at 1pm   Provider's office was contacted to secure an appointment, however the office will follow up with the patient/caregiver directly. [facility] APPTS ARE READY TO BE MADE: [X] YES    Best Family or Patient Contact (if needed):    Additional Information about above appointments (if needed):    1: Cardiology   2: PCP  3: Oncology   4. Vascular surgery      Other comments or requests:     Prior to outreaching the patient, it was visible that the patient has secured a follow up appointment which was not scheduled by our team. Dr. Carballo on 5/22 at 1pm  Dr. Hernandez on 5/27 at 1pm   Provider's office was contacted to secure an appointment, however the office will follow up with the patient/caregiver directly. [facility]    Patient was provided with referral details by phone or email for a [facility] provider and will coordinate the appointment on their own.

## 2025-05-09 NOTE — DISCHARGE NOTE PROVIDER - CARE PROVIDER_API CALL
Arden Brower  Cardiovascular Disease  [address] [place] [id number]  Phone: [phone]  Follow Up Time: 1 week   Arden Brower  Cardiovascular Disease  [address] [place] [id number]  Phone: [phone]  Follow Up Time: 1 week    [facility],   Phone: (   )    -  Fax: (   )    -  Follow Up Time: 1 week   Arden Brower  Cardiovascular Disease  [address] [place] [address]  Phone: [phone]  Follow Up Time: 1 week    Jyoti Grady  Adv [facility]  [address] [place] [address]  Phone: [phone]  Fax: [phone]  Follow Up Time: 2 weeks    [facility],   Phone: (   )    -  Fax: (   )    -  Follow Up Time: 1 week   Arden Brower  Cardiovascular Disease  [address] [place] [id number]  Phone: [phone]  Follow Up Time: 1 week    [facility],   Phone: (   )    -  Fax: (   )    -  Follow Up Time: 1 week   Arden Brower  Cardiovascular Disease  [address] [place] [id number]  Phone: [phone]  Follow Up Time: 1 week    [facility],   Phone: (   )    -  Fax: (   )    -  Follow Up Time: 1 week    Cece Abdalla  Vascular Surgery  [address] [facility], [address]  [place] [address]  Phone: [phone]  Fax: [phone]  Follow Up Time: 2 weeks

## 2025-05-09 NOTE — PROGRESS NOTE ADULT - SUBJECTIVE AND OBJECTIVE BOX
Patient is a 84y old  Male who presents with a chief complaint of acute hypoxic respiratory failure, acute hfref, pleural effusions (08 May 2025 13:49)      INTERVAL HPI/OVERNIGHT EVENTS:  - No acute overnight events    TODAY:  - Patient examined bedside, no complaints. Patient denies CP, SOB, dizziness, fever, chills, nausea, vomiting, constipation, diarrhea.    MEDICATIONS  (STANDING):  aspirin  chewable 81 milliGRAM(s) Oral daily  atorvastatin 40 milliGRAM(s) Oral at bedtime  fluticasone propionate/ salmeterol 100-50 MICROgram(s) Diskus 1 Dose(s) Inhalation two times a day  furosemide   Injectable 40 milliGRAM(s) IV Push two times a day  metoprolol succinate ER 12.5 milliGRAM(s) Oral daily  midodrine. 15 milliGRAM(s) Oral three times a day  senna 2 Tablet(s) Oral at bedtime  tiotropium 2.5 MICROgram(s) Inhaler 2 Puff(s) Inhalation daily    MEDICATIONS  (PRN):  acetaminophen     Tablet .. 650 milliGRAM(s) Oral every 6 hours PRN Temp greater or equal to 38C (100.4F), Mild Pain (1 - 3)  aluminum hydroxide/magnesium hydroxide/simethicone Suspension 30 milliLiter(s) Oral every 4 hours PRN Dyspepsia  melatonin 3 milliGRAM(s) Oral at bedtime PRN Insomnia  ondansetron Injectable 4 milliGRAM(s) IV Push every 8 hours PRN Nausea and/or Vomiting      Allergies    No Known Allergies    Intolerances        REVIEW OF SYSTEMS:  Per HPI.      Vital Signs Last 24 Hrs  T(C): 36.4 (09 May 2025 04:44), Max: 36.4 (08 May 2025 10:38)  T(F): 97.5 (09 May 2025 04:44), Max: 97.5 (08 May 2025 10:38)  HR: 77 (09 May 2025 04:44) (76 - 102)  BP: 111/65 (09 May 2025 04:44) (111/65 - 135/85)  BP(mean): --  RR: 18 (09 May 2025 04:44) (17 - 18)  SpO2: 94% (09 May 2025 04:44) (94% - 99%)    Parameters below as of 09 May 2025 04:44  Patient On (Oxygen Delivery Method): nasal cannula  O2 Flow (L/min): 3      PHYSICAL EXAM:  GENERAL: No acute distress, comfortably in bed  HEENT: Atraumatic, normocephalic, non-icteric  NECK: Supple, full range of motion, no observable masses  NEURO: A&Ox3, no focal deficits, moving all extremities spontaneously, no dysarthria, CN II-XII grossly intact  PSYCH: Normal affect, appropriate insight and judgment, fluent speech  LUNGS: Decreased breath sounds on right   HEART: Irregular, systolic murmur   ABD: Soft, non-tender, non-distended, no appreciable masses  EXTREMITIES: +2 pitting edema bl   SKIN: Warm and dry     LABS:                        10.6   8.58  )-----------( 260      ( 09 May 2025 04:41 )             35.3     05-09    144  |  102  |  24.9[H]  ----------------------------<  92  4.4   |  31.0[H]  |  1.17    Ca    9.0      09 May 2025 04:41  Mg     2.2     05-09    TPro  7.4  /  Alb  3.9  /  TBili  0.7  /  DBili  x   /  AST  16  /  ALT  10  /  AlkPhos  126[H]  05-07    PT/INR - ( 09 May 2025 04:41 )   PT: 13.7 sec;   INR: 1.18 ratio         PTT - ( 09 May 2025 04:41 )  PTT:34.0 sec  Urinalysis Basic - ( 09 May 2025 04:41 )    Color: x / Appearance: x / SG: x / pH: x  Gluc: 92 mg/dL / Ketone: x  / Bili: x / Urobili: x   Blood: x / Protein: x / Nitrite: x   Leuk Esterase: x / RBC: x / WBC x   Sq Epi: x / Non Sq Epi: x / Bacteria: x      CAPILLARY BLOOD GLUCOSE          RADIOLOGY & ADDITIONAL TESTS:    Imaging Personally Reviewed:  [X] YES  [ ] NO    Consultant(s) Notes Reviewed:  [X] YES  [ ] NO    Care Discussed with Consultants/Other Providers [X] YES  [ ] NO    Plan of Care discussed with House Staff: [X]YES [ ] NO Patient is a 84y old  Male who presents with a chief complaint of acute hypoxic respiratory failure, acute hfref, pleural effusions (08 May 2025 13:49)      INTERVAL HPI/OVERNIGHT EVENTS:  - No acute overnight events    TODAY:  - Patient examined bedside. Reports dyspnea has progressively getting worse in the past month. Tolerated procedure well. Patient denies CP, SOB, dizziness, fever, chills, nausea, vomiting, constipation, diarrhea.    MEDICATIONS  (STANDING):  aspirin  chewable 81 milliGRAM(s) Oral daily  atorvastatin 40 milliGRAM(s) Oral at bedtime  fluticasone propionate/ salmeterol 100-50 MICROgram(s) Diskus 1 Dose(s) Inhalation two times a day  furosemide   Injectable 40 milliGRAM(s) IV Push two times a day  metoprolol succinate ER 12.5 milliGRAM(s) Oral daily  midodrine. 15 milliGRAM(s) Oral three times a day  senna 2 Tablet(s) Oral at bedtime  tiotropium 2.5 MICROgram(s) Inhaler 2 Puff(s) Inhalation daily    MEDICATIONS  (PRN):  acetaminophen     Tablet .. 650 milliGRAM(s) Oral every 6 hours PRN Temp greater or equal to 38C (100.4F), Mild Pain (1 - 3)  aluminum hydroxide/magnesium hydroxide/simethicone Suspension 30 milliLiter(s) Oral every 4 hours PRN Dyspepsia  melatonin 3 milliGRAM(s) Oral at bedtime PRN Insomnia  ondansetron Injectable 4 milliGRAM(s) IV Push every 8 hours PRN Nausea and/or Vomiting      Allergies    No Known Allergies    Intolerances        REVIEW OF SYSTEMS:  Per HPI.      Vital Signs Last 24 Hrs  T(C): 36.4 (09 May 2025 04:44), Max: 36.4 (08 May 2025 10:38)  T(F): 97.5 (09 May 2025 04:44), Max: 97.5 (08 May 2025 10:38)  HR: 77 (09 May 2025 04:44) (76 - 102)  BP: 111/65 (09 May 2025 04:44) (111/65 - 135/85)  BP(mean): --  RR: 18 (09 May 2025 04:44) (17 - 18)  SpO2: 94% (09 May 2025 04:44) (94% - 99%)    Parameters below as of 09 May 2025 04:44  Patient On (Oxygen Delivery Method): nasal cannula  O2 Flow (L/min): 3      PHYSICAL EXAM:  GENERAL: No acute distress, comfortably in bed  HEENT: Atraumatic, normocephalic, non-icteric  NECK: Supple, full range of motion, no observable masses  NEURO: A&Ox3, no focal deficits, moving all extremities spontaneously, no dysarthria, CN II-XII grossly intact  PSYCH: Normal affect, appropriate insight and judgment, fluent speech  LUNGS: Decreased breath sounds on right, right chest tube, draining light yellow fluid   HEART: Irregular, systolic murmur   ABD: Soft, non-tender, non-distended, no appreciable masses  EXTREMITIES: +2 pitting edema bl up to mid shin  SKIN: Warm and dry     LABS:                        10.6   8.58  )-----------( 260      ( 09 May 2025 04:41 )             35.3     05-09    144  |  102  |  24.9[H]  ----------------------------<  92  4.4   |  31.0[H]  |  1.17    Ca    9.0      09 May 2025 04:41  Mg     2.2     05-09    TPro  7.4  /  Alb  3.9  /  TBili  0.7  /  DBili  x   /  AST  16  /  ALT  10  /  AlkPhos  126[H]  05-07    PT/INR - ( 09 May 2025 04:41 )   PT: 13.7 sec;   INR: 1.18 ratio         PTT - ( 09 May 2025 04:41 )  PTT:34.0 sec  Urinalysis Basic - ( 09 May 2025 04:41 )    Color: x / Appearance: x / SG: x / pH: x  Gluc: 92 mg/dL / Ketone: x  / Bili: x / Urobili: x   Blood: x / Protein: x / Nitrite: x   Leuk Esterase: x / RBC: x / WBC x   Sq Epi: x / Non Sq Epi: x / Bacteria: x      CAPILLARY BLOOD GLUCOSE          RADIOLOGY & ADDITIONAL TESTS:    Imaging Personally Reviewed:  [X] YES  [ ] NO    Consultant(s) Notes Reviewed:  [X] YES  [ ] NO    Care Discussed with Consultants/Other Providers [X] YES  [ ] NO    Plan of Care discussed with House Staff: [X]YES [ ] NO

## 2025-05-09 NOTE — PROGRESS NOTE ADULT - ASSESSMENT
84y Male w/ PMH of HFrEF, afib (on Eliquis), COPD (no home O2), severe AS, prior PE (no longer on AC), and stage 3 SCC lung (treated w/ platinum doublet chemoRT and adjuvant durvalumab 1 year ago finished) presented with worsening acute on chornic dyspnea and cough. Admitted for acute hypoxic respiratory failure 2/2 pleural effusions in setting of acute HFrEF.    #Acute hypoxic respiratory failure  #Acute HFrEF  #R Pleural effusion  - On 3L NC, wean as tolerated   - Known pleural effusion, drainage in the past   - BNP elevated on admission   - CT-A PE study w/o PE but shows worsening of right sided effusion  - Most recent TTE 3/2025 w/ mildly reduced EF 40-45% and mod-severe AS  - Cont 40mg IV Lasix BID  - GDMT limited by orthostasis/hypotension on midodrine at home, but on Toprol 12.5mg QD  - Thoracic consulted, plan thoracentesis today, hold eliquis   - Monitor I/O  - Daily BMP and Mg levels w/ Mg goal > 2 and K goal > 4  - Monitor on telemetry/   - Cards following   - Consider cardiomems prior to discharge     #Afib  - [id number] score 3  - Currently rate controlled  - Hole eliquis as above  - C/w home Toprol for rate control  - Cardiology following     #COPD  - Not in acute exacerbation  - C/w home Trelegy    #Hx of stage 3 SCC lung cancer   - S/p chemoradiation completed 4/2023  - Completed 1 year of durvaluamb 6/2024  - Heme/onc signed off    #Orthostatic hypotension  - On midodrine    #Nodular opacity, left upper lobe  - Follow up outpatient     DVT PPx: Eliquis on hold for thoracentesis   Dispo: Active, diuresis, pending thoracentesis 84y Male w/ PMH of HFrEF, afib (on Eliquis), COPD (no home O2), severe AS, prior PE (no longer on AC), and stage 3 SCC lung (treated w/ platinum doublet chemoRT and adjuvant durvalumab 1 year ago finished) presented with worsening acute on chornic dyspnea and cough. Admitted for acute hypoxic respiratory failure 2/2 pleural effusions in setting of acute HFrEF. S/p right chest tube.     #Acute hypoxic respiratory failure  #Acute HFrEF  #R Pleural effusion, known effusion, drainage in the past   - On 3L NC, wean as tolerated   - BNP elevated on admission   - CT-A PE study w/o PE but shows worsening of right sided effusion  - Most recent TTE 3/2025 w/ mildly reduced EF 40-45% and mod-severe AS  - Cont 40mg IV Lasix BID  - GDMT limited by orthostasis/hypotension on midodrine at home, but on Toprol 12.5mg QD  - Thoracic consulted, chest tube placed, eliquis held for chest tube placement    - Monitor I/O  - Daily BMP and Mg levels w/ Mg goal > 2 and K goal > 4  - Cards following   - Consider cardiomems prior to discharge     #Afib  - [id number] score 3  - Currently rate controlled  - Hole eliquis as above  - C/w home Toprol for rate control  - Cardiology following     #COPD  - Not in acute exacerbation  - C/w home Trelegy    #Hx of stage 3 SCC lung cancer   - S/p chemoradiation completed 4/2023  - Completed 1 year of durvaluamb 6/2024  - Heme/onc signed off    #Orthostatic hypotension  - Cont midodrine    #Nodular opacity, left upper lobe  - Follow up outpatient     DVT PPx: Eliquis on hold for thoracentesis   Dispo: Active, chest tube placed, continue diuresis, wean oxygen, and pending improvement of R pleural effusion  84y Male w/ PMH of HFrEF, afib (on Eliquis), COPD (no home O2), severe AS, prior PE (no longer on AC), and stage 3 SCC lung (treated w/ platinum doublet chemoRT and adjuvant durvalumab 1 year ago finished) presented with worsening acute on chornic dyspnea and cough. Admitted for acute hypoxic respiratory failure 2/2 pleural effusions in setting of acute HFrEF. S/p right chest tube.     #Acute hypoxic respiratory failure  #Acute HFrEF  #R Pleural effusion, known effusion, drainage in the past   - On 3L NC, wean as tolerated   - BNP elevated on admission   - CT-A PE study w/o PE but shows worsening of right sided effusion  - Most recent TTE 3/2025 w/ mildly reduced EF 40-45% and mod-severe AS  - Cont 40mg IV Lasix BID  - GDMT limited by orthostasis/hypotension on midodrine at home, but on Toprol 12.5mg QD  - Thoracic consulted, chest tube placed  - Monitor I/O  - Daily BMP and Mg levels w/ Mg goal > 2 and K goal > 4  - Cards following   - Consider cardiomems prior to discharge     #Afib  - [id number] score 3  - Currently rate controlled  - Resume eliquis  - C/w home Toprol for rate control  - Cardiology following     #COPD  - Not in acute exacerbation  - C/w home Trelegy    #Hx of stage 3 SCC lung cancer   - S/p chemoradiation completed 4/2023  - Completed 1 year of durvaluamb 6/2024  - Heme/onc signed off    #Orthostatic hypotension  - Cont midodrine    #Nodular opacity, left upper lobe  - Follow up outpatient     DVT PPx: Eliquis  Dispo: Active, chest tube placed, continue diuresis, wean oxygen, and pending improvement of R pleural effusion

## 2025-05-09 NOTE — DISCHARGE NOTE PROVIDER - PROVIDER TOKENS
PROVIDER:[TOKEN:[[id number]],FOLLOWUP:[1 week]] PROVIDER:[TOKEN:[[id number]],FOLLOWUP:[1 week]],FREE:[LAST:[[facility]],PHONE:[(   )    -],FAX:[(   )    -],FOLLOWUP:[1 week]] PROVIDER:[TOKEN:[[id number]],FOLLOWUP:[1 week]],PROVIDER:[TOKEN:[[id number]],FOLLOWUP:[2 weeks]],FREE:[LAST:[[facility]],PHONE:[(   )    -],FAX:[(   )    -],FOLLOWUP:[1 week]] PROVIDER:[TOKEN:[[id number]],FOLLOWUP:[1 week]],FREE:[LAST:[[facility]],PHONE:[(   )    -],FAX:[(   )    -],FOLLOWUP:[1 week]] PROVIDER:[TOKEN:[[id number]],FOLLOWUP:[1 week]],FREE:[LAST:[[facility]],PHONE:[(   )    -],FAX:[(   )    -],FOLLOWUP:[1 week]],PROVIDER:[TOKEN:[[id number]],FOLLOWUP:[2 weeks]]

## 2025-05-09 NOTE — DISCHARGE NOTE PROVIDER - NPI NUMBER (FOR [facility] USE ONLY) :
[[id number]] [[id number]],[UNKNOWN] [[id number]],[[id number]],[UNKNOWN] [[id number]],[UNKNOWN] [[id number]],[UNKNOWN],[[id number]]

## 2025-05-09 NOTE — DISCHARGE NOTE PROVIDER - NSDCMRMEDTOKEN_GEN_ALL_CORE_FT
apixaban 5 mg oral tablet: 1 tab(s) orally 2 times a day  aspirin 81 mg oral tablet, chewable: 1 tab(s) orally once a day  atorvastatin 40 mg oral tablet: 1 tab(s) orally once a day  Metoprolol Succinate ER 25 mg oral tablet, extended release: 0.5 tab(s) orally once a day  midodrine 5 mg oral tablet: 3 tab(s) orally 3 times a day  potassium chloride: 40 milliequivalent(s) orally once a day  Senna S 50 mg-8.6 mg oral tablet: 2 tab(s) orally once a day (at bedtime) as needed for  constipation MDD: 2  Trelegy Ellipta 100 mcg-62.5 mcg-25 mcg/inh inhalation powder: 1 puff(s) inhaled   apixaban 5 mg oral tablet: 1 tab(s) orally 2 times a day  aspirin 81 mg oral tablet, chewable: 1 tab(s) orally once a day  atorvastatin 40 mg oral tablet: 1 tab(s) orally once a day  Metoprolol Succinate ER 25 mg oral tablet, extended release: 0.5 tab(s) orally once a day  midodrine 5 mg oral tablet: 3 tab(s) orally 3 times a day  potassium chloride: 40 milliequivalent(s) orally once a day  Trelegy Ellipta 100 mcg-62.5 mcg-25 mcg/inh inhalation powder: 1 puff(s) inhaled   apixaban 5 mg oral tablet: 1 tab(s) orally 2 times a day  aspirin 81 mg oral tablet, chewable: 1 tab(s) orally once a day  atorvastatin 40 mg oral tablet: 1 tab(s) orally once a day  Lasix 20 mg oral tablet: 1 tab(s) orally every other day  Metoprolol Succinate ER 25 mg oral tablet, extended release: 0.5 tab(s) orally once a day  potassium chloride: 40 milliequivalent(s) orally once a day  Trelegy Ellipta 100 mcg-62.5 mcg-25 mcg/inh inhalation powder: 1 puff(s) inhaled

## 2025-05-09 NOTE — PROGRESS NOTE ADULT - SUBJECTIVE AND OBJECTIVE BOX
Subjective - patient seen and evaluated bedside. Sitting comfortably in bed. C/o SOB unchanged Denies CP,  HA, dizziness, n/v/d    Review of Systems: negative x 10 systems except as noted above    Brief summary:  84yMale with right pleural effusion    Significant/[id number] events: no acute events      PAST MEDICAL & SURGICAL HISTORY:  Hypertension      Hyperlipidemia      CAD (coronary artery disease)      Lung cancer      Afib      Chronic HFrEF (heart failure with reduced ejection fraction)      Aortic stenosis      History of COPD      S/P hip replacement, right      S/P hip replacement, left            acetaminophen     Tablet .. 650 milliGRAM(s) Oral every 6 hours PRN  aluminum hydroxide/magnesium hydroxide/simethicone Suspension 30 milliLiter(s) Oral every 4 hours PRN  aspirin  chewable 81 milliGRAM(s) Oral daily  atorvastatin 40 milliGRAM(s) Oral at bedtime  fluticasone propionate/ salmeterol 100-50 MICROgram(s) Diskus 1 Dose(s) Inhalation two times a day  furosemide   Injectable 40 milliGRAM(s) IV Push two times a day  melatonin 3 milliGRAM(s) Oral at bedtime PRN  metoprolol succinate ER 12.5 milliGRAM(s) Oral daily  midodrine. 15 milliGRAM(s) Oral three times a day  ondansetron Injectable 4 milliGRAM(s) IV Push every 8 hours PRN  senna 2 Tablet(s) Oral at bedtime  tiotropium 2.5 MICROgram(s) Inhaler 2 Puff(s) Inhalation daily  MEDICATIONS  (PRN):  acetaminophen     Tablet .. 650 milliGRAM(s) Oral every 6 hours PRN Temp greater or equal to 38C (100.4F), Mild Pain (1 - 3)  aluminum hydroxide/magnesium hydroxide/simethicone Suspension 30 milliLiter(s) Oral every 4 hours PRN Dyspepsia  melatonin 3 milliGRAM(s) Oral at bedtime PRN Insomnia  ondansetron Injectable 4 milliGRAM(s) IV Push every 8 hours PRN Nausea and/or Vomiting    Height (cm): 167.6 ( @ 04:44)  Weight (kg): 83.915 (:44)  BMI (kg/m2): 29.9 ( @ 04:44)  BSA (m2): 1.93 ( @ 04:44)  Daily Height in cm: 167.6 (09 May 2025 04:44)    Daily Weight in k.4 (09 May 2025 04:44)                              10.6   8.58  )-----------( 260      ( 09 May 2025 04:41 )             35.3       144  |  102  |  24.9[H]  ----------------------------<  92  4.4   |  31.0[H]  |  1.17    Ca    9.0      09 May 2025 04:41  Mg     2.2         TPro  7.4  /  Alb  3.9  /  TBili  0.7  /  DBili  x   /  AST  16  /  ALT  10  /  AlkPhos  126[H]        PT/INR - ( 09 May 2025 04:41 )   PT: 13.7 sec;   INR: 1.18 ratio         PTT - ( 09 May 2025 04:41 )  PTT:34.0 sec      Objective:  T(C): 36.3 (25 @ 08:10), Max: 36.4 (25 @ 16:17)  HR: 81 (25 @ 08:10) (76 - 82)  BP: 132/67 (25 @ 08:10) (111/65 - 132/67)  RR: 19 (25 @ 08:10) (17 - 19)  SpO2: 99% (25 @ 08:10) (94% - 99%)  Wt(kg): --CAPILLARY BLOOD GLUCOSE      I&O's Summary    08 May 2025 07:01  -  09 May 2025 07:00  --------------------------------------------------------  IN: 0 mL / OUT: 1750 mL / NET: -1750 mL        Physical Exam  General: NAD  Neuro: A+O x 3, non-focal, speech clear and intact  Psych: Appropriate affect  HEENT:  NCAT, No conjuctival edema or icterus, no thrush.  Pulm: CTA left, diminished right base  CV: RRR, +S1S2  Abd: soft, NT, ND, +BS  Ext: +DP Pulses b/l, no edema  Skin: Warm, dry, intact  Chest tubes: RT CT to WS no AL        Imaging:      < from: CT Angio Chest PE Protocol w/ IV Cont (25 @ 17:17) >  IMPRESSION:  1. The subsegmental pulmonary arteries are somewhat limited evaluation   due to motion artifact. No pulmonary embolism within this limitation.  2. Probable slight increase in size of the right pleural effusion with   persistent consolidation versus atelectasis/scar throughout the right   lower lobe and trace left pleural effusion.  3. New 1.1 cm nodular opacity within the left upper lobe which is   nonspecific and could be infectious or inflammatory in etiology.   Attention on follow-up is recommended.    < end of copied text >

## 2025-05-09 NOTE — DISCHARGE NOTE PROVIDER - CARE PROVIDERS DIRECT ADDRESSES
,[email or web address] ,[email or web address],DirectAddress_Unknown ,[email or web address],DirectAddress_Unknown,DirectAddress_Unknown ,[email or web address],DirectAddress_Unknown ,[email or web address],DirectAddress_Unknown,DirectAddress_Unknown

## 2025-05-10 LAB
ALBUMIN SERPL ELPH-MCNC: 3.4 G/DL — SIGNIFICANT CHANGE UP (ref 3.3–5.2)
ALP SERPL-CCNC: 113 U/L — SIGNIFICANT CHANGE UP (ref 40–120)
ALT FLD-CCNC: 7 U/L — SIGNIFICANT CHANGE UP
ANION GAP SERPL CALC-SCNC: 10 MMOL/L — SIGNIFICANT CHANGE UP (ref 5–17)
AST SERPL-CCNC: 24 U/L — SIGNIFICANT CHANGE UP
BILIRUB SERPL-MCNC: 0.7 MG/DL — SIGNIFICANT CHANGE UP (ref 0.4–2)
BUN SERPL-MCNC: 27 MG/DL — HIGH (ref 8–20)
CALCIUM SERPL-MCNC: 9.3 MG/DL — SIGNIFICANT CHANGE UP (ref 8.4–10.5)
CHLORIDE SERPL-SCNC: 101 MMOL/L — SIGNIFICANT CHANGE UP (ref 96–108)
CO2 SERPL-SCNC: 32 MMOL/L — HIGH (ref 22–29)
CREAT SERPL-MCNC: 1.25 MG/DL — SIGNIFICANT CHANGE UP (ref 0.5–1.3)
EGFR: 57 ML/MIN/1.73M2 — LOW
EGFR: 57 ML/MIN/1.73M2 — LOW
GLUCOSE SERPL-MCNC: 106 MG/DL — HIGH (ref 70–99)
HCT VFR BLD CALC: 39.1 % — SIGNIFICANT CHANGE UP (ref 39–50)
HGB BLD-MCNC: 11.5 G/DL — LOW (ref 13–17)
LDH SERPL L TO P-CCNC: 235 U/L — HIGH (ref 98–192)
MAGNESIUM SERPL-MCNC: 2.4 MG/DL — SIGNIFICANT CHANGE UP (ref 1.6–2.6)
MCHC RBC-ENTMCNC: 27.9 PG — SIGNIFICANT CHANGE UP (ref 27–34)
MCHC RBC-ENTMCNC: 29.4 G/DL — LOW (ref 32–36)
MCV RBC AUTO: 94.9 FL — SIGNIFICANT CHANGE UP (ref 80–100)
NRBC # BLD AUTO: 0 K/UL — SIGNIFICANT CHANGE UP (ref 0–0)
NRBC # FLD: 0 K/UL — SIGNIFICANT CHANGE UP (ref 0–0)
NRBC BLD AUTO-RTO: 0 /100 WBCS — SIGNIFICANT CHANGE UP (ref 0–0)
PHOSPHATE SERPL-MCNC: 4.1 MG/DL — SIGNIFICANT CHANGE UP (ref 2.4–4.7)
PLATELET # BLD AUTO: 276 K/UL — SIGNIFICANT CHANGE UP (ref 150–400)
PMV BLD: 10.8 FL — SIGNIFICANT CHANGE UP (ref 7–13)
POTASSIUM SERPL-MCNC: 5.5 MMOL/L — HIGH (ref 3.5–5.3)
POTASSIUM SERPL-SCNC: 5.5 MMOL/L — HIGH (ref 3.5–5.3)
PROT SERPL-MCNC: 7.1 G/DL — SIGNIFICANT CHANGE UP (ref 6.6–8.7)
RBC # BLD: 4.12 M/UL — LOW (ref 4.2–5.8)
RBC # FLD: 17 % — HIGH (ref 10.3–14.5)
SODIUM SERPL-SCNC: 143 MMOL/L — SIGNIFICANT CHANGE UP (ref 135–145)
WBC # BLD: 10.32 K/UL — SIGNIFICANT CHANGE UP (ref 3.8–10.5)
WBC # FLD AUTO: 10.32 K/UL — SIGNIFICANT CHANGE UP (ref 3.8–10.5)

## 2025-05-10 PROCEDURE — 99231 SBSQ HOSP IP/OBS SF/LOW 25: CPT

## 2025-05-10 PROCEDURE — 71045 X-RAY EXAM CHEST 1 VIEW: CPT | Mod: 26

## 2025-05-10 PROCEDURE — 99233 SBSQ HOSP IP/OBS HIGH 50: CPT | Mod: GC

## 2025-05-10 RX ORDER — SODIUM ZIRCONIUM CYCLOSILICATE 5 G/5G
10 POWDER, FOR SUSPENSION ORAL EVERY 8 HOURS
Refills: 0 | Status: COMPLETED | OUTPATIENT
Start: 2025-05-10 | End: 2025-05-10

## 2025-05-10 RX ORDER — FUROSEMIDE 10 MG/ML
40 INJECTION INTRAMUSCULAR; INTRAVENOUS DAILY
Refills: 0 | Status: DISCONTINUED | OUTPATIENT
Start: 2025-05-11 | End: 2025-05-12

## 2025-05-10 RX ADMIN — Medication 2 TABLET(S): at 21:19

## 2025-05-10 RX ADMIN — APIXABAN 5 MILLIGRAM(S): 2.5 TABLET, FILM COATED ORAL at 05:55

## 2025-05-10 RX ADMIN — SODIUM ZIRCONIUM CYCLOSILICATE 10 GRAM(S): 5 POWDER, FOR SUSPENSION ORAL at 14:06

## 2025-05-10 RX ADMIN — APIXABAN 5 MILLIGRAM(S): 2.5 TABLET, FILM COATED ORAL at 17:52

## 2025-05-10 RX ADMIN — FUROSEMIDE 40 MILLIGRAM(S): 10 INJECTION INTRAMUSCULAR; INTRAVENOUS at 13:19

## 2025-05-10 RX ADMIN — Medication 1 DOSE(S): at 21:19

## 2025-05-10 RX ADMIN — MIDODRINE HYDROCHLORIDE 15 MILLIGRAM(S): 5 TABLET ORAL at 11:06

## 2025-05-10 RX ADMIN — FUROSEMIDE 40 MILLIGRAM(S): 10 INJECTION INTRAMUSCULAR; INTRAVENOUS at 05:54

## 2025-05-10 RX ADMIN — Medication 1 DOSE(S): at 08:31

## 2025-05-10 RX ADMIN — SODIUM ZIRCONIUM CYCLOSILICATE 10 GRAM(S): 5 POWDER, FOR SUSPENSION ORAL at 09:02

## 2025-05-10 RX ADMIN — Medication 81 MILLIGRAM(S): at 11:06

## 2025-05-10 RX ADMIN — MIDODRINE HYDROCHLORIDE 15 MILLIGRAM(S): 5 TABLET ORAL at 17:51

## 2025-05-10 RX ADMIN — ATORVASTATIN CALCIUM 40 MILLIGRAM(S): 80 TABLET, FILM COATED ORAL at 21:18

## 2025-05-10 RX ADMIN — TIOTROPIUM BROMIDE INHALATION SPRAY 2 PUFF(S): 3.12 SPRAY, METERED RESPIRATORY (INHALATION) at 08:31

## 2025-05-10 RX ADMIN — Medication 650 MILLIGRAM(S): at 21:19

## 2025-05-10 RX ADMIN — MIDODRINE HYDROCHLORIDE 15 MILLIGRAM(S): 5 TABLET ORAL at 05:56

## 2025-05-10 RX ADMIN — Medication 650 MILLIGRAM(S): at 22:19

## 2025-05-10 NOTE — DIETITIAN INITIAL EVALUATION ADULT - PERTINENT LABORATORY DATA
05-10    143  |  101  |  27.0[H]  ----------------------------<  106[H]  5.5[H]   |  32.0[H]  |  1.25    Ca    9.3      10 May 2025 06:18  Phos  4.1     05-10  Mg     2.4     05-10    A1C with Estimated Average Glucose Result: 6.2 % (03-16-25 @ 05:14)  Lipid panel:  TG, LDL WNL   HDL 30 [L]

## 2025-05-10 NOTE — DIETITIAN INITIAL EVALUATION ADULT - ADD RECOMMEND
1. Continue diet as tolerated   2. Monitor electrolytes   3. Recommend adding bowel regimen to promote stool regularity, hx constipation   4. Monitor and trend daily weights

## 2025-05-10 NOTE — PROGRESS NOTE ADULT - ASSESSMENT
84y Male w/ PMH of HFrEF, afib (on Eliquis), COPD (no home O2), severe AS, prior PE (no longer on AC), and stage 3 SCC lung (treated w/ platinum doublet chemoRT and adjuvant durvalumab 1 year ago finished) presented with worsening acute on chornic dyspnea and cough. Admitted for acute hypoxic respiratory failure 2/2 pleural effusions in setting of acute HFrEF. S/p right chest tube.     #Acute hypoxic respiratory failure- improved  #Acute HFrEF- improved  #R Pleural effusion, known effusion, drainage in the past   - 3L NC, removed today.  - CT-A PE study w/o PE but shows worsening of right sided effusion  - Most recent TTE 3/2025 w/ mildly reduced EF 40-45% and mod-severe AS and MR  - Cont 40mg IV Lasix BID today. Change to PO Lasix daily tomorrow  - GDMT limited by orthostasis/hypotension on midodrine at home, but on Toprol 12.5mg QD  - Thoracic --- chest tube placed. 24 hr drainage 350 ml  - Monitor I/O  - Cards following   - Consider cardiomems prior to discharge     #Afib NVR  - [id number] score 3  - Currently rate controlled  - Resume eliquis  - C/w home Toprol for rate control  - Cardiology following     #COPD  - Not in acute exacerbation  - C/w home Trelegy    #Hx of stage 3 SCC lung cancer   - S/p chemoradiation completed 4/2023  - Completed 1 year of durvaluamb 6/2024  - Heme/onc signed off    #Orthostatic hypotension  - Cont midodrine    #Nodular opacity, left upper lobe  - Follow up outpatient     DVT PPx: Eliquis  Dispo: Home  Active, chest tube placed,     High Acuity and Spent at least 50 mins in evaluating, assessing and medical decision making in providing patient care separate from teaching.     84y Male w/ PMH of HFrEF, afib (on Eliquis), COPD (no home O2), severe AS, prior PE (no longer on AC), and stage 3 SCC lung (treated w/ platinum doublet chemoRT and adjuvant durvalumab 1 year ago finished) presented with worsening acute on chornic dyspnea and cough. Admitted for acute hypoxic respiratory failure 2/2 pleural effusions in setting of acute HFrEF. S/p right chest tube.     #Acute hypoxic respiratory failure- improved  #Acute HFrEF- improved  #R Pleural effusion, known effusion, drainage in the past   - 3L NC, removed today.  - CT-A PE study w/o PE but shows worsening of right sided effusion  - Most recent TTE 3/2025 w/ mildly reduced EF 40-45% and mod-severe AS and MR  - Cont 40mg IV Lasix BID today. Change to PO Lasix daily tomorrow  - GDMT limited by orthostasis/hypotension on midodrine at home, but on Toprol 12.5mg QD  - Thoracic --- chest tube placed. 24 hr drainage 350 ml. Transudative effusion by Light's criterion and <1000 cells/ cubic mm.   - Monitor I/O  - Cards following   - Consider Cardiomems prior to discharge     #Afib NVR  - [id number] score 3  - Currently rate controlled  - Resume eliquis  - C/w home Toprol for rate control  - Cardiology following     #COPD  - Not in acute exacerbation  - C/w home Trelegy    #Hx of stage 3 SCC lung cancer   - S/p chemoradiation completed 4/2023  - Completed 1 year of durvaluamb 6/2024  - Heme/onc signed off    #Orthostatic hypotension  - Cont midodrine    #Nodular opacity, left upper lobe  - Follow up outpatient     DVT PPx: Eliquis  Dispo: Home  Active, chest tube placed,     High Acuity and Spent at least 50 mins in evaluating, assessing and medical decision making in providing patient care separate from teaching.

## 2025-05-10 NOTE — PROGRESS NOTE ADULT - SUBJECTIVE AND OBJECTIVE BOX
HEALTH ISSUES - PROBLEM Dx:    CC- #Acute hypoxic respiratory failure  #Acute HFrEF  #R Pleural effusion, known effusion, drainage in the past     INTERVAL HPI/ OVERNIGHT EVENTS:    sitting up in chair with NC, just finished lunch  family at bedside  explained process of pl effusions and chest tube removal, heart failure, leg edema, lung ca Rx to the best of my knowledge  family state pt has chr leg edema which had increased when he came in and that it has improved now  removed NC today  chest tube draining well    REVIEW OF SYSTEMS:    as above    Vital Signs Last 24 Hrs  T(C): 36.8 (10 May 2025 07:07), Max: 36.9 (10 May 2025 04:39)  T(F): 98.2 (10 May 2025 07:07), Max: 98.4 (10 May 2025 04:39)  HR: 87 (10 May 2025 07:07) (74 - 87)  BP: 116/67 (10 May 2025 07:07) (98/61 - 118/76)  BP(mean): --  RR: 19 (10 May 2025 07:07) (18 - 19)  SpO2: 98% (10 May 2025 07:07) (98% - 100%)    Parameters below as of 10 May 2025 08:00  Patient On (Oxygen Delivery Method): nasal cannula      PHYSICAL EXAM-  GENERAL: No acute distress, comfortable in chair, conversing [place]  HEENT: Atraumatic, normocephalic, non-icteric  NECK: Supple, full range of motion, no observable masses  NEURO: A&Ox3, no focal deficits, moving all extremities spontaneously, no dysarthria, CN II-XII grossly intact  PSYCH: Normal affect, appropriate insight and judgment, fluent speech  LUNGS: Decreased breath sounds on right, right chest tube, draining light yellow fluid   HEART: Irregular, systolic murmur   ABD: Soft, non-tender, non-distended, no appreciable masses  EXTREMITIES: +2 pitting edema bl up to mid shin which is his baseline, much improved since admission  SKIN: Warm and dry     MEDICATIONS  (STANDING):  apixaban 5 milliGRAM(s) Oral two times a day  aspirin  chewable 81 milliGRAM(s) Oral daily  atorvastatin 40 milliGRAM(s) Oral at bedtime  fluticasone propionate/ salmeterol 100-50 MICROgram(s) Diskus 1 Dose(s) Inhalation two times a day  metoprolol succinate ER 12.5 milliGRAM(s) Oral daily  midodrine. 15 milliGRAM(s) Oral three times a day  senna 2 Tablet(s) Oral at bedtime  tiotropium 2.5 MICROgram(s) Inhaler 2 Puff(s) Inhalation daily    MEDICATIONS  (PRN):  acetaminophen     Tablet .. 650 milliGRAM(s) Oral every 6 hours PRN Temp greater or equal to 38C (100.4F), Mild Pain (1 - 3)  aluminum hydroxide/magnesium hydroxide/simethicone Suspension 30 milliLiter(s) Oral every 4 hours PRN Dyspepsia  melatonin 3 milliGRAM(s) Oral at bedtime PRN Insomnia  ondansetron Injectable 4 milliGRAM(s) IV Push every 8 hours PRN Nausea and/or Vomiting      LABS:                        11.5   10.32 )-----------( 276      ( 10 May 2025 06:18 )             39.1     05-10    143  |  101  |  27.0[H]  ----------------------------<  106[H]  5.5[H]   |  32.0[H]  |  1.25    Ca    9.3      10 May 2025 06:18  Phos  4.1     05-10  Mg     2.4     05-10    TPro  7.1  /  Alb  3.4  /  TBili  0.7  /  DBili  x   /  AST  24  /  ALT  7   /  AlkPhos  113  05-10    PT/INR - ( 09 May 2025 04:41 )   PT: 13.7 sec;   INR: 1.18 ratio         PTT - ( 09 May 2025 04:41 )  PTT:34.0 sec  Urinalysis Basic - ( 10 May 2025 06:18 )    Color: x / Appearance: x / SG: x / pH: x  Gluc: 106 mg/dL / Ketone: x  / Bili: x / Urobili: x   Blood: x / Protein: x / Nitrite: x   Leuk Esterase: x / RBC: x / WBC x   Sq Epi: x / Non Sq Epi: x / Bacteria: x          Culture - Fungal, Body Fluid (collected 09 May 2025 10:50)  Source: Pleural Fl Pleural Fluid  Preliminary Report (10 May 2025 08:18):    Testing in progress    Culture - Body Fluid with Gram Stain (collected 09 May 2025 10:50)  Source: Pleural Fl Pleural Fluid  Gram Stain (09 May 2025 22:56):    polymorphonuclear leukocytes seen    No organisms seen    by cytocentrifuge  Preliminary Report (10 May 2025 14:02):    No growth    Culture - Blood (collected 07 May 2025 13:24)  Source: Blood Blood  Preliminary Report (09 May 2025 19:01):    No growth at 48 Hours      Xray Chest 1 View- PORTABLE-Urgent:   ACC: [id number] EXAM:  XR CHEST PORTABLE URGENT 1V   ORDERED BY: PRINCE ROSENTHAL     PROCEDURE DATE:  05/09/2025          INTERPRETATION:  Exam:XR CHEST URGENT    clinical history:rt pleural effusion s/p chest tube placement    Right basilar chest tube. Slightly diminished right pleural effusion. No   pneumothorax.    IMPRESSION: Right chest tube placement as above    --- End of Report ---        WALTER ALVARADO MD; Attending Radiologist  This document has been electronically signed. May  9 2025  2:34PM (05-09-25 @ 12:04)    RADIOLOGY  Reviewed myself

## 2025-05-10 NOTE — DIETITIAN INITIAL EVALUATION ADULT - OTHER INFO
83 yo male with PMHx HFrEF, afib (on Eliquis), COPD (no home O2), severe AS, prior PE (no longer on AC), and stage 3 SCC lung (treated w/ platinum doublet chemo RT and adjuvant durvalumab 1 year ago finished), admitted with acute on chronic dyspnea and cough. Pt found to have moderate to large pleural effusion on CT admitted for with acute CHF in the setting of pleural effusion. S/p chest tube placement 5/9.

## 2025-05-10 NOTE — DIETITIAN INITIAL EVALUATION ADULT - PERTINENT MEDS FT
MEDICATIONS  (STANDING):  furosemide   Injectable 40 milliGRAM(s) IV Push two times a day  senna 2 Tablet(s) Oral at bedtime    MEDICATIONS  (PRN):  aluminum hydroxide/magnesium hydroxide/simethicone Suspension 30 milliLiter(s) Oral every 4 hours PRN Dyspepsia  ondansetron Injectable 4 milliGRAM(s) IV Push every 8 hours PRN Nausea and/or Vomiting

## 2025-05-10 NOTE — DIETITIAN INITIAL EVALUATION ADULT - NSFNSGIIOFT_GEN_A_CORE
05-09-25 @ 07:01  -  05-10-25 @ 07:00  --------------------------------------------------------  OUT:    Chest Tube (mL): 2750 mL  Total OUT: 2750 mL    Total NET: -2750 mL

## 2025-05-10 NOTE — PROGRESS NOTE ADULT - SUBJECTIVE AND OBJECTIVE BOX
Subjective - patient seen and evaluated bedside. Sitting comfortably in chair. Denies CP, SOB, HA, dizziness, n/v/d    Review of Systems: negative x 10 systems except as noted above    Brief summary:  84yMale with RT pleural effusion s/p RT PTC placment    Significant/[id number] events: s/p RT PTC placement      PAST MEDICAL & SURGICAL HISTORY:  Hypertension      Hyperlipidemia      CAD (coronary artery disease)      Lung cancer      Afib      Chronic HFrEF (heart failure with reduced ejection fraction)      Aortic stenosis      History of COPD      S/P hip replacement, right      S/P hip replacement, left            acetaminophen     Tablet .. 650 milliGRAM(s) Oral every 6 hours PRN  aluminum hydroxide/magnesium hydroxide/simethicone Suspension 30 milliLiter(s) Oral every 4 hours PRN  apixaban 5 milliGRAM(s) Oral two times a day  aspirin  chewable 81 milliGRAM(s) Oral daily  atorvastatin 40 milliGRAM(s) Oral at bedtime  fluticasone propionate/ salmeterol 100-50 MICROgram(s) Diskus 1 Dose(s) Inhalation two times a day  furosemide   Injectable 40 milliGRAM(s) IV Push two times a day  melatonin 3 milliGRAM(s) Oral at bedtime PRN  metoprolol succinate ER 12.5 milliGRAM(s) Oral daily  midodrine. 15 milliGRAM(s) Oral three times a day  ondansetron Injectable 4 milliGRAM(s) IV Push every 8 hours PRN  senna 2 Tablet(s) Oral at bedtime  sodium zirconium cyclosilicate 10 Gram(s) Oral every 8 hours  tiotropium 2.5 MICROgram(s) Inhaler 2 Puff(s) Inhalation daily  MEDICATIONS  (PRN):  acetaminophen     Tablet .. 650 milliGRAM(s) Oral every 6 hours PRN Temp greater or equal to 38C (100.4F), Mild Pain (1 - 3)  aluminum hydroxide/magnesium hydroxide/simethicone Suspension 30 milliLiter(s) Oral every 4 hours PRN Dyspepsia  melatonin 3 milliGRAM(s) Oral at bedtime PRN Insomnia  ondansetron Injectable 4 milliGRAM(s) IV Push every 8 hours PRN Nausea and/or Vomiting    Height (cm): 167.6 (05-09 @ 10:44)  Weight (kg): 83.9 (05-09 @ 10:44)  BMI (kg/m2): 29.9 (05-09 @ 10:44)  BSA (m2): 1.93 (05-09 @ 10:44)  Daily Height in cm: 167.64 (09 May 2025 10:44)    Daily                               11.5   10.32 )-----------( 276      ( 10 May 2025 06:18 )             39.1   05-10    143  |  101  |  27.0[H]  ----------------------------<  106[H]  5.5[H]   |  32.0[H]  |  1.25    Ca    9.3      10 May 2025 06:18  Phos  4.1     05-10  Mg     2.4     05-10    TPro  7.1  /  Alb  3.4  /  TBili  0.7  /  DBili  x   /  AST  24  /  ALT  7   /  AlkPhos  113  05-10      PT/INR - ( 09 May 2025 04:41 )   PT: 13.7 sec;   INR: 1.18 ratio         PTT - ( 09 May 2025 04:41 )  PTT:34.0 sec      Objective:  T(C): 36.8 (05-10-25 @ 07:07), Max: 36.9 (05-10-25 @ 04:39)  HR: 87 (05-10-25 @ 07:07) (74 - 87)  BP: 116/67 (05-10-25 @ 07:07) (98/61 - 118/76)  RR: 19 (05-10-25 @ 07:07) (18 - 19)  SpO2: 98% (05-10-25 @ 07:07) (97% - 100%)  Wt(kg): --CAPILLARY BLOOD GLUCOSE      I&O's Summary    09 May 2025 07:01  -  10 May 2025 07:00  --------------------------------------------------------  IN: 0 mL / OUT: 2750 mL / NET: -2750 mL    10 May 2025 07:01  -  10 May 2025 10:10  --------------------------------------------------------  IN: 0 mL / OUT: 500 mL / NET: -500 mL        Physical Exam  General: NAD  Neuro: A+O x 3, non-focal, speech clear and intact  Psych: Appropriate affect  HEENT:  NCAT, No conjuctival edema or icterus, no thrush.  Pulm: CTA, equal bilaterally  CV: RRR, +S1S2  Abd: soft, NT, ND, +BS  Ext: +DP Pulses b/l, no edema  Skin: Warm, dry, intact  Chest tubes: RT PT C to WS draining apprpriately no AL        Imaging:      < from: Xray Chest 1 View- PORTABLE-Urgent (Xray Chest 1 View- PORTABLE-Urgent .) (05.09.25 @ 12:04) >    ACC: [id number] EXAM:  XR CHEST PORTABLE URGENT 1V   ORDERED BY: PRINCE ROSENTHAL     PROCEDURE DATE:  05/09/2025          INTERPRETATION:  Exam:XR CHEST URGENT    clinical history:rt pleural effusion s/p chest tube placement    Right basilar chest tube. Slightly diminished right pleural effusion. No   pneumothorax.    IMPRESSION: Right chest tube placement as above    --- End of Report ---    < end of copied text >

## 2025-05-10 NOTE — DIETITIAN INITIAL EVALUATION ADULT - NS FNS DIET ORDER
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 Diet, DASH/TLC:   Sodium & Cholesterol Restricted  1500mL Fluid Restriction ([id number])     Special Instructions for Nursin milliLiter(s) to 2000 milliLiter(s) fluid restriction (25 @ 02:29) [Active]

## 2025-05-10 NOTE — PROGRESS NOTE ADULT - SUBJECTIVE AND OBJECTIVE BOX
[facility] - [facility]                                   [address] [place] [address]                                                      PHONE: [phone]                                                         FAX: [phone]  [email or web address]  ---------------------------------------------------------------------------------------------------------------------------------    Overnight events/patient complaints:  Feels better but not at baseline. Has pain at site of chest tube    No Known Allergies    MEDICATIONS  (STANDING):  apixaban 5 milliGRAM(s) Oral two times a day  aspirin  chewable 81 milliGRAM(s) Oral daily  atorvastatin 40 milliGRAM(s) Oral at bedtime  fluticasone propionate/ salmeterol 100-50 MICROgram(s) Diskus 1 Dose(s) Inhalation two times a day  furosemide   Injectable 40 milliGRAM(s) IV Push two times a day  metoprolol succinate ER 12.5 milliGRAM(s) Oral daily  midodrine. 15 milliGRAM(s) Oral three times a day  senna 2 Tablet(s) Oral at bedtime  sodium zirconium cyclosilicate 10 Gram(s) Oral every 8 hours  tiotropium 2.5 MICROgram(s) Inhaler 2 Puff(s) Inhalation daily    MEDICATIONS  (PRN):  acetaminophen     Tablet .. 650 milliGRAM(s) Oral every 6 hours PRN Temp greater or equal to 38C (100.4F), Mild Pain (1 - 3)  aluminum hydroxide/magnesium hydroxide/simethicone Suspension 30 milliLiter(s) Oral every 4 hours PRN Dyspepsia  melatonin 3 milliGRAM(s) Oral at bedtime PRN Insomnia  ondansetron Injectable 4 milliGRAM(s) IV Push every 8 hours PRN Nausea and/or Vomiting      Vital Signs Last 24 Hrs  T(C): 36.8 (10 May 2025 07:07), Max: 36.9 (10 May 2025 04:39)  T(F): 98.2 (10 May 2025 07:07), Max: 98.4 (10 May 2025 04:39)  HR: 87 (10 May 2025 07:07) (74 - 87)  BP: 116/67 (10 May 2025 07:07) (98/61 - 118/76)  BP(mean): --  RR: 19 (10 May 2025 07:07) (18 - 19)  SpO2: 98% (10 May 2025 07:07) (97% - 100%)    Parameters below as of 10 May 2025 08:00  Patient On (Oxygen Delivery Method): nasal cannula      ICU Vital Signs Last 24 Hrs  JONE SOUTH  I&O's Detail    09 May 2025 07:01  -  10 May 2025 07:00  --------------------------------------------------------  IN:  Total IN: 0 mL    OUT:    Chest Tube (mL): 2750 mL  Total OUT: 2750 mL    Total NET: -2750 mL      10 May 2025 07:01  -  10 May 2025 11:08  --------------------------------------------------------  IN:  Total IN: 0 mL    OUT:    Voided (mL): 500 mL  Total OUT: 500 mL    Total NET: -500 mL        I&O's Summary    09 May 2025 07:01  -  10 May 2025 07:00  --------------------------------------------------------  IN: 0 mL / OUT: 2750 mL / NET: -2750 mL    10 May 2025 07:01  -  10 May 2025 11:08  --------------------------------------------------------  IN: 0 mL / OUT: 500 mL / NET: -500 mL      Drug Dosing Weight  JONE SOUTH      PHYSICAL EXAM:  General: Appears comfortable, alert and cooperative.  HEENT: Normocephalic, atraumatic.  Eyes: Pupils reactive, cornea wnl.  Neck: Supple, no nodes adenopathy; no JVD, carotid bruit or thyromegaly.  CARDIOVASCULAR: Irr irr  LUNGS: Mildly decreased BS at right base  ABDOMEN: Soft, nontender without mass or organomegaly. bowel sounds normoactive.  EXTREMITIES: No clubbing, cyanosis. Trace edema. Distal pulses wnl.   SKIN: Warm and dry with normal turgor.  NEURO: Alert/oriented x 3/normal motor exam  PSYCH: Appropriate affect        LABS:                        11.5   10.32 )-----------( 276      ( 10 May 2025 06:18 )             39.1     05-10    143  |  101  |  27.0[H]  ----------------------------<  106[H]  5.5[H]   |  32.0[H]  |  1.25    Ca    9.3      10 May 2025 06:18  Phos  4.1     05-10  Mg     2.4     05-10    TPro  7.1  /  Alb  3.4  /  TBili  0.7  /  DBili  x   /  AST  24  /  ALT  7   /  AlkPhos  113  05-10    JONE SOUTH      PT/INR - ( 09 May 2025 04:41 )   PT: 13.7 sec;   INR: 1.18 ratio         PTT - ( 09 May 2025 04:41 )  PTT:34.0 sec  Urinalysis Basic - ( 10 May 2025 06:18 )    Color: x / Appearance: x / SG: x / pH: x  Gluc: 106 mg/dL / Ketone: x  / Bili: x / Urobili: x   Blood: x / Protein: x / Nitrite: x   Leuk Esterase: x / RBC: x / WBC x   Sq Epi: x / Non Sq Epi: x / Bacteria: x        RADIOLOGY & ADDITIONAL STUDIES:    INTERPRETATION OF TELEMETRY (personally reviewed):  AF rate controlled, brief nocturnal pauses    ASSESSMENT AND PLAN:  In summary, JONE SOUTH is an 84y Male with chronic HFpEF, mod-severe AS, hypertension, hyperlipidemia, moderate CAD, persistent atrial fibrillation, SCC lung s/p treatment c/b DVT, on Eliquis, who presented with shortness of breath and acute on chronic HFpEF s/p right chest tube    - Fluid status improving  - Change lasix to 40 mg PO daily  - Chest tube management per thoracic surgery  - Continue anticoagulation  - Consider cardioMEMS and rhythm control with cardioversion or ablation as outpatient

## 2025-05-11 LAB
ALBUMIN SERPL ELPH-MCNC: 3.2 G/DL — LOW (ref 3.3–5.2)
ALP SERPL-CCNC: 92 U/L — SIGNIFICANT CHANGE UP (ref 40–120)
ALT FLD-CCNC: 7 U/L — SIGNIFICANT CHANGE UP
ANION GAP SERPL CALC-SCNC: 9 MMOL/L — SIGNIFICANT CHANGE UP (ref 5–17)
AST SERPL-CCNC: 12 U/L — SIGNIFICANT CHANGE UP
BASOPHILS # BLD AUTO: 0.04 K/UL — SIGNIFICANT CHANGE UP (ref 0–0.2)
BASOPHILS NFR BLD AUTO: 0.5 % — SIGNIFICANT CHANGE UP (ref 0–2)
BILIRUB SERPL-MCNC: 0.5 MG/DL — SIGNIFICANT CHANGE UP (ref 0.4–2)
BUN SERPL-MCNC: 29.1 MG/DL — HIGH (ref 8–20)
CALCIUM SERPL-MCNC: 8.8 MG/DL — SIGNIFICANT CHANGE UP (ref 8.4–10.5)
CHLORIDE SERPL-SCNC: 99 MMOL/L — SIGNIFICANT CHANGE UP (ref 96–108)
CO2 SERPL-SCNC: 35 MMOL/L — HIGH (ref 22–29)
CREAT SERPL-MCNC: 1.23 MG/DL — SIGNIFICANT CHANGE UP (ref 0.5–1.3)
EGFR: 58 ML/MIN/1.73M2 — LOW
EGFR: 58 ML/MIN/1.73M2 — LOW
EOSINOPHIL # BLD AUTO: 0.76 K/UL — HIGH (ref 0–0.5)
EOSINOPHIL NFR BLD AUTO: 9.4 % — HIGH (ref 0–6)
GLUCOSE SERPL-MCNC: 101 MG/DL — HIGH (ref 70–99)
HCT VFR BLD CALC: 35.7 % — LOW (ref 39–50)
HGB BLD-MCNC: 10.8 G/DL — LOW (ref 13–17)
IMM GRANULOCYTES # BLD AUTO: 0.03 K/UL — SIGNIFICANT CHANGE UP (ref 0–0.07)
IMM GRANULOCYTES NFR BLD AUTO: 0.4 % — SIGNIFICANT CHANGE UP (ref 0–0.9)
LYMPHOCYTES # BLD AUTO: 0.72 K/UL — LOW (ref 1–3.3)
LYMPHOCYTES NFR BLD AUTO: 8.9 % — LOW (ref 13–44)
MAGNESIUM SERPL-MCNC: 2.3 MG/DL — SIGNIFICANT CHANGE UP (ref 1.6–2.6)
MCHC RBC-ENTMCNC: 28.3 PG — SIGNIFICANT CHANGE UP (ref 27–34)
MCHC RBC-ENTMCNC: 30.3 G/DL — LOW (ref 32–36)
MCV RBC AUTO: 93.7 FL — SIGNIFICANT CHANGE UP (ref 80–100)
MONOCYTES # BLD AUTO: 1.04 K/UL — HIGH (ref 0–0.9)
MONOCYTES NFR BLD AUTO: 12.9 % — SIGNIFICANT CHANGE UP (ref 2–14)
NEUTROPHILS # BLD AUTO: 5.49 K/UL — SIGNIFICANT CHANGE UP (ref 1.8–7.4)
NEUTROPHILS NFR BLD AUTO: 67.9 % — SIGNIFICANT CHANGE UP (ref 43–77)
NRBC # BLD AUTO: 0 K/UL — SIGNIFICANT CHANGE UP (ref 0–0)
NRBC # FLD: 0 K/UL — SIGNIFICANT CHANGE UP (ref 0–0)
NRBC BLD AUTO-RTO: 0 /100 WBCS — SIGNIFICANT CHANGE UP (ref 0–0)
PHOSPHATE SERPL-MCNC: 3.5 MG/DL — SIGNIFICANT CHANGE UP (ref 2.4–4.7)
PLATELET # BLD AUTO: 239 K/UL — SIGNIFICANT CHANGE UP (ref 150–400)
PMV BLD: 10.5 FL — SIGNIFICANT CHANGE UP (ref 7–13)
POTASSIUM SERPL-MCNC: 3.9 MMOL/L — SIGNIFICANT CHANGE UP (ref 3.5–5.3)
POTASSIUM SERPL-SCNC: 3.9 MMOL/L — SIGNIFICANT CHANGE UP (ref 3.5–5.3)
PROT SERPL-MCNC: 6.2 G/DL — LOW (ref 6.6–8.7)
RBC # BLD: 3.81 M/UL — LOW (ref 4.2–5.8)
RBC # FLD: 16.8 % — HIGH (ref 10.3–14.5)
SODIUM SERPL-SCNC: 143 MMOL/L — SIGNIFICANT CHANGE UP (ref 135–145)
WBC # BLD: 8.08 K/UL — SIGNIFICANT CHANGE UP (ref 3.8–10.5)
WBC # FLD AUTO: 8.08 K/UL — SIGNIFICANT CHANGE UP (ref 3.8–10.5)

## 2025-05-11 PROCEDURE — 99231 SBSQ HOSP IP/OBS SF/LOW 25: CPT

## 2025-05-11 PROCEDURE — 71045 X-RAY EXAM CHEST 1 VIEW: CPT | Mod: 26

## 2025-05-11 PROCEDURE — 99233 SBSQ HOSP IP/OBS HIGH 50: CPT | Mod: GC

## 2025-05-11 RX ADMIN — Medication 1 DOSE(S): at 08:21

## 2025-05-11 RX ADMIN — Medication 650 MILLIGRAM(S): at 21:37

## 2025-05-11 RX ADMIN — Medication 650 MILLIGRAM(S): at 12:20

## 2025-05-11 RX ADMIN — APIXABAN 5 MILLIGRAM(S): 2.5 TABLET, FILM COATED ORAL at 05:59

## 2025-05-11 RX ADMIN — MIDODRINE HYDROCHLORIDE 15 MILLIGRAM(S): 5 TABLET ORAL at 06:01

## 2025-05-11 RX ADMIN — Medication 81 MILLIGRAM(S): at 11:45

## 2025-05-11 RX ADMIN — METOPROLOL SUCCINATE 12.5 MILLIGRAM(S): 50 TABLET, EXTENDED RELEASE ORAL at 06:00

## 2025-05-11 RX ADMIN — TIOTROPIUM BROMIDE INHALATION SPRAY 2 PUFF(S): 3.12 SPRAY, METERED RESPIRATORY (INHALATION) at 08:21

## 2025-05-11 RX ADMIN — Medication 650 MILLIGRAM(S): at 11:45

## 2025-05-11 RX ADMIN — Medication 2 TABLET(S): at 21:37

## 2025-05-11 RX ADMIN — MIDODRINE HYDROCHLORIDE 15 MILLIGRAM(S): 5 TABLET ORAL at 11:45

## 2025-05-11 RX ADMIN — APIXABAN 5 MILLIGRAM(S): 2.5 TABLET, FILM COATED ORAL at 18:00

## 2025-05-11 RX ADMIN — FUROSEMIDE 40 MILLIGRAM(S): 10 INJECTION INTRAMUSCULAR; INTRAVENOUS at 05:59

## 2025-05-11 RX ADMIN — Medication 650 MILLIGRAM(S): at 22:37

## 2025-05-11 RX ADMIN — ATORVASTATIN CALCIUM 40 MILLIGRAM(S): 80 TABLET, FILM COATED ORAL at 21:37

## 2025-05-11 NOTE — PROGRESS NOTE ADULT - SUBJECTIVE AND OBJECTIVE BOX
Subjective - patient seen and evaluated bedside. Sitting comfortably in bed. Denies CP, SOB, HA, dizziness, n/v/d    Review of Systems: negative x 10 systems except as noted above    Brief summary:  84yMale with RT PLeural effusion s/p RT PTC    Significant/[id number] events: no acute events      PAST MEDICAL & SURGICAL HISTORY:  Hypertension      Hyperlipidemia      CAD (coronary artery disease)      Lung cancer      Afib      Chronic HFrEF (heart failure with reduced ejection fraction)      Aortic stenosis      History of COPD      S/P hip replacement, right      S/P hip replacement, left            acetaminophen     Tablet .. 650 milliGRAM(s) Oral every 6 hours PRN  aluminum hydroxide/magnesium hydroxide/simethicone Suspension 30 milliLiter(s) Oral every 4 hours PRN  apixaban 5 milliGRAM(s) Oral two times a day  aspirin  chewable 81 milliGRAM(s) Oral daily  atorvastatin 40 milliGRAM(s) Oral at bedtime  fluticasone propionate/ salmeterol 100-50 MICROgram(s) Diskus 1 Dose(s) Inhalation two times a day  furosemide    Tablet 40 milliGRAM(s) Oral daily  melatonin 3 milliGRAM(s) Oral at bedtime PRN  metoprolol succinate ER 12.5 milliGRAM(s) Oral daily  midodrine. 15 milliGRAM(s) Oral three times a day  ondansetron Injectable 4 milliGRAM(s) IV Push every 8 hours PRN  senna 2 Tablet(s) Oral at bedtime  tiotropium 2.5 MICROgram(s) Inhaler 2 Puff(s) Inhalation daily  MEDICATIONS  (PRN):  acetaminophen     Tablet .. 650 milliGRAM(s) Oral every 6 hours PRN Temp greater or equal to 38C (100.4F), Mild Pain (1 - 3)  aluminum hydroxide/magnesium hydroxide/simethicone Suspension 30 milliLiter(s) Oral every 4 hours PRN Dyspepsia  melatonin 3 milliGRAM(s) Oral at bedtime PRN Insomnia  ondansetron Injectable 4 milliGRAM(s) IV Push every 8 hours PRN Nausea and/or Vomiting      Daily     Daily                               10.8   8.08  )-----------( 239      ( 11 May 2025 05:55 )             35.7   05-11    143  |  99  |  29.1[H]  ----------------------------<  101[H]  3.9   |  35.0[H]  |  1.23    Ca    8.8      11 May 2025 05:55  Phos  3.5     05-11  Mg     2.3     05-11    TPro  6.2[L]  /  Alb  3.2[L]  /  TBili  0.5  /  DBili  x   /  AST  12  /  ALT  7   /  AlkPhos  92  05-11            Objective:  T(C): 36.6 (05-11-25 @ 07:11), Max: 37.3 (05-10-25 @ 16:42)  HR: 85 (05-11-25 @ 07:11) (76 - 88)  BP: 118/62 (05-11-25 @ 07:11) (112/65 - 122/71)  RR: 19 (05-11-25 @ 07:11) (18 - 19)  SpO2: 98% (05-11-25 @ 07:11) (92% - 98%)  Wt(kg): --CAPILLARY BLOOD GLUCOSE      I&O's Summary    10 May 2025 07:01  -  11 May 2025 07:00  --------------------------------------------------------  IN: 0 mL / OUT: 1350 mL / NET: -1350 mL    11 May 2025 07:01  -  11 May 2025 10:16  --------------------------------------------------------  IN: 0 mL / OUT: 550 mL / NET: -550 mL        Physical Exam  General: NAD  Neuro: A+O x 3, non-focal, speech clear and intact  Psych: Appropriate affect  HEENT:  NCAT, No conjuctival edema or icterus, no thrush.  Pulm: CTA, equal bilaterally  CV: RRR, +S1S2  Abd: soft, NT, ND, +BS  Ext: +DP Pulses b/l, no edema  Skin: Warm, dry, intact  Chest tubes: RT Chest tube to WS draining appropriately no AL        Imaging:  < from: Xray Chest 1 View- PORTABLE-Routine (Xray Chest 1 View- PORTABLE-Routine in AM.) (05.10.25 @ 06:04) >    ACC: [id number] EXAM:  XR CHEST PORTABLE ROUTINE 1V   ORDERED BY: PRINCE ROSENTHAL     PROCEDURE DATE:  05/10/2025          INTERPRETATION:  Clinical Information: Right pleural effusion    Technique: 1 AP chest x-ray(s)    Comparison: None    Findings/  Impression: Status post right port. Status post right chest pigtail   catheter. Cardiomegaly. Small loculated right pleural effusion.   Multifocal lung opacities most prominent in the left midlung and the   right midlung.    --- End of Report ---      < end of copied text >

## 2025-05-11 NOTE — PROGRESS NOTE ADULT - SUBJECTIVE AND OBJECTIVE BOX
[facility] - [facility]                                   [address] [place] [address]                                                      PHONE: [phone]                                                         FAX: [phone]  [email or web address]  ---------------------------------------------------------------------------------------------------------------------------------    Overnight events/patient complaints:  Feels better, responding to oral lasix     No Known Allergies    MEDICATIONS  (STANDING):  apixaban 5 milliGRAM(s) Oral two times a day  aspirin  chewable 81 milliGRAM(s) Oral daily  atorvastatin 40 milliGRAM(s) Oral at bedtime  fluticasone propionate/ salmeterol 100-50 MICROgram(s) Diskus 1 Dose(s) Inhalation two times a day  furosemide    Tablet 40 milliGRAM(s) Oral daily  metoprolol succinate ER 12.5 milliGRAM(s) Oral daily  midodrine. 15 milliGRAM(s) Oral three times a day  senna 2 Tablet(s) Oral at bedtime  tiotropium 2.5 MICROgram(s) Inhaler 2 Puff(s) Inhalation daily    MEDICATIONS  (PRN):  acetaminophen     Tablet .. 650 milliGRAM(s) Oral every 6 hours PRN Temp greater or equal to 38C (100.4F), Mild Pain (1 - 3)  aluminum hydroxide/magnesium hydroxide/simethicone Suspension 30 milliLiter(s) Oral every 4 hours PRN Dyspepsia  melatonin 3 milliGRAM(s) Oral at bedtime PRN Insomnia  ondansetron Injectable 4 milliGRAM(s) IV Push every 8 hours PRN Nausea and/or Vomiting      Vital Signs Last 24 Hrs  T(C): 36.6 (11 May 2025 07:11), Max: 37.3 (10 May 2025 16:42)  T(F): 97.8 (11 May 2025 07:11), Max: 99.2 (10 May 2025 16:42)  HR: 85 (11 May 2025 07:11) (76 - 88)  BP: 118/62 (11 May 2025 07:11) (112/65 - 122/71)  BP(mean): --  RR: 19 (11 May 2025 07:11) (18 - 19)  SpO2: 98% (11 May 2025 07:11) (92% - 98%)    Parameters below as of 11 May 2025 08:05  Patient On (Oxygen Delivery Method): room air      ICU Vital Signs Last 24 Hrs  JONE SOUTH  I&O's Detail    10 May 2025 07:01  -  11 May 2025 07:00  --------------------------------------------------------  IN:  Total IN: 0 mL    OUT:    Chest Tube (mL): 250 mL    Voided (mL): 1100 mL  Total OUT: 1350 mL    Total NET: -1350 mL      11 May 2025 07:01  -  11 May 2025 11:16  --------------------------------------------------------  IN:  Total IN: 0 mL    OUT:    Chest Tube (mL): 550 mL  Total OUT: 550 mL    Total NET: -550 mL        I&O's Summary    10 May 2025 07:01  -  11 May 2025 07:00  --------------------------------------------------------  IN: 0 mL / OUT: 1350 mL / NET: -1350 mL    11 May 2025 07:01  -  11 May 2025 11:16  --------------------------------------------------------  IN: 0 mL / OUT: 550 mL / NET: -550 mL      Drug Dosing Weight  JONE SOUTH      PHYSICAL EXAM:  General: Appears comfortable, alert and cooperative.  HEENT: Normocephalic, atraumatic.  Eyes: Pupils reactive, cornea wnl.  Neck: Supple, no nodes adenopathy; no JVD, carotid bruit or thyromegaly.  CARDIOVASCULAR: Irr irr  LUNGS: No rales, rhonchi or wheeze. Normal breath sounds bilaterally.  ABDOMEN: Soft, nontender without mass or organomegaly. bowel sounds normoactive.  EXTREMITIES: No clubbing, cyanosis. Trace edema. Distal pulses wnl.   SKIN: Warm and dry with normal turgor.  NEURO: Alert/oriented x 3/normal motor exam  PSYCH: Appropriate affect        LABS:                        10.8   8.08  )-----------( 239      ( 11 May 2025 05:55 )             35.7     05-11    143  |  99  |  29.1[H]  ----------------------------<  101[H]  3.9   |  35.0[H]  |  1.23    Ca    8.8      11 May 2025 05:55  Phos  3.5     05-11  Mg     2.3     05-11    TPro  6.2[L]  /  Alb  3.2[L]  /  TBili  0.5  /  DBili  x   /  AST  12  /  ALT  7   /  AlkPhos  92  05-11    JONE SOUTH        Urinalysis Basic - ( 11 May 2025 05:55 )    Color: x / Appearance: x / SG: x / pH: x  Gluc: 101 mg/dL / Ketone: x  / Bili: x / Urobili: x   Blood: x / Protein: x / Nitrite: x   Leuk Esterase: x / RBC: x / WBC x   Sq Epi: x / Non Sq Epi: x / Bacteria: x        RADIOLOGY & ADDITIONAL STUDIES:    INTERPRETATION OF TELEMETRY (personally reviewed):  atrial fibrillation rate controlled    ASSESSMENT AND PLAN:  In summary, JONE SOUTH is an 84y Male with chronic HFpEF, mod-severe AS, hypertension, hyperlipidemia, moderate CAD, persistent atrial fibrillation, SCC lung s/p treatment c/b DVT, on Eliquis, who presented with shortness of breath and acute on chronic HFpEF s/p right chest tube    - Continue to monitor urine output and fluid status on lasix 40 PO daily  - Had long conversation with daughter and wife at bedside re: CHF management. Discussed monitoring I/Os, weight, increasing lasix as needed  - Chest tube per thoracic surgery  - Consider CardioMEMS prior to DC, eventual rhythm control as outpt                     [facility] - [facility]                                   [address] [place] [address]                                                      PHONE: [phone]                                                         FAX: [phone]  [email or web address]  ---------------------------------------------------------------------------------------------------------------------------------    Overnight events/patient complaints:  Feels better, responding to oral lasix     No Known Allergies    MEDICATIONS  (STANDING):  apixaban 5 milliGRAM(s) Oral two times a day  aspirin  chewable 81 milliGRAM(s) Oral daily  atorvastatin 40 milliGRAM(s) Oral at bedtime  fluticasone propionate/ salmeterol 100-50 MICROgram(s) Diskus 1 Dose(s) Inhalation two times a day  furosemide    Tablet 40 milliGRAM(s) Oral daily  metoprolol succinate ER 12.5 milliGRAM(s) Oral daily  midodrine. 15 milliGRAM(s) Oral three times a day  senna 2 Tablet(s) Oral at bedtime  tiotropium 2.5 MICROgram(s) Inhaler 2 Puff(s) Inhalation daily    MEDICATIONS  (PRN):  acetaminophen     Tablet .. 650 milliGRAM(s) Oral every 6 hours PRN Temp greater or equal to 38C (100.4F), Mild Pain (1 - 3)  aluminum hydroxide/magnesium hydroxide/simethicone Suspension 30 milliLiter(s) Oral every 4 hours PRN Dyspepsia  melatonin 3 milliGRAM(s) Oral at bedtime PRN Insomnia  ondansetron Injectable 4 milliGRAM(s) IV Push every 8 hours PRN Nausea and/or Vomiting      Vital Signs Last 24 Hrs  T(C): 36.6 (11 May 2025 07:11), Max: 37.3 (10 May 2025 16:42)  T(F): 97.8 (11 May 2025 07:11), Max: 99.2 (10 May 2025 16:42)  HR: 85 (11 May 2025 07:11) (76 - 88)  BP: 118/62 (11 May 2025 07:11) (112/65 - 122/71)  BP(mean): --  RR: 19 (11 May 2025 07:11) (18 - 19)  SpO2: 98% (11 May 2025 07:11) (92% - 98%)    Parameters below as of 11 May 2025 08:05  Patient On (Oxygen Delivery Method): room air      ICU Vital Signs Last 24 Hrs  JONE SOUTH  I&O's Detail    10 May 2025 07:01  -  11 May 2025 07:00  --------------------------------------------------------  IN:  Total IN: 0 mL    OUT:    Chest Tube (mL): 250 mL    Voided (mL): 1100 mL  Total OUT: 1350 mL    Total NET: -1350 mL      11 May 2025 07:01  -  11 May 2025 11:16  --------------------------------------------------------  IN:  Total IN: 0 mL    OUT:    Chest Tube (mL): 550 mL  Total OUT: 550 mL    Total NET: -550 mL        I&O's Summary    10 May 2025 07:01  -  11 May 2025 07:00  --------------------------------------------------------  IN: 0 mL / OUT: 1350 mL / NET: -1350 mL    11 May 2025 07:01  -  11 May 2025 11:16  --------------------------------------------------------  IN: 0 mL / OUT: 550 mL / NET: -550 mL      Drug Dosing Weight  JONE SOUTH      PHYSICAL EXAM:  General: Appears comfortable, alert and cooperative.  HEENT: Normocephalic, atraumatic.  Eyes: Pupils reactive, cornea wnl.  Neck: Supple, no nodes adenopathy; no JVD, carotid bruit or thyromegaly.  CARDIOVASCULAR: Irr irr  LUNGS: No rales, rhonchi or wheeze. Normal breath sounds bilaterally.  ABDOMEN: Soft, nontender without mass or organomegaly. bowel sounds normoactive.  EXTREMITIES: No clubbing, cyanosis. Trace edema. Distal pulses wnl.   SKIN: Warm and dry with normal turgor.  NEURO: Alert/oriented x 3/normal motor exam  PSYCH: Appropriate affect        LABS:                        10.8   8.08  )-----------( 239      ( 11 May 2025 05:55 )             35.7     05-11    143  |  99  |  29.1[H]  ----------------------------<  101[H]  3.9   |  35.0[H]  |  1.23    Ca    8.8      11 May 2025 05:55  Phos  3.5     05-11  Mg     2.3     05-11    TPro  6.2[L]  /  Alb  3.2[L]  /  TBili  0.5  /  DBili  x   /  AST  12  /  ALT  7   /  AlkPhos  92  05-11    JONE SOUTH        Urinalysis Basic - ( 11 May 2025 05:55 )    Color: x / Appearance: x / SG: x / pH: x  Gluc: 101 mg/dL / Ketone: x  / Bili: x / Urobili: x   Blood: x / Protein: x / Nitrite: x   Leuk Esterase: x / RBC: x / WBC x   Sq Epi: x / Non Sq Epi: x / Bacteria: x        RADIOLOGY & ADDITIONAL STUDIES:    INTERPRETATION OF TELEMETRY (personally reviewed):  atrial fibrillation rate controlled    ASSESSMENT AND PLAN:  In summary, JONE SOUTH is an 84y Male with chronic HFpEF, mod-severe AS, hypertension, hyperlipidemia, moderate CAD, persistent atrial fibrillation, SCC lung s/p treatment c/b DVT, on Eliquis, who presented with shortness of breath and acute on chronic HFpEF s/p right chest tube    - Continue to monitor urine output and fluid status on lasix 40 PO daily  - Had long conversation with daughter and wife at bedside re: CHF management. Discussed monitoring I/Os, weight, increasing lasix as needed. Also extensively discussed the complexity of his care including multiple conditions interacting and affecting care (ie, mod-severe AS).   - Chest tube per thoracic surgery  - Consider CardioMEMS prior to DC, eventual rhythm control as outpt

## 2025-05-11 NOTE — PROGRESS NOTE ADULT - ASSESSMENT
84y Male w/ PMH of HFrEF, afib (on Eliquis), COPD (no home O2), severe AS, prior PE (no longer on AC), and stage 3 SCC lung (treated w/ platinum doublet chemoRT and adjuvant durvalumab 1 year ago finished) presented with worsening acute on chornic dyspnea and cough. Admitted for acute hypoxic respiratory failure 2/2 pleural effusions in setting of acute HFrEF. S/p right chest tube.     #Acute hypoxic respiratory failure- improved  #Acute HFrEF- improved  #R Pleural effusion, known effusion, drainage in the past   - On RA  - CT-A PE study w/o PE but shows worsening of right sided effusion  - Most recent TTE 3/2025 w/ mildly reduced EF 40-45% and mod-severe AS and MR  - S/p IV lasix, now on PO Lasix 40mg daily   - GDMT limited by orthostasis/hypotension on midodrine at home, but on Toprol 12.5mg QD  - S/p R chest tube. Transudative effusion by Light's criterion. Culture NGTD  - Monitor I/O  - Cards following     #Afib NVR  - [id number] score 3  - Currently rate controlled  - Resume eliquis  - C/w home Toprol for rate control  - Cardiology following     #COPD  - Not in acute exacerbation  - C/w home Trelegy    #Hx of stage 3 SCC lung cancer   - S/p chemoradiation completed 4/2023  - Completed 1 year of durvaluamb 6/2024  - Heme/onc signed off    #Orthostatic hypotension  - Cont midodrine    #Nodular opacity, left upper lobe  - Follow up outpatient     DVT PPx: Eliquis  Dispo: Home, active, chest tube  84y Male w/ PMH of HFrEF, afib (on Eliquis), COPD (no home O2), severe AS, prior PE (no longer on AC), and stage 3 SCC lung (treated w/ platinum doublet chemoRT and adjuvant durvalumab 1 year ago finished) presented with worsening acute on chornic dyspnea and cough. Admitted for acute hypoxic respiratory failure 2/2 pleural effusions in setting of acute HFrEF. S/p right chest tube.     #Acute hypoxic respiratory failure- improved  #Acute HFrEF- improved  #R Pleural effusion, known effusion, drainage in the past   - On 2L  - CT-A PE study w/o PE but shows worsening of right sided effusion  - Most recent TTE 3/2025 w/ mildly reduced EF 40-45% and mod-severe AS and MR  - S/p IV lasix, now on PO Lasix 40mg daily   - GDMT limited by orthostasis/hypotension on midodrine at home, but on Toprol 12.5mg QD  - S/p R chest tube. Transudative effusion by Light's criterion. Culture NGTD  - Monitor I/O  - Cards following     #Afib NVR  - [id number] score 3  - Currently rate controlled  - C/w eliquis  - C/w home Toprol for rate control  - Cardiology following     #COPD  - Not in acute exacerbation  - C/w home Trelegy    #Hx of stage 3 SCC lung cancer   - S/p chemoradiation completed 4/2023  - Completed 1 year of durvaluamb 6/2024  - Heme/onc signed off    #Orthostatic hypotension  - Cont midodrine    #Nodular opacity, left upper lobe  - Follow up outpatient     DVT PPx: Eliquis  Dispo: Home, active, chest tube, likely 2-3 days

## 2025-05-11 NOTE — PROGRESS NOTE ADULT - SUBJECTIVE AND OBJECTIVE BOX
Patient is a 84y old  Male who presents with a chief complaint of acute hypoxic respiratory failure, acute hfref, pleural effusions (10 May 2025 14:21)      INTERVAL HPI/OVERNIGHT EVENTS:  - No acute overnight events    TODAY:  - Patient examined bedside, no complaints. Patient denies CP, SOB, dizziness, fever, chills, nausea, vomiting, constipation, diarrhea.    MEDICATIONS  (STANDING):  apixaban 5 milliGRAM(s) Oral two times a day  aspirin  chewable 81 milliGRAM(s) Oral daily  atorvastatin 40 milliGRAM(s) Oral at bedtime  fluticasone propionate/ salmeterol 100-50 MICROgram(s) Diskus 1 Dose(s) Inhalation two times a day  furosemide    Tablet 40 milliGRAM(s) Oral daily  metoprolol succinate ER 12.5 milliGRAM(s) Oral daily  midodrine. 15 milliGRAM(s) Oral three times a day  senna 2 Tablet(s) Oral at bedtime  tiotropium 2.5 MICROgram(s) Inhaler 2 Puff(s) Inhalation daily    MEDICATIONS  (PRN):  acetaminophen     Tablet .. 650 milliGRAM(s) Oral every 6 hours PRN Temp greater or equal to 38C (100.4F), Mild Pain (1 - 3)  aluminum hydroxide/magnesium hydroxide/simethicone Suspension 30 milliLiter(s) Oral every 4 hours PRN Dyspepsia  melatonin 3 milliGRAM(s) Oral at bedtime PRN Insomnia  ondansetron Injectable 4 milliGRAM(s) IV Push every 8 hours PRN Nausea and/or Vomiting      Allergies    No Known Allergies    Intolerances        REVIEW OF SYSTEMS:  Per HPI.      Vital Signs Last 24 Hrs  T(C): 36.4 (11 May 2025 05:00), Max: 37.3 (10 May 2025 16:42)  T(F): 97.5 (11 May 2025 05:00), Max: 99.2 (10 May 2025 16:42)  HR: 76 (11 May 2025 05:00) (76 - 88)  BP: 112/65 (11 May 2025 05:00) (112/65 - 122/71)  BP(mean): --  RR: 18 (11 May 2025 05:00) (18 - 18)  SpO2: 97% (11 May 2025 05:00) (92% - 97%)    Parameters below as of 11 May 2025 05:00  Patient On (Oxygen Delivery Method): room air        PHYSICAL EXAM:  GENERAL: No acute distress, comfortable in chair  HEENT: Atraumatic, normocephalic, non-icteric  NECK: Supple, full range of motion, no observable masses  NEURO: A&Ox3, no focal deficits, moving all extremities spontaneously, no dysarthria, CN II-XII grossly intact  PSYCH: Normal affect, appropriate insight and judgment, fluent speech  LUNGS: Decreased breath sounds on right, right chest tube, draining light yellow fluid   HEART: Irregular, systolic murmur   ABD: Soft, non-tender, non-distended, no appreciable masses  EXTREMITIES: +2 pitting edema bl up to mid shin which is his baseline, much improved since admission  SKIN: Warm and dry     LABS:                        10.8   8.08  )-----------( 239      ( 11 May 2025 05:55 )             35.7     05-11    143  |  99  |  29.1[H]  ----------------------------<  101[H]  3.9   |  35.0[H]  |  1.23    Ca    8.8      11 May 2025 05:55  Phos  3.5     05-11  Mg     2.3     05-11    TPro  6.2[L]  /  Alb  3.2[L]  /  TBili  0.5  /  DBili  x   /  AST  12  /  ALT  7   /  AlkPhos  92  05-11      Urinalysis Basic - ( 11 May 2025 05:55 )    Color: x / Appearance: x / SG: x / pH: x  Gluc: 101 mg/dL / Ketone: x  / Bili: x / Urobili: x   Blood: x / Protein: x / Nitrite: x   Leuk Esterase: x / RBC: x / WBC x   Sq Epi: x / Non Sq Epi: x / Bacteria: x      CAPILLARY BLOOD GLUCOSE          RADIOLOGY & ADDITIONAL TESTS:    Imaging Personally Reviewed:  [X] YES  [ ] NO    Consultant(s) Notes Reviewed:  [X] YES  [ ] NO    Care Discussed with Consultants/Other Providers [X] YES  [ ] NO    Plan of Care discussed with House Staff: [X]YES [ ] NO Patient is a 84y old  Male who presents with a chief complaint of acute hypoxic respiratory failure, acute hfref, pleural effusions (10 May 2025 14:21)      INTERVAL HPI/OVERNIGHT EVENTS:  - No acute overnight events    TODAY:  - Patient examined bedside, reports feeling better overall. Improvement in breathing, would like to go home. Patient denies CP, SOB, dizziness, fever, chills, nausea, vomiting, constipation, diarrhea.    MEDICATIONS  (STANDING):  apixaban 5 milliGRAM(s) Oral two times a day  aspirin  chewable 81 milliGRAM(s) Oral daily  atorvastatin 40 milliGRAM(s) Oral at bedtime  fluticasone propionate/ salmeterol 100-50 MICROgram(s) Diskus 1 Dose(s) Inhalation two times a day  furosemide    Tablet 40 milliGRAM(s) Oral daily  metoprolol succinate ER 12.5 milliGRAM(s) Oral daily  midodrine. 15 milliGRAM(s) Oral three times a day  senna 2 Tablet(s) Oral at bedtime  tiotropium 2.5 MICROgram(s) Inhaler 2 Puff(s) Inhalation daily    MEDICATIONS  (PRN):  acetaminophen     Tablet .. 650 milliGRAM(s) Oral every 6 hours PRN Temp greater or equal to 38C (100.4F), Mild Pain (1 - 3)  aluminum hydroxide/magnesium hydroxide/simethicone Suspension 30 milliLiter(s) Oral every 4 hours PRN Dyspepsia  melatonin 3 milliGRAM(s) Oral at bedtime PRN Insomnia  ondansetron Injectable 4 milliGRAM(s) IV Push every 8 hours PRN Nausea and/or Vomiting      Allergies    No Known Allergies    Intolerances        REVIEW OF SYSTEMS:  Per HPI.      Vital Signs Last 24 Hrs  T(C): 36.4 (11 May 2025 05:00), Max: 37.3 (10 May 2025 16:42)  T(F): 97.5 (11 May 2025 05:00), Max: 99.2 (10 May 2025 16:42)  HR: 76 (11 May 2025 05:00) (76 - 88)  BP: 112/65 (11 May 2025 05:00) (112/65 - 122/71)  BP(mean): --  RR: 18 (11 May 2025 05:00) (18 - 18)  SpO2: 97% (11 May 2025 05:00) (92% - 97%)    Parameters below as of 11 May 2025 05:00  Patient On (Oxygen Delivery Method): room air        PHYSICAL EXAM:  GENERAL: No acute distress, comfortable in chair  HEENT: Atraumatic, normocephalic, non-icteric  NECK: Supple, full range of motion, no observable masses  NEURO: A&Ox3, no focal deficits, moving all extremities spontaneously, no dysarthria, CN II-XII grossly intact  PSYCH: Normal affect, appropriate insight and judgment, fluent speech  LUNGS: Decreased breath sounds on right, right chest tube draining light yellow fluid   HEART: Irregular, systolic murmur   ABD: Soft, non-tender, non-distended, no appreciable masses  EXTREMITIES: Minimal pitting edema bl, improved from prior   SKIN: Warm and dry     LABS:                        10.8   8.08  )-----------( 239      ( 11 May 2025 05:55 )             35.7     05-11    143  |  99  |  29.1[H]  ----------------------------<  101[H]  3.9   |  35.0[H]  |  1.23    Ca    8.8      11 May 2025 05:55  Phos  3.5     05-11  Mg     2.3     05-11    TPro  6.2[L]  /  Alb  3.2[L]  /  TBili  0.5  /  DBili  x   /  AST  12  /  ALT  7   /  AlkPhos  92  05-11      Urinalysis Basic - ( 11 May 2025 05:55 )    Color: x / Appearance: x / SG: x / pH: x  Gluc: 101 mg/dL / Ketone: x  / Bili: x / Urobili: x   Blood: x / Protein: x / Nitrite: x   Leuk Esterase: x / RBC: x / WBC x   Sq Epi: x / Non Sq Epi: x / Bacteria: x      CAPILLARY BLOOD GLUCOSE          RADIOLOGY & ADDITIONAL TESTS:    Imaging Personally Reviewed:  [X] YES  [ ] NO    Consultant(s) Notes Reviewed:  [X] YES  [ ] NO    Care Discussed with Consultants/Other Providers [X] YES  [ ] NO    Plan of Care discussed with House Staff: [X]YES [ ] NO

## 2025-05-12 ENCOUNTER — TRANSCRIPTION ENCOUNTER (OUTPATIENT)
Age: 85
End: 2025-05-12

## 2025-05-12 LAB
ALBUMIN SERPL ELPH-MCNC: 2.7 G/DL — LOW (ref 3.3–5.2)
ALP SERPL-CCNC: 84 U/L — SIGNIFICANT CHANGE UP (ref 40–120)
ALT FLD-CCNC: 9 U/L — SIGNIFICANT CHANGE UP
ANION GAP SERPL CALC-SCNC: 9 MMOL/L — SIGNIFICANT CHANGE UP (ref 5–17)
AST SERPL-CCNC: 18 U/L — SIGNIFICANT CHANGE UP
BILIRUB SERPL-MCNC: 0.5 MG/DL — SIGNIFICANT CHANGE UP (ref 0.4–2)
BUN SERPL-MCNC: 26.4 MG/DL — HIGH (ref 8–20)
CALCIUM SERPL-MCNC: 8.9 MG/DL — SIGNIFICANT CHANGE UP (ref 8.4–10.5)
CHLORIDE SERPL-SCNC: 97 MMOL/L — SIGNIFICANT CHANGE UP (ref 96–108)
CO2 SERPL-SCNC: 32 MMOL/L — HIGH (ref 22–29)
CREAT SERPL-MCNC: 0.99 MG/DL — SIGNIFICANT CHANGE UP (ref 0.5–1.3)
CULTURE RESULTS: SIGNIFICANT CHANGE UP
EGFR: 76 ML/MIN/1.73M2 — SIGNIFICANT CHANGE UP
EGFR: 76 ML/MIN/1.73M2 — SIGNIFICANT CHANGE UP
GLUCOSE SERPL-MCNC: 102 MG/DL — HIGH (ref 70–99)
HCT VFR BLD CALC: 34.2 % — LOW (ref 39–50)
HGB BLD-MCNC: 10.6 G/DL — LOW (ref 13–17)
MAGNESIUM SERPL-MCNC: 2.1 MG/DL — SIGNIFICANT CHANGE UP (ref 1.6–2.6)
MCHC RBC-ENTMCNC: 28.2 PG — SIGNIFICANT CHANGE UP (ref 27–34)
MCHC RBC-ENTMCNC: 31 G/DL — LOW (ref 32–36)
MCV RBC AUTO: 91 FL — SIGNIFICANT CHANGE UP (ref 80–100)
NON-GYNECOLOGICAL CYTOLOGY STUDY: SIGNIFICANT CHANGE UP
NRBC # BLD AUTO: 0 K/UL — SIGNIFICANT CHANGE UP (ref 0–0)
NRBC # FLD: 0 K/UL — SIGNIFICANT CHANGE UP (ref 0–0)
NRBC BLD AUTO-RTO: 0 /100 WBCS — SIGNIFICANT CHANGE UP (ref 0–0)
PHOSPHATE SERPL-MCNC: 2.7 MG/DL — SIGNIFICANT CHANGE UP (ref 2.4–4.7)
PLATELET # BLD AUTO: 234 K/UL — SIGNIFICANT CHANGE UP (ref 150–400)
PMV BLD: 11 FL — SIGNIFICANT CHANGE UP (ref 7–13)
POTASSIUM SERPL-MCNC: 3.8 MMOL/L — SIGNIFICANT CHANGE UP (ref 3.5–5.3)
POTASSIUM SERPL-SCNC: 3.8 MMOL/L — SIGNIFICANT CHANGE UP (ref 3.5–5.3)
PROT SERPL-MCNC: 6 G/DL — LOW (ref 6.6–8.7)
RBC # BLD: 3.76 M/UL — LOW (ref 4.2–5.8)
RBC # FLD: 16.6 % — HIGH (ref 10.3–14.5)
SODIUM SERPL-SCNC: 138 MMOL/L — SIGNIFICANT CHANGE UP (ref 135–145)
SPECIMEN SOURCE: SIGNIFICANT CHANGE UP
WBC # BLD: 7.31 K/UL — SIGNIFICANT CHANGE UP (ref 3.8–10.5)
WBC # FLD AUTO: 7.31 K/UL — SIGNIFICANT CHANGE UP (ref 3.8–10.5)

## 2025-05-12 PROCEDURE — 71045 X-RAY EXAM CHEST 1 VIEW: CPT | Mod: 26

## 2025-05-12 PROCEDURE — 99232 SBSQ HOSP IP/OBS MODERATE 35: CPT

## 2025-05-12 PROCEDURE — 99233 SBSQ HOSP IP/OBS HIGH 50: CPT | Mod: GC

## 2025-05-12 RX ORDER — MIDODRINE HYDROCHLORIDE 5 MG/1
5 TABLET ORAL THREE TIMES A DAY
Refills: 0 | Status: DISCONTINUED | OUTPATIENT
Start: 2025-05-12 | End: 2025-05-16

## 2025-05-12 RX ORDER — POLYETHYLENE GLYCOL 3350 17 G/17G
17 POWDER, FOR SOLUTION ORAL ONCE
Refills: 0 | Status: COMPLETED | OUTPATIENT
Start: 2025-05-13 | End: 2025-05-13

## 2025-05-12 RX ADMIN — APIXABAN 5 MILLIGRAM(S): 2.5 TABLET, FILM COATED ORAL at 17:11

## 2025-05-12 RX ADMIN — ATORVASTATIN CALCIUM 40 MILLIGRAM(S): 80 TABLET, FILM COATED ORAL at 21:21

## 2025-05-12 RX ADMIN — METOPROLOL SUCCINATE 12.5 MILLIGRAM(S): 50 TABLET, EXTENDED RELEASE ORAL at 05:11

## 2025-05-12 RX ADMIN — Medication 81 MILLIGRAM(S): at 11:44

## 2025-05-12 RX ADMIN — Medication 1 DOSE(S): at 08:00

## 2025-05-12 RX ADMIN — APIXABAN 5 MILLIGRAM(S): 2.5 TABLET, FILM COATED ORAL at 05:10

## 2025-05-12 RX ADMIN — Medication 2 TABLET(S): at 21:22

## 2025-05-12 RX ADMIN — MIDODRINE HYDROCHLORIDE 15 MILLIGRAM(S): 5 TABLET ORAL at 05:11

## 2025-05-12 RX ADMIN — TIOTROPIUM BROMIDE INHALATION SPRAY 2 PUFF(S): 3.12 SPRAY, METERED RESPIRATORY (INHALATION) at 08:59

## 2025-05-12 RX ADMIN — FUROSEMIDE 40 MILLIGRAM(S): 10 INJECTION INTRAMUSCULAR; INTRAVENOUS at 05:10

## 2025-05-12 NOTE — PHYSICAL THERAPY INITIAL EVALUATION ADULT - PERTINENT HX OF CURRENT PROBLEM, REHAB EVAL
Pt admitted with acute hypoxic respiratory failure, acute hfref, pleural effusions; s/p right chest tube

## 2025-05-12 NOTE — DISCHARGE NOTE NURSING/CASE MANAGEMENT/SOCIAL WORK - PATIENT PORTAL LINK FT
You can access the FollowMyHealth Patient Portal offered by [facility] by registering at the following website: [email or web address]. By joining [facility]’s FollowMyHealth portal, you will also be able to view your health information using other applications (apps) compatible with our system.

## 2025-05-12 NOTE — PROGRESS NOTE ADULT - SUBJECTIVE AND OBJECTIVE BOX
Patient is a 84y old  Male who presents with a chief complaint of acute hypoxic respiratory failure, acute hfref, pleural effusions (11 May 2025 11:16)      INTERVAL HPI/OVERNIGHT EVENTS:  - No acute overnight events    TODAY:  - Patient examined bedside, no complaints. Patient denies CP, SOB, dizziness, fever, chills, nausea, vomiting, constipation, diarrhea.    MEDICATIONS  (STANDING):  apixaban 5 milliGRAM(s) Oral two times a day  aspirin  chewable 81 milliGRAM(s) Oral daily  atorvastatin 40 milliGRAM(s) Oral at bedtime  fluticasone propionate/ salmeterol 100-50 MICROgram(s) Diskus 1 Dose(s) Inhalation two times a day  furosemide    Tablet 40 milliGRAM(s) Oral daily  metoprolol succinate ER 12.5 milliGRAM(s) Oral daily  midodrine. 15 milliGRAM(s) Oral three times a day  senna 2 Tablet(s) Oral at bedtime  tiotropium 2.5 MICROgram(s) Inhaler 2 Puff(s) Inhalation daily    MEDICATIONS  (PRN):  acetaminophen     Tablet .. 650 milliGRAM(s) Oral every 6 hours PRN Temp greater or equal to 38C (100.4F), Mild Pain (1 - 3)  aluminum hydroxide/magnesium hydroxide/simethicone Suspension 30 milliLiter(s) Oral every 4 hours PRN Dyspepsia  melatonin 3 milliGRAM(s) Oral at bedtime PRN Insomnia  ondansetron Injectable 4 milliGRAM(s) IV Push every 8 hours PRN Nausea and/or Vomiting      Allergies    No Known Allergies    Intolerances        REVIEW OF SYSTEMS:  Per HPI.      Vital Signs Last 24 Hrs  T(C): 36.3 (12 May 2025 04:51), Max: 36.7 (11 May 2025 17:00)  T(F): 97.4 (12 May 2025 04:51), Max: 98.1 (11 May 2025 17:00)  HR: 88 (12 May 2025 04:51) (82 - 88)  BP: 110/63 (12 May 2025 04:51) (110/63 - 131/71)  BP(mean): --  RR: 18 (12 May 2025 04:51) (18 - 19)  SpO2: 93% (12 May 2025 04:51) (93% - 96%)    Parameters below as of 12 May 2025 04:51  Patient On (Oxygen Delivery Method): room air        PHYSICAL EXAM:  GENERAL: No acute distress, comfortable in chair  HEENT: Atraumatic, normocephalic, non-icteric  NECK: Supple, full range of motion, no observable masses  NEURO: A&Ox3, no focal deficits, moving all extremities spontaneously, no dysarthria, CN II-XII grossly intact  PSYCH: Normal affect, appropriate insight and judgment, fluent speech  LUNGS: Decreased breath sounds on right, right chest tube draining light yellow fluid   HEART: Irregular, systolic murmur   ABD: Soft, non-tender, non-distended, no appreciable masses  EXTREMITIES: Minimal pitting edema bl, improved from prior   SKIN: Warm and dry     LABS:                        10.6   7.31  )-----------( 234      ( 12 May 2025 04:23 )             34.2     05-12    138  |  97  |  26.4[H]  ----------------------------<  102[H]  3.8   |  32.0[H]  |  0.99    Ca    8.9      12 May 2025 04:23  Phos  2.7     05-12  Mg     2.1     05-12    TPro  6.0[L]  /  Alb  2.7[L]  /  TBili  0.5  /  DBili  x   /  AST  18  /  ALT  9   /  AlkPhos  84  05-12      Urinalysis Basic - ( 12 May 2025 04:23 )    Color: x / Appearance: x / SG: x / pH: x  Gluc: 102 mg/dL / Ketone: x  / Bili: x / Urobili: x   Blood: x / Protein: x / Nitrite: x   Leuk Esterase: x / RBC: x / WBC x   Sq Epi: x / Non Sq Epi: x / Bacteria: x      CAPILLARY BLOOD GLUCOSE          RADIOLOGY & ADDITIONAL TESTS:    Imaging Personally Reviewed:  [X] YES  [ ] NO    Consultant(s) Notes Reviewed:  [X] YES  [ ] NO    Care Discussed with Consultants/Other Providers [X] YES  [ ] NO    Plan of Care discussed with House Staff: [X]YES [ ] NO Patient is a 84y old  Male who presents with a chief complaint of acute hypoxic respiratory failure, acute hfref, pleural effusions (11 May 2025 11:16)      INTERVAL HPI/OVERNIGHT EVENTS:  - No acute overnight events    TODAY:  - Patient examined bedside, no complaints. Reports feeling better. Ambulates to chair. Patient denies CP, SOB, dizziness, fever, chills, nausea, vomiting, constipation, diarrhea.    MEDICATIONS  (STANDING):  apixaban 5 milliGRAM(s) Oral two times a day  aspirin  chewable 81 milliGRAM(s) Oral daily  atorvastatin 40 milliGRAM(s) Oral at bedtime  fluticasone propionate/ salmeterol 100-50 MICROgram(s) Diskus 1 Dose(s) Inhalation two times a day  furosemide    Tablet 40 milliGRAM(s) Oral daily  metoprolol succinate ER 12.5 milliGRAM(s) Oral daily  midodrine. 15 milliGRAM(s) Oral three times a day  senna 2 Tablet(s) Oral at bedtime  tiotropium 2.5 MICROgram(s) Inhaler 2 Puff(s) Inhalation daily    MEDICATIONS  (PRN):  acetaminophen     Tablet .. 650 milliGRAM(s) Oral every 6 hours PRN Temp greater or equal to 38C (100.4F), Mild Pain (1 - 3)  aluminum hydroxide/magnesium hydroxide/simethicone Suspension 30 milliLiter(s) Oral every 4 hours PRN Dyspepsia  melatonin 3 milliGRAM(s) Oral at bedtime PRN Insomnia  ondansetron Injectable 4 milliGRAM(s) IV Push every 8 hours PRN Nausea and/or Vomiting      Allergies    No Known Allergies    Intolerances        REVIEW OF SYSTEMS:  Per HPI.      Vital Signs Last 24 Hrs  T(C): 36.3 (12 May 2025 04:51), Max: 36.7 (11 May 2025 17:00)  T(F): 97.4 (12 May 2025 04:51), Max: 98.1 (11 May 2025 17:00)  HR: 88 (12 May 2025 04:51) (82 - 88)  BP: 110/63 (12 May 2025 04:51) (110/63 - 131/71)  BP(mean): --  RR: 18 (12 May 2025 04:51) (18 - 19)  SpO2: 93% (12 May 2025 04:51) (93% - 96%)    Parameters below as of 12 May 2025 04:51  Patient On (Oxygen Delivery Method): room air        PHYSICAL EXAM:  GENERAL: No acute distress, comfortable in chair  HEENT: Atraumatic, normocephalic, non-icteric  NECK: Supple, full range of motion, no observable masses  NEURO: A&Ox3, no focal deficits, moving all extremities spontaneously, no dysarthria, CN II-XII grossly intact  PSYCH: Normal affect, appropriate insight and judgment, fluent speech  LUNGS: Decreased breath sounds on right, right chest tube draining light yellow fluid   HEART: Irregular, systolic murmur   ABD: Soft, non-tender, non-distended, no appreciable masses  EXTREMITIES: Minimal pitting edema bl, improved from prior   SKIN: Warm and dry     LABS:                        10.6   7.31  )-----------( 234      ( 12 May 2025 04:23 )             34.2     05-12    138  |  97  |  26.4[H]  ----------------------------<  102[H]  3.8   |  32.0[H]  |  0.99    Ca    8.9      12 May 2025 04:23  Phos  2.7     05-12  Mg     2.1     05-12    TPro  6.0[L]  /  Alb  2.7[L]  /  TBili  0.5  /  DBili  x   /  AST  18  /  ALT  9   /  AlkPhos  84  05-12      Urinalysis Basic - ( 12 May 2025 04:23 )    Color: x / Appearance: x / SG: x / pH: x  Gluc: 102 mg/dL / Ketone: x  / Bili: x / Urobili: x   Blood: x / Protein: x / Nitrite: x   Leuk Esterase: x / RBC: x / WBC x   Sq Epi: x / Non Sq Epi: x / Bacteria: x      CAPILLARY BLOOD GLUCOSE          RADIOLOGY & ADDITIONAL TESTS:    Imaging Personally Reviewed:  [X] YES  [ ] NO    Consultant(s) Notes Reviewed:  [X] YES  [ ] NO    Care Discussed with Consultants/Other Providers [X] YES  [ ] NO    Plan of Care discussed with House Staff: [X]YES [ ] NO Patient is a 84y old  Male who presents with a chief complaint of acute hypoxic respiratory failure, acute hfref, pleural effusions (11 May 2025 11:16)      INTERVAL HPI/ OVERNIGHT EVENTS:  - No acute overnight events    TODAY:  - Patient examined bedside, no complaints. Reports feeling better. Ambulates to chair. Patient denies CP, SOB, dizziness, fever, chills, nausea, vomiting, constipation, diarrhea.    MEDICATIONS  (STANDING):  apixaban 5 milliGRAM(s) Oral two times a day  aspirin  chewable 81 milliGRAM(s) Oral daily  atorvastatin 40 milliGRAM(s) Oral at bedtime  fluticasone propionate/ salmeterol 100-50 MICROgram(s) Diskus 1 Dose(s) Inhalation two times a day  furosemide    Tablet 40 milliGRAM(s) Oral daily  metoprolol succinate ER 12.5 milliGRAM(s) Oral daily  midodrine. 15 milliGRAM(s) Oral three times a day  senna 2 Tablet(s) Oral at bedtime  tiotropium 2.5 MICROgram(s) Inhaler 2 Puff(s) Inhalation daily    MEDICATIONS  (PRN):  acetaminophen     Tablet .. 650 milliGRAM(s) Oral every 6 hours PRN Temp greater or equal to 38C (100.4F), Mild Pain (1 - 3)  aluminum hydroxide/magnesium hydroxide/simethicone Suspension 30 milliLiter(s) Oral every 4 hours PRN Dyspepsia  melatonin 3 milliGRAM(s) Oral at bedtime PRN Insomnia  ondansetron Injectable 4 milliGRAM(s) IV Push every 8 hours PRN Nausea and/or Vomiting      Allergies  No Known Allergies  Intolerances        REVIEW OF SYSTEMS:  Per HPI.      Vital Signs Last 24 Hrs  T(C): 36.3 (12 May 2025 04:51), Max: 36.7 (11 May 2025 17:00)  T(F): 97.4 (12 May 2025 04:51), Max: 98.1 (11 May 2025 17:00)  HR: 88 (12 May 2025 04:51) (82 - 88)  BP: 110/63 (12 May 2025 04:51) (110/63 - 131/71)  BP(mean): --  RR: 18 (12 May 2025 04:51) (18 - 19)  SpO2: 93% (12 May 2025 04:51) (93% - 96%)    Parameters below as of 12 May 2025 04:51  Patient On (Oxygen Delivery Method): room air        PHYSICAL EXAM:  GENERAL: No acute distress, comfortable in chair  HEENT: Atraumatic, normocephalic, non-icteric  NECK: Supple, full range of motion, no observable masses  NEURO: A&Ox3, no focal deficits, moving all extremities spontaneously, no dysarthria, CN II-XII grossly intact  PSYCH: Normal affect, appropriate insight and judgment, fluent speech  LUNGS: Decreased breath sounds on right, right chest tube draining light yellow fluid   HEART: Irregular, systolic murmur   ABD: Soft, non-tender, non-distended, no appreciable masses  EXTREMITIES: Minimal pitting edema bl, improved from prior   SKIN: Warm and dry     LABS:                        10.6   7.31  )-----------( 234      ( 12 May 2025 04:23 )             34.2     05-12    138  |  97  |  26.4[H]  ----------------------------<  102[H]  3.8   |  32.0[H]  |  0.99    Ca    8.9      12 May 2025 04:23  Phos  2.7     05-12  Mg     2.1     05-12    TPro  6.0[L]  /  Alb  2.7[L]  /  TBili  0.5  /  DBili  x   /  AST  18  /  ALT  9   /  AlkPhos  84  05-12      Urinalysis Basic - ( 12 May 2025 04:23 )    Color: x / Appearance: x / SG: x / pH: x  Gluc: 102 mg/dL / Ketone: x  / Bili: x / Urobili: x   Blood: x / Protein: x / Nitrite: x   Leuk Esterase: x / RBC: x / WBC x   Sq Epi: x / Non Sq Epi: x / Bacteria: x      CAPILLARY BLOOD GLUCOSE          RADIOLOGY & ADDITIONAL TESTS:    Imaging Personally Reviewed:  [X] YES  [ ] NO    Consultant(s) Notes Reviewed:  [X] YES  [ ] NO    Care Discussed with Consultants/Other Providers [X] YES  [ ] NO    Plan of Care discussed with House Staff: [X]YES [ ] NO

## 2025-05-12 NOTE — DISCHARGE NOTE NURSING/CASE MANAGEMENT/SOCIAL WORK - FINANCIAL ASSISTANCE
[facility] provides services at a reduced cost to those who are determined to be eligible through [facility]’s financial assistance program. Information regarding [facility]’s financial assistance program can be found by going to [email or web address] or by calling [phone].

## 2025-05-12 NOTE — PROGRESS NOTE ADULT - ASSESSMENT
74 y/o M w/ hx of HFrEF, afib (on Eliquis), COPD (no home O2), severe AS, prior PE (no longer on AC), and stage 3 SCC lung (treated w/ platinum doublet chemoRT and adjuvant durvalumab 1 year ago finished) who presented w/ acute on chronic dyspnea and cough. Pt was in the hospital in March for dyspnea. At that time, was found to have pleural effusions. Also underwent cardiac workup, which revealed mod-severe AS and patent coronaries on LHC. No thoracentesis at that time. Since discharge, pt has remained at baseline respiratory status (mild dyspnea on exertion), but over past couple days has had worsening. Also developed intermittent cough which is productive but pt is swallowing phlegm so not able to describe it further. Also complaining of orthopnea and increased LE swelling during this time. Patient placed on 3L NC in the ED due to hypoxic and tachypneic.    Thoracic surgery consulted for right pleural effusion. S/p R PTC placed 5/9.

## 2025-05-12 NOTE — PHYSICAL THERAPY INITIAL EVALUATION ADULT - ADDITIONAL COMMENTS
Pt reports he lives in a house with his wife with 1 = another 2 KARTHIK without rail. All needs met on 1st floor. pt wife is able to supervise as needed. Pt was modified independent with RW vs rollator with b/l platform. Also owns a shower chair.

## 2025-05-12 NOTE — PROGRESS NOTE ADULT - SUBJECTIVE AND OBJECTIVE BOX
Brief summary:  84yMale seen and assessed at bedside.  Pt laying comfortably in hospital bed in NAD on room air.  States no current physical complaints at this time.  Denies f/c, n/v, chest pain, sob, abdominal pain, d/c, urinary symptoms.  ROS negative x 10.      Overnight events: No acute events overnight.  Hospital course progressing as expected.      PAST MEDICAL & SURGICAL HISTORY:  Hypertension    Hyperlipidemia    CAD (coronary artery disease)    Lung cancer    Afib    Chronic HFrEF (heart failure with reduced ejection fraction)    Aortic stenosis    History of COPD    S/P hip replacement, right    S/P hip replacement, left        Medications:  acetaminophen     Tablet .. 650 milliGRAM(s) Oral every 6 hours PRN  aluminum hydroxide/magnesium hydroxide/simethicone Suspension 30 milliLiter(s) Oral every 4 hours PRN  apixaban 5 milliGRAM(s) Oral two times a day  aspirin  chewable 81 milliGRAM(s) Oral daily  atorvastatin 40 milliGRAM(s) Oral at bedtime  fluticasone propionate/ salmeterol 100-50 MICROgram(s) Diskus 1 Dose(s) Inhalation two times a day  melatonin 3 milliGRAM(s) Oral at bedtime PRN  metoprolol succinate ER 12.5 milliGRAM(s) Oral daily  midodrine. 5 milliGRAM(s) Oral three times a day PRN  ondansetron Injectable 4 milliGRAM(s) IV Push every 8 hours PRN  senna 2 Tablet(s) Oral at bedtime  tiotropium 2.5 MICROgram(s) Inhaler 2 Puff(s) Inhalation daily      MEDICATIONS  (PRN):  acetaminophen     Tablet .. 650 milliGRAM(s) Oral every 6 hours PRN Temp greater or equal to 38C (100.4F), Mild Pain (1 - 3)  aluminum hydroxide/magnesium hydroxide/simethicone Suspension 30 milliLiter(s) Oral every 4 hours PRN Dyspepsia  melatonin 3 milliGRAM(s) Oral at bedtime PRN Insomnia  midodrine. 5 milliGRAM(s) Oral three times a day PRN see below  ondansetron Injectable 4 milliGRAM(s) IV Push every 8 hours PRN Nausea and/or Vomiting        Daily Review:               10.6   7.31  )-----------( 234      ( 12 May 2025 04:23 )             34.2   05-12    138  |  97  |  26.4[H]  ----------------------------<  102[H]  3.8   |  32.0[H]  |  0.99    Ca    8.9      12 May 2025 04:23  Phos  2.7     05-12  Mg     2.1     05-12    TPro  6.0[L]  /  Alb  2.7[L]  /  TBili  0.5  /  DBili  x   /  AST  18  /  ALT  9   /  AlkPhos  84  05-12      T(C): 36.5 (05-12-25 @ 07:06), Max: 36.7 (05-11-25 @ 17:00)  HR: 79 (05-12-25 @ 07:06) (79 - 88)  BP: 135/69 (05-12-25 @ 07:06) (110/63 - 135/69)  RR: 19 (05-12-25 @ 07:06) (18 - 19)  SpO2: 95% (05-12-25 @ 07:06) (93% - 96%)  Wt(kg): --      I&O's Summary    11 May 2025 07:01  -  12 May 2025 07:00  --------------------------------------------------------  IN: 0 mL / OUT: 640 mL / NET: -640 mL    12 May 2025 07:01  -  12 May 2025 10:58  --------------------------------------------------------  IN: 0 mL / OUT: 700 mL / NET: -700 mL        Physical Exam  Neuro: A+O x 3, non-focal, speech clear and intact  Pulm: coarse breath sounds bilaterally, no wheezing or rales  CV: RRR, +S1S2, + murmur  Abd: soft, NT, ND, normoactive bowel sounds  Ext: +DP Pulses b/l, +PT pulses, no edema  Chest tubes: right chest tube to WS, no air leak, no SQ air        CXR 5/11/25    < from: Xray Chest 1 View- PORTABLE-Routine (Xray Chest 1 View- PORTABLE-Routine in AM.) (05.11.25 @ 06:37) >  INTERPRETATION:  Follow-up.    AP chest. Prior 5/10/2025    IMPRESSION: Right chest port right chest tube reidentified in position.   No change in the small right pleural effusion with slight improvement in   the right perihilar atelectasis/consolidation. No new abnormality    --- End of Report ---    < end of copied text >

## 2025-05-12 NOTE — DISCHARGE NOTE NURSING/CASE MANAGEMENT/SOCIAL WORK - NSDCFUADDAPPT_GEN_ALL_CORE_FT
Please see below for post hospital follow up information     1. [facility] Meds To Bed: [phone]    2. Home Care:    3. Transitional Care Services: [phone]    4, A. Primary Care Appointment:PENDING    4, B. Specialty Appointment:PENDING    5. [facility] Education Packet Provided   Please see below for post hospital follow up information     1. [facility] Meds To Bed: [phone]    2. Home Care:[facility]    3. Transitional Care Services: [phone]    4, A. Primary Care Appointment:Pcp- Appointment made with Dr. Carballo on 5/22 at 1:00.  If you are unable to attend your pre-scheduled appointment, please contact the office directly at [phone] to reschedule.           4, B. Specialty Appointment:PENDING    5. STAR Education Packet Provided   Please see below for post hospital follow up information     1. [facility] Meds To Bed: [phone]    2. Home Care:[facility]    3. Transitional Care Services: [phone]    4, A. Primary Care Appointment:Pcp- Appointment made with Dr. Carballo on 5/22 at 1:00.  If you are unable to attend your pre-scheduled appointment, please contact the office directly at [phone] to reschedule.           4, B. Specialty Appointment:[facility] office will set up    5. STAR Education Packet Provided

## 2025-05-12 NOTE — PROGRESS NOTE ADULT - SUBJECTIVE AND OBJECTIVE BOX
[facility] - [facility]                                   [address] [place] [address]                                                      PHONE: [phone]                                                         FAX: [phone]  [email or web address]  ---------------------------------------------------------------------------------------------------------------------------------    Overnight events/patient complaints:      NAD feeling well today     No Known Allergies    MEDICATIONS  (STANDING):  apixaban 5 milliGRAM(s) Oral two times a day  aspirin  chewable 81 milliGRAM(s) Oral daily  atorvastatin 40 milliGRAM(s) Oral at bedtime  fluticasone propionate/ salmeterol 100-50 MICROgram(s) Diskus 1 Dose(s) Inhalation two times a day  furosemide    Tablet 40 milliGRAM(s) Oral daily  metoprolol succinate ER 12.5 milliGRAM(s) Oral daily  midodrine. 15 milliGRAM(s) Oral three times a day  senna 2 Tablet(s) Oral at bedtime  tiotropium 2.5 MICROgram(s) Inhaler 2 Puff(s) Inhalation daily    MEDICATIONS  (PRN):  acetaminophen     Tablet .. 650 milliGRAM(s) Oral every 6 hours PRN Temp greater or equal to 38C (100.4F), Mild Pain (1 - 3)  aluminum hydroxide/magnesium hydroxide/simethicone Suspension 30 milliLiter(s) Oral every 4 hours PRN Dyspepsia  melatonin 3 milliGRAM(s) Oral at bedtime PRN Insomnia  ondansetron Injectable 4 milliGRAM(s) IV Push every 8 hours PRN Nausea and/or Vomiting      Vital Signs Last 24 Hrs  T(C): 36.5 (12 May 2025 07:06), Max: 36.7 (11 May 2025 17:00)  T(F): 97.7 (12 May 2025 07:06), Max: 98.1 (11 May 2025 17:00)  HR: 79 (12 May 2025 07:06) (79 - 88)  BP: 135/69 (12 May 2025 07:06) (110/63 - 135/69)  BP(mean): --  RR: 19 (12 May 2025 07:06) (18 - 19)  SpO2: 95% (12 May 2025 07:06) (93% - 96%)    Parameters below as of 12 May 2025 07:06  Patient On (Oxygen Delivery Method): room air      ICU Vital Signs Last 24 Hrs  JONE SOUTH  I&O's Detail    11 May 2025 07:01  -  12 May 2025 07:00  --------------------------------------------------------  IN:  Total IN: 0 mL    OUT:    Chest Tube (mL): 640 mL  Total OUT: 640 mL    Total NET: -640 mL      12 May 2025 07:01  -  12 May 2025 09:44  --------------------------------------------------------  IN:  Total IN: 0 mL    OUT:    Voided (mL): 700 mL  Total OUT: 700 mL    Total NET: -700 mL        I&O's Summary    11 May 2025 07:01  -  12 May 2025 07:00  --------------------------------------------------------  IN: 0 mL / OUT: 640 mL / NET: -640 mL    12 May 2025 07:01  -  12 May 2025 09:44  --------------------------------------------------------  IN: 0 mL / OUT: 700 mL / NET: -700 mL      Drug Dosing Weight  JONE SOUTH      PHYSICAL EXAM:  General: Appears well developed, alert and cooperative.  HEENT: Head; normocephalic, atraumatic.  Eyes: Pupils reactive, cornea wnl.  Neck: Supple, no nodes adenopathy, no NVD or carotid bruit or thyromegaly.  CARDIOVASCULAR: Normal S1 and S2, 2/6 murmur, rub, gallop or lift.   LUNGS: Decrease BS at the lower bases + chest tube   ABDOMEN: Soft, nontender without mass or organomegaly. bowel sounds normoactive.  EXTREMITIES: No clubbing, cyanosis or edema. Distal pulses wnl.   SKIN: warm and dry with normal turgor.  NEURO: Alert/oriented x 3/normal motor exam. No pathologic reflexes.    PSYCH: normal affect.        LABS:                        10.6   7.31  )-----------( 234      ( 12 May 2025 04:23 )             34.2     05-12    138  |  97  |  26.4[H]  ----------------------------<  102[H]  3.8   |  32.0[H]  |  0.99    Ca    8.9      12 May 2025 04:23  Phos  2.7     05-12  Mg     2.1     05-12    TPro  6.0[L]  /  Alb  2.7[L]  /  TBili  0.5  /  DBili  x   /  AST  18  /  ALT  9   /  AlkPhos  84  05-12    JONE SOUTH        Urinalysis Basic - ( 12 May 2025 04:23 )    Color: x / Appearance: x / SG: x / pH: x  Gluc: 102 mg/dL / Ketone: x  / Bili: x / Urobili: x   Blood: x / Protein: x / Nitrite: x   Leuk Esterase: x / RBC: x / WBC x   Sq Epi: x / Non Sq Epi: x / Bacteria: x        RADIOLOGY & ADDITIONAL STUDIES:    INTERPRETATION OF TELEMETRY (personally reviewed):        INTERPRETATION OF TELEMETRY (personally reviewed):  atrial fibrillation rate controlled    ASSESSMENT AND PLAN:  In summary, JONE SOUTH is an 84y Male with chronic HFpEF, mod-severe AS, hypertension, hyperlipidemia, moderate CAD, persistent atrial fibrillation, SCC lung s/p treatment c/b DVT, on Eliquis, who presented with shortness of breath and acute on chronic HFpEF in setting of AF/AS s/p right chest tube    - Continue to monitor urine output and fluid status on Lasix 40 PO daily  - Had long conversation with son and wife at bedside re: CHF management. Discussed monitoring I/Os, weight, increasing Lasix as needed. Also extensively discussed the complexity of his care including multiple conditions interacting and affecting care Moderate to severe AS/AF along with orthostatic hypotension    - Chest tube per thoracic surgery  - Consider CardioMEMS prior to DC, eventual rhythm control outpatient possibly

## 2025-05-12 NOTE — PROGRESS NOTE ADULT - ASSESSMENT
84y Male w/ PMH of HFrEF, afib (on Eliquis), COPD (no home O2), severe AS, prior PE (no longer on AC), and stage 3 SCC lung (treated w/ platinum doublet chemoRT and adjuvant durvalumab 1 year ago finished) presented with worsening acute on chornic dyspnea and cough. Admitted for acute hypoxic respiratory failure 2/2 pleural effusions in setting of acute HFrEF. S/p right chest tube.     #Acute hypoxic respiratory failure- improved  #Acute HFrEF- improved  #R Pleural effusion, known effusion, drainage in the past   - Off NC  - CT-A PE study w/o PE but shows worsening of right sided effusion  - Most recent TTE 3/2025 w/ mildly reduced EF 40-45% and mod-severe AS and MR  - S/p IV lasix, now on PO Lasix 40mg daily, plan to wean off lasix prior to dc   - GDMT limited by orthostasis/hypotension on midodrine at home, but on Toprol 12.5mg QD  - S/p R chest tube. Transudative effusion by Light's criterion. Culture NGTD  - Monitor I/O  - Cards following, Cardiomemo prior to dc     #Afib NVR  - [id number] score 3  - Currently rate controlled  - C/w eliquis  - C/w home Toprol for rate control  - Cardiology following     #COPD  - Not in acute exacerbation  - C/w home Trelegy    #Hx of stage 3 SCC lung cancer   - S/p chemoradiation completed 4/2023  - Completed 1 year of durvaluamb 6/2024  - Heme/onc signed off    #Orthostatic hypotension  - Cont midodrine    #Nodular opacity, left upper lobe  - Follow up outpatient     DVT PPx: Eliquis  Dispo: Active, chest tube needs to be removed, then dc home 84y Male w/ PMH of HFrEF, afib (on Eliquis), COPD (no home O2), severe AS, prior PE (no longer on AC), and stage 3 SCC lung (treated w/ platinum doublet chemoRT and adjuvant durvalumab 1 year ago finished) presented with worsening acute on chornic dyspnea and cough. Admitted for acute hypoxic respiratory failure 2/2 pleural effusions in setting of acute HFrEF. S/p right chest tube.     #Acute hypoxic respiratory failure- improved  #Acute HFrEF- improved  #R Pleural effusion, known effusion, drainage in the past   - Off NC  - CT-A PE study w/o PE but shows worsening of right sided effusion  - Most recent TTE 3/2025 w/ mildly reduced EF 40-45% and mod-severe AS and MR  - S/p IV lasix, now on PO Lasix 40mg daily, plan to wean off lasix prior to dc   - GDMT limited by orthostasis/hypotension on midodrine at home, but on Toprol 12.5mg QD  - S/p R chest tube. Transudative effusion by Light's criterion. Culture NGTD, management per thoracic   - Monitor I/O  - Cards following, Cardiomemo prior to dc     #Afib NVR  - [id number] score 3  - Currently rate controlled  - C/w eliquis  - C/w home Toprol for rate control  - Cardiology following     #COPD  - Not in acute exacerbation  - C/w home Trelegy    #Hx of stage 3 SCC lung cancer   - S/p chemoradiation completed 4/2023  - Completed 1 year of durvaluamb 6/2024  - Heme/onc signed off    #Orthostatic hypotension  - Cont midodrine    #Nodular opacity, left upper lobe  - Follow up outpatient     DVT PPx: Eliquis  Dispo: Active, chest tube needs to be removed, possible cardiomemes before dc home with home care 84y Male w/ PMH of HFrEF, A fib (on Eliquis), COPD (no home O2), severe AS, prior PE (no longer on AC), and stage 3 SCC lung (treated w/ platinum doublet chemoRT and adjuvant durvalumab 1 year ago finished) presented with worsening acute on chronic dyspnea and cough. Admitted for acute hypoxic respiratory failure 2/2 pleural effusions in setting of acute HFrEF. S/p right chest tube.     #Acute hypoxic respiratory failure- improved  #Acute HFrEF- improved  #R Pleural effusion, known effusion, drainage in the past   - Off NC  - CT-A PE study w/o PE but shows worsening of right sided effusion  - Most recent TTE 3/2025 w/ mildly reduced EF 40-45% and mod-severe AS and MR  - S/p IV lasix, now on PO Lasix 40mg daily, plan to wean off lasix prior to dc   - GDMT limited by orthostasis/hypotension on midodrine at home, but on Toprol 12.5mg QD  - S/p R chest tube. Transudative effusion by Light's criterion. Culture NGTD, management per thoracic   - Monitor I/O  - Cards following, Cardiomemo prior to dc     #Afib NVR  - [id number] score 3  - Currently rate controlled  - C/w eliquis  - C/w home Toprol for rate control  - Cardiology following     #COPD  - Not in acute exacerbation  - C/w home Trelegy    #Hx of stage 3 SCC lung cancer   - S/p chemoradiation completed 4/2023  - Completed 1 year of durvaluamb 6/2024  - Heme/onc signed off    #Orthostatic hypotension  - Cont midodrine    #Nodular opacity, left upper lobe  - Follow up outpatient     DVT PPx: Eliquis  Dispo: Active, chest tube needs to be removed, possible cardiomemes before dc home with home care

## 2025-05-13 DIAGNOSIS — I50.23 ACUTE ON CHRONIC SYSTOLIC (CONGESTIVE) HEART FAILURE: ICD-10-CM

## 2025-05-13 DIAGNOSIS — I48.20 CHRONIC ATRIAL FIBRILLATION, UNSPECIFIED: ICD-10-CM

## 2025-05-13 DIAGNOSIS — Z51.5 ENCOUNTER FOR PALLIATIVE CARE: ICD-10-CM

## 2025-05-13 DIAGNOSIS — C34.90 MALIGNANT NEOPLASM OF UNSPECIFIED PART OF UNSPECIFIED BRONCHUS OR LUNG: ICD-10-CM

## 2025-05-13 LAB
ALBUMIN SERPL ELPH-MCNC: 2.8 G/DL — LOW (ref 3.3–5.2)
ALP SERPL-CCNC: 81 U/L — SIGNIFICANT CHANGE UP (ref 40–120)
ALT FLD-CCNC: 8 U/L — SIGNIFICANT CHANGE UP
ANION GAP SERPL CALC-SCNC: 11 MMOL/L — SIGNIFICANT CHANGE UP (ref 5–17)
AST SERPL-CCNC: 14 U/L — SIGNIFICANT CHANGE UP
BILIRUB SERPL-MCNC: 0.5 MG/DL — SIGNIFICANT CHANGE UP (ref 0.4–2)
BUN SERPL-MCNC: 24.3 MG/DL — HIGH (ref 8–20)
CALCIUM SERPL-MCNC: 8.9 MG/DL — SIGNIFICANT CHANGE UP (ref 8.4–10.5)
CHLORIDE SERPL-SCNC: 100 MMOL/L — SIGNIFICANT CHANGE UP (ref 96–108)
CO2 SERPL-SCNC: 32 MMOL/L — HIGH (ref 22–29)
CREAT SERPL-MCNC: 1.01 MG/DL — SIGNIFICANT CHANGE UP (ref 0.5–1.3)
EGFR: 74 ML/MIN/1.73M2 — SIGNIFICANT CHANGE UP
EGFR: 74 ML/MIN/1.73M2 — SIGNIFICANT CHANGE UP
GLUCOSE SERPL-MCNC: 105 MG/DL — HIGH (ref 70–99)
HCT VFR BLD CALC: 33.8 % — LOW (ref 39–50)
HGB BLD-MCNC: 10.3 G/DL — LOW (ref 13–17)
MCHC RBC-ENTMCNC: 27.6 PG — SIGNIFICANT CHANGE UP (ref 27–34)
MCHC RBC-ENTMCNC: 30.5 G/DL — LOW (ref 32–36)
MCV RBC AUTO: 90.6 FL — SIGNIFICANT CHANGE UP (ref 80–100)
NRBC # BLD AUTO: 0 K/UL — SIGNIFICANT CHANGE UP (ref 0–0)
NRBC # FLD: 0 K/UL — SIGNIFICANT CHANGE UP (ref 0–0)
NRBC BLD AUTO-RTO: 0 /100 WBCS — SIGNIFICANT CHANGE UP (ref 0–0)
PLATELET # BLD AUTO: 250 K/UL — SIGNIFICANT CHANGE UP (ref 150–400)
PMV BLD: 11 FL — SIGNIFICANT CHANGE UP (ref 7–13)
POTASSIUM SERPL-MCNC: 3.5 MMOL/L — SIGNIFICANT CHANGE UP (ref 3.5–5.3)
POTASSIUM SERPL-SCNC: 3.5 MMOL/L — SIGNIFICANT CHANGE UP (ref 3.5–5.3)
PROT SERPL-MCNC: 5.9 G/DL — LOW (ref 6.6–8.7)
RBC # BLD: 3.73 M/UL — LOW (ref 4.2–5.8)
RBC # FLD: 16.6 % — HIGH (ref 10.3–14.5)
SODIUM SERPL-SCNC: 143 MMOL/L — SIGNIFICANT CHANGE UP (ref 135–145)
WBC # BLD: 7.87 K/UL — SIGNIFICANT CHANGE UP (ref 3.8–10.5)
WBC # FLD AUTO: 7.87 K/UL — SIGNIFICANT CHANGE UP (ref 3.8–10.5)

## 2025-05-13 PROCEDURE — 71045 X-RAY EXAM CHEST 1 VIEW: CPT | Mod: 26

## 2025-05-13 PROCEDURE — 99231 SBSQ HOSP IP/OBS SF/LOW 25: CPT | Mod: FS

## 2025-05-13 PROCEDURE — 71045 X-RAY EXAM CHEST 1 VIEW: CPT | Mod: 26,77

## 2025-05-13 PROCEDURE — 99223 1ST HOSP IP/OBS HIGH 75: CPT

## 2025-05-13 PROCEDURE — 99232 SBSQ HOSP IP/OBS MODERATE 35: CPT | Mod: GC

## 2025-05-13 PROCEDURE — 99497 ADVNCD CARE PLAN 30 MIN: CPT | Mod: 25

## 2025-05-13 RX ORDER — POLYETHYLENE GLYCOL 3350 17 G/17G
17 POWDER, FOR SOLUTION ORAL DAILY
Refills: 0 | Status: DISCONTINUED | OUTPATIENT
Start: 2025-05-13 | End: 2025-05-16

## 2025-05-13 RX ADMIN — Medication 81 MILLIGRAM(S): at 11:25

## 2025-05-13 RX ADMIN — APIXABAN 5 MILLIGRAM(S): 2.5 TABLET, FILM COATED ORAL at 17:34

## 2025-05-13 RX ADMIN — METOPROLOL SUCCINATE 12.5 MILLIGRAM(S): 50 TABLET, EXTENDED RELEASE ORAL at 05:04

## 2025-05-13 RX ADMIN — TIOTROPIUM BROMIDE INHALATION SPRAY 2 PUFF(S): 3.12 SPRAY, METERED RESPIRATORY (INHALATION) at 11:28

## 2025-05-13 RX ADMIN — POLYETHYLENE GLYCOL 3350 17 GRAM(S): 17 POWDER, FOR SOLUTION ORAL at 11:25

## 2025-05-13 RX ADMIN — APIXABAN 5 MILLIGRAM(S): 2.5 TABLET, FILM COATED ORAL at 05:04

## 2025-05-13 RX ADMIN — ATORVASTATIN CALCIUM 40 MILLIGRAM(S): 80 TABLET, FILM COATED ORAL at 21:10

## 2025-05-13 RX ADMIN — Medication 2 TABLET(S): at 21:10

## 2025-05-13 RX ADMIN — POLYETHYLENE GLYCOL 3350 17 GRAM(S): 17 POWDER, FOR SOLUTION ORAL at 06:34

## 2025-05-13 RX ADMIN — Medication 1 DOSE(S): at 11:27

## 2025-05-13 NOTE — PROGRESS NOTE ADULT - SUBJECTIVE AND OBJECTIVE BOX
Patient is a 84y old  Male who presents with a chief complaint of acute hypoxic respiratory failure, acute hfref, pleural effusions (12 May 2025 10:58)      INTERVAL HPI/OVERNIGHT EVENTS:  - No acute overnight events    TODAY:  - Patient examined bedside, no complaints. Patient denies CP, SOB, dizziness, fever, chills, nausea, vomiting, constipation, diarrhea.    MEDICATIONS  (STANDING):  apixaban 5 milliGRAM(s) Oral two times a day  aspirin  chewable 81 milliGRAM(s) Oral daily  atorvastatin 40 milliGRAM(s) Oral at bedtime  fluticasone propionate/ salmeterol 100-50 MICROgram(s) Diskus 1 Dose(s) Inhalation two times a day  metoprolol succinate ER 12.5 milliGRAM(s) Oral daily  senna 2 Tablet(s) Oral at bedtime  tiotropium 2.5 MICROgram(s) Inhaler 2 Puff(s) Inhalation daily    MEDICATIONS  (PRN):  acetaminophen     Tablet .. 650 milliGRAM(s) Oral every 6 hours PRN Temp greater or equal to 38C (100.4F), Mild Pain (1 - 3)  aluminum hydroxide/magnesium hydroxide/simethicone Suspension 30 milliLiter(s) Oral every 4 hours PRN Dyspepsia  melatonin 3 milliGRAM(s) Oral at bedtime PRN Insomnia  midodrine. 5 milliGRAM(s) Oral three times a day PRN see below  ondansetron Injectable 4 milliGRAM(s) IV Push every 8 hours PRN Nausea and/or Vomiting      Allergies    No Known Allergies    Intolerances        REVIEW OF SYSTEMS:  Per HPI.      Vital Signs Last 24 Hrs  T(C): 36.4 (13 May 2025 04:31), Max: 36.9 (12 May 2025 20:37)  T(F): 97.5 (13 May 2025 04:31), Max: 98.5 (12 May 2025 20:37)  HR: 75 (13 May 2025 04:31) (75 - 88)  BP: 125/65 (13 May 2025 04:31) (121/74 - 135/69)  BP(mean): --  RR: 18 (13 May 2025 04:31) (18 - 19)  SpO2: 92% (13 May 2025 04:31) (92% - 95%)    Parameters below as of 13 May 2025 04:31  Patient On (Oxygen Delivery Method): room air        PHYSICAL EXAM:  GENERAL: No acute distress, comfortable in chair  HEENT: Atraumatic, normocephalic, non-icteric  NECK: Supple, full range of motion, no observable masses  NEURO: A&Ox3, no focal deficits, moving all extremities spontaneously, no dysarthria, CN II-XII grossly intact  PSYCH: Normal affect, appropriate insight and judgment, fluent speech  LUNGS: Decreased breath sounds on right, right chest tube draining light yellow fluid   HEART: Irregular, systolic murmur   ABD: Soft, non-tender, non-distended, no appreciable masses  EXTREMITIES: Minimal pitting edema bl, improved from prior   SKIN: Warm and dry     LABS:                        10.3   7.87  )-----------( 250      ( 13 May 2025 03:50 )             33.8     05-13    143  |  100  |  24.3[H]  ----------------------------<  105[H]  3.5   |  32.0[H]  |  1.01    Ca    8.9      13 May 2025 03:50  Phos  2.7     05-12  Mg     2.1     05-12    TPro  5.9[L]  /  Alb  2.8[L]  /  TBili  0.5  /  DBili  x   /  AST  14  /  ALT  8   /  AlkPhos  81  05-13      Urinalysis Basic - ( 13 May 2025 03:50 )    Color: x / Appearance: x / SG: x / pH: x  Gluc: 105 mg/dL / Ketone: x  / Bili: x / Urobili: x   Blood: x / Protein: x / Nitrite: x   Leuk Esterase: x / RBC: x / WBC x   Sq Epi: x / Non Sq Epi: x / Bacteria: x      CAPILLARY BLOOD GLUCOSE          RADIOLOGY & ADDITIONAL TESTS:    Imaging Personally Reviewed:  [X] YES  [ ] NO    Consultant(s) Notes Reviewed:  [X] YES  [ ] NO    Care Discussed with Consultants/Other Providers [X] YES  [ ] NO    Plan of Care discussed with House Staff: [X]YES [ ] NO Patient is a 84y old  Male who presents with a chief complaint of acute hypoxic respiratory failure, acute hfref, pleural effusions (12 May 2025 10:58)      INTERVAL HPI/OVERNIGHT EVENTS:  - No acute overnight events    TODAY:  - Patient examined bedside, feeling well. Tolerating diet. Haven't had BM in 3 days. Patient denies CP, SOB, dizziness, fever, chills, nausea, vomiting, diarrhea.    MEDICATIONS  (STANDING):  apixaban 5 milliGRAM(s) Oral two times a day  aspirin  chewable 81 milliGRAM(s) Oral daily  atorvastatin 40 milliGRAM(s) Oral at bedtime  fluticasone propionate/ salmeterol 100-50 MICROgram(s) Diskus 1 Dose(s) Inhalation two times a day  metoprolol succinate ER 12.5 milliGRAM(s) Oral daily  senna 2 Tablet(s) Oral at bedtime  tiotropium 2.5 MICROgram(s) Inhaler 2 Puff(s) Inhalation daily    MEDICATIONS  (PRN):  acetaminophen     Tablet .. 650 milliGRAM(s) Oral every 6 hours PRN Temp greater or equal to 38C (100.4F), Mild Pain (1 - 3)  aluminum hydroxide/magnesium hydroxide/simethicone Suspension 30 milliLiter(s) Oral every 4 hours PRN Dyspepsia  melatonin 3 milliGRAM(s) Oral at bedtime PRN Insomnia  midodrine. 5 milliGRAM(s) Oral three times a day PRN see below  ondansetron Injectable 4 milliGRAM(s) IV Push every 8 hours PRN Nausea and/or Vomiting      Allergies    No Known Allergies    Intolerances        REVIEW OF SYSTEMS:  Per HPI.      Vital Signs Last 24 Hrs  T(C): 36.4 (13 May 2025 04:31), Max: 36.9 (12 May 2025 20:37)  T(F): 97.5 (13 May 2025 04:31), Max: 98.5 (12 May 2025 20:37)  HR: 75 (13 May 2025 04:31) (75 - 88)  BP: 125/65 (13 May 2025 04:31) (121/74 - 135/69)  BP(mean): --  RR: 18 (13 May 2025 04:31) (18 - 19)  SpO2: 92% (13 May 2025 04:31) (92% - 95%)    Parameters below as of 13 May 2025 04:31  Patient On (Oxygen Delivery Method): room air        PHYSICAL EXAM:  GENERAL: No acute distress, comfortable in chair  HEENT: Atraumatic, normocephalic, non-icteric  NECK: Supple, full range of motion, no observable masses  NEURO: A&Ox3, no focal deficits, moving all extremities spontaneously, no dysarthria, CN II-XII grossly intact  PSYCH: Normal affect, appropriate insight and judgment, fluent speech  LUNGS: Decreased breath sounds on right, improved from prior, otherwise clear bl, right chest tube clamped overnight   HEART: Irregular, systolic murmur   ABD: Soft, non-tender, non-distended, no appreciable masses  EXTREMITIES: Minimal pitting edema bl  SKIN: Warm and dry     LABS:                        10.3   7.87  )-----------( 250      ( 13 May 2025 03:50 )             33.8     05-13    143  |  100  |  24.3[H]  ----------------------------<  105[H]  3.5   |  32.0[H]  |  1.01    Ca    8.9      13 May 2025 03:50  Phos  2.7     05-12  Mg     2.1     05-12    TPro  5.9[L]  /  Alb  2.8[L]  /  TBili  0.5  /  DBili  x   /  AST  14  /  ALT  8   /  AlkPhos  81  05-13      Urinalysis Basic - ( 13 May 2025 03:50 )    Color: x / Appearance: x / SG: x / pH: x  Gluc: 105 mg/dL / Ketone: x  / Bili: x / Urobili: x   Blood: x / Protein: x / Nitrite: x   Leuk Esterase: x / RBC: x / WBC x   Sq Epi: x / Non Sq Epi: x / Bacteria: x      CAPILLARY BLOOD GLUCOSE          RADIOLOGY & ADDITIONAL TESTS:    Imaging Personally Reviewed:  [X] YES  [ ] NO    Consultant(s) Notes Reviewed:  [X] YES  [ ] NO    Care Discussed with Consultants/Other Providers [X] YES  [ ] NO    Plan of Care discussed with House Staff: [X]YES [ ] NO

## 2025-05-13 NOTE — CONSULT NOTE ADULT - CONVERSATION DETAILS
Discussed overall medical condition, prognosis, and options for plan of care with Duane, his wife Constanza, his daughter Aurora, and his son-in-law. Also present were myself and Dr. Washington. Pt expressed that since having been admitted and receiving IV diuresis and a pleural catheter, he has significant improvement in his dyspnea. Expressed understanding that the etiology of his effusions is likely 2/2 acute HF on top of chronic effusions. Expressed that he has not thought about advanced directives before, but now that he is feeling better compared to admission, he would like to talk about it more with his family and make a decision before he were to decompensate in the future this way his wishes are already known, though would need to think more about what those wishes are. States his family is a good support.

## 2025-05-13 NOTE — CONSULT NOTE ADULT - SUBJECTIVE AND OBJECTIVE BOX
HPI:  72 y/o M w/ hx of HFrEF, afib (on Eliquis), COPD (no home O2), severe AS, prior PE (no longer on AC), and stage 3 SCC lung (treated w/ platinum doublet chemoRT and adjuvant durvalumab 1 year ago finished) who presented w/ acute on chronic dyspnea and cough. Pt was in the hospital in March for dyspnea. At that time, was found to have pleural effusions. Also underwent cardiac workup, which revealed mod-severe AS and patent coronaries on LHC. No thoracentesis at that time. Since discharge, pt has remained at baseline respiratory status (mild dyspnea on exertion), but over past couple days has had worsening. Also developed intermittent cough which is productive but pt is swallowing phlegm so not able to describe it further. Also complaining of orthopnea and increased LE swelling during this time. Denies chest pain, pleuritic pain, abd pain, hematuria, dysuria. Denies sick contact and travel.    In the ED, pt was hypoxic and tachypneic requiring 3L NC, but otherwise hemodynamically stable and afebrile. Labs w/o leukocytosis. Trop 37 --> 34. BNP at 2144. CT-A PE study w/o PE but showed worsening of large right-sided pleural effusion with concern for interstitial edema. 1.1-cm nodule identified on CT but PET at the end of April negative for FGD-avid lesions.  (08 May 2025 03:34)    72 y/o M w/ hx of HFrEF, afib (on Eliquis), COPD (no home O2), severe AS, prior PE (no longer on AC), and stage 3 SCC lung (treated w/ platinum doublet chemoRT and adjuvant durvalumab 1 year ago finished) who presented w/ acute on chronic dyspnea and cough. In the ED, pt was hypoxic requiring supplemental O2 via NC. Labs showed elevated BNP. CT-A r/o PE but did show large increased right-sided pleural effusion w/ interstitial edema. Admitted w/ pleural effusions and acute HF exacerbation. Has now had IV diuresis as well as pleural catheter placement with significant interval improvement in volume status and respiratory status.    PERTINENT PMH REVIEWED: Yes     PAST MEDICAL & SURGICAL HISTORY:  Hypertension  Hyperlipidemia  CAD (coronary artery disease)  Lung cancer  Afib  Chronic HFrEF (heart failure with reduced ejection fraction)  Aortic stenosis  History of COPD  S/P hip replacement, right  S/P hip replacement, left    SOCIAL HISTORY:                      Substance history: None                     Admitted from:  home \                    Episcopal/spirituality: Rastafari                     Cultural concerns: None                       Surrogate/HCP/Guardian: Phone#: Rodrigo Alexander [phone]    FAMILY HISTORY:  Family history of CVA (Father)    FH: heart attack (Father)        Allergies    No Known Allergies    Intolerances        ADVANCE DIRECTIVES/TREATMENT PREFERENCES:  Full code, all aggressive measures desired     Baseline ADLs (prior to admission):  Independent mostly, though some assistance from wife      Karnofsky/Palliative Performance Status Version 2:  80%  http://npcrc.org/files/news/palliative_performance_scale_ppsv2.pdf    Present Symptoms:     Dyspnea: No  Nausea/Vomiting:  No  Anxiety:  No  Depression:  No  Fatigue: No  Loss of appetite: No  Constipation: No    Pain: None  Pain AD Score:0  [email or web address]/[email or web address] (press ctrl + left click to view)    Review of Systems: Significantly improved LE swelling and dyspnea, now able to walk around more, sitting in chair  All others negative    MEDICATIONS  (STANDING):  apixaban 5 milliGRAM(s) Oral two times a day  aspirin  chewable 81 milliGRAM(s) Oral daily  atorvastatin 40 milliGRAM(s) Oral at bedtime  fluticasone propionate/ salmeterol 100-50 MICROgram(s) Diskus 1 Dose(s) Inhalation two times a day  metoprolol succinate ER 12.5 milliGRAM(s) Oral daily  polyethylene glycol 3350 17 Gram(s) Oral daily  senna 2 Tablet(s) Oral at bedtime  tiotropium 2.5 MICROgram(s) Inhaler 2 Puff(s) Inhalation daily    MEDICATIONS  (PRN):  acetaminophen     Tablet .. 650 milliGRAM(s) Oral every 6 hours PRN Temp greater or equal to 38C (100.4F), Mild Pain (1 - 3)  aluminum hydroxide/magnesium hydroxide/simethicone Suspension 30 milliLiter(s) Oral every 4 hours PRN Dyspepsia  melatonin 3 milliGRAM(s) Oral at bedtime PRN Insomnia  midodrine. 5 milliGRAM(s) Oral three times a day PRN see below  ondansetron Injectable 4 milliGRAM(s) IV Push every 8 hours PRN Nausea and/or Vomiting      PHYSICAL EXAM:    Vital Signs Last 24 Hrs  T(C): 36.8 (13 May 2025 07:10), Max: 36.9 (12 May 2025 20:37)  T(F): 98.3 (13 May 2025 07:10), Max: 98.5 (12 May 2025 20:37)  HR: 80 (13 May 2025 07:10) (75 - 88)  BP: 135/68 (13 May 2025 07:10) (121/74 - 135/68)  BP(mean): --  RR: 19 (13 May 2025 07:10) (18 - 19)  SpO2: 94% (13 May 2025 07:10) (92% - 94%)    Parameters below as of 13 May 2025 08:30  Patient On (Oxygen Delivery Method): room air        General: alert  oriented x 3  HEENT: normal    Chest: Pleural catheter in place   Lungs: comfortable, no tachypnea/labored breathing    CV: normal    GI: normal    : normal    MSK: normal  Neuro: no focal deficit, no cognitive impairment   Skin: normal      LABS:                        10.3   7.87  )-----------( 250      ( 13 May 2025 03:50 )             33.8     05-13    143  |  100  |  24.3[H]  ----------------------------<  105[H]  3.5   |  32.0[H]  |  1.01    Ca    8.9      13 May 2025 03:50  Phos  2.7     05-12  Mg     2.1     05-12    TPro  5.9[L]  /  Alb  2.8[L]  /  TBili  0.5  /  DBili  x   /  AST  14  /  ALT  8   /  AlkPhos  81  05-13      Urinalysis Basic - ( 13 May 2025 03:50 )    Color: x / Appearance: x / SG: x / pH: x  Gluc: 105 mg/dL / Ketone: x  / Bili: x / Urobili: x   Blood: x / Protein: x / Nitrite: x   Leuk Esterase: x / RBC: x / WBC x   Sq Epi: x / Non Sq Epi: x / Bacteria: x      I&O's Summary    12 May 2025 07:01  -  13 May 2025 07:00  --------------------------------------------------------  IN: 0 mL / OUT: 1140 mL / NET: -1140 mL        RADIOLOGY & ADDITIONAL STUDIES:  < from: Xray Chest 1 View- PORTABLE-Routine (Xray Chest 1 View- PORTABLE-Routine in AM.) (05.13.25 @ 05:18) >  IMPRESSION:    Right chest tube in place with no pneumothorax, unchanged.    Infiltrate/atelectasis in the right perihilar region/upper lobe, unchanged    --- End of Report ---    < end of copied text >

## 2025-05-13 NOTE — PROGRESS NOTE ADULT - ASSESSMENT
84y Male w/ PMH of HFrEF, A fib (on Eliquis), COPD (no home O2), severe AS, prior PE (no longer on AC), and stage 3 SCC lung (treated w/ platinum doublet chemoRT and adjuvant durvalumab 1 year ago finished) presented with worsening acute on chronic dyspnea and cough. Admitted for acute hypoxic respiratory failure 2/2 pleural effusions in setting of acute HFrEF. S/p right chest tube.     #Acute hypoxic respiratory failure- improved  #Acute HFrEF- improved  #R Pleural effusion, drainage in the past   - Off NC  - CT-A PE study w/o PE but shows worsening of right sided effusion  - Most recent TTE 3/2025 w/ mildly reduced EF 40-45% and mod-severe AS and MR  - Lasix prn  - GDMT limited by orthostasis/hypotension on midodrine at home, but on Toprol 12.5mg QD  - S/p R chest tube. Transudative effusion by Light's criterion. Culture NGTD, cytology negative for malignant cells, management per thoracic   - CXR stable  - Monitor I/O  - Cards following, Cardiomemo prior to dc     #Afib NVR  - [id number] score 3  - Currently rate controlled  - C/w eliquis  - C/w home Toprol for rate control  - Cardiology following     #COPD  - Not in acute exacerbation  - C/w home Trelegy    #Hx of stage 3 SCC lung cancer   - S/p chemoradiation completed 4/2023  - Completed 1 year of durvaluamb 6/2024  - Heme/onc signed off    #Orthostatic hypotension  - Cont home midodrine prn    #Nodular opacity, left upper lobe  - Follow up outpatient     DVT PPx: Eliquis  Dispo: Active, chest tube needs to be removed, possible cardiomemes before dc home with home care 84y Male w/ PMH of HFrEF, A fib (on Eliquis), COPD (no home O2), severe AS, prior PE (no longer on AC), and stage 3 SCC lung (treated w/ platinum doublet chemoRT and adjuvant durvalumab 1 year ago finished) presented with worsening acute on chronic dyspnea and cough. Admitted for acute hypoxic respiratory failure 2/2 pleural effusions in setting of acute HFrEF. S/p right chest tube.     #Acute hypoxic respiratory failure- improved  #Acute HFrEF- improved  #R Pleural effusion, drainage in the past   - On RA  - CT-A PE study w/o PE but shows worsening of right sided effusion  - Most recent TTE 3/2025 w/ mildly reduced EF 40-45% and mod-severe AS and MR  - Lasix prn  - GDMT limited by orthostasis/hypotension on midodrine at home, but on Toprol 12.5mg QD  - S/p R chest tube. Clamped today   - Transudative effusion by Light's criterion. Culture NGTD, cytology negative for malignant cells, management per thoracic   - CXR stable  - Monitor I/O  - Cards following, Cardiomemo prior to dc     #Afib NVR  - [id number] score 3  - Currently rate controlled  - C/w eliquis  - C/w home Toprol for rate control  - Cardiology following     #COPD  - Not in acute exacerbation  - C/w home Trelegy    #Hx of stage 3 SCC lung cancer   - S/p chemoradiation completed 4/2023  - Completed 1 year of durvaluamb 6/2024  - Heme/onc signed off    #Orthostatic hypotension  - Cont home midodrine prn    #Nodular opacity, left upper lobe  - Follow up outpatient     DVT PPx: Eliquis  Dispo: Active, pending rec for chest tube management, possible cardiomemes before dc home with home care

## 2025-05-13 NOTE — CONSULT NOTE ADULT - ASSESSMENT
84y Male w/ PMH of HFrEF, A fib (on Eliquis), COPD (no home O2), severe AS, prior PE (no longer on AC), and stage 3 SCC lung (treated w/ platinum doublet chemoRT and adjuvant durvalumab 1 year ago finished) admitted for acute hypoxic respiratory failure 2/2 pleural effusions and acute HFrEF.     Plan  #Acute hypoxic respiratory failure  #Acute HFrEF  #Pleural effusion  -Initially on NC, now on RA  -CT w/o PE did show worse pleural effusion  -Sp IV diuresis  -Sp pleural catheter  -Talks of PleurX vs cardiomems prior to dc    #Afib  -Currently rate controlled  -On Toprol  -On Eliquis    #Lung cancer stage 3  -Treated definitively completed treatment 6/2024  -Now in surveillance  -Seen by heme onc this admission    #Encounter for palliative care  -Identified as high risk STAR pt  -Spoke w/ pt and family at bedside  -Overall clinically improving  -[facility] as discussed above  -Full code, encouraged pt to have conversations with family regarding advanced directives while doing better clinically as opposed to when decompensated

## 2025-05-13 NOTE — PROGRESS NOTE ADULT - PROBLEM SELECTOR PLAN 1
S/p RT PTC on 5/9 -  Maintain CT to WS  Daily CXR while in place  Record drainage in flowsheet Q12h  Transudative per light's criteria  F/u fluid cyto/path/culture  Thoracic surgery to follow  Rest of care per primary care  Discussed with Dr. Sandoval
S/p RT PTC on 5/9   Daily CXR while in place  Transudative per light's criteria  Pleural fluid cultures NGTD. Cytology (-)  Right PTC removed 5/13  CXR pending. If cxr stable thoracic surgery will sign off   Rest of care per primary care    Plan d/w Dr. Martin
S/p RT PTC on 5/9   Maintain CT to WS  Daily CXR while in place  Record drainage in flowsheet Q12h, even if output is zero  Transudative per light's criteria  Pleural fluid cultures NGTD. Cytology pending  Thoracic surgery to follow  Rest of care per primary care    Plan discussed with Thoracic Surgery attendings during AM rounds.
S/p RT PTC on 5/9 -  Maintain CT to WS  Daily CXR while in place  Record drainage in flowsheet Q12h  Transudative per light's criteria  F/u fluid cyto/path/culture  Thoracic surgery to follow  Rest of care per primary care  Discussed with Dr. Sandoval
S/p RT PTC on 5/9 - 250cc initial serous drainage  Maintain CT to WS  Daily CXR while in place  Record drainage in flowsheet Q12h  F/u fluid cyto/path/culture  Thoracic surgery to follow  Rest of care per primary care  Discussed with Dr. Martin.

## 2025-05-13 NOTE — PROGRESS NOTE ADULT - SUBJECTIVE AND OBJECTIVE BOX
[place] [facility] - [facility], THE [facility]ART [facility]                                   [address] [place] [address]                                                      PHONE: [phone]                                                         FAX: [phone]  [email or web address]  ---------------------------------------------------------------------------------------------------------------------------------    Overnight events/patient complaints:  Patient feeling better.  No chest pain.  Breathing improving.     PAST MEDICAL & SURGICAL HISTORY:  Hypertension      Hyperlipidemia      CAD (coronary artery disease)      Lung cancer      Afib      Chronic HFrEF (heart failure with reduced ejection fraction)      Aortic stenosis      History of COPD      S/P hip replacement, right      S/P hip replacement, left          No Known Allergies    MEDICATIONS  (STANDING):  apixaban 5 milliGRAM(s) Oral two times a day  aspirin  chewable 81 milliGRAM(s) Oral daily  atorvastatin 40 milliGRAM(s) Oral at bedtime  fluticasone propionate/ salmeterol 100-50 MICROgram(s) Diskus 1 Dose(s) Inhalation two times a day  metoprolol succinate ER 12.5 milliGRAM(s) Oral daily  polyethylene glycol 3350 17 Gram(s) Oral daily  senna 2 Tablet(s) Oral at bedtime  tiotropium 2.5 MICROgram(s) Inhaler 2 Puff(s) Inhalation daily    MEDICATIONS  (PRN):  acetaminophen     Tablet .. 650 milliGRAM(s) Oral every 6 hours PRN Temp greater or equal to 38C (100.4F), Mild Pain (1 - 3)  aluminum hydroxide/magnesium hydroxide/simethicone Suspension 30 milliLiter(s) Oral every 4 hours PRN Dyspepsia  melatonin 3 milliGRAM(s) Oral at bedtime PRN Insomnia  midodrine. 5 milliGRAM(s) Oral three times a day PRN see below  ondansetron Injectable 4 milliGRAM(s) IV Push every 8 hours PRN Nausea and/or Vomiting      Vital Signs Last 24 Hrs  T(C): 36.8 (13 May 2025 07:10), Max: 36.9 (12 May 2025 20:37)  T(F): 98.3 (13 May 2025 07:10), Max: 98.5 (12 May 2025 20:37)  HR: 80 (13 May 2025 07:10) (75 - 88)  BP: 135/68 (13 May 2025 07:10) (121/74 - 135/68)  BP(mean): --  RR: 19 (13 May 2025 07:10) (18 - 19)  SpO2: 94% (13 May 2025 07:10) (92% - 94%)    Parameters below as of 13 May 2025 08:30  Patient On (Oxygen Delivery Method): room air      ICU Vital Signs Last 24 Hrs  JONE SOUTH  I&O's Detail    12 May 2025 07:01  -  13 May 2025 07:00  --------------------------------------------------------  IN:  Total IN: 0 mL    OUT:    Chest Tube (mL): 40 mL    Voided (mL): 1100 mL  Total OUT: 1140 mL    Total NET: -1140 mL        I&O's Summary    12 May 2025 07:01  -  13 May 2025 07:00  --------------------------------------------------------  IN: 0 mL / OUT: 1140 mL / NET: -1140 mL      Drug Dosing Weight  JONE SOUTH      PHYSICAL EXAM:  General: Appears well developed, alert and cooperative.  HEENT: Head; normocephalic, atraumatic.  Eyes: Pupils reactive, cornea wnl.  Neck: Supple, no nodes adenopathy, no JVD, no carotid bruit  CARDIOVASCULAR: Irregular, 3/6 systolic jason/decrescendo murmur at RUSB with mid systolic peak  LUNGS: No rales, rhonchi or wheeze. R chest tube  ABDOMEN: Soft, nontender, nondistended  EXTREMITIES: No clubbing or edema. Distal pulses wnl.   SKIN: warm and dry with normal turgor.  NEURO: Alert/oriented x 3/normal motor exam.   PSYCH: normal affect.        LABS:                        10.3   7.87  )-----------( 250      ( 13 May 2025 03:50 )             33.8     05-13    143  |  100  |  24.3[H]  ----------------------------<  105[H]  3.5   |  32.0[H]  |  1.01    Ca    8.9      13 May 2025 03:50  Phos  2.7     05-12  Mg     2.1     05-12    TPro  5.9[L]  /  Alb  2.8[L]  /  TBili  0.5  /  DBili  x   /  AST  14  /  ALT  8   /  AlkPhos  81  05-13    JONE SOUTH        Urinalysis Basic - ( 13 May 2025 03:50 )    Color: x / Appearance: x / SG: x / pH: x  Gluc: 105 mg/dL / Ketone: x  / Bili: x / Urobili: x   Blood: x / Protein: x / Nitrite: x   Leuk Esterase: x / RBC: x / WBC x   Sq Epi: x / Non Sq Epi: x / Bacteria: x       CONCLUSIONS:      1. Left ventricular cavity is normal in size. Left ventricular systolic function is mildly decreased with an ejection fraction visually estimated at 40 to45 %.   2. Normal right ventricular cavity size and normal right ventricular systolic function.   3. The right atrium is normal in size.   4. No pericardial effusion seen.   5. Analysis of left ventricular diastolic function and filling pressure is made challenging by the presence of atrial fibrillation.   6. Estimated pulmonary artery systolic pressure is 18 mmHg.   7. No left ventricular hypertrophy.   8. No prior echocardiogram is available for comparison.   9. Technically difficult image quality.  10. There is moderate calcification of the aortic valve leaflets. Severe aortic stenosis.      Cath 03/2025  Indications:               Severe aortic stenosis   Dyspnea   Suspected coronary artery disease     Diagnostic Conclusions:     Mild RCA and LAD disease   LVEF 45%   Normal wedge pressure and pulmonary pressures   Borderline low cardiac output   Recommendations:     Consult with valve clinic   Can consider TEEto evaluate aortic valve as aortic valve gradient  minimal on cath    Acute complication:    No complications     Hemodynamic:           Hemodynamic Impressions   General Impressions: Hemodynamic assessment demonstrates normal  pulmonary capillary wedge  pressure, borderline low cardiac output.      Procedure Narrative:   The risks and alternatives of the procedures and conscious sedation  were explained to the patient and informed consent was  obtained. The patient was brought to the cath lab and placed on the  exam table.  Access   Right femoral vein:   Vascular access was obtained using modified seldinger technique.      Coronary Angiography     Patient: JONE SOUTH        MRN: [id number]  Study Date: 03/14/2025   12:58 PM      Page 1 of 4          Left Coronary System:   A catheter was positioned into the vessel ostia under fluoroscopic  guidance. Angiograms were obtained in multiple views.  Right Coronary System:   A catheter was positioned into the vessel ostia under fluoroscopic  guidance. Angiograms were obtained in multiple views.    Right Heart Cath   Measurements of pressures were obtained.      Diagnostic Findings:     Coronary Angiography   The coronary circulation is right dominant.      LM   Left main artery: Angiography shows minor irregularities.      LAD   Left anterior descending artery: Angiography shows mild  atherosclerosis. There is a 20 % stenosis.    CX   Circumflex: Angiography shows minor irregularities.      RCA   Right coronary artery: Angiography shows mild atherosclerosis. There  is a 30 % stenosis in the proximal third portion of the  segment.      Left Heart Cath   Ejection fraction was visually estimated by echo with a value of 45%.  The left ventricular end diastolic pressure was 7 mmHg.  LHC performed: Aortic valve crossed and left ventricular pressures  were obtained.      RADIOLOGY & ADDITIONAL STUDIES:    INTERPRETATION OF TELEMETRY (personally reviewed):    ASSESSMENT AND PLAN:  In summary, JONE SOUTH is an 84y Male with past medical history significant for HFmrEF, moderate AS, HLD, HTN, nonobstructive CAD, persistent afib, hx of SCC lung CA, DVT on eliquis presents with shortness of breath found to have acute on chronic HFmrEF.    CHF- would restart lasix at 40 mg IV daily as patient continues to drain from the chest tube.  Renal function relatively normal.    -monitor renal function  -continue toprol xl  -daily weights  -strict I/Os  -pt amenable to cardiomems implant prior to DC  -would add ace/arb/arni prior to DC    Nonobstructive CAD- continue asa and statin.  Continue toprol xl.     Moderate aortic valve stenosis- CARLOS showed moderate AS.  Would repeat echo in one year    Afib- can switch from eliquis to lovenox while inpatient in anticipation for cardiomems implant prior to dc      Thank you for allowing [facility] to participate in the care of this patient.  Please feel free to call with any questions or concerns.

## 2025-05-13 NOTE — PROGRESS NOTE ADULT - SUBJECTIVE AND OBJECTIVE BOX
Brief summary:  84y Male consulted for right pleural effusion s/p Right pigtail     SUBJECTIVE:  Pt OOB to the chair with family at the bedside. Patient denies acute pain with radiating or aggravating factors.  He denies chest pain, shortness of breath, palpitations, headache, dizziness, nausea, or vomiting.         PAST MEDICAL & SURGICAL HISTORY:  Hypertension      Hyperlipidemia      CAD (coronary artery disease)      Lung cancer      Afib      Chronic HFrEF (heart failure with reduced ejection fraction)      Aortic stenosis      History of COPD      S/P hip replacement, right      S/P hip replacement, left          MEDICATIONs  acetaminophen     Tablet .. 650 milliGRAM(s) Oral every 6 hours PRN  aluminum hydroxide/magnesium hydroxide/simethicone Suspension 30 milliLiter(s) Oral every 4 hours PRN  apixaban 5 milliGRAM(s) Oral two times a day  aspirin  chewable 81 milliGRAM(s) Oral daily  atorvastatin 40 milliGRAM(s) Oral at bedtime  fluticasone propionate/ salmeterol 100-50 MICROgram(s) Diskus 1 Dose(s) Inhalation two times a day  melatonin 3 milliGRAM(s) Oral at bedtime PRN  metoprolol succinate ER 12.5 milliGRAM(s) Oral daily  midodrine. 5 milliGRAM(s) Oral three times a day PRN  ondansetron Injectable 4 milliGRAM(s) IV Push every 8 hours PRN  polyethylene glycol 3350 17 Gram(s) Oral daily  senna 2 Tablet(s) Oral at bedtime  tiotropium 2.5 MICROgram(s) Inhaler 2 Puff(s) Inhalation daily  MEDICATIONS  (PRN):  acetaminophen     Tablet .. 650 milliGRAM(s) Oral every 6 hours PRN Temp greater or equal to 38C (100.4F), Mild Pain (1 - 3)  aluminum hydroxide/magnesium hydroxide/simethicone Suspension 30 milliLiter(s) Oral every 4 hours PRN Dyspepsia  melatonin 3 milliGRAM(s) Oral at bedtime PRN Insomnia  midodrine. 5 milliGRAM(s) Oral three times a day PRN see below  ondansetron Injectable 4 milliGRAM(s) IV Push every 8 hours PRN Nausea and/or Vomiting      Daily     Daily Weight in k.5 (13 May 2025 07:10)                              10.3   7.87  )-----------( 250      ( 13 May 2025 03:50 )             33.8       143  |  100  |  24.3[H]  ----------------------------<  105[H]  3.5   |  32.0[H]  |  1.01    Ca    8.9      13 May 2025 03:50  Phos  2.7       Mg     2.1         TPro  5.9[L]  /  Alb  2.8[L]  /  TBili  0.5  /  DBili  x   /  AST  14  /  ALT  8   /  AlkPhos  81              Objective:  T(C): 36.8 (25 @ 07:10), Max: 36.9 (25 @ 20:37)  HR: 80 (25 @ 07:10) (75 - 88)  BP: 135/68 (25 @ 07:10) (121/74 - 135/68)  RR: 19 (25 @ 07:10) (18 - 19)  SpO2: 94% (25 @ 07:10) (92% - 94%)  Wt(kg): --CAPILLARY BLOOD GLUCOSE      I&O's Summary    12 May 2025 07:01  -  13 May 2025 07:00  --------------------------------------------------------  IN: 0 mL / OUT: 1140 mL / NET: -1140 mL        Physical Exam  Neuro: A+O x 3, non-focal, speech clear and intact  Pulm: CTA, b/l no accessory muscles  CV: RRR, +S1S2  Abd: soft, NT, ND, +BS  Ext: +DP Pulses b/l, +PT pulses, no edema  Chest tubes: right PTC now removed     Imaging:  CXR:  < from: Xray Chest 1 View- PORTABLE-Routine (Xray Chest 1 View- PORTABLE-Routine in AM.) (25 @ 05:18) >  ACC: [id number] EXAM:  XR CHEST PORTABLE ROUTINE 1V   ORDERED BY: ALDAIR LAZO     PROCEDURE DATE:  2025          INTERPRETATION:  Clinical history: 84-year-old male, status post right   PTC.    Portable view of the chest is compared to 2025 and is correlated   with the CT PA of 2025.    FINDINGS: Right chest tube in place with no pneumothorax, unchanged   Right-sided Wfteed-a-Mmia with the tip at the junction of the SVC and   right atrium, unchanged.    Normal cardiac silhouette and normal pulmonary vasculature with no lobar   consolidation, left effusion, pneumothorax or acute osseous findings.    Right perihilar/upper lobe infiltrate/area of atelectasis, unchanged.    Minimal right pleural effusion, unchanged.    IMPRESSION:    Right chest tube in place with no pneumothorax, unchanged.    Infiltrate/atelectasis in the right perihilar region/upper lobe, unchanged    --- End of Report ---            LAKSHMI MENON DO; Attending Radiologist  This document has beenelectronically signed. May 13 2025  9:33AM    < end of copied text >

## 2025-05-14 LAB
ALBUMIN SERPL ELPH-MCNC: 2.8 G/DL — LOW (ref 3.3–5.2)
ALP SERPL-CCNC: 79 U/L — SIGNIFICANT CHANGE UP (ref 40–120)
ALT FLD-CCNC: 10 U/L — SIGNIFICANT CHANGE UP
ANION GAP SERPL CALC-SCNC: 8 MMOL/L — SIGNIFICANT CHANGE UP (ref 5–17)
AST SERPL-CCNC: 16 U/L — SIGNIFICANT CHANGE UP
BILIRUB SERPL-MCNC: 0.4 MG/DL — SIGNIFICANT CHANGE UP (ref 0.4–2)
BUN SERPL-MCNC: 25 MG/DL — HIGH (ref 8–20)
CALCIUM SERPL-MCNC: 8.7 MG/DL — SIGNIFICANT CHANGE UP (ref 8.4–10.5)
CHLORIDE SERPL-SCNC: 102 MMOL/L — SIGNIFICANT CHANGE UP (ref 96–108)
CO2 SERPL-SCNC: 32 MMOL/L — HIGH (ref 22–29)
CREAT SERPL-MCNC: 1.11 MG/DL — SIGNIFICANT CHANGE UP (ref 0.5–1.3)
CULTURE RESULTS: SIGNIFICANT CHANGE UP
EGFR: 66 ML/MIN/1.73M2 — SIGNIFICANT CHANGE UP
EGFR: 66 ML/MIN/1.73M2 — SIGNIFICANT CHANGE UP
GLUCOSE SERPL-MCNC: 102 MG/DL — HIGH (ref 70–99)
HCT VFR BLD CALC: 33.6 % — LOW (ref 39–50)
HGB BLD-MCNC: 10.1 G/DL — LOW (ref 13–17)
MCHC RBC-ENTMCNC: 27.6 PG — SIGNIFICANT CHANGE UP (ref 27–34)
MCHC RBC-ENTMCNC: 30.1 G/DL — LOW (ref 32–36)
MCV RBC AUTO: 91.8 FL — SIGNIFICANT CHANGE UP (ref 80–100)
NRBC # BLD AUTO: 0 K/UL — SIGNIFICANT CHANGE UP (ref 0–0)
NRBC # FLD: 0 K/UL — SIGNIFICANT CHANGE UP (ref 0–0)
NRBC BLD AUTO-RTO: 0 /100 WBCS — SIGNIFICANT CHANGE UP (ref 0–0)
PLATELET # BLD AUTO: 241 K/UL — SIGNIFICANT CHANGE UP (ref 150–400)
PMV BLD: 10.5 FL — SIGNIFICANT CHANGE UP (ref 7–13)
POTASSIUM SERPL-MCNC: 3.6 MMOL/L — SIGNIFICANT CHANGE UP (ref 3.5–5.3)
POTASSIUM SERPL-SCNC: 3.6 MMOL/L — SIGNIFICANT CHANGE UP (ref 3.5–5.3)
PROT SERPL-MCNC: 6.1 G/DL — LOW (ref 6.6–8.7)
RBC # BLD: 3.66 M/UL — LOW (ref 4.2–5.8)
RBC # FLD: 16.5 % — HIGH (ref 10.3–14.5)
SODIUM SERPL-SCNC: 142 MMOL/L — SIGNIFICANT CHANGE UP (ref 135–145)
SPECIMEN SOURCE: SIGNIFICANT CHANGE UP
WBC # BLD: 7.44 K/UL — SIGNIFICANT CHANGE UP (ref 3.8–10.5)
WBC # FLD AUTO: 7.44 K/UL — SIGNIFICANT CHANGE UP (ref 3.8–10.5)

## 2025-05-14 PROCEDURE — 71045 X-RAY EXAM CHEST 1 VIEW: CPT | Mod: 26

## 2025-05-14 PROCEDURE — 99232 SBSQ HOSP IP/OBS MODERATE 35: CPT | Mod: GC

## 2025-05-14 RX ORDER — FUROSEMIDE 10 MG/ML
20 INJECTION INTRAMUSCULAR; INTRAVENOUS DAILY
Refills: 0 | Status: DISCONTINUED | OUTPATIENT
Start: 2025-05-14 | End: 2025-05-14

## 2025-05-14 RX ADMIN — Medication 1 DOSE(S): at 08:39

## 2025-05-14 RX ADMIN — FUROSEMIDE 20 MILLIGRAM(S): 10 INJECTION INTRAMUSCULAR; INTRAVENOUS at 08:39

## 2025-05-14 RX ADMIN — TIOTROPIUM BROMIDE INHALATION SPRAY 2 PUFF(S): 3.12 SPRAY, METERED RESPIRATORY (INHALATION) at 08:40

## 2025-05-14 RX ADMIN — Medication 81 MILLIGRAM(S): at 13:32

## 2025-05-14 RX ADMIN — METOPROLOL SUCCINATE 12.5 MILLIGRAM(S): 50 TABLET, EXTENDED RELEASE ORAL at 05:02

## 2025-05-14 RX ADMIN — Medication 1 DOSE(S): at 20:30

## 2025-05-14 RX ADMIN — ATORVASTATIN CALCIUM 40 MILLIGRAM(S): 80 TABLET, FILM COATED ORAL at 20:52

## 2025-05-14 RX ADMIN — Medication 2 TABLET(S): at 20:52

## 2025-05-14 RX ADMIN — POLYETHYLENE GLYCOL 3350 17 GRAM(S): 17 POWDER, FOR SOLUTION ORAL at 13:31

## 2025-05-14 NOTE — PROGRESS NOTE ADULT - SUBJECTIVE AND OBJECTIVE BOX
[place] [facility] - [facility], THE [facility]                                   [address] [place] [address]                                                      PHONE: [phone]                                                         FAX: [phone]  [email or web address]  ---------------------------------------------------------------------------------------------------------------------------------    Overnight events/patient complaints:      NAD feeling well today     No Known Allergies    MEDICATIONS  (STANDING):  aspirin  chewable 81 milliGRAM(s) Oral daily  atorvastatin 40 milliGRAM(s) Oral at bedtime  fluticasone propionate/ salmeterol 100-50 MICROgram(s) Diskus 1 Dose(s) Inhalation two times a day  furosemide    Tablet 20 milliGRAM(s) Oral daily  metoprolol succinate ER 12.5 milliGRAM(s) Oral daily  polyethylene glycol 3350 17 Gram(s) Oral daily  senna 2 Tablet(s) Oral at bedtime  tiotropium 2.5 MICROgram(s) Inhaler 2 Puff(s) Inhalation daily    MEDICATIONS  (PRN):  acetaminophen     Tablet .. 650 milliGRAM(s) Oral every 6 hours PRN Temp greater or equal to 38C (100.4F), Mild Pain (1 - 3)  aluminum hydroxide/magnesium hydroxide/simethicone Suspension 30 milliLiter(s) Oral every 4 hours PRN Dyspepsia  melatonin 3 milliGRAM(s) Oral at bedtime PRN Insomnia  midodrine. 5 milliGRAM(s) Oral three times a day PRN see below  ondansetron Injectable 4 milliGRAM(s) IV Push every 8 hours PRN Nausea and/or Vomiting      Vital Signs Last 24 Hrs  T(C): 36.3 (14 May 2025 07:15), Max: 37.2 (13 May 2025 16:11)  T(F): 97.4 (14 May 2025 07:15), Max: 98.9 (13 May 2025 16:11)  HR: 81 (14 May 2025 07:15) (78 - 83)  BP: 128/62 (14 May 2025 07:15) (120/63 - 143/70)  BP(mean): --  RR: 19 (14 May 2025 07:15) (18 - 19)  SpO2: 100% (14 May 2025 07:15) (93% - 100%)    Parameters below as of 14 May 2025 09:19  Patient On (Oxygen Delivery Method): room air      ICU Vital Signs Last 24 Hrs  JONE SOUTH  I&O's Detail    14 May 2025 07:01  -  14 May 2025 09:34  --------------------------------------------------------  IN:  Total IN: 0 mL    OUT:    Voided (mL): 400 mL  Total OUT: 400 mL    Total NET: -400 mL        I&O's Summary    14 May 2025 07:01  -  14 May 2025 09:34  --------------------------------------------------------  IN: 0 mL / OUT: 400 mL / NET: -400 mL      Drug Dosing Weight  JONE SOUTH      PHYSICAL EXAM:  General: Appears well developed, alert and cooperative.  HEENT: Head; normocephalic, atraumatic.  Eyes: Pupils reactive, cornea wnl.  Neck: Supple, no nodes adenopathy, no NVD or carotid bruit or thyromegaly.  CARDIOVASCULAR: Normal S1 and S2, 3/6 murmur, rub, gallop or lift.   LUNGS: Decrease BS at the lower bases   ABDOMEN: Soft, nontender without mass or organomegaly. bowel sounds normoactive.  EXTREMITIES: No clubbing, cyanosis or edema. Distal pulses wnl.   SKIN: warm and dry with normal turgor.  NEURO: Alert/oriented x 3/normal motor exam. No pathologic reflexes.    PSYCH: normal affect.        LABS:                        10.1   7.44  )-----------( 241      ( 14 May 2025 03:46 )             33.6     05-14    142  |  102  |  25.0[H]  ----------------------------<  102[H]  3.6   |  32.0[H]  |  1.11    Ca    8.7      14 May 2025 03:46    TPro  6.1[L]  /  Alb  2.8[L]  /  TBili  0.4  /  DBili  x   /  AST  16  /  ALT  10  /  AlkPhos  79  05-14    JONE SOUTH        Urinalysis Basic - ( 14 May 2025 03:46 )    Color: x / Appearance: x / SG: x / pH: x  Gluc: 102 mg/dL / Ketone: x  / Bili: x / Urobili: x   Blood: x / Protein: x / Nitrite: x   Leuk Esterase: x / RBC: x / WBC x   Sq Epi: x / Non Sq Epi: x / Bacteria: x        RADIOLOGY & ADDITIONAL STUDIES:    INTERPRETATION OF TELEMETRY (personally reviewed):      ASSESSMENT AND PLAN:  In summary, JONE SOUTH is an 84y Male with chronic HFpEF, mod-severe AS, hypertension, hyperlipidemia, moderate CAD, persistent atrial fibrillation, SCC lung s/p treatment c/b DVT, on Eliquis, who presented with shortness of breath and acute on chronic HFpEF in setting of AF/AS s/p right chest tube    - Continue to monitor urine output and fluid status on Lasix 20 PO daily  - Had long conversation with son and wife at bedside re: CHF management. Discussed monitoring I/Os, weight, increasing Lasix as needed. Also extensively discussed the complexity of his care including multiple conditions interacting and affecting care Moderate to severe AS/AF along with orthostatic hypotension        [facility] 5/15/2025 HOLD AC at this time

## 2025-05-14 NOTE — PROGRESS NOTE ADULT - SUBJECTIVE AND OBJECTIVE BOX
Patient is a 84y old  Male who presents with a chief complaint of acute hypoxic respiratory failure, acute hfref, pleural effusions (13 May 2025 15:45)      INTERVAL HPI/OVERNIGHT EVENTS:  - No acute overnight events    TODAY:  - Patient examined bedside, no complaints. Patient denies CP, SOB, dizziness, fever, chills, nausea, vomiting, constipation, diarrhea.    MEDICATIONS  (STANDING):  aspirin  chewable 81 milliGRAM(s) Oral daily  atorvastatin 40 milliGRAM(s) Oral at bedtime  fluticasone propionate/ salmeterol 100-50 MICROgram(s) Diskus 1 Dose(s) Inhalation two times a day  metoprolol succinate ER 12.5 milliGRAM(s) Oral daily  polyethylene glycol 3350 17 Gram(s) Oral daily  senna 2 Tablet(s) Oral at bedtime  tiotropium 2.5 MICROgram(s) Inhaler 2 Puff(s) Inhalation daily    MEDICATIONS  (PRN):  acetaminophen     Tablet .. 650 milliGRAM(s) Oral every 6 hours PRN Temp greater or equal to 38C (100.4F), Mild Pain (1 - 3)  aluminum hydroxide/magnesium hydroxide/simethicone Suspension 30 milliLiter(s) Oral every 4 hours PRN Dyspepsia  melatonin 3 milliGRAM(s) Oral at bedtime PRN Insomnia  midodrine. 5 milliGRAM(s) Oral three times a day PRN see below  ondansetron Injectable 4 milliGRAM(s) IV Push every 8 hours PRN Nausea and/or Vomiting      Allergies    No Known Allergies    Intolerances        REVIEW OF SYSTEMS:  Per HPI.      Vital Signs Last 24 Hrs  T(C): 36.4 (14 May 2025 04:20), Max: 37.2 (13 May 2025 16:11)  T(F): 97.6 (14 May 2025 04:20), Max: 98.9 (13 May 2025 16:11)  HR: 81 (14 May 2025 04:20) (78 - 83)  BP: 143/70 (14 May 2025 04:20) (120/63 - 143/70)  BP(mean): --  RR: 18 (14 May 2025 04:20) (18 - 19)  SpO2: 94% (14 May 2025 04:20) (93% - 97%)    Parameters below as of 14 May 2025 04:20  Patient On (Oxygen Delivery Method): room air        PHYSICAL EXAM:  GENERAL: No acute distress, comfortable in chair  HEENT: Atraumatic, normocephalic, non-icteric  NECK: Supple, full range of motion, no observable masses  NEURO: A&Ox3, no focal deficits, moving all extremities spontaneously, no dysarthria, CN II-XII grossly intact  PSYCH: Normal affect, appropriate insight and judgment, fluent speech  LUNGS: Decreased breath sounds on right, improved from prior, otherwise clear bl, s/p chest tube removal, dressing clean and dry   HEART: Irregular, systolic murmur   ABD: Soft, non-tender, non-distended, no appreciable masses  EXTREMITIES: Minimal pitting edema bl  SKIN: Warm and dry     LABS:                        10.1   7.44  )-----------( 241      ( 14 May 2025 03:46 )             33.6     05-14    142  |  102  |  25.0[H]  ----------------------------<  102[H]  3.6   |  32.0[H]  |  1.11    Ca    8.7      14 May 2025 03:46    TPro  6.1[L]  /  Alb  2.8[L]  /  TBili  0.4  /  DBili  x   /  AST  16  /  ALT  10  /  AlkPhos  79  05-14      Urinalysis Basic - ( 14 May 2025 03:46 )    Color: x / Appearance: x / SG: x / pH: x  Gluc: 102 mg/dL / Ketone: x  / Bili: x / Urobili: x   Blood: x / Protein: x / Nitrite: x   Leuk Esterase: x / RBC: x / WBC x   Sq Epi: x / Non Sq Epi: x / Bacteria: x      CAPILLARY BLOOD GLUCOSE          RADIOLOGY & ADDITIONAL TESTS:    Imaging Personally Reviewed:  [X] YES  [ ] NO    Consultant(s) Notes Reviewed:  [X] YES  [ ] NO    Care Discussed with Consultants/Other Providers [X] YES  [ ] NO    Plan of Care discussed with House Staff: [X]YES [ ] NO Patient is a 84y old  Male who presents with a chief complaint of acute hypoxic respiratory failure, acute hfref, pleural effusions (13 May 2025 15:45)      INTERVAL HPI/OVERNIGHT EVENTS:  - No acute overnight events    TODAY:  - Patient examined bedside, chest tube removed, no complaints. Discussed with patient and son about cardiomems, both are agreeable. Patient denies CP, SOB, dizziness, fever, chills, nausea, vomiting, constipation, diarrhea.    MEDICATIONS  (STANDING):  aspirin  chewable 81 milliGRAM(s) Oral daily  atorvastatin 40 milliGRAM(s) Oral at bedtime  fluticasone propionate/ salmeterol 100-50 MICROgram(s) Diskus 1 Dose(s) Inhalation two times a day  metoprolol succinate ER 12.5 milliGRAM(s) Oral daily  polyethylene glycol 3350 17 Gram(s) Oral daily  senna 2 Tablet(s) Oral at bedtime  tiotropium 2.5 MICROgram(s) Inhaler 2 Puff(s) Inhalation daily    MEDICATIONS  (PRN):  acetaminophen     Tablet .. 650 milliGRAM(s) Oral every 6 hours PRN Temp greater or equal to 38C (100.4F), Mild Pain (1 - 3)  aluminum hydroxide/magnesium hydroxide/simethicone Suspension 30 milliLiter(s) Oral every 4 hours PRN Dyspepsia  melatonin 3 milliGRAM(s) Oral at bedtime PRN Insomnia  midodrine. 5 milliGRAM(s) Oral three times a day PRN see below  ondansetron Injectable 4 milliGRAM(s) IV Push every 8 hours PRN Nausea and/or Vomiting      Allergies    No Known Allergies    Intolerances        REVIEW OF SYSTEMS:  Per HPI.      Vital Signs Last 24 Hrs  T(C): 36.4 (14 May 2025 04:20), Max: 37.2 (13 May 2025 16:11)  T(F): 97.6 (14 May 2025 04:20), Max: 98.9 (13 May 2025 16:11)  HR: 81 (14 May 2025 04:20) (78 - 83)  BP: 143/70 (14 May 2025 04:20) (120/63 - 143/70)  BP(mean): --  RR: 18 (14 May 2025 04:20) (18 - 19)  SpO2: 94% (14 May 2025 04:20) (93% - 97%)    Parameters below as of 14 May 2025 04:20  Patient On (Oxygen Delivery Method): room air        PHYSICAL EXAM:  GENERAL: No acute distress, comfortable in chair  HEENT: Atraumatic, normocephalic, non-icteric  NECK: Supple, full range of motion, no observable masses  NEURO: A&Ox3, no focal deficits, moving all extremities spontaneously, no dysarthria, CN II-XII grossly intact  PSYCH: Normal affect, appropriate insight and judgment, fluent speech  LUNGS: Decreased breath sounds on right, improved from prior, otherwise clear bl, s/p chest tube removal, dressing clean and dry   HEART: Irregular, systolic murmur   ABD: Soft, non-tender, non-distended, no appreciable masses  EXTREMITIES: Minimal pitting edema bl  SKIN: Warm and dry     LABS:                        10.1   7.44  )-----------( 241      ( 14 May 2025 03:46 )             33.6     05-14    142  |  102  |  25.0[H]  ----------------------------<  102[H]  3.6   |  32.0[H]  |  1.11    Ca    8.7      14 May 2025 03:46    TPro  6.1[L]  /  Alb  2.8[L]  /  TBili  0.4  /  DBili  x   /  AST  16  /  ALT  10  /  AlkPhos  79  05-14      Urinalysis Basic - ( 14 May 2025 03:46 )    Color: x / Appearance: x / SG: x / pH: x  Gluc: 102 mg/dL / Ketone: x  / Bili: x / Urobili: x   Blood: x / Protein: x / Nitrite: x   Leuk Esterase: x / RBC: x / WBC x   Sq Epi: x / Non Sq Epi: x / Bacteria: x      CAPILLARY BLOOD GLUCOSE          RADIOLOGY & ADDITIONAL TESTS:    Imaging Personally Reviewed:  [X] YES  [ ] NO    Consultant(s) Notes Reviewed:  [X] YES  [ ] NO    Care Discussed with Consultants/Other Providers [X] YES  [ ] NO    Plan of Care discussed with House Staff: [X]YES [ ] NO

## 2025-05-14 NOTE — PROGRESS NOTE ADULT - ASSESSMENT
84y Male w/ PMH of HFrEF, A fib (on Eliquis), COPD (no home O2), severe AS, prior PE (no longer on AC), and stage 3 SCC lung (treated w/ platinum doublet chemoRT and adjuvant durvalumab 1 year ago finished) presented with worsening acute on chronic dyspnea and cough. Admitted for acute hypoxic respiratory failure 2/2 pleural effusions in setting of acute HFrEF. S/p right chest tube.     #Acute hypoxic respiratory failure- improved  #Acute HFrEF- improved  #R Pleural effusion, drainage in the past   - On RA  - CT-A PE study w/o PE but shows worsening of right sided effusion  - Most recent TTE 3/2025 w/ mildly reduced EF 40-45% and mod-severe AS and MR  - Lasix prn  - GDMT limited by orthostasis/hypotension on midodrine at home, but on Toprol 12.5mg QD  - S/p R chest tube. removed 5/13  - Transudative effusion by Light's criterion. Culture NGTD, cytology negative for malignant cells,  - Monitor I/O  - Cards following, Cardiomemo prior to dc     #Afib NVR  - [id number] score 3  - Currently rate controlled  - C/w eliquis  - C/w home Toprol for rate control  - Cardiology following     #COPD  - Not in acute exacerbation  - C/w home Trelegy    #Hx of stage 3 SCC lung cancer   - S/p chemoradiation completed 4/2023  - Completed 1 year of durvaluamb 6/2024  - Heme/onc signed off    #Orthostatic hypotension  - Cont home midodrine prn    #Nodular opacity, left upper lobe  - Follow up outpatient     DVT PPx: Eliquis  Dispo: Active, possible cardiomemes before dc home with home care 84y Male w/ PMH of HFrEF, A fib (on Eliquis), COPD (no home O2), severe AS, prior PE (no longer on AC), and stage 3 SCC lung (treated w/ platinum doublet chemoRT and adjuvant durvalumab 1 year ago finished) presented with worsening acute on chronic dyspnea and cough. Admitted for acute hypoxic respiratory failure 2/2 pleural effusions in setting of acute HFrEF. S/p right chest tube. Pending Cardiomems.     #Acute hypoxic respiratory failure- improved  #Acute HFrEF- improved  #R Pleural effusion, drainage in the past   - On RA  - CT-A PE study w/o PE but shows worsening of right sided effusion  - Most recent TTE 3/2025 w/ mildly reduced EF 40-45% and mod-severe AS and MR  - Lasix qod  - GDMT limited by orthostasis/hypotension on midodrine at home, but on Toprol 12.5mg QD  - S/p R chest tube. Removed 5/13  - Transudative effusion by Light's criterion. Culture NGTD, cytology negative for malignant cells  - Planned cardiomems 5/15, eliquis held since last night   - Monitor I/O  - Cards following    #Afib NVR  - [id number] score 3  - Currently rate controlled  - Hold eliquis as above  - C/w home Toprol for rate control  - Cardiology following     #COPD  - Not in acute exacerbation  - C/w home Trelegy    #Hx of stage 3 SCC lung cancer   - S/p chemoradiation completed 4/2023  - Completed 1 year of durvaluamb 6/2024  - Heme/onc signed off    #Orthostatic hypotension  - Cont home midodrine prn    #Nodular opacity, left upper lobe  - Follow up outpatient     DVT PPx: Eliquis  Dispo: Active, planned cardiomemes 5/15, likely dc Friday, home with home care

## 2025-05-15 ENCOUNTER — APPOINTMENT (OUTPATIENT)
Dept: RADIATION ONCOLOGY | Facility: CLINIC | Age: 85
End: 2025-05-15

## 2025-05-15 LAB
ALBUMIN SERPL ELPH-MCNC: 2.8 G/DL — LOW (ref 3.3–5.2)
ALP SERPL-CCNC: 77 U/L — SIGNIFICANT CHANGE UP (ref 40–120)
ALT FLD-CCNC: 11 U/L — SIGNIFICANT CHANGE UP
ANION GAP SERPL CALC-SCNC: 13 MMOL/L — SIGNIFICANT CHANGE UP (ref 5–17)
APTT BLD: 32.5 SEC — SIGNIFICANT CHANGE UP (ref 26.1–36.8)
AST SERPL-CCNC: 19 U/L — SIGNIFICANT CHANGE UP
BILIRUB SERPL-MCNC: 0.4 MG/DL — SIGNIFICANT CHANGE UP (ref 0.4–2)
BUN SERPL-MCNC: 25.3 MG/DL — HIGH (ref 8–20)
CALCIUM SERPL-MCNC: 8.8 MG/DL — SIGNIFICANT CHANGE UP (ref 8.4–10.5)
CHLORIDE SERPL-SCNC: 101 MMOL/L — SIGNIFICANT CHANGE UP (ref 96–108)
CO2 SERPL-SCNC: 29 MMOL/L — SIGNIFICANT CHANGE UP (ref 22–29)
CREAT SERPL-MCNC: 1.06 MG/DL — SIGNIFICANT CHANGE UP (ref 0.5–1.3)
EGFR: 70 ML/MIN/1.73M2 — SIGNIFICANT CHANGE UP
EGFR: 70 ML/MIN/1.73M2 — SIGNIFICANT CHANGE UP
GLUCOSE SERPL-MCNC: 101 MG/DL — HIGH (ref 70–99)
HCT VFR BLD CALC: 32.8 % — LOW (ref 39–50)
HGB BLD-MCNC: 10.1 G/DL — LOW (ref 13–17)
INR BLD: 1.14 RATIO — SIGNIFICANT CHANGE UP (ref 0.85–1.16)
MCHC RBC-ENTMCNC: 27.8 PG — SIGNIFICANT CHANGE UP (ref 27–34)
MCHC RBC-ENTMCNC: 30.8 G/DL — LOW (ref 32–36)
MCV RBC AUTO: 90.4 FL — SIGNIFICANT CHANGE UP (ref 80–100)
NRBC # BLD AUTO: 0 K/UL — SIGNIFICANT CHANGE UP (ref 0–0)
NRBC # FLD: 0 K/UL — SIGNIFICANT CHANGE UP (ref 0–0)
NRBC BLD AUTO-RTO: 0 /100 WBCS — SIGNIFICANT CHANGE UP (ref 0–0)
PLATELET # BLD AUTO: 248 K/UL — SIGNIFICANT CHANGE UP (ref 150–400)
PMV BLD: 10.8 FL — SIGNIFICANT CHANGE UP (ref 7–13)
POTASSIUM SERPL-MCNC: 4.1 MMOL/L — SIGNIFICANT CHANGE UP (ref 3.5–5.3)
POTASSIUM SERPL-SCNC: 4.1 MMOL/L — SIGNIFICANT CHANGE UP (ref 3.5–5.3)
PROT SERPL-MCNC: 5.9 G/DL — LOW (ref 6.6–8.7)
PROTHROM AB SERPL-ACNC: 13.2 SEC — SIGNIFICANT CHANGE UP (ref 9.9–13.4)
RBC # BLD: 3.63 M/UL — LOW (ref 4.2–5.8)
RBC # FLD: 16.4 % — HIGH (ref 10.3–14.5)
SODIUM SERPL-SCNC: 143 MMOL/L — SIGNIFICANT CHANGE UP (ref 135–145)
WBC # BLD: 7 K/UL — SIGNIFICANT CHANGE UP (ref 3.8–10.5)
WBC # FLD AUTO: 7 K/UL — SIGNIFICANT CHANGE UP (ref 3.8–10.5)

## 2025-05-15 PROCEDURE — 99232 SBSQ HOSP IP/OBS MODERATE 35: CPT | Mod: GC

## 2025-05-15 PROCEDURE — 93010 ELECTROCARDIOGRAM REPORT: CPT

## 2025-05-15 RX ORDER — FUROSEMIDE 10 MG/ML
20 INJECTION INTRAMUSCULAR; INTRAVENOUS DAILY
Refills: 0 | Status: COMPLETED | OUTPATIENT
Start: 2025-05-16 | End: 2025-05-16

## 2025-05-15 RX ORDER — FUROSEMIDE 10 MG/ML
1 INJECTION INTRAMUSCULAR; INTRAVENOUS
Qty: 15 | Refills: 0
Start: 2025-05-15 | End: 2025-06-13

## 2025-05-15 RX ADMIN — TIOTROPIUM BROMIDE INHALATION SPRAY 2 PUFF(S): 3.12 SPRAY, METERED RESPIRATORY (INHALATION) at 09:00

## 2025-05-15 RX ADMIN — METOPROLOL SUCCINATE 12.5 MILLIGRAM(S): 50 TABLET, EXTENDED RELEASE ORAL at 05:26

## 2025-05-15 RX ADMIN — Medication 1 DOSE(S): at 20:45

## 2025-05-15 RX ADMIN — Medication 2 TABLET(S): at 20:23

## 2025-05-15 RX ADMIN — ATORVASTATIN CALCIUM 40 MILLIGRAM(S): 80 TABLET, FILM COATED ORAL at 20:23

## 2025-05-15 RX ADMIN — Medication 1 DOSE(S): at 09:00

## 2025-05-15 NOTE — CONSULT NOTE ADULT - ASSESSMENT
ASSESSMENT: This patient is a 84y old male with non-palpable pulses after a cardiomems procedure today, however B/L DP and PT signals are strong.    PLAN:    - No urgent vascular intervention  - Recommend RLE arterial duplex to r/o any other sources of decreased perfusion    Plan discussed with Attending, Dr. Abdalla

## 2025-05-15 NOTE — CHART NOTE - NSCHARTNOTEFT_GEN_A_CORE
Pt came from the cath lab s/p cardioMEMS insertion    The report given to the nurse was that dorsalis pedis pulses were appreciated on Right and Left foot.   On arrival the nurse reports that she cannot appreciate the right dorsalis pedis pulse.   Assessed the patient, right and left femoral pulses were appreciated with the doppler. Right dorsalis pedis pulse was not appreciated, left dorsalis pedis was appreciated. Both right and left posterior tibial pulses were appreciated. Capillary refill <3 seconds.   Called the cath lab and made aware. Cath lab nurse will come to check the pulses on the floor.

## 2025-05-15 NOTE — CONSULT NOTE ADULT - SUBJECTIVE AND OBJECTIVE BOX
VASCULAR SURGERY CONSULT     HPI: 84y Male with a PMHx of HFrEF, A fib (on Eliquis), COPD (no home O2), severe AS, prior PE (no longer on AC), and stage 3 SCC lung (treated w/ platinum doublet chemoRT and adjuvant durvalumab 1 year ago finished) who is admitted to the medical service for acute hypoxic respiratory failure.  He underwent a cardiomems procedure with interventional cardiology today via the R femoral vein.  Post-procedure the cath lab nurse reported DP pulses b/l which the medicine team and nursing staff could not appreciate on the floor.  Vascular was consulted for assessment of decreased pulses.  Mr. Macias denies any sxs at this time.  He has chronic decreased sensation in his feet, but reports no new changes in sensation.  Denies any RLE pain.          ROS: 10-system review is otherwise negative except HPI above.      PAST MEDICAL & SURGICAL HISTORY:  Hypertension      Hyperlipidemia      CAD (coronary artery disease)      Lung cancer      Afib      Chronic HFrEF (heart failure with reduced ejection fraction)      Aortic stenosis      History of COPD      S/P hip replacement, right      S/P hip replacement, left        FAMILY HISTORY:  Family history of CVA (Father)    FH: heart attack (Father)      Family history not pertinent as reviewed with the patient.    SOCIAL HISTORY:  Denies any toxic habits    ALLERGIES: NKA No Known Allergies      HOME MEDICATIONS: ***  Home Medications:  aspirin 81 mg oral tablet, chewable: 1 tab(s) orally once a day (08 May 2025 02:31)  Metoprolol Succinate ER 25 mg oral tablet, extended release: 0.5 tab(s) orally once a day (08 May 2025 02:32)  potassium chloride: 40 milliequivalent(s) orally once a day (08 May 2025 02:32)  Trelegy Ellipta 100 mcg-62.5 mcg-25 mcg/inh inhalation powder: 1 puff(s) inhaled (08 May 2025 02:31)      --------------------------------------------------------------------------------------------  Vital Signs Last 24 Hrs  T(C): 36.6 (15 May 2025 17:30), Max: 37.3 (15 May 2025 00:48)  T(F): 97.8 (15 May 2025 17:30), Max: 99.1 (15 May 2025 00:48)  HR: 83 (15 May 2025 20:22) (70 - 83)  BP: 111/63 (15 May 2025 20:22) (104/60 - 149/78)  BP(mean): --  RR: 16 (15 May 2025 20:22) (16 - 22)  SpO2: 96% (15 May 2025 20:22) (91% - 98%)    Parameters below as of 15 May 2025 20:22  Patient On (Oxygen Delivery Method): room air        PHYSICAL EXAM:   General: NAD, Lying in bed comfortably  Neuro: A+Ox3  HEENT: EOMI, PERRLA, MMM  Cardio: RRR  Resp: Non labored breathing  Vascular: B/L feet are warm and well perfused. 1+ edema.  B/L DP and PT signals appreciated.  R groin is soft, dressing is c/d/i.  --------------------------------------------------------------------------------------------    LABS                 10.1   7.00   )----------(  248       ( 15 May 2025 03:54 )               32.8      143    |  101    |  25.3   ----------------------------<  101        ( 15 May 2025 03:54 )  4.1     |  29.0   |  1.06     Ca    8.8        ( 15 May 2025 03:54 )    TPro  5.9    /  Alb  2.8    /  TBili  0.4    /  DBili  x      /  AST  19     /  ALT  11     /  AlkPhos  77     ( 15 May 2025 03:54 )    LIVER FUNCTIONS - ( 15 May 2025 03:54 )  Alb: 2.8 g/dL / Pro: 5.9 g/dL / ALK PHOS: 77 U/L / ALT: 11 U/L / AST: 19 U/L / GGT: x           PT/INR -  13.2 sec / 1.14 ratio   ( 15 May 2025 03:54 )       PTT -  32.5 sec   ( 15 May 2025 03:54 )  CAPILLARY BLOOD GLUCOSE        Urinalysis Basic - ( 15 May 2025 03:54 )    Color: x / Appearance: x / SG: x / pH: x  Gluc: 101 mg/dL / Ketone: x  / Bili: x / Urobili: x   Blood: x / Protein: x / Nitrite: x   Leuk Esterase: x / RBC: x / WBC x   Sq Epi: x / Non Sq Epi: x / Bacteria: x          --------------------------------------------------------------------------------------------

## 2025-05-15 NOTE — CONSULT NOTE ADULT - REASON FOR ADMISSION
acute hypoxic respiratory failure, acute hfref, pleural effusions

## 2025-05-15 NOTE — CONSULT NOTE ADULT - ATTENDING COMMENTS
Patient status post cardiomems procedure via right groin access.  Concerns for decreased pulses post-op in the right leg. Evaluated at the bedside.  Has audible PT signal.  He denies any pain in the right lower extremity.  Denies claudication and has no wounds.  Arterial duplex was reviewed.  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  Disease likely chronic finding.  At this point no acute surgical intervention is recommended.  Recommend for the patient to be on antiplatelet therapy and statin and follow-up outpatient for PVD surveillance.
Patient seen and assessed with resident. Agree with assessment and plan as noted above, amendments made where necessary.     Goals established, symptoms managed, will sign off. Please reconsult our service should anything change.

## 2025-05-15 NOTE — PROGRESS NOTE ADULT - ASSESSMENT
HPI:  74 y/o M w/ hx of HFrEF, afib (on Eliquis), COPD (no home O2), severe AS, prior PE (no longer on AC), and stage 3 SCC lung (treated w/ platinum doublet chemoRT and adjuvant durvalumab 1 year ago finished) who presented w/ acute on chronic dyspnea and cough. His last hospitalization [facility] in 3/2025 for CHF symptoms  At that time, was found to have pleural effusions. Also underwent cardiac workup, which revealed mod-severe AS and patent coronaries on LHC.   Patient developed Dyspnea, cough, LE swelling few days ago,  whish propped him to come to Ed,   In the ED, pt was hypoxic and tachypneic requiring 3L NC,Trop 37 --> 34. BNP at 2144. CT-A PE study w/o PE but showed worsening of large right-sided pleural effusion with concern for interstitial edema. 1.1-cm nodule identified on CT but PET at the end of April negative for FGD-avid lesions.  S/P rt Ct insertion on 05/08/25 and removal on 05/13/25  - Most recent TTE 3/2025 w/ mildly reduced EF 40-45% and mod-severe AS and MR, GDMT limited by orthostasis/hypotension  Patient currently on RA, s/p diuresis with lasix iv and po, currently off diuretics.  Patient endorses significant improvement in SOB after being diuresed.  He denies HA, dizziness, CP, orthopnea, palpaitions, syncope and near syncope.  Patient now presents to [facility] for Cardio mems inserTION.      Risk Assessments:  ASA: 3  Mallampati:2    BRA:3.8%    ASA/Mallenpoti:  pt. assessed, appropriate for sedation, pt.  educated regarding the plan for Versed/fentanyl as needed    Plan:    -plan for CARDIOMEMS VIA RFA   -preferred access: RACHEL/LFA  -confirmed appropriate NPO duration  -ECG and Labs reviewed  -Aspirin 81mg po pre-cath  -Eliqusi on hold from 05/13/25  -NS 0.9% 250ml/hr x 1 bolus; pre procedure LUCILA ppx HELD AAS PT WITH chf, for cardiomems   -procedure discussed with patient; risks and benefits explained, questions answered  -consent obtained by attending DR Mcdaniels    Risks, benefits, and alternatives reviewed.  Risks including but not limited to MI, death, stroke, bleeding, infection, vessel injury, hematoma, renal failure, allergic reaction, urgent open heart surgery, restenosis and stent thrombosis were reviewed.  All questions answered.  Patient is agreeable to proceed.   Pt. assessed, appropriate for sedation, pt. educated regarding the plan for Versed/Fentanyl as needed.

## 2025-05-15 NOTE — CHART NOTE - NSCHARTNOTEFT_GEN_A_CORE
Department of Cardiology                                                                  [facility]/[place] [address] [address]                                                            Telephone: [phone]. Fax:[phone]                                                    Post- Procedure Note: Right heart Catheterization and Cardiomems placement        Narrative:   Patient  now s/p Right  heart catheterization vai RFV approach (S/P 11F RFV SHEATH, GROIN sealed with per close)  with Dr. Mcdaniels, , tolerated procedure well without complications. Arrived to recovery room NAD and hemodynamically stable, distal pulse +, neurovascular intact          Objective:  Vital Signs Last 24 Hrs  T(C): 36.6 (15 May 2025 11:43), Max: 37.3 (14 May 2025 20:32)  T(F): 97.8 (15 May 2025 11:43), Max: 99.2 (14 May 2025 20:32)  HR: 70 (15 May 2025 14:35) (70 - 90)  BP: 137/76 (15 May 2025 14:35) (107/59 - 149/78)  BP(mean): --  RR: 20 (15 May 2025 14:35) (18 - 22)  SpO2: 94% (15 May 2025 14:35) (91% - 96%)    Parameters below as of 15 May 2025 15:05  Patient On (Oxygen Delivery Method): room air        GENERAL: Pt lying comfortably, NAD.  ENMT: PERRL, +EOMI.  NECK: soft, Supple, No JVD,   CHEST/LUNG: Clear to auscultatation bilaterally; No wheezing.  HEART: S1S2+, Regular rate and rhythm; No murmurs.  ABDOMEN: Soft, Nontender, Nondistended; Bowel sounds present.  MUSCULOSKELETAL: Normal range of motion.  SKIN: No rashes or lesions.  NEURO: AAOX3, no focal deficits, no motor r sensory loss.  PSYCH: normal mood.  Procedure site:  RFV  no signs of bleeding or hematoma, neurovascular intact   VASCULAR:   Radial +2 R/+2 L  Femoral +2 R/+2 L  PT +2 R/+2 L  DP +2 R/+2 L                          10.1   7.00  )-----------( 248      ( 15 May 2025 03:54 )             32.8     05-15    143  |  101  |  25.3[H]  ----------------------------<  101[H]  4.1   |  29.0  |  1.06    Ca    8.8      15 May 2025 03:54    TPro  5.9[L]  /  Alb  2.8[L]  /  TBili  0.4  /  DBili  x   /  AST  19  /  ALT  11  /  AlkPhos  77  05-15    PT/INR - ( 15 May 2025 03:54 )   PT: 13.2 sec;   INR: 1.14 ratio         PTT - ( 15 May 2025 03:54 )  PTT:32.5 sec    Intraprocedurally:  Pt received no sedation    Omnipaque: 5 ml    Prelim procedural Findings:     S/P RHC VAI RFV  RA: 12/12/13  RV: 5/15/10  PA: 52/23/30  PCW: 22/29/23  CO: 4.12  CI: 2.13    s/p CARDIOMEMS INSERTION vai RFV , deplyed to PA  PAD MEASUREMENT: 23    Official cath report pending      Post Cath EKG: aFIB, RBBB, LAFB, No acute ST/T changes     -ADMIT due to: / Pt is already in-patient sec to HF  -post procedural management per protocol  -Right groin precautions  -bedrest x 4 hours post procedure, patient can sit up after 2 hours, OOB after 4 hours at 05: 30 pm   -NS 0.9% 250ml/hr x 1 bolus: post procedure LUCILA ppx HELD as pt with HF  -continue current medical therapy  -GDMT management per Cardiology  recommend diuresis  -Patient andc family requesting HF physician for Op consult, (DR Draper)   -follow up outpt in 2 weeks with Cardiologist SIMRAN Wagner  -[facility] Cardiology following during hospitalization  -Lifestyle modifications discussed to reduce cardiovascular risk factors including weight reduction, smoking cessation, medication compliance, and routine follow up with Cardiologist to track your BMI, cholesterol, and glucose levels.  -Management per Hospitalist / [facility] cardiology  -Transfer pt to the tele unit once criteria met  - Discussed with DR Mcdaniels Department of Cardiology                                                                  [facility]/[place] [address] [address] [facility]                                                            Telephone: [phone]. Fax:[phone]                                                    Post- Procedure Note: Right heart Catheterization and Cardiomems placement        Narrative:   Patient  now s/p Right  heart catheterization vai RFV approach (S/P 11F RFV SHEATH, GROIN sealed with per close)  with Dr. Mcdaniels, , tolerated procedure well without complications. Arrived to recovery room NAD and hemodynamically stable, distal pulse +, neurovascular intact          Objective:  Vital Signs Last 24 Hrs  T(C): 36.6 (15 May 2025 11:43), Max: 37.3 (14 May 2025 20:32)  T(F): 97.8 (15 May 2025 11:43), Max: 99.2 (14 May 2025 20:32)  HR: 70 (15 May 2025 14:35) (70 - 90)  BP: 137/76 (15 May 2025 14:35) (107/59 - 149/78)  BP(mean): --  RR: 20 (15 May 2025 14:35) (18 - 22)  SpO2: 94% (15 May 2025 14:35) (91% - 96%)    Parameters below as of 15 May 2025 15:05  Patient On (Oxygen Delivery Method): room air        GENERAL: Pt lying comfortably, NAD.  ENMT: PERRL, +EOMI.  NECK: soft, Supple, No JVD,   CHEST/LUNG: Clear to auscultatation bilaterally; No wheezing.  HEART: S1S2+, Regular rate and rhythm; No murmurs.  ABDOMEN: Soft, Nontender, Nondistended; Bowel sounds present.  MUSCULOSKELETAL: Normal range of motion.  SKIN: No rashes or lesions.  NEURO: AAOX3, no focal deficits, no motor r sensory loss.  PSYCH: normal mood.  Procedure site:  RFV  no signs of bleeding or hematoma, neurovascular intact   VASCULAR:   Radial +2 R/+2 L  Femoral +2 R/+2 L  PT +2 R/+2 L  DP +2 R/+2 L                          10.1   7.00  )-----------( 248      ( 15 May 2025 03:54 )             32.8     05-15    143  |  101  |  25.3[H]  ----------------------------<  101[H]  4.1   |  29.0  |  1.06    Ca    8.8      15 May 2025 03:54    TPro  5.9[L]  /  Alb  2.8[L]  /  TBili  0.4  /  DBili  x   /  AST  19  /  ALT  11  /  AlkPhos  77  05-15    PT/INR - ( 15 May 2025 03:54 )   PT: 13.2 sec;   INR: 1.14 ratio         PTT - ( 15 May 2025 03:54 )  PTT:32.5 sec    Intraprocedurally:  Pt received no sedation    Omnipaque: 5 ml    Prelim procedural Findings:     S/P RHC VAI RFV  RA: 12/12/13  RV: 5/15/10  PA: 52/23/30  PCW: 22/29/23  CO: 4.12  CI: 2.13    s/p CARDIOMEMS INSERTION vai RFV , deployed to PA  Cardiomems sensor: 15 mm, device size: 45 mm   PAD MEASUREMENT: 23    Official cath report pending      Post Cath EKG: aFIB, RBBB, LAFB, No acute ST/T changes     -ADMIT due to: / Pt is already in-patient sec to HF  -post procedural management per protocol  -Right groin precautions  -bedrest x 4 hours post procedure, patient can sit up after 2 hours, OOB after 4 hours at 05: 30 pm   -NS 0.9% 250ml/hr x 1 bolus: post procedure LUCILA ppx HELD as pt with HF  -continue current medical therapy  -GDMT management per Cardiology  recommend diuresis  -Patient andc family requesting HF physician for Op consult, (DR Draper)   -follow up outpt in 2 weeks with Cardiologist SIMRAN Wagner  -[facility] Cardiology following during hospitalization  -Lifestyle modifications discussed to reduce cardiovascular risk factors including weight reduction, smoking cessation, medication compliance, and routine follow up with Cardiologist to track your BMI, cholesterol, and glucose levels.  -Management per Hospitalist / [facility] cardiology  -Transfer pt to the tele unit once criteria met  - Discussed with DR Mcdaniels

## 2025-05-15 NOTE — CONSULT NOTE ADULT - CONSULT REQUESTED BY NAME
House Staff
Dr. Tellez
Ranjana
Medicine Resident
We are seeing this patient after being identified on the advanced illness list as a patient with heart failure and high risk for readmission.

## 2025-05-15 NOTE — PROGRESS NOTE ADULT - ASSESSMENT
84y Male w/ PMH of HFrEF, A fib (on Eliquis), COPD (no home O2), severe AS, prior PE (no longer on AC), and stage 3 SCC lung (treated w/ platinum doublet chemoRT and adjuvant durvalumab 1 year ago finished) presented with worsening acute on chronic dyspnea and cough. Admitted for acute hypoxic respiratory failure 2/2 pleural effusions in setting of acute HFrEF. S/p right chest tube. Pending Cardiomems.     #Acute hypoxic respiratory failure- improved  #Acute HFrEF- improved  #R Pleural effusion, drainage in the past   - On RA  - CT-A PE study w/o PE but shows worsening of right sided effusion  - Most recent TTE 3/2025 w/ mildly reduced EF 40-45% and mod-severe AS and MR  - Lasix qod  - GDMT limited by orthostasis/hypotension on midodrine at home, but on Toprol 12.5mg QD  - S/p R chest tube. Removed 5/13  - Transudative effusion by Light's criterion. Culture NGTD, cytology negative for malignant cells  - Planned cardiomems 5/15, eliquis held   - Monitor I/O  - Cards following    #Afib NVR  - [id number] score 3  - Currently rate controlled  - Hold eliquis as above  - C/w home Toprol for rate control  - Cardiology following     #COPD  - Not in acute exacerbation  - C/w home Trelegy    #Hx of stage 3 SCC lung cancer   - S/p chemoradiation completed 4/2023  - Completed 1 year of durvaluamb 6/2024  - Heme/onc signed off    #Orthostatic hypotension  - Cont home midodrine prn    #Nodular opacity, left upper lobe  - Follow up outpatient     DVT PPx: Eliquis  Dispo: Active, planned cardiomemes 5/15, possible dc after procedure, home with home care 84y Male w/ PMH of HFrEF, A fib (on Eliquis), COPD (no home O2), severe AS, prior PE (no longer on AC), and stage 3 SCC lung (treated w/ platinum doublet chemoRT and adjuvant durvalumab 1 year ago finished) presented with worsening acute on chronic dyspnea and cough. Admitted for acute hypoxic respiratory failure 2/2 pleural effusions in setting of acute HFrEF. S/p right chest tube. Pending Cardiomems.     #Acute hypoxic respiratory failure- improved  #Acute HFrEF- improved  #R Pleural effusion, drainage in the past   - On RA  - CT-A PE study w/o PE but shows worsening of right sided effusion  - Most recent TTE 3/2025 w/ mildly reduced EF 40-45% and mod-severe AS and MR  - Lasix qod  - GDMT limited by orthostasis/hypotension on midodrine at home, but on Toprol 12.5mg QD  - S/p R chest tube. Removed 5/13  - Transudative effusion by Light's criterion. Culture NGTD, cytology negative for malignant cells  - Planned cardiomems 5/15, eliquis held   - Monitor I/O  - Cards following    #Afib NVR  - [id number] score 3  - Currently rate controlled  - Hold eliquis as above  - C/w home Toprol for rate control  - Cardiology following     #COPD  - Not in acute exacerbation  - C/w home Trelegy    #Hx of stage 3 SCC lung cancer   - S/p chemoradiation completed 4/2023  - Completed 1 year of durvaluamb 6/2024  - Heme/onc signed off    #Orthostatic hypotension  - Cont home midodrine prn    #Nodular opacity, left upper lobe  - Follow up outpatient     DVT PPx: Eliquis  Dispo: Active, planned cardiomemes 5/15, possible dc after procedure today or tomorrow pending cards rec, home with home care

## 2025-05-15 NOTE — PROGRESS NOTE ADULT - SUBJECTIVE AND OBJECTIVE BOX
Patient is a 84y old  Male who presents with a chief complaint of acute hypoxic respiratory failure, acute hfref, pleural effusions (14 May 2025 09:34)      INTERVAL HPI/OVERNIGHT EVENTS:  - No acute overnight events    TODAY:  - Patient examined bedside, no complaints. Patient denies CP, SOB, dizziness, fever, chills, nausea, vomiting, constipation, diarrhea.    MEDICATIONS  (STANDING):  aspirin  chewable 81 milliGRAM(s) Oral daily  atorvastatin 40 milliGRAM(s) Oral at bedtime  fluticasone propionate/ salmeterol 100-50 MICROgram(s) Diskus 1 Dose(s) Inhalation two times a day  metoprolol succinate ER 12.5 milliGRAM(s) Oral daily  polyethylene glycol 3350 17 Gram(s) Oral daily  senna 2 Tablet(s) Oral at bedtime  tiotropium 2.5 MICROgram(s) Inhaler 2 Puff(s) Inhalation daily    MEDICATIONS  (PRN):  acetaminophen     Tablet .. 650 milliGRAM(s) Oral every 6 hours PRN Temp greater or equal to 38C (100.4F), Mild Pain (1 - 3)  aluminum hydroxide/magnesium hydroxide/simethicone Suspension 30 milliLiter(s) Oral every 4 hours PRN Dyspepsia  melatonin 3 milliGRAM(s) Oral at bedtime PRN Insomnia  midodrine. 5 milliGRAM(s) Oral three times a day PRN see below  ondansetron Injectable 4 milliGRAM(s) IV Push every 8 hours PRN Nausea and/or Vomiting      Allergies    No Known Allergies    Intolerances        REVIEW OF SYSTEMS:  Per HPI.      Vital Signs Last 24 Hrs  T(C): 36.9 (15 May 2025 04:57), Max: 37.3 (14 May 2025 20:32)  T(F): 98.4 (15 May 2025 04:57), Max: 99.2 (14 May 2025 20:32)  HR: 81 (15 May 2025 04:57) (77 - 90)  BP: 115/67 (15 May 2025 04:57) (101/63 - 133/69)  BP(mean): --  RR: 18 (15 May 2025 04:57) (18 - 19)  SpO2: 95% (15 May 2025 04:57) (91% - 100%)    Parameters below as of 15 May 2025 04:57  Patient On (Oxygen Delivery Method): room air        PHYSICAL EXAM:  GENERAL: No acute distress, comfortable in chair  HEENT: Atraumatic, normocephalic, non-icteric  NECK: Supple, full range of motion, no observable masses  NEURO: A&Ox3, no focal deficits, moving all extremities spontaneously, no dysarthria, CN II-XII grossly intact  PSYCH: Normal affect, appropriate insight and judgment, fluent speech  LUNGS: Decreased breath sounds on right, improved from prior, otherwise clear bl, s/p chest tube removal, dressing clean and dry   HEART: Irregular, systolic murmur   ABD: Soft, non-tender, non-distended, no appreciable masses  EXTREMITIES: Minimal pitting edema bl  SKIN: Warm and dry     LABS:                        10.1   7.00  )-----------( 248      ( 15 May 2025 03:54 )             32.8     05-15    143  |  101  |  25.3[H]  ----------------------------<  101[H]  4.1   |  29.0  |  1.06    Ca    8.8      15 May 2025 03:54    TPro  5.9[L]  /  Alb  2.8[L]  /  TBili  0.4  /  DBili  x   /  AST  19  /  ALT  11  /  AlkPhos  77  05-15    PT/INR - ( 15 May 2025 03:54 )   PT: 13.2 sec;   INR: 1.14 ratio         PTT - ( 15 May 2025 03:54 )  PTT:32.5 sec  Urinalysis Basic - ( 15 May 2025 03:54 )    Color: x / Appearance: x / SG: x / pH: x  Gluc: 101 mg/dL / Ketone: x  / Bili: x / Urobili: x   Blood: x / Protein: x / Nitrite: x   Leuk Esterase: x / RBC: x / WBC x   Sq Epi: x / Non Sq Epi: x / Bacteria: x      CAPILLARY BLOOD GLUCOSE          RADIOLOGY & ADDITIONAL TESTS:    Imaging Personally Reviewed:  [X] YES  [ ] NO    Consultant(s) Notes Reviewed:  [X] YES  [ ] NO    Care Discussed with Consultants/Other Providers [X] YES  [ ] NO    Plan of Care discussed with House Staff: [X]YES [ ] NO

## 2025-05-15 NOTE — CONSULT NOTE ADULT - CONSULT REASON
Difficulty finding DP signal s/p cardiomems
lung cancer
Right pleural effusion
RECURRENT Acute on Chronic HFpEF
We are seeing this patient after being identified on the advanced illness list as a patient with heart failure and high risk for readmission.

## 2025-05-16 VITALS
HEART RATE: 84 BPM | RESPIRATION RATE: 16 BRPM | OXYGEN SATURATION: 95 % | SYSTOLIC BLOOD PRESSURE: 116 MMHG | TEMPERATURE: 99 F | DIASTOLIC BLOOD PRESSURE: 66 MMHG

## 2025-05-16 PROCEDURE — 99239 HOSP IP/OBS DSCHRG MGMT >30: CPT

## 2025-05-16 PROCEDURE — 99222 1ST HOSP IP/OBS MODERATE 55: CPT

## 2025-05-16 PROCEDURE — 93926 LOWER EXTREMITY STUDY: CPT | Mod: 26,RT

## 2025-05-16 RX ADMIN — POLYETHYLENE GLYCOL 3350 17 GRAM(S): 17 POWDER, FOR SOLUTION ORAL at 11:29

## 2025-05-16 RX ADMIN — Medication 81 MILLIGRAM(S): at 11:29

## 2025-05-16 RX ADMIN — METOPROLOL SUCCINATE 12.5 MILLIGRAM(S): 50 TABLET, EXTENDED RELEASE ORAL at 05:11

## 2025-05-16 RX ADMIN — TIOTROPIUM BROMIDE INHALATION SPRAY 2 PUFF(S): 3.12 SPRAY, METERED RESPIRATORY (INHALATION) at 08:21

## 2025-05-16 RX ADMIN — Medication 1 DOSE(S): at 08:21

## 2025-05-16 RX ADMIN — FUROSEMIDE 20 MILLIGRAM(S): 10 INJECTION INTRAMUSCULAR; INTRAVENOUS at 05:11

## 2025-05-16 NOTE — PROGRESS NOTE ADULT - PROVIDER SPECIALTY LIST ADULT
Cardiology
Hospitalist
Internal Medicine
Internal Medicine
Cardiology
Internal Medicine
Intervent Cardiology
Thoracic Surgery
Cardiology
Internal Medicine
Thoracic Surgery
Internal Medicine
Thoracic Surgery

## 2025-05-16 NOTE — PROGRESS NOTE ADULT - ASSESSMENT
84y Male w/ PMH of HFrEF, A fib (on Eliquis), COPD (no home O2), severe AS, prior PE (no longer on AC), and stage 3 SCC lung (treated w/ platinum doublet chemoRT and adjuvant durvalumab 1 year ago finished) presented with worsening acute on chronic dyspnea and cough. Admitted for acute hypoxic respiratory failure 2/2 pleural effusions in setting of acute HFrEF. S/p right chest tube and removal. S/p Cardiomems.     #Acute hypoxic respiratory failure- improved  #Acute HFrEF- improved  #R Pleural effusion, drainage in the past   - On RA  - CT-A PE study w/o PE but shows worsening of right sided effusion  - Most recent TTE 3/2025 w/ mildly reduced EF 40-45% and mod-severe AS and MR  - S/p R chest tube. Removed 5/13  - Transudative effusion by Light's criterion. Culture NGTD, cytology negative for malignant cells  - S/p cardiomems 5/15  - Lasix qod  - GDMT limited by orthostasis/hypotension on midodrine at home, but on Toprol 12.5mg QD, titrate GDMT outpatient   - Monitor I/O  - Cards following    #Diminished R DP pulse  - Decreased R DP post procedure  - Sensory and strength intact   - Vascular consulted, no intervention  - Pending RLE arterial duplex     #Afib NVR  - [id number] score 3  - Currently rate controlled  - Eliquis held for cardiomemes  - C/w home Toprol for rate control  - Cardiology following     #COPD  - Not in acute exacerbation  - C/w home Trelegy    #Hx of stage 3 SCC lung cancer   - S/p chemoradiation completed 4/2023  - Completed 1 year of durvaluamb 6/2024  - Heme/onc signed off    #Orthostatic hypotension  - Cont home midodrine prn    #Nodular opacity, left upper lobe  - Follow up outpatient     DVT PPx: Eliquis  Dispo: Pending RLE arterial duplex, likely home with home care 84y Male w/ PMH of HFrEF, A fib (on Eliquis), COPD (no home O2), severe AS, prior PE (no longer on AC), and stage 3 SCC lung (treated w/ platinum doublet chemoRT and adjuvant durvalumab 1 year ago finished) presented with worsening acute on chronic dyspnea and cough. Admitted for acute hypoxic respiratory failure 2/2 pleural effusions in setting of acute HFrEF. S/p right chest tube and removal. S/p Cardiomems.     #Acute hypoxic respiratory failure- improved  #Acute HFrEF- improved  #R Pleural effusion, drainage in the past   - On RA  - CT-A PE study w/o PE but shows worsening of right sided effusion  - Most recent TTE 3/2025 w/ mildly reduced EF 40-45% and mod-severe AS and MR  - S/p R chest tube. Removed 5/13  - Transudative effusion by Light's criterion. Culture NGTD, cytology negative for malignant cells  - S/p cardiomems 5/15  - Lasix qod  - GDMT limited by orthostasis/hypotension on midodrine at home, but on Toprol 12.5mg QD, titrate GDMT outpatient   - Monitor I/O  - Cards following    #Diminished R DP pulse  - Noted decreased R DP post procedure  - Sensory and strength intact, BLE warm and well perfused   - Vascular consulted, no intervention  - Pending RLE arterial duplex     #Afib NVR  - [id number] score 3  - Currently rate controlled  - Eliquis held for cardiomemes  - C/w home Toprol for rate control  - Cardiology following     #COPD  - Not in acute exacerbation  - C/w home Trelegy    #Hx of stage 3 SCC lung cancer   - S/p chemoradiation completed 4/2023  - Completed 1 year of durvaluamb 6/2024  - Heme/onc signed off    #Orthostatic hypotension  - Cont home midodrine prn    #Nodular opacity, left upper lobe  - Follow up outpatient     DVT PPx: Eliquis  Dispo: Pending RLE arterial duplex, likely home with home care later today if negative  84y Male w/ PMH of HFrEF, A fib (on Eliquis), COPD (no home O2), severe AS, prior PE (no longer on AC), and stage 3 SCC lung (treated w/ platinum doublet chemoRT and adjuvant durvalumab 1 year ago finished) presented with worsening acute on chronic dyspnea and cough. Admitted for acute hypoxic respiratory failure 2/2 pleural effusions in setting of acute HFrEF. S/p right chest tube and removal. S/p Cardiomems.     #Acute hypoxic respiratory failure- improved  #Acute HFrEF- improved  #R Pleural effusion, drainage in the past   - On RA  - CT-A PE study w/o PE but shows worsening of right sided effusion  - Most recent TTE 3/2025 w/ mildly reduced EF 40-45% and mod-severe AS and MR  - S/p R chest tube. Removed 5/13  - Transudative effusion by Light's criterion. Culture NGTD, cytology negative for malignant cells  - S/p cardiomems 5/15  - Lasix qod  - GDMT limited by orthostasis/hypotension on midodrine at home, but on Toprol 12.5mg QD, titrate GDMT outpatient   - Monitor I/O  - Cards following    #Diminished R DP pulse  - Noted decreased R DP post procedure  - Sensory and strength intact, BLE warm and well perfused   - Vascular consulted, no intervention  - Pending RLE arterial duplex     #Afib NVR  - [id number] score 3  - Currently rate controlled  - Eliquis held for cardiomemes  - C/w home Toprol for rate control  - Cardiology following     #COPD  - Not in acute exacerbation  - C/w home Trelegy    #Hx of stage 3 SCC lung cancer   - S/p chemoradiation completed 4/2023  - Completed 1 year of durvaluamb 6/2024  - Heme/onc signed off    #Orthostatic hypotension  - Cont home midodrine prn    #Nodular opacity, left upper lobe  - Follow up outpatient     DVT PPx: Eliquis held as above  Dispo: Pending RLE arterial duplex, likely home with home care later today if negative

## 2025-05-16 NOTE — CHART NOTE - NSCHARTNOTEFT_GEN_A_CORE
s/p RHC with cardiomems via RFV  Right groin with no hematoma, site dry and intact, + PT doppler pulse. Unable to palpate DP or doppler but likely baseline  Pending ultrasound ordered by [place] and further management per SB

## 2025-05-16 NOTE — PROGRESS NOTE ADULT - SUBJECTIVE AND OBJECTIVE BOX
[facility], THE HEART CENTER                                   [address] [place] [address]                                                      PHONE: [phone]                                                         FAX: [phone]  [email or web address]  ---------------------------------------------------------------------------------------------------------------------------------    Overnight events/patient complaints:      NAD feeling well today     No Known Allergies    MEDICATIONS  (STANDING):  aspirin  chewable 81 milliGRAM(s) Oral daily  atorvastatin 40 milliGRAM(s) Oral at bedtime  fluticasone propionate/ salmeterol 100-50 MICROgram(s) Diskus 1 Dose(s) Inhalation two times a day  metoprolol succinate ER 12.5 milliGRAM(s) Oral daily  polyethylene glycol 3350 17 Gram(s) Oral daily  senna 2 Tablet(s) Oral at bedtime  tiotropium 2.5 MICROgram(s) Inhaler 2 Puff(s) Inhalation daily    MEDICATIONS  (PRN):  acetaminophen     Tablet .. 650 milliGRAM(s) Oral every 6 hours PRN Temp greater or equal to 38C (100.4F), Mild Pain (1 - 3)  aluminum hydroxide/magnesium hydroxide/simethicone Suspension 30 milliLiter(s) Oral every 4 hours PRN Dyspepsia  melatonin 3 milliGRAM(s) Oral at bedtime PRN Insomnia  midodrine. 5 milliGRAM(s) Oral three times a day PRN see below  ondansetron Injectable 4 milliGRAM(s) IV Push every 8 hours PRN Nausea and/or Vomiting      Vital Signs Last 24 Hrs  T(C): 36.6 (16 May 2025 08:27), Max: 36.6 (15 May 2025 11:43)  T(F): 97.8 (16 May 2025 08:27), Max: 97.8 (15 May 2025 11:43)  HR: 81 (16 May 2025 08:27) (70 - 83)  BP: 107/64 (16 May 2025 08:27) (104/60 - 149/78)  BP(mean): --  RR: 18 (16 May 2025 08:27) (16 - 22)  SpO2: 92% (16 May 2025 08:) (92% - 98%)    Parameters below as of 16 May 2025 08:27  Patient On (Oxygen Delivery Method): room air      ICU Vital Signs Last 24 Hrs  JONE SOUTH  I&O's Detail    15 May 2025 07:01  -  16 May 2025 07:00  --------------------------------------------------------  IN:  Total IN: 0 mL    OUT:    Voided (mL): 300 mL  Total OUT: 300 mL    Total NET: -300 mL        I&O's Summary    15 May 2025 07:01  -  16 May 2025 07:00  --------------------------------------------------------  IN: 0 mL / OUT: 300 mL / NET: -300 mL      Drug Dosing Weight  JONE SOUTH      PHYSICAL EXAM:  General: Appears well developed, alert and cooperative.  HEENT: Head; normocephalic, atraumatic.  Eyes: Pupils reactive, cornea wnl.  Neck: Supple, no nodes adenopathy, no NVD or carotid bruit or thyromegaly.  CARDIOVASCULAR: Normal S1 and S2, 3/6 AS mid peaking murmur, rub, gallop or lift.   LUNGS: Decrease BS at the lower bases   ABDOMEN: Soft, nontender without mass or organomegaly. bowel sounds normoactive.  EXTREMITIES: No clubbing, cyanosis or edema. Distal pulses wnl.   SKIN: warm and dry with normal turgor.  NEURO: Alert/oriented x 3/normal motor exam. No pathologic reflexes.    PSYCH: normal affect.        LABS:                        10.1   7.00  )-----------( 248      ( 15 May 2025 03:54 )             32.8     05-15    143  |  101  |  25.3[H]  ----------------------------<  101[H]  4.1   |  29.0  |  1.06    Ca    8.8      15 May 2025 03:54    TPro  5.9[L]  /  Alb  2.8[L]  /  TBili  0.4  /  DBili  x   /  AST  19  /  ALT  11  /  AlkPhos  77  -15    JONE SOUTH      PT/INR - ( 15 May 2025 03:54 )   PT: 13.2 sec;   INR: 1.14 ratio         PTT - ( 15 May 2025 03:54 )  PTT:32.5 sec  Urinalysis Basic - ( 15 May 2025 03:54 )    Color: x / Appearance: x / SG: x / pH: x  Gluc: 101 mg/dL / Ketone: x  / Bili: x / Urobili: x   Blood: x / Protein: x / Nitrite: x   Leuk Esterase: x / RBC: x / WBC x   Sq Epi: x / Non Sq Epi: x / Bacteria: x        RADIOLOGY & ADDITIONAL STUDIES:    INTERPRETATION OF TELEMETRY (personally reviewed):    < from: CARLOS W or WO Ultrasound Enhancing Agent (25 @ 10:27) >     CONCLUSIONS:      1. Left ventricular systolic function is normal.   2. Moderate mitral regurgitation.   3. The aortic valve is calcified with decreased leaflet opening. peak gradient: 27 mm Hg, mean gradient: 15 mmHg, MACKENZIE by planimetry: 1.15 cm2 all consistent with moderate aortic stenosis.   4. No evidence of left atrial appendage thrombus.    < end of copied text >      < from: Cardiac Catheterization (05.15.25 @ 13:44) >  Procedures Performed   Procedures:              1.    Ultrasound Guided Access     2.    Venous Access - Right Femoral   3.    RHC   4.    PA Angiography   5.    CardioMems Implant     Conclusions:   Mod-sev pulm HTN.     L PA gram.   Cardiomems implant.   Recommendations:     Med tx   Followup 1-2 weeks.   Acute complication:    No complications     Procedure Narrative:   The risks and alternatives of the procedures and conscious sedation  were explained to the patient and informed consent was  obtained. The patient was brought to the cath lab and placedon the  exam table.  Access   Right femoral vein:   Vascular access was obtained using modified seldinger technique.      Right Heart Cath   Measurements of pressures were obtained.      Diagnostic Findings:     < end of copied text >        < from: Cardiac Catheterization (25 @ 12:58) >    *****This report is revised and replaces previous reports. The  previous report w   as signed on 3/14/2025 1:28:51 PM.*****  Study Date:     2025   Name:           JONE SOUTH   :            1940   (84 years)   Gender:  male   MR#:            [id number]   MPI#:           [id number]   Patient Class:  Inpatient     Cath Lab Report    Diagnostic Cardiologist:       Malvin Velez MD   Referring Physician:           Shakir Muhammad DO     Procedures Performed   Procedures:              1.    Ultrasound Guided Access     2.    Venous Access - Right Femoral   3.    RHC   4.    Diagnostic Coronary Angiography   5.    Left Heart Cath   6.    AngioSeal     Indications:               Severe aortic stenosis   Dyspnea   Suspected coronary artery disease     Diagnostic Conclusions:     Mild RCA and LAD disease   LVEF 45%   Normal wedge pressure and pulmonary pressures   Borderline low cardiac output   Recommendations:     Consult with valve clinic   Can consider TEEto evaluate aortic valve as aortic valve gradient  minimal on cath    < end of copied text >        ASSESSMENT AND PLAN:  In summary, JONE SOUTH is an 84y Male with chronic HFpEF, mod-severe AS, hypertension, hyperlipidemia, moderate CAD, persistent atrial fibrillation, SCC lung s/p treatment c/b DVT, on Eliquis, who presented with shortness of breath and acute on chronic HFpEF in setting of AF/AS s/p right chest tube    - Continue to monitor urine output and fluid status   - Lasix prn   - Had long conversation with son and wife at bedside re: CHF management. Discussed monitoring I/Os, weight, increasing Lasix as needed. Also extensively discussed the complexity of his care including multiple conditions interacting and affecting care Moderate to severe AS/AF along with orthostatic hypotension        s/p CardioMEMS 5/15/2025     DC planning with outpatient FU

## 2025-05-16 NOTE — PROGRESS NOTE ADULT - REASON FOR ADMISSION
acute hypoxic respiratory failure, acute hfref, pleural effusions

## 2025-05-16 NOTE — PROGRESS NOTE ADULT - ATTENDING COMMENTS
84y Male w/ PMH of HFrEF, A fib (on Eliquis), COPD (no home O2), severe AS, prior PE (no longer on AC), and stage 3 SCC lung (treated w/ platinum doublet chemoRT and adjuvant durvalumab 1 year ago finished) presented with worsening acute on chronic dyspnea and cough. Admitted for acute hypoxic respiratory failure 2/2 pleural effusions in setting of acute HFrEF. S/p right chest tube.     #Acute hypoxic respiratory failure- improved  #Acute HFrEF- improved  #R Pleural effusion, drainage in the past   off Lasix now  not requiring midodrine hemodynamically improved  chest tube removed now  rpt CXR acceptable  restart low dose Lasix QOD.   Cardiology planning Cardiomems tomorrow.    Can try GDMT outpatient if BP tolerates    dispo- home  likely tomorrow after mems
84y Male w/ PMH of HFrEF, A fib (on Eliquis), COPD (no home O2), severe AS, prior PE (no longer on AC), and stage 3 SCC lung (treated w/ platinum doublet chemoRT and adjuvant durvalumab 1 year ago finished) presented with worsening acute on chronic dyspnea and cough. Admitted for acute hypoxic respiratory failure 2/2 pleural effusions in setting of acute HFrEF. S/p right chest tube.     #Acute hypoxic respiratory failure- improved  #Acute HFrEF- improved  #R Pleural effusion, drainage in the past   off Lasix now  not requiring midodrine hemodynamically improved  chest tube clamped overnight  monitoring for accumulation to assess if it can be removed.     Can try GDMT outpatient if BP tolerates  may be able to tolerate low dose QOD lasix dosing for home
84y Male w/ PMH of HFrEF, afib (on Eliquis), COPD (no home O2), severe AS, prior PE (no longer on AC), and stage 3 SCC lung (treated w/ platinum doublet chemoRT and adjuvant durvalumab 1 year ago finished) presented with worsening acute on chronic dyspnea and cough. Admitted for acute hypoxic respiratory failure 2/2 pleural effusions in setting of acute HFrEF. S/p right chest tube.     #Acute hypoxic respiratory failure- improved  #Acute HFrEF- improved  #R Pleural effusion, known effusion, drainage in the past   250ml drainage overnight chest tube  off NC O2 now  po lasix dosing now  will wean off lasix prior to discharge as he is midodrine dependent to maintain stable Hemodynamics    dispo- home when chest tube out    cardiomems prior to discharge per cards
Agree with above   Patient seen and examined together with medical residents. Family at bedside. Pt reports feeling much better after chest tube placement.   Vital signs,  labs and imaging  reviewed   s/p right sided chest tube placement   Assessment and plan as above and discussed with pt and residents     Acute hypoxic respiratory failure secondary to large pleural effusion likely secondary from cancer   Hx of COPD   Hx of SCC of lung on active treatment   Chronic afib     s/p chest tube, actively draining   Taper Supplemental O2 as tolerates   No on supplemental O2   Encourage incentive spirometry   c/w Furosemide 40 mg IV BID for today and taper to PO starting tomorrow   c/w Metoprolol   C/w Midodrine for chronic hypotension   Get out of bed to chair   The rest as above
for discharge today
84y Male w/ PMH of HFrEF, afib (on Eliquis), COPD (no home O2), severe AS, prior PE (no longer on AC), and stage 3 SCC lung (treated w/ platinum doublet chemoRT and adjuvant durvalumab 1 year ago finished) presented with worsening acute on chronic dyspnea and cough. Admitted for acute hypoxic respiratory failure 2/2 pleural effusions in setting of acute HFrEF. S/p right chest tube.     #Acute hypoxic respiratory failure- improved  #Acute HFrEF- improved  #R Pleural effusion, known effusion, drainage in the past   550ml drainage overnight chest tube. CXR stable  off NC O2 now  fluid status good. d/c lasix. and use PRN only.   uses midodrine PRN only at home. change midodrine to 5 mg PRN with parameters    dispo- home when chest tube out    cardiomems prior to discharge per cards .    discussed with family and updated

## 2025-05-17 ENCOUNTER — TRANSCRIPTION ENCOUNTER (OUTPATIENT)
Age: 85
End: 2025-05-17

## 2025-05-19 ENCOUNTER — TRANSCRIPTION ENCOUNTER (OUTPATIENT)
Age: 85
End: 2025-05-19

## 2025-05-19 PROBLEM — I48.91 UNSPECIFIED ATRIAL FIBRILLATION: Chronic | Status: ACTIVE | Noted: 2025-05-08

## 2025-05-19 PROBLEM — I50.22 CHRONIC SYSTOLIC (CONGESTIVE) HEART FAILURE: Chronic | Status: ACTIVE | Noted: 2025-05-08

## 2025-05-19 PROBLEM — I35.0 NONRHEUMATIC AORTIC (VALVE) STENOSIS: Chronic | Status: ACTIVE | Noted: 2025-05-08

## 2025-05-19 PROBLEM — C34.90 MALIGNANT NEOPLASM OF UNSPECIFIED PART OF UNSPECIFIED BRONCHUS OR LUNG: Chronic | Status: ACTIVE | Noted: 2025-05-08

## 2025-05-20 ENCOUNTER — OUTPATIENT (OUTPATIENT)
Dept: OUTPATIENT SERVICES | Facility: HOSPITAL | Age: 85
LOS: 1 days | Discharge: HOME | End: 2025-05-20

## 2025-05-20 DIAGNOSIS — Z96.642 PRESENCE OF LEFT ARTIFICIAL HIP JOINT: Chronic | ICD-10-CM

## 2025-05-20 DIAGNOSIS — Z96.641 PRESENCE OF RIGHT ARTIFICIAL HIP JOINT: Chronic | ICD-10-CM

## 2025-05-20 DIAGNOSIS — C34.90 MALIGNANT NEOPLASM OF UNSPECIFIED PART OF UNSPECIFIED BRONCHUS OR LUNG: ICD-10-CM

## 2025-05-21 ENCOUNTER — OUTPATIENT (OUTPATIENT)
Dept: OUTPATIENT SERVICES | Facility: HOSPITAL | Age: 85
LOS: 1 days | End: 2025-05-21
Payer: MEDICARE

## 2025-05-21 VITALS
TEMPERATURE: 98 F | WEIGHT: 169.76 LBS | DIASTOLIC BLOOD PRESSURE: 78 MMHG | HEIGHT: 70 IN | RESPIRATION RATE: 16 BRPM | SYSTOLIC BLOOD PRESSURE: 120 MMHG | OXYGEN SATURATION: 99 % | HEART RATE: 78 BPM

## 2025-05-21 DIAGNOSIS — K40.90 UNILATERAL INGUINAL HERNIA, WITHOUT OBSTRUCTION OR GANGRENE, NOT SPECIFIED AS RECURRENT: ICD-10-CM

## 2025-05-21 DIAGNOSIS — Z29.9 ENCOUNTER FOR PROPHYLACTIC MEASURES, UNSPECIFIED: ICD-10-CM

## 2025-05-21 DIAGNOSIS — C34.90 MALIGNANT NEOPLASM OF UNSPECIFIED PART OF UNSPECIFIED BRONCHUS OR LUNG: ICD-10-CM

## 2025-05-21 DIAGNOSIS — I48.91 UNSPECIFIED ATRIAL FIBRILLATION: ICD-10-CM

## 2025-05-21 DIAGNOSIS — Z96.641 PRESENCE OF RIGHT ARTIFICIAL HIP JOINT: Chronic | ICD-10-CM

## 2025-05-21 DIAGNOSIS — I10 ESSENTIAL (PRIMARY) HYPERTENSION: ICD-10-CM

## 2025-05-21 DIAGNOSIS — Z96.642 PRESENCE OF LEFT ARTIFICIAL HIP JOINT: Chronic | ICD-10-CM

## 2025-05-21 DIAGNOSIS — Z91.89 OTHER SPECIFIED PERSONAL RISK FACTORS, NOT ELSEWHERE CLASSIFIED: ICD-10-CM

## 2025-05-21 DIAGNOSIS — Z01.818 ENCOUNTER FOR OTHER PREPROCEDURAL EXAMINATION: ICD-10-CM

## 2025-05-21 DIAGNOSIS — I50.22 CHRONIC SYSTOLIC (CONGESTIVE) HEART FAILURE: ICD-10-CM

## 2025-05-21 LAB
A1C WITH ESTIMATED AVERAGE GLUCOSE RESULT: 5.7 % — HIGH (ref 4–5.6)
ALBUMIN SERPL ELPH-MCNC: 3.5 G/DL — SIGNIFICANT CHANGE UP (ref 3.3–5.2)
ALP SERPL-CCNC: 98 U/L — SIGNIFICANT CHANGE UP (ref 40–120)
ALT FLD-CCNC: 9 U/L — SIGNIFICANT CHANGE UP
ANION GAP SERPL CALC-SCNC: 12 MMOL/L — SIGNIFICANT CHANGE UP (ref 5–17)
APTT BLD: 35.7 SEC — SIGNIFICANT CHANGE UP (ref 26.1–36.8)
AST SERPL-CCNC: 21 U/L — SIGNIFICANT CHANGE UP
BASOPHILS # BLD AUTO: 0.04 K/UL — SIGNIFICANT CHANGE UP (ref 0–0.2)
BASOPHILS NFR BLD AUTO: 0.5 % — SIGNIFICANT CHANGE UP (ref 0–2)
BILIRUB SERPL-MCNC: 0.4 MG/DL — SIGNIFICANT CHANGE UP (ref 0.4–2)
BLD GP AB SCN SERPL QL: SIGNIFICANT CHANGE UP
BUN SERPL-MCNC: 19 MG/DL — SIGNIFICANT CHANGE UP (ref 8–20)
CALCIUM SERPL-MCNC: 9.1 MG/DL — SIGNIFICANT CHANGE UP (ref 8.4–10.5)
CHLORIDE SERPL-SCNC: 102 MMOL/L — SIGNIFICANT CHANGE UP (ref 96–108)
CO2 SERPL-SCNC: 26 MMOL/L — SIGNIFICANT CHANGE UP (ref 22–29)
CREAT SERPL-MCNC: 0.95 MG/DL — SIGNIFICANT CHANGE UP (ref 0.5–1.3)
EGFR: 79 ML/MIN/1.73M2 — SIGNIFICANT CHANGE UP
EGFR: 79 ML/MIN/1.73M2 — SIGNIFICANT CHANGE UP
EOSINOPHIL # BLD AUTO: 0.31 K/UL — SIGNIFICANT CHANGE UP (ref 0–0.5)
EOSINOPHIL NFR BLD AUTO: 3.6 % — SIGNIFICANT CHANGE UP (ref 0–6)
ESTIMATED AVERAGE GLUCOSE: 117 MG/DL — HIGH (ref 68–114)
GLUCOSE SERPL-MCNC: 87 MG/DL — SIGNIFICANT CHANGE UP (ref 70–99)
HCT VFR BLD CALC: 33.9 % — LOW (ref 39–50)
HGB BLD-MCNC: 10.2 G/DL — LOW (ref 13–17)
IMM GRANULOCYTES # BLD AUTO: 0.02 K/UL — SIGNIFICANT CHANGE UP (ref 0–0.07)
IMM GRANULOCYTES NFR BLD AUTO: 0.2 % — SIGNIFICANT CHANGE UP (ref 0–0.9)
INR BLD: 1.38 RATIO — HIGH (ref 0.85–1.16)
LYMPHOCYTES # BLD AUTO: 0.68 K/UL — LOW (ref 1–3.3)
LYMPHOCYTES NFR BLD AUTO: 8 % — LOW (ref 13–44)
MCHC RBC-ENTMCNC: 28.1 PG — SIGNIFICANT CHANGE UP (ref 27–34)
MCHC RBC-ENTMCNC: 30.1 G/DL — LOW (ref 32–36)
MCV RBC AUTO: 93.4 FL — SIGNIFICANT CHANGE UP (ref 80–100)
MONOCYTES # BLD AUTO: 0.81 K/UL — SIGNIFICANT CHANGE UP (ref 0–0.9)
MONOCYTES NFR BLD AUTO: 9.5 % — SIGNIFICANT CHANGE UP (ref 2–14)
MRSA PCR RESULT.: SIGNIFICANT CHANGE UP
NEUTROPHILS # BLD AUTO: 6.64 K/UL — SIGNIFICANT CHANGE UP (ref 1.8–7.4)
NEUTROPHILS NFR BLD AUTO: 78.2 % — HIGH (ref 43–77)
NRBC # BLD AUTO: 0 K/UL — SIGNIFICANT CHANGE UP (ref 0–0)
NRBC # FLD: 0 K/UL — SIGNIFICANT CHANGE UP (ref 0–0)
NRBC BLD AUTO-RTO: 0 /100 WBCS — SIGNIFICANT CHANGE UP (ref 0–0)
PLATELET # BLD AUTO: 306 K/UL — SIGNIFICANT CHANGE UP (ref 150–400)
PMV BLD: 10.7 FL — SIGNIFICANT CHANGE UP (ref 7–13)
POTASSIUM SERPL-MCNC: 4.4 MMOL/L — SIGNIFICANT CHANGE UP (ref 3.5–5.3)
POTASSIUM SERPL-SCNC: 4.4 MMOL/L — SIGNIFICANT CHANGE UP (ref 3.5–5.3)
PROT SERPL-MCNC: 7.1 G/DL — SIGNIFICANT CHANGE UP (ref 6.6–8.7)
PROTHROM AB SERPL-ACNC: 15.9 SEC — HIGH (ref 9.9–13.4)
RBC # BLD: 3.63 M/UL — LOW (ref 4.2–5.8)
RBC # FLD: 15.9 % — HIGH (ref 10.3–14.5)
S AUREUS DNA NOSE QL NAA+PROBE: SIGNIFICANT CHANGE UP
SODIUM SERPL-SCNC: 140 MMOL/L — SIGNIFICANT CHANGE UP (ref 135–145)
WBC # BLD: 8.5 K/UL — SIGNIFICANT CHANGE UP (ref 3.8–10.5)
WBC # FLD AUTO: 8.5 K/UL — SIGNIFICANT CHANGE UP (ref 3.8–10.5)

## 2025-05-21 PROCEDURE — 85730 THROMBOPLASTIN TIME PARTIAL: CPT

## 2025-05-21 PROCEDURE — 87640 STAPH A DNA AMP PROBE: CPT

## 2025-05-21 PROCEDURE — 80053 COMPREHEN METABOLIC PANEL: CPT

## 2025-05-21 PROCEDURE — 71046 X-RAY EXAM CHEST 2 VIEWS: CPT

## 2025-05-21 PROCEDURE — 36415 COLL VENOUS BLD VENIPUNCTURE: CPT

## 2025-05-21 PROCEDURE — 86850 RBC ANTIBODY SCREEN: CPT

## 2025-05-21 PROCEDURE — 71046 X-RAY EXAM CHEST 2 VIEWS: CPT | Mod: 26

## 2025-05-21 PROCEDURE — G0463: CPT

## 2025-05-21 PROCEDURE — 86900 BLOOD TYPING SEROLOGIC ABO: CPT

## 2025-05-21 PROCEDURE — 85025 COMPLETE CBC W/AUTO DIFF WBC: CPT

## 2025-05-21 PROCEDURE — 83036 HEMOGLOBIN GLYCOSYLATED A1C: CPT

## 2025-05-21 PROCEDURE — 93005 ELECTROCARDIOGRAM TRACING: CPT

## 2025-05-21 PROCEDURE — 85610 PROTHROMBIN TIME: CPT

## 2025-05-21 PROCEDURE — 87641 MR-STAPH DNA AMP PROBE: CPT

## 2025-05-21 PROCEDURE — 86901 BLOOD TYPING SEROLOGIC RH(D): CPT

## 2025-05-21 PROCEDURE — 93010 ELECTROCARDIOGRAM REPORT: CPT

## 2025-05-21 RX ORDER — METOPROLOL SUCCINATE 50 MG/1
0.5 TABLET, EXTENDED RELEASE ORAL
Refills: 0 | DISCHARGE

## 2025-05-21 NOTE — H&P PST ADULT - ASSESSMENT
85 y/o M seen in PST    in anticipation of scheduled ROBOTIC LEFT INGUINAL HERNIA REPAIR POSSIBLE B/L on 6/10/25  at [facility] w/DR JOSEPH BURDEN.  PMHX: HTN, OA, CAD, CHF, BPH, Pleurodesis, COPD, NSCC s/p chemo/radiation, Afib, DVT, Aortic Stenosis, Thoracic Myelopathy. Recent hospitalization for SOB/HF/Pleurodesis/AFIB.  Had  PA angiography, CARLOS, RHC, CardioMEMS  in early May 2025, no stents required.  He reports a left groin bulge and recent PET/CT that showed a left inguinal hernia containing sigmoid colon. The patient denies previous bowel obstructions or acute changes in nausea/vomiting. He reports that the bulge waxes and wanes in size. He reports occasional constipation issues. However, denies incarceration. Denies pain in the testicle. Denies past abdominal surgery. Reports chronic "fluid on lung" most recently last week had 3L removed with Dr. Martin.  Reports hx of [facility] s/p chemo/radiation 2 years ago, still has chemo port in follows with Dr Ruiz.  Reports due to chemo he had a LLE DVT 18 months ago.  He is currently on ASA/Elliquis. Follows with [facility] [facility] but will be switching to [address].  PENDING MEDICAL OPTIMIZATION WITH PCP PCP FAVIOLA BLACKBURN [phone]    PENDING CARDIAC OPTIMIZATION WITH  [place] [facility] [phone]    OPIOID RISK TOOL    LAKSHMI EACH BOX THAT APPLIES AND ADD TOTALS AT THE END    FAMILY HISTORY OF SUBSTANCE ABUSE                 FEMALE         MALE                                               Alcohol                             [  ]1 pt          [  ]3pts                                               Illegal Durgs                     [  ]2 pts        [  ]3pts                                               Rx Drugs                           [  ]4 pts        [  ]4 pts    PERSONAL HISTORY OF SUBSTANCE ABUSE                                                                                         Alcohol                             [  ]3 pts       [  ]3 pts                                               Illegal Durgs                     [  ]4 pts        [  ]4 pts                                               Rx Drugs                           [  ]5 pts        [  ]5 pts    AGE BETWEEN 16-45 YEARS                                      [  ]1 pt         [  ]1 pt    HISTORY OF PREADOLESCENT  SEXUAL ABUSE                                                             [  ]3 pts        [  ]0pts    PSYCHOLOGICAL DISEASE                     ADD, OCD, Bipolar, Schizophrenia        [  ]2 pts         [  ]2 pts                      Depression                                               [  ]1 pt           [  ]1 pt          Total: 0  A score of 3 or lower indicated LOW risk for future opiod abuse  A score of 4 to 7 indicated moderate risk for future opiod abuse  A score of 8 or higher indicates a high risk for opiod abuse       CAPRINI SCORE    AGE RELATED RISK FACTORS                                                             [ ] Age 41-60 years                                            (1 Point)  [ ] Age: 61-74 years                                           (2 Points)                 [X ] Age= 75 years                                                (3 Points)             DISEASE RELATED RISK FACTORS                                                       [ ] Edema in the lower extremities                 (1 Point)                     [ ] Varicose veins                                               (1 Point)                                 [ ] BMI > 25 Kg/m2                                            (1 Point)                                  [ ] Serious infection (ie PNA)                            (1 Point)                     [ X] Lung disease ( COPD, Emphysema)            (1 Point)                                                                          [ ] Acute myocardial infarction                         (1 Point)                  [ ] Congestive heart failure (in the previous month)  (1 Point)         [ ] Inflammatory bowel disease                            (1 Point)                  [X ] Central venous access, PICC or Port               (2 points)       (within the last month)                                                                [ ] Stroke (in the previous month)                        (5 Points)    [X ] Previous or present malignancy                       (2 points)                                                                                                                                                         HEMATOLOGY RELATED FACTORS                                                         [X] Prior episodes of VTE                                     (3 Points)                     [ ] Positive family history for VTE                      (3 Points)                  [ ] Prothrombin 49696 A                                     (3 Points)                     [ ] Factor V Leiden                                                (3 Points)                        [ ] Lupus anticoagulants                                      (3 Points)                                                           [ ] Anticardiolipin antibodies                              (3 Points)                                                       [ ] High homocysteine in the blood                   (3 Points)                                             [ X] Other congenital or acquired thrombophilia      (3 Points)                                                [ ] Heparin induced thrombocytopenia                  (3 Points)                                        MOBILITY RELATED FACTORS  [ ] Bed rest                                                         (1 Point)  [ ] Plaster cast                                                    (2 points)  [ ] Bed bound for more than 72 hours           (2 Points)    GENDER SPECIFIC FACTORS  [ ] Pregnancy or had a baby within the last month   (1 Point)  [ ] Post-partum < 6 weeks                                   (1 Point)  [ ] Hormonal therapy  or oral contraception   (1 Point)  [ ] History of pregnancy complications              (1 point)  [ ] Unexplained or recurrent              (1 Point)    OTHER RISK FACTORS                                           (1 Point)  [ ] BMI >40, smoking, diabetes requiring insulin, chemotherapy  blood transfusions and length of surgery over 2 hours    SURGERY RELATED RISK FACTORS  [ ]  Section within the last month     (1 Point)  [ ] Minor surgery                                                  (1 Point)  [ ] Arthroscopic surgery                                       (2 Points)  [X ] Planned major surgery lasting more            (2 Points)      than 45 minutes     [ ] Elective hip or knee joint replacement       (5 points)       surgery                                                TRAUMA RELATED RISK FACTORS  [ ] Fracture of the hip, pelvis, or leg                       (5 Points)  [ ] Spinal cord injury resulting in paralysis             (5 points)       (in the previous month)    [ ] Paralysis  (less than 1 month)                             (5 Points)  [ ] Multiple Trauma within 1 month                        (5 Points)    Total Score [   16  ]    Caprini Score 0-2: Low Risk, NO VTE prophylaxis required for most patients, encourage ambulation  Caprini Score 3-6: Moderate Risk , pharmacologic VTE prophylaxis is indicated for most patients (in the absence of contraindications)  Caprini Score Greater than or =7: High risk, pharmocologic VTE prophylaxis indicated for most patients (in the absence of contraindications)

## 2025-05-21 NOTE — H&P PST ADULT - OTHER CARE PROVIDERS
PCP LIBBY SALMERON [facility] [phone]  [place] [facility] [phone]

## 2025-05-21 NOTE — H&P PST ADULT - PROBLEM/PLAN-3
What Type Of Note Output Would You Prefer (Optional)?: Standard Output How Severe Is Your Rash?: mild Is This A New Presentation, Or A Follow-Up?: Rash DISPLAY PLAN FREE TEXT

## 2025-05-21 NOTE — H&P PST ADULT - PROBLEM SELECTOR PLAN 3
Continue RX meds as ordered by prescriber. Verbal and written instructions given to continue and take meds day of surgery with small sip of water at least 2 hours prior to start time unless otherwise instructed by prescriber/surgeon.  PENDING MEDICAL OPTIMIZATION WITH PCP PCP FAVIOLA BLACKBURN [phone]    PENDING CARDIAC OPTIMIZATION WITH  [place] [facility] [phone]

## 2025-05-21 NOTE — H&P PST ADULT - PROBLEM SELECTOR PLAN 1
[facility], [place] [address]  [address]  [facility] [address]  [phone]      Jennifer Crawley : 1967 MRN: [id number]      3/26/2019  Time Session Began: 4:30 PM  Time Session Ended: 5:30 PM    Session Type: 60 Minute Therapy (48166)    Others Present:     Intervention: Cognitive    Suicide/Homicide/Violence Ideation: No    If Yes, explain:     Current Outpatient Medications   Medication Sig   • meloxicam (MOBIC) 15 MG tablet Take 1 tablet by mouth daily.   • [START ON 2019] lisdexamfetamine (VYVANSE) 40 MG capsule Take 1 capsule by mouth every morning.   • Ascorbic Acid (VITAMIN C PO)    • tiZANidine (ZANAFLEX) 4 MG tablet 1 tab po qhs   • VITAMIN D, CHOLECALCIFEROL, PO Take 5,000 Units by mouth.   • levothyroxine (SYNTHROID, LEVOTHROID) 50 MCG tablet TAKE 1 TABLET BY MOUTH DAILY   • atorvastatin (LIPITOR) 10 MG tablet Take 1 tablet by mouth daily.   • acetaminophen (TYLENOL) 500 MG tablet Take 1,000 mg by mouth every 6 hours as needed for Pain.   • FIBER ADULT GUMMIES PO Take 2 tablets by mouth daily.   • clonazePAM (KLONOPIN) 0.5 MG tablet Take 0.5 mg by mouth as needed for Anxiety.   • hydrochlorothiazide (HYDRODIURIL) 25 MG tablet Take 1 tablet by mouth daily.     No current facility-administered medications for this visit.        Change in Medication(s) Reported: No  If Yes, explain:     Patient/Family Education Provided: Yes  Patient/Family Displays Understanding: Yes    If No, explain:     Chief complaint in patient's own words: \"Anxiety\"    Progress Note containing chief complaint and symptoms/problems related to the complaint:    Data, Assessment, Response, Plan:     D: Jennifer arrived on time for her appointment today presenting with elevated anxiety and congruent affect. She indicates continued issues at her job and requested to process these issues in session today.    A: Writer utilized cognitive behavioral therapy today with  Jennifer to process the issues occurring at her job and the emotional response that she is experiencing.    R: Jennifer responded well to treatment today and discussed issues at work in which she is feeling as though her values are being challenged and she is being placed in situations in which she is not comfortable. She was able to discuss maladaptive thought patterns that contribute to her anxiety including fears of not being liked, not being good enough, knowing valuing her opinions, and her expertise being minimized. Jennifer was able to discuss these thought patterns and process ways in which this has an impact on her emotional responses to her supervisor at her place of employment. Jennifer responded well to feedback and was able to identify ways in which she can better respond and better manage her emotions in situations that continue to occur.    P: Jennifer will continue to utilize coping mechanisms in between sessions to help manage her symptoms of anxiety and depression.      Need for Community Resources Assessed: Yes    Resources Needed: No    If Yes, what resources:     Primary Diagnosis: Generalized Anxiety Disorder  : 2 Moderate    Treatment Plan: See Treatment Plan    Discharge Plan: Strategies Discussed to Maintain Gains    Next Appointment: 1-2 weeks  Nicki Delacruz LCSW     PST 5/21   in anticipation of scheduled ROBOTIC LEFT INGUINAL HERNIA REPAIR POSSIBLE B/L on 6/10/25  at [facility] w/DR JOSEPH BURDEN. Patient educated on no shaving x 3 days prior to procedure, correct use of surgical scrub, NPO after MN,  preadmission instructions, day of procedure medications, MEDICAL/CARDIAC OPTIMIZATION needed.   Pt instructed to stop vitamins/supplements/herbal medications/NSAIDS for one week prior to surgery and discuss with PMD/PRESCIRBER/SPECIALIST any outstanding items or questions about prescriptions/medications. Written and oral instructions given to patient. f  PENDING MEDICAL OPTIMIZATION WITH PCP PCP FAVIOLA BLACKBURN [phone]    PENDING CARDIAC OPTIMIZATION WITH  [place] [facility] [phone]

## 2025-05-21 NOTE — H&P PST ADULT - PROBLEM SELECTOR PLAN 6
EKG done in PST.  CardioMEMS in place  PENDING MEDICAL OPTIMIZATION WITH PCP PCP FAVIOLA BLACKBURN [phone]    PENDING CARDIAC OPTIMIZATION WITH  [place] [facility] [phone]

## 2025-05-21 NOTE — H&P PST ADULT - CARDIOVASCULAR
details… normal/regular rate and rhythm/S1 S2 present/no gallops/no rub/no murmur S1 S2 present/no JVD/no pedal edema/murmur

## 2025-05-21 NOTE — H&P PST ADULT - PROBLEM SELECTOR PLAN 5
Continue RX meds as ordered by prescriber. Verbal and written instructions given to continue and take meds day of surgery with small sip of water at least 2 hours prior to start time unless otherwise instructed by prescriber/surgeon.  PENDING MEDICAL OPTIMIZATION WITH PCP PCP FAVIOLA BLACKBURN [phone]    PENDING CARDIAC OPTIMIZATION WITH  [place] [facility] [phone]

## 2025-05-21 NOTE — H&P PST ADULT - HISTORY OF PRESENT ILLNESS
This patient is a pleasant 84-year-old male with a history of hypertension, osteoarthritis status post left and right hip replacement, non-small cell lung cancer on the right status post chemoradiation therapy, aortic stenosis, CAD status post recent CARLOS and cardiac catheterization not requiring stenting, who now presents for evaluation of left groin bulge and recent PET/CT that showed a left inguinal hernia containing sigmoid colon. The patient denies previous bowel obstructions or acute changes in nausea/vomiting. He reports that the bulge waxes and wanes in size. He reports occasional constipation issues. However, denies incarceration. Denies pain in the testicle. He has a history of BPH with mild urinary obstruction. Denies previous abdominal surgery. No tobacco use.  ?  The patient has had multiple pleurodesis under the care of Dr. Martin. He states that he recently has had increasing shortness of breath. Recent PET/CT showed significant fluid accumulation in the right chest.  ?  Active Problems  Acute on chronic systolic congestive heart failure (428.23,428.0) (I50.23)  Acute UTI (599.0) (N39.0)  Anemia, unspecified type (285.9) (D64.9)  Aortic stenosis, severe (424.1) (I35.0)  Arm weakness (729.89) (R29.898)  Bilateral carotid artery stenosis (433.10,433.30) (I65.23)  Bilateral shoulder pain (719.41) (M25.511,M25.512)  Bladder wall thickening (596.89) (N32.89)  BPH with urinary obstruction (600.01,599.69) (N40.1,N13.8)  CAD (coronary artery disease) (414.00) (I25.10)  Cervical myelopathy (721.1) (G95.9)  Chronic obstructive pulmonary disease, unspecified COPD type (496) (J44.9)  DVT (deep venous thrombosis) (453.40) (I82.409)  Encounter for immunotherapy (V07.2) (Z29.89)  Esophagitis (530.10) (K20.90)  Essential hypertension (401.9) (I10)  Hydronephrosis (591) (N13.30)  Impaired functional mobility, balance, gait, and endurance (V49.89) (Z74.09)  Incomplete bladder emptying (788.21) (R33.9)  Inguinal hernia, left (550.90) (K40.90)  Leg weakness (729.89) (R29.898)  Lower urinary tract symptoms (LUTS) (788.99) (R39.9)  Lung mass (786.6) (R91.8)  Neuropathic pain (729.2) (M79.2)  Nocturia (788.43) (R35.1)  Non-small cell carcinoma of lung (162.9) (C34.90)  Other iron deficiency anemia (280.8) (D50.8)  Paroxysmal atrial fibrillation (427.31) (I48.0)  Personal history of radiation therapy (V15.3) (Z92.3)  Preop pulmonary/respiratory exam (V72.82) (Z01.811)  Preoperative clearance (V72.84) (Z01.818)  Spinal stenosis of lumbar region with neurogenic claudication (724.03) (M48.062)  Stage 2 moderate COPD by GOLD classification (496) (J44.9)  Status post total replacement of left hip (V43.64) (Z96.642)  Status post total replacement of right hip (V43.64) (Z96.641)  Thoracic disc disorder with myelopathy (722.72) (M51.04)  Thoracic myelopathy (721.41) (M47.14)  Urinary retention (788.20) (R33.9)           ?  Past Medical History  History of hypercholesterolemia (V12.29) (Z86.39)  History of malignant neoplasm of lung (V10.11) (Z85.118)  History of Hypertension (401.9) (I10)           ?  Surgical History  History of Back Surgery  History of Hip replacement  History of Neck Surgery 83 y/o M seen in PST 5/21   in anticipation of scheduled ROBOTIC LEFT INGUINAL HERNIA REPAIR POSSIBLE B/L on 6/10/25  at [facility] w/DR JOSEPH BURDEN.  PMHX: HTN, OA, CAD, CHF, BPH, Pleurodesis, COPD, NSCC s/p chemo/radiation, Afib, DVT, Aortic Stenosis, Thoracic Myelopathy.  Recent CARLOS and Cardiac Cath in May 2025, no stets required.  He reports a left groin bulge and recent PET/CT that showed a left inguinal hernia containing sigmoid colon. The patient denies previous bowel obstructions or acute changes in nausea/vomiting. He reports that the bulge waxes and wanes in size. He reports occasional constipation issues. However, denies incarceration. Denies pain in the testicle. Denies past abdominal surgery.  PENDING MEDICAL OPTIMIZATION WITH PCP  PENDING CARDIAC OPTIMIZATION WITH  83 y/o M seen in PST 5/21   in anticipation of scheduled ROBOTIC LEFT INGUINAL HERNIA REPAIR POSSIBLE B/L on 6/10/25  at [facility] w/DR JOSEPH BURDEN.  PMHX: HTN, OA, CAD, CHF, BPH, Pleurodesis, COPD, NSCC s/p chemo/radiation, Afib, DVT, Aortic Stenosis, Thoracic Myelopathy. Recent hospitalization for SOB/HF/Pleurodesis/AFIB.  Had  PA angiography, CARLOS, RHC, CardioMEMS  in early May 2025, no stents required.  He reports a left groin bulge and recent PET/CT that showed a left inguinal hernia containing sigmoid colon. The patient denies previous bowel obstructions or acute changes in nausea/vomiting. He reports that the bulge waxes and wanes in size. He reports occasional constipation issues. However, denies incarceration. Denies pain in the testicle. Denies past abdominal surgery. Reports chronic "fluid on lung" most recently last week had 3L removed with Dr. Martin.  Reports hx of [facility] s/p chemo/radiation 2 years ago, still has chemo port in follows with Dr Ruiz.  Reports due to chemo he had a LLE DVT 18 months ago.  He is currently on ASA/Elliquis. Follows with [facility] but will be switching to [address].  PENDING MEDICAL OPTIMIZATION WITH PCP PCP FAVIOLA BLACKBURN [phone]    PENDING CARDIAC OPTIMIZATION WITH  [place] [facility] [phone]

## 2025-05-21 NOTE — H&P PST ADULT - PROBLEM SELECTOR PLAN 9
Medication reconciliation/Dr First reviewed with patient in conjunction with home medication list.  Dosages reviewed.  PENDING MEDICAL OPTIMIZATION WITH PCP PCP FAVIOLA BLACKBURN [phone]    PENDING CARDIAC OPTIMIZATION WITH  [place] [facility] [phone]

## 2025-05-21 NOTE — H&P PST ADULT - PROBLEM SELECTOR PLAN 4
Use inhalers as directed by prescriber. CXR done in PST.  PENDING MEDICAL OPTIMIZATION WITH PCP PCP FAVIOLA BLACKBURN [phone]    PENDING CARDIAC OPTIMIZATION WITH  [place] [facility] [phone]

## 2025-05-21 NOTE — H&P PST ADULT - PROBLEM SELECTOR PLAN 2
Followed by [facility] Dr Ruiz.  Chemo port in place.  CXR done in PST.  Last week with 3L fluid removed.  Followed by Thoracic Surgeon Dr Martin  PENDING MEDICAL OPTIMIZATION WITH PCP PCP FAVIOLA BLACKBURN [phone]    PENDING CARDIAC OPTIMIZATION WITH  [place] [facility] [phone]

## 2025-05-21 NOTE — H&P PST ADULT - PROBLEM SELECTOR PLAN 8
Surgical team to address    Total Score [   16  ]  Caprini Score Greater than or =7: High risk, pharmocologic VTE prophylaxis indicated for most patients (in the absence of contraindications)

## 2025-05-21 NOTE — H&P PST ADULT - PROBLEM SELECTOR PLAN 7
On ASA + ELLIQUIS. Instructed to call cardiologist prior to surgery to discuss discontinuation instructions for these medications.  PENDING MEDICAL OPTIMIZATION WITH PCP PCP FAVIOLA BLACKBURN [phone]    PENDING CARDIAC OPTIMIZATION WITH  [place] [facility] [phone]

## 2025-05-22 ENCOUNTER — APPOINTMENT (OUTPATIENT)
Dept: CARE COORDINATION | Facility: HOME HEALTH | Age: 85
End: 2025-05-22

## 2025-05-22 DIAGNOSIS — I50.23 ACUTE ON CHRONIC SYSTOLIC (CONGESTIVE) HEART FAILURE: ICD-10-CM

## 2025-05-22 DIAGNOSIS — J44.9 CHRONIC OBSTRUCTIVE PULMONARY DISEASE, UNSPECIFIED: ICD-10-CM

## 2025-05-22 PROCEDURE — 99495 TRANSJ CARE MGMT MOD F2F 14D: CPT

## 2025-05-27 ENCOUNTER — APPOINTMENT (OUTPATIENT)
Dept: HEMATOLOGY ONCOLOGY | Facility: CLINIC | Age: 85
End: 2025-05-27
Payer: COMMERCIAL

## 2025-05-27 ENCOUNTER — APPOINTMENT (OUTPATIENT)
Age: 85
End: 2025-05-27

## 2025-05-27 VITALS
DIASTOLIC BLOOD PRESSURE: 64 MMHG | SYSTOLIC BLOOD PRESSURE: 129 MMHG | OXYGEN SATURATION: 93 % | WEIGHT: 177.25 LBS | TEMPERATURE: 98.2 F | HEIGHT: 70 IN | BODY MASS INDEX: 25.38 KG/M2 | HEART RATE: 76 BPM

## 2025-05-27 DIAGNOSIS — G95.9 DISEASE OF SPINAL CORD, UNSPECIFIED: ICD-10-CM

## 2025-05-27 DIAGNOSIS — I82.409 ACUTE EMBOLISM AND THROMBOSIS OF UNSPECIFIED DEEP VEINS OF UNSPECIFIED LOWER EXTREMITY: ICD-10-CM

## 2025-05-27 DIAGNOSIS — Z29.89 ENCOUNTER. FOR OTHER SPECIFIED PROPHYLACTIC MEASURES: ICD-10-CM

## 2025-05-27 PROCEDURE — 99215 OFFICE O/P EST HI 40 MIN: CPT

## 2025-05-27 PROCEDURE — 99205 OFFICE O/P NEW HI 60 MIN: CPT

## 2025-05-28 ENCOUNTER — NON-APPOINTMENT (OUTPATIENT)
Age: 85
End: 2025-05-28

## 2025-05-31 VITALS
RESPIRATION RATE: 16 BRPM | WEIGHT: 170 LBS | OXYGEN SATURATION: 99 % | DIASTOLIC BLOOD PRESSURE: 68 MMHG | SYSTOLIC BLOOD PRESSURE: 122 MMHG | BODY MASS INDEX: 24.39 KG/M2 | HEART RATE: 55 BPM

## 2025-05-31 RX ORDER — METOPROLOL SUCCINATE 25 MG/1
25 TABLET, EXTENDED RELEASE ORAL DAILY
Qty: 30 | Refills: 0 | Status: ACTIVE | COMMUNITY

## 2025-05-31 RX ORDER — POTASSIUM CHLORIDE 20 MEQ
20 TABLET, EXT RELEASE, PARTICLES/CRYSTALS ORAL DAILY
Refills: 0 | Status: ACTIVE | COMMUNITY

## 2025-06-02 PROCEDURE — C1894: CPT

## 2025-06-02 PROCEDURE — C1887: CPT

## 2025-06-02 PROCEDURE — C1760: CPT

## 2025-06-02 PROCEDURE — 87075 CULTR BACTERIA EXCEPT BLOOD: CPT

## 2025-06-02 PROCEDURE — 87070 CULTURE OTHR SPECIMN AEROBIC: CPT

## 2025-06-02 PROCEDURE — 88112 CYTOPATH CELL ENHANCE TECH: CPT

## 2025-06-02 PROCEDURE — 86850 RBC ANTIBODY SCREEN: CPT

## 2025-06-02 PROCEDURE — 36415 COLL VENOUS BLD VENIPUNCTURE: CPT

## 2025-06-02 PROCEDURE — 85610 PROTHROMBIN TIME: CPT

## 2025-06-02 PROCEDURE — 93926 LOWER EXTREMITY STUDY: CPT

## 2025-06-02 PROCEDURE — 80053 COMPREHEN METABOLIC PANEL: CPT

## 2025-06-02 PROCEDURE — 84157 ASSAY OF PROTEIN OTHER: CPT

## 2025-06-02 PROCEDURE — 83735 ASSAY OF MAGNESIUM: CPT

## 2025-06-02 PROCEDURE — 99285 EMERGENCY DEPT VISIT HI MDM: CPT | Mod: 25

## 2025-06-02 PROCEDURE — 87205 SMEAR GRAM STAIN: CPT

## 2025-06-02 PROCEDURE — 89051 BODY FLUID CELL COUNT: CPT

## 2025-06-02 PROCEDURE — 71045 X-RAY EXAM CHEST 1 VIEW: CPT

## 2025-06-02 PROCEDURE — 86901 BLOOD TYPING SEROLOGIC RH(D): CPT

## 2025-06-02 PROCEDURE — 93005 ELECTROCARDIOGRAM TRACING: CPT

## 2025-06-02 PROCEDURE — 83615 LACTATE (LD) (LDH) ENZYME: CPT

## 2025-06-02 PROCEDURE — 33289 TCAT IMPL WRLS P-ART PRS SNR: CPT

## 2025-06-02 PROCEDURE — 97116 GAIT TRAINING THERAPY: CPT

## 2025-06-02 PROCEDURE — 82042 OTHER SOURCE ALBUMIN QUAN EA: CPT

## 2025-06-02 PROCEDURE — 80048 BASIC METABOLIC PNL TOTAL CA: CPT

## 2025-06-02 PROCEDURE — C2624: CPT

## 2025-06-02 PROCEDURE — 84100 ASSAY OF PHOSPHORUS: CPT

## 2025-06-02 PROCEDURE — 94760 N-INVAS EAR/PLS OXIMETRY 1: CPT

## 2025-06-02 PROCEDURE — 85025 COMPLETE CBC W/AUTO DIFF WBC: CPT

## 2025-06-02 PROCEDURE — 82945 GLUCOSE OTHER FLUID: CPT

## 2025-06-02 PROCEDURE — 84484 ASSAY OF TROPONIN QUANT: CPT

## 2025-06-02 PROCEDURE — 96375 TX/PRO/DX INJ NEW DRUG ADDON: CPT

## 2025-06-02 PROCEDURE — C1729: CPT

## 2025-06-02 PROCEDURE — 87102 FUNGUS ISOLATION CULTURE: CPT

## 2025-06-02 PROCEDURE — 96374 THER/PROPH/DIAG INJ IV PUSH: CPT

## 2025-06-02 PROCEDURE — 87015 SPECIMEN INFECT AGNT CONCNTJ: CPT

## 2025-06-02 PROCEDURE — 85027 COMPLETE CBC AUTOMATED: CPT

## 2025-06-02 PROCEDURE — 83880 ASSAY OF NATRIURETIC PEPTIDE: CPT

## 2025-06-02 PROCEDURE — 94640 AIRWAY INHALATION TREATMENT: CPT

## 2025-06-02 PROCEDURE — 85730 THROMBOPLASTIN TIME PARTIAL: CPT

## 2025-06-02 PROCEDURE — 86900 BLOOD TYPING SEROLOGIC ABO: CPT

## 2025-06-02 PROCEDURE — 88305 TISSUE EXAM BY PATHOLOGIST: CPT

## 2025-06-02 PROCEDURE — 83986 ASSAY PH BODY FLUID NOS: CPT

## 2025-06-02 PROCEDURE — 97530 THERAPEUTIC ACTIVITIES: CPT

## 2025-06-02 PROCEDURE — 87040 BLOOD CULTURE FOR BACTERIA: CPT

## 2025-06-02 PROCEDURE — 71275 CT ANGIOGRAPHY CHEST: CPT

## 2025-06-04 ENCOUNTER — APPOINTMENT (OUTPATIENT)
Dept: PULMONOLOGY | Facility: CLINIC | Age: 85
End: 2025-06-04
Payer: MEDICARE

## 2025-06-04 ENCOUNTER — APPOINTMENT (OUTPATIENT)
Dept: PULMONOLOGY | Facility: CLINIC | Age: 85
End: 2025-06-04

## 2025-06-04 VITALS
DIASTOLIC BLOOD PRESSURE: 70 MMHG | RESPIRATION RATE: 16 BRPM | SYSTOLIC BLOOD PRESSURE: 132 MMHG | OXYGEN SATURATION: 94 % | HEART RATE: 74 BPM

## 2025-06-04 VITALS — WEIGHT: 172 LBS | BODY MASS INDEX: 25.77 KG/M2 | HEIGHT: 68.5 IN

## 2025-06-04 DIAGNOSIS — J44.9 CHRONIC OBSTRUCTIVE PULMONARY DISEASE, UNSPECIFIED: ICD-10-CM

## 2025-06-04 DIAGNOSIS — Z01.818 ENCOUNTER FOR OTHER PREPROCEDURAL EXAMINATION: ICD-10-CM

## 2025-06-04 DIAGNOSIS — I35.0 NONRHEUMATIC AORTIC (VALVE) STENOSIS: ICD-10-CM

## 2025-06-04 DIAGNOSIS — C34.90 MALIGNANT NEOPLASM OF UNSPECIFIED PART OF UNSPECIFIED BRONCHUS OR LUNG: ICD-10-CM

## 2025-06-04 PROCEDURE — 99215 OFFICE O/P EST HI 40 MIN: CPT | Mod: 25

## 2025-06-04 PROCEDURE — 94010 BREATHING CAPACITY TEST: CPT

## 2025-06-04 RX ORDER — FUROSEMIDE 20 MG/1
20 TABLET ORAL
Refills: 0 | Status: ACTIVE | COMMUNITY

## 2025-06-07 LAB
CULTURE RESULTS: SIGNIFICANT CHANGE UP
SPECIMEN SOURCE: SIGNIFICANT CHANGE UP

## 2025-06-09 ENCOUNTER — TRANSCRIPTION ENCOUNTER (OUTPATIENT)
Age: 85
End: 2025-06-09

## 2025-06-10 ENCOUNTER — TRANSCRIPTION ENCOUNTER (OUTPATIENT)
Age: 85
End: 2025-06-10

## 2025-06-10 ENCOUNTER — OUTPATIENT (OUTPATIENT)
Dept: INPATIENT UNIT | Facility: HOSPITAL | Age: 85
LOS: 1 days | End: 2025-06-10
Payer: MEDICARE

## 2025-06-10 ENCOUNTER — APPOINTMENT (OUTPATIENT)
Dept: SURGERY | Facility: HOSPITAL | Age: 85
End: 2025-06-10

## 2025-06-10 VITALS
WEIGHT: 169.76 LBS | HEIGHT: 70 IN | SYSTOLIC BLOOD PRESSURE: 152 MMHG | DIASTOLIC BLOOD PRESSURE: 83 MMHG | OXYGEN SATURATION: 98 % | HEART RATE: 73 BPM | TEMPERATURE: 97 F | RESPIRATION RATE: 16 BRPM

## 2025-06-10 DIAGNOSIS — K40.90 UNILATERAL INGUINAL HERNIA, WITHOUT OBSTRUCTION OR GANGRENE, NOT SPECIFIED AS RECURRENT: ICD-10-CM

## 2025-06-10 DIAGNOSIS — Z96.642 PRESENCE OF LEFT ARTIFICIAL HIP JOINT: Chronic | ICD-10-CM

## 2025-06-10 DIAGNOSIS — Z96.641 PRESENCE OF RIGHT ARTIFICIAL HIP JOINT: Chronic | ICD-10-CM

## 2025-06-10 PROCEDURE — 49650 LAP ING HERNIA REPAIR INIT: CPT | Mod: 50

## 2025-06-10 PROCEDURE — S2900 ROBOTIC SURGICAL SYSTEM: CPT | Mod: NC

## 2025-06-10 PROCEDURE — 49650 LAP ING HERNIA REPAIR INIT: CPT | Mod: AS,50

## 2025-06-10 DEVICE — MESH HERNIA INGUINAL PROGRIP LAPAROSCOPIC 15 X 10CM RIGHT: Type: IMPLANTABLE DEVICE | Site: LEFT | Status: FUNCTIONAL

## 2025-06-10 DEVICE — MESH HERNIA INGUINAL PROGRIP LAPAROSCOPIC 15 X 10CM LEFT: Type: IMPLANTABLE DEVICE | Site: LEFT | Status: FUNCTIONAL

## 2025-06-10 RX ORDER — ATORVASTATIN CALCIUM 80 MG/1
40 TABLET, FILM COATED ORAL AT BEDTIME
Refills: 0 | Status: DISCONTINUED | OUTPATIENT
Start: 2025-06-10 | End: 2025-06-25

## 2025-06-10 RX ORDER — ENOXAPARIN SODIUM 100 MG/ML
40 INJECTION SUBCUTANEOUS EVERY 24 HOURS
Refills: 0 | Status: DISCONTINUED | OUTPATIENT
Start: 2025-06-10 | End: 2025-06-10

## 2025-06-10 RX ORDER — ASPIRIN 325 MG
81 TABLET ORAL DAILY
Refills: 0 | Status: DISCONTINUED | OUTPATIENT
Start: 2025-06-11 | End: 2025-06-25

## 2025-06-10 RX ORDER — APIXABAN 2.5 MG/1
5 TABLET, FILM COATED ORAL
Refills: 0 | Status: DISCONTINUED | OUTPATIENT
Start: 2025-06-11 | End: 2025-06-25

## 2025-06-10 RX ORDER — ACETAMINOPHEN 500 MG/5ML
1000 LIQUID (ML) ORAL ONCE
Refills: 0 | Status: COMPLETED | OUTPATIENT
Start: 2025-06-10 | End: 2025-06-10

## 2025-06-10 RX ORDER — METOPROLOL SUCCINATE 50 MG/1
12.5 TABLET, EXTENDED RELEASE ORAL DAILY
Refills: 0 | Status: DISCONTINUED | OUTPATIENT
Start: 2025-06-11 | End: 2025-06-25

## 2025-06-10 RX ORDER — CEFAZOLIN SODIUM IN 0.9 % NACL 3 G/100 ML
2000 INTRAVENOUS SOLUTION, PIGGYBACK (ML) INTRAVENOUS ONCE
Refills: 0 | Status: DISCONTINUED | OUTPATIENT
Start: 2025-06-10 | End: 2025-06-10

## 2025-06-10 RX ADMIN — Medication 3 MILLILITER(S): at 22:16

## 2025-06-10 RX ADMIN — ATORVASTATIN CALCIUM 40 MILLIGRAM(S): 80 TABLET, FILM COATED ORAL at 22:17

## 2025-06-10 RX ADMIN — Medication 400 MILLIGRAM(S): at 18:25

## 2025-06-10 NOTE — PATIENT PROFILE ADULT - FUNCTIONAL ASSESSMENT - BASIC MOBILITY 6.
177.8  3-calculated by average/Not able to assess (calculate score using [facility] averaging method)

## 2025-06-10 NOTE — BRIEF OPERATIVE NOTE - NSICDXBRIEFPROCEDURE_GEN_ALL_CORE_FT
PROCEDURES:  Robot-assisted laparoscopic repair of bilateral inguinal hernias using da Yamilet Xi 10-Vinicio-2025 17:30:25  Tahmina Franks

## 2025-06-10 NOTE — ASU PREOP CHECKLIST - NS PREOP CHK HIBICLENS NA
ASSESSMENT  45M with HTN, IDDM, HLD, active smoker (1/2 PPD x 28 yrs), hepatitis B (s/p treatment) presents with CC of chest pressure and SOB.      IMPRESSION  #Acute Hypoxemic Respiratory failure  #ARDS  - CT Chest No Cont (12.28.24 @ 16:56): IMPRESSION: Findings consistent with ARDS    #Pulmonary Edema  #NSTEMI  #VTach   #Acute HFrEF  - TTE 12/27 - EF 25-30%     #LV Thrombus  #COVID-19, recent infection   #DM II  #Hx of Hep B     #Obesity BMI (kg/m2): 32.2  #Abx allergy: No Known Drug Allergies    RECOMMENDATIONS  - CT Chest 12/28 with radiologic ARDS -- but in setting of recent COVID, fluid overload from HFrEF   - has been on Lasix 80 mg BID on 12/27, then daily IV lasix 80 mg on 12/28, 12/29 -- transitioned to Bumex 2g q 8 hours on 12/30   - post inflammatory changes always possible from COVID and is difficult to completely exclude, although subjective dyspnea seems to be improving since transitioned to aggressive diuresis yesterday   - on HFNC 40/40 today -- if worsening O2 requirements despite adequate diuresis, would start Dex 6 mg BID if no contraindication from cardiac perspective  - as procalcitonin low, afebrile, and stable mild leukocytosis, would continue to monitor off antibiotics  - check CRP and ferritin with next set of AM labs    Please call or message on [employer] Teams if with any questions.  Spectra [phone]   N/A

## 2025-06-10 NOTE — PATIENT PROFILE ADULT - FALL HARM RISK - HARM RISK INTERVENTIONS

## 2025-06-10 NOTE — CHART NOTE - NSCHARTNOTEFT_GEN_A_CORE
Post-op Check    Subjective:  Pt offers no acute complaints at this time. Pain well controlled on current regiment. Denies nausea, vomiting, chest pain, SOB, palpitations.     STATUS POST:      POST OPERATIVE DAY #: 0    MEDICATIONS  (STANDING):  atorvastatin 40 milliGRAM(s) Oral at bedtime  sodium chloride 0.9% lock flush 3 milliLiter(s) IV Push Once    MEDICATIONS  (PRN):      Vital Signs Last 24 Hrs  T(C): 36.4 (10 Vinicio 2025 19:15), Max: 36.6 (10 Vinicio 2025 18:15)  T(F): 97.6 (10 Vinicio 2025 19:15), Max: 97.8 (10 Vinicio 2025 18:15)  HR: 77 (10 Vinicio 2025 19:30) (69 - 81)  BP: 157/- (10 Vinicio 2025 19:30) (116/68 - 159/75)  BP(mean): 100 (10 Vinicio 2025 19:30) (82 - 100)  RR: 20 (10 Vinicio 2025 19:30) (16 - 22)  SpO2: 100% (10 Vinicio 2025 19:30) (98% - 100%)    Parameters below as of 10 Vinicio 2025 19:30  Patient On (Oxygen Delivery Method): nasal cannula  O2 Flow (L/min): 3      Physical Exam:    General: Laying comfortably in bed, NAD  HEENT: PERRL, EOMI  Neck: No JVD, FROM without pain  Respiratory: no accessory muscle use, respirations non-labored  Abdomen:   Neurological: A&O x 3; without gross deficit    A: 85 yo M s/p RA left inguinal hernia repair, experienced acute CHF exacerbation post operatively    P:   bed   Will monitor overnight  Continue current care  Pain control  OOB as tolerated  Encourage IS  DVT ppx

## 2025-06-10 NOTE — BRIEF OPERATIVE NOTE - OPERATION/FINDINGS
Abdomen was prepped and draped in sterile fashion. Insufflation was achieved with veress needle and 3x 8mm ports were placed under direct visualization. Peritoneum was cut down and mobilized and dissection was completed with both blunt and sharp dissection. Bilateral anatomic mesh were placed. Peritoneum was re-secured using 3-0 absorbable v-loc.  Port sites were closed using 4-0 Monocryl sutures and dermabond

## 2025-06-11 ENCOUNTER — TRANSCRIPTION ENCOUNTER (OUTPATIENT)
Age: 85
End: 2025-06-11

## 2025-06-11 VITALS
TEMPERATURE: 98 F | OXYGEN SATURATION: 92 % | RESPIRATION RATE: 17 BRPM | DIASTOLIC BLOOD PRESSURE: 69 MMHG | SYSTOLIC BLOOD PRESSURE: 120 MMHG | HEART RATE: 82 BPM

## 2025-06-11 LAB
ACANTHOCYTES BLD QL SMEAR: SLIGHT — SIGNIFICANT CHANGE UP
ANION GAP SERPL CALC-SCNC: 13 MMOL/L — SIGNIFICANT CHANGE UP (ref 5–17)
ANION GAP SERPL CALC-SCNC: 13 MMOL/L — SIGNIFICANT CHANGE UP (ref 5–17)
ANISOCYTOSIS BLD QL: SLIGHT — SIGNIFICANT CHANGE UP
BASOPHILS # BLD AUTO: 0.02 K/UL — SIGNIFICANT CHANGE UP (ref 0–0.2)
BASOPHILS # BLD MANUAL: 0 K/UL — SIGNIFICANT CHANGE UP (ref 0–0.2)
BASOPHILS NFR BLD AUTO: 0.3 % — SIGNIFICANT CHANGE UP (ref 0–2)
BASOPHILS NFR BLD MANUAL: 0 % — SIGNIFICANT CHANGE UP (ref 0–2)
BUN SERPL-MCNC: 23.7 MG/DL — HIGH (ref 8–20)
BUN SERPL-MCNC: 23.9 MG/DL — HIGH (ref 8–20)
BURR CELLS BLD QL SMEAR: SLIGHT — SIGNIFICANT CHANGE UP
CALCIUM SERPL-MCNC: 8.7 MG/DL — SIGNIFICANT CHANGE UP (ref 8.4–10.5)
CALCIUM SERPL-MCNC: 8.9 MG/DL — SIGNIFICANT CHANGE UP (ref 8.4–10.5)
CHLORIDE SERPL-SCNC: 101 MMOL/L — SIGNIFICANT CHANGE UP (ref 96–108)
CHLORIDE SERPL-SCNC: 104 MMOL/L — SIGNIFICANT CHANGE UP (ref 96–108)
CO2 SERPL-SCNC: 24 MMOL/L — SIGNIFICANT CHANGE UP (ref 22–29)
CO2 SERPL-SCNC: 27 MMOL/L — SIGNIFICANT CHANGE UP (ref 22–29)
CREAT SERPL-MCNC: 1.14 MG/DL — SIGNIFICANT CHANGE UP (ref 0.5–1.3)
CREAT SERPL-MCNC: 1.15 MG/DL — SIGNIFICANT CHANGE UP (ref 0.5–1.3)
EGFR: 63 ML/MIN/1.73M2 — SIGNIFICANT CHANGE UP
EGFR: 63 ML/MIN/1.73M2 — SIGNIFICANT CHANGE UP
EGFR: 64 ML/MIN/1.73M2 — SIGNIFICANT CHANGE UP
EGFR: 64 ML/MIN/1.73M2 — SIGNIFICANT CHANGE UP
ELLIPTOCYTES BLD QL SMEAR: SLIGHT — SIGNIFICANT CHANGE UP
EOSINOPHIL # BLD AUTO: 0 K/UL — SIGNIFICANT CHANGE UP (ref 0–0.5)
EOSINOPHIL # BLD MANUAL: 0 K/UL — SIGNIFICANT CHANGE UP (ref 0–0.5)
EOSINOPHIL NFR BLD AUTO: 0 % — SIGNIFICANT CHANGE UP (ref 0–6)
EOSINOPHIL NFR BLD MANUAL: 0 % — SIGNIFICANT CHANGE UP (ref 0–6)
GLUCOSE SERPL-MCNC: 122 MG/DL — HIGH (ref 70–99)
GLUCOSE SERPL-MCNC: 156 MG/DL — HIGH (ref 70–99)
HCT VFR BLD CALC: 33.4 % — LOW (ref 39–50)
HGB BLD-MCNC: 10.1 G/DL — LOW (ref 13–17)
IMM GRANULOCYTES # BLD AUTO: 0.03 K/UL — SIGNIFICANT CHANGE UP (ref 0–0.07)
IMM GRANULOCYTES NFR BLD AUTO: 0.4 % — SIGNIFICANT CHANGE UP (ref 0–0.9)
LYMPHOCYTES # BLD AUTO: 0.48 K/UL — LOW (ref 1–3.3)
LYMPHOCYTES # BLD MANUAL: 0.32 K/UL — LOW (ref 1–3.3)
LYMPHOCYTES NFR BLD AUTO: 6.4 % — LOW (ref 13–44)
LYMPHOCYTES NFR BLD MANUAL: 4.3 % — LOW (ref 13–44)
MANUAL NEUTROPHIL BANDS #: 0.2 K/UL — SIGNIFICANT CHANGE UP (ref 0–0.84)
MCHC RBC-ENTMCNC: 26.9 PG — LOW (ref 27–34)
MCHC RBC-ENTMCNC: 30.2 G/DL — LOW (ref 32–36)
MCV RBC AUTO: 89.1 FL — SIGNIFICANT CHANGE UP (ref 80–100)
MONOCYTES # BLD AUTO: 0.6 K/UL — SIGNIFICANT CHANGE UP (ref 0–0.9)
MONOCYTES # BLD MANUAL: 0.26 K/UL — SIGNIFICANT CHANGE UP (ref 0–0.9)
MONOCYTES NFR BLD AUTO: 7.9 % — SIGNIFICANT CHANGE UP (ref 2–14)
MONOCYTES NFR BLD MANUAL: 3.4 % — SIGNIFICANT CHANGE UP (ref 2–14)
NEUTROPHILS # BLD AUTO: 6.42 K/UL — SIGNIFICANT CHANGE UP (ref 1.8–7.4)
NEUTROPHILS # BLD MANUAL: 6.77 K/UL — SIGNIFICANT CHANGE UP (ref 1.8–7.4)
NEUTROPHILS NFR BLD AUTO: 85 % — HIGH (ref 43–77)
NEUTROPHILS NFR BLD MANUAL: 89.7 % — HIGH (ref 43–77)
NEUTS BAND # BLD: 2.6 % — SIGNIFICANT CHANGE UP (ref 0–8)
NEUTS BAND NFR BLD: 2.6 % — SIGNIFICANT CHANGE UP (ref 0–8)
NRBC # BLD AUTO: 0 K/UL — SIGNIFICANT CHANGE UP (ref 0–0)
NRBC # FLD: 0 K/UL — SIGNIFICANT CHANGE UP (ref 0–0)
NRBC BLD AUTO-RTO: 0 /100 WBCS — SIGNIFICANT CHANGE UP (ref 0–0)
OVALOCYTES BLD QL SMEAR: SLIGHT — SIGNIFICANT CHANGE UP
PLAT MORPH BLD: NORMAL — SIGNIFICANT CHANGE UP
PLATELET # BLD AUTO: 265 K/UL — SIGNIFICANT CHANGE UP (ref 150–400)
PMV BLD: 10.1 FL — SIGNIFICANT CHANGE UP (ref 7–13)
POIKILOCYTOSIS BLD QL AUTO: SLIGHT — SIGNIFICANT CHANGE UP
POLYCHROMASIA BLD QL SMEAR: ABNORMAL
POTASSIUM SERPL-MCNC: 4.7 MMOL/L — SIGNIFICANT CHANGE UP (ref 3.5–5.3)
POTASSIUM SERPL-MCNC: 5.4 MMOL/L — HIGH (ref 3.5–5.3)
POTASSIUM SERPL-SCNC: 4.7 MMOL/L — SIGNIFICANT CHANGE UP (ref 3.5–5.3)
POTASSIUM SERPL-SCNC: 5.4 MMOL/L — HIGH (ref 3.5–5.3)
RBC # BLD: 3.75 M/UL — LOW (ref 4.2–5.8)
RBC # FLD: 15.3 % — HIGH (ref 10.3–14.5)
RBC BLD AUTO: ABNORMAL
SODIUM SERPL-SCNC: 140 MMOL/L — SIGNIFICANT CHANGE UP (ref 135–145)
SODIUM SERPL-SCNC: 141 MMOL/L — SIGNIFICANT CHANGE UP (ref 135–145)
WBC # BLD: 7.55 K/UL — SIGNIFICANT CHANGE UP (ref 3.8–10.5)
WBC # FLD AUTO: 7.55 K/UL — SIGNIFICANT CHANGE UP (ref 3.8–10.5)

## 2025-06-11 PROCEDURE — C1781: CPT

## 2025-06-11 PROCEDURE — 85025 COMPLETE CBC W/AUTO DIFF WBC: CPT

## 2025-06-11 PROCEDURE — 36415 COLL VENOUS BLD VENIPUNCTURE: CPT

## 2025-06-11 PROCEDURE — 80048 BASIC METABOLIC PNL TOTAL CA: CPT

## 2025-06-11 PROCEDURE — 94640 AIRWAY INHALATION TREATMENT: CPT

## 2025-06-11 PROCEDURE — 49650 LAP ING HERNIA REPAIR INIT: CPT | Mod: 50

## 2025-06-11 PROCEDURE — C9399: CPT

## 2025-06-11 PROCEDURE — S2900: CPT

## 2025-06-11 RX ORDER — FUROSEMIDE 10 MG/ML
20 INJECTION INTRAMUSCULAR; INTRAVENOUS
Refills: 0 | Status: DISCONTINUED | OUTPATIENT
Start: 2025-06-11 | End: 2025-06-25

## 2025-06-11 RX ORDER — TIOTROPIUM BROMIDE INHALATION SPRAY 3.12 UG/1
2 SPRAY, METERED RESPIRATORY (INHALATION) DAILY
Refills: 0 | Status: DISCONTINUED | OUTPATIENT
Start: 2025-06-11 | End: 2025-06-25

## 2025-06-11 RX ORDER — METOPROLOL SUCCINATE 50 MG/1
12.5 TABLET, EXTENDED RELEASE ORAL
Qty: 0 | Refills: 0 | DISCHARGE

## 2025-06-11 RX ADMIN — APIXABAN 5 MILLIGRAM(S): 2.5 TABLET, FILM COATED ORAL at 05:41

## 2025-06-11 RX ADMIN — FUROSEMIDE 20 MILLIGRAM(S): 10 INJECTION INTRAMUSCULAR; INTRAVENOUS at 06:32

## 2025-06-11 RX ADMIN — METOPROLOL SUCCINATE 12.5 MILLIGRAM(S): 50 TABLET, EXTENDED RELEASE ORAL at 05:41

## 2025-06-11 RX ADMIN — Medication 1 DOSE(S): at 09:28

## 2025-06-11 RX ADMIN — TIOTROPIUM BROMIDE INHALATION SPRAY 2 PUFF(S): 3.12 SPRAY, METERED RESPIRATORY (INHALATION) at 09:29

## 2025-06-11 RX ADMIN — Medication 81 MILLIGRAM(S): at 10:57

## 2025-06-11 NOTE — DISCHARGE NOTE NURSING/CASE MANAGEMENT/SOCIAL WORK - FINANCIAL ASSISTANCE
[facility] provides services at a reduced cost to those who are determined to be eligible through [facility]’s financial assistance program. Information regarding [facility]’s financial assistance program can be found by going to [email or web address] or by calling [phone].

## 2025-06-11 NOTE — DISCHARGE NOTE NURSING/CASE MANAGEMENT/SOCIAL WORK - NSTOBACCONEVERSMOKERY/N_GEN_A
Patient presented to the ED today with complaints of abdominal pain and hypertension. Patient states symptoms presented this morning.   
Yes

## 2025-06-11 NOTE — PROGRESS NOTE ADULT - SUBJECTIVE AND OBJECTIVE BOX
Subjective: Patient seen and examined at bedside. No overnight events. Patient had CHF exacerbation in preop. Patient progressing well. Tolerating diet, not requiring O2, satting well on room air. Patient states having no pain. Patient denies HA, Dizziness, CP, SOB, N/V/D.       MEDICATIONS  (STANDING):  apixaban 5 milliGRAM(s) Oral two times a day  aspirin  chewable 81 milliGRAM(s) Oral daily  atorvastatin 40 milliGRAM(s) Oral at bedtime  fluticasone propionate/ salmeterol 100-50 MICROgram(s) Diskus 1 Dose(s) Inhalation two times a day  furosemide    Tablet 20 milliGRAM(s) Oral <User Schedule>  metoprolol tartrate 12.5 milliGRAM(s) Oral daily  tiotropium 2.5 MICROgram(s) Inhaler 2 Puff(s) Inhalation daily    MEDICATIONS  (PRN):      Vital Signs Last 24 Hrs  T(C): 36.4 (11 Jun 2025 04:40), Max: 36.8 (10 Vinicio 2025 21:15)  T(F): 97.5 (11 Jun 2025 04:40), Max: 98.3 (10 Vinicio 2025 21:15)  HR: 75 (11 Jun 2025 06:15) (69 - 88)  BP: 110/70 (11 Jun 2025 06:15) (110/70 - 159/75)  BP(mean): 100 (10 Vinicio 2025 19:30) (82 - 100)  RR: 18 (11 Jun 2025 04:40) (16 - 22)  SpO2: 98% (11 Jun 2025 04:40) (92% - 100%)    Parameters below as of 11 Jun 2025 04:40  Patient On (Oxygen Delivery Method): nasal cannula  O2 Flow (L/min): 2      Physical Exam:    Constitutional: NAD  HEENT: PERRL, EOMI  Respiratory: Respirations non-labored, no accessory muscle use  Gastrointestinal: Soft, non-tender, non-distended, incisions C/D/I   Neurological: A&O x 3          A: 85 yo M s/p RA left inguinal hernia repair, experienced acute CHF exacerbation post operatively. Progressing well, having no pain. satting well on RA    P:   bed   Will monitor overnight  Continue current care  Pain control  OOB as tolerated  Encourage IS  DVT ppx.  Dispo: home today

## 2025-06-11 NOTE — DISCHARGE NOTE NURSING/CASE MANAGEMENT/SOCIAL WORK - PATIENT PORTAL LINK FT
You can access the FollowMyHealth Patient Portal offered by [facility] by registering at the following website: [email or web address]. By joining [facility]’s FollowMyHealth portal, you will also be able to view your health information using other applications (apps) compatible with our system.

## 2025-06-11 NOTE — DISCHARGE NOTE PROVIDER - NSDCCPCAREPLAN_GEN_ALL_CORE_FT
PRINCIPAL DISCHARGE DIAGNOSIS  Diagnosis: H/O inguinal hernia repair  Assessment and Plan of Treatment: BATHING: Please do not submerge wound underwater. You may shower and/or sponge bathe.  ACTIVITY: No heavy lifting or straining. Otherwise, you may return to your usual level of physical   DIET: Return to your usual diet.  NOTIFY YOUR SURGEON IF: You have any bleeding that does not stop, any pus draining from your wound(s), any fever (over 100.4 F) or chills, persistent nausea/vomiting, persistent diarrhea, or if your pain is not controlled on your discharge pain medications.  FOLLOW-UP: Please follow up with your primary care physician and Dr. Yates within 2 weeks. Call for appointment upon discharge.

## 2025-06-11 NOTE — DISCHARGE NOTE PROVIDER - HOSPITAL COURSE
On 6/10/25 Mr. Macias who has a history of  HTN, OA, CAD, CHF, BPH, Pleurodesis, COPD, NSCC s/p chemo/radiation, Afib, DVT, Aortic Stenosis, Thoracic Myelopathy underwent underwent Robotic Assisted L inguinal hernia repair. Pre-op patient had CHF exacerbation, however, was subsequnetly evaluated by cardiology and anesthesia cleared for surgery. Patient tolerated the procedure well, was extubated in the operating room then transferred to the PACU in stable condition. Once hemodynamically stable and effective pain control the patient was able to ambulate with assistance. The patient was later transferred to surgical unit in stable condition. The patient tolerated regular diet the evening of surgery.    On POD #1 patient remained stable with no acute events overnight, has effective  pain control. Denies nausea and vomiting. Patient is ambulating independently and voiding as expected. The rest of the hospital course was uneventful. Patient previously retaining urine on POD#0, however, now voiding well. Patient will follow-up with Dr. Yates in 10-14 days, medical doctor in 30 days. Written discharge instruction explained and given.

## 2025-06-11 NOTE — DISCHARGE NOTE PROVIDER - CARE PROVIDER_API CALL
Harry Yates  Surgery  [address] [place] [id number]  Phone: [phone]  Fax: [phone]  Follow Up Time: 2 weeks

## 2025-06-11 NOTE — DISCHARGE NOTE PROVIDER - NSDCMRMEDTOKEN_GEN_ALL_CORE_FT
apixaban 5 mg oral tablet: 1 tab(s) orally 2 times a day  aspirin 81 mg oral tablet, chewable: 1 tab(s) orally once a day  atorvastatin 40 mg oral tablet: 1 tab(s) orally once a day  Lasix 20 mg oral tablet: 1 tab(s) orally every other day  metoprolol: 12.5 milligram(s)  potassium chloride: 40 milliequivalent(s) orally once a day  Trelegy Ellipta 100 mcg-62.5 mcg-25 mcg/inh inhalation powder: 1 puff(s) inhaled   apixaban 5 mg oral tablet: 1 tab(s) orally 2 times a day  aspirin 81 mg oral tablet, chewable: 1 tab(s) orally once a day  atorvastatin 40 mg oral tablet: 1 tab(s) orally once a day  Lasix 20 mg oral tablet: 1 tab(s) orally every other day  metoprolol: 12.5 milligram(s) orally once a day  potassium chloride: 40 milliequivalent(s) orally once a day  Trelegy Ellipta 100 mcg-62.5 mcg-25 mcg/inh inhalation powder: 1 puff(s) inhaled

## 2025-06-11 NOTE — DISCHARGE NOTE PROVIDER - NSDCHHNEEDSERVICE_GEN_ALL_CORE
Intern Progress Note  [facility]       Patient: Seble Flor MRN: [id number]  CSN: [id number]    YOB: 1927  Age: 81 y.o. Sex: female    DOA: 1/13/2017 LOS:  LOS: 3 days                    Subjective:     Acute events: No acute events overnight. Patient slept well. She notes overall improvement, but still having intermittent chest discomfort, with associated SOB. Denies headache, lightheadedness, abdominal pain, n/v.    Hydralazine added yesterday, BP's still high. Cardiology to interrogate ICD today. Objective:      Patient Vitals for the past 24 hrs:   Temp Pulse Resp BP SpO2   01/16/17 0725 97.9 °F (36.6 °C) 60 20 182/77 100 %   01/16/17 0458 97.7 °F (36.5 °C) 63 16 163/73 100 %   01/15/17 2356 96.7 °F (35.9 °C) 64 16 164/72 100 %   01/15/17 1601 95.3 °F (35.2 °C) 62 15 102/48 100 %   01/15/17 1142 96.9 °F (36.1 °C) 63 15 157/69 100 %       Physical Exam:   Gen: Thin, frail elderly lady in no distress, fatigued, nontoxic. Eyes/nose: PERRL. No nasal polyps. Mouth/throat: MMM. Neck: Supple. No supraclavicular or cervical LAD. No thyromegaly. Cardiac: RRR, 1/5 VENECIA, gallop. Pulm: moderate coarse rales especially in posterior bases, no wheezes. Abdomen: Mildly distended, NABS, tympanic, soft, non-tender. Ext: Radial/DP pulses intact bilat, no bilateral edema  Skin: Warm/dry. No bruising/rashes. Neuro: A&Ox3. CN II-XII grossly intact, no focal deficits, speech clear and appropriate.     Lab/Data Reviewed:  BMP:   Lab Results   Component Value Date/Time     01/16/2017 04:11 AM    K 3.0 (L) 01/16/2017 04:11 AM    CL 99 (L) 01/16/2017 04:11 AM    CO2 34 (H) 01/16/2017 04:11 AM    AGAP 9 01/16/2017 04:11 AM    GLU 90 01/16/2017 04:11 AM    BUN 29 (H) 01/16/2017 04:11 AM    CREA 1.90 (H) 01/16/2017 04:11 AM    GFRAA 30 (L) 01/16/2017 04:11 AM    GFRNA 25 (L) 01/16/2017 04:11 AM     CBC:   Lab Results   Component Value Date/Time    WBC 4.7 01/16/2017 04:11 AM HGB 9.4 (L) 01/16/2017 04:11 AM    HCT 29.0 (L) 01/16/2017 04:11 AM     01/16/2017 04:11 AM        Scheduled Medications Reviewed:  Current Facility-Administered Medications   Medication Dose Route Frequency    potassium chloride 10 mEq, lidocaine (PF) (XYLOCAINE) 10 mg/mL (1 %) 1 mL in 0.9% sodium chloride 100 mL IVPB   IntraVENous Q1H    magnesium sulfate 2 g/50 ml IVPB (premix or compounded)  2 g IntraVENous ONCE    hydrALAZINE (APRESOLINE) tablet 25 mg  25 mg Oral TID    therapeutic multivitamin (THERAGRAN) tablet 1 Tab  1 Tab Oral DAILY    bumetanide (BUMEX) injection 1 mg  1 mg IntraVENous Q12H    isosorbide mononitrate ER (IMDUR) tablet 30 mg  30 mg Oral DAILY    atorvastatin (LIPITOR) tablet 20 mg  20 mg Oral QHS    aspirin chewable tablet 81 mg  81 mg Oral DAILY    levothyroxine (SYNTHROID) tablet 50 mcg  50 mcg Oral ACB    ferrous sulfate tablet 325 mg  1 Tab Oral BID WITH MEALS    Lactobacillus Acidoph & Bulgar (FLORANEX) tablet 2 Tab  2 Tab Oral BID    potassium chloride (K-DUR, KLOR-CON) SR tablet 20 mEq  20 mEq Oral DAILY    heparin (porcine) injection 5,000 Units  5,000 Units SubCUTAneous Q8H    scopolamine (TRANSDERM-SCOP) 1.5 mg  1.5 mg TransDERmal Q72H    carvedilol (COREG) tablet 25 mg  25 mg Oral BID WITH MEALS    cefTRIAXone (ROCEPHIN) 1 g in 0.9% sodium chloride (MBP/ADV) 50 mL MBP  1 g IntraVENous Q24H    doxycycline (VIBRAMYCIN) 100 mg in 0.9% sodium chloride (MBP/ADV) 100 mL IVPB  100 mg IntraVENous Q12H         Imaging, microbiology, and EKG/Telemetry:    TTE 1/15/2017   SUMMARY:  Left ventricle: The ventricle was severely dilated. Systolic function was  severely reduced. Ejection fraction was estimated to be 20 %. There was  severe diffuse hypokinesis. Right atrium: The atrium was mildly dilated. Mitral valve: There was moderate regurgitation. Mean transmitral gradient  was 2 mmHg. COMPARISONS:  Comparison was made with the previous study of 09-Feb-2010.  LV overall  function has not changed. Mitral regurgitation has not changed. US RLE Venous 1/14/2017   REASON FOR TEST  Edema     Right Leg:-  Deep venous thrombosis: No  Superficial venous thrombosis: No  Deep venous insufficiency: Not examined  Superficial venous insufficiency: Not examined      Assessment/Plan   Elli Mosley is a 81 y.o. AAF w/ PMH sig for chronic combined CHF, CKD-4, HTN, hypothyroidism, anemia, arthritis who presented w/ dyspnea, and is being admitted for CHF exacerbation, pneumonia.       Acute exacerbation of chronic combined CHF:  - 2010 ECHO EF 15-20% w/ G2DD, peak pressure was 40  - 2017 ECHO EF 20%, severe diffuse hypokinesis, no significant change in LV function or MR   - Volume overload suggested by exam, proBNP >175,000, CT w/ pleural effusions. - Cardiac enzymes WNL x2, EKG non-acute. - Cards following; Plan for ICD interrogation 1/16.  - Tele monitor.  - Transition to PO Bumex today, watch renal indices and electrolytes. - Continue home Carvedilol 25 mg PO BID   - Imdur 30 mg PO daily   - ACE- allergy, intolerance to cozaar.   - Daily weights, Strict I&Os, ICS. HTN- BP not well controlled   - Carvedilol 25 mg PO daily   - Imdur 30 mg PO daily   - Hydralazine 25 PO TID      Hx intermittent Vtach:  - As above.      Pulmonary infiltrates on CT:1/4 SIRS  - Continue Ceftriaxone and Doxy. - Stop Ceftriaxone on 1/17   - Hx of allergy to cipro, sulfa. - Blood Cx- NGTD       Nausea:  - Scopolamine patch.       LE edema:  - Right PVL negative      CKD-4:  - Cr 1.53>1.90 (baseline ~1.6)  - Monitor renal fx, volume status.      Prolonged QTC:  - 1/13/17   - Cautious use of antiemetics, abx.      - Acute on chronic pain:  - No warm/swollen joints on exam.  - CK WNL. - Hx of codeine and tylenol allergy.      Hypothyroidism:  - TSH WNL, continue home levothyroxine 50 mcg PO daily       Normocytic anemia:  - Hgb 9.6>9.4   - Iron 41 ();  Ferritin 529 (8-388), TIBC 276 (250-450), Iron % Sat 15   - Continue home iron supps. - Hemoccult stools.       Ketonuria  - Likely 2/2 starvation.  - Per pt preference, clears diet, advance as tolerated.      Depressed mood:  - Consider PHQ-9.      Moderate protein calorie malnutrition:  - Diffuse atrophy, poor PO intake, albumin 3.7  - Renal supps. - Nutrition consult.          Global:  Advance to dysphagia diet. OOB w/ assistance, fall precautions, PT/OT. SQH for DVT ppx. PPI for GI ulcer ppx. DNR.   Emergency contact: Gena Shultz (daughter-in-law) [phone] (c), [phone] (h).      Nasir Terrazas DO   [facility] Medicine   January 16, 2017 8:34 AM Other, specify...

## 2025-06-11 NOTE — DISCHARGE NOTE NURSING/CASE MANAGEMENT/SOCIAL WORK - NSDCPEFALRISK_GEN_ALL_CORE
For information on Fall & Injury Prevention, visit: [email or web address] OR  [email or web address] OR  https://www.cdc.gov/steadi/patient.html

## 2025-06-11 NOTE — DISCHARGE NOTE PROVIDER - NSDCFUSCHEDAPPT_GEN_ALL_CORE_FT
Jono Ahumada  [facility]  UROLOGY [address] [place] [address]  Scheduled Appointment: 06/16/2025    [facility]  [facility]  Scheduled Appointment: 07/10/2025    [facility]  [facility]  Scheduled Appointment: 08/26/2025    Reyna Loya  [facility]  [facility]  Scheduled Appointment: 08/26/2025

## 2025-06-16 ENCOUNTER — TRANSCRIPTION ENCOUNTER (OUTPATIENT)
Age: 85
End: 2025-06-16

## 2025-06-23 ENCOUNTER — APPOINTMENT (OUTPATIENT)
Dept: SURGERY | Facility: CLINIC | Age: 85
End: 2025-06-23
Payer: MEDICARE

## 2025-06-23 VITALS
SYSTOLIC BLOOD PRESSURE: 103 MMHG | OXYGEN SATURATION: 95 % | HEIGHT: 68.5 IN | RESPIRATION RATE: 16 BRPM | DIASTOLIC BLOOD PRESSURE: 62 MMHG | HEART RATE: 75 BPM | TEMPERATURE: 97.6 F

## 2025-06-23 PROCEDURE — 99024 POSTOP FOLLOW-UP VISIT: CPT

## 2025-06-25 ENCOUNTER — OUTPATIENT (OUTPATIENT)
Dept: OUTPATIENT SERVICES | Facility: HOSPITAL | Age: 85
LOS: 1 days | End: 2025-06-25

## 2025-06-25 ENCOUNTER — APPOINTMENT (OUTPATIENT)
Dept: ULTRASOUND IMAGING | Facility: CLINIC | Age: 85
End: 2025-06-25
Payer: MEDICARE

## 2025-06-25 DIAGNOSIS — Z96.641 PRESENCE OF RIGHT ARTIFICIAL HIP JOINT: Chronic | ICD-10-CM

## 2025-06-25 DIAGNOSIS — N40.1 BENIGN PROSTATIC HYPERPLASIA WITH LOWER URINARY TRACT SYMPTOMS: ICD-10-CM

## 2025-06-25 PROCEDURE — 76770 US EXAM ABDO BACK WALL COMP: CPT | Mod: 26

## 2025-06-26 ENCOUNTER — APPOINTMENT (OUTPATIENT)
Dept: CARDIOLOGY | Facility: CLINIC | Age: 85
End: 2025-06-26
Payer: MEDICARE

## 2025-06-26 VITALS
HEIGHT: 68.5 IN | DIASTOLIC BLOOD PRESSURE: 50 MMHG | SYSTOLIC BLOOD PRESSURE: 130 MMHG | OXYGEN SATURATION: 95 % | HEART RATE: 82 BPM | BODY MASS INDEX: 25.77 KG/M2 | WEIGHT: 172 LBS

## 2025-06-26 VITALS — DIASTOLIC BLOOD PRESSURE: 58 MMHG | SYSTOLIC BLOOD PRESSURE: 128 MMHG

## 2025-06-26 PROBLEM — R60.9 EDEMA, UNSPECIFIED TYPE: Status: ACTIVE | Noted: 2025-06-26

## 2025-06-26 PROBLEM — I48.21 PERMANENT ATRIAL FIBRILLATION: Status: ACTIVE | Noted: 2025-06-26

## 2025-06-26 PROBLEM — I45.10 RBBB: Status: ACTIVE | Noted: 2025-06-26

## 2025-06-26 PROCEDURE — 99205 OFFICE O/P NEW HI 60 MIN: CPT

## 2025-06-26 PROCEDURE — 93000 ELECTROCARDIOGRAM COMPLETE: CPT

## 2025-06-30 NOTE — ED PROVIDER NOTE - CPE EDP GASTRO NORM
Viscosupplementation Follow-up      Allergies   Allergen Reactions    Latex Hives    Sulfa Antibiotics Hives and Rash    Adhesive Tape Other (See Comments)     Itching, rash    Lisinopril Other (See Comments)        Current Outpatient Medications on File Prior to Visit   Medication Sig Dispense Refill    buPROPion (WELLBUTRIN XL) 300 MG extended release tablet Take 1 tablet by mouth every morning 90 tablet 1    esomeprazole (NEXIUM) 40 MG delayed release capsule Take 1 capsule by mouth every morning (before breakfast) 90 capsule 1    valsartan-hydroCHLOROthiazide (DIOVAN-HCT) 80-12.5 MG per tablet Take 1 tablet by mouth daily 90 tablet 1    escitalopram (LEXAPRO) 10 MG tablet Take 1 tablet by mouth daily 90 tablet 1    sucralfate (CARAFATE) 1 GM tablet Take 1 tablet by mouth 2 times daily as needed (gastritis) 60 tablet 2    hydrOXYzine HCl (ATARAX) 25 MG tablet Take 1 tablet by mouth every 8 hours as needed for Anxiety 90 tablet 2    mirabegron (MYRBETRIQ) 50 MG TB24 Take 50 mg by mouth daily 90 tablet 3    famotidine (PEPCID) 40 MG tablet Take 1 tablet by mouth nightly at bedtime      Omega-3 Fatty Acids (FISH OIL PO) Take by mouth daily      MAGNESIUM PO Take by mouth daily       No current facility-administered medications on file prior to visit.        CC: bilateral  knee pain    History: Patient returns today for osteoarthritis of bilateral  knee. We have been treating this conservatively with modification of activities, knee exercise program and Viscosupplementaion. They have noted improvement in symptoms of knee pain with viscosupplementation. It has been approximately 4 months since the last series of injections. They would like to have reinjection of bilateral  knee as it did provide good relief and they are beginning to have recurrence of their symptoms.      Physical exam:  Patient is pleasant and in no acute distress  No obvious bony deformity to the knee(s).  Good range of motion of knee(s)  No skin  normal...

## 2025-07-07 ENCOUNTER — APPOINTMENT (OUTPATIENT)
Dept: UROLOGY | Facility: CLINIC | Age: 85
End: 2025-07-07
Payer: MEDICARE

## 2025-07-07 VITALS
HEART RATE: 87 BPM | DIASTOLIC BLOOD PRESSURE: 62 MMHG | WEIGHT: 169 LBS | BODY MASS INDEX: 25.32 KG/M2 | HEIGHT: 68.5 IN | SYSTOLIC BLOOD PRESSURE: 145 MMHG

## 2025-07-07 PROCEDURE — 99213 OFFICE O/P EST LOW 20 MIN: CPT

## 2025-07-07 PROCEDURE — 51798 US URINE CAPACITY MEASURE: CPT

## 2025-07-10 ENCOUNTER — RESULT REVIEW (OUTPATIENT)
Age: 85
End: 2025-07-10

## 2025-07-10 ENCOUNTER — APPOINTMENT (OUTPATIENT)
Age: 85
End: 2025-07-10

## 2025-07-10 LAB
BASOPHILS # BLD AUTO: 0.04 K/UL — SIGNIFICANT CHANGE UP (ref 0–0.2)
BASOPHILS NFR BLD AUTO: 0.5 % — SIGNIFICANT CHANGE UP (ref 0–2)
EOSINOPHIL # BLD AUTO: 0.7 K/UL — HIGH (ref 0–0.5)
EOSINOPHIL NFR BLD AUTO: 8.4 % — HIGH (ref 0–6)
HCT VFR BLD CALC: 32.7 % — LOW (ref 39–50)
HGB BLD-MCNC: 10.1 G/DL — LOW (ref 13–17)
IMM GRANULOCYTES # BLD AUTO: 0.03 K/UL — SIGNIFICANT CHANGE UP (ref 0–0.07)
IMM GRANULOCYTES NFR BLD AUTO: 0.4 % — SIGNIFICANT CHANGE UP (ref 0–0.9)
LYMPHOCYTES # BLD AUTO: 0.74 K/UL — LOW (ref 1–3.3)
LYMPHOCYTES NFR BLD AUTO: 8.9 % — LOW (ref 13–44)
MCHC RBC-ENTMCNC: 25.9 PG — LOW (ref 27–34)
MCHC RBC-ENTMCNC: 30.9 G/DL — LOW (ref 32–36)
MCV RBC AUTO: 83.8 FL — SIGNIFICANT CHANGE UP (ref 80–100)
MONOCYTES # BLD AUTO: 0.85 K/UL — SIGNIFICANT CHANGE UP (ref 0–0.9)
MONOCYTES NFR BLD AUTO: 10.2 % — SIGNIFICANT CHANGE UP (ref 2–14)
NEUTROPHILS # BLD AUTO: 5.94 K/UL — SIGNIFICANT CHANGE UP (ref 1.8–7.4)
NEUTROPHILS NFR BLD AUTO: 71.6 % — SIGNIFICANT CHANGE UP (ref 43–77)
NRBC # BLD AUTO: 0 K/UL — SIGNIFICANT CHANGE UP (ref 0–0)
NRBC # FLD: 0 K/UL — SIGNIFICANT CHANGE UP (ref 0–0)
NRBC BLD AUTO-RTO: 0 /100 WBCS — SIGNIFICANT CHANGE UP (ref 0–0)
PLATELET # BLD AUTO: 270 K/UL — SIGNIFICANT CHANGE UP (ref 150–400)
PMV BLD: 10.6 FL — SIGNIFICANT CHANGE UP (ref 7–13)
RBC # BLD: 3.9 M/UL — LOW (ref 4.2–5.8)
RBC # FLD: 16.4 % — HIGH (ref 10.3–14.5)
WBC # BLD: 8.3 K/UL — SIGNIFICANT CHANGE UP (ref 3.8–10.5)
WBC # FLD AUTO: 8.3 K/UL — SIGNIFICANT CHANGE UP (ref 3.8–10.5)

## 2025-07-11 LAB
ALBUMIN SERPL ELPH-MCNC: 3.6 G/DL — SIGNIFICANT CHANGE UP (ref 3.3–5)
ALP SERPL-CCNC: 106 U/L — SIGNIFICANT CHANGE UP (ref 40–120)
ALT FLD-CCNC: 11 U/L — SIGNIFICANT CHANGE UP (ref 10–50)
ANION GAP SERPL CALC-SCNC: 9 MMOL/L — SIGNIFICANT CHANGE UP (ref 5–17)
AST SERPL-CCNC: 31 U/L — SIGNIFICANT CHANGE UP (ref 10–40)
BILIRUB SERPL-MCNC: 0.3 MG/DL — SIGNIFICANT CHANGE UP (ref 0.2–1.2)
BUN SERPL-MCNC: 20 MG/DL — SIGNIFICANT CHANGE UP (ref 7–23)
CALCIUM SERPL-MCNC: 9.5 MG/DL — SIGNIFICANT CHANGE UP (ref 8.4–10.5)
CEA SERPL-MCNC: 1.2 NG/ML — SIGNIFICANT CHANGE UP (ref 0–3.8)
CHLORIDE SERPL-SCNC: 105 MMOL/L — SIGNIFICANT CHANGE UP (ref 96–108)
CO2 SERPL-SCNC: 25 MMOL/L — SIGNIFICANT CHANGE UP (ref 22–31)
CREAT SERPL-MCNC: 1.11 MG/DL — SIGNIFICANT CHANGE UP (ref 0.5–1.3)
EGFR: 66 ML/MIN/1.73M2 — SIGNIFICANT CHANGE UP
EGFR: 66 ML/MIN/1.73M2 — SIGNIFICANT CHANGE UP
GLUCOSE SERPL-MCNC: 85 MG/DL — SIGNIFICANT CHANGE UP (ref 70–99)
POTASSIUM SERPL-MCNC: 4.4 MMOL/L — SIGNIFICANT CHANGE UP (ref 3.5–5.3)
POTASSIUM SERPL-SCNC: 4.4 MMOL/L — SIGNIFICANT CHANGE UP (ref 3.5–5.3)
PROT SERPL-MCNC: 7.2 G/DL — SIGNIFICANT CHANGE UP (ref 6–8.3)
SODIUM SERPL-SCNC: 140 MMOL/L — SIGNIFICANT CHANGE UP (ref 135–145)

## 2025-07-14 ENCOUNTER — NON-APPOINTMENT (OUTPATIENT)
Age: 85
End: 2025-07-14

## 2025-07-15 ENCOUNTER — APPOINTMENT (OUTPATIENT)
Dept: CARDIOLOGY | Facility: CLINIC | Age: 85
End: 2025-07-15
Payer: MEDICARE

## 2025-07-15 VITALS
SYSTOLIC BLOOD PRESSURE: 106 MMHG | HEART RATE: 81 BPM | BODY MASS INDEX: 25.02 KG/M2 | OXYGEN SATURATION: 95 % | HEIGHT: 68.5 IN | WEIGHT: 167 LBS | DIASTOLIC BLOOD PRESSURE: 64 MMHG

## 2025-07-15 PROBLEM — J90 PLEURAL EFFUSION, RIGHT: Status: ACTIVE | Noted: 2025-06-26

## 2025-07-15 PROCEDURE — 99214 OFFICE O/P EST MOD 30 MIN: CPT

## 2025-07-18 ENCOUNTER — APPOINTMENT (OUTPATIENT)
Dept: CARDIOLOGY | Facility: CLINIC | Age: 85
End: 2025-07-18
Payer: MEDICARE

## 2025-07-18 PROCEDURE — 93306 TTE W/DOPPLER COMPLETE: CPT

## 2025-07-22 ENCOUNTER — APPOINTMENT (OUTPATIENT)
Dept: CARDIOLOGY | Facility: CLINIC | Age: 85
End: 2025-07-22
Payer: MEDICARE

## 2025-07-22 DIAGNOSIS — I50.31 ACUTE DIASTOLIC (CONGESTIVE) HEART FAILURE: ICD-10-CM

## 2025-07-22 PROCEDURE — 99212 OFFICE O/P EST SF 10 MIN: CPT | Mod: 93

## 2025-07-22 RX ORDER — DAPAGLIFLOZIN 10 MG/1
10 TABLET, FILM COATED ORAL DAILY
Qty: 90 | Refills: 0 | Status: ACTIVE | COMMUNITY
Start: 2025-07-22 | End: 1900-01-01

## 2025-07-22 RX ORDER — SPIRONOLACTONE 25 MG/1
25 TABLET ORAL
Qty: 90 | Refills: 0 | Status: ACTIVE | COMMUNITY
Start: 2025-07-22 | End: 1900-01-01

## 2025-07-30 NOTE — PROCEDURE NOTE - NSTOLERANCE_GEN_A_CORE
Can you call her Monday and put her on with loren for f/u 6 weeks pls Patient tolerated procedure well.

## 2025-08-06 ENCOUNTER — APPOINTMENT (OUTPATIENT)
Dept: HEART FAILURE | Facility: CLINIC | Age: 85
End: 2025-08-06
Payer: MEDICARE

## 2025-08-06 VITALS
HEART RATE: 51 BPM | DIASTOLIC BLOOD PRESSURE: 54 MMHG | OXYGEN SATURATION: 99 % | SYSTOLIC BLOOD PRESSURE: 118 MMHG | BODY MASS INDEX: 24.57 KG/M2 | HEIGHT: 68.5 IN | WEIGHT: 164 LBS

## 2025-08-06 DIAGNOSIS — C34.90 MALIGNANT NEOPLASM OF UNSPECIFIED PART OF UNSPECIFIED BRONCHUS OR LUNG: ICD-10-CM

## 2025-08-06 DIAGNOSIS — I35.0 NONRHEUMATIC AORTIC (VALVE) STENOSIS: ICD-10-CM

## 2025-08-06 PROCEDURE — 99204 OFFICE O/P NEW MOD 45 MIN: CPT

## 2025-08-11 ENCOUNTER — NON-APPOINTMENT (OUTPATIENT)
Age: 85
End: 2025-08-11

## 2025-08-11 RX ORDER — EZETIMIBE 10 MG/1
10 TABLET ORAL DAILY
Qty: 90 | Refills: 0 | Status: ACTIVE | COMMUNITY
Start: 2025-08-11 | End: 1900-01-01

## 2025-08-12 ENCOUNTER — APPOINTMENT (OUTPATIENT)
Dept: CARDIOLOGY | Facility: CLINIC | Age: 85
End: 2025-08-12
Payer: MEDICARE

## 2025-08-12 VITALS
OXYGEN SATURATION: 95 % | BODY MASS INDEX: 24.42 KG/M2 | WEIGHT: 163 LBS | HEART RATE: 93 BPM | HEIGHT: 68.5 IN | SYSTOLIC BLOOD PRESSURE: 112 MMHG | DIASTOLIC BLOOD PRESSURE: 72 MMHG

## 2025-08-12 DIAGNOSIS — R07.9 CHEST PAIN, UNSPECIFIED: ICD-10-CM

## 2025-08-12 DIAGNOSIS — I35.0 NONRHEUMATIC AORTIC (VALVE) STENOSIS: ICD-10-CM

## 2025-08-12 DIAGNOSIS — I50.32 CHRONIC DIASTOLIC (CONGESTIVE) HEART FAILURE: ICD-10-CM

## 2025-08-12 DIAGNOSIS — I25.10 ATHEROSCLEROTIC HEART DISEASE OF NATIVE CORONARY ARTERY W/OUT ANGINA PECTORIS: ICD-10-CM

## 2025-08-12 DIAGNOSIS — R94.31 ABNORMAL ELECTROCARDIOGRAM [ECG] [EKG]: ICD-10-CM

## 2025-08-12 DIAGNOSIS — I48.91 UNSPECIFIED ATRIAL FIBRILLATION: ICD-10-CM

## 2025-08-12 PROCEDURE — 99214 OFFICE O/P EST MOD 30 MIN: CPT

## 2025-08-18 ENCOUNTER — OUTPATIENT (OUTPATIENT)
Dept: OUTPATIENT SERVICES | Facility: HOSPITAL | Age: 85
LOS: 1 days | End: 2025-08-18
Payer: MEDICARE

## 2025-08-18 ENCOUNTER — APPOINTMENT (OUTPATIENT)
Dept: CT IMAGING | Facility: CLINIC | Age: 85
End: 2025-08-18
Payer: MEDICARE

## 2025-08-18 DIAGNOSIS — C34.90 MALIGNANT NEOPLASM OF UNSPECIFIED PART OF UNSPECIFIED BRONCHUS OR LUNG: ICD-10-CM

## 2025-08-18 DIAGNOSIS — Z96.642 PRESENCE OF LEFT ARTIFICIAL HIP JOINT: Chronic | ICD-10-CM

## 2025-08-18 DIAGNOSIS — Z96.641 PRESENCE OF RIGHT ARTIFICIAL HIP JOINT: Chronic | ICD-10-CM

## 2025-08-18 PROCEDURE — 74177 CT ABD & PELVIS W/CONTRAST: CPT | Mod: 26

## 2025-08-18 PROCEDURE — 71260 CT THORAX DX C+: CPT | Mod: 26

## 2025-08-18 PROCEDURE — 71260 CT THORAX DX C+: CPT

## 2025-08-18 PROCEDURE — 74177 CT ABD & PELVIS W/CONTRAST: CPT

## 2025-08-19 ENCOUNTER — NON-APPOINTMENT (OUTPATIENT)
Age: 85
End: 2025-08-19

## 2025-08-26 ENCOUNTER — APPOINTMENT (OUTPATIENT)
Dept: HEMATOLOGY ONCOLOGY | Facility: CLINIC | Age: 85
End: 2025-08-26
Payer: MEDICARE

## 2025-08-26 ENCOUNTER — APPOINTMENT (OUTPATIENT)
Age: 85
End: 2025-08-26

## 2025-08-26 VITALS
HEIGHT: 68.5 IN | BODY MASS INDEX: 24.43 KG/M2 | HEART RATE: 82 BPM | WEIGHT: 163.05 LBS | OXYGEN SATURATION: 94 % | DIASTOLIC BLOOD PRESSURE: 52 MMHG | SYSTOLIC BLOOD PRESSURE: 91 MMHG

## 2025-08-26 DIAGNOSIS — I82.409 ACUTE EMBOLISM AND THROMBOSIS OF UNSPECIFIED DEEP VEINS OF UNSPECIFIED LOWER EXTREMITY: ICD-10-CM

## 2025-08-26 DIAGNOSIS — C34.90 MALIGNANT NEOPLASM OF UNSPECIFIED PART OF UNSPECIFIED BRONCHUS OR LUNG: ICD-10-CM

## 2025-08-26 DIAGNOSIS — I50.23 ACUTE ON CHRONIC SYSTOLIC (CONGESTIVE) HEART FAILURE: ICD-10-CM

## 2025-08-26 DIAGNOSIS — Z29.89 ENCOUNTER. FOR OTHER SPECIFIED PROPHYLACTIC MEASURES: ICD-10-CM

## 2025-08-26 DIAGNOSIS — G95.9 DISEASE OF SPINAL CORD, UNSPECIFIED: ICD-10-CM

## 2025-08-26 PROCEDURE — G2211 COMPLEX E/M VISIT ADD ON: CPT

## 2025-08-26 PROCEDURE — 99215 OFFICE O/P EST HI 40 MIN: CPT

## 2025-08-28 ENCOUNTER — APPOINTMENT (OUTPATIENT)
Dept: CARDIOLOGY | Facility: CLINIC | Age: 85
End: 2025-08-28
Payer: MEDICARE

## 2025-08-28 VITALS
WEIGHT: 162 LBS | BODY MASS INDEX: 24.27 KG/M2 | OXYGEN SATURATION: 98 % | HEART RATE: 87 BPM | HEIGHT: 68.5 IN | DIASTOLIC BLOOD PRESSURE: 60 MMHG | SYSTOLIC BLOOD PRESSURE: 94 MMHG

## 2025-08-28 DIAGNOSIS — I50.32 CHRONIC DIASTOLIC (CONGESTIVE) HEART FAILURE: ICD-10-CM

## 2025-08-28 DIAGNOSIS — J90 PLEURAL EFFUSION, NOT ELSEWHERE CLASSIFIED: ICD-10-CM

## 2025-08-28 DIAGNOSIS — I48.91 UNSPECIFIED ATRIAL FIBRILLATION: ICD-10-CM

## 2025-08-28 DIAGNOSIS — I45.10 UNSPECIFIED RIGHT BUNDLE-BRANCH BLOCK: ICD-10-CM

## 2025-08-28 DIAGNOSIS — I65.23 OCCLUSION AND STENOSIS OF BILATERAL CAROTID ARTERIES: ICD-10-CM

## 2025-08-28 DIAGNOSIS — I48.0 PAROXYSMAL ATRIAL FIBRILLATION: ICD-10-CM

## 2025-08-28 DIAGNOSIS — I25.10 ATHEROSCLEROTIC HEART DISEASE OF NATIVE CORONARY ARTERY W/OUT ANGINA PECTORIS: ICD-10-CM

## 2025-08-28 DIAGNOSIS — I35.0 NONRHEUMATIC AORTIC (VALVE) STENOSIS: ICD-10-CM

## 2025-08-28 DIAGNOSIS — R94.31 ABNORMAL ELECTROCARDIOGRAM [ECG] [EKG]: ICD-10-CM

## 2025-08-28 PROCEDURE — 99214 OFFICE O/P EST MOD 30 MIN: CPT

## 2025-09-02 ENCOUNTER — NON-APPOINTMENT (OUTPATIENT)
Age: 85
End: 2025-09-02

## (undated) DEVICE — DRAPE INSTRUMENT POUCH 6.75" X 11"

## (undated) DEVICE — ELCTR SUBDERMAL CORKSCREW NDL 1.2MM

## (undated) DEVICE — GOWN IMPERV XL

## (undated) DEVICE — ION PERIPHERAL VISION PROBE

## (undated) DEVICE — SOL IRR POUR H2O 1000ML

## (undated) DEVICE — DRSG TELFA 3 X 4

## (undated) DEVICE — FORCEP RADIAL JAW 4 PEDIATRIC W NDL 1.8MM 2MM 160CM DISP

## (undated) DEVICE — VALVE SUCTION EVIS 160/200/240

## (undated) DEVICE — MIDAS REX CLEARVIEW IRRIGATION TUBING SET

## (undated) DEVICE — NDL SPINAL 18G X 3.5" (PINK)

## (undated) DEVICE — NDL ASPIRATION VIZISHOT2 21G

## (undated) DEVICE — PACK ROBOTIC

## (undated) DEVICE — GLV 8 PROTEXIS (WHITE)

## (undated) DEVICE — DRSG TEGADERM 4X4.75"

## (undated) DEVICE — DRAPE 1/2 SHEET 40X57"

## (undated) DEVICE — DENTURE CUP PINK

## (undated) DEVICE — TUBING CONNECTING 6MM 20FT

## (undated) DEVICE — DRAPE TOWEL BLUE 17" X 24"

## (undated) DEVICE — DRAPE 3/4 SHEET 52X76"

## (undated) DEVICE — TRAP SPECIMEN SPUTUM 40CC

## (undated) DEVICE — MARKING PEN W RULER

## (undated) DEVICE — POSITIONER FOAM EGG CRATE ULNAR 2PCS (PINK)

## (undated) DEVICE — STOPCOCK 3 WAY W SWIVEL MALE LUER LOCK

## (undated) DEVICE — SOL IRR POUR NS 0.9% 1000ML

## (undated) DEVICE — ELCTR GROUNDING PAD ADULT COVIDIEN

## (undated) DEVICE — ION VISION PROBE ADAPTOR

## (undated) DEVICE — SSH-ERBE 26004501: Type: DURABLE MEDICAL EQUIPMENT

## (undated) DEVICE — VENODYNE/SCD SLEEVE CALF MEDIUM

## (undated) DEVICE — BALLOON SINGLE FOR BF-UC160F

## (undated) DEVICE — XI DRAPE ARM

## (undated) DEVICE — VISITEC 4X4

## (undated) DEVICE — DRAPE GENERAL ENDOSCOPY

## (undated) DEVICE — XI ARM FORCEP PROGRASP 8MM

## (undated) DEVICE — VALVE BIOPSY BRONCHOVIDEOSCOPE

## (undated) DEVICE — D HELP - CLEARVIEW CLEARIFY SYSTEM

## (undated) DEVICE — ION SWIVEL CONNECTOR

## (undated) DEVICE — GLV 6.5 PROTEXIS (WHITE)

## (undated) DEVICE — TAPE SILK 3"

## (undated) DEVICE — DRSG STERISTRIPS 0.5 X 4"

## (undated) DEVICE — DRAPE TOWEL 1000 SMALL 17" X 11"

## (undated) DEVICE — PACK NEURO

## (undated) DEVICE — DRAPE XL SHEET 77X98"

## (undated) DEVICE — SYR LUER LOK 10CC

## (undated) DEVICE — BRUSH CYTO DISP

## (undated) DEVICE — PACK IV START WITH CHG

## (undated) DEVICE — DRAPE SPLIT SHEET 77" X 108"

## (undated) DEVICE — WARMING BLANKET LOWER ADULT

## (undated) DEVICE — XI OBTURATOR OPTICAL BLADELESS 8MM

## (undated) DEVICE — XI SEAL UNIVERSIAL 5-12MM

## (undated) DEVICE — CATH IV SAFE BC 22G X 1" (BLUE)

## (undated) DEVICE — ELCTR BOVIE PENCIL BLADE 10FT

## (undated) DEVICE — SUT VICRYL 3-0 18" SH UNDYED (POP-OFF)

## (undated) DEVICE — STRYKER SONOPET IQ TUBING SET

## (undated) DEVICE — FOLEY TRAY 16FR LF URINE METER SURESTEP

## (undated) DEVICE — SYR IV FLUSH SALINE 10ML 30/TY

## (undated) DEVICE — MIDAS REX LEGEND MATCH HEAD FLUTED LG BORE 3.0MM X 14CM

## (undated) DEVICE — INSUFFLATION NDL COVIDIEN SURGINEEDLE VERESS 120MM

## (undated) DEVICE — POSITIONER FOAM LAMINECTOMY ARM CRADLE (PINK)

## (undated) DEVICE — DRAPE TOWEL BLUE STICKY

## (undated) DEVICE — XI ARM NEEDLE DRIVER SUTURECUT MEGA 8MM

## (undated) DEVICE — SPECIMEN TRAP 70ML

## (undated) DEVICE — ADAPTER BIOPSY VALVE

## (undated) DEVICE — ELCTR PEDICLE SCREW PROBE 3MM BALL 1.8MM X 100MM

## (undated) DEVICE — STAPLER SKIN PROXIMATE

## (undated) DEVICE — ELCTR SUBDERMAL NDL CLASSIC 1.5M X 59" (6 COLOR)

## (undated) DEVICE — DRAIN JACKSON PRATT 7MM FLAT FULL W 15 FR TROCAR

## (undated) DEVICE — ION CATHETER GUIDE

## (undated) DEVICE — ELCTR BOVIE PENCIL SMOKE EVACUATION 15FT

## (undated) DEVICE — CATH IV ANGIOCATH 18G X 1.16" (GREEN)

## (undated) DEVICE — TUBING FOR SMOKE EVACUATOR (PURPLE END)

## (undated) DEVICE — STRYKER SONOPET IQ TIP 12CM CLAW

## (undated) DEVICE — SSH-EBUS 1111243: Type: DURABLE MEDICAL EQUIPMENT

## (undated) DEVICE — SUT VICRYL 0 27" UR-6

## (undated) DEVICE — SPONGE PEANUT AUTO COUNT

## (undated) DEVICE — DRSG MEPILEX 10 X 10CM (4 X 4") AG

## (undated) DEVICE — ELCTR BOVIE TIP BLADE INSULATED 2.75" EDGE

## (undated) DEVICE — SUT VICRYL 2-0 18" CT-2 (POP-OFF)

## (undated) DEVICE — DRAIN JACKSON PRATT 3 SPRING RESERVOIR W 7FR PVC DRAIN

## (undated) DEVICE — POSITIONER JACKSON TABLE HEADREST 7", CHEST, HIP, THIGH PADS, ARM CRADLE

## (undated) DEVICE — DRAPE MAYO STAND 23"

## (undated) DEVICE — ELCTR BIPOLAR PROBE

## (undated) DEVICE — SUT VICRYL 0 18" CT-1 UNDYED (POP-OFF)

## (undated) DEVICE — DRAIN JACKSON PRATT 7FR ROUND END NO TROCAR

## (undated) DEVICE — PREP DURAPREP 26CC

## (undated) DEVICE — MIDAS REX MR8 MATCH HEAD FLUTED SM BORE 3MM X 10CM

## (undated) DEVICE — XI CORD MONOPOLAR CAUTERY (GREEN)

## (undated) DEVICE — SUT VLOC 180 3-0 6" V-20 GREEN

## (undated) DEVICE — ELCTR MONOPOLAR STIMULATOR PROBE FLUSH-TIP

## (undated) DEVICE — ELCTR SUBDERMAL NDL 27G X 1/2" WITH TWISTED PAIR

## (undated) DEVICE — GOWN ROYAL SILK XL

## (undated) DEVICE — SUT NYLON 3-0 18" PS-2

## (undated) DEVICE — GLV 8 PROTEXIS (BLUE)

## (undated) DEVICE — BITE BLOCK ADULT 20 X 27MM (GREEN)

## (undated) DEVICE — DRSG DERMABOND 0.7ML

## (undated) DEVICE — XI DRAPE COLUMN

## (undated) DEVICE — FRAZIER CONNECTING TUBE 2FT 5MM

## (undated) DEVICE — TROCAR COVIDIEN VERSAPORT BLADELESS OPTICAL 5MM STANDARD

## (undated) DEVICE — ION BIOPSY NEEDLE 19G

## (undated) DEVICE — ION SENSOR CONNECTION CLEANER

## (undated) DEVICE — ION BIOPSY NEEDLE 23G

## (undated) DEVICE — XI SCISSOR TIP COVER

## (undated) DEVICE — XI ARM SCISSOR MONO CURVED

## (undated) DEVICE — ION FULLY ARTICULATING CATHETER

## (undated) DEVICE — TUBING SUCTION CONN 6FT STERILE

## (undated) DEVICE — DRAPE C ARM UNIVERSAL

## (undated) DEVICE — STRYKER SONOPET IQ TIP 11CM APEX KNIFE

## (undated) DEVICE — SUT MONOCRYL 4-0 27" PS-2 UNDYED

## (undated) DEVICE — ION VISION PROBE BAG

## (undated) DEVICE — SOL INJ NS 0.9% 100ML

## (undated) DEVICE — DRAPE MICROSCOPE ZEISS OPMI VISIONGUARD 154 X 52"

## (undated) DEVICE — NDL ASPIRATION VIZISHOT2 22G

## (undated) DEVICE — SYR CONTROL LUER LOK 10CC

## (undated) DEVICE — WARMING BLANKET UPPER ADULT

## (undated) DEVICE — WOUND IRR SURGIPHOR

## (undated) DEVICE — DRAPE LARGE SHEET 72X85"

## (undated) DEVICE — SUT VICRYL 2-0 18" CP-2 UNDYED (POP-OFF)

## (undated) DEVICE — ION BIOPSY NEEDLE 21G

## (undated) DEVICE — SUT MONOCRYL 3-0 27" PS-2 UNDYED

## (undated) DEVICE — GLV 7.5 PROTEXIS (WHITE)

## (undated) DEVICE — PACK BASIC GOWN MAYO COVER

## (undated) DEVICE — GLV 6.5 PROTEXIS (BLUE)